# Patient Record
Sex: FEMALE | Race: BLACK OR AFRICAN AMERICAN | NOT HISPANIC OR LATINO
[De-identification: names, ages, dates, MRNs, and addresses within clinical notes are randomized per-mention and may not be internally consistent; named-entity substitution may affect disease eponyms.]

---

## 2017-04-13 PROBLEM — Z00.00 ENCOUNTER FOR PREVENTIVE HEALTH EXAMINATION: Status: ACTIVE | Noted: 2017-04-13

## 2017-05-09 ENCOUNTER — APPOINTMENT (OUTPATIENT)
Dept: OTOLARYNGOLOGY | Facility: CLINIC | Age: 57
End: 2017-05-09

## 2017-10-16 ENCOUNTER — APPOINTMENT (OUTPATIENT)
Dept: OTOLARYNGOLOGY | Facility: HOSPITAL | Age: 57
End: 2017-10-16

## 2017-10-30 ENCOUNTER — INPATIENT (INPATIENT)
Facility: HOSPITAL | Age: 57
LOS: 0 days | Discharge: HOME | End: 2017-10-31
Attending: OTOLARYNGOLOGY | Admitting: OTOLARYNGOLOGY
Payer: MEDICAID

## 2017-10-30 ENCOUNTER — APPOINTMENT (OUTPATIENT)
Dept: OTOLARYNGOLOGY | Facility: HOSPITAL | Age: 57
End: 2017-10-30

## 2017-10-30 ENCOUNTER — RESULT REVIEW (OUTPATIENT)
Age: 57
End: 2017-10-30

## 2017-10-30 VITALS
HEIGHT: 64 IN | HEART RATE: 74 BPM | SYSTOLIC BLOOD PRESSURE: 113 MMHG | OXYGEN SATURATION: 98 % | TEMPERATURE: 98 F | RESPIRATION RATE: 18 BRPM | DIASTOLIC BLOOD PRESSURE: 84 MMHG

## 2017-10-30 DIAGNOSIS — Z93.0 TRACHEOSTOMY STATUS: Chronic | ICD-10-CM

## 2017-10-30 DIAGNOSIS — J39.8 OTHER SPECIFIED DISEASES OF UPPER RESPIRATORY TRACT: ICD-10-CM

## 2017-10-30 PROCEDURE — 31613 TRACHEOSTOMA REVJ SIMPLE: CPT

## 2017-10-30 PROCEDURE — 31571 LARYNGOSCOP W/VC INJ + SCOPE: CPT

## 2017-10-30 PROCEDURE — 31641 BRONCHOSCOPY TREAT BLOCKAGE: CPT

## 2017-10-30 PROCEDURE — 88305 TISSUE EXAM BY PATHOLOGIST: CPT | Mod: 26

## 2017-10-30 RX ORDER — ASPIRIN/CALCIUM CARB/MAGNESIUM 324 MG
81 TABLET ORAL DAILY
Qty: 0 | Refills: 0 | Status: DISCONTINUED | OUTPATIENT
Start: 2017-10-30 | End: 2017-10-31

## 2017-10-30 RX ORDER — MORPHINE SULFATE 50 MG/1
2 CAPSULE, EXTENDED RELEASE ORAL EVERY 4 HOURS
Qty: 0 | Refills: 0 | Status: DISCONTINUED | OUTPATIENT
Start: 2017-10-30 | End: 2017-10-31

## 2017-10-30 RX ORDER — MIRTAZAPINE 45 MG/1
15 TABLET, ORALLY DISINTEGRATING ORAL DAILY
Qty: 0 | Refills: 0 | Status: DISCONTINUED | OUTPATIENT
Start: 2017-10-30 | End: 2017-10-31

## 2017-10-30 RX ORDER — HEPARIN SODIUM 5000 [USP'U]/ML
5000 INJECTION INTRAVENOUS; SUBCUTANEOUS EVERY 12 HOURS
Qty: 0 | Refills: 0 | Status: DISCONTINUED | OUTPATIENT
Start: 2017-10-30 | End: 2017-10-31

## 2017-10-30 RX ORDER — SODIUM CHLORIDE 9 MG/ML
1000 INJECTION, SOLUTION INTRAVENOUS
Qty: 0 | Refills: 0 | Status: DISCONTINUED | OUTPATIENT
Start: 2017-10-30 | End: 2017-10-31

## 2017-10-30 RX ORDER — ACETAMINOPHEN 500 MG
650 TABLET ORAL ONCE
Qty: 0 | Refills: 0 | Status: COMPLETED | OUTPATIENT
Start: 2017-10-30 | End: 2017-10-30

## 2017-10-30 RX ORDER — CLONAZEPAM 1 MG
0.5 TABLET ORAL EVERY 8 HOURS
Qty: 0 | Refills: 0 | Status: DISCONTINUED | OUTPATIENT
Start: 2017-10-30 | End: 2017-10-31

## 2017-10-30 RX ORDER — ALBUTEROL 90 UG/1
2 AEROSOL, METERED ORAL EVERY 6 HOURS
Qty: 0 | Refills: 0 | Status: DISCONTINUED | OUTPATIENT
Start: 2017-10-30 | End: 2017-10-30

## 2017-10-30 RX ORDER — CLONAZEPAM 1 MG
0.5 TABLET ORAL DAILY
Qty: 0 | Refills: 0 | Status: DISCONTINUED | OUTPATIENT
Start: 2017-10-30 | End: 2017-10-30

## 2017-10-30 RX ORDER — IPRATROPIUM/ALBUTEROL SULFATE 18-103MCG
3 AEROSOL WITH ADAPTER (GRAM) INHALATION EVERY 6 HOURS
Qty: 0 | Refills: 0 | Status: DISCONTINUED | OUTPATIENT
Start: 2017-10-30 | End: 2017-10-31

## 2017-10-30 RX ORDER — ALBUTEROL 90 UG/1
2.5 AEROSOL, METERED ORAL ONCE
Qty: 0 | Refills: 0 | Status: COMPLETED | OUTPATIENT
Start: 2017-10-30 | End: 2017-10-30

## 2017-10-30 RX ORDER — ACETAMINOPHEN 500 MG
650 TABLET ORAL EVERY 6 HOURS
Qty: 0 | Refills: 0 | Status: DISCONTINUED | OUTPATIENT
Start: 2017-10-30 | End: 2017-10-31

## 2017-10-30 RX ORDER — IPRATROPIUM BROMIDE 0.2 MG/ML
1 SOLUTION, NON-ORAL INHALATION EVERY 6 HOURS
Qty: 0 | Refills: 0 | Status: DISCONTINUED | OUTPATIENT
Start: 2017-10-30 | End: 2017-10-30

## 2017-10-30 RX ORDER — AMLODIPINE BESYLATE 2.5 MG/1
5 TABLET ORAL DAILY
Qty: 0 | Refills: 0 | Status: DISCONTINUED | OUTPATIENT
Start: 2017-10-30 | End: 2017-10-31

## 2017-10-30 RX ORDER — OXYCODONE AND ACETAMINOPHEN 5; 325 MG/1; MG/1
2 TABLET ORAL EVERY 6 HOURS
Qty: 0 | Refills: 0 | Status: DISCONTINUED | OUTPATIENT
Start: 2017-10-30 | End: 2017-10-31

## 2017-10-30 RX ORDER — FENTANYL CITRATE 50 UG/ML
25 INJECTION INTRAVENOUS
Qty: 0 | Refills: 0 | Status: DISCONTINUED | OUTPATIENT
Start: 2017-10-30 | End: 2017-10-31

## 2017-10-30 RX ORDER — ONDANSETRON 8 MG/1
4 TABLET, FILM COATED ORAL ONCE
Qty: 0 | Refills: 0 | Status: DISCONTINUED | OUTPATIENT
Start: 2017-10-30 | End: 2017-10-31

## 2017-10-30 RX ADMIN — FENTANYL CITRATE 25 MICROGRAM(S): 50 INJECTION INTRAVENOUS at 18:30

## 2017-10-30 RX ADMIN — ALBUTEROL 2.5 MILLIGRAM(S): 90 AEROSOL, METERED ORAL at 19:47

## 2017-10-30 RX ADMIN — FENTANYL CITRATE 25 MICROGRAM(S): 50 INJECTION INTRAVENOUS at 19:10

## 2017-10-30 RX ADMIN — FENTANYL CITRATE 25 MICROGRAM(S): 50 INJECTION INTRAVENOUS at 23:49

## 2017-10-30 RX ADMIN — Medication 1 MILLIGRAM(S): at 18:30

## 2017-10-30 NOTE — CONSULT NOTE ADULT - ASSESSMENT
57 year old woman s/p direct laryngoscopy and tracheoplasty, SICU consult for respiratory care    Plan:    Neuro: post-op pain  pain control tylenol and oxycodone    Respiratory: s/p tracheostomy revision  continue current vent settings    CV: hypertension  continue norvasc    GI: no active issues  diet per ENT    : no active issues    Heme: DVT ppx  SQH    Dispo: PACU overnight

## 2017-10-30 NOTE — H&P ADULT - NSHPPHYSICALEXAM_GEN_ALL_CORE
Constitutional:. wheel-chair bound, on oxygen through trach, no retractions or distress.     Neck:. trach tube one-half out from stoma, loose trach ties. parotid gland, submandibular gland and thyroid glands are normal. temporomandibular joint is normal.     Ear: external ears are normal bilaterally and tympanic membranes are normal in both ears.     Nasal:  external appearance is normal, inferior turbinates and middle turbinates are normal and no abnormal secretions.     Oral Cavity: tongue is normal, teeth are normal, gums are normal, palatine tonsils are normal, lingual tonsils are normal, mucosa is normal and trachea located in midline position.   Larynx: laryngoscopy was performed, see procedure section for findings.     Head/Face: no masses and lesions seen, face is symmetric . salivary glands are normal.     Eyes: ocular motility and gaze are normal, extraocular movements are normal and no nystagmus.     Respiratory: assessment of respiratory effort is normal.       Lymphatic: palpation of lymph nodes is normal and no neck adenopathy.     Neurological: cranial nerves 2-12 intact and orientation to person, place, and time: normal.     Skin: no rashes.   Additional Findings: moderately raspy voice.

## 2017-10-30 NOTE — H&P ADULT - ASSESSMENT
57yF with chronic tracheostomy dependence, LTS, for repair in OR. She needs antibiotics now for acute tracheitis and we will plan for elective DLB with excision stenosis. Pulmonary clearance prior to OR. This can be done at [facility] or [facility].

## 2017-10-30 NOTE — H&P ADULT - HISTORY OF PRESENT ILLNESS
LnsycHygpldi378Ghc FuspjVdimaxw149Uplfm RagtiPhyuwki659Cue AmlnMktxYbrhl74Wtwcp 57yF with long h/o trach dependence pulmonary disease, laryngotracheal stneosis, last surgery at [facility] more than one year ago. She has been having a hard time breathing for the last few months and is requiring intermittent vent. The trach has been coming out and is difficult to replace for the patinet. Mild bleeding from trach, no significant amount, but frequent small blood tinged sputum coughed out from trach. She has been taking acetylcysteine for secretions, feels better on oxygen, though she does not desaturate off oxygen. PzmiUrqmNbald10Oef OvrxrOqnmard2Mxnhl KzmzsRmethic6Vtm IwhncJgxsegy797Dxtdz CegawUlifbrl534Ahm ZwbvnOncqkwr692Bscjv CbnfrAconuqj951Nvv LgtbsTljmmwz277Cyfzq CdnzpOufldzz468Qef [id number]

## 2017-10-30 NOTE — CONSULT NOTE ADULT - SUBJECTIVE AND OBJECTIVE BOX
HISTORY OF PRESENT ILLNESS:  PATRIZIA NAVARRETE is a 57y Female with long h/o trach dependence pulmonary disease, laryngotracheal stenosis, last surgery at [facility] more than one year ago. She has been having a hard time breathing for the last few months and is requiring intermittent vent. The trach has been coming out and is difficult to replace for the patient. Mild bleeding from trach, no significant amount, but frequent small blood tinged sputum coughed out from trach. She has been taking acetylcysteine for secretions, feels better on oxygen, though she does not desaturate off oxygen.     Today she underwent       PAST MEDICAL HISTORY: Tracheostomy dependent      PAST SURGICAL HISTORY: Dependence on tracheostomy      FAMILY HISTORY:     SOCIAL HISTORY:    CODE STATUS:     HOME MEDICATIONS:    ALLERGIES: No Known Allergies      VITAL SIGNS:  ICU Vital Signs Last 24 Hrs  T(C): 36.4 (30 Oct 2017 16:40), Max: 36.9 (30 Oct 2017 13:45)  T(F): 97.5 (30 Oct 2017 16:40), Max: 98.4 (30 Oct 2017 13:45)  HR: 72 (30 Oct 2017 18:30) (57 - 84)  BP: 118/75 (30 Oct 2017 18:30) (102/79 - 137/74)  BP(mean): --  ABP: --  ABP(mean): --  RR: 15 (30 Oct 2017 18:30) (10 - 19)  SpO2: 100% (30 Oct 2017 18:30) (98% - 100%)      NEURO  Exam:  Meds:acetaminophen   Tablet 650 milliGRAM(s) Oral every 6 hours PRN mild pain  acetaminophen   Tablet 650 milliGRAM(s) Oral once  clonazePAM  Oral Tab/Cap - Peds 0.5 milliGRAM(s) Oral daily  fentaNYL    Injectable 25 MICROGram(s) IV Push every 5 minutes PRN Moderate Pain  mirtazapine 15 milliGRAM(s) Oral daily  morphine  - Injectable 2 milliGRAM(s) IV Push every 4 hours PRN Severe Pain (7 - 10)  ondansetron Injectable 4 milliGRAM(s) IV Push once PRN Nausea and/or Vomiting  oxyCODONE    5 mG/acetaminophen 325 mG 2 Tablet(s) Oral every 6 hours PRN Moderate Pain (4 - 6)      RESPIRATORY  Mechanical Ventilation:     Exam:  Meds:ALBUTerol    0.083%. 2.5 milliGRAM(s) Nebulizer once  ALBUTerol    90 MICROgram(s) HFA Inhaler 2 Puff(s) Inhalation every 6 hours PRN Shortness of Breath  ipratropium 17 MICROgram(s) HFA Inhaler 1 Puff(s) Inhalation every 6 hours      CARDIOVASCULAR    Exam:  Cardiac Rhythm:  Meds:amLODIPine   Tablet 5 milliGRAM(s) Oral daily      GI/NUTRITION  Exam:  Diet:  Meds:    GENITOURINARY/RENAL  Meds:lactated ringers. 1000 milliLiter(s) IV Continuous <Continuous>              [ ] Reich catheter, indication: urine output monitoring in critically ill patient    HEMATOLOGIC  [ ] VTE Prophylaxis:  aspirin  chewable 81 milliGRAM(s) Oral daily  heparin  Injectable 5000 Unit(s) SubCutaneous every 12 hours        Transfusion: [ ] PRBC	[ ] Platelets	[ ] FFP	[ ] Cryoprecipitate      INFECTIOUS DISEASES  Meds:  RECENT CULTURES:      ENDOCRINE  Meds:  CAPILLARY BLOOD GLUCOSE          PATIENT CARE ACCESS DEVICES:  [ ] Peripheral IV  [ ] Central Venous Line	[ ] R	[ ] L	[ ] IJ	[ ] Fem	[ ] SC	Placed:   [ ] Arterial Line		[ ] R	[ ] L	[ ] Fem	[ ] Rad	[ ] Ax	Placed:   [ ] PICC:					[ ] Mediport  [ ] Urinary Catheter, Date Placed:   [x] Necessity of urinary, arterial, and venous catheters discussed    OTHER MEDICATIONS:     IMAGING STUDIES: HISTORY OF PRESENT ILLNESS:  PATRIZIA NAVARRETE is a 57y Female with long h/o trach dependence pulmonary disease, laryngotracheal stenosis, last surgery at [facility] more than one year ago. She has been having a hard time breathing for the last few months and is requiring intermittent vent. The trach has been coming out and is difficult to replace for the patient. Mild bleeding from trach, no significant amount, but frequent small blood tinged sputum coughed out from trach. She has been taking acetylcysteine for secretions, feels better on oxygen, though she does not desaturate off oxygen.     Today she underwent direct laryngoscopy and tracheoplasty. The procedure was un      PAST MEDICAL HISTORY: Tracheostomy dependent      PAST SURGICAL HISTORY: tracheostomy  CODE STATUS: Full Code     HOME MEDICATIONS: aspirin 81 mg, amlodipine 5 mg daily    ALLERGIES: No Known Allergies      VITAL SIGNS:  ICU Vital Signs Last 24 Hrs  T(C): 36.4 (30 Oct 2017 16:40), Max: 36.9 (30 Oct 2017 13:45)  T(F): 97.5 (30 Oct 2017 16:40), Max: 98.4 (30 Oct 2017 13:45)  HR: 72 (30 Oct 2017 18:30) (57 - 84)  BP: 118/75 (30 Oct 2017 18:30) (102/79 - 137/74)  BP(mean): --  ABP: --  ABP(mean): --  RR: 15 (30 Oct 2017 18:30) (10 - 19)  SpO2: 100% (30 Oct 2017 18:30) (98% - 100%)      NEURO  Exam: patient currently agitated, complaining that cuff is too tight, moving all extremities equally  Meds:acetaminophen   Tablet 650 milliGRAM(s) Oral every 6 hours PRN mild pain  acetaminophen   Tablet 650 milliGRAM(s) Oral once  clonazePAM  Oral Tab/Cap - Peds 0.5 milliGRAM(s) Oral daily  fentaNYL    Injectable 25 MICROGram(s) IV Push every 5 minutes PRN Moderate Pain  mirtazapine 15 milliGRAM(s) Oral daily  morphine  - Injectable 2 milliGRAM(s) IV Push every 4 hours PRN Severe Pain (7 - 10)  ondansetron Injectable 4 milliGRAM(s) IV Push once PRN Nausea and/or Vomiting  oxyCODONE    5 mG/acetaminophen 325 mG 2 Tablet(s) Oral every 6 hours PRN Moderate Pain (4 - 6)      RESPIRATORY  Mechanical Ventilation:     Exam:  Meds:ALBUTerol    0.083%. 2.5 milliGRAM(s) Nebulizer once  ALBUTerol    90 MICROgram(s) HFA Inhaler 2 Puff(s) Inhalation every 6 hours PRN Shortness of Breath  ipratropium 17 MICROgram(s) HFA Inhaler 1 Puff(s) Inhalation every 6 hours      CARDIOVASCULAR    Exam: S1, S2 heard  Cardiac Rhythm: regular rate and rhythm  Meds:amLODIPine   Tablet 5 milliGRAM(s) Oral daily      GI/NUTRITION  Exam: soft, nontender, nondistended  Diet: currently NPO, activatable CLD  Meds: none    GENITOURINARY/RENAL  Meds:lactated ringers. 1000 milliLiter(s) IV Continuous <Continuous>              [ ] Reich catheter, indication: urine output monitoring in critically ill patient- N/A    HEMATOLOGIC  [x] VTE Prophylaxis:  aspirin  chewable 81 milliGRAM(s) Oral daily  heparin  Injectable 5000 Unit(s) SubCutaneous every 12 hours        Transfusion: [ ] PRBC	[ ] Platelets	[ ] FFP	[ ] Cryoprecipitate- none      INFECTIOUS DISEASES  Meds: none  RECENT CULTURES: none      ENDOCRINE  Meds: none  CAPILLARY BLOOD GLUCOSE          PATIENT CARE ACCESS DEVICES:  [x] Peripheral IV  [ ] Central Venous Line	[ ] R	[ ] L	[ ] IJ	[ ] Fem	[ ] SC	Placed:   [ ] Arterial Line		[ ] R	[ ] L	[ ] Fem	[ ] Rad	[ ] Ax	Placed:   [ ] PICC:					[ ] Mediport  [ ] Urinary Catheter, Date Placed:   [x] Necessity of urinary, arterial, and venous catheters discussed HISTORY OF PRESENT ILLNESS:  PATRIZIA NAVARRETE is a 57y Female with long h/o trach dependence pulmonary disease, laryngotracheal stenosis, last surgery at [facility] more than one year ago. She has been having a hard time breathing for the last few months and is requiring intermittent vent. The trach has been coming out and is difficult to replace for the patient. Mild bleeding from trach, no significant amount, but frequent small blood tinged sputum coughed out from trach. She has been taking acetylcysteine for secretions, feels better on oxygen, though she does not desaturate off oxygen.     Today she underwent direct laryngoscopy and tracheoplasty. The procedure was un      PAST MEDICAL HISTORY: Tracheostomy dependent, obesity, hypertension, anxiety, COPD, dyspepsia      PAST SURGICAL HISTORY: tracheostomy in 2007, stomaplasty september 2016  CODE STATUS: Full Code     HOME MEDICATIONS: aspirin 81 mg, amlodipine 10 mg daily, prednisone PRN, albuterol+  ipratropium nebs, lasix 20 mg PO qd, zantac 150 mg PO bid, clonazepam .5 mg PO bid, 15 mg PO qhs mirtazapine    ALLERGIES: No Known Allergies      VITAL SIGNS:  ICU Vital Signs Last 24 Hrs  T(C): 36.4 (30 Oct 2017 16:40), Max: 36.9 (30 Oct 2017 13:45)  T(F): 97.5 (30 Oct 2017 16:40), Max: 98.4 (30 Oct 2017 13:45)  HR: 72 (30 Oct 2017 18:30) (57 - 84)  BP: 118/75 (30 Oct 2017 18:30) (102/79 - 137/74)  BP(mean): --  ABP: --  ABP(mean): --  RR: 15 (30 Oct 2017 18:30) (10 - 19)  SpO2: 100% (30 Oct 2017 18:30) (98% - 100%)      NEURO  Exam: patient currently agitated, complaining that cuff is too tight, moving all extremities equally  Meds:acetaminophen   Tablet 650 milliGRAM(s) Oral every 6 hours PRN mild pain  acetaminophen   Tablet 650 milliGRAM(s) Oral once  clonazePAM  Oral Tab/Cap - Peds 0.5 milliGRAM(s) Oral daily  fentaNYL    Injectable 25 MICROGram(s) IV Push every 5 minutes PRN Moderate Pain  mirtazapine 15 milliGRAM(s) Oral daily  morphine  - Injectable 2 milliGRAM(s) IV Push every 4 hours PRN Severe Pain (7 - 10)  ondansetron Injectable 4 milliGRAM(s) IV Push once PRN Nausea and/or Vomiting  oxyCODONE    5 mG/acetaminophen 325 mG 2 Tablet(s) Oral every 6 hours PRN Moderate Pain (4 - 6)      RESPIRATORY  Mechanical Ventilation: rate 12, volume 500 peep 5 FiO2 40%    Exam:  Meds:ALBUTerol    0.083%. 2.5 milliGRAM(s) Nebulizer once  ALBUTerol    90 MICROgram(s) HFA Inhaler 2 Puff(s) Inhalation every 6 hours PRN Shortness of Breath  ipratropium 17 MICROgram(s) HFA Inhaler 1 Puff(s) Inhalation every 6 hours      CARDIOVASCULAR    Exam: S1, S2 heard  Cardiac Rhythm: regular rate and rhythm  Meds:amLODIPine   Tablet 5 milliGRAM(s) Oral daily  per outpatient records, EF 55-60%      GI/NUTRITION  Exam: soft, nontender, nondistended  Diet: currently NPO, activatable CLD  Meds: none    GENITOURINARY/RENAL  Meds:lactated ringers. 1000 milliLiter(s) IV Continuous <Continuous>              [ ] Reich catheter, indication: urine output monitoring in critically ill patient- N/A    HEMATOLOGIC  [x] VTE Prophylaxis:  aspirin  chewable 81 milliGRAM(s) Oral daily  heparin  Injectable 5000 Unit(s) SubCutaneous every 12 hours        Transfusion: [ ] PRBC	[ ] Platelets	[ ] FFP	[ ] Cryoprecipitate- none      INFECTIOUS DISEASES  Meds: none  RECENT CULTURES: none      ENDOCRINE  Meds: none  CAPILLARY BLOOD GLUCOSE          PATIENT CARE ACCESS DEVICES:  [x] Peripheral IV  [ ] Central Venous Line	[ ] R	[ ] L	[ ] IJ	[ ] Fem	[ ] SC	Placed:   [ ] Arterial Line		[ ] R	[ ] L	[ ] Fem	[ ] Rad	[ ] Ax	Placed:   [ ] PICC:					[ ] Mediport  [ ] Urinary Catheter, Date Placed:   [x] Necessity of urinary, arterial, and venous catheters discussed

## 2017-10-31 ENCOUNTER — TRANSCRIPTION ENCOUNTER (OUTPATIENT)
Age: 57
End: 2017-10-31

## 2017-10-31 VITALS
HEART RATE: 90 BPM | SYSTOLIC BLOOD PRESSURE: 151 MMHG | OXYGEN SATURATION: 100 % | RESPIRATION RATE: 15 BRPM | DIASTOLIC BLOOD PRESSURE: 90 MMHG

## 2017-10-31 LAB
ALBUMIN SERPL ELPH-MCNC: 4.3 G/DL — SIGNIFICANT CHANGE UP (ref 3.3–5)
ALP SERPL-CCNC: 62 U/L — SIGNIFICANT CHANGE UP (ref 40–120)
ALT FLD-CCNC: 9 U/L — SIGNIFICANT CHANGE UP (ref 4–33)
APTT BLD: 45 SEC — HIGH (ref 27.5–37.4)
AST SERPL-CCNC: 17 U/L — SIGNIFICANT CHANGE UP (ref 4–32)
BASOPHILS # BLD AUTO: 0.01 K/UL — SIGNIFICANT CHANGE UP (ref 0–0.2)
BASOPHILS NFR BLD AUTO: 0.1 % — SIGNIFICANT CHANGE UP (ref 0–2)
BILIRUB SERPL-MCNC: 0.5 MG/DL — SIGNIFICANT CHANGE UP (ref 0.2–1.2)
BUN SERPL-MCNC: 8 MG/DL — SIGNIFICANT CHANGE UP (ref 7–23)
CALCIUM SERPL-MCNC: 9.5 MG/DL — SIGNIFICANT CHANGE UP (ref 8.4–10.5)
CHLORIDE SERPL-SCNC: 102 MMOL/L — SIGNIFICANT CHANGE UP (ref 98–107)
CO2 SERPL-SCNC: 29 MMOL/L — SIGNIFICANT CHANGE UP (ref 22–31)
CREAT SERPL-MCNC: 0.66 MG/DL — SIGNIFICANT CHANGE UP (ref 0.5–1.3)
EOSINOPHIL # BLD AUTO: 0 K/UL — SIGNIFICANT CHANGE UP (ref 0–0.5)
EOSINOPHIL NFR BLD AUTO: 0 % — SIGNIFICANT CHANGE UP (ref 0–6)
GLUCOSE SERPL-MCNC: 140 MG/DL — HIGH (ref 70–99)
HCT VFR BLD CALC: 34.3 % — LOW (ref 34.5–45)
HGB BLD-MCNC: 10.7 G/DL — LOW (ref 11.5–15.5)
IMM GRANULOCYTES # BLD AUTO: 0.03 # — SIGNIFICANT CHANGE UP
IMM GRANULOCYTES NFR BLD AUTO: 0.4 % — SIGNIFICANT CHANGE UP (ref 0–1.5)
INR BLD: 1.09 — SIGNIFICANT CHANGE UP (ref 0.88–1.17)
LYMPHOCYTES # BLD AUTO: 1.19 K/UL — SIGNIFICANT CHANGE UP (ref 1–3.3)
LYMPHOCYTES # BLD AUTO: 14.6 % — SIGNIFICANT CHANGE UP (ref 13–44)
MAGNESIUM SERPL-MCNC: 1.6 MG/DL — SIGNIFICANT CHANGE UP (ref 1.6–2.6)
MCHC RBC-ENTMCNC: 27.1 PG — SIGNIFICANT CHANGE UP (ref 27–34)
MCHC RBC-ENTMCNC: 31.2 % — LOW (ref 32–36)
MCV RBC AUTO: 86.8 FL — SIGNIFICANT CHANGE UP (ref 80–100)
MONOCYTES # BLD AUTO: 0.08 K/UL — SIGNIFICANT CHANGE UP (ref 0–0.9)
MONOCYTES NFR BLD AUTO: 1 % — LOW (ref 2–14)
NEUTROPHILS # BLD AUTO: 6.85 K/UL — SIGNIFICANT CHANGE UP (ref 1.8–7.4)
NEUTROPHILS NFR BLD AUTO: 83.9 % — HIGH (ref 43–77)
NRBC # FLD: 0 — SIGNIFICANT CHANGE UP
PHOSPHATE SERPL-MCNC: 1.9 MG/DL — LOW (ref 2.5–4.5)
PLATELET # BLD AUTO: 286 K/UL — SIGNIFICANT CHANGE UP (ref 150–400)
PMV BLD: 11.3 FL — SIGNIFICANT CHANGE UP (ref 7–13)
POTASSIUM SERPL-MCNC: 4.2 MMOL/L — SIGNIFICANT CHANGE UP (ref 3.5–5.3)
POTASSIUM SERPL-SCNC: 4.2 MMOL/L — SIGNIFICANT CHANGE UP (ref 3.5–5.3)
PROT SERPL-MCNC: 7.6 G/DL — SIGNIFICANT CHANGE UP (ref 6–8.3)
PROTHROM AB SERPL-ACNC: 12.2 SEC — SIGNIFICANT CHANGE UP (ref 9.8–13.1)
RBC # BLD: 3.95 M/UL — SIGNIFICANT CHANGE UP (ref 3.8–5.2)
RBC # FLD: 13.8 % — SIGNIFICANT CHANGE UP (ref 10.3–14.5)
SODIUM SERPL-SCNC: 144 MMOL/L — SIGNIFICANT CHANGE UP (ref 135–145)
WBC # BLD: 8.16 K/UL — SIGNIFICANT CHANGE UP (ref 3.8–10.5)
WBC # FLD AUTO: 8.16 K/UL — SIGNIFICANT CHANGE UP (ref 3.8–10.5)

## 2017-10-31 RX ORDER — IPRATROPIUM/ALBUTEROL SULFATE 18-103MCG
3 AEROSOL WITH ADAPTER (GRAM) INHALATION
Qty: 0 | Refills: 0 | COMMUNITY
Start: 2017-10-31

## 2017-10-31 RX ORDER — CLONAZEPAM 1 MG
1 TABLET ORAL
Qty: 0 | Refills: 0 | COMMUNITY
Start: 2017-10-31

## 2017-10-31 RX ORDER — AMLODIPINE BESYLATE 2.5 MG/1
1 TABLET ORAL
Qty: 0 | Refills: 0 | COMMUNITY
Start: 2017-10-31

## 2017-10-31 RX ORDER — ASPIRIN/CALCIUM CARB/MAGNESIUM 324 MG
1 TABLET ORAL
Qty: 0 | Refills: 0 | COMMUNITY
Start: 2017-10-31

## 2017-10-31 RX ADMIN — FENTANYL CITRATE 25 MICROGRAM(S): 50 INJECTION INTRAVENOUS at 00:00

## 2017-10-31 RX ADMIN — Medication 3 MILLILITER(S): at 11:59

## 2017-10-31 RX ADMIN — OXYCODONE AND ACETAMINOPHEN 2 TABLET(S): 5; 325 TABLET ORAL at 05:10

## 2017-10-31 RX ADMIN — OXYCODONE AND ACETAMINOPHEN 2 TABLET(S): 5; 325 TABLET ORAL at 13:20

## 2017-10-31 RX ADMIN — Medication 3 MILLILITER(S): at 05:03

## 2017-10-31 RX ADMIN — OXYCODONE AND ACETAMINOPHEN 2 TABLET(S): 5; 325 TABLET ORAL at 04:44

## 2017-10-31 RX ADMIN — Medication 3 MILLILITER(S): at 00:00

## 2017-10-31 RX ADMIN — AMLODIPINE BESYLATE 5 MILLIGRAM(S): 2.5 TABLET ORAL at 05:00

## 2017-10-31 NOTE — DISCHARGE NOTE ADULT - CARE PROVIDER_API CALL
Gaurang Rodriguez (MD; PhD), Otolaryngology  [facility]  Dept of Otolaryngology  [address] [place] [id number]  Phone: [phone]  Fax: [phone]

## 2017-10-31 NOTE — DISCHARGE NOTE ADULT - HOSPITAL COURSE
direct laryngoscopy and stoma revision direct laryngoscopy and stoma revision. will be weaned off the ventilator by pulmonology. Unable to tolerate cuff down and finger occlusion longer than 1 minute.

## 2017-10-31 NOTE — DISCHARGE NOTE ADULT - CONDITIONS AT DISCHARGE
STABLE. WILL NEED TO GO HOME WITH HOME VENTILATOR. WILL NEED TO SEE LUNG DOCTOR TO BE WEANED OFF THE VENTILATOR. WILL NEED TO SEE DR. MEDINA IN OFFICE AS WELL.

## 2017-10-31 NOTE — DISCHARGE NOTE ADULT - CARE PLAN
Principal Discharge DX:	Tracheostomy dependent  Goal:	tracheostomy stoma revision  Instructions for follow-up, activity and diet:	clears

## 2017-10-31 NOTE — PROVIDER CONTACT NOTE (OTHER) - ASSESSMENT
Social work contacted due to patient going to be dischared today after medical clearance in PACU bed 2.  Pt resides alone in an apartment and is vent dependent.  NETTIE set up ALS ambulance transport for 2pm via [facility] ambulance.  ref# [id number] via [facility].  [facility] trip#[id number].  Pt's home care RN Rose Mary contacted regarding patient being restarted today for RN visits.  Rose Mary Rn T# [phone].  Rose Mary aware pt is leaving at 2pm,  if any changes occur RN will be notified.

## 2017-10-31 NOTE — DISCHARGE NOTE ADULT - PATIENT PORTAL LINK FT
“You can access the [employer] Patient Portal, offered by [facility], by registering with the following website: [email or web address]”

## 2017-10-31 NOTE — PROGRESS NOTE ADULT - SUBJECTIVE AND OBJECTIVE BOX
SICU Daily Progress Note  =====================================================  Interval/Overnight Events: nil acute overnight  appears comfortable and drowsy this morning, unable to talk, indicating pain to head and neck and dry mouth    POD #  1        	SICU Day #  2    HPI:  57y Female with long h/o trach dependence pulmonary disease, laryngotracheal stenosis, last surgery at [facility] more than one year ago. She has been having a hard time breathing for the last few months and is requiring intermittent vent. The trach has been coming out and is difficult to replace for the patient. Mild bleeding from trach, no significant amount, but frequent small blood tinged sputum coughed out from trach. She has been taking acetylcysteine for secretions, feels better on oxygen, though she does not desaturate off oxygen.     s/p direct laryngoscopy and tracheoplasty 10/30/17    PMH: Tracheostomy dependent, obesity, hypertension, anxiety, COPD, dyspepsia    Allergies: No Known Allergies      MEDICATIONS:   --------------------------------------------------------------------------------------  Neurologic Medications  acetaminophen   Tablet 650 milliGRAM(s) Oral every 6 hours PRN mild pain  clonazePAM Oral Disintegrating Tablet 0.5 milliGRAM(s) Oral every 8 hours PRN anxiety  mirtazapine 15 milliGRAM(s) Oral daily  morphine  - Injectable 2 milliGRAM(s) IV Push every 4 hours PRN Severe Pain (7 - 10)  ondansetron Injectable 4 milliGRAM(s) IV Push once PRN Nausea and/or Vomiting  oxyCODONE    5 mG/acetaminophen 325 mG 2 Tablet(s) Oral every 6 hours PRN Moderate Pain (4 - 6)    Respiratory Medications  ALBUTerol/ipratropium for Nebulization 3 milliLiter(s) Nebulizer every 6 hours    Cardiovascular Medications  amLODIPine   Tablet 5 milliGRAM(s) Oral daily    Gastrointestinal Medications  lactated ringers. 1000 milliLiter(s) IV Continuous <Continuous>    Genitourinary Medications    Hematologic/Oncologic Medications  aspirin  chewable 81 milliGRAM(s) Oral daily  heparin  Injectable 5000 Unit(s) SubCutaneous every 12 hours    Antimicrobial/Immunologic Medications    Endocrine/Metabolic Medications    Topical/Other Medications    --------------------------------------------------------------------------------------    VITAL SIGNS, INS/OUTS (last 24 hours):  --------------------------------------------------------------------------------------  ICU Vital Signs Last 24 Hrs  T(C): 37.1 (31 Oct 2017 05:00), Max: 37.1 (31 Oct 2017 05:00)  T(F): 98.7 (31 Oct 2017 05:00), Max: 98.7 (31 Oct 2017 05:00)  HR: 78 (31 Oct 2017 07:00) (57 - 112)  BP: 157/87 (31 Oct 2017 07:00) (102/79 - 161/95)  BP(mean): --  ABP: --  ABP(mean): --  RR: 12 (31 Oct 2017 07:00) (10 - 24)  SpO2: 100% (31 Oct 2017 07:00) (95% - 100%)    I&O's Detail    30 Oct 2017 07:01  -  31 Oct 2017 07:00  --------------------------------------------------------  IN:    lactated ringers.: 750 mL    Oral Fluid: 515 mL  Total IN: 1265 mL    OUT:    Voided: 1075 mL  Total OUT: 1075 mL    Total NET: 190 mL      31 Oct 2017 07:01  -  31 Oct 2017 07:45  --------------------------------------------------------  IN:    lactated ringers.: 75 mL  Total IN: 75 mL    OUT:  Total OUT: 0 mL    Total NET: 75 mL        --------------------------------------------------------------------------------------    EXAM  NEUROLOGY  RASS: -1  	GCS:    Exam: easily rousable, follows instructions, orientation could not be established, moving all limbs    HEENT  Exam: Normocephalic, atraumatic, EOMI. tracheostomy in place    RESPIRATORY  Exam: Lungs clear to auscultation, Normal expansion/effort.  Mechanical Ventilation: Mode: SIMV with PS, RR (machine): 12, TV (machine): 500, FiO2: 40, PEEP: 5, PS: 10, MAP: 9.8, PIP: 26    CARDIOVASCULAR  Exam: S1, S2.  Regular rate and rhythm.    GI/NUTRITION  Exam: Abdomen soft, Non-tender, Distended.  Wound: nil  Current Diet: clear liquid    VASCULAR  Exam: Extremities warm, well-perfused.    MUSCULOSKELETAL  Exam: All extremities moving spontaneously without limitations.    SKIN  Exam: Good skin turgor, no skin breakdown. ***    METABOLIC/FLUIDS/ELECTROLYTES  lactated ringers. 1000 milliLiter(s) IV Continuous <Continuous>      HEMATOLOGIC  [x] VTE Prophylaxis: aspirin  chewable 81 milliGRAM(s) Oral daily  heparin  Injectable 5000 Unit(s) SubCutaneous every 12 hours    Transfusions:	[] PRBC	[] Platelets		[] FFP	[] Cryoprecipitate    INFECTIOUS DISEASE  Antimicrobials/Immunologic Medications: nil    Tubes/Lines/Drains  ***  [x] Peripheral IV  [] Central Venous Line     	[] R	[] L	[] IJ	[] Fem	[] SC	Date Placed:   [] Arterial Line		[] R	[] L	[] Fem	[] Rad	[] Ax	Date Placed:   [] PICC		[] Midline		[] Mediport  [] Urinary Catheter		Date Placed:   [x] Necessity of urinary, arterial, and venous catheters discussed    LABS  --------------------------------------------------------------------------------------                                            10.7                  Neurophils% (auto):   83.9   (10-31 @ 04:54):    8.16 )-----------(286          Lymphocytes% (auto):  14.6                                          34.3                   Eosinphils% (auto):   0.0      Manual%: Neutrophils x    ; Lymphocytes x    ; Eosinophils x    ; Bands%: x    ; Blasts x          10-31    144  |  102  |  8   ----------------------------<  140<H>  4.2   |  29  |  0.66    Ca    9.5      31 Oct 2017 04:54  Phos  1.9     10-31  Mg     1.6     10-31    TPro  7.6  /  Alb  4.3  /  TBili  0.5  /  DBili  x   /  AST  17  /  ALT  9   /  AlkPhos  62  10-31    ( 10-31 @ 04:54 )   PT: 12.2 SEC;   INR: 1.09   aPTT: 45.0 SEC        RECENT CULTURES:      --------------------------------------------------------------------------------------    OTHER LABORATORY:     IMAGING STUDIES:
ANESTHESIA POSTOP CHECK    57y Female POSTOP DAY 1 S/P excision of trachael stoma  pod1    Vital Signs Last 24 Hrs  T(C): 37.1 (31 Oct 2017 05:00), Max: 37.1 (31 Oct 2017 05:00)  T(F): 98.7 (31 Oct 2017 05:00), Max: 98.7 (31 Oct 2017 05:00)  HR: 82 (31 Oct 2017 08:00) (57 - 112)  BP: 145/78 (31 Oct 2017 08:00) (102/79 - 161/95)  BP(mean): --  RR: 16 (31 Oct 2017 08:00) (10 - 24)  SpO2: 100% (31 Oct 2017 08:00) (95% - 100%)  I&O's Summary    30 Oct 2017 07:01  -  31 Oct 2017 07:00  --------------------------------------------------------  IN: 1265 mL / OUT: 1075 mL / NET: 190 mL    31 Oct 2017 07:01  -  31 Oct 2017 09:40  --------------------------------------------------------  IN: 325 mL / OUT: 325 mL / NET: 0 mL        [x ] NO APPARENT ANESTHESIA COMPLICATIONS      Comments:
Pt seen and examined, No acute event overnight.    Vss  Awake, interactive  On vent  Face symmetric  OC/OP pink moist mucosa, clear secretions  Neck with 6LPC secured with soft collar  Minimal peristomal oozing, no bleeding  Mild secretions    A/P 57F, long term vent dependent s/p DLB stomaplasty  -	Pain control  -	Vent   -	Home meds  -	Dispo planning  -	Routine trach care

## 2017-11-02 ENCOUNTER — TRANSCRIPTION ENCOUNTER (OUTPATIENT)
Age: 57
End: 2017-11-02

## 2017-11-08 RX ORDER — OXYCODONE AND ACETAMINOPHEN 5; 325 MG/1; MG/1
5-325 TABLET ORAL
Qty: 21 | Refills: 0 | Status: COMPLETED | COMMUNITY
Start: 2017-11-08 | End: 2017-11-08

## 2017-11-16 ENCOUNTER — APPOINTMENT (OUTPATIENT)
Dept: OTOLARYNGOLOGY | Facility: CLINIC | Age: 57
End: 2017-11-16

## 2017-11-17 ENCOUNTER — APPOINTMENT (OUTPATIENT)
Dept: OTOLARYNGOLOGY | Facility: CLINIC | Age: 57
End: 2017-11-17
Payer: MEDICAID

## 2017-11-17 VITALS
HEART RATE: 57 BPM | TEMPERATURE: 98.3 F | HEIGHT: 64 IN | SYSTOLIC BLOOD PRESSURE: 173 MMHG | DIASTOLIC BLOOD PRESSURE: 92 MMHG

## 2017-11-17 PROCEDURE — 31575 DIAGNOSTIC LARYNGOSCOPY: CPT | Mod: 58,59

## 2017-11-17 PROCEDURE — 99024 POSTOP FOLLOW-UP VISIT: CPT

## 2017-11-17 PROCEDURE — 31615 TRCHEOBRNCHSC EST TRACHS INC: CPT | Mod: 58

## 2017-12-21 ENCOUNTER — APPOINTMENT (OUTPATIENT)
Dept: OTOLARYNGOLOGY | Facility: CLINIC | Age: 57
End: 2017-12-21
Payer: MEDICAID

## 2017-12-21 ENCOUNTER — OUTPATIENT (OUTPATIENT)
Dept: OUTPATIENT SERVICES | Facility: HOSPITAL | Age: 57
LOS: 1 days | Discharge: HOME | End: 2017-12-21

## 2017-12-21 DIAGNOSIS — Z93.0 TRACHEOSTOMY STATUS: Chronic | ICD-10-CM

## 2017-12-21 PROCEDURE — 99024 POSTOP FOLLOW-UP VISIT: CPT

## 2017-12-21 PROCEDURE — 31615 TRCHEOBRNCHSC EST TRACHS INC: CPT | Mod: 78

## 2017-12-21 PROCEDURE — 17250 CHEM CAUT OF GRANLTJ TISSUE: CPT | Mod: 78

## 2018-01-05 ENCOUNTER — APPOINTMENT (OUTPATIENT)
Dept: OTOLARYNGOLOGY | Facility: CLINIC | Age: 58
End: 2018-01-05
Payer: MEDICAID

## 2018-01-05 VITALS — HEART RATE: 64 BPM | OXYGEN SATURATION: 99 % | SYSTOLIC BLOOD PRESSURE: 144 MMHG | DIASTOLIC BLOOD PRESSURE: 91 MMHG

## 2018-01-05 PROCEDURE — 31615 TRCHEOBRNCHSC EST TRACHS INC: CPT | Mod: 58

## 2018-01-05 PROCEDURE — 99024 POSTOP FOLLOW-UP VISIT: CPT

## 2018-01-19 ENCOUNTER — APPOINTMENT (OUTPATIENT)
Dept: OTOLARYNGOLOGY | Facility: CLINIC | Age: 58
End: 2018-01-19

## 2018-02-08 ENCOUNTER — APPOINTMENT (OUTPATIENT)
Dept: OTOLARYNGOLOGY | Facility: CLINIC | Age: 58
End: 2018-02-08
Payer: MEDICAID

## 2018-02-08 PROCEDURE — 99214 OFFICE O/P EST MOD 30 MIN: CPT | Mod: 25

## 2018-02-08 PROCEDURE — 31615 TRCHEOBRNCHSC EST TRACHS INC: CPT

## 2018-02-22 ENCOUNTER — APPOINTMENT (OUTPATIENT)
Dept: PULMONOLOGY | Facility: CLINIC | Age: 58
End: 2018-02-22
Payer: MEDICAID

## 2018-02-22 VITALS
OXYGEN SATURATION: 98 % | RESPIRATION RATE: 17 BRPM | SYSTOLIC BLOOD PRESSURE: 152 MMHG | HEIGHT: 68 IN | DIASTOLIC BLOOD PRESSURE: 100 MMHG | WEIGHT: 190 LBS | HEART RATE: 70 BPM | BODY MASS INDEX: 28.79 KG/M2

## 2018-02-22 DIAGNOSIS — Z86.79 PERSONAL HISTORY OF OTHER DISEASES OF THE CIRCULATORY SYSTEM: ICD-10-CM

## 2018-02-22 DIAGNOSIS — Z87.19 PERSONAL HISTORY OF OTHER DISEASES OF THE DIGESTIVE SYSTEM: ICD-10-CM

## 2018-02-22 DIAGNOSIS — Z82.3 FAMILY HISTORY OF STROKE: ICD-10-CM

## 2018-02-22 DIAGNOSIS — Z83.2 FAMILY HISTORY OF DISEASES OF THE BLOOD AND BLOOD-FORMING ORGANS AND CERTAIN DISORDERS INVOLVING THE IMMUNE MECHANISM: ICD-10-CM

## 2018-02-22 DIAGNOSIS — Z82.79 FAMILY HISTORY OF OTHER CONGENITAL MALFORMATIONS, DEFORMATIONS AND CHROMOSOMAL ABNORMALITIES: ICD-10-CM

## 2018-02-22 DIAGNOSIS — Z87.09 PERSONAL HISTORY OF OTHER DISEASES OF THE RESPIRATORY SYSTEM: ICD-10-CM

## 2018-02-22 DIAGNOSIS — Z80.6 FAMILY HISTORY OF LEUKEMIA: ICD-10-CM

## 2018-02-22 DIAGNOSIS — Z72.820 SLEEP DEPRIVATION: ICD-10-CM

## 2018-02-22 DIAGNOSIS — F41.9 ANXIETY DISORDER, UNSPECIFIED: ICD-10-CM

## 2018-02-22 DIAGNOSIS — Z87.891 PERSONAL HISTORY OF NICOTINE DEPENDENCE: ICD-10-CM

## 2018-02-22 DIAGNOSIS — Z87.440 PERSONAL HISTORY OF URINARY (TRACT) INFECTIONS: ICD-10-CM

## 2018-02-22 PROCEDURE — 99205 OFFICE O/P NEW HI 60 MIN: CPT

## 2018-02-22 RX ORDER — MULTIVITAMIN
TABLET ORAL
Refills: 0 | Status: ACTIVE | COMMUNITY

## 2018-02-22 RX ORDER — LOSARTAN POTASSIUM 25 MG/1
25 TABLET, FILM COATED ORAL
Refills: 0 | Status: ACTIVE | COMMUNITY

## 2018-02-22 RX ORDER — AMLODIPINE BESYLATE 5 MG/1
5 TABLET ORAL
Refills: 0 | Status: ACTIVE | COMMUNITY

## 2018-02-22 RX ORDER — CLONAZEPAM 0.5 MG/1
0.5 TABLET ORAL
Refills: 0 | Status: ACTIVE | COMMUNITY

## 2018-02-27 ENCOUNTER — MEDICATION RENEWAL (OUTPATIENT)
Age: 58
End: 2018-02-27

## 2018-02-27 RX ORDER — THEOPHYLLINE ANHYDROUS 300 MG/1
300 CAPSULE, EXTENDED RELEASE ORAL
Qty: 90 | Refills: 1 | Status: DISCONTINUED | COMMUNITY
Start: 2018-02-22 | End: 2018-02-27

## 2018-03-14 ENCOUNTER — FORM ENCOUNTER (OUTPATIENT)
Age: 58
End: 2018-03-14

## 2018-03-15 ENCOUNTER — APPOINTMENT (OUTPATIENT)
Dept: CT IMAGING | Facility: IMAGING CENTER | Age: 58
End: 2018-03-15
Payer: MEDICAID

## 2018-03-15 ENCOUNTER — OUTPATIENT (OUTPATIENT)
Dept: OUTPATIENT SERVICES | Facility: HOSPITAL | Age: 58
LOS: 1 days | End: 2018-03-15
Payer: MEDICAID

## 2018-03-15 DIAGNOSIS — Z93.0 TRACHEOSTOMY STATUS: Chronic | ICD-10-CM

## 2018-03-15 DIAGNOSIS — J39.8 OTHER SPECIFIED DISEASES OF UPPER RESPIRATORY TRACT: ICD-10-CM

## 2018-03-15 PROCEDURE — 71250 CT THORAX DX C-: CPT | Mod: 26

## 2018-03-15 PROCEDURE — 71250 CT THORAX DX C-: CPT

## 2018-03-19 ENCOUNTER — APPOINTMENT (OUTPATIENT)
Dept: PULMONOLOGY | Facility: CLINIC | Age: 58
End: 2018-03-19
Payer: MEDICAID

## 2018-03-19 VITALS
SYSTOLIC BLOOD PRESSURE: 126 MMHG | HEIGHT: 68 IN | HEART RATE: 63 BPM | WEIGHT: 190 LBS | DIASTOLIC BLOOD PRESSURE: 80 MMHG | OXYGEN SATURATION: 100 % | BODY MASS INDEX: 28.79 KG/M2

## 2018-03-19 PROCEDURE — 99214 OFFICE O/P EST MOD 30 MIN: CPT

## 2018-03-20 DIAGNOSIS — J04.10 ACUTE TRACHEITIS WITHOUT OBSTRUCTION: ICD-10-CM

## 2018-03-20 DIAGNOSIS — J98.9 RESPIRATORY DISORDER, UNSPECIFIED: ICD-10-CM

## 2018-03-20 DIAGNOSIS — J95.00 UNSPECIFIED TRACHEOSTOMY COMPLICATION: ICD-10-CM

## 2018-03-22 ENCOUNTER — APPOINTMENT (OUTPATIENT)
Dept: THORACIC SURGERY | Facility: CLINIC | Age: 58
End: 2018-03-22
Payer: MEDICAID

## 2018-03-22 ENCOUNTER — INPATIENT (INPATIENT)
Facility: HOSPITAL | Age: 58
LOS: 3 days | Discharge: HOME | DRG: 166 | End: 2018-03-26
Attending: THORACIC SURGERY (CARDIOTHORACIC VASCULAR SURGERY) | Admitting: THORACIC SURGERY (CARDIOTHORACIC VASCULAR SURGERY)
Payer: COMMERCIAL

## 2018-03-22 VITALS
HEART RATE: 57 BPM | HEIGHT: 64 IN | TEMPERATURE: 97.7 F | DIASTOLIC BLOOD PRESSURE: 74 MMHG | BODY MASS INDEX: 45.24 KG/M2 | OXYGEN SATURATION: 98 % | RESPIRATION RATE: 18 BRPM | WEIGHT: 265 LBS | SYSTOLIC BLOOD PRESSURE: 134 MMHG

## 2018-03-22 VITALS
HEART RATE: 76 BPM | RESPIRATION RATE: 18 BRPM | DIASTOLIC BLOOD PRESSURE: 85 MMHG | OXYGEN SATURATION: 98 % | TEMPERATURE: 98 F | SYSTOLIC BLOOD PRESSURE: 129 MMHG

## 2018-03-22 DIAGNOSIS — Z43.0 ENCOUNTER FOR ATTENTION TO TRACHEOSTOMY: Chronic | ICD-10-CM

## 2018-03-22 DIAGNOSIS — Z01.811 ENCOUNTER FOR PREPROCEDURAL RESPIRATORY EXAMINATION: ICD-10-CM

## 2018-03-22 DIAGNOSIS — Z93.0 TRACHEOSTOMY STATUS: Chronic | ICD-10-CM

## 2018-03-22 LAB
ALBUMIN SERPL ELPH-MCNC: 4.4 G/DL — SIGNIFICANT CHANGE UP (ref 3.3–5)
ALP SERPL-CCNC: 59 U/L — SIGNIFICANT CHANGE UP (ref 40–120)
ALT FLD-CCNC: 8 U/L — LOW (ref 10–45)
ANION GAP SERPL CALC-SCNC: 8 MMOL/L — SIGNIFICANT CHANGE UP (ref 5–17)
APTT BLD: 65.7 SEC — HIGH (ref 27.5–37.4)
AST SERPL-CCNC: 13 U/L — SIGNIFICANT CHANGE UP (ref 10–40)
BASOPHILS NFR BLD AUTO: 0.6 % — SIGNIFICANT CHANGE UP (ref 0–2)
BILIRUB SERPL-MCNC: 0.5 MG/DL — SIGNIFICANT CHANGE UP (ref 0.2–1.2)
BUN SERPL-MCNC: 5 MG/DL — LOW (ref 7–23)
CALCIUM SERPL-MCNC: 9.2 MG/DL — SIGNIFICANT CHANGE UP (ref 8.4–10.5)
CHLORIDE SERPL-SCNC: 101 MMOL/L — SIGNIFICANT CHANGE UP (ref 96–108)
CO2 SERPL-SCNC: 35 MMOL/L — HIGH (ref 22–31)
CREAT SERPL-MCNC: 0.73 MG/DL — SIGNIFICANT CHANGE UP (ref 0.5–1.3)
EOSINOPHIL NFR BLD AUTO: 3.6 % — SIGNIFICANT CHANGE UP (ref 0–6)
GLUCOSE SERPL-MCNC: 99 MG/DL — SIGNIFICANT CHANGE UP (ref 70–99)
HCT VFR BLD CALC: 33.8 % — LOW (ref 34.5–45)
HGB BLD-MCNC: 10.9 G/DL — LOW (ref 11.5–15.5)
INR BLD: 1.15 — SIGNIFICANT CHANGE UP (ref 0.88–1.16)
LYMPHOCYTES # BLD AUTO: 37.9 % — SIGNIFICANT CHANGE UP (ref 13–44)
MCHC RBC-ENTMCNC: 27.9 PG — SIGNIFICANT CHANGE UP (ref 27–34)
MCHC RBC-ENTMCNC: 32.2 G/DL — SIGNIFICANT CHANGE UP (ref 32–36)
MCV RBC AUTO: 86.4 FL — SIGNIFICANT CHANGE UP (ref 80–100)
MONOCYTES NFR BLD AUTO: 7.4 % — SIGNIFICANT CHANGE UP (ref 2–14)
NEUTROPHILS NFR BLD AUTO: 50.5 % — SIGNIFICANT CHANGE UP (ref 43–77)
PLATELET # BLD AUTO: 282 K/UL — SIGNIFICANT CHANGE UP (ref 150–400)
POTASSIUM SERPL-MCNC: 4 MMOL/L — SIGNIFICANT CHANGE UP (ref 3.5–5.3)
POTASSIUM SERPL-SCNC: 4 MMOL/L — SIGNIFICANT CHANGE UP (ref 3.5–5.3)
PROT SERPL-MCNC: 7.8 G/DL — SIGNIFICANT CHANGE UP (ref 6–8.3)
PROTHROM AB SERPL-ACNC: 12.8 SEC — HIGH (ref 9.8–12.7)
RBC # BLD: 3.91 M/UL — SIGNIFICANT CHANGE UP (ref 3.8–5.2)
RBC # FLD: 13.8 % — SIGNIFICANT CHANGE UP (ref 10.3–16.9)
SODIUM SERPL-SCNC: 144 MMOL/L — SIGNIFICANT CHANGE UP (ref 135–145)
WBC # BLD: 6.6 K/UL — SIGNIFICANT CHANGE UP (ref 3.8–10.5)
WBC # FLD AUTO: 6.6 K/UL — SIGNIFICANT CHANGE UP (ref 3.8–10.5)

## 2018-03-22 PROCEDURE — 99205 OFFICE O/P NEW HI 60 MIN: CPT

## 2018-03-22 PROCEDURE — 99285 EMERGENCY DEPT VISIT HI MDM: CPT

## 2018-03-22 PROCEDURE — 71045 X-RAY EXAM CHEST 1 VIEW: CPT | Mod: 26

## 2018-03-22 RX ORDER — IPRATROPIUM/ALBUTEROL SULFATE 18-103MCG
3 AEROSOL WITH ADAPTER (GRAM) INHALATION ONCE
Qty: 0 | Refills: 0 | Status: COMPLETED | OUTPATIENT
Start: 2018-03-22 | End: 2018-03-22

## 2018-03-22 RX ORDER — CHLORHEXIDINE GLUCONATE 213 G/1000ML
1 SOLUTION TOPICAL ONCE
Qty: 0 | Refills: 0 | Status: COMPLETED | OUTPATIENT
Start: 2018-03-22 | End: 2018-03-22

## 2018-03-22 RX ORDER — CHLORHEXIDINE GLUCONATE 213 G/1000ML
10 SOLUTION TOPICAL ONCE
Qty: 0 | Refills: 0 | Status: DISCONTINUED | OUTPATIENT
Start: 2018-03-22 | End: 2018-03-26

## 2018-03-22 RX ORDER — OXYCODONE AND ACETAMINOPHEN 5; 325 MG/1; MG/1
1 TABLET ORAL EVERY 6 HOURS
Qty: 0 | Refills: 0 | Status: DISCONTINUED | OUTPATIENT
Start: 2018-03-22 | End: 2018-03-26

## 2018-03-22 RX ADMIN — OXYCODONE AND ACETAMINOPHEN 1 TABLET(S): 5; 325 TABLET ORAL at 23:28

## 2018-03-22 RX ADMIN — Medication 3 MILLILITER(S): at 18:50

## 2018-03-22 NOTE — ED PROVIDER NOTE - OBJECTIVE STATEMENT
57 y/o female with hx of HTN, COPD, Anxiety, tracheal, bronchial malacia was evaluated earlier today by CT surgery team for episode of chocking, pain at site of tracheal stent. Concern is that trachea will need dilatation.

## 2018-03-22 NOTE — ED ADULT NURSE NOTE - PMH
Anxiety disorder    COPD (chronic obstructive pulmonary disease)    Hypertension    Pancreatic cyst    Sickle cell trait

## 2018-03-22 NOTE — H&P ADULT - NSHPLABSRESULTS_GEN_ALL_CORE
< from: CT Chest No Cont (03.15.18 @ 13:41) >  FINDINGS:   AIRWAYS AND LUNGS: Approximately 65% narrowing of the trachea with   dynamic expiration. Anti--dependent 10 x 5 mm nodule along the tracheal   wall just cranial to the [place] (2, 43) that focally nearly occludes the   trachea on dynamic expiration. The complete collapseof the proximal   bronchi with dynamic expiration. Linear opacities within the right upper   lobe may represent scarring. Tracheostomy tube.  MEDIASTINUM AND PLEURA: There are no enlarged mediastinal, hilar or   axillary lymph nodes.     There is no pleural effusion. There is no   pneumothorax.    HEART AND VESSELS: The heart is normal in size.  There are no   atherosclerotic calcifications of the aorta.  There is no pericardial   effusion.       UPPER ABDOMEN: Images of the upper abdomen demonstrate no abnormality.   BONES AND SOFT TISSUES: There are mild degenerative changes of the spine.    The soft tissues are unremarkable.  TUBES/LINES: None.  IMPRESSION:   Bronchomalacia. Approximately 65% narrowing of the trachea with dynamic   expiration, correlate clinically for tracheomalacia.   Anti--dependent 10 x 5 mm nodule along the tracheal wall just cranial to   the [place] that focally nearly occludes the trachea on dynamic   expiration. Given tracheostomy tube this may represent adherent   secretions although tracheal wall versus aortic etiologies are not   excluded. Consider short-term follow-up CTA versus direct visualization   for complete evaluation.

## 2018-03-22 NOTE — H&P ADULT - NSHPREVIEWOFSYSTEMS_GEN_ALL_CORE
Review of Systems  CONSTITUTIONAL:  Denies Fevers / chills, sweats, fatigue, weight loss, weight gain                                      NEURO:  Denies parathesias, seizures, syncope, confusion                                                                                EYES:  Denies Blurry vision, discharge, pain, loss of vision                                                                                    ENMT:  Denies Difficulty hearing, vertigo, dysphagia, epistaxis, recent dental work                                       CV:  Denies Chest pain, palpitations, INFANTE, orthopnea                                                                                          RESPIRATORY:  +SOB, -Wheezing,  -cough / sputum, -hemoptysis                                                                GI:  Denies Nausea, vommiting, diarrhea, constipation, melena, difficulty swallowing                                               : Denies Hematuria, dysuria, urgency, incontinence                                                                                         MUSKULOSKELETAL:  Denies arthritis, joint swelling, muscle weakness                                                             SKIN/BREAST:  Denies rash, itching, jam loss, masses                                                                                            PSYCH:  Denies depresion, anxiety, suicidal ideation                                                                                               HEME/LYMPH:  Denies bruises easily, enlarged lymph nodes, tender lymph nodes                                        ENDOCRINE:  Denies cold intolerance, heat intolerance, polydipsia

## 2018-03-22 NOTE — H&P ADULT - NSHPPHYSICALEXAM_GEN_ALL_CORE
Physical Exam  CONSTITUTIONAL: NAD, sitting in bed  NEURO: A&Ox3, no focal deficits                     EYES:  WNL  ENMT:  WNL  CV: S1S2, RRR, no m/g/r  RESPIRATORY: diffuse wheezing throughout  GI: +BS. NT/ND  : ANNE  -                                                                    MUSKULOSKELETAL: WNL  SKIN / BREAST:   WNL  EXTREMITIES: no edema or calf tenderness. 2+ peripheral pulses

## 2018-03-22 NOTE — H&P ADULT - HISTORY OF PRESENT ILLNESS
This is a 58 year old female, former smoker, with a PMHx of HTN, GERD, COPD and respiratory failure that was seen by Dr. Shell in the office for laryngotracheal stenosis, complaining of increased SOBx2 months and pain at her trach site. Her trach was placed 11 years ago after being hospitalized for a fall in which she fell face first into a park bench, which she manages at home with a 24/7 home aid and portable ventilator and can normally speak, breath and eat normally. Over the past 2 months, she has experienced increasing SOB for which she states she cant sleep, can only tolerate pureed meals and has noticed a change in her voice. In October of 2017, she was noted to have laryngotracheal stenosis on CT for which a bronchoscopy and excision of the laryngotracheal stenosis was performed and trach tube exchange with Dr. Gaurang Rodriguez. In March of 2018, the patient noted she was unable to change her trach tube (due to tightening of the trach), a dynamic CT was performed which revealed 65% stenosis of her trachea and a 10mm nodule above the maria del rosario. She was referred to Dr. Shell/Dr. Larios for evaluation. Upon her visit in the office, she was sent to [facility] ED for PST and management of her laryngotracheal stenosis. She denies fever, chills, headache, dizziness, chest pain, palpitations, abdominal pain, n/v/d, pain or swelling in the upper or lower extremities.

## 2018-03-22 NOTE — ED ADULT NURSE REASSESSMENT NOTE - NS ED NURSE REASSESS COMMENT FT1
Patient a/oX 3, anxious, able to speak w/out difficulty, no SOB or chest pain complaint, c/o of pain on tracheostomy site, no bleed or discharge from trach site.  Trach connected to VENT, nebs administered, Respiratory therapist at bedside, stable and comfortable.

## 2018-03-22 NOTE — H&P ADULT - ASSESSMENT
58 year old female, former smoker, with a PMHx of HTN, GERD, COPD and respiratory failure s/p trach 11 years ago. She is here for evaluation and management of her laryngotracheal stenosis and stenosis of trach site.    Neuro: pain control  -oxycodone    CV: pmhx HTN  -monitor tele, HR and BP    Pulm: COPD, respiratory failure s/p trach (11 years ago), laryngotracheal stenosis  -patient on ventilator  -SATing 100% on vent  -continue nebs  -OR tomorrow with Dr. Larios  -PST: need T&Sx2, bloods, consent, HgA1C, TSH     GI: no active issues  -Diet: NPO after midnight, Regular diet  -GI ppx: protonix when on 9L  -Bowel reg: colace, senna, miralax once on 9L    renal/gu: no active issues  -BUN/Cr 5/.73 monitor  -monitor lytes, replete prn    Endo: f/u HgA1C    Heme: H/H 1039/33.8  -DVT ppx: once on floor 5000un q8hrs    ID: no active issues  -afebrile, WBC 6.6  -monitor temp and CBC    Dispo: Bronchoscopy with possible balloon dilatation/laser ablation of laryngotracheal stenosis, trach exchange with Dr. Larios 3/23/18

## 2018-03-22 NOTE — ED PROVIDER NOTE - MEDICAL DECISION MAKING DETAILS
59 y/o female with trachea malacia with episode of chocking, pain at site of trachea. CT surgery planning to do bronchoscopy to be followed by tracheal dilatation as needed. Given albuterol in the ED

## 2018-03-22 NOTE — ED ADULT NURSE NOTE - CHPI ED SYMPTOMS NEG
no chest pain/no fever/no headache/no hemoptysis/no wheezing/no edema/no cough/no chills/no diaphoresis

## 2018-03-22 NOTE — ED ADULT NURSE REASSESSMENT NOTE - NS ED NURSE REASSESS COMMENT FT1
Rec'd pt calm, sitting on stretcher using cell phone. Pt denies SOB, MILTON at this time. Pt trach to vent settings FiO2 40%, Tv 450, RR 20, PEEP 5. Pt tolerating well. Safety precautions in place. Close monitoring continues.

## 2018-03-22 NOTE — ED ADULT NURSE NOTE - OBJECTIVE STATEMENT
Patient sent from PMD due to c/o of pain 6/10 over trach site, no discharge or bleed from site, no SOB.  Respiratory at bedside, attached to Vent and nebs administered.

## 2018-03-22 NOTE — ED PROVIDER NOTE - PMH
Anxiety disorder    COPD (chronic obstructive pulmonary disease)    Hypertension    Pancreatic cyst    Sickle cell trait    Tracheostomy dependent

## 2018-03-22 NOTE — H&P ADULT - NSHPSOCIALHISTORY_GEN_ALL_CORE
Social History  Smoker:   former     Pack Years:       When Quit:  ETOH Use:   NO     Ilicit Drug Use:   NO  Lives with:  and 24/7 aid

## 2018-03-23 ENCOUNTER — APPOINTMENT (OUTPATIENT)
Dept: THORACIC SURGERY | Facility: HOSPITAL | Age: 58
End: 2018-03-23

## 2018-03-23 DIAGNOSIS — J39.8 OTHER SPECIFIED DISEASES OF UPPER RESPIRATORY TRACT: ICD-10-CM

## 2018-03-23 LAB
ALBUMIN SERPL ELPH-MCNC: 4.1 G/DL — SIGNIFICANT CHANGE UP (ref 3.3–5)
ALP SERPL-CCNC: 56 U/L — SIGNIFICANT CHANGE UP (ref 40–120)
ALT FLD-CCNC: 7 U/L — LOW (ref 10–45)
ANION GAP SERPL CALC-SCNC: 10 MMOL/L — SIGNIFICANT CHANGE UP (ref 5–17)
ANION GAP SERPL CALC-SCNC: 9 MMOL/L — SIGNIFICANT CHANGE UP (ref 5–17)
APTT BLD: 34.6 SECS — SIGNIFICANT CHANGE UP (ref 27.5–37.4)
APTT BLD: 51.4 SEC — HIGH (ref 27.5–37.4)
APTT BLD: 62.9 SEC — HIGH (ref 27.5–37.4)
AST SERPL-CCNC: 13 U/L — SIGNIFICANT CHANGE UP (ref 10–40)
BILIRUB SERPL-MCNC: 0.5 MG/DL — SIGNIFICANT CHANGE UP (ref 0.2–1.2)
BLD GP AB SCN SERPL QL: NEGATIVE — SIGNIFICANT CHANGE UP
BUN SERPL-MCNC: 5 MG/DL — LOW (ref 7–23)
BUN SERPL-MCNC: 5 MG/DL — LOW (ref 7–23)
CALCIUM SERPL-MCNC: 9.6 MG/DL — SIGNIFICANT CHANGE UP (ref 8.4–10.5)
CALCIUM SERPL-MCNC: 9.6 MG/DL — SIGNIFICANT CHANGE UP (ref 8.4–10.5)
CHLORIDE SERPL-SCNC: 101 MMOL/L — SIGNIFICANT CHANGE UP (ref 96–108)
CHLORIDE SERPL-SCNC: 102 MMOL/L — SIGNIFICANT CHANGE UP (ref 96–108)
CO2 SERPL-SCNC: 31 MMOL/L — SIGNIFICANT CHANGE UP (ref 22–31)
CO2 SERPL-SCNC: 33 MMOL/L — HIGH (ref 22–31)
CREAT SERPL-MCNC: 0.69 MG/DL — SIGNIFICANT CHANGE UP (ref 0.5–1.3)
CREAT SERPL-MCNC: 0.75 MG/DL — SIGNIFICANT CHANGE UP (ref 0.5–1.3)
FACT II INHIB PPP-ACNC: 96 % — SIGNIFICANT CHANGE UP (ref 74–142)
FACT IX PPP CHRO-ACNC: 134 % — SIGNIFICANT CHANGE UP (ref 69–167)
FACT VII ACT/NOR PPP: 88 % — SIGNIFICANT CHANGE UP (ref 53–149)
FACT VIII ACT/NOR PPP: 157 % — HIGH (ref 51–138)
FACT X ACT/NOR PPP: 89 % — SIGNIFICANT CHANGE UP (ref 68–149)
FACT XII ACT/NOR PPP: 89 % — SIGNIFICANT CHANGE UP (ref 73–148)
FACT XIIA PPP-ACNC: 42 % — LOW (ref 51–180)
FIBRINOGEN PPP-MCNC: 403 MG/DL — SIGNIFICANT CHANGE UP (ref 258–438)
GLUCOSE SERPL-MCNC: 119 MG/DL — HIGH (ref 70–99)
GLUCOSE SERPL-MCNC: 97 MG/DL — SIGNIFICANT CHANGE UP (ref 70–99)
HBA1C BLD-MCNC: 5.2 % — SIGNIFICANT CHANGE UP (ref 4–5.6)
HCT VFR BLD CALC: 31.8 % — LOW (ref 34.5–45)
HCT VFR BLD CALC: 33.1 % — LOW (ref 34.5–45)
HGB BLD-MCNC: 10.3 G/DL — LOW (ref 11.5–15.5)
HGB BLD-MCNC: 10.6 G/DL — LOW (ref 11.5–15.5)
INR BLD: 1.11 — SIGNIFICANT CHANGE UP (ref 0.88–1.16)
INR BLD: 1.11 — SIGNIFICANT CHANGE UP (ref 0.88–1.16)
MAGNESIUM SERPL-MCNC: 1.8 MG/DL — SIGNIFICANT CHANGE UP (ref 1.6–2.6)
MAGNESIUM SERPL-MCNC: 2 MG/DL — SIGNIFICANT CHANGE UP (ref 1.6–2.6)
MCHC RBC-ENTMCNC: 27.7 PG — SIGNIFICANT CHANGE UP (ref 27–34)
MCHC RBC-ENTMCNC: 27.9 PG — SIGNIFICANT CHANGE UP (ref 27–34)
MCHC RBC-ENTMCNC: 32 G/DL — SIGNIFICANT CHANGE UP (ref 32–36)
MCHC RBC-ENTMCNC: 32.4 G/DL — SIGNIFICANT CHANGE UP (ref 32–36)
MCV RBC AUTO: 86.2 FL — SIGNIFICANT CHANGE UP (ref 80–100)
MCV RBC AUTO: 86.4 FL — SIGNIFICANT CHANGE UP (ref 80–100)
PHOSPHATE SERPL-MCNC: 3.3 MG/DL — SIGNIFICANT CHANGE UP (ref 2.5–4.5)
PLATELET # BLD AUTO: 256 K/UL — SIGNIFICANT CHANGE UP (ref 150–400)
PLATELET # BLD AUTO: 289 K/UL — SIGNIFICANT CHANGE UP (ref 150–400)
POTASSIUM SERPL-MCNC: 3.8 MMOL/L — SIGNIFICANT CHANGE UP (ref 3.5–5.3)
POTASSIUM SERPL-MCNC: 4 MMOL/L — SIGNIFICANT CHANGE UP (ref 3.5–5.3)
POTASSIUM SERPL-SCNC: 3.8 MMOL/L — SIGNIFICANT CHANGE UP (ref 3.5–5.3)
POTASSIUM SERPL-SCNC: 4 MMOL/L — SIGNIFICANT CHANGE UP (ref 3.5–5.3)
PROT SERPL-MCNC: 7.8 G/DL — SIGNIFICANT CHANGE UP (ref 6–8.3)
PROTHROM AB SERPL-ACNC: 12.3 SEC — SIGNIFICANT CHANGE UP (ref 9.8–12.7)
PROTHROM AB SERPL-ACNC: 12.4 SEC — SIGNIFICANT CHANGE UP (ref 9.8–12.7)
RBC # BLD: 3.69 M/UL — LOW (ref 3.8–5.2)
RBC # BLD: 3.83 M/UL — SIGNIFICANT CHANGE UP (ref 3.8–5.2)
RBC # FLD: 13.9 % — SIGNIFICANT CHANGE UP (ref 10.3–16.9)
RBC # FLD: 14 % — SIGNIFICANT CHANGE UP (ref 10.3–16.9)
RH IG SCN BLD-IMP: POSITIVE — SIGNIFICANT CHANGE UP
RH IG SCN BLD-IMP: POSITIVE — SIGNIFICANT CHANGE UP
SODIUM SERPL-SCNC: 142 MMOL/L — SIGNIFICANT CHANGE UP (ref 135–145)
SODIUM SERPL-SCNC: 144 MMOL/L — SIGNIFICANT CHANGE UP (ref 135–145)
THROMBIN TIME: 20.1 SEC — SIGNIFICANT CHANGE UP (ref 17.6–24)
WBC # BLD: 5.8 K/UL — SIGNIFICANT CHANGE UP (ref 3.8–10.5)
WBC # BLD: 6.8 K/UL — SIGNIFICANT CHANGE UP (ref 3.8–10.5)
WBC # FLD AUTO: 5.8 K/UL — SIGNIFICANT CHANGE UP (ref 3.8–10.5)
WBC # FLD AUTO: 6.8 K/UL — SIGNIFICANT CHANGE UP (ref 3.8–10.5)

## 2018-03-23 PROCEDURE — 31500 INSERT EMERGENCY AIRWAY: CPT

## 2018-03-23 PROCEDURE — 99222 1ST HOSP IP/OBS MODERATE 55: CPT | Mod: 25

## 2018-03-23 PROCEDURE — 99291 CRITICAL CARE FIRST HOUR: CPT

## 2018-03-23 PROCEDURE — 31830 REVISE WINDPIPE SCAR: CPT

## 2018-03-23 PROCEDURE — 31502 CHANGE OF WINDPIPE AIRWAY: CPT

## 2018-03-23 PROCEDURE — 71045 X-RAY EXAM CHEST 1 VIEW: CPT | Mod: 26,59

## 2018-03-23 PROCEDURE — 93010 ELECTROCARDIOGRAM REPORT: CPT

## 2018-03-23 PROCEDURE — 99292 CRITICAL CARE ADDL 30 MIN: CPT

## 2018-03-23 PROCEDURE — 31645 BRNCHSC W/THER ASPIR 1ST: CPT

## 2018-03-23 RX ORDER — FENTANYL CITRATE 50 UG/ML
25 INJECTION INTRAVENOUS ONCE
Qty: 0 | Refills: 0 | Status: DISCONTINUED | OUTPATIENT
Start: 2018-03-23 | End: 2018-03-23

## 2018-03-23 RX ORDER — IPRATROPIUM/ALBUTEROL SULFATE 18-103MCG
3 AEROSOL WITH ADAPTER (GRAM) INHALATION EVERY 6 HOURS
Qty: 0 | Refills: 0 | Status: DISCONTINUED | OUTPATIENT
Start: 2018-03-23 | End: 2018-03-26

## 2018-03-23 RX ORDER — POTASSIUM CHLORIDE 20 MEQ
20 PACKET (EA) ORAL ONCE
Qty: 0 | Refills: 0 | Status: COMPLETED | OUTPATIENT
Start: 2018-03-23 | End: 2018-03-24

## 2018-03-23 RX ORDER — IPRATROPIUM/ALBUTEROL SULFATE 18-103MCG
3 AEROSOL WITH ADAPTER (GRAM) INHALATION ONCE
Qty: 0 | Refills: 0 | Status: COMPLETED | OUTPATIENT
Start: 2018-03-23 | End: 2018-03-23

## 2018-03-23 RX ORDER — ACETAMINOPHEN 500 MG
1000 TABLET ORAL ONCE
Qty: 0 | Refills: 0 | Status: COMPLETED | OUTPATIENT
Start: 2018-03-23 | End: 2018-03-23

## 2018-03-23 RX ORDER — MAGNESIUM OXIDE 400 MG ORAL TABLET 241.3 MG
800 TABLET ORAL ONCE
Qty: 0 | Refills: 0 | Status: COMPLETED | OUTPATIENT
Start: 2018-03-23 | End: 2018-03-24

## 2018-03-23 RX ORDER — SODIUM CHLORIDE 9 MG/ML
3 INJECTION INTRAMUSCULAR; INTRAVENOUS; SUBCUTANEOUS
Qty: 0 | Refills: 0 | Status: DISCONTINUED | OUTPATIENT
Start: 2018-03-23 | End: 2018-03-26

## 2018-03-23 RX ORDER — PANTOPRAZOLE SODIUM 20 MG/1
40 TABLET, DELAYED RELEASE ORAL
Qty: 0 | Refills: 0 | Status: DISCONTINUED | OUTPATIENT
Start: 2018-03-23 | End: 2018-03-26

## 2018-03-23 RX ORDER — PANTOPRAZOLE SODIUM 20 MG/1
40 TABLET, DELAYED RELEASE ORAL DAILY
Qty: 0 | Refills: 0 | Status: DISCONTINUED | OUTPATIENT
Start: 2018-03-23 | End: 2018-03-23

## 2018-03-23 RX ORDER — VANCOMYCIN HCL 1 G
1000 VIAL (EA) INTRAVENOUS ONCE
Qty: 0 | Refills: 0 | Status: COMPLETED | OUTPATIENT
Start: 2018-03-23 | End: 2018-03-23

## 2018-03-23 RX ORDER — INFLUENZA VIRUS VACCINE 15; 15; 15; 15 UG/.5ML; UG/.5ML; UG/.5ML; UG/.5ML
0.5 SUSPENSION INTRAMUSCULAR ONCE
Qty: 0 | Refills: 0 | Status: DISCONTINUED | OUTPATIENT
Start: 2018-03-23 | End: 2018-03-26

## 2018-03-23 RX ADMIN — Medication 3 MILLILITER(S): at 11:56

## 2018-03-23 RX ADMIN — PANTOPRAZOLE SODIUM 40 MILLIGRAM(S): 20 TABLET, DELAYED RELEASE ORAL at 11:56

## 2018-03-23 RX ADMIN — Medication 250 MILLIGRAM(S): at 18:49

## 2018-03-23 RX ADMIN — OXYCODONE AND ACETAMINOPHEN 1 TABLET(S): 5; 325 TABLET ORAL at 00:00

## 2018-03-23 RX ADMIN — Medication 400 MILLIGRAM(S): at 14:17

## 2018-03-23 RX ADMIN — FENTANYL CITRATE 25 MICROGRAM(S): 50 INJECTION INTRAVENOUS at 21:09

## 2018-03-23 RX ADMIN — Medication 3 MILLILITER(S): at 23:59

## 2018-03-23 RX ADMIN — FENTANYL CITRATE 25 MICROGRAM(S): 50 INJECTION INTRAVENOUS at 21:15

## 2018-03-23 RX ADMIN — FENTANYL CITRATE 25 MICROGRAM(S): 50 INJECTION INTRAVENOUS at 17:30

## 2018-03-23 RX ADMIN — Medication 1000 MILLIGRAM(S): at 15:00

## 2018-03-23 NOTE — CONSULT NOTE ADULT - SUBJECTIVE AND OBJECTIVE BOX
Patient is a 58y old  Female who presents with a chief complaint of Shortness of breath and pain at trach site (22 Mar 2018 20:49)    HPI:  This is a 58 year old female, former smoker, with a PMHx of HTN, GERD, COPD and respiratory failure that was seen by Dr. Shell in the office for laryngotracheal stenosis, complaining of increased SOBx2 months and pain at her trach site. Her trach was placed 11 years ago after being hospitalized for a fall in which she fell face first into a park bench, which she manages at home with a 24/7 home aid and portable ventilator and can normally speak, breath and eat normally. Over the past 2 months, she has experienced increasing SOB for which she states she cant sleep, can only tolerate pureed meals and has noticed a change in her voice. In October of 2017, she was noted to have laryngotracheal stenosis on CT for which a bronchoscopy and excision of the laryngotracheal stenosis was performed and trach tube exchange with Dr. Gaurang Rodriguez. In March of 2018, the patient noted she was unable to change her trach tube (due to tightening of the trach), a dynamic CT was performed which revealed 65% stenosis of her trachea and a 10mm nodule above the maria del rosario. She was referred to Dr. Shell/Dr. Larios for evaluation. Upon her visit in the office, she was sent to [facility] ED for PST and management of her laryngotracheal stenosis. She denies fever, chills, headache, dizziness, chest pain, palpitations, abdominal pain, n/v/d, pain or swelling in the upper or lower extremities. (22 Mar 2018 20:49)    Patient reports she still feels dyspneic and has significant pain at tracheal site. Is unable to advance her trach very far due to pain and has difficulty taking deep breaths. Prior to October was always on trach collar but over the past few months, has become more ventilator dependent.     PAST MEDICAL & SURGICAL HISTORY:  Pancreatic cyst  Anxiety disorder  Sickle cell trait  Hypertension  COPD (chronic obstructive pulmonary disease)  Tracheostomy dependent  Acute tracheostomy management  Dependence on tracheostomy      FAMILY HISTORY:  No pertinent family history in first degree relatives      SOCIAL HISTORY:  Smoking Status: [ ] Current, [x] Former, [ ] Never  Pack Years: 10    MEDICATIONS:  Pulmonary:  ALBUTerol/ipratropium for Nebulization. 3 milliLiter(s) Nebulizer once    Antimicrobials:    Anticoagulants:    Onc:    GI/:  pantoprazole  Injectable 40 milliGRAM(s) IV Push daily    Endocrine:    Cardiac:    Other Medications:  chlorhexidine 0.12% Liquid 10 milliLiter(s) Swish and Spit once  influenza   Vaccine 0.5 milliLiter(s) IntraMuscular once  oxyCODONE    5 mG/acetaminophen 325 mG 1 Tablet(s) Oral every 6 hours PRN      Allergies    Cipro (Unknown)    Intolerances        Vital Signs Last 24 Hrs  T(C): 36.3 (23 Mar 2018 10:41), Max: 37 (22 Mar 2018 22:28)  T(F): 97.4 (23 Mar 2018 10:41), Max: 98.6 (22 Mar 2018 22:28)  HR: 62 (23 Mar 2018 11:15) (56 - 80)  BP: 111/69 (23 Mar 2018 11:15) (93/55 - 148/76)  BP(mean): 82 (23 Mar 2018 11:15) (77 - 112)  RR: 20 (23 Mar 2018 11:15) (18 - 24)  SpO2: 100% (23 Mar 2018 11:15) (95% - 100%)    Mode: SIMV (Synchronized Intermittent Mandatory Ventilation)  RR (machine): 6  TV (machine): 450  FiO2: 40  PEEP: 5  PS: 15  ITime: 1  MAP: 6  PIP: 19    .  VITAL SIGNS:  T(C): 36.3 (03-23-18 @ 10:41), Max: 37 (03-22-18 @ 22:28)  T(F): 97.4 (03-23-18 @ 10:41), Max: 98.6 (03-22-18 @ 22:28)  HR: 62 (03-23-18 @ 11:15) (56 - 80)  BP: 111/69 (03-23-18 @ 11:15) (93/55 - 148/76)  BP(mean): 82 (03-23-18 @ 11:15) (77 - 112)  RR: 20 (03-23-18 @ 11:15) (18 - 24)  SpO2: 100% (03-23-18 @ 11:15) (95% - 100%)  Wt(kg): --    PHYSICAL EXAM:    Constitutional: elderly appearing female, well appearing, hoarse voice, NAD   Eyes: PERRL, EOMI, clear conjunctiva  ENT: no nasal discharge; uvula midline, no oropharyngeal erythema or exudates; dry MM  Neck: supple; no JVD or thyromegaly  Respiratory: mild expiratory wheezing at right lower base, otherwise good air movement; trach site clean but trach is protruding from skin approximately 1-2cm and unable to be advanced   Cardiac: +S1/S2; RRR; no murmurs  Gastrointestinal: soft, NT/ND; no rebound or guarding; +BSx4  Extremities: warm well perfused; no peripheral edema  Musculoskeletal: NROM x4; no joint swelling, tenderness or erythema  Vascular: 2+ radial, femoral, DP/PT pulses B/L  Dermatologic: skin warm, dry and intact; no rashes, wounds, or scars  Lymphatic: no submandibular or cervical LAD    LABS:  CBC Full  -  ( 23 Mar 2018 10:25 )  WBC Count : 5.8 K/uL  Hemoglobin : 10.6 g/dL  Hematocrit : 33.1 %  Platelet Count - Automated : 289 K/uL  Mean Cell Volume : 86.4 fL  Mean Cell Hemoglobin : 27.7 pg  Mean Cell Hemoglobin Concentration : 32.0 g/dL  Auto Neutrophil # : x  Auto Lymphocyte # : x  Auto Monocyte # : x  Auto Eosinophil # : x  Auto Basophil # : x  Auto Neutrophil % : x  Auto Lymphocyte % : x  Auto Monocyte % : x  Auto Eosinophil % : x  Auto Basophil % : x    03-23    142  |  101  |  5<L>  ----------------------------<  97  3.8   |  31  |  0.75    Ca    9.6      23 Mar 2018 10:25  Phos  3.3     03-23  Mg     2.0     03-23    TPro  7.8  /  Alb  4.1  /  TBili  0.5  /  DBili  x   /  AST  13  /  ALT  7<L>  /  AlkPhos  56  03-23    PT/INR - ( 23 Mar 2018 10:25 )   PT: 12.3 sec;   INR: 1.11          PTT - ( 23 Mar 2018 10:25 )  PTT:62.9 sec        RADIOLOGY & ADDITIONAL STUDIES (The following images were personally reviewed):  CT Chest Non Contrast (3/15/2018): FINDINGS:     AIRWAYS AND LUNGS: Approximately 65% narrowing of the trachea with dynamic   expiration. Anti--dependent 10 x 5 mm nodule along the tracheal wall just   cranial to the maria del rosario (2, 43) that focally nearly occludes the trachea on   dynamic expiration. The complete collapse of the proximal bronchi with   dynamic expiration. Linear opacities within the right upper lobe may   represent scarring. Tracheostomy tube.     MEDIASTINUM AND PLEURA: There are no enlarged mediastinal, hilar or axillary   lymph nodes. There is no pleural effusion. There is no pneumothorax.     HEART AND VESSELS: The heart is normal in size. There are no   atherosclerotic calcifications of the aorta. There is no pericardial   effusion.     UPPER ABDOMEN: Images of the upper abdomen demonstrate no abnormality.     BONES AND SOFT TISSUES: There are mild degenerative changes of the spine.   The soft tissues are unremarkable.     TUBES/LINES: None.     IMPRESSION:   Bronchomalacia. Approximately 65% narrowing of the trachea with dynamic   expiration, correlate clinically for tracheomalacia.     Anti--dependent 10 x 5 mm nodule along the tracheal wall just cranial to the   maria del rosario that focally nearly occludes the trachea on dynamic expiration. Given   tracheostomy tube this may represent adherent secretions although tracheal   wall versus aortic etiologies are not excluded. Consider short-term   follow-up CTA versus direct visualization for complete evaluation.       TORRES CHEW M.D., ATTENDING RADIOLOGIST   This document has been electronically signed. Mar 15 2018 3:01PM

## 2018-03-23 NOTE — CONSULT NOTE ADULT - PROBLEM SELECTOR RECOMMENDATION 9
- on recent CT Chest (3/15/18) found to have "10x5 mm nodule along tracheal wall just cranial to maria del rosario that nearly focally occludes trachea on end expiration" - differential includes adherent secretions vs tracheal vs aortic etiology     Recommend:  - plan for rigid bronchoscopy with laser therapy in OR today no

## 2018-03-23 NOTE — CONSULT NOTE ADULT - PROBLEM SELECTOR RECOMMENDATION 2
- patient with history of respiratory failure s/p trach in 2007 then in Oct 2017 underwent excision of laryngotracheal stenosis, direct laryngoscopy with therapeutic injection, stomaplasty with trach tube exchange (#6 [facility] Tonjaastrid)  - has been having progressively worsening symptoms and more ventilator dependent now - CT chest (3/15/18) revealed approximately 65% narrowing of the trachea with dynamic expiration    Recommend:  - plan for OR evaluation of airway with Dr Larios

## 2018-03-23 NOTE — CONSULT NOTE ADULT - ASSESSMENT
58 year old F with hx of smoker, COPD, and s/p trach 11 years ago on chronic vent admitted for dyspnea and found to have laryngotracheal stenosis. We were consulted for prolonged aPTT.     At time of this note, pt going to OR and factor analysis/mixing study was completed.    #Prolonged APTT  No prior history of acute bleeding in previous surgeries. Prolonged PTT could be due to factor deficiency, underlying factor inhibitor or lupus anticoagulant. Pt had a prolonged aPTT in October, at that time PTT was 45 seconds.     -Mixing study corrected after 1:1 dilution with normal plasma, likely due to underlying factor deficiency however factor inhibitor cannot be ruled out (requires 2 hour incubation period which special hematology was unable to do today). Lupus anticoagulant ruled out as PTT would not correct in the mixing study   -FXII ~43%, the rest of the other factors were within normal limits. 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 Pts are typically not treated with prophylactic FFP. Recommend keeping 2-3 units of FFP on standby in case patient bleeds in the OR.   -Would recommend a repeat mixing study where both immediate and 2 hour incubation phase are done to confirm that there is no underlying factor inhibitor.     Case discussed w/ Dr. Dixon, Recs relayed to primary team. Pt to go to the OR. 58 year old F with hx of smoker, COPD, and s/p trach 11 years ago on chronic vent admitted for dyspnea and found to have laryngotracheal stenosis. We were consulted for prolonged aPTT.     At time of this note, pt going to OR and factor analysis/mixing study was completed.    #Prolonged APTT  No prior history of acute bleeding in previous surgeries. Prolonged PTT could be due to factor deficiency, underlying factor inhibitor or lupus anticoagulant. Pt had a prolonged aPTT in October, at that time PTT was 45 seconds.     -Mixing study corrected after 1:1 dilution with normal plasma, likely due to underlying factor deficiency however factor inhibitor cannot be ruled out (requires 2 hour incubation period which special hematology was unable to do today). Lupus anticoagulant ruled out as PTT would not correct in the mixing study   -FXII ~43%, the rest of the other factors were within normal limits. 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 Pts are typically not treated with prophylactic FFP. Recommend keeping 2-3 units of FFP on standby in case patient bleeds in the OR.   -Would recommend a repeat mixing study where both immediate and 2 hour incubation phase are done to confirm that there is no underlying factor inhibitor.     Case discussed w/ Dr. Dixon and Dr. Rosenberg, Recs relayed to primary team. Pt to go to the OR.

## 2018-03-23 NOTE — BRIEF OPERATIVE NOTE - POST-OP DX
Tracheal stoma stenosis  03/23/2018    Active  Sam Gutiérrez
Tracheal stenosis  03/23/2018    Active  Sam Gutiérrez  Tracheal stoma stenosis  03/23/2018    Active  Sam Gutiérrez

## 2018-03-23 NOTE — BRIEF OPERATIVE NOTE - OPERATION/FINDINGS
Indication: 58 year old female, former smoker, with a PMHx of HTN, GERD, COPD and respiratory failure s/p trach 11 years ago. She is here for evaluation and management of her laryngotracheal stenosis and stenosis of trach site.  Procedure done:  Bronchoscopy through the stoma of a trach with aspiration of mucus  Intubation over a fiberoptic bronchoscope.  Pre-op Dx: Tracheal stoma stenosis, tracheal stenosis s/p trac  Preop medication: See anesthesia note  Findings:  Bronchoscope inserted through tracheostomy and airway evaluation done. No endobronchial lesion noted. There is small granulation tissue around the trach site. There was multiple mucus in the airway that probably seen on CT as, and mimicked an endobronchial lesion.  Airway evaluation revealed Sharp Estelle. KEVEN and LLL evaluation revealed multiple areas of thick mucus secretions that was suctioned out. RUL, RML, RLL revealed  mucus.  The bronchoscope is then advanced through the mouth passing through the vocal cord that reveled a nodule on the VC. Then an ETT is advanced through the bronchoscopy and airway secured while the CTS team revised the trach stoma. Then through a guidewire help a dilator passed and dilation of stoma done and shiley 6 Trach placed successfully and secured. Patient tolerated the procedure well and no bleeding noted    Specimens: None
Tracheal stoma stenosis, no endtracheal nodules

## 2018-03-23 NOTE — BRIEF OPERATIVE NOTE - PRE-OP DX
Tracheal stoma stenosis  03/23/2018    Active  Ellen Best
Tracheal stenosis  03/23/2018    Active  Sam Gutiérrez

## 2018-03-23 NOTE — PROGRESS NOTE ADULT - SUBJECTIVE AND OBJECTIVE BOX
CTICU  CRITICAL  CARE  attending     Hand off received 					   Pertinent clinical, laboratory, radiographic, hemodynamic, echocardiographic, respiratory data, microbiologic data and chart were reviewed and analyzed frequently throughout the course of the day and night  Patient seen and examined with CTS/ SH attending at bedside    This is a 58 year old female, former smoker, with a PMHx of HTN, GERD, COPD and respiratory failure that was seen by Dr. Shell in the office for laryngotracheal stenosis, complaining of increased SOBx2 months and pain at her trach site. Her trach was placed 11 years ago after being hospitalized for a fall in which she fell face first into a park bench, which she manages at home with a 24/7 home aid and portable ventilator and can normally speak, breath and eat normally. Over the past 2 months, she has experienced increasing SOB for which she states she cant sleep, can only tolerate pureed meals and has noticed a change in her voice. In October of 2017, she was noted to have laryngotracheal stenosis on CT for which a bronchoscopy and excision of the laryngotracheal stenosis was performed and trach tube exchange with Dr. Gaurang Rodriguez. In March of 2018, the patient noted she was unable to change her trach tube (due to tightening of the trach), a dynamic CT was performed which revealed 65% stenosis of her trachea and a 10mm nodule above the maria del rosario. She was referred to Dr. Shell/Dr. Larios for evaluation. Upon her visit in the office, she was sent to [facility] ED for PST and management of her laryngotracheal stenosis. She denies fever, chills, headache, dizziness, chest pain, palpitations, abdominal pain, n/v/d, pain or swelling in the upper or lower extremities.        FAMILY HISTORY:  No pertinent family history in first degree relatives  PAST MEDICAL & SURGICAL HISTORY:  Pancreatic cyst  Anxiety disorder  Sickle cell trait  Hypertension  COPD (chronic obstructive pulmonary disease)  Tracheostomy dependent  Acute tracheostomy management  Dependence on tracheostomy    Patient is a 58y old  Female who presents with a chief complaint of Shortness of breath and pain at trach site (22 Mar 2018 20:49)      14 system review was unremarkable  acute changes include acute respiratory failure  Vital signs, hemodynamic and respiratory parameters were reviewed from the bedside nursing flow sheet.  ICU Vital Signs Last 24 Hrs  T(C): 36.8 (23 Mar 2018 02:00), Max: 37 (22 Mar 2018 22:28)  T(F): 98.2 (23 Mar 2018 02:00), Max: 98.6 (22 Mar 2018 22:28)  HR: 68 (23 Mar 2018 04:15) (64 - 80)  BP: 120/68 (23 Mar 2018 04:00) (107/71 - 148/76)  BP(mean): 96 (23 Mar 2018 04:00) (96 - 112)  ABP: --  ABP(mean): --  RR: 22 (23 Mar 2018 04:00) (18 - 22)  SpO2: 100% (23 Mar 2018 04:15) (95% - 100%)    Adult Advanced Hemodynamics Last 24 Hrs  CVP(mm Hg): --  CVP(cm H2O): --  CO: --  CI: --  PA: --  PA(mean): --  PCWP: --  SVR: --  SVRI: --  PVR: --  PVRI: --,   Mode: SIMV with PS  RR (machine): 6  TV (machine): 450  FiO2: 40  PEEP: 5  PS: 15  ITime: 1  MAP: 11  PIP: 21    Intake and output was reviewed and the fluid balance was calculated  Daily     Daily   I&O's Summary      All lines and drain sites were assessed  Glycemic trend was reviewed CAPILLARY BLOOD GLUCOSE        NEURO: No focal motor deficit.   CVS: S1, S2, No S3.   RESPIRATORY: Auscultation of the chest reveals equal breath sounds.  GI: Abdomen is soft. Morbidly obese. No tenderness. + Bowel sounds.  Extremities are warm and well perfused.   VASCULAR: + Distal pulses.  Wounds appear clean and unremarkable      labs  CBC Full  -  ( 23 Mar 2018 03:22 )  WBC Count : 6.8 K/uL  Hemoglobin : 10.3 g/dL  Hematocrit : 31.8 %  Platelet Count - Automated : 256 K/uL  Mean Cell Volume : 86.2 fL  Mean Cell Hemoglobin : 27.9 pg  Mean Cell Hemoglobin Concentration : 32.4 g/dL  Auto Neutrophil # : x  Auto Lymphocyte # : x  Auto Monocyte # : x  Auto Eosinophil # : x  Auto Basophil # : x  Auto Neutrophil % : x  Auto Lymphocyte % : x  Auto Monocyte % : x  Auto Eosinophil % : x  Auto Basophil % : x    03-23    144  |  102  |  5<L>  ----------------------------<  119<H>  4.0   |  33<H>  |  0.69    Ca    9.6      23 Mar 2018 03:22  Mg     1.8     03-23    TPro  7.8  /  Alb  4.4  /  TBili  0.5  /  DBili  x   /  AST  13  /  ALT  8<L>  /  AlkPhos  59  03-22    PT/INR - ( 22 Mar 2018 17:55 )   PT: 12.8 sec;   INR: 1.15          PTT - ( 22 Mar 2018 17:55 )  PTT:65.7 sec  The current medications were reviewed   MEDICATIONS  (STANDING):  chlorhexidine 0.12% Liquid 10 milliLiter(s) Swish and Spit once  influenza   Vaccine 0.5 milliLiter(s) IntraMuscular once    MEDICATIONS  (PRN):  oxyCODONE    5 mG/acetaminophen 325 mG 1 Tablet(s) Oral every 6 hours PRN Moderate Pain (4 - 6)       PROBLEM LIST/ ASSESSMENT:  HEALTH ISSUES - PROBLEM Dx:    58 years old female with HYN, GERD, COPD, H/O respiratory failure S/P Trach. On vent at home admitted with laryngotracheal  stenosis.    My plan includes :  Keep NPO for Revison of TRACH, laser ablation of laryngotracheal stenosis.  Close hemodynamic, ventilatory and drain monitoring and management.  Monitor for arrhythmias and monitor parameters for organ perfusion  Monitor neurologic status  Head of the bed should remain elevated to 45 deg .   Chest PT and IS will be encouraged  Monitor adequacy of oxygenation and ventilation and attempt to wean oxygen  Nutritional goals will be met using po eventually , ensure adequate caloric intake and monitor the same  Stress ulcer and VTE prophylaxis will be achieved    Glycemic control is satisfactory  Electrolytes have been repleted as necessary and wound care has been carried out. Pain control has been achieved.   Aggressive physical therapy and early mobility and ambulation goals will be met   The family was updated about the course and plan  CRITICAL CARE TIME SPENT in evaluation and management, reassessments, review and interpretation of labs and x-rays, ventilator and hemodynamic management, formulating a plan and coordinating care: ___60____ MIN.  Time does not include procedural time.  CTICU ATTENDING     					    Fadi Mcnally MD

## 2018-03-23 NOTE — BRIEF OPERATIVE NOTE - PROCEDURE
<<-----Click on this checkbox to enter Procedure Tracheal stoma revision  03/23/2018  replacement of tracheal tube with a #6 shiley  Active  PMAINGON

## 2018-03-23 NOTE — CONSULT NOTE ADULT - ASSESSMENT
59 yo F with HTN, GERD, COPD, trach s/p trauma 11 years ago, then developed progressive laryngotracheal stenosis and found to have bronchomalacia and 10x5 mm nodule along tracheal wall, admitted for further workup.

## 2018-03-23 NOTE — CONSULT NOTE ADULT - SUBJECTIVE AND OBJECTIVE BOX
Hematology Oncology Consult Note (Dr. Dixon )  Discussed with Dr. Dixon and recommendations reviewed with the primary team.    The patient was seen and examined    This is a 58 year old female, former smoker, with a PMHx of HTN, GERD, COPD and respiratory failure that was seen by Dr. Shell in the office for laryngotracheal stenosis, complaining of increased SOBx2 months and pain at her trach site. Her trach was placed 11 years ago after being hospitalized for a fall in which she fell face first into a park bench, which she manages at home with a 24/7 home aid and portable ventilator and can normally speak, breath and eat normally. Over the past 2 months, she has experienced increasing SOB for which she states she cant sleep, can only tolerate pureed meals and has noticed a change in her voice. In October of 2017, she was noted to have laryngotracheal stenosis on CT for which a bronchoscopy and excision of the laryngotracheal stenosis was performed and trach tube exchange with Dr. Gaurang Rodriguez. In March of 2018, the patient noted she was unable to change her trach tube (due to tightening of the trach), a dynamic CT was performed which revealed 65% stenosis of her trachea and a 10mm nodule above the maria del rosario. She was referred to Dr. Shell/Dr. Larios for evaluation. Upon her visit in the office, she was sent to [facility] ED for PST and management of her laryngotracheal stenosis. She denies fever, chills, headache, dizziness, chest pain, palpitations, abdominal pain, n/v/d, pain or swelling in the upper or lower extremities.    We were consulted for prolonged aPTT. PTT yesterday was 65 seconds. It was repeated today w/ similar results.     Pt has to urgently go to the OR for bronchoscopy and dilation for replacement of tracheostomy tube         PAST MEDICAL & SURGICAL HISTORY:  Pancreatic cyst  Anxiety disorder  Sickle cell trait  Hypertension  COPD (chronic obstructive pulmonary disease)  Tracheostomy dependent  Acute tracheostomy management  Dependence on tracheostomy      Allergies:  Cipro (Unknown)      Medications:        Social History:    FAMILY HISTORY:  No pertinent family history in first degree relatives      PHYSICAL EXAM:    T(F): 97.4 (03-23-18 @ 15:13), Max: 98.6 (03-22-18 @ 22:28)  HR: 64 (03-23-18 @ 15:13) (54 - 80)  BP: 120/68 (03-23-18 @ 15:13) (93/55 - 148/76)  RR: 20 (03-23-18 @ 15:13) (18 - 24)  SpO2: 100% (03-23-18 @ 15:13) (95% - 100%)  Wt(kg): --    Daily Height in cm: 193 (23 Mar 2018 15:13)    Daily     Constitutional: elderly appearing female, well appearing, hoarse voice, NAD   Eyes: PERRL, EOMI, clear conjunctiva  ENT: no nasal discharge; uvula midline, no oropharyngeal erythema or exudates; dry MM  Neck: supple; no JVD or thyromegaly  Respiratory: mild expiratory wheezing at right lower base, otherwise good air movement; trach site clean but trach is protruding from skin approximately 1-2cm and unable to be advanced   Cardiac: +S1/S2; RRR; no murmurs  Gastrointestinal: soft, NT/ND; no rebound or guarding; +BSx4  Extremities: warm well perfused; no peripheral edema  Musculoskeletal: NROM x4; no joint swelling, tenderness or erythema  Vascular: 2+ radial, femoral, DP/PT pulses B/L  Dermatologic: skin warm, dry and intact; no rashes, wounds, or scars  Lymphatic: no submandibular or cervical LAD            Labs:                          10.6   5.8   )-----------( 289      ( 23 Mar 2018 10:25 )             33.1     CBC Full  -  ( 23 Mar 2018 10:25 )  WBC Count : 5.8 K/uL  Hemoglobin : 10.6 g/dL  Hematocrit : 33.1 %  Platelet Count - Automated : 289 K/uL  Mean Cell Volume : 86.4 fL  Mean Cell Hemoglobin : 27.7 pg  Mean Cell Hemoglobin Concentration : 32.0 g/dL  Auto Neutrophil # : x  Auto Lymphocyte # : x  Auto Monocyte # : x  Auto Eosinophil # : x  Auto Basophil # : x  Auto Neutrophil % : x  Auto Lymphocyte % : x  Auto Monocyte % : x  Auto Eosinophil % : x  Auto Basophil % : x    PT/INR - ( 23 Mar 2018 12:33 )   PT: 12.4 sec;   INR: 1.11          PTT - ( 23 Mar 2018 12:33 )  PTT:51.4 sec    03-23    142  |  101  |  5<L>  ----------------------------<  97  3.8   |  31  |  0.75    Ca    9.6      23 Mar 2018 10:25  Phos  3.3     03-23  Mg     2.0     03-23    TPro  7.8  /  Alb  4.1  /  TBili  0.5  /  DBili  x   /  AST  13  /  ALT  7<L>  /  AlkPhos  56  03-23          Other Labs:    Cultures:    Pathology:    Imaging Studies:

## 2018-03-23 NOTE — PROGRESS NOTE ADULT - SUBJECTIVE AND OBJECTIVE BOX
CTICU  CRITICAL  CARE  attending     Hand off received @ 7a					   Pertinent clinical, laboratory, radiographic, hemodynamic, echocardiographic, respiratory data, microbiologic data and chart were reviewed and analyzed frequently throughout the course of the day and night  Patient seen and examined with CTS/ SH attending at bedside    Pt is a 58y , Female, admitted with tracheal stenosis        HPI:  This is a 58 year old female, former smoker, with a PMHx of HTN, GERD, COPD and respiratory failure that was seen by Dr. Shell in the office for laryngotracheal stenosis, complaining of increased SOBx2 months and pain at her trach site. Her trach was placed 11 years ago after being hospitalized for a fall in which she fell face first into a park bench, which she manages at home with a 24/7 home aid and portable ventilator and can normally speak, breath and eat normally. Over the past 2 months, she has experienced increasing SOB for which she states she cant sleep, can only tolerate pureed meals and has noticed a change in her voice. In October of 2017, she was noted to have laryngotracheal stenosis on CT for which a bronchoscopy and excision of the laryngotracheal stenosis was performed and trach tube exchange with Dr. Gaurang Rodriguez. In March of 2018, the patient noted she was unable to change her trach tube (due to tightening of the trach), a dynamic CT was performed which revealed 65% stenosis of her trachea and a 10mm nodule above the maria del rosario. She was referred to Dr. Shell/Dr. Larios for evaluation. Upon her visit in the office, she was sent to [facility] ED for PST and management of her laryngotracheal stenosis. She denies fever, chills, headache, dizziness, chest pain, palpitations, abdominal pain, n/v/d, pain or swelling in the upper or lower extremities. (22 Mar 2018 20:49)    Patient reports she still feels dyspneic and has significant pain at tracheal site. Is unable to advance her trach very far due to pain and has difficulty taking deep breaths. Prior to October was always on trach collar but over the past few months, has become more ventilator dependent.     PAST MEDICAL & SURGICAL HISTORY:  Pancreatic cyst  Anxiety disorder  Sickle cell trait  Hypertension  COPD (chronic obstructive pulmonary disease)  Tracheostomy dependent  Acute tracheostomy managemen          Trachea, stenosis: Trachea, stenosis  Tracheal mass: Tracheal mass      , FAMILY HISTORY:  No pertinent family history in first degree relatives  PAST MEDICAL & SURGICAL HISTORY:  Pancreatic cyst  Anxiety disorder  Sickle cell trait  Hypertension  COPD (chronic obstructive pulmonary disease)  Tracheostomy dependent  Acute tracheostomy management  Dependence on tracheostomy    Patient is a 58y old  Female who presents with a chief complaint of Shortness of breath and pain at trach site (22 Mar 2018 20:49)      14 system review was unremarkable  acute changes include acute respiratory failure  Vital signs, hemodynamic and respiratory parameters were reviewed from the bedside nursing flowsheet.  ICU Vital Signs Last 24 Hrs  T(C): 36.3 (23 Mar 2018 15:13), Max: 37 (22 Mar 2018 22:28)  T(F): 97.4 (23 Mar 2018 15:13), Max: 98.6 (22 Mar 2018 22:28)  HR: 64 (23 Mar 2018 15:13) (54 - 80)  BP: 120/68 (23 Mar 2018 15:13) (93/55 - 148/76)  BP(mean): 96 (23 Mar 2018 14:00) (77 - 112)  ABP: --  ABP(mean): --  RR: 20 (23 Mar 2018 15:13) (18 - 24)  SpO2: 100% (23 Mar 2018 15:13) (95% - 100%)    Adult Advanced Hemodynamics Last 24 Hrs  CVP(mm Hg): --  CVP(cm H2O): --  CO: --  CI: --  PA: --  PA(mean): --  PCWP: --  SVR: --  SVRI: --  PVR: --  PVRI: --,   Mode: SIMV (Synchronized Intermittent Mandatory Ventilation)  RR (machine): 6  TV (machine): 450  FiO2: 40  PEEP: 5  PS: 15  ITime: 1  MAP: 9  PIP: 15    Intake and output was reviewed and the fluid balance was calculated  Daily Height in cm: 193 (23 Mar 2018 15:13)    Daily   I&O's Summary    23 Mar 2018 07:01  -  23 Mar 2018 16:49  --------------------------------------------------------  IN: 100 mL / OUT: 200 mL / NET: -100 mL        All lines and drain sites were assessed  Glycemic trend was reviewedCAPILLARY BLOOD GLUCOSE        No acute change in mental status  Auscultation of the chest reveals equal bs  Abdomen is soft  Extremities are warm and well perfused  Wounds appear clean and unremarkable  Antibiotics are periop    labs  CBC Full  -  ( 23 Mar 2018 10:25 )  WBC Count : 5.8 K/uL  Hemoglobin : 10.6 g/dL  Hematocrit : 33.1 %  Platelet Count - Automated : 289 K/uL  Mean Cell Volume : 86.4 fL  Mean Cell Hemoglobin : 27.7 pg  Mean Cell Hemoglobin Concentration : 32.0 g/dL  Auto Neutrophil # : x  Auto Lymphocyte # : x  Auto Monocyte # : x  Auto Eosinophil # : x  Auto Basophil # : x  Auto Neutrophil % : x  Auto Lymphocyte % : x  Auto Monocyte % : x  Auto Eosinophil % : x  Auto Basophil % : x    03-23    142  |  101  |  5<L>  ----------------------------<  97  3.8   |  31  |  0.75    Ca    9.6      23 Mar 2018 10:25  Phos  3.3     03-23  Mg     2.0     03-23    TPro  7.8  /  Alb  4.1  /  TBili  0.5  /  DBili  x   /  AST  13  /  ALT  7<L>  /  AlkPhos  56  03-23    PT/INR - ( 23 Mar 2018 12:33 )   PT: 12.4 sec;   INR: 1.11          PTT - ( 23 Mar 2018 12:33 )  PTT:51.4 sec  The current medications were reviewed   MEDICATIONS  (STANDING):  chlorhexidine 0.12% Liquid 10 milliLiter(s) Swish and Spit once  influenza   Vaccine 0.5 milliLiter(s) IntraMuscular once  pantoprazole  Injectable 40 milliGRAM(s) IV Push daily    MEDICATIONS  (PRN):  oxyCODONE    5 mG/acetaminophen 325 mG 1 Tablet(s) Oral every 6 hours PRN Moderate Pain (4 - 6)       PROBLEM LIST/ ASSESSMENT:  HEALTH ISSUES - PROBLEM Dx  acute resp failure with hypoxia  Trachea, stenosis: Trachea, stenosis  Tracheal mass: Tracheal mass      ,   Patient is a 58y old  Female who presents with a chief complaint of Shortness of breath and pain at trach site (22 Mar 2018 20:49)    admitted with tracheal stenosis  acute changes include acute respiratory failure    My plan includes :  close hemodynamic, ventilatory and drain monitoring and management per post op routine    Monitor for arrhythmias and monitor parameters for organ perfusion  monitor neurologic status  Head of the bed should remain elevated to 45 deg .   chest PT and IS will be encouraged  monitor adequacy of oxygenation and ventilation and attempt to wean oxygen  Nutritional goals will be met using po eventually , ensure adequate caloric intake and montior the same  Stress ulcer and VTE prophylaxis will be achieved    Glycemic control is satisfactory  Electrolytes have been repleted as necessary and wound care has been carried out. Pain control has been achieved.   agressive physical therapy and early mobility and ambulation goals will be met   The family was updated about the course and plan  CRITICAL CARE TIME SPENT in evaluation and management, reassessments, review and interpretation of labs and x-rays, ventilator and hemodynamic management, formulating a plan and coordinating care: _30__ MIN.  Time does not include procedural time.  CTICU ATTENDING     					    Robert Chambers MD

## 2018-03-23 NOTE — BRIEF OPERATIVE NOTE - PROCEDURE
<<-----Click on this checkbox to enter Procedure Intubation, endotracheal, patient with difficult airway  03/23/2018    Active  HBELETE  Bronchoscopy  03/23/2018    Active  HBELETE

## 2018-03-24 LAB
ALBUMIN SERPL ELPH-MCNC: 3.5 G/DL — SIGNIFICANT CHANGE UP (ref 3.3–5)
ALP SERPL-CCNC: 51 U/L — SIGNIFICANT CHANGE UP (ref 40–120)
ALT FLD-CCNC: 8 U/L — LOW (ref 10–45)
ANION GAP SERPL CALC-SCNC: 9 MMOL/L — SIGNIFICANT CHANGE UP (ref 5–17)
APTT BLD: 55.2 SEC — HIGH (ref 27.5–37.4)
AST SERPL-CCNC: 15 U/L — SIGNIFICANT CHANGE UP (ref 10–40)
BILIRUB SERPL-MCNC: 0.8 MG/DL — SIGNIFICANT CHANGE UP (ref 0.2–1.2)
BUN SERPL-MCNC: 4 MG/DL — LOW (ref 7–23)
CALCIUM SERPL-MCNC: 9.4 MG/DL — SIGNIFICANT CHANGE UP (ref 8.4–10.5)
CHLORIDE SERPL-SCNC: 100 MMOL/L — SIGNIFICANT CHANGE UP (ref 96–108)
CO2 SERPL-SCNC: 32 MMOL/L — HIGH (ref 22–31)
CREAT SERPL-MCNC: 0.72 MG/DL — SIGNIFICANT CHANGE UP (ref 0.5–1.3)
GLUCOSE SERPL-MCNC: 90 MG/DL — SIGNIFICANT CHANGE UP (ref 70–99)
HCT VFR BLD CALC: 31.2 % — LOW (ref 34.5–45)
HGB BLD-MCNC: 9.9 G/DL — LOW (ref 11.5–15.5)
INR BLD: 1.17 — HIGH (ref 0.88–1.16)
MAGNESIUM SERPL-MCNC: 1.7 MG/DL — SIGNIFICANT CHANGE UP (ref 1.6–2.6)
MCHC RBC-ENTMCNC: 27.3 PG — SIGNIFICANT CHANGE UP (ref 27–34)
MCHC RBC-ENTMCNC: 31.7 G/DL — LOW (ref 32–36)
MCV RBC AUTO: 86 FL — SIGNIFICANT CHANGE UP (ref 80–100)
PHOSPHATE SERPL-MCNC: 3.1 MG/DL — SIGNIFICANT CHANGE UP (ref 2.5–4.5)
PLATELET # BLD AUTO: 245 K/UL — SIGNIFICANT CHANGE UP (ref 150–400)
POTASSIUM SERPL-MCNC: 3.9 MMOL/L — SIGNIFICANT CHANGE UP (ref 3.5–5.3)
POTASSIUM SERPL-SCNC: 3.9 MMOL/L — SIGNIFICANT CHANGE UP (ref 3.5–5.3)
PROT SERPL-MCNC: 6.7 G/DL — SIGNIFICANT CHANGE UP (ref 6–8.3)
PROTHROM AB SERPL-ACNC: 13 SEC — HIGH (ref 9.8–12.7)
RBC # BLD: 3.63 M/UL — LOW (ref 3.8–5.2)
RBC # FLD: 13.8 % — SIGNIFICANT CHANGE UP (ref 10.3–16.9)
SODIUM SERPL-SCNC: 141 MMOL/L — SIGNIFICANT CHANGE UP (ref 135–145)
WBC # BLD: 5.6 K/UL — SIGNIFICANT CHANGE UP (ref 3.8–10.5)
WBC # FLD AUTO: 5.6 K/UL — SIGNIFICANT CHANGE UP (ref 3.8–10.5)

## 2018-03-24 PROCEDURE — 99291 CRITICAL CARE FIRST HOUR: CPT

## 2018-03-24 PROCEDURE — 71045 X-RAY EXAM CHEST 1 VIEW: CPT | Mod: 26,59

## 2018-03-24 RX ORDER — HYDROMORPHONE HYDROCHLORIDE 2 MG/ML
0.5 INJECTION INTRAMUSCULAR; INTRAVENOUS; SUBCUTANEOUS ONCE
Qty: 0 | Refills: 0 | Status: DISCONTINUED | OUTPATIENT
Start: 2018-03-24 | End: 2018-03-24

## 2018-03-24 RX ORDER — ACETYLCYSTEINE 200 MG/ML
3 VIAL (ML) MISCELLANEOUS EVERY 6 HOURS
Qty: 0 | Refills: 0 | Status: DISCONTINUED | OUTPATIENT
Start: 2018-03-24 | End: 2018-03-26

## 2018-03-24 RX ORDER — ACETAMINOPHEN 500 MG
1000 TABLET ORAL ONCE
Qty: 0 | Refills: 0 | Status: COMPLETED | OUTPATIENT
Start: 2018-03-24 | End: 2018-03-24

## 2018-03-24 RX ORDER — FENTANYL CITRATE 50 UG/ML
25 INJECTION INTRAVENOUS ONCE
Qty: 0 | Refills: 0 | Status: DISCONTINUED | OUTPATIENT
Start: 2018-03-24 | End: 2018-03-24

## 2018-03-24 RX ADMIN — Medication 3 MILLILITER(S): at 21:24

## 2018-03-24 RX ADMIN — FENTANYL CITRATE 25 MICROGRAM(S): 50 INJECTION INTRAVENOUS at 06:40

## 2018-03-24 RX ADMIN — FENTANYL CITRATE 25 MICROGRAM(S): 50 INJECTION INTRAVENOUS at 20:41

## 2018-03-24 RX ADMIN — HYDROMORPHONE HYDROCHLORIDE 0.5 MILLIGRAM(S): 2 INJECTION INTRAMUSCULAR; INTRAVENOUS; SUBCUTANEOUS at 15:30

## 2018-03-24 RX ADMIN — FENTANYL CITRATE 25 MICROGRAM(S): 50 INJECTION INTRAVENOUS at 04:15

## 2018-03-24 RX ADMIN — FENTANYL CITRATE 25 MICROGRAM(S): 50 INJECTION INTRAVENOUS at 03:59

## 2018-03-24 RX ADMIN — FENTANYL CITRATE 25 MICROGRAM(S): 50 INJECTION INTRAVENOUS at 10:05

## 2018-03-24 RX ADMIN — FENTANYL CITRATE 25 MICROGRAM(S): 50 INJECTION INTRAVENOUS at 11:06

## 2018-03-24 RX ADMIN — MAGNESIUM OXIDE 400 MG ORAL TABLET 800 MILLIGRAM(S): 241.3 TABLET ORAL at 15:03

## 2018-03-24 RX ADMIN — FENTANYL CITRATE 25 MICROGRAM(S): 50 INJECTION INTRAVENOUS at 06:15

## 2018-03-24 RX ADMIN — Medication 3 MILLILITER(S): at 21:26

## 2018-03-24 RX ADMIN — Medication 3 MILLILITER(S): at 07:05

## 2018-03-24 RX ADMIN — FENTANYL CITRATE 25 MICROGRAM(S): 50 INJECTION INTRAVENOUS at 20:31

## 2018-03-24 RX ADMIN — Medication 20 MILLIEQUIVALENT(S): at 15:03

## 2018-03-24 RX ADMIN — HYDROMORPHONE HYDROCHLORIDE 0.5 MILLIGRAM(S): 2 INJECTION INTRAMUSCULAR; INTRAVENOUS; SUBCUTANEOUS at 15:31

## 2018-03-24 RX ADMIN — PANTOPRAZOLE SODIUM 40 MILLIGRAM(S): 20 TABLET, DELAYED RELEASE ORAL at 15:04

## 2018-03-24 RX ADMIN — FENTANYL CITRATE 25 MICROGRAM(S): 50 INJECTION INTRAVENOUS at 20:32

## 2018-03-24 NOTE — PROGRESS NOTE ADULT - SUBJECTIVE AND OBJECTIVE BOX
Patient discussed on morning rounds with       Operation / Date:     SUBJECTIVE ASSESSMENT:  58y Female         Vital Signs Last 24 Hrs  T(C): 36.1 (24 Mar 2018 16:46), Max: 36.9 (24 Mar 2018 10:05)  T(F): 97 (24 Mar 2018 16:46), Max: 98.5 (24 Mar 2018 10:05)  HR: 68 (24 Mar 2018 17:04) (56 - 82)  BP: 138/72 (24 Mar 2018 12:24) (106/67 - 145/78)  BP(mean): 97 (24 Mar 2018 12:24) (73 - 109)  RR: 20 (24 Mar 2018 12:24) (12 - 20)  SpO2: 100% (24 Mar 2018 17:04) (97% - 100%)  I&O's Detail    23 Mar 2018 07:01  -  24 Mar 2018 07:00  --------------------------------------------------------  IN:    IV PiggyBack: 350 mL  Total IN: 350 mL    OUT:    Voided: 1950 mL  Total OUT: 1950 mL    Total NET: -1600 mL      24 Mar 2018 07:01  -  24 Mar 2018 17:57  --------------------------------------------------------  IN:  Total IN: 0 mL    OUT:    Voided: 700 mL  Total OUT: 700 mL    Total NET: -700 mL          CHEST TUBE:  Yes/No. AIR LEAKS: Yes/No. Suction / H2O SEAL.   JORDAN DRAIN:  Yes/No.  EPICARDIAL WIRES: Yes/No.  TIE DOWNS: Yes/No.  ANNE: Yes/No.    PHYSICAL EXAM:    General:     Neurological:    Cardiovascular:    Respiratory:    Gastrointestinal:    Extremities:    Vascular:    Incision Sites:    LABS:                        9.9    5.6   )-----------( 245      ( 24 Mar 2018 03:50 )             31.2       COUMADIN:  Yes/No. REASON: .    PT/INR - ( 24 Mar 2018 03:50 )   PT: 13.0 sec;   INR: 1.17          PTT - ( 24 Mar 2018 03:50 )  PTT:55.2 sec    03-24    141  |  100  |  4<L>  ----------------------------<  90  3.9   |  32<H>  |  0.72    Ca    9.4      24 Mar 2018 03:50  Phos  3.1     03-24  Mg     1.7     03-24    TPro  6.7  /  Alb  3.5  /  TBili  0.8  /  DBili  x   /  AST  15  /  ALT  8<L>  /  AlkPhos  51  03-24          MEDICATIONS  (STANDING):  chlorhexidine 0.12% Liquid 10 milliLiter(s) Swish and Spit once  influenza   Vaccine 0.5 milliLiter(s) IntraMuscular once  pantoprazole    Tablet 40 milliGRAM(s) Oral before breakfast    MEDICATIONS  (PRN):  ALBUTerol/ipratropium for Nebulization 3 milliLiter(s) Nebulizer every 6 hours PRN Shortness of Breath and/or Wheezing  oxyCODONE    5 mG/acetaminophen 325 mG 1 Tablet(s) Oral every 6 hours PRN Moderate Pain (4 - 6)  sodium chloride 0.9% for Nebulization 3 milliLiter(s) Nebulizer every 2 hours PRN TT Care.        RADIOLOGY & ADDITIONAL TESTS: Patient discussed on morning rounds with Dr. Larios/ Chase    Operation / Date: Tracheal stoma revision, and tracheal tube exchange to # 6 Yesika 3/23/18    SUBJECTIVE ASSESSMENT:  58y Female - doing well. No issues. No SOB.         Vital Signs Last 24 Hrs  T(C): 36.1 (24 Mar 2018 16:46), Max: 36.9 (24 Mar 2018 10:05)  T(F): 97 (24 Mar 2018 16:46), Max: 98.5 (24 Mar 2018 10:05)  HR: 68 (24 Mar 2018 17:04) (56 - 82) SR  BP: 138/72 (24 Mar 2018 12:24) (106/67 - 145/78)  BP(mean): 97 (24 Mar 2018 12:24) (73 - 109)  RR: 20 (24 Mar 2018 12:24) (12 - 20)  SpO2: 100% (24 Mar 2018 17:04) (97% - 100%) CPAP/CMV  I&O's Detail    23 Mar 2018 07:01  -  24 Mar 2018 07:00  --------------------------------------------------------  IN:    IV PiggyBack: 350 mL  Total IN: 350 mL    OUT:    Voided: 1950 mL  Total OUT: 1950 mL    Total NET: -1600 mL      24 Mar 2018 07:01  -  24 Mar 2018 17:57  --------------------------------------------------------  IN:  Total IN: 0 mL    OUT:    Voided: 700 mL  Total OUT: 700 mL    Total NET: -700 mL      CHEST TUBE:  No  TIE DOWNS: Trach sutured in place  ANNE: No.    PHYSICAL EXAM:    General: Sitting comfortably, NAD    Neurological: Alert and oriented, non focal    Cardiovascular: RRR, no m/r/g    Respiratory: CTABL    Gastrointestinal: +BS, soft, non tender    Extremities: warm, no edema    Trach Sites:    LABS:                        9.9    5.6   )-----------( 245      ( 24 Mar 2018 03:50 )             31.2       COUMADIN:  Yes/No. REASON: .    PT/INR - ( 24 Mar 2018 03:50 )   PT: 13.0 sec;   INR: 1.17          PTT - ( 24 Mar 2018 03:50 )  PTT:55.2 sec    03-24    141  |  100  |  4<L>  ----------------------------<  90  3.9   |  32<H>  |  0.72    Ca    9.4      24 Mar 2018 03:50  Phos  3.1     03-24  Mg     1.7     03-24    TPro  6.7  /  Alb  3.5  /  TBili  0.8  /  DBili  x   /  AST  15  /  ALT  8<L>  /  AlkPhos  51  03-24          MEDICATIONS  (STANDING):  chlorhexidine 0.12% Liquid 10 milliLiter(s) Swish and Spit once  influenza   Vaccine 0.5 milliLiter(s) IntraMuscular once  pantoprazole    Tablet 40 milliGRAM(s) Oral before breakfast    MEDICATIONS  (PRN):  ALBUTerol/ipratropium for Nebulization 3 milliLiter(s) Nebulizer every 6 hours PRN Shortness of Breath and/or Wheezing  oxyCODONE    5 mG/acetaminophen 325 mG 1 Tablet(s) Oral every 6 hours PRN Moderate Pain (4 - 6)  sodium chloride 0.9% for Nebulization 3 milliLiter(s) Nebulizer every 2 hours PRN TT Care.        RADIOLOGY & ADDITIONAL TESTS: Patient discussed on morning rounds with Dr. Larios/ [facility]    Operation / Date: Tracheal stoma revision, and tracheal tube exchange to # 6 Yesika 3/23/18    SUBJECTIVE ASSESSMENT:  58y Female - doing well. No issues. No SOB.         Vital Signs Last 24 Hrs  T(C): 36.1 (24 Mar 2018 16:46), Max: 36.9 (24 Mar 2018 10:05)  T(F): 97 (24 Mar 2018 16:46), Max: 98.5 (24 Mar 2018 10:05)  HR: 68 (24 Mar 2018 17:04) (56 - 82) SR  BP: 138/72 (24 Mar 2018 12:24) (106/67 - 145/78)  BP(mean): 97 (24 Mar 2018 12:24) (73 - 109)  RR: 20 (24 Mar 2018 12:24) (12 - 20)  SpO2: 100% (24 Mar 2018 17:04) (97% - 100%) CPAP/CMV  I&O's Detail    23 Mar 2018 07:01  -  24 Mar 2018 07:00  --------------------------------------------------------  IN:    IV PiggyBack: 350 mL  Total IN: 350 mL    OUT:    Voided: 1950 mL  Total OUT: 1950 mL    Total NET: -1600 mL      24 Mar 2018 07:01  -  24 Mar 2018 17:57  --------------------------------------------------------  IN:  Total IN: 0 mL    OUT:    Voided: 700 mL  Total OUT: 700 mL    Total NET: -700 mL      CHEST TUBE:  No  TIE DOWNS: Trach sutured in place  ANNE: No.    PHYSICAL EXAM:    General: Sitting comfortably, NAD    Neurological: Alert and oriented, non focal    Cardiovascular: RRR, no m/r/g    Respiratory: CTABL    Gastrointestinal: +BS, soft, non tender    Extremities: warm, no edema    Trach Sites: c/d/i    LABS:                        9.9    5.6   )-----------( 245      ( 24 Mar 2018 03:50 )             31.2       COUMADIN:  Yes/No. REASON: .    PT/INR - ( 24 Mar 2018 03:50 )   PT: 13.0 sec;   INR: 1.17          PTT - ( 24 Mar 2018 03:50 )  PTT:55.2 sec    03-24    141  |  100  |  4<L>  ----------------------------<  90  3.9   |  32<H>  |  0.72    Ca    9.4      24 Mar 2018 03:50  Phos  3.1     03-24  Mg     1.7     03-24    TPro  6.7  /  Alb  3.5  /  TBili  0.8  /  DBili  x   /  AST  15  /  ALT  8<L>  /  AlkPhos  51  03-24          MEDICATIONS  (STANDING):  chlorhexidine 0.12% Liquid 10 milliLiter(s) Swish and Spit once  influenza   Vaccine 0.5 milliLiter(s) IntraMuscular once  pantoprazole    Tablet 40 milliGRAM(s) Oral before breakfast    MEDICATIONS  (PRN):  ALBUTerol/ipratropium for Nebulization 3 milliLiter(s) Nebulizer every 6 hours PRN Shortness of Breath and/or Wheezing  oxyCODONE    5 mG/acetaminophen 325 mG 1 Tablet(s) Oral every 6 hours PRN Moderate Pain (4 - 6)  sodium chloride 0.9% for Nebulization 3 milliLiter(s) Nebulizer every 2 hours PRN TT Care.        RADIOLOGY & ADDITIONAL TESTS:

## 2018-03-24 NOTE — PROGRESS NOTE ADULT - ASSESSMENT
58 year old female,     former smoker, with a PMHx of HTN, GERD, COPD and respiratory failure that was seen by Dr. Shell in the office for laryngotracheal stenosis, complaining of increased SOBx2 months and pain at her trach site. Her trach was placed 11 years ago after being hospitalized for a fall in which she fell face first into a park bench, which she manages at home with a 24/7 home aid and portable ventilator and can normally speak, breath and eat normally. Over the past 2 months, she has experienced increasing SOB for which she states she cant sleep, can only tolerate pureed meals and has noticed a change in her voice. In October of 2017, she was noted to have laryngotracheal stenosis on CT for which a bronchoscopy and excision of the laryngotracheal stenosis was performed and trach tube exchange with Dr. Gaurang Rodriguez. In March of 2018, the patient noted she was unable to change her trach tube (due to tightening of the trach), a dynamic CT was performed which revealed 65% stenosis of her trachea and a 10mm nodule above the maria del rosario. 58 year old female former smoker, with a PMHx of HTN, GERD, COPD and respiratory failure that was seen by Dr. Shell in the office for laryngotracheal stenosis, complaining of increased SOBx2 months and pain at her trach site. Her trach was placed 11 years ago after being hospitalized for a fall in which she fell face first into a park bench, which she manages at home with a 24/7 home aid and portable ventilator and can normally speak, breath and eat normally. Over the past 2 months, she has experienced increasing SOB for which she states she cant sleep, can only tolerate pureed meals and has noticed a change in her voice. In October of 2017, she was noted to have laryngotracheal stenosis on CT for which a bronchoscopy and excision of the laryngotracheal stenosis was performed and trach tube exchange with Dr. Gaurang Rodriguez. In March of 2018, the patient noted she was unable to change her trach tube (due to tightening of the trach), a dynamic CT was performed which revealed 65% stenosis of her trachea and a 10mm nodule above the maria del rosario. On 3/23 she went for tracheal revision, her stoma was debrided of scar tissue and enlarged, no nodule was seen, a new #6 Shiley tracheal tube was placed.     -HD stable.   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 No trach care for 48 hours post procedure per Dr. Larios.

## 2018-03-24 NOTE — PROVIDER CONTACT NOTE (OTHER) - ACTION/TREATMENT ORDERED:
Advised to discuss with Dr. Larios in AM regarding balloon deflation to preform dysphagia screen. Oncoming nurse notified as Dr. Larios did not round during shift.

## 2018-03-24 NOTE — PROVIDER CONTACT NOTE (OTHER) - BACKGROUND
Pt had new trach placed and was ordered no trach care for 48 hours. Unable to do dysphagia screen without deflating balloon.

## 2018-03-24 NOTE — PROGRESS NOTE ADULT - SUBJECTIVE AND OBJECTIVE BOX
CTICU  CRITICAL  CARE  attending     Hand off received 					   Pertinent clinical, laboratory, radiographic, hemodynamic, echocardiographic, respiratory data, microbiologic data and chart were reviewed and analyzed frequently throughout the course of the day and night  Patient seen and examined with CTS/ SH attending at bedside  Pt is a 58y , Female, HEALTH ISSUES - PROBLEM Dx:  Trachea, stenosis: Trachea, stenosis  Tracheal mass: Tracheal mass      , FAMILY HISTORY:  No pertinent family history in first degree relatives  PAST MEDICAL & SURGICAL HISTORY:  Pancreatic cyst  Anxiety disorder  Sickle cell trait  Hypertension  COPD (chronic obstructive pulmonary disease)  Tracheostomy dependent  Acute tracheostomy management  Dependence on tracheostomy    Patient is a 58y old  Female who presents with a chief complaint of Shortness of breath and pain at trach site (22 Mar 2018 20:49)      14 system review was unremarkable  acute changes include acute respiratory failure  Vital signs, hemodynamic and respiratory parameters were reviewed from the bedside nursing flowsheet.  ICU Vital Signs Last 24 Hrs  T(C): 36.3 (24 Mar 2018 21:12), Max: 36.9 (24 Mar 2018 10:05)  T(F): 97.3 (24 Mar 2018 21:12), Max: 98.5 (24 Mar 2018 10:05)  HR: 82 (24 Mar 2018 21:26) (56 - 84)  BP: 104/72 (24 Mar 2018 20:45) (104/72 - 145/78)  BP(mean): 85 (24 Mar 2018 20:45) (73 - 109)  ABP: --  ABP(mean): --  RR: 18 (24 Mar 2018 20:45) (12 - 20)  SpO2: 99% (24 Mar 2018 21:26) (97% - 100%)    Adult Advanced Hemodynamics Last 24 Hrs  CVP(mm Hg): --  CVP(cm H2O): --  CO: --  CI: --  PA: --  PA(mean): --  PCWP: --  SVR: --  SVRI: --  PVR: --  PVRI: --,   Mode: AC/ CMV (Assist Control/ Continuous Mandatory Ventilation)  RR (machine): 12  TV (machine): 500  FiO2: 50  PEEP: 5  ITime: 1  MAP: 10  PC: 5  PIP: 23    Intake and output was reviewed and the fluid balance was calculated  Daily     Daily   I&O's Summary    23 Mar 2018 07:01  -  24 Mar 2018 07:00  --------------------------------------------------------  IN: 350 mL / OUT: 1950 mL / NET: -1600 mL    24 Mar 2018 07:01  -  24 Mar 2018 21:42  --------------------------------------------------------  IN: 0 mL / OUT: 900 mL / NET: -900 mL        All lines and drain sites were assessed  Glycemic trend was reviewedCAPILLARY BLOOD GLUCOSE        No acute change in mental status  Auscultation of the chest reveals equal bs  Abdomen is soft  Extremities are warm and well perfused  Wounds appear clean and unremarkable  Antibiotics are periop    labs  CBC Full  -  ( 24 Mar 2018 03:50 )  WBC Count : 5.6 K/uL  Hemoglobin : 9.9 g/dL  Hematocrit : 31.2 %  Platelet Count - Automated : 245 K/uL  Mean Cell Volume : 86.0 fL  Mean Cell Hemoglobin : 27.3 pg  Mean Cell Hemoglobin Concentration : 31.7 g/dL  Auto Neutrophil # : x  Auto Lymphocyte # : x  Auto Monocyte # : x  Auto Eosinophil # : x  Auto Basophil # : x  Auto Neutrophil % : x  Auto Lymphocyte % : x  Auto Monocyte % : x  Auto Eosinophil % : x  Auto Basophil % : x    03-24    141  |  100  |  4<L>  ----------------------------<  90  3.9   |  32<H>  |  0.72    Ca    9.4      24 Mar 2018 03:50  Phos  3.1     03-24  Mg     1.7     03-24    TPro  6.7  /  Alb  3.5  /  TBili  0.8  /  DBili  x   /  AST  15  /  ALT  8<L>  /  AlkPhos  51  03-24    PT/INR - ( 24 Mar 2018 03:50 )   PT: 13.0 sec;   INR: 1.17          PTT - ( 24 Mar 2018 03:50 )  PTT:55.2 sec  The current medications were reviewed   MEDICATIONS  (STANDING):  chlorhexidine 0.12% Liquid 10 milliLiter(s) Swish and Spit once  influenza   Vaccine 0.5 milliLiter(s) IntraMuscular once  pantoprazole    Tablet 40 milliGRAM(s) Oral before breakfast    MEDICATIONS  (PRN):  acetylcysteine 20% Inhalation 3 milliLiter(s) Inhalation every 6 hours PRN SOB  ALBUTerol/ipratropium for Nebulization 3 milliLiter(s) Nebulizer every 6 hours PRN Shortness of Breath and/or Wheezing  oxyCODONE    5 mG/acetaminophen 325 mG 1 Tablet(s) Oral every 6 hours PRN Moderate Pain (4 - 6)  sodium chloride 0.9% for Nebulization 3 milliLiter(s) Nebulizer every 2 hours PRN TT Care.       PROBLEM LIST/ ASSESSMENT:  HEALTH ISSUES - PROBLEM Dx:  Trachea, stenosis: Trachea, stenosis  Tracheal mass: Tracheal mass      ,   Patient is a 58y old  Female who presents with a chief complaint of Shortness of breath and pain at trach site (22 Mar 2018 20:49)     s/p ts  acute changes include acute respiratory failure    My plan includes :  close hemodynamic, ventilatory and drain monitoring and management per post op routine    Monitor for arrhythmias and monitor parameters for organ perfusion  monitor neurologic status  Head of the bed should remain elevated to 45 deg .   chest PT and IS will be encouraged  monitor adequacy of oxygenation and ventilation and attempt to wean oxygen  Nutritional goals will be met using po eventually , ensure adequate caloric intake and montior the same  Stress ulcer and VTE prophylaxis will be achieved    Glycemic control is satisfactory  Electrolytes have been repleted as necessary and wound care has been carried out. Pain control has been achieved.   agressive physical therapy and early mobility and ambulation goals will be met   The family was updated about the course and plan  CRITICAL CARE TIME SPENT in evaluation and management, reassessments, review and interpretation of labs and x-rays, ventilator and hemodynamic management, formulating a plan and coordinating care: ___90____ MIN.  Time does not include procedural time.  CTICU ATTENDING     					    Anthony Covarrubias MD

## 2018-03-25 LAB
ALBUMIN SERPL ELPH-MCNC: 3.9 G/DL — SIGNIFICANT CHANGE UP (ref 3.3–5)
ALBUMIN SERPL ELPH-MCNC: 4.2 G/DL — SIGNIFICANT CHANGE UP (ref 3.3–5)
ALP SERPL-CCNC: 52 U/L — SIGNIFICANT CHANGE UP (ref 40–120)
ALP SERPL-CCNC: 57 U/L — SIGNIFICANT CHANGE UP (ref 40–120)
ALT FLD-CCNC: 6 U/L — LOW (ref 10–45)
ALT FLD-CCNC: 6 U/L — LOW (ref 10–45)
ANION GAP SERPL CALC-SCNC: 7 MMOL/L — SIGNIFICANT CHANGE UP (ref 5–17)
ANION GAP SERPL CALC-SCNC: 9 MMOL/L — SIGNIFICANT CHANGE UP (ref 5–17)
APTT BLD: 61.2 SEC — HIGH (ref 27.5–37.4)
AST SERPL-CCNC: 13 U/L — SIGNIFICANT CHANGE UP (ref 10–40)
AST SERPL-CCNC: 14 U/L — SIGNIFICANT CHANGE UP (ref 10–40)
BILIRUB DIRECT SERPL-MCNC: <0.2 MG/DL — SIGNIFICANT CHANGE UP (ref 0–0.2)
BILIRUB INDIRECT FLD-MCNC: >0.4 MG/DL — SIGNIFICANT CHANGE UP (ref 0.2–1)
BILIRUB SERPL-MCNC: 0.6 MG/DL — SIGNIFICANT CHANGE UP (ref 0.2–1.2)
BILIRUB SERPL-MCNC: 0.7 MG/DL — SIGNIFICANT CHANGE UP (ref 0.2–1.2)
BUN SERPL-MCNC: 5 MG/DL — LOW (ref 7–23)
BUN SERPL-MCNC: 6 MG/DL — LOW (ref 7–23)
CALCIUM SERPL-MCNC: 9.2 MG/DL — SIGNIFICANT CHANGE UP (ref 8.4–10.5)
CALCIUM SERPL-MCNC: 9.4 MG/DL — SIGNIFICANT CHANGE UP (ref 8.4–10.5)
CHLORIDE SERPL-SCNC: 101 MMOL/L — SIGNIFICANT CHANGE UP (ref 96–108)
CHLORIDE SERPL-SCNC: 103 MMOL/L — SIGNIFICANT CHANGE UP (ref 96–108)
CO2 SERPL-SCNC: 30 MMOL/L — SIGNIFICANT CHANGE UP (ref 22–31)
CO2 SERPL-SCNC: 32 MMOL/L — HIGH (ref 22–31)
CREAT SERPL-MCNC: 0.74 MG/DL — SIGNIFICANT CHANGE UP (ref 0.5–1.3)
CREAT SERPL-MCNC: 0.77 MG/DL — SIGNIFICANT CHANGE UP (ref 0.5–1.3)
GAS PNL BLDA: SIGNIFICANT CHANGE UP
GLUCOSE SERPL-MCNC: 102 MG/DL — HIGH (ref 70–99)
GLUCOSE SERPL-MCNC: 115 MG/DL — HIGH (ref 70–99)
HCT VFR BLD CALC: 31.9 % — LOW (ref 34.5–45)
HCT VFR BLD CALC: 32.2 % — LOW (ref 34.5–45)
HGB BLD-MCNC: 10 G/DL — LOW (ref 11.5–15.5)
HGB BLD-MCNC: 10.2 G/DL — LOW (ref 11.5–15.5)
INR BLD: 1.19 — HIGH (ref 0.88–1.16)
LACTATE SERPL-SCNC: 0.4 MMOL/L — LOW (ref 0.5–2)
MAGNESIUM SERPL-MCNC: 1.9 MG/DL — SIGNIFICANT CHANGE UP (ref 1.6–2.6)
MCHC RBC-ENTMCNC: 27 PG — SIGNIFICANT CHANGE UP (ref 27–34)
MCHC RBC-ENTMCNC: 27.2 PG — SIGNIFICANT CHANGE UP (ref 27–34)
MCHC RBC-ENTMCNC: 31.3 G/DL — LOW (ref 32–36)
MCHC RBC-ENTMCNC: 31.7 G/DL — LOW (ref 32–36)
MCV RBC AUTO: 85.2 FL — SIGNIFICANT CHANGE UP (ref 80–100)
MCV RBC AUTO: 86.7 FL — SIGNIFICANT CHANGE UP (ref 80–100)
PHOSPHATE SERPL-MCNC: 3.6 MG/DL — SIGNIFICANT CHANGE UP (ref 2.5–4.5)
PLATELET # BLD AUTO: 244 K/UL — SIGNIFICANT CHANGE UP (ref 150–400)
PLATELET # BLD AUTO: 254 K/UL — SIGNIFICANT CHANGE UP (ref 150–400)
POTASSIUM SERPL-MCNC: 3.9 MMOL/L — SIGNIFICANT CHANGE UP (ref 3.5–5.3)
POTASSIUM SERPL-MCNC: 4.3 MMOL/L — SIGNIFICANT CHANGE UP (ref 3.5–5.3)
POTASSIUM SERPL-SCNC: 3.9 MMOL/L — SIGNIFICANT CHANGE UP (ref 3.5–5.3)
POTASSIUM SERPL-SCNC: 4.3 MMOL/L — SIGNIFICANT CHANGE UP (ref 3.5–5.3)
PROT SERPL-MCNC: 7.1 G/DL — SIGNIFICANT CHANGE UP (ref 6–8.3)
PROT SERPL-MCNC: 7.4 G/DL — SIGNIFICANT CHANGE UP (ref 6–8.3)
PROTHROM AB SERPL-ACNC: 13.2 SEC — HIGH (ref 9.8–12.7)
RBC # BLD: 3.68 M/UL — LOW (ref 3.8–5.2)
RBC # BLD: 3.78 M/UL — LOW (ref 3.8–5.2)
RBC # FLD: 13.8 % — SIGNIFICANT CHANGE UP (ref 10.3–16.9)
RBC # FLD: 13.9 % — SIGNIFICANT CHANGE UP (ref 10.3–16.9)
SODIUM SERPL-SCNC: 140 MMOL/L — SIGNIFICANT CHANGE UP (ref 135–145)
SODIUM SERPL-SCNC: 142 MMOL/L — SIGNIFICANT CHANGE UP (ref 135–145)
WBC # BLD: 6.3 K/UL — SIGNIFICANT CHANGE UP (ref 3.8–10.5)
WBC # BLD: 7.7 K/UL — SIGNIFICANT CHANGE UP (ref 3.8–10.5)
WBC # FLD AUTO: 6.3 K/UL — SIGNIFICANT CHANGE UP (ref 3.8–10.5)
WBC # FLD AUTO: 7.7 K/UL — SIGNIFICANT CHANGE UP (ref 3.8–10.5)

## 2018-03-25 PROCEDURE — 99291 CRITICAL CARE FIRST HOUR: CPT

## 2018-03-25 PROCEDURE — 71045 X-RAY EXAM CHEST 1 VIEW: CPT | Mod: 26

## 2018-03-25 RX ORDER — FENTANYL CITRATE 50 UG/ML
25 INJECTION INTRAVENOUS ONCE
Qty: 0 | Refills: 0 | Status: DISCONTINUED | OUTPATIENT
Start: 2018-03-25 | End: 2018-03-25

## 2018-03-25 RX ORDER — DEXMEDETOMIDINE HYDROCHLORIDE IN 0.9% SODIUM CHLORIDE 4 UG/ML
0.2 INJECTION INTRAVENOUS
Qty: 200 | Refills: 0 | Status: DISCONTINUED | OUTPATIENT
Start: 2018-03-25 | End: 2018-03-26

## 2018-03-25 RX ORDER — MULTIVIT WITH MIN/MFOLATE/K2 340-15/3 G
150 POWDER (GRAM) ORAL ONCE
Qty: 0 | Refills: 0 | Status: COMPLETED | OUTPATIENT
Start: 2018-03-25 | End: 2018-03-25

## 2018-03-25 RX ORDER — MAGNESIUM OXIDE 400 MG ORAL TABLET 241.3 MG
800 TABLET ORAL ONCE
Qty: 0 | Refills: 0 | Status: COMPLETED | OUTPATIENT
Start: 2018-03-25 | End: 2018-03-25

## 2018-03-25 RX ORDER — POTASSIUM CHLORIDE 20 MEQ
40 PACKET (EA) ORAL ONCE
Qty: 0 | Refills: 0 | Status: COMPLETED | OUTPATIENT
Start: 2018-03-25 | End: 2018-03-25

## 2018-03-25 RX ORDER — LACTULOSE 10 G/15ML
20 SOLUTION ORAL ONCE
Qty: 0 | Refills: 0 | Status: COMPLETED | OUTPATIENT
Start: 2018-03-25 | End: 2018-03-25

## 2018-03-25 RX ADMIN — Medication 40 MILLIEQUIVALENT(S): at 09:24

## 2018-03-25 RX ADMIN — Medication 10 MILLIGRAM(S): at 19:00

## 2018-03-25 RX ADMIN — FENTANYL CITRATE 25 MICROGRAM(S): 50 INJECTION INTRAVENOUS at 04:59

## 2018-03-25 RX ADMIN — LACTULOSE 20 GRAM(S): 10 SOLUTION ORAL at 18:00

## 2018-03-25 RX ADMIN — Medication 1 ENEMA: at 21:40

## 2018-03-25 RX ADMIN — FENTANYL CITRATE 25 MICROGRAM(S): 50 INJECTION INTRAVENOUS at 00:29

## 2018-03-25 RX ADMIN — Medication 3 MILLILITER(S): at 12:21

## 2018-03-25 RX ADMIN — FENTANYL CITRATE 25 MICROGRAM(S): 50 INJECTION INTRAVENOUS at 05:46

## 2018-03-25 RX ADMIN — Medication 3 MILLILITER(S): at 18:38

## 2018-03-25 RX ADMIN — MAGNESIUM OXIDE 400 MG ORAL TABLET 800 MILLIGRAM(S): 241.3 TABLET ORAL at 09:24

## 2018-03-25 RX ADMIN — OXYCODONE AND ACETAMINOPHEN 1 TABLET(S): 5; 325 TABLET ORAL at 22:00

## 2018-03-25 RX ADMIN — OXYCODONE AND ACETAMINOPHEN 1 TABLET(S): 5; 325 TABLET ORAL at 09:20

## 2018-03-25 RX ADMIN — Medication 3 MILLILITER(S): at 09:23

## 2018-03-25 RX ADMIN — Medication 150 MILLILITER(S): at 22:49

## 2018-03-25 RX ADMIN — OXYCODONE AND ACETAMINOPHEN 1 TABLET(S): 5; 325 TABLET ORAL at 20:51

## 2018-03-25 RX ADMIN — FENTANYL CITRATE 25 MICROGRAM(S): 50 INJECTION INTRAVENOUS at 00:59

## 2018-03-25 RX ADMIN — OXYCODONE AND ACETAMINOPHEN 1 TABLET(S): 5; 325 TABLET ORAL at 10:30

## 2018-03-25 RX ADMIN — PANTOPRAZOLE SODIUM 40 MILLIGRAM(S): 20 TABLET, DELAYED RELEASE ORAL at 09:24

## 2018-03-25 NOTE — PROGRESS NOTE ADULT - ASSESSMENT
58 year old female former smoker, with a PMHx of HTN, GERD, COPD and respiratory failure that was seen by Dr. Shell in the office for laryngotracheal stenosis, complaining of increased SOBx2 months and pain at her trach site. Her trach was placed 11 years ago after being hospitalized for a fall in which she fell face first into a park bench, which she manages at home with a 24/7 home aid and portable ventilator and can normally speak, breath and eat normally. Over the past 2 months, she has experienced increasing SOB for which she states she cant sleep, can only tolerate pureed meals and has noticed a change in her voice. In October of 2017, she was noted to have laryngotracheal stenosis on CT for which a bronchoscopy and excision of the laryngotracheal stenosis was performed and trach tube exchange with Dr. Gaurang Rodriguez. In March of 2018, the patient noted she was unable to change her trach tube (due to tightening of the trach), a dynamic CT was performed which revealed 65% stenosis of her trachea and a 10mm nodule above the maria del rosario. On 3/23 she went for tracheal revision, her stoma was debrided of scar tissue and enlarged, no nodule was seen, a new #6 Shiley tracheal tube was placed.     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 No trach care for 48 hours post procedure per Dr. Larios

## 2018-03-25 NOTE — PROGRESS NOTE ADULT - ASSESSMENT
59yo s/p tracheal stoma revision and trach exchange.  Chronic resp failure, vent dependent    Problems  1. Chronic resp failure/vent dependent  2. Acute on chronic resp insufficiency   3. Anxiety     Plan   Neuro -- pain control, anxiolysis  CVS - hemodynamic support. Stable BP  Pulm - vent weaning as tolerated   GI - GI proph, bowel regimen   - monitor UOP  Endo - glycemic control  Heme - correct coagulapathy, monitor for bleeding  ID - Sputum cx sent       Critical post op.    Critical care time spent 50 min

## 2018-03-25 NOTE — PROGRESS NOTE ADULT - SUBJECTIVE AND OBJECTIVE BOX
Patient discussed on morning rounds with Dr. Stone    Operation / Date: Tracheal stoma revision, and tracheal tube exchange to # 6 Yesika 3/23/18    SUBJECTIVE ASSESSMENT:  58y Female - doing well. No issues. No SOB.     Vital Signs Last 24 Hrs  T(C): 36.2 (25 Mar 2018 13:58), Max: 36.3 (24 Mar 2018 21:12)  T(F): 97.1 (25 Mar 2018 13:58), Max: 97.3 (24 Mar 2018 21:12)  HR: 70 (25 Mar 2018 12:10) (56 - 84)  BP: 105/71 (25 Mar 2018 12:00) (87/51 - 138/88)  BP(mean): 86 (25 Mar 2018 12:00) (63 - 108)  RR: 16 (25 Mar 2018 12:10) (13 - 20)  SpO2: 100% (25 Mar 2018 12:10) (96% - 100%)  I&O's Detail    24 Mar 2018 07:01  -  25 Mar 2018 07:00  --------------------------------------------------------  IN:    Oral Fluid: 220 mL  Total IN: 220 mL    OUT:    Voided: 1460 mL  Total OUT: 1460 mL    Total NET: -1240 mL    PHYSICAL EXAM:  General: WD obese female  Neurological: AA&O x3  Cardiovascular: S1S2, no mummurs  Respiratory: b/l breath sounds  Gastrointestinal: soft NTND  Extremities: moves all 4  Vascular: WWP  Incision Sites: trach c/d/i sutured in place    LABS:                        10.2   6.3   )-----------( 244      ( 25 Mar 2018 15:38 )             32.2       COUMADIN:  No.    PT/INR - ( 25 Mar 2018 05:29 )   PT: 13.2 sec;   INR: 1.19          PTT - ( 25 Mar 2018 05:29 )  PTT:61.2 sec    03-25    140  |  101  |  6<L>  ----------------------------<  102<H>  3.9   |  32<H>  |  0.77    Ca    9.4      25 Mar 2018 05:29  Phos  3.6     03-25  Mg     1.9     03-25    TPro  7.1  /  Alb  3.9  /  TBili  0.7  /  DBili  x   /  AST  14  /  ALT  6<L>  /  AlkPhos  52  03-25    MEDICATIONS  (STANDING):  bisacodyl Suppository 10 milliGRAM(s) Rectal once  bisacodyl Suppository 10 milliGRAM(s) Rectal once  chlorhexidine 0.12% Liquid 10 milliLiter(s) Swish and Spit once  dexmedetomidine Infusion 0.2 MICROgram(s)/kG/Hr (3.25 mL/Hr) IV Continuous <Continuous>  influenza   Vaccine 0.5 milliLiter(s) IntraMuscular once  lactulose Syrup 20 Gram(s) Oral once  pantoprazole    Tablet 40 milliGRAM(s) Oral before breakfast    MEDICATIONS  (PRN):  acetylcysteine 20% Inhalation 3 milliLiter(s) Inhalation every 6 hours PRN SOB  ALBUTerol/ipratropium for Nebulization 3 milliLiter(s) Nebulizer every 6 hours PRN Shortness of Breath and/or Wheezing  oxyCODONE    5 mG/acetaminophen 325 mG 1 Tablet(s) Oral every 6 hours PRN Moderate Pain (4 - 6)  sodium chloride 0.9% for Nebulization 3 milliLiter(s) Nebulizer every 2 hours PRN TT Care.

## 2018-03-25 NOTE — PROGRESS NOTE ADULT - SUBJECTIVE AND OBJECTIVE BOX
3/23 S/p Tracheal stoma revision     24hours: stable on CPAP, failed brief trial of trach collar  developed acute subjective shortness of breath post trach collar resolved spontaneously. labs and CXR no abnormal finding.     57yo with chronic resp failure trach/vent dependent admitted on with shortness of breath     PMHx of HTN, GERD, COPD  11/ 2017 - diagnosed with laryngotracheal stenosis on CT for which a bronchoscopy and excision of the laryngotracheal stenosis was performed and trach tube exchange with Dr. Gaurang Rodriguez.   3/2018 : the patient noted she was unable to change her trach tube (due to tightening of the trach), a dynamic CT was performed which revealed 65% stenosis of her trachea and a 10mm nodule above the maria del rosario. She was referred to Dr. Shell/Dr. Larios for evaluation.  Admitted directly from clinic.     3/23 Bronch and Trach exchange via wire -- thick secretions in airway and no nodule noted at the level of Maria Del Rosario.  During endotracheal intubation noted to have a vocal cord nodule.    size 6 trach inserted and secured.       ICU Vital Signs Last 24 Hrs  T(C): 36.3 (25 Mar 2018 16:49), Max: 36.3 (24 Mar 2018 21:12)  T(F): 97.3 (25 Mar 2018 16:49), Max: 97.3 (24 Mar 2018 21:12)  HR: 68 (25 Mar 2018 16:00) (56 - 84)  BP: 109/72 (25 Mar 2018 16:00) (87/51 - 173/117)  BP(mean): 83 (25 Mar 2018 16:00) (63 - 148)  RR: 16 (25 Mar 2018 16:00) (13 - 20)  SpO2: 94% (25 Mar 2018 16:00) (94% - 100%)    MEDICATIONS  (STANDING):  pantoprazole    Tablet 40 milliGRAM(s) Oral before breakfast    Exam   PHYSICAL EXAM:  Gen : no acute distress, anxious   Neck: + trach- minimal drainage, no secretion  Able to phonate with fully inflated cuff.  ++ leak around cuff  Respiratory: clear   Cardiovascular: S1 and S2, RRR, no M/G/R  Gastrointestinal: BS+,obese  Extremities: No peripheral edema                          10.2   6.3   )-----------( 244      ( 25 Mar 2018 15:38 )             32.2   03-25    142  |  103  |  5<L>  ----------------------------<  115<H>  4.3   |  30  |  0.74    Ca    9.2      25 Mar 2018 15:38  Phos  3.6     03-25  Mg     1.9     03-25    TPro  7.4  /  Alb  4.2  /  TBili  0.6  /  DBili  <0.2  /  AST  13  /  ALT  6<L>  /  AlkPhos  57  03-25  ABG - ( 25 Mar 2018 15:45 )    pH: 7.35  /  pCO2: 57    /  pO2: 411   / HCO3: 30    / Base Excess: 3.8   /  SaO2: 100

## 2018-03-26 VITALS — SYSTOLIC BLOOD PRESSURE: 139 MMHG | DIASTOLIC BLOOD PRESSURE: 72 MMHG | OXYGEN SATURATION: 100 % | HEART RATE: 72 BPM

## 2018-03-26 PROBLEM — D57.3 SICKLE-CELL TRAIT: Chronic | Status: ACTIVE | Noted: 2018-03-22

## 2018-03-26 PROBLEM — K86.2 CYST OF PANCREAS: Chronic | Status: ACTIVE | Noted: 2018-03-22

## 2018-03-26 PROBLEM — F41.9 ANXIETY DISORDER, UNSPECIFIED: Chronic | Status: ACTIVE | Noted: 2018-03-22

## 2018-03-26 PROBLEM — J44.9 CHRONIC OBSTRUCTIVE PULMONARY DISEASE, UNSPECIFIED: Chronic | Status: ACTIVE | Noted: 2018-03-22

## 2018-03-26 PROBLEM — I10 ESSENTIAL (PRIMARY) HYPERTENSION: Chronic | Status: ACTIVE | Noted: 2018-03-22

## 2018-03-26 LAB
ALBUMIN SERPL ELPH-MCNC: 3.7 G/DL — SIGNIFICANT CHANGE UP (ref 3.3–5)
ALP SERPL-CCNC: 53 U/L — SIGNIFICANT CHANGE UP (ref 40–120)
ALT FLD-CCNC: 7 U/L — LOW (ref 10–45)
ANION GAP SERPL CALC-SCNC: 11 MMOL/L — SIGNIFICANT CHANGE UP (ref 5–17)
APTT 50/50 2HOUR INCUB: 36.9 SEC — SIGNIFICANT CHANGE UP (ref 27.5–37.4)
APTT BLD: 51.8 SEC — HIGH (ref 27.5–37.4)
AST SERPL-CCNC: 14 U/L — SIGNIFICANT CHANGE UP (ref 10–40)
BILIRUB SERPL-MCNC: 0.5 MG/DL — SIGNIFICANT CHANGE UP (ref 0.2–1.2)
BUN SERPL-MCNC: 5 MG/DL — LOW (ref 7–23)
CALCIUM SERPL-MCNC: 9.4 MG/DL — SIGNIFICANT CHANGE UP (ref 8.4–10.5)
CHLORIDE SERPL-SCNC: 101 MMOL/L — SIGNIFICANT CHANGE UP (ref 96–108)
CO2 SERPL-SCNC: 29 MMOL/L — SIGNIFICANT CHANGE UP (ref 22–31)
CONFIRM APTT STACLOT: NEGATIVE — SIGNIFICANT CHANGE UP
CREAT SERPL-MCNC: 0.73 MG/DL — SIGNIFICANT CHANGE UP (ref 0.5–1.3)
DRVVT SCREEN TO CONFIRM RATIO: (no result)
GLUCOSE SERPL-MCNC: 94 MG/DL — SIGNIFICANT CHANGE UP (ref 70–99)
HCT VFR BLD CALC: 31.6 % — LOW (ref 34.5–45)
HGB BLD-MCNC: 10 G/DL — LOW (ref 11.5–15.5)
INR BLD: 1.06 — SIGNIFICANT CHANGE UP (ref 0.88–1.16)
LA NT DPL PPP QL: 38.3 SEC — SIGNIFICANT CHANGE UP
MAGNESIUM SERPL-MCNC: 2.2 MG/DL — SIGNIFICANT CHANGE UP (ref 1.6–2.6)
MCHC RBC-ENTMCNC: 27.5 PG — SIGNIFICANT CHANGE UP (ref 27–34)
MCHC RBC-ENTMCNC: 31.6 G/DL — LOW (ref 32–36)
MCV RBC AUTO: 87.1 FL — SIGNIFICANT CHANGE UP (ref 80–100)
PAT CTL 2H: 44.2 SEC — HIGH (ref 27.5–37.4)
PHOSPHATE SERPL-MCNC: 3.2 MG/DL — SIGNIFICANT CHANGE UP (ref 2.5–4.5)
PLATELET # BLD AUTO: 253 K/UL — SIGNIFICANT CHANGE UP (ref 150–400)
POTASSIUM SERPL-MCNC: 4.1 MMOL/L — SIGNIFICANT CHANGE UP (ref 3.5–5.3)
POTASSIUM SERPL-SCNC: 4.1 MMOL/L — SIGNIFICANT CHANGE UP (ref 3.5–5.3)
PROT SERPL-MCNC: 7.2 G/DL — SIGNIFICANT CHANGE UP (ref 6–8.3)
PROTHROM AB SERPL-ACNC: 11.8 SEC — SIGNIFICANT CHANGE UP (ref 9.8–12.7)
RBC # BLD: 3.63 M/UL — LOW (ref 3.8–5.2)
RBC # FLD: 14.1 % — SIGNIFICANT CHANGE UP (ref 10.3–16.9)
SODIUM SERPL-SCNC: 141 MMOL/L — SIGNIFICANT CHANGE UP (ref 135–145)
VWF AG ACT/NOR PPP IA: 139 % — SIGNIFICANT CHANGE UP (ref 63–170)
VWF:RCO ACT/NOR PPP PL AGG: 125 % — SIGNIFICANT CHANGE UP (ref 45–133)
WBC # BLD: 6.7 K/UL — SIGNIFICANT CHANGE UP (ref 3.8–10.5)
WBC # FLD AUTO: 6.7 K/UL — SIGNIFICANT CHANGE UP (ref 3.8–10.5)

## 2018-03-26 PROCEDURE — 71045 X-RAY EXAM CHEST 1 VIEW: CPT | Mod: 26

## 2018-03-26 RX ORDER — BUDESONIDE, MICRONIZED 100 %
0 POWDER (GRAM) MISCELLANEOUS
Qty: 0 | Refills: 0 | COMMUNITY

## 2018-03-26 RX ORDER — ALBUTEROL 90 UG/1
0 AEROSOL, METERED ORAL
Qty: 0 | Refills: 0 | COMMUNITY

## 2018-03-26 RX ORDER — AMLODIPINE BESYLATE 2.5 MG/1
1 TABLET ORAL
Qty: 0 | Refills: 0 | COMMUNITY

## 2018-03-26 RX ORDER — ASPIRIN/CALCIUM CARB/MAGNESIUM 324 MG
1 TABLET ORAL
Qty: 30 | Refills: 0
Start: 2018-03-26 | End: 2018-04-24

## 2018-03-26 RX ORDER — ZOLPIDEM TARTRATE 10 MG/1
1 TABLET ORAL
Qty: 15 | Refills: 0
Start: 2018-03-26 | End: 2018-04-09

## 2018-03-26 RX ORDER — ASPIRIN/CALCIUM CARB/MAGNESIUM 324 MG
1 TABLET ORAL
Qty: 30 | Refills: 0 | DISCHARGE
Start: 2018-03-26 | End: 2018-04-24

## 2018-03-26 RX ORDER — BUDESONIDE, MICRONIZED 100 %
2 POWDER (GRAM) MISCELLANEOUS
Qty: 1 | Refills: 0 | OUTPATIENT
Start: 2018-03-26 | End: 2018-04-24

## 2018-03-26 RX ORDER — IPRATROPIUM/ALBUTEROL SULFATE 18-103MCG
3 AEROSOL WITH ADAPTER (GRAM) INHALATION
Qty: 1 | Refills: 0
Start: 2018-03-26

## 2018-03-26 RX ORDER — LOSARTAN POTASSIUM 100 MG/1
1 TABLET, FILM COATED ORAL
Qty: 0 | Refills: 0 | COMMUNITY

## 2018-03-26 RX ORDER — CLONAZEPAM 1 MG
0 TABLET ORAL
Qty: 0 | Refills: 0 | COMMUNITY

## 2018-03-26 RX ORDER — AMLODIPINE BESYLATE 2.5 MG/1
5 TABLET ORAL ONCE
Qty: 0 | Refills: 0 | Status: COMPLETED | OUTPATIENT
Start: 2018-03-26 | End: 2018-03-26

## 2018-03-26 RX ORDER — OXYCODONE HYDROCHLORIDE 5 MG/1
0 TABLET ORAL
Qty: 0 | Refills: 0 | COMMUNITY

## 2018-03-26 RX ORDER — AMLODIPINE BESYLATE 2.5 MG/1
1 TABLET ORAL
Qty: 30 | Refills: 0
Start: 2018-03-26 | End: 2018-04-24

## 2018-03-26 RX ORDER — CLONAZEPAM 1 MG
1 TABLET ORAL
Qty: 65 | Refills: 0
Start: 2018-03-26

## 2018-03-26 RX ADMIN — PANTOPRAZOLE SODIUM 40 MILLIGRAM(S): 20 TABLET, DELAYED RELEASE ORAL at 06:15

## 2018-03-26 RX ADMIN — Medication 3 MILLILITER(S): at 05:28

## 2018-03-26 RX ADMIN — Medication 3 MILLILITER(S): at 00:21

## 2018-03-26 RX ADMIN — AMLODIPINE BESYLATE 5 MILLIGRAM(S): 2.5 TABLET ORAL at 14:44

## 2018-03-26 NOTE — DISCHARGE NOTE ADULT - MEDICATION SUMMARY - MEDICATIONS TO TAKE
I will START or STAY ON the medications listed below when I get home from the hospital:    6 DIC Shiley Inner Cannula   -- 6 Disposable Inner Cannula Shiley    (Dispense 30)  -- Indication: For TRACHEostomy    Tracheostomy Tube Size 6 cuffed, non-fenestrated  -- Tracheostomy Tube Size 6 cuffed, non-fenestrated    Dispense One, Patient to carry with her at all times in case of an emergency  -- Indication: For TRACHEostomy    budesonide 0.5 mg/2 mL inhalation suspension  -- 2 milliliter(s) by nebulizer 2 times a day     dispense one month supply  -- For inhalation only.  Rinse mouth thoroughly after use.  Shake well before use.    -- Indication: For lung health    aspirin 81 mg oral tablet, chewable  -- 1 tab(s) by mouth once a day  -- Indication: For Heart disease    oxyCODONE-acetaminophen 5 mg-325 mg oral tablet  -- 1 tab(s) by mouth every 6 hours, As Needed -Pain MDD:4 tablets  -- Indication: For As needed for pain    clonazePAM 0.5 mg oral tablet, disintegrating  -- 1 tab(s) by mouth every 8 hours, As Needed -anxiety - for agitation MDD:3 tablets  -- Indication: For As needed for anxiety    Ambien 5 mg oral tablet  -- 1 tab(s) by mouth once a day (at bedtime), As Needed insomnia MDD:1 tablet  -- Caution federal law prohibits the transfer of this drug to any person other  than the person for whom it was prescribed.  May cause drowsiness.  Alcohol may intensify this effect.  Use care when operating dangerous machinery.    -- Indication: For As needed for sleep    ipratropium-albuterol 0.5 mg-2.5 mg/3 mLinhalation solution  -- 3 milliliter(s) inhaled every 6 hours    dispense one month supply  -- Indication: For Lung health    Norvasc 5 mg oral tablet  -- 1 tab(s) by mouth once a day  -- Indication: For Blood pressure    multivitamin  -- Indication: For Vitamin

## 2018-03-26 NOTE — PROGRESS NOTE ADULT - SUBJECTIVE AND OBJECTIVE BOX
Patient discussed on morning rounds with Dr. Larios     Operation / Date: 3/23/18: tracheal stoma revision, bronchoscopy, replacement ro tracheal tube size #6 shiley     Surgeon: Dr. Larios, Dr. Kay     Referring Physician: Dr. ely    SUBJECTIVE ASSESSMENT:  58y Female seen and examined at the bedside. Pt feeling well, denies CP or SOB and states she is ready for discharge    Hospital Course:  57 y/o F, former smoker, with a PMHx of HTN, GERD, COPD and respiratory failure that was seen by Dr. Phillips/Dr. Larios's in the office for laryngotracheal stenosis, c/o increased SOB and pain at her trach site. Her trach was placed 11 years ago after being hospitalized for a fall in which she fell face first into a park bench, which she manages at home with a 24/7 home aid and portable ventilator. In 10/2017, she was noted to have laryngotracheal stenosis on CT for which a bronchoscopy and excision of the laryngotracheal stenosis was performed and trach tube exchange with Dr. Gaurang Ely. In 3/2018,the pt was unable to change her trach tube, a dynamic CT was performed which revealed 65% stenosis of her trachea and a 10mm nodule above the maria del rosario. She was referred to Dr. Shell/Dr. Larios for evaluation. Upon her visit in the office, she was sent to [facility] ED for PST and management of her laryngotracheal stenosis. Pt was admitted to CTICU for evaluation. On 3/23/18, pt underwent tracheal stoma revision and replacement to tracheal tube with #6 shiley. Of note pre-op pt was noted to have isolated elevated ptt, Hematology team was consulted and pt was diagnosed with mild Factor XII deficiency (hematology clear pt for OR). Pt remained stable in CTICU post-op and was cleared by Dr. Larios for discharge home on POD#3.     Of note, pt will have outpatiet follow-up with Dr. Larios, Dr. Hopkins and Dr. Dixon. Pt's previous home services were reinstated by social work. Pt was given Emergency size 6 DIC Shiley, and understands to carry with her at all times for emergency personnel use only. Pts understands to not remove Shiley which is to remain sutured in place. Pt will also be dispensed 30 inner tracheal tube cannulas. As discussed with hematology pt will have additional antibody testing drawn prior to discharge and will be followed up as an outpatient. As discussed with Dr. Larios, pt's blood pressure has been controlled, will send on Norvasc and hold Losartan. Pt to have PCP evaluate blood pressure control as an outpatient.       Vital Signs Last 24 Hrs  T(C): 36.5 (26 Mar 2018 14:00), Max: 36.5 (26 Mar 2018 14:00)  T(F): 97.7 (26 Mar 2018 14:00), Max: 97.7 (26 Mar 2018 14:00)  HR: 64 (26 Mar 2018 14:00) (56 - 76)  BP: 127/65 (26 Mar 2018 12:00) (109/72 - 142/82)  BP(mean): 96 (26 Mar 2018 12:00) (83 - 110)  RR: 16 (26 Mar 2018 12:00) (16 - 21)  SpO2: 100% (26 Mar 2018 14:00) (94% - 100%)  I&O's Detail    25 Mar 2018 07:01  -  26 Mar 2018 07:00  --------------------------------------------------------  IN:    Oral Fluid: 360 mL  Total IN: 360 mL    OUT:    Voided: 300 mL  Total OUT: 300 mL    Total NET: 60 mL      26 Mar 2018 07:01  -  26 Mar 2018 15:20  --------------------------------------------------------  IN:    Oral Fluid: 450 mL  Total IN: 450 mL    OUT:    Voided: 700 mL  Total OUT: 700 mL    Total NET: -250 mL        TIE DOWNS REMOVED: Yes trach sutures in place.    PHYSICAL EXAM:    General: NAD, sitting upright in bed   Neurological: moving all extremities with no focal deficits  Cardiovascular: RRR, no m/r/g  Respiratory: CTA b/l   Gastrointestinal: NT-ND, soft   Extremities: warm and well perfused, no edema or calf tenderness b/l   Vascular: +2 radial b/l, +1 PT b/L  Incision Sites: trach site CDI, no erythema, hematoma, bleeding or drainage    LABS:                        10.0   6.7   )-----------( 253      ( 26 Mar 2018 07:14 )             31.6       COUMADIN:  No.       PT/INR - ( 26 Mar 2018 05:41 )   PT: 11.8 sec;   INR: 1.06     PTT - ( 26 Mar 2018 05:41 )  PTT:51.8 sec    03-26    141  |  101  |  5<L>  ----------------------------<  94  4.1   |  29  |  0.73    Ca    9.4      26 Mar 2018 07:15  Phos  3.2     03-26  Mg     2.2     03-26    TPro  7.2  /  Alb  3.7  /  TBili  0.5  /  DBili  x   /  AST  14  /  ALT  7<L>  /  AlkPhos  53  03-26          MEDICATIONS  (STANDING):  chlorhexidine 0.12% Liquid 10 milliLiter(s) Swish and Spit once  dexmedetomidine Infusion 0.2 MICROgram(s)/kG/Hr (3.25 mL/Hr) IV Continuous <Continuous>  influenza   Vaccine 0.5 milliLiter(s) IntraMuscular once  pantoprazole    Tablet 40 milliGRAM(s) Oral before breakfast      Discharge CXR:  < from: Xray Chest 1 View-PORTABLE IMMEDIATE (03.25.18 @ 15:22) >  Slight haziness in the right lower lobe may represent underlying small   pleural effusion or a developing infiltrate. Blunted left lateral   costophrenic angle could represent a small left pleural effusion.      D/C Instruction:   -Please follow up with Dr. Larios on 4/6/18 at 9:45AM.  The office is located at [facility], [facility], 4th floor. Call us with any questions #[phone].  - Dr. Hopkins is a pulmonologist that will see you as an outpatient. His office will call you to given you the scheduled date and time of your appointment. For any questions or concerns please contact his office. His contact information is located in your discharge paperwork.  - Please also follow-up with Hematologist Dr. Bonner. Her office will call you to schedule a follow-up appointment. Her contact information is located in your discharge paperwork.   -Please also make a follow-up appointment with your primary care provider within 1-2 weeks to discuss your blood pressure medication regimen. You were discharged on Norvasc 5mg orally once daily, and told to hold your losartan unless otherwise instructed by a medical provider.   -You were given Emergency size 6 DIC Tracheal Shiley, please carry with you at all times for emergency or medical personnel use only. Unless and emergency arises your tracheostomy tube is to remain sutured in place.    -Walk daily as tolerated daily and resume using your home ventilator.    -No driving or strenuous activity/exercise for 6 weeks, or until cleared by your surgeon. Please do not lift anything greater than ten pounds.     -Please keep your tracheostomy site clean and dry. Sutures are to remain in place unless otherwise instructed by Dr. Larios or in an emergency situation.     -Call your doctor if you have shortness of breath, chest pain not relieved by pain medication, dizziness, fever >101.5, or increased redness or drainage from tracheostomy site.

## 2018-03-26 NOTE — DISCHARGE NOTE ADULT - CARE PROVIDERS DIRECT ADDRESSES
[email or web address],DirectAddress_Unknown,DirectAddress_Unknown [email or web address]

## 2018-03-26 NOTE — DISCHARGE NOTE ADULT - DURABLE MEDICAL EQUIPMENT AGENCY
[facility], ph# [phone]. [phone], fax# [phone] [facility], ph# [phone]. [phone], fax# [phone]. Delivery in route, Confirmation# [id number].

## 2018-03-26 NOTE — DISCHARGE NOTE ADULT - PROVIDER TOKENS
TOKEN:'[id number]',TOKEN:'[id number]',TOKEN:'[id number]' TOKEN:'[id number]',TOKEN:'[id number]',TOKEN:'[id number]'

## 2018-03-26 NOTE — DISCHARGE NOTE ADULT - CARE PLAN
Principal Discharge DX:	Trachea, stenosis  Goal:	s/p Tracheostomy ablation and replacement  Assessment and plan of treatment:	-Please follow up with Dr. Larios on ___.  The office is located at [facility], [facility], 4th floor. Call us with any questions #[phone].  -You were given Emergency size 6 DIC Tracheal Shiley, please carry with you at all times for emergency or medical personnel use only. Unless and emergency arises your tracheostomy tube is to remain sutured in place.    -Walk daily as tolerated daily and resume using your home ventilator.    -No driving or strenuous activity/exercise for 6 weeks, or until cleared by your surgeon. Please do not lift anything greater than ten pounds.     -Please keep your tracheostomy site clean and dry. Sutures are to remain in place unless otherwise instructed by Dr. Larios or in an emergency situation.     -Call your doctor if you have shortness of breath, chest pain not relieved by pain medication, dizziness, fever >101.5, or increased redness or drainage from tracheostomy site. Principal Discharge DX:	Trachea, stenosis  Goal:	s/p Tracheostomy ablation and replacement  Assessment and plan of treatment:	-Please follow up with Dr. Larios on 4/6/18 at 9:45AM.  The office is located at [facility], [facility], 4th floor. Call us with any questions #[phone].  - Dr. Hopkins is a pulmonologist that will see you as an outpatient. His office will call you to given you the scheduled date and time of your appointment. For any questions or concerns please contact his office. His contact information is located in your discharge paperwork.  - Please also follow-up with Hematologist Dr. Bonner. Her office will call you to schedule a follow-up appointment. Her contact information is located in your discharge paperwork.   -Please also make a follow-up appointment with your primary care provider within 1-2 weeks to discuss your blood pressure medication regimen. You were discharged on Norvasc 5mg orally once daily, and told to hold your losartan unless otherwise instructed by a medical provider.   -You were given Emergency size 6 DIC Tracheal Shiley, please carry with you at all times for emergency or medical personnel use only. Unless and emergency arises your tracheostomy tube is to remain sutured in place.    -Walk daily as tolerated daily and resume using your home ventilator.    -No driving or strenuous activity/exercise for 6 weeks, or until cleared by your surgeon. Please do not lift anything greater than ten pounds.     -Please keep your tracheostomy site clean and dry. Sutures are to remain in place unless otherwise instructed by Dr. Lraios or in an emergency situation.     -Call your doctor if you have shortness of breath, chest pain not relieved by pain medication, dizziness, fever >101.5, or increased redness or drainage from tracheostomy site.

## 2018-03-26 NOTE — DISCHARGE NOTE ADULT - MEDICATION SUMMARY - MEDICATIONS TO STOP TAKING
I will STOP taking the medications listed below when I get home from the hospital:    losartan 25 mg oral tablet  -- 1 tab(s) by mouth once a day    OxyCONTIN

## 2018-03-26 NOTE — DISCHARGE NOTE ADULT - HOSPITAL COURSE
This is a 57 y/o F, former smoker, with a PMHx of HTN, GERD, COPD and respiratory failure that was seen by Dr. Phillips/Dr. Larios's in the office for laryngotracheal stenosis, c/o increased SOB and pain at her trach site. Her trach was placed 11 years ago after being hospitalized for a fall in which she fell face first into a park bench, which she manages at home with a 24/7 home aid and portable ventilator. In 10/2017, she was noted to have laryngotracheal stenosis on CT for which a bronchoscopy and excision of the laryngotracheal stenosis was performed and trach tube exchange with Dr. Gaurang Rodriguez. In 3/2018,the pt was unable to change her trach tube, a dynamic CT was performed which revealed 65% stenosis of her trachea and a 10mm nodule above the maria del rosario. She was referred to Dr. Shell/Dr. Larios for evaluation. Upon her visit in the office, she was sent to [facility] ED for PST and management of her laryngotracheal stenosis. Pt was admitted to CTICU for evaluation. On 3/23/18, pt underwent tracheal stoma revision and replacement to tracheal tube with #6 shiley. Of note pre-op pt was noted to have isolated elevated ptt, Hematology team was consulted and pt was diagnosed with mild Factor XII deficiency (hematology clear pt for OR). Pt remained stable in CTICU post-op and was cleared by Dr. Larios for discharge home on POD#3.     Of note, pt will have outpatiet follow-up with Dr. Larios, Dr. Hopkins and Dr. Dixon. Pt's previous home services were reinstated by social work. Pt was given Emergency size 6 DIC Shiley, and understands to carry with her at all times for emergency personnel use only. Pts understands to not remove Shiley which is to remain sutured in place. Pt will also be dispensed 30 inner tracheal tube cannulas. This is a 59 y/o F, former smoker, with a PMHx of HTN, GERD, COPD and respiratory failure that was seen by Dr. Phillips/Dr. Larios's in the office for laryngotracheal stenosis, c/o increased SOB and pain at her trach site. Her trach was placed 11 years ago after being hospitalized for a fall in which she fell face first into a park bench, which she manages at home with a 24/7 home aid and portable ventilator. In 10/2017, she was noted to have laryngotracheal stenosis on CT for which a bronchoscopy and excision of the laryngotracheal stenosis was performed and trach tube exchange with Dr. Gaurang Rodriguez. In 3/2018,the pt was unable to change her trach tube, a dynamic CT was performed which revealed 65% stenosis of her trachea and a 10mm nodule above the maria del rosario. She was referred to Dr. Shell/Dr. Larios for evaluation. Upon her visit in the office, she was sent to [facility] ED for PST and management of her laryngotracheal stenosis. Pt was admitted to CTICU for evaluation. On 3/23/18, pt underwent tracheal stoma revision and replacement to tracheal tube with #6 shiley. Of note pre-op pt was noted to have isolated elevated ptt, Hematology team was consulted and pt was diagnosed with mild Factor XII deficiency (hematology clear pt for OR). Pt remained stable in CTICU post-op and was cleared by Dr. Larios for discharge home on POD#3.     Of note, pt will have outpatiet follow-up with Dr. Larios, Dr. Hopkins and Dr. Dixon. Pt's previous home services were reinstated by social work. Pt was given Emergency size 6 DIC Shiley, and understands to carry with her at all times for emergency personnel use only. Pts understands to not remove Shiley which is to remain sutured in place. Pt will also be dispensed 30 inner tracheal tube cannulas. As discussed with hematology pt will have additional antibody testing drawn prior to discharge and will be followed up as an outpatient. As discussed with Dr. Larios, pt's blood pressure has been controlled, will send on Norvasc and hold Losartan. Pt to have PCP evaluate blood pressure control as an outpatient.

## 2018-03-26 NOTE — DISCHARGE NOTE ADULT - HOME CARE AGENCY
Tavo, Care Manager at [facility] [phone]. [facility] [phone], Kelly FISHER [place] [phone].

## 2018-03-26 NOTE — DISCHARGE NOTE ADULT - PATIENT PORTAL LINK FT
You can access the [facility] Patient Portal, offered by [facility], by registering with the following website: [email or web address]

## 2018-03-26 NOTE — DISCHARGE NOTE ADULT - MEDICATION SUMMARY - MEDICATIONS TO CHANGE
I will SWITCH the dose or number of times a day I take the medications listed below when I get home from the hospital:    oxyCODONE-acetaminophen 5 mg-325 mg oral tablet  -- 2 tab(s) by mouth every 6 hours, As needed, Moderate Pain (4 - 6) MDD:4

## 2018-03-26 NOTE — DISCHARGE NOTE ADULT - CARE PROVIDER_API CALL
Kelby Larios), Thoracic Surgery  [address] [address]  Phone: [phone]  Fax: [phone]    Paul Dixon (MD), Hematology; Internal Medicine  [address] [address]  Phone: [phone]  Fax: [phone]    Darlene Hopkins), Critical Care Medicine; Pulmonary Disease  [address] [address]  Phone: [phone]  Fax: [phone] Kelby Larios), Thoracic Surgery  [address] [address]  Phone: [phone]  Fax: [phone]    Darlene Hopkins), Critical Care Medicine; Pulmonary Disease  [address]  [address]  [place] [address]  Phone: [phone]  Fax: [phone]    Maria Del Cramen Bonner), Internal Medicine  [address]  4th [place] [address]  Phone: [phone]  Fax: [phone]

## 2018-03-26 NOTE — DISCHARGE NOTE ADULT - PLAN OF CARE
s/p Tracheostomy ablation and replacement -Please follow up with Dr. Larios on ___.  The office is located at [facility], [facility], 4th floor. Call us with any questions #[phone].  -You were given Emergency size 6 DIC Tracheal Shiley, please carry with you at all times for emergency or medical personnel use only. Unless and emergency arises your tracheostomy tube is to remain sutured in place.    -Walk daily as tolerated daily and resume using your home ventilator.    -No driving or strenuous activity/exercise for 6 weeks, or until cleared by your surgeon. Please do not lift anything greater than ten pounds.     -Please keep your tracheostomy site clean and dry. Sutures are to remain in place unless otherwise instructed by Dr. Larios or in an emergency situation.     -Call your doctor if you have shortness of breath, chest pain not relieved by pain medication, dizziness, fever >101.5, or increased redness or drainage from tracheostomy site. -Please follow up with Dr. Larios on 4/6/18 at 9:45AM.  The office is located at [facility], [facility], 4th floor. Call us with any questions #[phone].  - Dr. Hopkins is a pulmonologist that will see you as an outpatient. His office will call you to given you the scheduled date and time of your appointment. For any questions or concerns please contact his office. His contact information is located in your discharge paperwork.  - Please also follow-up with Hematologist Dr. Bonner. Her office will call you to schedule a follow-up appointment. Her contact information is located in your discharge paperwork.   -Please also make a follow-up appointment with your primary care provider within 1-2 weeks to discuss your blood pressure medication regimen. You were discharged on Norvasc 5mg orally once daily, and told to hold your losartan unless otherwise instructed by a medical provider.   -You were given Emergency size 6 DIC Tracheal Shiley, please carry with you at all times for emergency or medical personnel use only. Unless and emergency arises your tracheostomy tube is to remain sutured in place.    -Walk daily as tolerated daily and resume using your home ventilator.    -No driving or strenuous activity/exercise for 6 weeks, or until cleared by your surgeon. Please do not lift anything greater than ten pounds.     -Please keep your tracheostomy site clean and dry. Sutures are to remain in place unless otherwise instructed by Dr. Larios or in an emergency situation.     -Call your doctor if you have shortness of breath, chest pain not relieved by pain medication, dizziness, fever >101.5, or increased redness or drainage from tracheostomy site.

## 2018-03-27 LAB
B2 GLYCOPROT1 AB SER QL: POSITIVE
CARDIOLIPIN AB SER-ACNC: NEGATIVE — SIGNIFICANT CHANGE UP

## 2018-03-27 PROCEDURE — 86901 BLOOD TYPING SEROLOGIC RH(D): CPT

## 2018-03-27 PROCEDURE — 85027 COMPLETE CBC AUTOMATED: CPT

## 2018-03-27 PROCEDURE — 85270 CLOT FACTOR XI PTA: CPT

## 2018-03-27 PROCEDURE — 85025 COMPLETE CBC W/AUTO DIFF WBC: CPT

## 2018-03-27 PROCEDURE — 83036 HEMOGLOBIN GLYCOSYLATED A1C: CPT

## 2018-03-27 PROCEDURE — 83605 ASSAY OF LACTIC ACID: CPT

## 2018-03-27 PROCEDURE — 86146 BETA-2 GLYCOPROTEIN ANTIBODY: CPT

## 2018-03-27 PROCEDURE — 84100 ASSAY OF PHOSPHORUS: CPT

## 2018-03-27 PROCEDURE — 85613 RUSSELL VIPER VENOM DILUTED: CPT

## 2018-03-27 PROCEDURE — 85246 CLOT FACTOR VIII VW ANTIGEN: CPT

## 2018-03-27 PROCEDURE — 84132 ASSAY OF SERUM POTASSIUM: CPT

## 2018-03-27 PROCEDURE — 80048 BASIC METABOLIC PNL TOTAL CA: CPT

## 2018-03-27 PROCEDURE — 85384 FIBRINOGEN ACTIVITY: CPT

## 2018-03-27 PROCEDURE — 83735 ASSAY OF MAGNESIUM: CPT

## 2018-03-27 PROCEDURE — 85610 PROTHROMBIN TIME: CPT

## 2018-03-27 PROCEDURE — 84295 ASSAY OF SERUM SODIUM: CPT

## 2018-03-27 PROCEDURE — 86850 RBC ANTIBODY SCREEN: CPT

## 2018-03-27 PROCEDURE — 85280 CLOT FACTOR XII HAGEMAN: CPT

## 2018-03-27 PROCEDURE — 85250 CLOT FACTOR IX PTC/CHRSTMAS: CPT

## 2018-03-27 PROCEDURE — 85240 CLOT FACTOR VIII AHG 1 STAGE: CPT

## 2018-03-27 PROCEDURE — 85230 CLOT FACTOR VII PROCONVERTIN: CPT

## 2018-03-27 PROCEDURE — 85670 THROMBIN TIME PLASMA: CPT

## 2018-03-27 PROCEDURE — 85732 THROMBOPLASTIN TIME PARTIAL: CPT

## 2018-03-27 PROCEDURE — 82803 BLOOD GASES ANY COMBINATION: CPT

## 2018-03-27 PROCEDURE — 85245 CLOT FACTOR VIII VW RISTOCTN: CPT

## 2018-03-27 PROCEDURE — 99285 EMERGENCY DEPT VISIT HI MDM: CPT | Mod: 25

## 2018-03-27 PROCEDURE — 80053 COMPREHEN METABOLIC PANEL: CPT

## 2018-03-27 PROCEDURE — 80076 HEPATIC FUNCTION PANEL: CPT

## 2018-03-27 PROCEDURE — 86900 BLOOD TYPING SEROLOGIC ABO: CPT

## 2018-03-27 PROCEDURE — 85260 CLOT FACTOR X STUART-POWER: CPT

## 2018-03-27 PROCEDURE — 82330 ASSAY OF CALCIUM: CPT

## 2018-03-27 PROCEDURE — 85598 HEXAGNAL PHOSPH PLTLT NEUTRL: CPT

## 2018-03-27 PROCEDURE — 94640 AIRWAY INHALATION TREATMENT: CPT

## 2018-03-27 PROCEDURE — 85210 CLOT FACTOR II PROTHROM SPEC: CPT

## 2018-03-27 PROCEDURE — 85730 THROMBOPLASTIN TIME PARTIAL: CPT

## 2018-03-27 PROCEDURE — 93005 ELECTROCARDIOGRAM TRACING: CPT

## 2018-03-27 PROCEDURE — 71045 X-RAY EXAM CHEST 1 VIEW: CPT

## 2018-03-27 PROCEDURE — 36415 COLL VENOUS BLD VENIPUNCTURE: CPT

## 2018-03-28 DIAGNOSIS — Z79.891 LONG TERM (CURRENT) USE OF OPIATE ANALGESIC: ICD-10-CM

## 2018-03-28 DIAGNOSIS — D68.2 HEREDITARY DEFICIENCY OF OTHER CLOTTING FACTORS: ICD-10-CM

## 2018-03-28 DIAGNOSIS — Z87.891 PERSONAL HISTORY OF NICOTINE DEPENDENCE: ICD-10-CM

## 2018-03-28 DIAGNOSIS — K21.9 GASTRO-ESOPHAGEAL REFLUX DISEASE WITHOUT ESOPHAGITIS: ICD-10-CM

## 2018-03-28 DIAGNOSIS — I10 ESSENTIAL (PRIMARY) HYPERTENSION: ICD-10-CM

## 2018-03-28 DIAGNOSIS — Z88.1 ALLERGY STATUS TO OTHER ANTIBIOTIC AGENTS STATUS: ICD-10-CM

## 2018-03-28 DIAGNOSIS — J98.09 OTHER DISEASES OF BRONCHUS, NOT ELSEWHERE CLASSIFIED: ICD-10-CM

## 2018-03-28 DIAGNOSIS — J39.8 OTHER SPECIFIED DISEASES OF UPPER RESPIRATORY TRACT: ICD-10-CM

## 2018-03-28 DIAGNOSIS — D57.3 SICKLE-CELL TRAIT: ICD-10-CM

## 2018-03-28 DIAGNOSIS — K86.2 CYST OF PANCREAS: ICD-10-CM

## 2018-03-28 DIAGNOSIS — J96.90 RESPIRATORY FAILURE, UNSPECIFIED, UNSPECIFIED WHETHER WITH HYPOXIA OR HYPERCAPNIA: ICD-10-CM

## 2018-03-28 DIAGNOSIS — F41.9 ANXIETY DISORDER, UNSPECIFIED: ICD-10-CM

## 2018-03-28 DIAGNOSIS — Z93.0 TRACHEOSTOMY STATUS: ICD-10-CM

## 2018-03-28 DIAGNOSIS — Z79.82 LONG TERM (CURRENT) USE OF ASPIRIN: ICD-10-CM

## 2018-03-28 DIAGNOSIS — J44.9 CHRONIC OBSTRUCTIVE PULMONARY DISEASE, UNSPECIFIED: ICD-10-CM

## 2018-03-28 DIAGNOSIS — Z99.11 DEPENDENCE ON RESPIRATOR [VENTILATOR] STATUS: ICD-10-CM

## 2018-04-06 ENCOUNTER — APPOINTMENT (OUTPATIENT)
Dept: THORACIC SURGERY | Facility: CLINIC | Age: 58
End: 2018-04-06
Payer: MEDICAID

## 2018-04-06 VITALS
OXYGEN SATURATION: 96 % | DIASTOLIC BLOOD PRESSURE: 85 MMHG | RESPIRATION RATE: 18 BRPM | TEMPERATURE: 97.5 F | SYSTOLIC BLOOD PRESSURE: 119 MMHG | HEART RATE: 67 BPM

## 2018-04-06 PROCEDURE — 99024 POSTOP FOLLOW-UP VISIT: CPT

## 2018-04-17 ENCOUNTER — APPOINTMENT (OUTPATIENT)
Dept: PULMONOLOGY | Facility: CLINIC | Age: 58
End: 2018-04-17
Payer: MEDICAID

## 2018-04-17 VITALS
SYSTOLIC BLOOD PRESSURE: 124 MMHG | BODY MASS INDEX: 45.24 KG/M2 | DIASTOLIC BLOOD PRESSURE: 80 MMHG | WEIGHT: 265 LBS | HEART RATE: 83 BPM | HEIGHT: 64 IN | OXYGEN SATURATION: 99 %

## 2018-04-17 PROCEDURE — 99214 OFFICE O/P EST MOD 30 MIN: CPT

## 2018-04-23 ENCOUNTER — APPOINTMENT (OUTPATIENT)
Dept: HEMATOLOGY ONCOLOGY | Facility: CLINIC | Age: 58
End: 2018-04-23
Payer: MEDICAID

## 2018-04-23 VITALS
OXYGEN SATURATION: 100 % | SYSTOLIC BLOOD PRESSURE: 116 MMHG | TEMPERATURE: 97.9 F | HEART RATE: 72 BPM | DIASTOLIC BLOOD PRESSURE: 81 MMHG | RESPIRATION RATE: 12 BRPM | HEIGHT: 64 IN

## 2018-04-23 DIAGNOSIS — D68.62 LUPUS ANTICOAGULANT SYNDROME: ICD-10-CM

## 2018-04-23 DIAGNOSIS — R79.1 ABNORMAL COAGULATION PROFILE: ICD-10-CM

## 2018-04-23 PROCEDURE — 99204 OFFICE O/P NEW MOD 45 MIN: CPT

## 2018-04-26 ENCOUNTER — APPOINTMENT (OUTPATIENT)
Dept: PULMONOLOGY | Facility: CLINIC | Age: 58
End: 2018-04-26

## 2018-04-27 ENCOUNTER — APPOINTMENT (OUTPATIENT)
Dept: THORACIC SURGERY | Facility: CLINIC | Age: 58
End: 2018-04-27
Payer: MEDICAID

## 2018-04-27 VITALS
SYSTOLIC BLOOD PRESSURE: 130 MMHG | OXYGEN SATURATION: 99 % | DIASTOLIC BLOOD PRESSURE: 76 MMHG | RESPIRATION RATE: 18 BRPM | HEART RATE: 66 BPM | TEMPERATURE: 97.3 F

## 2018-04-27 PROCEDURE — 99214 OFFICE O/P EST MOD 30 MIN: CPT | Mod: 24

## 2018-04-30 ENCOUNTER — APPOINTMENT (OUTPATIENT)
Dept: PULMONOLOGY | Facility: CLINIC | Age: 58
End: 2018-04-30

## 2018-04-30 ENCOUNTER — MEDICATION RENEWAL (OUTPATIENT)
Age: 58
End: 2018-04-30

## 2018-05-04 ENCOUNTER — APPOINTMENT (OUTPATIENT)
Dept: OTOLARYNGOLOGY | Facility: CLINIC | Age: 58
End: 2018-05-04
Payer: MEDICAID

## 2018-05-04 VITALS — DIASTOLIC BLOOD PRESSURE: 68 MMHG | OXYGEN SATURATION: 100 % | SYSTOLIC BLOOD PRESSURE: 100 MMHG

## 2018-05-04 PROCEDURE — 17250 CHEM CAUT OF GRANLTJ TISSUE: CPT

## 2018-05-04 PROCEDURE — 99214 OFFICE O/P EST MOD 30 MIN: CPT | Mod: 25

## 2018-05-04 PROCEDURE — 31615 TRCHEOBRNCHSC EST TRACHS INC: CPT

## 2018-05-09 ENCOUNTER — MEDICATION RENEWAL (OUTPATIENT)
Age: 58
End: 2018-05-09

## 2018-05-18 ENCOUNTER — APPOINTMENT (OUTPATIENT)
Dept: THORACIC SURGERY | Facility: CLINIC | Age: 58
End: 2018-05-18

## 2018-05-18 VITALS
RESPIRATION RATE: 17 BRPM | DIASTOLIC BLOOD PRESSURE: 88 MMHG | SYSTOLIC BLOOD PRESSURE: 127 MMHG | HEART RATE: 60 BPM | OXYGEN SATURATION: 90 %

## 2018-05-30 ENCOUNTER — APPOINTMENT (OUTPATIENT)
Dept: OTOLARYNGOLOGY | Facility: CLINIC | Age: 58
End: 2018-05-30

## 2018-05-31 ENCOUNTER — APPOINTMENT (OUTPATIENT)
Dept: OTOLARYNGOLOGY | Facility: CLINIC | Age: 58
End: 2018-05-31
Payer: MEDICAID

## 2018-05-31 VITALS — WEIGHT: 265 LBS | BODY MASS INDEX: 45.24 KG/M2 | HEIGHT: 64 IN

## 2018-05-31 PROCEDURE — 31615 TRCHEOBRNCHSC EST TRACHS INC: CPT

## 2018-05-31 PROCEDURE — 99214 OFFICE O/P EST MOD 30 MIN: CPT | Mod: 25

## 2018-06-20 ENCOUNTER — OUTPATIENT (OUTPATIENT)
Dept: OUTPATIENT SERVICES | Facility: HOSPITAL | Age: 58
LOS: 1 days | Discharge: HOME | End: 2018-06-20

## 2018-06-20 ENCOUNTER — APPOINTMENT (OUTPATIENT)
Dept: OTOLARYNGOLOGY | Facility: CLINIC | Age: 58
End: 2018-06-20
Payer: MEDICAID

## 2018-06-20 DIAGNOSIS — Z43.0 ENCOUNTER FOR ATTENTION TO TRACHEOSTOMY: Chronic | ICD-10-CM

## 2018-06-20 DIAGNOSIS — Z93.0 TRACHEOSTOMY STATUS: Chronic | ICD-10-CM

## 2018-06-20 PROCEDURE — 17250 CHEM CAUT OF GRANLTJ TISSUE: CPT

## 2018-06-20 PROCEDURE — 99214 OFFICE O/P EST MOD 30 MIN: CPT | Mod: 25

## 2018-06-20 PROCEDURE — 31615 TRCHEOBRNCHSC EST TRACHS INC: CPT

## 2018-06-20 RX ORDER — LEVOFLOXACIN 250 MG/1
250 TABLET, FILM COATED ORAL DAILY
Qty: 7 | Refills: 0 | Status: DISCONTINUED | COMMUNITY
Start: 2018-04-17 | End: 2018-06-20

## 2018-06-20 RX ORDER — METHYLPREDNISOLONE 4 MG/1
4 TABLET ORAL
Qty: 1 | Refills: 0 | Status: DISCONTINUED | COMMUNITY
Start: 2018-05-31 | End: 2018-06-20

## 2018-06-20 RX ORDER — CEPHALEXIN 500 MG/1
500 TABLET ORAL 3 TIMES DAILY
Qty: 30 | Refills: 0 | Status: DISCONTINUED | COMMUNITY
Start: 2018-05-31 | End: 2018-06-20

## 2018-06-22 ENCOUNTER — APPOINTMENT (OUTPATIENT)
Dept: PULMONOLOGY | Facility: CLINIC | Age: 58
End: 2018-06-22
Payer: MEDICAID

## 2018-06-22 VITALS
HEIGHT: 64 IN | WEIGHT: 265 LBS | RESPIRATION RATE: 14 BRPM | BODY MASS INDEX: 45.24 KG/M2 | OXYGEN SATURATION: 99 % | DIASTOLIC BLOOD PRESSURE: 77 MMHG | SYSTOLIC BLOOD PRESSURE: 122 MMHG | HEART RATE: 60 BPM

## 2018-06-22 PROCEDURE — 99214 OFFICE O/P EST MOD 30 MIN: CPT

## 2018-06-22 RX ORDER — BACITRACIN 500 [IU]/G
500 OINTMENT TOPICAL
Qty: 56 | Refills: 0 | Status: DISCONTINUED | COMMUNITY
Start: 2018-05-02

## 2018-06-25 DIAGNOSIS — J95.00 UNSPECIFIED TRACHEOSTOMY COMPLICATION: ICD-10-CM

## 2018-06-25 DIAGNOSIS — L92.9 GRANULOMATOUS DISORDER OF THE SKIN AND SUBCUTANEOUS TISSUE, UNSPECIFIED: ICD-10-CM

## 2018-06-25 DIAGNOSIS — Z93.0 TRACHEOSTOMY STATUS: ICD-10-CM

## 2018-06-25 DIAGNOSIS — R49.0 DYSPHONIA: ICD-10-CM

## 2018-06-25 DIAGNOSIS — J98.9 RESPIRATORY DISORDER, UNSPECIFIED: ICD-10-CM

## 2018-06-25 DIAGNOSIS — J20.9 ACUTE BRONCHITIS, UNSPECIFIED: ICD-10-CM

## 2018-06-25 DIAGNOSIS — R06.89 OTHER ABNORMALITIES OF BREATHING: ICD-10-CM

## 2018-06-25 DIAGNOSIS — J39.8 OTHER SPECIFIED DISEASES OF UPPER RESPIRATORY TRACT: ICD-10-CM

## 2018-07-26 ENCOUNTER — INPATIENT (INPATIENT)
Facility: HOSPITAL | Age: 58
LOS: 5 days | Discharge: HOME HEALTH | DRG: 202 | End: 2018-08-01
Attending: STUDENT IN AN ORGANIZED HEALTH CARE EDUCATION/TRAINING PROGRAM | Admitting: STUDENT IN AN ORGANIZED HEALTH CARE EDUCATION/TRAINING PROGRAM
Payer: COMMERCIAL

## 2018-07-26 ENCOUNTER — APPOINTMENT (OUTPATIENT)
Dept: THORACIC SURGERY | Facility: CLINIC | Age: 58
End: 2018-07-26
Payer: MEDICAID

## 2018-07-26 VITALS
HEART RATE: 77 BPM | TEMPERATURE: 99 F | RESPIRATION RATE: 20 BRPM | SYSTOLIC BLOOD PRESSURE: 132 MMHG | OXYGEN SATURATION: 92 % | DIASTOLIC BLOOD PRESSURE: 84 MMHG

## 2018-07-26 VITALS
RESPIRATION RATE: 19 BRPM | TEMPERATURE: 98.1 F | DIASTOLIC BLOOD PRESSURE: 76 MMHG | HEART RATE: 76 BPM | OXYGEN SATURATION: 100 % | SYSTOLIC BLOOD PRESSURE: 134 MMHG

## 2018-07-26 DIAGNOSIS — Z43.0 ENCOUNTER FOR ATTENTION TO TRACHEOSTOMY: Chronic | ICD-10-CM

## 2018-07-26 DIAGNOSIS — Z93.0 TRACHEOSTOMY STATUS: Chronic | ICD-10-CM

## 2018-07-26 LAB
ALBUMIN SERPL ELPH-MCNC: 4.2 G/DL — SIGNIFICANT CHANGE UP (ref 3.3–5)
ALP SERPL-CCNC: 55 U/L — SIGNIFICANT CHANGE UP (ref 40–120)
ALT FLD-CCNC: 9 U/L — LOW (ref 10–45)
ANION GAP SERPL CALC-SCNC: 8 MMOL/L — SIGNIFICANT CHANGE UP (ref 5–17)
APTT BLD: 54.7 SEC — HIGH (ref 27.5–37.4)
AST SERPL-CCNC: 18 U/L — SIGNIFICANT CHANGE UP (ref 10–40)
BASOPHILS NFR BLD AUTO: 0.7 % — SIGNIFICANT CHANGE UP (ref 0–2)
BILIRUB SERPL-MCNC: 0.8 MG/DL — SIGNIFICANT CHANGE UP (ref 0.2–1.2)
BUN SERPL-MCNC: 4 MG/DL — LOW (ref 7–23)
CALCIUM SERPL-MCNC: 10 MG/DL — SIGNIFICANT CHANGE UP (ref 8.4–10.5)
CHLORIDE SERPL-SCNC: 101 MMOL/L — SIGNIFICANT CHANGE UP (ref 96–108)
CO2 SERPL-SCNC: 34 MMOL/L — HIGH (ref 22–31)
CREAT SERPL-MCNC: 0.73 MG/DL — SIGNIFICANT CHANGE UP (ref 0.5–1.3)
EOSINOPHIL NFR BLD AUTO: 3.1 % — SIGNIFICANT CHANGE UP (ref 0–6)
GLUCOSE SERPL-MCNC: 108 MG/DL — HIGH (ref 70–99)
HCT VFR BLD CALC: 32.6 % — LOW (ref 34.5–45)
HGB BLD-MCNC: 10.2 G/DL — LOW (ref 11.5–15.5)
INR BLD: 1.14 — SIGNIFICANT CHANGE UP (ref 0.88–1.16)
LYMPHOCYTES # BLD AUTO: 28 % — SIGNIFICANT CHANGE UP (ref 13–44)
MCHC RBC-ENTMCNC: 26.5 PG — LOW (ref 27–34)
MCHC RBC-ENTMCNC: 31.3 G/DL — LOW (ref 32–36)
MCV RBC AUTO: 84.7 FL — SIGNIFICANT CHANGE UP (ref 80–100)
MONOCYTES NFR BLD AUTO: 8.9 % — SIGNIFICANT CHANGE UP (ref 2–14)
NEUTROPHILS NFR BLD AUTO: 59.3 % — SIGNIFICANT CHANGE UP (ref 43–77)
PLATELET # BLD AUTO: 268 K/UL — SIGNIFICANT CHANGE UP (ref 150–400)
POTASSIUM SERPL-MCNC: 4.3 MMOL/L — SIGNIFICANT CHANGE UP (ref 3.5–5.3)
POTASSIUM SERPL-SCNC: 4.3 MMOL/L — SIGNIFICANT CHANGE UP (ref 3.5–5.3)
PROT SERPL-MCNC: 7.3 G/DL — SIGNIFICANT CHANGE UP (ref 6–8.3)
PROTHROM AB SERPL-ACNC: 12.7 SEC — SIGNIFICANT CHANGE UP (ref 9.8–12.7)
RBC # BLD: 3.85 M/UL — SIGNIFICANT CHANGE UP (ref 3.8–5.2)
RBC # FLD: 13.4 % — SIGNIFICANT CHANGE UP (ref 10.3–16.9)
SODIUM SERPL-SCNC: 143 MMOL/L — SIGNIFICANT CHANGE UP (ref 135–145)
WBC # BLD: 5.8 K/UL — SIGNIFICANT CHANGE UP (ref 3.8–10.5)
WBC # FLD AUTO: 5.8 K/UL — SIGNIFICANT CHANGE UP (ref 3.8–10.5)

## 2018-07-26 PROCEDURE — 99215 OFFICE O/P EST HI 40 MIN: CPT

## 2018-07-26 PROCEDURE — 71045 X-RAY EXAM CHEST 1 VIEW: CPT | Mod: 26

## 2018-07-26 PROCEDURE — 99285 EMERGENCY DEPT VISIT HI MDM: CPT

## 2018-07-26 NOTE — ED PROVIDER NOTE - MEDICAL DECISION MAKING DETAILS
vent dependent pt w/ chronic lung disease, reports difficulty w/ breathing thru trach ~ 1 month, seen by pulm and sent to see ENT in ED, will call for scope to eval for stenosis/obstruction vs pathology

## 2018-07-26 NOTE — ED PROVIDER NOTE - PHYSICAL EXAMINATION
CON: ao x 3, HENMT: clear oropharynx, soft neck, trach in place, pt being vented, able to speak thru vent, HEAD: atraumatic, CV: rrr, equal pulses b/l, RESP: coarse breath sounds bl, GI: soft, nontender, no rebound, no guarding, SKIN: no rash, MSK: no deformities,

## 2018-07-26 NOTE — ED PROVIDER NOTE - PROGRESS NOTE DETAILS
d/w ENT, noted tracheomalacia but no stricture/stenosis, no obstruction, no acute intervention for any procedure pt feels uncomfortable w/ trach, reconsulted ENT, evaluated pt at bedside, can potentially change the trach in the AM, but no emergent indication, as pt is adequately vented on current setup, no concern for airway obstruction, rec'd admission to medicine for respiratory care and trach replacement in the AM

## 2018-07-26 NOTE — ED ADULT NURSE NOTE - OBJECTIVE STATEMENT
here for airway evaluation here for airway evaluation--- has scar tissue in trach and here for ENT evaluation for surgery  - has had increased SOB over 2 months and intermittent CP

## 2018-07-26 NOTE — CONSULT NOTE ADULT - SUBJECTIVE AND OBJECTIVE BOX
ENT Consult Note    HPI: 58F PMH HTN, GERD, COPD s/p trach 2007 who presented for increased difficulty breathing. 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 Gaurang Rodriguez 10/2017 ([facility]) and revision of her tracheal stoma 3/2018. She has had difficulty with a feeling of tightness in her neck and feeling as though her breathing is blocked. She reports previously tolerating trach collar for a few hours at a time but over the last few months has been vent dependent throughout the day. She was sent to the ED by CT surgery for management of her tracheal stenosis. Last trach change 6/14/18 at [facility] by Dr. Fco Dunn.     PE  Alert, NAD  Voice raspy, strained, but able to phonate around cuffed trach and vent  Nonlabored respirations on ventilator. No stridor  Tracheal stoma clean, no skin breakdown or granulation tissue appreciated    Tracheoscopy: Trach in place, sitting on left tracheal wall. Mild-moderate tracheomalacia, not fully obstructing the distal opening of the trach with inspiration. No tracheal stenosis observed.     A/P: 58F PMH HTN, GERD, COPD s/p trach 2007 s/p tracheal stoma revision and excision of tracheal stenosis now with tracheomalacia.   -	Although she has tracheomalacia, there is no tracheal stenosis and her trachea is clear to maria del rosario. There is no acute intervention needed at this point.   -	Routine trach care: spare trach and one size down at bedside. Routine suctioning with red rubber catheter. Obturator at [facility]  -	If admitted, will plan for trach change with Dr. Laughlin to distal XLT in AM  -	d/w attending ENT Consult Note    HPI: 58F PMH HTN, GERD, COPD s/p trach 2007 who presented for increased difficulty breathing. 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 Gaurang Rodriguez 10/2017 ([facility]) and revision of her tracheal stoma 3/2018. She has had difficulty with a feeling of tightness in her neck and feeling as though her breathing is blocked. She reports previously tolerating trach collar for a few hours at a time but over the last few months has been vent dependent throughout the day. She was sent to the ED by CT surgery for management of her tracheal stenosis. Last trach change 6/14/18 at [facility] by Dr. Fco Dunn.     PE  Alert, NAD  Voice raspy, strained, but able to phonate around cuffed trach and vent  Nonlabored respirations on ventilator. No stridor  Tracheal stoma clean, no skin breakdown or granulation tissue appreciated    Tracheoscopy: Trach in place, sitting on left tracheal wall. Mild-moderate tracheomalacia, not fully obstructing the distal opening of the trach with inspiration. No tracheal stenosis observed.     A/P: 58F PMH HTN, GERD, COPD s/p trach 2007 s/p tracheal stoma revision and excision of tracheal stenosis now with tracheomalacia.   -	Although she has tracheomalacia, there is no tracheal stenosis and her trachea is clear to maria del rosario. There is no acute intervention needed at this point.   -	Can f/u w/Dr. Laughlin as an outpatient  -	d/w attending ENT Consult Note    HPI: 58F PMH HTN, GERD, COPD s/p trach 2007 who presented for increased difficulty breathing. 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 Gaurang Rodriguez 10/2017 ([facility]) and revision of her tracheal stoma 3/2018. She has had difficulty with a feeling of tightness in her neck and feeling as though her breathing is blocked. She reports previously tolerating trach collar for a few hours at a time but over the last few months has been vent dependent throughout the day. She was sent to the ED by CT surgery for management of her tracheal stenosis. Last trach change 6/14/18 at [facility] by Dr. Fco Dunn.     PE  Alert, NAD  Voice raspy, strained, but able to phonate around cuffed trach and vent  Nonlabored respirations on ventilator. No stridor  Tracheal stoma clean, no skin breakdown or granulation tissue appreciated    Tracheoscopy: Trach in place, sitting on left tracheal wall. Mild-moderate tracheomalacia, not fully obstructing the distal opening of the trach with inspiration. No tracheal stenosis observed.     A/P: 58F PMH HTN, GERD, COPD s/p trach 2007 s/p tracheal stoma revision and excision of tracheal stenosis now with tracheomalacia.   -	Although she has tracheomalacia, there is no tracheal stenosis and her trachea is clear to maria del rosario. There is no emergent intervention needed tonight  -	Routine trach care: Spare trach and one size down at bedside. Suction with red rubber catheters. Obturator at [facility]  -	If admitted, will change trach to distal XLT in AM  -	d/w attending

## 2018-07-26 NOTE — ED PROVIDER NOTE - OBJECTIVE STATEMENT
58 yof pw trach problem, pt states feels like it's difficult to breathe thru the trach and feels like there's stuff stuck there.  vent dependent, chronic lung disease.  states feels worse when vent tubing pulls on the trach.  sent here by ENT by PMD.

## 2018-07-26 NOTE — ED ADULT NURSE REASSESSMENT NOTE - NS ED NURSE REASSESS COMMENT FT1
pt remains aaox3  no deficits no sob no chest pain no n/v.  trach to vent.  voided on bedpan.  pending transport to floor.

## 2018-07-27 ENCOUNTER — TRANSCRIPTION ENCOUNTER (OUTPATIENT)
Age: 58
End: 2018-07-27

## 2018-07-27 DIAGNOSIS — R94.31 ABNORMAL ELECTROCARDIOGRAM [ECG] [EKG]: ICD-10-CM

## 2018-07-27 DIAGNOSIS — J39.8 OTHER SPECIFIED DISEASES OF UPPER RESPIRATORY TRACT: ICD-10-CM

## 2018-07-27 DIAGNOSIS — R63.8 OTHER SYMPTOMS AND SIGNS CONCERNING FOOD AND FLUID INTAKE: ICD-10-CM

## 2018-07-27 DIAGNOSIS — D64.9 ANEMIA, UNSPECIFIED: ICD-10-CM

## 2018-07-27 DIAGNOSIS — J44.9 CHRONIC OBSTRUCTIVE PULMONARY DISEASE, UNSPECIFIED: ICD-10-CM

## 2018-07-27 DIAGNOSIS — Z91.89 OTHER SPECIFIED PERSONAL RISK FACTORS, NOT ELSEWHERE CLASSIFIED: ICD-10-CM

## 2018-07-27 DIAGNOSIS — Z93.0 TRACHEOSTOMY STATUS: ICD-10-CM

## 2018-07-27 DIAGNOSIS — I10 ESSENTIAL (PRIMARY) HYPERTENSION: ICD-10-CM

## 2018-07-27 DIAGNOSIS — F41.9 ANXIETY DISORDER, UNSPECIFIED: ICD-10-CM

## 2018-07-27 LAB
ANION GAP SERPL CALC-SCNC: 8 MMOL/L — SIGNIFICANT CHANGE UP (ref 5–17)
BASE EXCESS BLDA CALC-SCNC: 5 MMOL/L — HIGH (ref -2–3)
BASOPHILS NFR BLD AUTO: 0.8 % — SIGNIFICANT CHANGE UP (ref 0–2)
BUN SERPL-MCNC: 5 MG/DL — LOW (ref 7–23)
CALCIUM SERPL-MCNC: 9.8 MG/DL — SIGNIFICANT CHANGE UP (ref 8.4–10.5)
CHLORIDE SERPL-SCNC: 99 MMOL/L — SIGNIFICANT CHANGE UP (ref 96–108)
CO2 SERPL-SCNC: 34 MMOL/L — HIGH (ref 22–31)
CREAT SERPL-MCNC: 0.71 MG/DL — SIGNIFICANT CHANGE UP (ref 0.5–1.3)
EOSINOPHIL NFR BLD AUTO: 5.3 % — SIGNIFICANT CHANGE UP (ref 0–6)
GAS PNL BLDA: SIGNIFICANT CHANGE UP
GLUCOSE SERPL-MCNC: 109 MG/DL — HIGH (ref 70–99)
HCO3 BLDA-SCNC: 27 MMOL/L — SIGNIFICANT CHANGE UP (ref 21–28)
HCT VFR BLD CALC: 30.7 % — LOW (ref 34.5–45)
HGB BLD-MCNC: 9.3 G/DL — LOW (ref 11.5–15.5)
LYMPHOCYTES # BLD AUTO: 42.5 % — SIGNIFICANT CHANGE UP (ref 13–44)
MAGNESIUM SERPL-MCNC: 2 MG/DL — SIGNIFICANT CHANGE UP (ref 1.6–2.6)
MCHC RBC-ENTMCNC: 26.3 PG — LOW (ref 27–34)
MCHC RBC-ENTMCNC: 30.3 G/DL — LOW (ref 32–36)
MCV RBC AUTO: 86.7 FL — SIGNIFICANT CHANGE UP (ref 80–100)
MONOCYTES NFR BLD AUTO: 8.8 % — SIGNIFICANT CHANGE UP (ref 2–14)
NEUTROPHILS NFR BLD AUTO: 42.6 % — LOW (ref 43–77)
PCO2 BLDA: 31 MMHG — LOW (ref 32–45)
PH BLDA: 7.55 — HIGH (ref 7.35–7.45)
PHOSPHATE SERPL-MCNC: 3.3 MG/DL — SIGNIFICANT CHANGE UP (ref 2.5–4.5)
PLATELET # BLD AUTO: 260 K/UL — SIGNIFICANT CHANGE UP (ref 150–400)
PO2 BLDA: 151 MMHG — HIGH (ref 83–108)
POTASSIUM SERPL-MCNC: 3.4 MMOL/L — LOW (ref 3.5–5.3)
POTASSIUM SERPL-SCNC: 3.4 MMOL/L — LOW (ref 3.5–5.3)
RBC # BLD: 3.54 M/UL — LOW (ref 3.8–5.2)
RBC # FLD: 13.8 % — SIGNIFICANT CHANGE UP (ref 10.3–16.9)
SAO2 % BLDA: 99 % — SIGNIFICANT CHANGE UP (ref 95–100)
SODIUM SERPL-SCNC: 141 MMOL/L — SIGNIFICANT CHANGE UP (ref 135–145)
WBC # BLD: 5.1 K/UL — SIGNIFICANT CHANGE UP (ref 3.8–10.5)
WBC # FLD AUTO: 5.1 K/UL — SIGNIFICANT CHANGE UP (ref 3.8–10.5)

## 2018-07-27 PROCEDURE — 93010 ELECTROCARDIOGRAM REPORT: CPT

## 2018-07-27 PROCEDURE — 99223 1ST HOSP IP/OBS HIGH 75: CPT | Mod: GC

## 2018-07-27 RX ORDER — CLONAZEPAM 1 MG
0.5 TABLET ORAL EVERY 8 HOURS
Qty: 0 | Refills: 0 | Status: DISCONTINUED | OUTPATIENT
Start: 2018-07-27 | End: 2018-07-27

## 2018-07-27 RX ORDER — PANTOPRAZOLE SODIUM 20 MG/1
40 TABLET, DELAYED RELEASE ORAL
Qty: 0 | Refills: 0 | Status: DISCONTINUED | OUTPATIENT
Start: 2018-07-27 | End: 2018-08-01

## 2018-07-27 RX ORDER — TIOTROPIUM BROMIDE 18 UG/1
1 CAPSULE ORAL; RESPIRATORY (INHALATION) DAILY
Qty: 0 | Refills: 0 | Status: DISCONTINUED | OUTPATIENT
Start: 2018-07-27 | End: 2018-07-27

## 2018-07-27 RX ORDER — ALBUTEROL 90 UG/1
2 AEROSOL, METERED ORAL EVERY 6 HOURS
Qty: 0 | Refills: 0 | Status: DISCONTINUED | OUTPATIENT
Start: 2018-07-27 | End: 2018-07-27

## 2018-07-27 RX ORDER — IPRATROPIUM/ALBUTEROL SULFATE 18-103MCG
3 AEROSOL WITH ADAPTER (GRAM) INHALATION ONCE
Qty: 0 | Refills: 0 | Status: COMPLETED | OUTPATIENT
Start: 2018-07-27 | End: 2018-07-27

## 2018-07-27 RX ORDER — HEPARIN SODIUM 5000 [USP'U]/ML
7500 INJECTION INTRAVENOUS; SUBCUTANEOUS EVERY 8 HOURS
Qty: 0 | Refills: 0 | Status: DISCONTINUED | OUTPATIENT
Start: 2018-07-27 | End: 2018-07-28

## 2018-07-27 RX ORDER — POTASSIUM CHLORIDE 20 MEQ
20 PACKET (EA) ORAL
Qty: 0 | Refills: 0 | Status: COMPLETED | OUTPATIENT
Start: 2018-07-27 | End: 2018-07-27

## 2018-07-27 RX ORDER — AMLODIPINE BESYLATE 2.5 MG/1
5 TABLET ORAL DAILY
Qty: 0 | Refills: 0 | Status: DISCONTINUED | OUTPATIENT
Start: 2018-07-28 | End: 2018-08-01

## 2018-07-27 RX ORDER — HEPARIN SODIUM 5000 [USP'U]/ML
5000 INJECTION INTRAVENOUS; SUBCUTANEOUS EVERY 8 HOURS
Qty: 0 | Refills: 0 | Status: DISCONTINUED | OUTPATIENT
Start: 2018-07-27 | End: 2018-07-27

## 2018-07-27 RX ORDER — POLYETHYLENE GLYCOL 3350 17 G/17G
17 POWDER, FOR SOLUTION ORAL DAILY
Qty: 0 | Refills: 0 | Status: DISCONTINUED | OUTPATIENT
Start: 2018-07-27 | End: 2018-08-01

## 2018-07-27 RX ORDER — AMLODIPINE BESYLATE 2.5 MG/1
5 TABLET ORAL DAILY
Qty: 0 | Refills: 0 | Status: DISCONTINUED | OUTPATIENT
Start: 2018-07-27 | End: 2018-07-27

## 2018-07-27 RX ORDER — BUDESONIDE, MICRONIZED 100 %
0.5 POWDER (GRAM) MISCELLANEOUS
Qty: 0 | Refills: 0 | Status: DISCONTINUED | OUTPATIENT
Start: 2018-07-27 | End: 2018-08-01

## 2018-07-27 RX ORDER — LOSARTAN POTASSIUM 100 MG/1
25 TABLET, FILM COATED ORAL DAILY
Qty: 0 | Refills: 0 | Status: DISCONTINUED | OUTPATIENT
Start: 2018-07-27 | End: 2018-07-28

## 2018-07-27 RX ORDER — ASPIRIN/CALCIUM CARB/MAGNESIUM 324 MG
81 TABLET ORAL DAILY
Qty: 0 | Refills: 0 | Status: DISCONTINUED | OUTPATIENT
Start: 2018-07-27 | End: 2018-08-01

## 2018-07-27 RX ADMIN — Medication 20 MILLIEQUIVALENT(S): at 14:03

## 2018-07-27 RX ADMIN — Medication 81 MILLIGRAM(S): at 12:05

## 2018-07-27 RX ADMIN — AMLODIPINE BESYLATE 5 MILLIGRAM(S): 2.5 TABLET ORAL at 06:27

## 2018-07-27 RX ADMIN — Medication 3 MILLILITER(S): at 09:02

## 2018-07-27 RX ADMIN — LOSARTAN POTASSIUM 25 MILLIGRAM(S): 100 TABLET, FILM COATED ORAL at 06:27

## 2018-07-27 RX ADMIN — HEPARIN SODIUM 7500 UNIT(S): 5000 INJECTION INTRAVENOUS; SUBCUTANEOUS at 14:03

## 2018-07-27 RX ADMIN — Medication 20 MILLIEQUIVALENT(S): at 09:56

## 2018-07-27 RX ADMIN — Medication 0.5 MILLIGRAM(S): at 17:58

## 2018-07-27 RX ADMIN — Medication 20 MILLIEQUIVALENT(S): at 12:05

## 2018-07-27 RX ADMIN — HEPARIN SODIUM 7500 UNIT(S): 5000 INJECTION INTRAVENOUS; SUBCUTANEOUS at 21:49

## 2018-07-27 RX ADMIN — Medication 0.5 MILLIGRAM(S): at 06:28

## 2018-07-27 RX ADMIN — PANTOPRAZOLE SODIUM 40 MILLIGRAM(S): 20 TABLET, DELAYED RELEASE ORAL at 06:27

## 2018-07-27 RX ADMIN — Medication 3 MILLILITER(S): at 04:57

## 2018-07-27 NOTE — CONSULT NOTE ADULT - SUBJECTIVE AND OBJECTIVE BOX
=	HPI: 58F PMH HTN, GERD, COPD s/p trach 2007 who presented for increased difficulty breathing. 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 Gaurang Ely 10/2017 ([facility]) and revision of her tracheal stoma 3/2018. She has had difficulty with a feeling of tightness in her neck and feeling as though her breathing is blocked. She reports previously tolerating trach collar for a few hours at a time but over the last few months has been vent dependent throughout the day. She was sent to the ED by CT surgery for management of her tracheal stenosis. Last trach change with Dr. ely with a custom trach, the location of which is currently unknown.  A note from Dr. Fco Dunn also indicates a trach change on 6/14/18.     Interval: 7/27/18. Patient evaluated by at bedside with Dr. Laughlin.  6 cuffed inner canula found to be striped. The optimal trach for patient is a 6 distal XLT cuffed, which is not readily available on [facility] floors, ICUs or materials management.  Request placed with OR materials management to order above trach.  Patient given option to change trach to an 8 cuffed in the meant time to provide enough length to bypass tracheomalacia; patient refused. Patient noted to be anxious during conversation.       PE  Anxious, A+OX3  Voice raspy, strained, but able to phonate around cuffed trach and vent  Nonlabored respirations on ventilator. No stridor  Tracheal stoma clean, no skin breakdown or granulation tissue appreciated    Tracheoscopy: Trach in place, clear to maria del rosario and appropriately positioned with mild tracheomalacia.   A/P: 58F PMH HTN, GERD, COPD s/p trach 2007 s/p tracheal stoma revision and excision of tracheal stenosis now with tracheomalacia.   -	6 distal XLT clinically indicated; not readily available; request placed for order - will follow up  8 cuffed teach offered as temporizing measure, which patient adamantly refused.   	Recommend Psych consult for anxiety management  	  D/W attending whom agrees with above. =	HPI: 58F PMH HTN, GERD, COPD s/p trach 2007 who presented for increased difficulty breathing. 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 Gaurang Ely 10/2017 ([facility]) and revision of her tracheal stoma 3/2018. She has had difficulty with a feeling of tightness in her neck and feeling as though her breathing is blocked. She reports previously tolerating trach collar for a few hours at a time but over the last few months has been vent dependent throughout the day. She was sent to the ED by CT surgery for management of her tracheal stenosis. Last trach change with Dr. ely with a custom trach, the location of which is currently unknown.  A note from Dr. Fco Dunn also indicates a trach change on 6/14/18.     Interval: 7/27/18. Patient evaluated by at bedside with Dr. Laughlin.  6 cuffed inner canula found to be striped. The optimal trach for patient is a 6 distal XLT cuffed, which is not readily available on [facility] floors, ICUs or materials management.  Request placed with OR materials management to order above trach.  Patient given option to change trach to an 8 cuffed in the mean time to provide enough length to bypass tracheomalacia; patient refused. Patient noted to be anxious during conversation.       PE  Anxious, A+OX3  Voice raspy, strained, but able to phonate around cuffed trach and vent  Nonlabored respirations on ventilator. No stridor  Tracheal stoma clean, no skin breakdown or granulation tissue appreciated    Tracheoscopy: Trach in place, clear to maria del rosario and appropriately positioned with mild tracheomalacia.   A/P: 58F PMH HTN, GERD, COPD s/p trach 2007 s/p tracheal stoma revision and excision of tracheal stenosis now with tracheomalacia.   -	6 distal XLT clinically indicated; not readily available; request placed for order - will follow up  8 cuffed teach offered as temporizing measure, which patient adamantly refused.   	Recommend Psych consult for anxiety management  	  D/W attending whom agrees with above.

## 2018-07-27 NOTE — DISCHARGE NOTE ADULT - PATIENT PORTAL LINK FT
You can access the [facility] Patient Portal, offered by [facility], by registering with the following website: [email or web address]

## 2018-07-27 NOTE — PROGRESS NOTE ADULT - PROBLEM SELECTOR PLAN 2
Continue ventilation through trach. Current settings: , RR 15, PEEP 5, FiO2 35%  RT for routine tracheostomy care. Continue ventilation through trach.   -RT for routine tracheostomy care.    #metabolic alkalosis w/ resp alkalosis  ABG showed pH 7.55, and Co2 31, with bicarb 37 on BMP, indicative of mixed resp and met alkalemia likely 2/2 hyperventilation on vent and post-hypercapnea  -decreased vent rate for the resp alkalosis  -consulted RT

## 2018-07-27 NOTE — H&P ADULT - NSHPSOCIALHISTORY_GEN_ALL_CORE
Previous tobacco users. Denies alcohol or illicit drug use.    Lives at home by self, with 24 hr nurse. Chronically ventilator and trach dependent.

## 2018-07-27 NOTE — H&P ADULT - PROBLEM SELECTOR PLAN 7
Will update primary MD prior to discharge Hb 10.2 on admission, appears to be at baseline, likely 2/2 to history of sickle cell trait.  Patient also with elevated aPTT at 54.7.    Monitor with daily labs. Repeat coags. Taking clonazepam PRN at home.    Continue clonazepam PRN while inpatient.

## 2018-07-27 NOTE — PROGRESS NOTE ADULT - PROBLEM SELECTOR PLAN 5
QTc 501 on admission EKG    Avoid QT prolonging agents.  Repeat EKG. QTc 501 on admission EKG. Not taking any meds that cause qt prologation.   -Avoid QT prolonging agents.  -f/u repeat EKG

## 2018-07-27 NOTE — H&P ADULT - NSHPPHYSICALEXAM_GEN_ALL_CORE
General: no acute distress; sitting in bed; appears older than stated age  HEENT: NC, AT, PERRL, EOMI, oropharynx clear; poor dentition   Neck: supple, trach in place   Lungs: diminished breath sounds bilaterally, no wheezing, rales or rhonchi  Heart: regular rate and regular rhythm; no murmurs, rubs, gallops  Abdomen: soft, non-tender, non-distended; normal bowel sounds; no hepatosplenomegaly   Extremities: no edema or cyanosis; 2+ DP, PT, and radial pulses bilaterally  Neuro: AAOx3; grossly intact General: no acute distress; sitting in bed; appears older than stated age.  HEENT: NC, AT, PERRL, EOMI, oropharynx clear; poor dentition. Speaks in raspy voice.   Neck: supple, trach in place   Lungs: diminished breath sounds bilaterally, no wheezing, rales or rhonchi  Heart: regular rate and regular rhythm; no murmurs, rubs, gallops  Abdomen: soft, non-tender, non-distended; normal bowel sounds; no hepatosplenomegaly   Extremities: no edema or cyanosis; 2+ DP, PT, and radial pulses bilaterally  Neuro: AAOx3; grossly intact

## 2018-07-27 NOTE — H&P ADULT - PROBLEM SELECTOR PLAN 5
F:  E:  N: soft diet (passed bedside swallow) Hb 10.2 on admission, appears to be at baseline  Likely 2/2 to history of sickle cell trait     Monitor with daily labs. Hb 10.2 on admission, appears to be at baseline, likely 2/2 to history of sickle cell trait.  Patient also with elevated aPTT at 54.7.    Monitor with daily labs. Repeat coags. QTc 501 on admission EKG>    Avoid QT prolonging agents.  Repeat EKG. QTc 501 on admission EKG    Avoid QT prolonging agents.  Repeat EKG.

## 2018-07-27 NOTE — H&P ADULT - PROBLEM SELECTOR PLAN 6
F:  E:  N: soft diet (passed bedside swallow) F: not needed  E: monitor and replace as needed   N: soft diet (passed bedside swallow)    Code: Full  DVT ppx: heparin SQ  GI ppx: pantoprazole  Dispo: admit to Medicine Taking clonazepam PRN at home.    Continue clonazepam PRN while inpatient. Hb 10.2 on admission, appears to be at baseline, likely 2/2 to history of sickle cell trait.  Patient also with elevated aPTT at 54.7.    Monitor with daily labs. Repeat coags.

## 2018-07-27 NOTE — DISCHARGE NOTE ADULT - CARE PLAN
Principal Discharge DX:	Tracheomalacia  Goal:	to home  Assessment and plan of treatment:	Your trach was replaced by ENT. It was found to be placed correctly  Secondary Diagnosis:	Anxiety disorder Principal Discharge DX:	Tracheomalacia  Goal:	to home  Assessment and plan of treatment:	Your trach was replaced by ENT. It was found to be placed correctly and is safe for you.  Secondary Diagnosis:	Anxiety disorder  Assessment and plan of treatment:	Your home medications of clonopin were held for a few days in the hospital, but have been restarted. Principal Discharge DX:	Tracheomalacia  Goal:	to home  Assessment and plan of treatment:	Your trach was replaced by ENT. It was found to be placed correctly and is safe for you. You are going home with trach collar. Please follow up with Dr. Laughlin within ~2 weeks.  Secondary Diagnosis:	Anxiety disorder  Assessment and plan of treatment:	Your home medications of clonopin were held for a few days in the hospital, but have been restarted.

## 2018-07-27 NOTE — CONSULT NOTE ADULT - SUBJECTIVE AND OBJECTIVE BOX
ENT Follow Up    See prior notes for further details.      6-0 cuffed distal XLT obtained.     Patient satting 100% on AC vent  6-0 cuffed shiley removed  tracheal stoma small, skin clean and intact, tract well formed, no granulation tissue or areas of skin breakdown    Trach changed to 6-0 cuffed distal XLT, patient satting well on same vent settings after trach change.    Tracheoscopy revealed trach in good position, trachea clear to maria del rosario.       - Patient now has 6-0 cuffed distal XLT  - Please have spare trach at bedside  - Patient will need new supplies to go home with, as her prior supples were for 6-0 cuffed shiley. The inner cannula for a 6-0 cuffed shiley does not fit a 6-0 cuffed distal XLT.   - Please send obturator home with patient  - Routine trach care: spare trach and one size down at bedside (okay to have 4-0 cuffed shiley as one size down), suction with red rubber catheters, humidfied air at all times. Please obtain extra 6-0 distal XLT inner cannulas for daily inner cannula changes while in house. It is important that her inner cannula is changed routinely so she does not get a mucus plug.   - Seen w/attending

## 2018-07-27 NOTE — H&P ADULT - HISTORY OF PRESENT ILLNESS
58 female with history of HTN, GERD, COPD s/p trach in 2007 who presented to ED for increased difficulty breathing. Patient reports a feeling of tightness in her neck and feeling as though her breathing is blocked. Previously, she was tolerating her trach collar for a few hours at a time but over the last few months has been vent-dependent throughout the day. She was sent to the ED by CT Surgery for management of suspected tracheal stenosis.     Of note, patient was found to have tracheal stenosis, s/p DL/bronch, and excision of tracheal stenosis by Dr. Gaurang Rodriguez at [facility] in October 2017. She  underwent revision of her tracheal stoma 3/2018. Last trach change was  6/14/18 at [facility] by Dr. Fco Dunn. 58 female with history of HTN, GERD, COPD s/p trach in 2007 who presented to ED for increased difficulty breathing. Patient reports a feeling of tightness in her neck and feeling as though her breathing is blocked. Previously, she was tolerating her trach collar for a few hours at a time but over the last few months has been vent-dependent throughout the day. She was sent to the ED by CT Surgery for management of suspected tracheal stenosis.     In ED initial vital signs were 98.5, P 79, /73, R 20, SpO2 100% on ventilator at FiO2 of 40%. Patient seen by ENT in ED: scope was performed and showing tracheomalacia.    Of note, patient was previously found to have tracheal stenosis, s/p DL/bronch, and excision of tracheal stenosis by Dr. Gaurang Rodriguez at [facility] in October 2017. She  underwent revision of her tracheal stoma 3/2018. Last trach change was  6/14/18 at [facility] by Dr. Fco Dunn. 58 female with history of HTN, GERD, COPD s/p trach in 2007 who presented to ED for increased difficulty breathing. Patient reports a feeling of tightness in her neck and feeling as though her breathing is blocked. Previously, she was tolerating her trach collar for a few hours at a time but over the last few months has been vent-dependent throughout the day. She was sent to the ED by CT Surgery for management of suspected tracheal stenosis.     In ED initial vital signs were 98.5, P 79, /73, R 20, SpO2 100% on ventilator at FiO2 of 40%. Patient seen by ENT in ED and tracheoscopy was performed and showing tracheomalacia but no tracheal stenosis. Otherwise, no complaints at this time. Patient denies fevers, chills, chest pain, palpitations, nausea, vomiting, abdominal pain, changes in bowel habits. Patient in no respiratory distress on admission and denied dyspnea at time of examination.     Of note, patient was previously found to have tracheal stenosis, s/p DL/bronch, and excision of tracheal stenosis by Dr. Gaurang Rodriguez at [facility] in October 2017. She  underwent revision of her tracheal stoma 3/2018. Last trach change was  6/14/18 at [facility] by Dr. Fco Dunn.

## 2018-07-27 NOTE — PROGRESS NOTE ADULT - PROBLEM SELECTOR PLAN 6
Hb 10.2 on admission, appears to be at baseline, likely 2/2 to history of sickle cell trait.  Patient also with elevated aPTT at 54.7.    Monitor with daily labs. Repeat coags. Hb 10.2 on admission, appears to be at baseline, likely 2/2 to history of sickle cell trait.  -monitor CBC    #high PTT  Patient also with elevated aPTT at 54.7, at baseline from other admissions.

## 2018-07-27 NOTE — H&P ADULT - PROBLEM SELECTOR PLAN 9
Will update primary MD prior to discharge 1) PCP Contacted on Admission: (Y/N) --> Name & Phone #:  2) Date of Contact with PCP:  3) PCP Contacted at Discharge: (Y/N)  4) Summary of Handoff Given to PCP:   5) Post-Discharge Appointment Date and Location:

## 2018-07-27 NOTE — H&P ADULT - ATTENDING COMMENTS
patient seen and examined    reviewed complex data including vs, labs, available radiological reports/ studies, EKG     agree w/ PE findings as above, w/ additions/ exceptions: pt w/ raspy voice, no increased WOB; trach in place; mild rhonchi b/l    1. tracheomalacia: follow up ENT recs plan for trach change, c/w trach care; monitor for worsening sxs         rest of plan as above

## 2018-07-27 NOTE — PROGRESS NOTE ADULT - SUBJECTIVE AND OBJECTIVE BOX
OVERNIGHT EVENTS: No acute events overnight.    SUBJECTIVE / INTERVAL HPI:  58y yo F w/ . Patient seen and examined at bedside. No new complaints.     VITAL SIGNS:  Vital Signs Last 24 Hrs  T(C): 36.4 (27 Jul 2018 08:36), Max: 37.1 (26 Jul 2018 17:19)  T(F): 97.6 (27 Jul 2018 08:36), Max: 98.7 (26 Jul 2018 17:19)  HR: 60 (27 Jul 2018 08:36) (60 - 81)  BP: 104/69 (27 Jul 2018 08:36) (104/69 - 154/73)  BP(mean): --  RR: 19 (27 Jul 2018 08:36) (18 - 20)  SpO2: 100% (27 Jul 2018 08:36) (92% - 100%)      PHYSICAL EXAM:  General: NAD, laying in bed  Cardiovascular: +S1/S2; RRR  Respiratory: CTA B/L; no W/R/R  Gastrointestinal: soft, NT/ND; +BSx4  Extremities: no edema, clubbing or cyanosis  Neurological: AAOx3; no focal deficits    MEDICATIONS:  MEDICATIONS  (STANDING):  aspirin  chewable 81 milliGRAM(s) Oral daily  buDESOnide   0.5 milliGRAM(s) Respule 0.5 milliGRAM(s) Inhalation two times a day  heparin  Injectable 7500 Unit(s) SubCutaneous every 8 hours  losartan 25 milliGRAM(s) Oral daily  pantoprazole    Tablet 40 milliGRAM(s) Oral before breakfast    MEDICATIONS  (PRN):      ALLERGIES:  Allergies    Cipro (Unknown)    Intolerances        LABS:                        9.3    5.1   )-----------( 260      ( 27 Jul 2018 08:08 )             30.7     07-27    141  |  99  |  5<L>  ----------------------------<  109<H>  3.4<L>   |  34<H>  |  0.71    Ca    9.8      27 Jul 2018 08:08  Phos  3.3     07-27  Mg     2.0     07-27    TPro  7.3  /  Alb  4.2  /  TBili  0.8  /  DBili  x   /  AST  18  /  ALT  9<L>  /  AlkPhos  55  07-26    PT/INR - ( 26 Jul 2018 19:12 )   PT: 12.7 sec;   INR: 1.14          PTT - ( 26 Jul 2018 19:12 )  PTT:54.7 sec  Fingerstick  glucose:     RADIOLOGY & ADDITIONAL TESTS: Reviewed. OVERNIGHT EVENTS: No acute events overnight.    SUBJECTIVE / INTERVAL HPI:  58 female with history of HTN, GERD, COPD s/p trach in 2007, on vent for past several months who presented to ED for increased difficulty breathing after being sent by CT surgery. . Patient seen and examined at bedside. No new complaints.     VITAL SIGNS:  Vital Signs Last 24 Hrs  T(C): 36.4 (27 Jul 2018 08:36), Max: 37.1 (26 Jul 2018 17:19)  T(F): 97.6 (27 Jul 2018 08:36), Max: 98.7 (26 Jul 2018 17:19)  HR: 60 (27 Jul 2018 08:36) (60 - 81)  BP: 104/69 (27 Jul 2018 08:36) (104/69 - 154/73)  BP(mean): --  RR: 19 (27 Jul 2018 08:36) (18 - 20)  SpO2: 100% (27 Jul 2018 08:36) (92% - 100%)      PHYSICAL EXAM:  General: NAD, laying in bed  Cardiovascular: +S1/S2; RRR  Respiratory: CTA B/L; no W/R/R  Gastrointestinal: soft, NT/ND; +BSx4  Extremities: no edema, clubbing or cyanosis  Neurological: AAOx3; no focal deficits    MEDICATIONS:  MEDICATIONS  (STANDING):  aspirin  chewable 81 milliGRAM(s) Oral daily  buDESOnide   0.5 milliGRAM(s) Respule 0.5 milliGRAM(s) Inhalation two times a day  heparin  Injectable 7500 Unit(s) SubCutaneous every 8 hours  losartan 25 milliGRAM(s) Oral daily  pantoprazole    Tablet 40 milliGRAM(s) Oral before breakfast    MEDICATIONS  (PRN):      ALLERGIES:  Allergies    Cipro (Unknown)    Intolerances        LABS:                        9.3    5.1   )-----------( 260      ( 27 Jul 2018 08:08 )             30.7     07-27    141  |  99  |  5<L>  ----------------------------<  109<H>  3.4<L>   |  34<H>  |  0.71    Ca    9.8      27 Jul 2018 08:08  Phos  3.3     07-27  Mg     2.0     07-27    TPro  7.3  /  Alb  4.2  /  TBili  0.8  /  DBili  x   /  AST  18  /  ALT  9<L>  /  AlkPhos  55  07-26    PT/INR - ( 26 Jul 2018 19:12 )   PT: 12.7 sec;   INR: 1.14          PTT - ( 26 Jul 2018 19:12 )  PTT:54.7 sec  Fingerstick  glucose:     RADIOLOGY & ADDITIONAL TESTS: Reviewed. OVERNIGHT EVENTS: No acute events overnight.    SUBJECTIVE / INTERVAL HPI:  58 female with history of HTN, GERD, COPD s/p trach in 2007, on vent for past several months who presented to ED for increased difficulty breathing after being sent by CT surgery. Patient seen and examined at bedside. No new complaints.     VITAL SIGNS:  Vital Signs Last 24 Hrs  T(C): 36.4 (27 Jul 2018 08:36), Max: 37.1 (26 Jul 2018 17:19)  T(F): 97.6 (27 Jul 2018 08:36), Max: 98.7 (26 Jul 2018 17:19)  HR: 60 (27 Jul 2018 08:36) (60 - 81)  BP: 104/69 (27 Jul 2018 08:36) (104/69 - 154/73)  BP(mean): --  RR: 19 (27 Jul 2018 08:36) (18 - 20)  SpO2: 100% (27 Jul 2018 08:36) (92% - 100%)      PHYSICAL EXAM:  General: obese  female, NAD, laying in bed, on vent, lethargic this am but more alert a few hours later after more awake  Cardiovascular: +S1/S2; RRR  Respiratory: CTA B/L; decreased breath sounds due to body habitus   Gastrointestinal: soft, NT/ND; +BSx4      MEDICATIONS:  MEDICATIONS  (STANDING):  aspirin  chewable 81 milliGRAM(s) Oral daily  buDESOnide   0.5 milliGRAM(s) Respule 0.5 milliGRAM(s) Inhalation two times a day  heparin  Injectable 7500 Unit(s) SubCutaneous every 8 hours  losartan 25 milliGRAM(s) Oral daily  pantoprazole    Tablet 40 milliGRAM(s) Oral before breakfast    MEDICATIONS  (PRN):      ALLERGIES:  Allergies    Cipro (Unknown)    Intolerances        LABS:                        9.3    5.1   )-----------( 260      ( 27 Jul 2018 08:08 )             30.7     07-27    141  |  99  |  5<L>  ----------------------------<  109<H>  3.4<L>   |  34<H>  |  0.71    Ca    9.8      27 Jul 2018 08:08  Phos  3.3     07-27  Mg     2.0     07-27    TPro  7.3  /  Alb  4.2  /  TBili  0.8  /  DBili  x   /  AST  18  /  ALT  9<L>  /  AlkPhos  55  07-26    PT/INR - ( 26 Jul 2018 19:12 )   PT: 12.7 sec;   INR: 1.14          PTT - ( 26 Jul 2018 19:12 )  PTT:54.7 sec  Fingerstick  glucose:     RADIOLOGY & ADDITIONAL TESTS: Reviewed. OVERNIGHT EVENTS: No acute events overnight.    SUBJECTIVE / INTERVAL HPI:  58 female with history of HTN, GERD, COPD s/p trach in 2007, on vent for past several months who presented to ED for increased difficulty breathing after being sent by CT surgery. Patient seen and examined at bedside. No new complaints. Complains of pain in her left under arm that has slowly traveled up her arm from wrist this month.    VITAL SIGNS:  Vital Signs Last 24 Hrs  T(C): 36.4 (27 Jul 2018 08:36), Max: 37.1 (26 Jul 2018 17:19)  T(F): 97.6 (27 Jul 2018 08:36), Max: 98.7 (26 Jul 2018 17:19)  HR: 60 (27 Jul 2018 08:36) (60 - 81)  BP: 104/69 (27 Jul 2018 08:36) (104/69 - 154/73)  BP(mean): --  RR: 19 (27 Jul 2018 08:36) (18 - 20)  SpO2: 100% (27 Jul 2018 08:36) (92% - 100%)      PHYSICAL EXAM:  General: obese  female, NAD, laying in bed, on vent, lethargic this am but more alert a few hours later after more awake  Cardiovascular: +S1/S2; RRR  Respiratory: CTA B/L; decreased breath sounds due to body habitus   Gastrointestinal: soft, NT/ND; +BSx4  Extremities: left arm not significantly more swollen than right, but hard to tell given body habitus, there is an indent in left upper arm that is not on right side (pt reports is recent change) may indicate swelling      MEDICATIONS:  MEDICATIONS  (STANDING):  aspirin  chewable 81 milliGRAM(s) Oral daily  buDESOnide   0.5 milliGRAM(s) Respule 0.5 milliGRAM(s) Inhalation two times a day  heparin  Injectable 7500 Unit(s) SubCutaneous every 8 hours  losartan 25 milliGRAM(s) Oral daily  pantoprazole    Tablet 40 milliGRAM(s) Oral before breakfast    MEDICATIONS  (PRN):      ALLERGIES:  Allergies    Cipro (Unknown)    Intolerances        LABS:                        9.3    5.1   )-----------( 260      ( 27 Jul 2018 08:08 )             30.7     07-27    141  |  99  |  5<L>  ----------------------------<  109<H>  3.4<L>   |  34<H>  |  0.71    Ca    9.8      27 Jul 2018 08:08  Phos  3.3     07-27  Mg     2.0     07-27    TPro  7.3  /  Alb  4.2  /  TBili  0.8  /  DBili  x   /  AST  18  /  ALT  9<L>  /  AlkPhos  55  07-26    PT/INR - ( 26 Jul 2018 19:12 )   PT: 12.7 sec;   INR: 1.14          PTT - ( 26 Jul 2018 19:12 )  PTT:54.7 sec  Fingerstick  glucose:     RADIOLOGY & ADDITIONAL TESTS: Reviewed.

## 2018-07-27 NOTE — H&P ADULT - PROBLEM SELECTOR PLAN 1
Per ENT, will swap trach for XLT trach the morning. Tracheoscopy by ENT showing mild-moderate tracheomalacia, not fully obstructing the distal opening of the trach with inspiration. No tracheal stenosis observed.     ENT on board, f/u recs:  -Routine trach care: Spare trach and one size down at bedside. Suction with red rubber catheters. Obturator at [facility]  -Per ENT, will swap trach for distal XLT trach the morning.

## 2018-07-27 NOTE — PROGRESS NOTE ADULT - ASSESSMENT
58 female with history of HTN, GERD, COPD s/p trach in 2007 who presented to ED for increased difficulty breathing after being sent by CT surgery. Tracheoscopy performed by ENT showing tracheomalacia but no tracheal stenosis. 58 female with history of HTN, GERD, COPD s/p trach in 2007 who presented to ED for increased difficulty breathing after being sent by CT surgery. Tracheoscopy performed by ENT showing tracheomalacia but no tracheal stenosis. ENT following and will replace trach. 58 female with history of HTN, GERD, COPD s/p trach in 2007 who presented to ED for increased difficulty breathing after being sent by CT surgery. Tracheoscopy performed by ENT showing tracheomalacia but no tracheal stenosis. ENT following and will replace trach.     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

## 2018-07-27 NOTE — DISCHARGE NOTE ADULT - MEDICATION SUMMARY - MEDICATIONS TO STOP TAKING
I will STOP taking the medications listed below when I get home from the hospital:  None I will STOP taking the medications listed below when I get home from the hospital:    Tracheostomy Tube Size 6 cuffed, non-fenestrated  -- Tracheostomy Tube Size 6 cuffed, non-fenestrated    Dispense One, Patient to carry with her at all times in case of an emergency

## 2018-07-27 NOTE — DISCHARGE NOTE ADULT - ADDITIONAL INSTRUCTIONS
Follow up with your Primary care doctor. Follow up with your Primary care doctor, and with Dr. Laughlin within 2 weeks. Follow up with your Primary care doctor, and with Dr. Laughlin. You have an appt with Dr. Laughlin 8/7 at 11:30am, please arrive at 11.

## 2018-07-27 NOTE — H&P ADULT - PROBLEM SELECTOR PROBLEM 7
Transition of care performed with sharing of clinical summary Anemia Anxiety disorder, unspecified type

## 2018-07-27 NOTE — PROGRESS NOTE ADULT - PROBLEM SELECTOR PLAN 1
Tracheoscopy by ENT showing mild-moderate tracheomalacia, not fully obstructing the distal opening of the trach with inspiration. No tracheal stenosis observed.     ENT on board, f/u recs:  -Routine trach care: Spare trach and one size down at bedside. Suction with red rubber catheters. Obturator at [facility]  -Per ENT, will swap trach for distal XLT trach the morning. Tracheoscopy by ENT showing mild-moderate tracheomalacia, not fully obstructing the distal opening of the trach with inspiration. No tracheal stenosis observed.   ENT on board, f/u recs:  -Routine trach care: Spare trach and one size down at bedside. Suction with red rubber catheters. Obturator at [facility]  -Per ENT, will swap trach for distal XLT trach

## 2018-07-27 NOTE — H&P ADULT - ASSESSMENT
58 female with history of HTN, GERD, COPD s/p trach in 2007 who presented to ED for increased difficulty breathing after being sent by CT surgery. Tracheoscopy performed by ENT showing tracheomalacia but no tracheal stenosis.

## 2018-07-27 NOTE — H&P ADULT - PROBLEM SELECTOR PLAN 2
Continue ventilation through trach. Current settings: Continue ventilation through trach. Current settings: , RR 15, PEEP 5, FiO2 35%  RT for routine tracheostomy care.

## 2018-07-27 NOTE — DISCHARGE NOTE ADULT - MEDICATION SUMMARY - MEDICATIONS TO TAKE
I will START or STAY ON the medications listed below when I get home from the hospital:    6.0 Distal XLT Cuffed Inner Cannula  -- Tracheostomy set with three replacement inner cannulas. ICD10 J39.8 Tracheomalacia  -- Indication: For Tracheomalacia    budesonide 0.5 mg/2 mL inhalation suspension  -- 2 milliliter(s) by nebulizer 2 times a day     dispense one month supply  -- For inhalation only.  Rinse mouth thoroughly after use.  Shake well before use.    -- Indication: For Copd    aspirin 81 mg oral tablet, chewable  -- 1 tab(s) by mouth once a day  -- Indication: For Cad    oxyCODONE-acetaminophen 5 mg-325 mg oral tablet  -- 1 tab(s) by mouth every 6 hours, As Needed -Pain MDD:4 tablets  -- Indication: For Pain    acetaminophen 325 mg oral tablet  -- 1 tab(s) by mouth every 4 hours, As needed, Mild Pain (1 - 3)  -- Indication: For Pain    losartan 25 mg oral tablet  -- 1 tab(s) by mouth once a day  -- Indication: For Cad    clonazePAM 0.5 mg oral tablet, disintegrating  -- 1 tab(s) by mouth every 8 hours, As Needed -anxiety - for agitation MDD:3 tablets  -- Indication: For Anxiety disorder    Ambien 5 mg oral tablet  -- 1 tab(s) by mouth once a day (at bedtime), As Needed insomnia MDD:1 tablet  -- Caution federal law prohibits the transfer of this drug to any person other  than the person for whom it was prescribed.  May cause drowsiness.  Alcohol may intensify this effect.  Use care when operating dangerous machinery.    -- Indication: For sleep    ipratropium-albuterol 0.5 mg-2.5 mg/3 mLinhalation solution  -- 3 milliliter(s) inhaled every 6 hours    dispense one month supply  -- Indication: For Copd    Norvasc 5 mg oral tablet  -- 1 tab(s) by mouth once a day  -- Indication: For BP    polyethylene glycol 3350 oral powder for reconstitution  -- 17 gram(s) by mouth once a day, As needed, Constipation  -- Indication: For Constipation    multivitamin  -- Indication: For Ppx

## 2018-07-27 NOTE — H&P ADULT - PROBLEM SELECTOR PLAN 8
F: not needed  E: monitor and replace as needed   N: soft diet (passed bedside swallow)    Code: Full  DVT ppx: heparin SQ  GI ppx: pantoprazole  Dispo: admit to Medicine

## 2018-07-27 NOTE — PROGRESS NOTE ADULT - PROBLEM SELECTOR PLAN 9
1) PCP Contacted on Admission: (Y/N) --> Name & Phone #:  2) Date of Contact with PCP:  3) PCP Contacted at Discharge: (Y/N)  4) Summary of Handoff Given to PCP:   5) Post-Discharge Appointment Date and Location:

## 2018-07-27 NOTE — H&P ADULT - NSHPLABSRESULTS_GEN_ALL_CORE
10.2   5.8   )-----------( 268      ( 26 Jul 2018 19:12 )             32.6     07-26    143  |  101  |  4<L>  ----------------------------<  108<H>  4.3   |  34<H>  |  0.73    Ca    10.0      26 Jul 2018 19:12    TPro  7.3  /  Alb  4.2  /  TBili  0.8  /  DBili  x   /  AST  18  /  ALT  9<L>  /  AlkPhos  55  07-26    CXR: official read pending; 10.2   5.8   )-----------( 268      ( 26 Jul 2018 19:12 )             32.6     07-26    143  |  101  |  4<L>  ----------------------------<  108<H>  4.3   |  34<H>  |  0.73    Ca    10.0      26 Jul 2018 19:12    TPro  7.3  /  Alb  4.2  /  TBili  0.8  /  DBili  x   /  AST  18  /  ALT  9<L>  /  AlkPhos  55  07-26    CXR: official read pending; appears to have mild scattered opacities in bilateral lung bases.

## 2018-07-27 NOTE — H&P ADULT - PROBLEM SELECTOR PROBLEM 5
Transition of care performed with sharing of clinical summary Nutrition, metabolism, and development symptoms Anemia Prolonged QT interval

## 2018-07-27 NOTE — H&P ADULT - PROBLEM SELECTOR PROBLEM 6
Transition of care performed with sharing of clinical summary Nutrition, metabolism, and development symptoms Anxiety disorder, unspecified type Anemia

## 2018-07-27 NOTE — H&P ADULT - PROBLEM SELECTOR PLAN 3
Stable, no signs of COPD exacerbation at this time   Continue home inhalers. Stable, no signs of COPD exacerbation at this time   DuoNebs PRN.  Continue home budesonide inhaler.

## 2018-07-27 NOTE — DISCHARGE NOTE ADULT - CARE PROVIDERS DIRECT ADDRESSES
[email or web address]

## 2018-07-27 NOTE — PROGRESS NOTE ADULT - PROBLEM SELECTOR PLAN 7
Taking clonazepam PRN at home.    Continue clonazepam PRN while inpatient. Taking clonazepam PRN at home.  -Continue clonazepam PRN while inpatient.  -hold if pt becomes too lethargic

## 2018-07-27 NOTE — DISCHARGE NOTE ADULT - PLAN OF CARE
to home Your trach was replaced by ENT. It was found to be placed correctly Your trach was replaced by ENT. It was found to be placed correctly and is safe for you. Your home medications of clonopin were held for a few days in the hospital, but have been restarted. Your trach was replaced by ENT. It was found to be placed correctly and is safe for you. You are going home with trach collar. Please follow up with Dr. Laughlin within ~2 weeks.

## 2018-07-27 NOTE — PROGRESS NOTE ADULT - PROBLEM SELECTOR PLAN 8
F: not needed  E: monitor and replace as needed   N: soft diet (passed bedside swallow)    Code: Full  DVT ppx: heparin SQ  GI ppx: pantoprazole  Dispo: admit to Medicine F: not needed  E: monitor and replace as needed   N: soft diet (passed bedside swallow)  Code: Full  DVT ppx: heparin SQ  GI ppx: pantoprazole  Dispo: to home

## 2018-07-27 NOTE — DISCHARGE NOTE ADULT - HOSPITAL COURSE
58 female with history of HTN, GERD, COPD s/p trach in 2007, on vent for past several months who presented to ED for increased difficulty breathing after being sent by CT surgery. S/p trach replacement by ENT during hospitlization. Pt complained of difficulty coughing and pain in her throat near trach site. 58 female with history of HTN, GERD, COPD s/p trach in 2007, on vent for past several months who presented to ED for increased difficulty breathing after being sent by CT surgery. S/p trach replacement by ENT during hospitlization. Pt complained of difficulty coughing and pain in her throat near trach site. Pt reassured that the trach was in correctly and was safe by ENT. Started in tylenol PRN for discomfort, and continued on home dose Clonapin for anxiety. Pt is going home on trach collar with printed Rx for trach supplies. Pt will f/u w/ Dr. Laughlin outpt.

## 2018-07-27 NOTE — DISCHARGE NOTE ADULT - CARE PROVIDER_API CALL
Brett Laughlin (MD), Otolaryngology  [address]  [address]  [place], [place] [address]  Phone: [phone]  Fax: [phone]

## 2018-07-28 DIAGNOSIS — D68.9 COAGULATION DEFECT, UNSPECIFIED: ICD-10-CM

## 2018-07-28 LAB
ANION GAP SERPL CALC-SCNC: 8 MMOL/L — SIGNIFICANT CHANGE UP (ref 5–17)
APTT BLD: 100.3 SEC — HIGH (ref 27.5–37.4)
BUN SERPL-MCNC: 7 MG/DL — SIGNIFICANT CHANGE UP (ref 7–23)
CALCIUM SERPL-MCNC: 9.6 MG/DL — SIGNIFICANT CHANGE UP (ref 8.4–10.5)
CHLORIDE SERPL-SCNC: 104 MMOL/L — SIGNIFICANT CHANGE UP (ref 96–108)
CO2 SERPL-SCNC: 32 MMOL/L — HIGH (ref 22–31)
CREAT SERPL-MCNC: 0.82 MG/DL — SIGNIFICANT CHANGE UP (ref 0.5–1.3)
GLUCOSE SERPL-MCNC: 97 MG/DL — SIGNIFICANT CHANGE UP (ref 70–99)
HCT VFR BLD CALC: 31.7 % — LOW (ref 34.5–45)
HGB BLD-MCNC: 9.7 G/DL — LOW (ref 11.5–15.5)
INR BLD: 1.13 — SIGNIFICANT CHANGE UP (ref 0.88–1.16)
MAGNESIUM SERPL-MCNC: 2 MG/DL — SIGNIFICANT CHANGE UP (ref 1.6–2.6)
MCHC RBC-ENTMCNC: 26.3 PG — LOW (ref 27–34)
MCHC RBC-ENTMCNC: 30.6 G/DL — LOW (ref 32–36)
MCV RBC AUTO: 85.9 FL — SIGNIFICANT CHANGE UP (ref 80–100)
PLATELET # BLD AUTO: 241 K/UL — SIGNIFICANT CHANGE UP (ref 150–400)
POTASSIUM SERPL-MCNC: 3.7 MMOL/L — SIGNIFICANT CHANGE UP (ref 3.5–5.3)
POTASSIUM SERPL-SCNC: 3.7 MMOL/L — SIGNIFICANT CHANGE UP (ref 3.5–5.3)
PROTHROM AB SERPL-ACNC: 12.6 SEC — SIGNIFICANT CHANGE UP (ref 9.8–12.7)
RBC # BLD: 3.69 M/UL — LOW (ref 3.8–5.2)
RBC # FLD: 14 % — SIGNIFICANT CHANGE UP (ref 10.3–16.9)
SODIUM SERPL-SCNC: 144 MMOL/L — SIGNIFICANT CHANGE UP (ref 135–145)
WBC # BLD: 4.5 K/UL — SIGNIFICANT CHANGE UP (ref 3.8–10.5)
WBC # FLD AUTO: 4.5 K/UL — SIGNIFICANT CHANGE UP (ref 3.8–10.5)

## 2018-07-28 PROCEDURE — 99233 SBSQ HOSP IP/OBS HIGH 50: CPT | Mod: GC

## 2018-07-28 RX ORDER — SODIUM CHLORIDE 9 MG/ML
5 INJECTION INTRAMUSCULAR; INTRAVENOUS; SUBCUTANEOUS THREE TIMES A DAY
Qty: 0 | Refills: 0 | Status: DISCONTINUED | OUTPATIENT
Start: 2018-07-28 | End: 2018-08-01

## 2018-07-28 RX ORDER — LANOLIN ALCOHOL/MO/W.PET/CERES
3 CREAM (GRAM) TOPICAL AT BEDTIME
Qty: 0 | Refills: 0 | Status: DISCONTINUED | OUTPATIENT
Start: 2018-07-28 | End: 2018-08-01

## 2018-07-28 RX ORDER — IPRATROPIUM/ALBUTEROL SULFATE 18-103MCG
3 AEROSOL WITH ADAPTER (GRAM) INHALATION EVERY 6 HOURS
Qty: 0 | Refills: 0 | Status: DISCONTINUED | OUTPATIENT
Start: 2018-07-28 | End: 2018-08-01

## 2018-07-28 RX ORDER — SODIUM CHLORIDE 9 MG/ML
3 INJECTION INTRAMUSCULAR; INTRAVENOUS; SUBCUTANEOUS THREE TIMES A DAY
Qty: 0 | Refills: 0 | Status: DISCONTINUED | OUTPATIENT
Start: 2018-07-28 | End: 2018-07-28

## 2018-07-28 RX ORDER — SODIUM CHLORIDE 9 MG/ML
3 INJECTION INTRAMUSCULAR; INTRAVENOUS; SUBCUTANEOUS
Qty: 0 | Refills: 0 | Status: DISCONTINUED | OUTPATIENT
Start: 2018-07-28 | End: 2018-08-01

## 2018-07-28 RX ORDER — HEPARIN SODIUM 5000 [USP'U]/ML
5000 INJECTION INTRAVENOUS; SUBCUTANEOUS EVERY 12 HOURS
Qty: 0 | Refills: 0 | Status: DISCONTINUED | OUTPATIENT
Start: 2018-07-28 | End: 2018-08-01

## 2018-07-28 RX ADMIN — POLYETHYLENE GLYCOL 3350 17 GRAM(S): 17 POWDER, FOR SOLUTION ORAL at 11:35

## 2018-07-28 RX ADMIN — HEPARIN SODIUM 7500 UNIT(S): 5000 INJECTION INTRAVENOUS; SUBCUTANEOUS at 05:12

## 2018-07-28 RX ADMIN — PANTOPRAZOLE SODIUM 40 MILLIGRAM(S): 20 TABLET, DELAYED RELEASE ORAL at 06:59

## 2018-07-28 RX ADMIN — Medication 3 MILLIGRAM(S): at 23:33

## 2018-07-28 RX ADMIN — Medication 3 MILLILITER(S): at 23:28

## 2018-07-28 RX ADMIN — Medication 3 MILLILITER(S): at 05:09

## 2018-07-28 RX ADMIN — Medication 0.5 MILLIGRAM(S): at 05:09

## 2018-07-28 RX ADMIN — HEPARIN SODIUM 7500 UNIT(S): 5000 INJECTION INTRAVENOUS; SUBCUTANEOUS at 13:17

## 2018-07-28 RX ADMIN — SODIUM CHLORIDE 5 MILLILITER(S): 9 INJECTION INTRAMUSCULAR; INTRAVENOUS; SUBCUTANEOUS at 22:43

## 2018-07-28 RX ADMIN — Medication 3 MILLILITER(S): at 17:43

## 2018-07-28 RX ADMIN — Medication 81 MILLIGRAM(S): at 11:35

## 2018-07-28 RX ADMIN — Medication 0.5 MILLIGRAM(S): at 17:43

## 2018-07-28 RX ADMIN — Medication 3 MILLILITER(S): at 11:35

## 2018-07-28 NOTE — PROGRESS NOTE ADULT - ASSESSMENT
58 female with history of HTN, GERD, COPD s/p trach in 2007 who presented to ED for increased difficulty breathing after being sent by CT surgery. Tracheoscopy performed by ENT showing tracheomalacia but no tracheal stenosis. ENT following and will replace trach.     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

## 2018-07-28 NOTE — PROGRESS NOTE ADULT - SUBJECTIVE AND OBJECTIVE BOX
Patient is a 58y old  Female who presents with a chief complaint of tracheomalacia (27 Jul 2018 14:06)      INTERVAL HPI/OVERNIGHT EVENTS:          ROS  (- ) headache  ( -  )fevers/chills  ( - ) dyspnea  (  - ) cough  (  - ) chest pain  (  - ) palpatations  ( - ) dizziness/lightheadedness  (  - ) nausea/vomiting  (  - ) abd pain  (  - ) diarrhea  (  - ) melena  (  - ) hematochezia  (  - ) dysuria   ( - ) hematuria  (  - ) leg swelling    ( - ) calf tenderness  (  - ) motor weakness  ( - ) extremity numbness  ( - ) back pain  ( + ) tolerating POs  ( + ) BM  ROS: 12 point review of systems otherwise negative              MEDICATIONS  (STANDING):  ALBUTerol/ipratropium for Nebulization 3 milliLiter(s) Nebulizer every 6 hours  amLODIPine   Tablet 5 milliGRAM(s) Oral daily  aspirin  chewable 81 milliGRAM(s) Oral daily  buDESOnide   0.5 milliGRAM(s) Respule 0.5 milliGRAM(s) Inhalation two times a day  heparin  Injectable 7500 Unit(s) SubCutaneous every 8 hours  pantoprazole    Tablet 40 milliGRAM(s) Oral before breakfast  sodium chloride 3%  Inhalation 3 milliLiter(s) Inhalation three times a day    MEDICATIONS  (PRN):  polyethylene glycol 3350 17 Gram(s) Oral daily PRN Constipation      Allergies    Cipro (Unknown)    Intolerances          Vital Signs Last 24 Hrs  T(C): 36.6 (28 Jul 2018 08:46), Max: 37.1 (27 Jul 2018 21:45)  T(F): 97.9 (28 Jul 2018 08:46), Max: 98.8 (27 Jul 2018 21:45)  HR: 71 (28 Jul 2018 08:46) (62 - 72)  BP: 113/67 (28 Jul 2018 08:46) (96/62 - 130/83)  BP(mean): --  RR: 16 (28 Jul 2018 08:46) (16 - 20)  SpO2: 98% (28 Jul 2018 14:00) (98% - 100%)  CAPILLARY BLOOD GLUCOSE            Physical Exam:    Daily     Daily   General:  Well appearing   HEENT:  Nonicteric, PERRLA, oral pharynx w/o erythema/exudate,  neck supple  CV:  [id number] , RRR,  no JVD  Lungs:  CTA B/L, no wheezes, rales, rhonchi  Abdomen:  positive BS, Soft, non-tender, non distended, no hepatosplenomegaly  Extremities:  2+ DP/radial pulses b/l, no cyanosis, no edema  Back: no cva or midline tenderness  Skin:  Warm and dry   :  No irizarry  Neuro:  AAOx3,  CN II-XII grossly intact, 5/5 str all 4 ext, sensation intact, (-) dysmetria b/l (-) dysdiadochokinesia bl  No Restraints    LABS:                        9.7    4.5   )-----------( 241      ( 28 Jul 2018 06:51 )             31.7     07-28    144  |  104  |  7   ----------------------------<  97  3.7   |  32<H>  |  0.82    Ca    9.6      28 Jul 2018 06:51  Phos  3.3     07-27  Mg     2.0     07-28    TPro  7.3  /  Alb  4.2  /  TBili  0.8  /  DBili  x   /  AST  18  /  ALT  9<L>  /  AlkPhos  55  07-26    PT/INR - ( 28 Jul 2018 06:51 )   PT: 12.6 sec;   INR: 1.13          PTT - ( 28 Jul 2018 06:51 )  PTT:100.3 sec        RADIOLOGY & ADDITIONAL TESTS: Patient is a 58y old  Female who presents with a chief complaint of tracheomalacia (27 Jul 2018 14:06)      INTERVAL HPI/OVERNIGHT EVENTS:  no dyspnea  no cough  remains on vent      ROS     ( -  )fevers/chills     (  - ) chest pain  (  - ) palpatations     (  - ) nausea/vomiting  (  - ) abd pain  (  - ) diarrhea  (  - ) melena  (  - ) hematochezia      ROS: 12 point review of systems otherwise negative              MEDICATIONS  (STANDING):  ALBUTerol/ipratropium for Nebulization 3 milliLiter(s) Nebulizer every 6 hours  amLODIPine   Tablet 5 milliGRAM(s) Oral daily  aspirin  chewable 81 milliGRAM(s) Oral daily  buDESOnide   0.5 milliGRAM(s) Respule 0.5 milliGRAM(s) Inhalation two times a day  heparin  Injectable 7500 Unit(s) SubCutaneous every 8 hours  pantoprazole    Tablet 40 milliGRAM(s) Oral before breakfast  sodium chloride 3%  Inhalation 3 milliLiter(s) Inhalation three times a day    MEDICATIONS  (PRN):  polyethylene glycol 3350 17 Gram(s) Oral daily PRN Constipation      Allergies    Cipro (Unknown)    Intolerances          Vital Signs Last 24 Hrs  T(C): 36.6 (28 Jul 2018 08:46), Max: 37.1 (27 Jul 2018 21:45)  T(F): 97.9 (28 Jul 2018 08:46), Max: 98.8 (27 Jul 2018 21:45)  HR: 71 (28 Jul 2018 08:46) (62 - 72)  BP: 113/67 (28 Jul 2018 08:46) (96/62 - 130/83)  BP(mean): --  RR: 16 (28 Jul 2018 08:46) (16 - 20)  SpO2: 98% (28 Jul 2018 14:00) (98% - 100%)  CAPILLARY BLOOD GLUCOSE            Physical Exam:    Daily     Daily   General:  Well appearing   HEENT:  Nonicteric, PERRLA, oral pharynx w/o erythema/exudate,  neck supple, trach site clean, dry  CV:  [id number] , RRR,  no JVD  Lungs:  CTA B/L, no wheezes, rales, rhonchi  Abdomen:  positive BS, Soft, non-tender, non distended, no hepatosplenomegaly  Extremities:  2+ DP/radial pulses b/l, no cyanosis, no edema  Back: no cva or midline tenderness  Skin:  Warm and dry   :  No irizarry  Neuro: follows commands,  CN II-XII grossly intact,  refused to move left upper ext, remainder to extremities 4/5 str , sensation intact,  No Restraints    LABS:                        9.7    4.5   )-----------( 241      ( 28 Jul 2018 06:51 )             31.7     07-28    144  |  104  |  7   ----------------------------<  97  3.7   |  32<H>  |  0.82    Ca    9.6      28 Jul 2018 06:51  Phos  3.3     07-27  Mg     2.0     07-28    TPro  7.3  /  Alb  4.2  /  TBili  0.8  /  DBili  x   /  AST  18  /  ALT  9<L>  /  AlkPhos  55  07-26    PT/INR - ( 28 Jul 2018 06:51 )   PT: 12.6 sec;   INR: 1.13          PTT - ( 28 Jul 2018 06:51 )  PTT:100.3 sec        RADIOLOGY & ADDITIONAL TESTS:

## 2018-07-29 LAB
ANION GAP SERPL CALC-SCNC: 12 MMOL/L — SIGNIFICANT CHANGE UP (ref 5–17)
BUN SERPL-MCNC: 6 MG/DL — LOW (ref 7–23)
CALCIUM SERPL-MCNC: 10 MG/DL — SIGNIFICANT CHANGE UP (ref 8.4–10.5)
CHLORIDE SERPL-SCNC: 100 MMOL/L — SIGNIFICANT CHANGE UP (ref 96–108)
CHLORIDE UR-SCNC: 20 MMOL/L — SIGNIFICANT CHANGE UP
CO2 SERPL-SCNC: 29 MMOL/L — SIGNIFICANT CHANGE UP (ref 22–31)
CREAT SERPL-MCNC: 0.8 MG/DL — SIGNIFICANT CHANGE UP (ref 0.5–1.3)
GLUCOSE SERPL-MCNC: 93 MG/DL — SIGNIFICANT CHANGE UP (ref 70–99)
MAGNESIUM SERPL-MCNC: 2 MG/DL — SIGNIFICANT CHANGE UP (ref 1.6–2.6)
POTASSIUM SERPL-MCNC: 4 MMOL/L — SIGNIFICANT CHANGE UP (ref 3.5–5.3)
POTASSIUM SERPL-SCNC: 4 MMOL/L — SIGNIFICANT CHANGE UP (ref 3.5–5.3)
SODIUM SERPL-SCNC: 141 MMOL/L — SIGNIFICANT CHANGE UP (ref 135–145)
SODIUM UR-SCNC: 53 MMOL/L — SIGNIFICANT CHANGE UP

## 2018-07-29 PROCEDURE — 99233 SBSQ HOSP IP/OBS HIGH 50: CPT | Mod: GC

## 2018-07-29 PROCEDURE — 71045 X-RAY EXAM CHEST 1 VIEW: CPT | Mod: 26

## 2018-07-29 RX ORDER — ACETAMINOPHEN 500 MG
325 TABLET ORAL EVERY 4 HOURS
Qty: 0 | Refills: 0 | Status: DISCONTINUED | OUTPATIENT
Start: 2018-07-29 | End: 2018-08-01

## 2018-07-29 RX ORDER — CLONAZEPAM 1 MG
0.5 TABLET ORAL EVERY 8 HOURS
Qty: 0 | Refills: 0 | Status: DISCONTINUED | OUTPATIENT
Start: 2018-07-29 | End: 2018-08-01

## 2018-07-29 RX ADMIN — Medication 3 MILLILITER(S): at 12:30

## 2018-07-29 RX ADMIN — Medication 0.5 MILLIGRAM(S): at 06:54

## 2018-07-29 RX ADMIN — AMLODIPINE BESYLATE 5 MILLIGRAM(S): 2.5 TABLET ORAL at 09:03

## 2018-07-29 RX ADMIN — HEPARIN SODIUM 5000 UNIT(S): 5000 INJECTION INTRAVENOUS; SUBCUTANEOUS at 17:23

## 2018-07-29 RX ADMIN — Medication 0.5 MILLIGRAM(S): at 17:23

## 2018-07-29 RX ADMIN — SODIUM CHLORIDE 5 MILLILITER(S): 9 INJECTION INTRAMUSCULAR; INTRAVENOUS; SUBCUTANEOUS at 06:55

## 2018-07-29 RX ADMIN — SODIUM CHLORIDE 5 MILLILITER(S): 9 INJECTION INTRAMUSCULAR; INTRAVENOUS; SUBCUTANEOUS at 13:04

## 2018-07-29 RX ADMIN — Medication 3 MILLILITER(S): at 06:54

## 2018-07-29 RX ADMIN — SODIUM CHLORIDE 3 MILLILITER(S): 9 INJECTION INTRAMUSCULAR; INTRAVENOUS; SUBCUTANEOUS at 03:48

## 2018-07-29 RX ADMIN — Medication 325 MILLIGRAM(S): at 19:22

## 2018-07-29 RX ADMIN — Medication 3 MILLILITER(S): at 17:23

## 2018-07-29 RX ADMIN — Medication 325 MILLIGRAM(S): at 20:15

## 2018-07-29 RX ADMIN — HEPARIN SODIUM 5000 UNIT(S): 5000 INJECTION INTRAVENOUS; SUBCUTANEOUS at 06:54

## 2018-07-29 RX ADMIN — Medication 81 MILLIGRAM(S): at 12:30

## 2018-07-29 NOTE — PROGRESS NOTE ADULT - SUBJECTIVE AND OBJECTIVE BOX
OVERNIGHT EVENTS: No acute events overnight. Given melatonin 3mg for insomnia.     SUBJECTIVE / INTERVAL HPI:  58 female with history of HTN, GERD, COPD s/p trach in 2007, on vent for past several months who presented to ED for increased difficulty breathing after being sent by CT surgery. s/p trach replacement by ENT. Patient seen and examined at bedside. Complains of difficulty coughing  and pain in her throat near trach site.     Vitals:  T(C): 37.1 (07-29-18 @ 08:41), Max: 37.2 (07-28-18 @ 17:11)  HR: 59 (07-29-18 @ 08:41) (59 - 76)  BP: 159/78 (07-29-18 @ 08:41) (105/72 - 159/78)  RR: 18 (07-29-18 @ 08:41) (18 - 21)  SpO2: 100% (07-29-18 @ 05:05) (98% - 100%)    PHYSICAL EXAM:  General: obese  female, NAD, laying in bed, on vent  Cardiovascular: +S1/S2; RRR  Respiratory: CTA B/L  Gastrointestinal: soft, NT/ND; +BSx4    MEDICATIONS:  MEDICATIONS  (STANDING):  aspirin  chewable 81 milliGRAM(s) Oral daily  buDESOnide   0.5 milliGRAM(s) Respule 0.5 milliGRAM(s) Inhalation two times a day  heparin  Injectable 7500 Unit(s) SubCutaneous every 8 hours  losartan 25 milliGRAM(s) Oral daily  pantoprazole    Tablet 40 milliGRAM(s) Oral before breakfast    MEDICATIONS  (PRN):      ALLERGIES:  Allergies    Cipro (Unknown)    Intolerances      .  LABS:                         9.7    4.5   )-----------( 241      ( 28 Jul 2018 06:51 )             31.7     07-28    144  |  104  |  7   ----------------------------<  97  3.7   |  32<H>  |  0.82    Ca    9.6      28 Jul 2018 06:51  Mg     2.0     07-28      PT/INR - ( 28 Jul 2018 06:51 )   PT: 12.6 sec;   INR: 1.13          PTT - ( 28 Jul 2018 06:51 )  PTT:100.3 sec

## 2018-07-29 NOTE — PROGRESS NOTE ADULT - PROBLEM SELECTOR PLAN 6
Hb 10.2 on admission, appears to be at baseline, likely 2/2 to history of sickle cell trait.  -monitor CBC    #high PTT  Patient also with elevated aPTT at 54.7, at baseline from other admissions.

## 2018-07-29 NOTE — PROGRESS NOTE ADULT - PROBLEM SELECTOR PLAN 8
F: not needed  E: monitor and replace as needed   N: soft diet (passed bedside swallow)  Code: Full  DVT ppx: heparin SQ  GI ppx: pantoprazole  Dispo: to home

## 2018-07-29 NOTE — PROGRESS NOTE ADULT - PROBLEM SELECTOR PLAN 7
Taking clonazepam PRN at home.  -Continue clonazepam PRN while inpatient.  -hold if pt becomes too lethargic

## 2018-07-29 NOTE — PROGRESS NOTE ADULT - PROBLEM SELECTOR PLAN 5
QTc 501 on admission EKG. Not taking any meds that cause qt prologation.   -Avoid QT prolonging agents.  -f/u repeat EKG

## 2018-07-29 NOTE — PROGRESS NOTE ADULT - PROBLEM SELECTOR PLAN 1
Tracheoscopy by ENT showing mild-moderate tracheomalacia, not fully obstructing the distal opening of the trach with inspiration. No tracheal stenosis observed.   ENT on board, f/u recs: ENT replaced trach, now awaiting SW to get trach supplies prior to dc.   -Routine trach care: Spare trach and one size down at bedside. Suction with red rubber catheters. Obturator at [facility]  -call RT to assess pressure in balloon   -pts pain likely 2/2 larger trach tube stretching the tissue

## 2018-07-29 NOTE — PROGRESS NOTE ADULT - PROBLEM SELECTOR PLAN 2
Continue ventilation through trach.   -RT for routine tracheostomy care.    #metabolic alkalosis w/ resp alkalosis  ABG showed pH 7.55, and Co2 31, with bicarb 37 on BMP, indicative of mixed resp and met alkalemia likely 2/2 hyperventilation on vent and post-hypercapnea  -decreased vent rate for the resp alkalosis  -consulted RT

## 2018-07-30 LAB
ANION GAP SERPL CALC-SCNC: 8 MMOL/L — SIGNIFICANT CHANGE UP (ref 5–17)
BUN SERPL-MCNC: 7 MG/DL — SIGNIFICANT CHANGE UP (ref 7–23)
CALCIUM SERPL-MCNC: 9.8 MG/DL — SIGNIFICANT CHANGE UP (ref 8.4–10.5)
CHLORIDE SERPL-SCNC: 101 MMOL/L — SIGNIFICANT CHANGE UP (ref 96–108)
CO2 SERPL-SCNC: 31 MMOL/L — SIGNIFICANT CHANGE UP (ref 22–31)
CREAT SERPL-MCNC: 0.79 MG/DL — SIGNIFICANT CHANGE UP (ref 0.5–1.3)
GLUCOSE SERPL-MCNC: 93 MG/DL — SIGNIFICANT CHANGE UP (ref 70–99)
MAGNESIUM SERPL-MCNC: 1.9 MG/DL — SIGNIFICANT CHANGE UP (ref 1.6–2.6)
POTASSIUM SERPL-MCNC: 3.6 MMOL/L — SIGNIFICANT CHANGE UP (ref 3.5–5.3)
POTASSIUM SERPL-SCNC: 3.6 MMOL/L — SIGNIFICANT CHANGE UP (ref 3.5–5.3)
SODIUM SERPL-SCNC: 140 MMOL/L — SIGNIFICANT CHANGE UP (ref 135–145)

## 2018-07-30 PROCEDURE — 99233 SBSQ HOSP IP/OBS HIGH 50: CPT | Mod: GC

## 2018-07-30 RX ORDER — POLYETHYLENE GLYCOL 3350 17 G/17G
17 POWDER, FOR SOLUTION ORAL
Qty: 0 | Refills: 0 | DISCHARGE
Start: 2018-07-30

## 2018-07-30 RX ORDER — ACETAMINOPHEN 500 MG
1 TABLET ORAL
Qty: 0 | Refills: 0 | COMMUNITY
Start: 2018-07-30

## 2018-07-30 RX ORDER — POTASSIUM CHLORIDE 20 MEQ
40 PACKET (EA) ORAL ONCE
Qty: 0 | Refills: 0 | Status: COMPLETED | OUTPATIENT
Start: 2018-07-30 | End: 2018-07-30

## 2018-07-30 RX ADMIN — SODIUM CHLORIDE 5 MILLILITER(S): 9 INJECTION INTRAMUSCULAR; INTRAVENOUS; SUBCUTANEOUS at 22:30

## 2018-07-30 RX ADMIN — SODIUM CHLORIDE 3 MILLILITER(S): 9 INJECTION INTRAMUSCULAR; INTRAVENOUS; SUBCUTANEOUS at 11:13

## 2018-07-30 RX ADMIN — AMLODIPINE BESYLATE 5 MILLIGRAM(S): 2.5 TABLET ORAL at 07:14

## 2018-07-30 RX ADMIN — Medication 3 MILLILITER(S): at 07:11

## 2018-07-30 RX ADMIN — HEPARIN SODIUM 5000 UNIT(S): 5000 INJECTION INTRAVENOUS; SUBCUTANEOUS at 07:11

## 2018-07-30 RX ADMIN — PANTOPRAZOLE SODIUM 40 MILLIGRAM(S): 20 TABLET, DELAYED RELEASE ORAL at 07:14

## 2018-07-30 RX ADMIN — Medication 3 MILLILITER(S): at 18:17

## 2018-07-30 RX ADMIN — Medication 3 MILLILITER(S): at 00:04

## 2018-07-30 RX ADMIN — Medication 0.5 MILLIGRAM(S): at 07:22

## 2018-07-30 RX ADMIN — Medication 3 MILLILITER(S): at 11:13

## 2018-07-30 RX ADMIN — HEPARIN SODIUM 5000 UNIT(S): 5000 INJECTION INTRAVENOUS; SUBCUTANEOUS at 18:17

## 2018-07-30 RX ADMIN — Medication 0.5 MILLIGRAM(S): at 18:17

## 2018-07-30 RX ADMIN — Medication 0.5 MILLIGRAM(S): at 07:13

## 2018-07-30 RX ADMIN — SODIUM CHLORIDE 5 MILLILITER(S): 9 INJECTION INTRAMUSCULAR; INTRAVENOUS; SUBCUTANEOUS at 07:14

## 2018-07-30 RX ADMIN — Medication 81 MILLIGRAM(S): at 11:13

## 2018-07-30 RX ADMIN — SODIUM CHLORIDE 5 MILLILITER(S): 9 INJECTION INTRAMUSCULAR; INTRAVENOUS; SUBCUTANEOUS at 00:00

## 2018-07-30 NOTE — CONSULT NOTE ADULT - SUBJECTIVE AND OBJECTIVE BOX
ENT follow up - See prior notes for further details     ENT consulted to eval trach and trach position as pt has complaints of size of trach and inability to cough.    At beginning of eval, patient sleeping with no distress.     PE: NAD, A+OX3 - became more anxious during conversation.    Neck: soft and flat.  6 distal XLT cuffed in place with trach tie.   Tracheoscopy: clear to [facility] [place], trach appropriately positioned with mild tracheomalacia noted  Pt able to phonate and cough with cuff down.     A/P: trach in appropriate position and clear to [facility] [place] [facility] - pt able to phonate and produce cough with cuff down.   - Clear for D/C home with supplies  - Please send obturator home with patient  - Routine trach care: spare trach and one size down at bedside (okay to have 4-0 cuffed shiley as one size down), suction with red rubber catheters, humidfied air at all times. Please obtain extra 6-0 distal XLT inner cannulas for daily inner cannula changes while in house. It is important that her inner cannula is changed routinely so she does not get a mucus plug.   D/W attending whom agrees with above.

## 2018-07-30 NOTE — DIETITIAN INITIAL EVALUATION ADULT. - PROBLEM SELECTOR PLAN 6
Hb 10.2 on admission, appears to be at baseline, likely 2/2 to history of sickle cell trait.  Patient also with elevated aPTT at 54.7.    Monitor with daily labs. Repeat coags.

## 2018-07-30 NOTE — DIETITIAN INITIAL EVALUATION ADULT. - ENERGY NEEDS
Ideal body weight used for calculations as pt >120% of IBW.   IBW 50kg, 198% IBW, BMI 40.2   Normal guidelines for adults used for calculations

## 2018-07-30 NOTE — PROGRESS NOTE ADULT - SUBJECTIVE AND OBJECTIVE BOX
OVERNIGHT EVENTS: No acute events overnight.     SUBJECTIVE / INTERVAL HPI: Restarted clonopin home dose evening of 7/29, given pt anxiety and not lethargic (reason for holding it).  S/p trach replacement by ENT. Pt complaining of discomfort and her throat and trouble coughing. Given tylenol PRN for throat pain. Consulting ENT for pt's concerns about trouble coughing/ choking on her food. Patient seen and examined at bedside.     Vitals:  T(C): 36.9 (07-30-18 @ 08:47), Max: 37.1 (07-29-18 @ 16:29)  HR: 63 (07-30-18 @ 08:47) (56 - 71)  BP: 120/74 (07-30-18 @ 08:47) (104/74 - 120/74)  RR: 15 (07-30-18 @ 08:47) (15 - 18)  SpO2: 99% (07-30-18 @ 08:47) (99% - 100%)    PHYSICAL EXAM:  General: obese  female, NAD, laying in bed, on vent  Cardiovascular: +S1/S2; RRR  Respiratory: CTA B/L  Gastrointestinal: soft, NT/ND; +BSx4    MEDICATIONS:  MEDICATIONS  (STANDING):  aspirin  chewable 81 milliGRAM(s) Oral daily  buDESOnide   0.5 milliGRAM(s) Respule 0.5 milliGRAM(s) Inhalation two times a day  heparin  Injectable 7500 Unit(s) SubCutaneous every 8 hours  losartan 25 milliGRAM(s) Oral daily  pantoprazole    Tablet 40 milliGRAM(s) Oral before breakfast    MEDICATIONS  (PRN):      ALLERGIES:  Allergies    Cipro (Unknown)    Intolerances      LABS:     07-30    140  |  101  |  7   ----------------------------<  93  3.6   |  31  |  0.79    Ca    9.8      30 Jul 2018 06:04  Mg     1.9     07-30      ABG 7/27: wnl

## 2018-07-30 NOTE — DIETITIAN INITIAL EVALUATION ADULT. - OTHER INFO
58F admitted w tracheomalacia and difficulty breathing. Trach replaced by ENT, pt w no new complaints, breathing has improved, pending supplies at home. No noted n/v/d/c, chewing/ swallowing/ pain impacting intake, prefers soft diet, tolerated well per RN >50% consumed. No noted changes in UBW. Skin intact at this time. Lethargic at time of visit, will follow for ed. DANAFA.

## 2018-07-30 NOTE — PROGRESS NOTE ADULT - PROBLEM SELECTOR PLAN 1
Tracheoscopy by ENT showing mild-moderate tracheomalacia, not fully obstructing the distal opening of the trach with inspiration. No tracheal stenosis observed.   ENT on board, f/u recs: ENT replaced trach, now awaiting SW to get trach supplies prior to dc.   -Routine trach care: Spare trach and one size down at bedside. Suction with red rubber catheters. Obturator at [facility]  -call RT to assess pressure in balloon   -pts pain likely 2/2 larger trach tube stretching the tissue Tracheoscopy by ENT showing mild-moderate tracheomalacia, not fully obstructing the distal opening of the trach with inspiration. No tracheal stenosis observed.   ENT on board, f/u recs: ENT replaced trach, now awaiting SW to get trach supplies prior to dc.   -ENT reconsulted bc pt complaining about unable to cough  -Routine trach care: Spare trach and one size down at bedside. Suction with red rubber catheters. Obturator at [facility]  -call RT to assess pressure in balloon   -pts pain likely 2/2 larger trach tube stretching the tissue  -tylenol ordered PRN  -clonapin restarted for anxiety  -pt needs to go with inner canula RX for trach, consult

## 2018-07-30 NOTE — PROGRESS NOTE ADULT - PROBLEM SELECTOR PLAN 5
QTc 501 on admission EKG. Not taking any meds that cause qt prologation.   -Avoid QT prolonging agents.  -f/u repeat EKG QTc 501 on admission EKG. Not taking any meds that cause qt prologation.   -Avoid QT prolonging agents.

## 2018-07-30 NOTE — DIETITIAN INITIAL EVALUATION ADULT. - PROBLEM SELECTOR PLAN 1
Tracheoscopy by ENT showing mild-moderate tracheomalacia, not fully obstructing the distal opening of the trach with inspiration. No tracheal stenosis observed.     ENT on board, f/u recs:  -Routine trach care: Spare trach and one size down at bedside. Suction with red rubber catheters. Obturator at [facility]  -Per ENT, will swap trach for distal XLT trach the morning.

## 2018-07-30 NOTE — PROGRESS NOTE ADULT - ASSESSMENT
58 female with history of HTN, GERD, COPD s/p trach in 2007 who presented to ED for increased difficulty breathing after being sent by CT surgery. Tracheoscopy performed by ENT showing tracheomalacia but no tracheal stenosis. ENT following and will replace trach. 58 female with history of HTN, GERD, COPD s/p trach in 2007 who presented to ED for increased difficulty breathing after being sent by CT surgery. Tracheoscopy performed by ENT showing tracheomalacia but no tracheal stenosis. ENT replaced trach, following pt.

## 2018-07-30 NOTE — PROGRESS NOTE ADULT - PROBLEM SELECTOR PLAN 9
1) PCP Contacted on Admission: (Y/N) --> Name & Phone #:  2) Date of Contact with PCP:  3) PCP Contacted at Discharge: (Y/N)  4) Summary of Handoff Given to PCP:   5) Post-Discharge Appointment Date and Location: 1) PCP Contacted on Admission: (Y/N) --> Name & Phone #:   2) Date of Contact with PCP:  3) PCP Contacted at Discharge: (Y/N)  4) Summary of Handoff Given to PCP:   5) Post-Discharge Appointment Date and Location:

## 2018-07-30 NOTE — DIETITIAN INITIAL EVALUATION ADULT. - PROBLEM SELECTOR PLAN 3
Stable, no signs of COPD exacerbation at this time   DuoNebs PRN.  Continue home budesonide inhaler.

## 2018-07-31 DIAGNOSIS — E66.01 MORBID (SEVERE) OBESITY DUE TO EXCESS CALORIES: ICD-10-CM

## 2018-07-31 DIAGNOSIS — F41.9 ANXIETY DISORDER, UNSPECIFIED: ICD-10-CM

## 2018-07-31 DIAGNOSIS — J44.9 CHRONIC OBSTRUCTIVE PULMONARY DISEASE, UNSPECIFIED: ICD-10-CM

## 2018-07-31 DIAGNOSIS — J96.10 CHRONIC RESPIRATORY FAILURE, UNSPECIFIED WHETHER WITH HYPOXIA OR HYPERCAPNIA: ICD-10-CM

## 2018-07-31 LAB
ANION GAP SERPL CALC-SCNC: 9 MMOL/L — SIGNIFICANT CHANGE UP (ref 5–17)
BUN SERPL-MCNC: 7 MG/DL — SIGNIFICANT CHANGE UP (ref 7–23)
CALCIUM SERPL-MCNC: 9.7 MG/DL — SIGNIFICANT CHANGE UP (ref 8.4–10.5)
CHLORIDE SERPL-SCNC: 101 MMOL/L — SIGNIFICANT CHANGE UP (ref 96–108)
CO2 SERPL-SCNC: 32 MMOL/L — HIGH (ref 22–31)
CREAT SERPL-MCNC: 0.8 MG/DL — SIGNIFICANT CHANGE UP (ref 0.5–1.3)
GLUCOSE SERPL-MCNC: 99 MG/DL — SIGNIFICANT CHANGE UP (ref 70–99)
HCT VFR BLD CALC: 28.7 % — LOW (ref 34.5–45)
HGB BLD-MCNC: 9.1 G/DL — LOW (ref 11.5–15.5)
MAGNESIUM SERPL-MCNC: 1.9 MG/DL — SIGNIFICANT CHANGE UP (ref 1.6–2.6)
MCHC RBC-ENTMCNC: 26.8 PG — LOW (ref 27–34)
MCHC RBC-ENTMCNC: 31.7 G/DL — LOW (ref 32–36)
MCV RBC AUTO: 84.4 FL — SIGNIFICANT CHANGE UP (ref 80–100)
PLATELET # BLD AUTO: 228 K/UL — SIGNIFICANT CHANGE UP (ref 150–400)
POTASSIUM SERPL-MCNC: 3.7 MMOL/L — SIGNIFICANT CHANGE UP (ref 3.5–5.3)
POTASSIUM SERPL-SCNC: 3.7 MMOL/L — SIGNIFICANT CHANGE UP (ref 3.5–5.3)
RBC # BLD: 3.4 M/UL — LOW (ref 3.8–5.2)
RBC # FLD: 13.8 % — SIGNIFICANT CHANGE UP (ref 10.3–16.9)
SODIUM SERPL-SCNC: 142 MMOL/L — SIGNIFICANT CHANGE UP (ref 135–145)
WBC # BLD: 4.7 K/UL — SIGNIFICANT CHANGE UP (ref 3.8–10.5)
WBC # FLD AUTO: 4.7 K/UL — SIGNIFICANT CHANGE UP (ref 3.8–10.5)

## 2018-07-31 PROCEDURE — 99233 SBSQ HOSP IP/OBS HIGH 50: CPT | Mod: GC

## 2018-07-31 RX ORDER — POTASSIUM CHLORIDE 20 MEQ
20 PACKET (EA) ORAL
Qty: 0 | Refills: 0 | Status: DISCONTINUED | OUTPATIENT
Start: 2018-07-31 | End: 2018-07-31

## 2018-07-31 RX ORDER — MAGNESIUM SULFATE 500 MG/ML
1 VIAL (ML) INJECTION ONCE
Qty: 0 | Refills: 0 | Status: DISCONTINUED | OUTPATIENT
Start: 2018-07-31 | End: 2018-07-31

## 2018-07-31 RX ORDER — POTASSIUM CHLORIDE 20 MEQ
20 PACKET (EA) ORAL ONCE
Qty: 0 | Refills: 0 | Status: COMPLETED | OUTPATIENT
Start: 2018-07-31 | End: 2018-07-31

## 2018-07-31 RX ADMIN — HEPARIN SODIUM 5000 UNIT(S): 5000 INJECTION INTRAVENOUS; SUBCUTANEOUS at 18:49

## 2018-07-31 RX ADMIN — Medication 3 MILLILITER(S): at 22:39

## 2018-07-31 RX ADMIN — Medication 0.5 MILLIGRAM(S): at 06:30

## 2018-07-31 RX ADMIN — Medication 3 MILLILITER(S): at 13:30

## 2018-07-31 RX ADMIN — POLYETHYLENE GLYCOL 3350 17 GRAM(S): 17 POWDER, FOR SOLUTION ORAL at 10:01

## 2018-07-31 RX ADMIN — SODIUM CHLORIDE 5 MILLILITER(S): 9 INJECTION INTRAMUSCULAR; INTRAVENOUS; SUBCUTANEOUS at 22:50

## 2018-07-31 RX ADMIN — Medication 20 MILLIEQUIVALENT(S): at 10:01

## 2018-07-31 RX ADMIN — AMLODIPINE BESYLATE 5 MILLIGRAM(S): 2.5 TABLET ORAL at 06:30

## 2018-07-31 RX ADMIN — Medication 3 MILLILITER(S): at 10:01

## 2018-07-31 RX ADMIN — HEPARIN SODIUM 5000 UNIT(S): 5000 INJECTION INTRAVENOUS; SUBCUTANEOUS at 06:30

## 2018-07-31 RX ADMIN — Medication 0.5 MILLIGRAM(S): at 13:32

## 2018-07-31 RX ADMIN — Medication 0.5 MILLIGRAM(S): at 18:49

## 2018-07-31 RX ADMIN — Medication 3 MILLILITER(S): at 03:59

## 2018-07-31 RX ADMIN — Medication 81 MILLIGRAM(S): at 11:34

## 2018-07-31 RX ADMIN — PANTOPRAZOLE SODIUM 40 MILLIGRAM(S): 20 TABLET, DELAYED RELEASE ORAL at 06:30

## 2018-07-31 RX ADMIN — SODIUM CHLORIDE 5 MILLILITER(S): 9 INJECTION INTRAMUSCULAR; INTRAVENOUS; SUBCUTANEOUS at 06:30

## 2018-07-31 RX ADMIN — SODIUM CHLORIDE 5 MILLILITER(S): 9 INJECTION INTRAMUSCULAR; INTRAVENOUS; SUBCUTANEOUS at 13:12

## 2018-07-31 NOTE — PROGRESS NOTE ADULT - PROBLEM SELECTOR PLAN 4
Continue home losartan and amlodipine.
QTc 501 on admission EKG.    -Avoid QT prolonging agents.  -f/u repeat EKG
BMI>40
Continue home losartan and amlodipine.

## 2018-07-31 NOTE — PROGRESS NOTE ADULT - PROBLEM SELECTOR PLAN 3
Stable, no signs of COPD exacerbation at this time   DuoNebs PRN.  Continue home budesonide inhaler.
losartan and amlodipine.
sg, c/w home meds
Stable, no signs of COPD exacerbation at this time   DuoNebs PRN.  Continue home budesonide inhaler.

## 2018-07-31 NOTE — PROGRESS NOTE ADULT - PROBLEM SELECTOR PROBLEM 2
Tracheostomy dependence
Chronic obstructive pulmonary disease, unspecified COPD type
Tracheomalacia
Tracheostomy dependence

## 2018-07-31 NOTE — PROGRESS NOTE ADULT - PROBLEM SELECTOR PLAN 5
QTc 501 on admission EKG. Not taking any meds that cause qt prologation.   -Avoid QT prolonging agents.

## 2018-07-31 NOTE — PROGRESS NOTE ADULT - PROBLEM SELECTOR PROBLEM 5
Prolonged QT interval
Anemia
Anxiety
Prolonged QT interval

## 2018-07-31 NOTE — PROGRESS NOTE ADULT - ASSESSMENT
58 female with history of HTN, GERD, COPD s/p trach in 2007 who presented to ED for increased difficulty breathing after being sent by CT surgery. Tracheoscopy performed by ENT showing tracheomalacia but no tracheal stenosis. ENT replaced trach, following pt.

## 2018-07-31 NOTE — PROGRESS NOTE ADULT - PROBLEM SELECTOR PLAN 1
Tracheoscopy by ENT showing mild-moderate tracheomalacia, not fully obstructing the distal opening of the trach with inspiration. No tracheal stenosis observed.   ENT on board, f/u recs: ENT replaced trach, transport set up for 10am tmrw. pt medically cleared, 24 hours notice signed,   -ENT reconsulted bc pt complaining about unable to cough  -Routine trach care: Spare trach and one size down at bedside. Suction with red rubber catheters. Obturator at [facility]  -pts pain likely 2/2 larger trach tube stretching the tissue  -tylenol ordered PRN  -clonapin restarted for anxiety

## 2018-07-31 NOTE — PROGRESS NOTE ADULT - SUBJECTIVE AND OBJECTIVE BOX
Patient is a 58y old  Female who presents with a chief complaint of tracheomalacia (27 Jul 2018 14:06)      INTERVAL HPI/OVERNIGHT EVENTS:  pt has been providing inconsistent stories to different providers and RNs,   tolerating po  CM verified with home services RN-on CPAP 24/7       Review of Systems: 12 point review of systems otherwise negative    MEDICATIONS  (STANDING):  ALBUTerol/ipratropium for Nebulization 3 milliLiter(s) Nebulizer every 6 hours  amLODIPine   Tablet 5 milliGRAM(s) Oral daily  aspirin  chewable 81 milliGRAM(s) Oral daily  buDESOnide   0.5 milliGRAM(s) Respule 0.5 milliGRAM(s) Inhalation two times a day  heparin  Injectable 5000 Unit(s) SubCutaneous every 12 hours  pantoprazole    Tablet 40 milliGRAM(s) Oral before breakfast  sodium chloride 3%  Inhalation 5 milliLiter(s) Inhalation three times a day    MEDICATIONS  (PRN):  acetaminophen   Tablet. 325 milliGRAM(s) Oral every 4 hours PRN Mild Pain (1 - 3)  clonazePAM Tablet 0.5 milliGRAM(s) Oral every 8 hours PRN anxiety  melatonin 3 milliGRAM(s) Oral at bedtime PRN Insomnia  polyethylene glycol 3350 17 Gram(s) Oral daily PRN Constipation  sodium chloride 3%  Inhalation 3 milliLiter(s) Inhalation every 2 hours PRN dry nasal/oral mucosa      Allergies    Cipro (Unknown)    Intolerances          Vital Signs Last 24 Hrs  T(C): 36.9 (31 Jul 2018 08:47), Max: 37.1 (30 Jul 2018 22:28)  T(F): 98.5 (31 Jul 2018 08:47), Max: 98.7 (30 Jul 2018 22:28)  HR: 80 (31 Jul 2018 13:31) (60 - 84)  BP: 120/74 (31 Jul 2018 08:47) (120/74 - 132/75)  BP(mean): --  RR: 15 (31 Jul 2018 08:47) (15 - 17)  SpO2: 100% (31 Jul 2018 13:31) (98% - 100%)  CAPILLARY BLOOD GLUCOSE            Physical Exam:    Daily     Daily   General:  Well appearing, NAD,   HEENT:  +trach on cpap  CV:  RRR,   Lungs:  CTA B/L  Abdomen:  Soft, non-tender, no distended, positive BS  Extremities:  no edema  Skin:  no rashes  :  No irizarry  Neuro:  AAOx3, non-focal      LABS:                        9.1    4.7   )-----------( 228      ( 31 Jul 2018 07:11 )             28.7     07-31    142  |  101  |  7   ----------------------------<  99  3.7   |  32<H>  |  0.80    Ca    9.7      31 Jul 2018 07:11  Mg     1.9     07-31

## 2018-07-31 NOTE — PROGRESS NOTE ADULT - SUBJECTIVE AND OBJECTIVE BOX
OVERNIGHT EVENTS: No acute events overnight.     SUBJECTIVE / INTERVAL HPI:  Pt medically cleared for discharge.     Vitals:  T(C): 36.9 (07-31-18 @ 08:47), Max: 37.1 (07-30-18 @ 22:28)  HR: 81 (07-31-18 @ 16:45) (60 - 84)  BP: 120/74 (07-31-18 @ 08:47) (120/74 - 132/75)  RR: 15 (07-31-18 @ 08:47) (15 - 17)  SpO2: 100% (07-31-18 @ 16:45) (98% - 100%)    PHYSICAL EXAM:  General: obese  female, NAD, laying in bed, on vent  Cardiovascular: +S1/S2; RRR  Respiratory: CTA B/L  Gastrointestinal: soft, NT/ND; +BSx4    MEDICATIONS:  MEDICATIONS  (STANDING):  aspirin  chewable 81 milliGRAM(s) Oral daily  buDESOnide   0.5 milliGRAM(s) Respule 0.5 milliGRAM(s) Inhalation two times a day  heparin  Injectable 7500 Unit(s) SubCutaneous every 8 hours  losartan 25 milliGRAM(s) Oral daily  pantoprazole    Tablet 40 milliGRAM(s) Oral before breakfast    MEDICATIONS  (PRN):      ALLERGIES:  Allergies    Cipro (Unknown)    Intolerances      .  LABS:                         9.1    4.7   )-----------( 228      ( 31 Jul 2018 07:11 )             28.7     07-31    142  |  101  |  7   ----------------------------<  99  3.7   |  32<H>  |  0.80    Ca    9.7      31 Jul 2018 07:11  Mg     1.9     07-31

## 2018-07-31 NOTE — PROGRESS NOTE ADULT - ATTENDING COMMENTS
Dispo: anticipate dc tomorrow.
pt with increasing neck discomfort from new trach.   will have ENT re-evaluate   pt remains stable on vent
Pt seen and examined by me earlier in AM and again later in the afternoon with ENT resident to address pt's concerns,   pt was able to cough with instruction to cover the trach; and also has been tolerating food.   speaking full sentences when pt was taking off vent setting (CPAP) briefly.   Pt was updated at this point in time, she is medically cleared by ENT for outpatient f/u. awaiting trach care setup at home.

## 2018-07-31 NOTE — PROGRESS NOTE ADULT - PROBLEM SELECTOR PROBLEM 1
Chronic respiratory failure
Tracheomalacia

## 2018-07-31 NOTE — PROGRESS NOTE ADULT - PROBLEM SELECTOR PROBLEM 3
Chronic obstructive pulmonary disease, unspecified COPD type
Essential hypertension
COPD (chronic obstructive pulmonary disease)
Chronic obstructive pulmonary disease, unspecified COPD type

## 2018-07-31 NOTE — PROGRESS NOTE ADULT - PROBLEM SELECTOR PROBLEM 4
Essential hypertension
Prolonged QT interval
Morbid obesity
Essential hypertension

## 2018-07-31 NOTE — PROGRESS NOTE ADULT - PROVIDER SPECIALTY LIST ADULT
Internal Medicine
Internal Medicine
Hospitalist
Internal Medicine

## 2018-07-31 NOTE — PROGRESS NOTE ADULT - PROBLEM SELECTOR PROBLEM 7
Anxiety disorder, unspecified type
Essential hypertension
Anxiety disorder, unspecified type
Anxiety disorder, unspecified type

## 2018-08-01 VITALS — OXYGEN SATURATION: 100 %

## 2018-08-01 PROCEDURE — 99239 HOSP IP/OBS DSCHRG MGMT >30: CPT

## 2018-08-01 RX ADMIN — AMLODIPINE BESYLATE 5 MILLIGRAM(S): 2.5 TABLET ORAL at 06:35

## 2018-08-01 RX ADMIN — PANTOPRAZOLE SODIUM 40 MILLIGRAM(S): 20 TABLET, DELAYED RELEASE ORAL at 06:35

## 2018-08-01 RX ADMIN — HEPARIN SODIUM 5000 UNIT(S): 5000 INJECTION INTRAVENOUS; SUBCUTANEOUS at 06:35

## 2018-08-01 RX ADMIN — POLYETHYLENE GLYCOL 3350 17 GRAM(S): 17 POWDER, FOR SOLUTION ORAL at 10:09

## 2018-08-01 RX ADMIN — SODIUM CHLORIDE 5 MILLILITER(S): 9 INJECTION INTRAMUSCULAR; INTRAVENOUS; SUBCUTANEOUS at 06:36

## 2018-08-01 RX ADMIN — Medication 3 MILLILITER(S): at 04:34

## 2018-08-01 RX ADMIN — Medication 0.5 MILLIGRAM(S): at 06:34

## 2018-08-01 NOTE — CHART NOTE - NSCHARTNOTEFT_GEN_A_CORE
Upon Nutritional Assessment by the Registered Dietitian your patient was determined to meet criteria / has evidence of the following diagnosis/diagnoses:          [ ]  Mild Protein Calorie Malnutrition        [ ]  Moderate Protein Calorie Malnutrition        [ ] Severe Protein Calorie Malnutrition        [ ] Unspecified Protein Calorie Malnutrition        [ ] Underweight / BMI <19        [ x] Morbid Obesity / BMI > 40      Findings as based on:  •  Comprehensive nutrition assessment and consultation      Treatment:    The following diet has been recommended:    Continue with low sodium diet    PROVIDER Section:     By signing this assessment you are acknowledging and agree with the diagnosis/diagnoses assigned by the Registered Dietitian    Comments:

## 2018-08-06 DIAGNOSIS — J44.9 CHRONIC OBSTRUCTIVE PULMONARY DISEASE, UNSPECIFIED: ICD-10-CM

## 2018-08-06 DIAGNOSIS — E66.01 MORBID (SEVERE) OBESITY DUE TO EXCESS CALORIES: ICD-10-CM

## 2018-08-06 DIAGNOSIS — J39.8 OTHER SPECIFIED DISEASES OF UPPER RESPIRATORY TRACT: ICD-10-CM

## 2018-08-06 DIAGNOSIS — I10 ESSENTIAL (PRIMARY) HYPERTENSION: ICD-10-CM

## 2018-08-06 DIAGNOSIS — E87.3 ALKALOSIS: ICD-10-CM

## 2018-08-06 DIAGNOSIS — Z87.891 PERSONAL HISTORY OF NICOTINE DEPENDENCE: ICD-10-CM

## 2018-08-06 DIAGNOSIS — Z93.0 TRACHEOSTOMY STATUS: ICD-10-CM

## 2018-08-06 DIAGNOSIS — J96.10 CHRONIC RESPIRATORY FAILURE, UNSPECIFIED WHETHER WITH HYPOXIA OR HYPERCAPNIA: ICD-10-CM

## 2018-08-06 DIAGNOSIS — Z88.1 ALLERGY STATUS TO OTHER ANTIBIOTIC AGENTS STATUS: ICD-10-CM

## 2018-08-06 DIAGNOSIS — G47.00 INSOMNIA, UNSPECIFIED: ICD-10-CM

## 2018-08-06 DIAGNOSIS — D64.9 ANEMIA, UNSPECIFIED: ICD-10-CM

## 2018-08-06 DIAGNOSIS — I45.81 LONG QT SYNDROME: ICD-10-CM

## 2018-08-06 DIAGNOSIS — F41.9 ANXIETY DISORDER, UNSPECIFIED: ICD-10-CM

## 2018-08-06 DIAGNOSIS — D68.2 HEREDITARY DEFICIENCY OF OTHER CLOTTING FACTORS: ICD-10-CM

## 2018-08-06 DIAGNOSIS — D57.3 SICKLE-CELL TRAIT: ICD-10-CM

## 2018-08-06 DIAGNOSIS — J98.4 OTHER DISORDERS OF LUNG: ICD-10-CM

## 2018-08-06 DIAGNOSIS — M79.603 PAIN IN ARM, UNSPECIFIED: ICD-10-CM

## 2018-08-07 ENCOUNTER — APPOINTMENT (OUTPATIENT)
Dept: OTOLARYNGOLOGY | Facility: CLINIC | Age: 58
End: 2018-08-07
Payer: MEDICAID

## 2018-08-07 PROCEDURE — 31515 LARYNGOSCOPY FOR ASPIRATION: CPT

## 2018-08-07 PROCEDURE — 99204 OFFICE O/P NEW MOD 45 MIN: CPT | Mod: 25

## 2018-08-10 ENCOUNTER — RX RENEWAL (OUTPATIENT)
Age: 58
End: 2018-08-10

## 2018-08-22 ENCOUNTER — APPOINTMENT (OUTPATIENT)
Dept: PULMONOLOGY | Facility: CLINIC | Age: 58
End: 2018-08-22
Payer: MEDICAID

## 2018-08-22 VITALS
OXYGEN SATURATION: 100 % | HEIGHT: 64 IN | SYSTOLIC BLOOD PRESSURE: 124 MMHG | BODY MASS INDEX: 40.12 KG/M2 | DIASTOLIC BLOOD PRESSURE: 93 MMHG | WEIGHT: 235 LBS | HEART RATE: 64 BPM | RESPIRATION RATE: 18 BRPM

## 2018-08-22 DIAGNOSIS — Z87.09 PERSONAL HISTORY OF OTHER DISEASES OF THE RESPIRATORY SYSTEM: ICD-10-CM

## 2018-08-22 DIAGNOSIS — J44.1 CHRONIC OBSTRUCTIVE PULMONARY DISEASE WITH (ACUTE) EXACERBATION: ICD-10-CM

## 2018-08-22 PROCEDURE — 99214 OFFICE O/P EST MOD 30 MIN: CPT

## 2018-08-29 ENCOUNTER — APPOINTMENT (OUTPATIENT)
Dept: OTOLARYNGOLOGY | Facility: CLINIC | Age: 58
End: 2018-08-29
Payer: MEDICAID

## 2018-08-29 PROCEDURE — 99214 OFFICE O/P EST MOD 30 MIN: CPT | Mod: 25

## 2018-08-29 PROCEDURE — 31615 TRCHEOBRNCHSC EST TRACHS INC: CPT

## 2018-08-29 PROCEDURE — 17250 CHEM CAUT OF GRANLTJ TISSUE: CPT

## 2018-09-07 ENCOUNTER — MEDICATION RENEWAL (OUTPATIENT)
Age: 58
End: 2018-09-07

## 2018-09-11 ENCOUNTER — MEDICATION RENEWAL (OUTPATIENT)
Age: 58
End: 2018-09-11

## 2018-09-13 ENCOUNTER — APPOINTMENT (OUTPATIENT)
Dept: OTOLARYNGOLOGY | Facility: CLINIC | Age: 58
End: 2018-09-13

## 2018-09-20 ENCOUNTER — APPOINTMENT (OUTPATIENT)
Dept: OTOLARYNGOLOGY | Facility: CLINIC | Age: 58
End: 2018-09-20
Payer: MEDICAID

## 2018-09-20 PROCEDURE — 99214 OFFICE O/P EST MOD 30 MIN: CPT | Mod: 25

## 2018-09-20 PROCEDURE — 11900 INJECT SKIN LESIONS </W 7: CPT

## 2018-09-20 PROCEDURE — 31615 TRCHEOBRNCHSC EST TRACHS INC: CPT

## 2018-09-25 ENCOUNTER — APPOINTMENT (OUTPATIENT)
Dept: SPEECH THERAPY | Facility: HOSPITAL | Age: 58
End: 2018-09-25

## 2018-09-25 ENCOUNTER — APPOINTMENT (OUTPATIENT)
Dept: RADIOLOGY | Facility: HOSPITAL | Age: 58
End: 2018-09-25

## 2018-10-16 DIAGNOSIS — J04.10 ACUTE TRACHEITIS WITHOUT OBSTRUCTION: ICD-10-CM

## 2018-10-16 DIAGNOSIS — M54.2 CERVICALGIA: ICD-10-CM

## 2018-10-16 DIAGNOSIS — K21.9 GASTRO-ESOPHAGEAL REFLUX DISEASE WITHOUT ESOPHAGITIS: ICD-10-CM

## 2018-10-16 DIAGNOSIS — J38.6 STENOSIS OF LARYNX: ICD-10-CM

## 2018-10-16 DIAGNOSIS — J96.11 CHRONIC RESPIRATORY FAILURE WITH HYPOXIA: ICD-10-CM

## 2018-10-17 ENCOUNTER — APPOINTMENT (OUTPATIENT)
Dept: OTOLARYNGOLOGY | Facility: CLINIC | Age: 58
End: 2018-10-17

## 2018-10-31 ENCOUNTER — APPOINTMENT (OUTPATIENT)
Dept: OTOLARYNGOLOGY | Facility: CLINIC | Age: 58
End: 2018-10-31
Payer: MEDICAID

## 2018-10-31 PROCEDURE — 31615 TRCHEOBRNCHSC EST TRACHS INC: CPT

## 2018-10-31 PROCEDURE — 17250 CHEM CAUT OF GRANLTJ TISSUE: CPT

## 2018-10-31 PROCEDURE — 99214 OFFICE O/P EST MOD 30 MIN: CPT | Mod: 25

## 2018-11-01 ENCOUNTER — APPOINTMENT (OUTPATIENT)
Dept: OTOLARYNGOLOGY | Facility: CLINIC | Age: 58
End: 2018-11-01

## 2018-11-05 ENCOUNTER — FORM ENCOUNTER (OUTPATIENT)
Age: 58
End: 2018-11-05

## 2018-11-06 ENCOUNTER — APPOINTMENT (OUTPATIENT)
Dept: CT IMAGING | Facility: IMAGING CENTER | Age: 58
End: 2018-11-06
Payer: MEDICAID

## 2018-11-06 ENCOUNTER — APPOINTMENT (OUTPATIENT)
Dept: RADIOLOGY | Facility: IMAGING CENTER | Age: 58
End: 2018-11-06
Payer: MEDICAID

## 2018-11-06 ENCOUNTER — OUTPATIENT (OUTPATIENT)
Dept: OUTPATIENT SERVICES | Facility: HOSPITAL | Age: 58
LOS: 1 days | End: 2018-11-06
Payer: MEDICAID

## 2018-11-06 DIAGNOSIS — K21.9 GASTRO-ESOPHAGEAL REFLUX DISEASE WITHOUT ESOPHAGITIS: ICD-10-CM

## 2018-11-06 DIAGNOSIS — Z43.0 ENCOUNTER FOR ATTENTION TO TRACHEOSTOMY: Chronic | ICD-10-CM

## 2018-11-06 DIAGNOSIS — Z93.0 TRACHEOSTOMY STATUS: Chronic | ICD-10-CM

## 2018-11-06 PROCEDURE — 70491 CT SOFT TISSUE NECK W/DYE: CPT | Mod: 26

## 2018-11-06 PROCEDURE — 70491 CT SOFT TISSUE NECK W/DYE: CPT

## 2018-11-06 PROCEDURE — 71046 X-RAY EXAM CHEST 2 VIEWS: CPT | Mod: 26

## 2018-11-06 PROCEDURE — 82565 ASSAY OF CREATININE: CPT

## 2018-11-06 PROCEDURE — 71046 X-RAY EXAM CHEST 2 VIEWS: CPT

## 2018-11-08 ENCOUNTER — APPOINTMENT (OUTPATIENT)
Dept: RADIOLOGY | Facility: HOSPITAL | Age: 58
End: 2018-11-08
Payer: MEDICAID

## 2018-11-08 ENCOUNTER — OUTPATIENT (OUTPATIENT)
Dept: OUTPATIENT SERVICES | Facility: HOSPITAL | Age: 58
LOS: 1 days | End: 2018-11-08

## 2018-11-08 DIAGNOSIS — Z93.0 TRACHEOSTOMY STATUS: Chronic | ICD-10-CM

## 2018-11-08 DIAGNOSIS — Z43.0 ENCOUNTER FOR ATTENTION TO TRACHEOSTOMY: Chronic | ICD-10-CM

## 2018-11-08 DIAGNOSIS — R13.12 DYSPHAGIA, OROPHARYNGEAL PHASE: ICD-10-CM

## 2018-11-08 PROCEDURE — 74220 X-RAY XM ESOPHAGUS 1CNTRST: CPT | Mod: 26

## 2018-11-09 ENCOUNTER — RESULT REVIEW (OUTPATIENT)
Age: 58
End: 2018-11-09

## 2018-11-13 ENCOUNTER — MEDICATION RENEWAL (OUTPATIENT)
Age: 58
End: 2018-11-13

## 2018-11-16 ENCOUNTER — APPOINTMENT (OUTPATIENT)
Dept: OTOLARYNGOLOGY | Facility: CLINIC | Age: 58
End: 2018-11-16
Payer: MEDICAID

## 2018-11-16 ENCOUNTER — APPOINTMENT (OUTPATIENT)
Dept: THORACIC SURGERY | Facility: CLINIC | Age: 58
End: 2018-11-16

## 2018-11-16 VITALS — SYSTOLIC BLOOD PRESSURE: 117 MMHG | OXYGEN SATURATION: 99 % | HEART RATE: 62 BPM | DIASTOLIC BLOOD PRESSURE: 63 MMHG

## 2018-11-16 PROCEDURE — 31615 TRCHEOBRNCHSC EST TRACHS INC: CPT

## 2018-11-16 PROCEDURE — 99214 OFFICE O/P EST MOD 30 MIN: CPT | Mod: 25

## 2018-11-26 ENCOUNTER — APPOINTMENT (OUTPATIENT)
Dept: PULMONOLOGY | Facility: CLINIC | Age: 58
End: 2018-11-26
Payer: MEDICAID

## 2018-11-26 VITALS
HEIGHT: 64 IN | HEART RATE: 65 BPM | WEIGHT: 190 LBS | DIASTOLIC BLOOD PRESSURE: 76 MMHG | BODY MASS INDEX: 32.44 KG/M2 | SYSTOLIC BLOOD PRESSURE: 117 MMHG | RESPIRATION RATE: 14 BRPM | OXYGEN SATURATION: 93 %

## 2018-11-26 DIAGNOSIS — Z23 ENCOUNTER FOR IMMUNIZATION: ICD-10-CM

## 2018-11-26 PROCEDURE — 90682 RIV4 VACC RECOMBINANT DNA IM: CPT

## 2018-11-26 PROCEDURE — 99214 OFFICE O/P EST MOD 30 MIN: CPT | Mod: 25

## 2018-11-26 PROCEDURE — G0008: CPT

## 2018-11-26 RX ORDER — PREDNISONE 10 MG/1
10 TABLET ORAL
Qty: 1 | Refills: 0 | Status: DISCONTINUED | COMMUNITY
Start: 2018-06-22 | End: 2018-11-26

## 2018-11-26 RX ORDER — ARFORMOTEROL TARTRATE 15 UG/2ML
15 SOLUTION RESPIRATORY (INHALATION) TWICE DAILY
Qty: 60 | Refills: 5 | Status: DISCONTINUED | COMMUNITY
Start: 2018-02-22 | End: 2018-11-26

## 2018-11-26 RX ORDER — GLYCOPYRROLATE 25 UG/ML
25 SOLUTION RESPIRATORY (INHALATION)
Qty: 60 | Refills: 5 | Status: ACTIVE | COMMUNITY
Start: 2018-11-26 | End: 1900-01-01

## 2018-11-26 RX ORDER — SULFAMETHOXAZOLE AND TRIMETHOPRIM 800; 160 MG/1; MG/1
800-160 TABLET ORAL TWICE DAILY
Qty: 1 | Refills: 0 | Status: DISCONTINUED | COMMUNITY
Start: 2018-06-22 | End: 2018-11-26

## 2018-11-26 NOTE — ADDENDUM
[FreeTextEntry1] : Documented by Reynaldo Hameed acting as a scribe for Dr. Maco Nova on 11/26/18\par \par All medical record entries made by the Scribe were at my, Dr. Maco Nova's, direction and personally dictated by me on 11/26/18. I have reviewed the chart and agree that the record accurately reflects my personal performance of the history, physical exam, assessment and plan. I have also personally directed, reviewed, and agree with the discharge instructions. \par \par \par \par \par

## 2018-11-26 NOTE — HISTORY OF PRESENT ILLNESS
[FreeTextEntry1] : Ms. Bo is a 58 year old female with a history of acute bronchitis, chronic hypoxemic respiratory failure, COPD, chronic respiratory insufficiency, complication of tracheostomy, dependence on tracheostomy, dysphonia, GERD, tracheal stenosis and tracheomalacia presenting to the office today for a follow up visit. Her chief complaint is SOB\par - She has been hospitalized 4 times for SOB\par - She notes significant SOB and difficulty eating.\par - Upon being released from the hospital, she was placed on a liquid diet. She has since returned to eating solid foods.\par - She reports having both heartburn and reflux.\par - She reports having gas in the chest\par - she states that the last 2 weeks she has been feeling fine. \par - She states that she is drinking a lot of water but continues to feel dry \par - She is coughing but has been unable to bring up any sputum.

## 2018-11-26 NOTE — PHYSICAL EXAM

## 2018-11-26 NOTE — ASSESSMENT
[FreeTextEntry1] : Ms. Bo is a 58 year old female with a history of former smoker, COPD, respiratory failure for 10 years, tracheostomy,  tracheostenosis and tracheomalacia who is s/p bronchoscopy with revision of the tracheostomy with dilation of the tracheostomy. She is s/p ENT visit. She presents with tracheomalacia COPD end stage with issues with ventilator. She is s/p 4 hospitalizations for SOB. \par \par Her chronic respiratory failure is multifactorial due to:\par -underlying COPD/severe persistent asthma\par -respiratory muscle weakness\par -tracheomalacia\par \par problem 1: tracheostomy/ chronic respiratory failure \par -Continue to wean off the ventilator as tolerable in short trials throughout the day  \par -recommended to use hypertonic saline in clearing of tracheostomy \par -Rx given for elastic pads, dressings, disposable inner cannulas, and tracheostomy collar to improve collar. Rx given for Metaplex Lite. \par -recommended f/u with Dr. Brett Laughlin and Dr. Edward Rodriguez\par \par problem 3:  COPD/asthma - stable \par -continue Performist nebulizer 1 unit dose BID, followed by Pulmicort 0.5 nebulizer BID \par -continue Albuterol via nebulizer for rescue up to QiD \par -continue on NAC Q8H\par -Add Lonhala 1 inhalation BID \par \par -COPD is a progressive disease and although it can’t be cured , appropriate management can slow its progression, reduce frequency and severity of exacerbations, and improve symptoms and the patient quality of life. Hospitalizations are the greatest contributor to the total COPD costs and account for up to 87% of total COPD related costs. Exacerbations are the main cause of admissions and subsequently account for the 40-75% of COPD costs. Inhaled maintenance therapy reduces the incidence of exacerbations in patients with stable COPD. Incorrect inhaler use and nonadherence are major obstacles to achieving COPD treatment goals. Many COPD patients have challenges (impaired inhalation, limited dexterity, reduced cognition: that limit their ability to correctly use their COPD treatment devices resulting in reduced symptom control. Of most importance is smoking cessation and early intervention with respiratory illnesses and contemplation for pulmonary rehab to enhance quality of life.\par \par problem 4: GERD\par -continue Ranitidine 150mg BID\par -add Protonix 40 mg qAM\par \par -Things to avoid including overeating, spicy foods, tight clothing, eating within three hours of bed, this list is not all inclusive. \par -For treatment of reflux, possible options discussed including diet control, H2 blockers, PPIs, as well as coating motility agents discussed as treatment options. Timing of meals and proximity of last meal to sleep were discussed. If symptoms persist, a formal gastrointestinal evaluation is needed.\par -Rule of 2's: Avoid eating too late, too fast, too much, too spicy or within two hours of bedtime\par \par Problem 5: Immunodeficiency\par - Add Zithromax 250 mg Monday, Wednesday, and Friday\par \par Problem 6: Sensory neuropathic Cough\par - Add Amitriptyline 10 mg up to TID\par \par problem 7: tracheomalacia/tracheal tumor \par -follow up with Dr. Kelby Larios for thoracic evaluation for potential tracheoplasty - s/p new tracheostomy\par \par Tracheomalacia is usually acquired in adults and common causes include damage by tracheostomy or endotracheal intubation damaging the tracheal cartilage with increase risk with multiple intubations, prolonged intubation, and concurrent high dose steroid therapy; external chest wall trauma and surgery; chronic compression of the trachea by benign etiologies (eg, benign mediastinal goiter) or malignancy; relapsing polychondritis; or recurrent infection. Tracheomalacia can be asymptomatic, however signs or symptoms can develop as the severity of the airway narrowing progresses with major symptoms include dyspnea, cough, and sputum retention. Other symptoms include severe paroxysms of coughing, wheezing or stridor, barking cough and may be exacerbated by forced expiration, cough, and Valsalva maneuver. Tracheomalacia is diagnosed by a bronchoscopic visualization of dynamic airway collapse on dynamic chest CT. Therapy is warranted in symptomatic patients with severe tracheomalacia and includes surgical repair as tracheobronchoplasty. The patient was referred to Dr. Willy Kay or Dr. Kelby Larios, at [facility] for a surgical consult. \par \par problem 8: dysphonia/sore throat\par -recommended Fisher[employer]'s lozengers \par \par problem 9: health maintenance \par - recommended to f/u with Dr. Dinh (GI) \par -instructed to increase physical activity as much as possible\par - She was administered an influenza vaccination today 11/26/2018\par -recommended strep pneumonia vaccines: Prevnar-13 vaccine, followed by Pneumo vaccine 23 one year following\par -recommended early intervention for URIs\par -recommended regular osteoporosis evaluations\par -recommended early dermatological evaluations\par -recommended after the age of 50 to the age of 70, colonoscopy every 5 years \par \par \par Follow up in 2 months.\par Patient is encouraged to call with any changes, concerns or questions.

## 2018-11-26 NOTE — REASON FOR VISIT
[Follow-Up] : a follow-up visit [FreeTextEntry1] : acute bronchitis, chronic hypoxemic respiratory failure, COPD, chronic respiratory insufficiency, complication of tracheostomy, dependence on tracheostomy, dysphonia, GERD, tracheal stenosis and tracheomalacia

## 2018-12-07 ENCOUNTER — MOBILE ON CALL (OUTPATIENT)
Age: 58
End: 2018-12-07

## 2018-12-13 ENCOUNTER — APPOINTMENT (OUTPATIENT)
Dept: OTOLARYNGOLOGY | Facility: CLINIC | Age: 58
End: 2018-12-13
Payer: MEDICAID

## 2018-12-13 PROCEDURE — 17250 CHEM CAUT OF GRANLTJ TISSUE: CPT

## 2018-12-13 PROCEDURE — 31615 TRCHEOBRNCHSC EST TRACHS INC: CPT

## 2018-12-13 PROCEDURE — 99214 OFFICE O/P EST MOD 30 MIN: CPT | Mod: 25

## 2018-12-26 PROCEDURE — 99285 EMERGENCY DEPT VISIT HI MDM: CPT

## 2018-12-26 PROCEDURE — 94799 UNLISTED PULMONARY SVC/PX: CPT

## 2018-12-26 PROCEDURE — 85025 COMPLETE CBC W/AUTO DIFF WBC: CPT

## 2018-12-26 PROCEDURE — 80048 BASIC METABOLIC PNL TOTAL CA: CPT

## 2018-12-26 PROCEDURE — 83735 ASSAY OF MAGNESIUM: CPT

## 2018-12-26 PROCEDURE — 85730 THROMBOPLASTIN TIME PARTIAL: CPT

## 2018-12-26 PROCEDURE — 93005 ELECTROCARDIOGRAM TRACING: CPT

## 2018-12-26 PROCEDURE — 82803 BLOOD GASES ANY COMBINATION: CPT

## 2018-12-26 PROCEDURE — 94640 AIRWAY INHALATION TREATMENT: CPT

## 2018-12-26 PROCEDURE — 85610 PROTHROMBIN TIME: CPT

## 2018-12-26 PROCEDURE — 94002 VENT MGMT INPAT INIT DAY: CPT

## 2018-12-26 PROCEDURE — 71045 X-RAY EXAM CHEST 1 VIEW: CPT

## 2018-12-26 PROCEDURE — 82436 ASSAY OF URINE CHLORIDE: CPT

## 2018-12-26 PROCEDURE — 80053 COMPREHEN METABOLIC PANEL: CPT

## 2018-12-26 PROCEDURE — 85027 COMPLETE CBC AUTOMATED: CPT

## 2018-12-26 PROCEDURE — 84300 ASSAY OF URINE SODIUM: CPT

## 2018-12-26 PROCEDURE — 36415 COLL VENOUS BLD VENIPUNCTURE: CPT

## 2018-12-26 PROCEDURE — 94003 VENT MGMT INPAT SUBQ DAY: CPT

## 2018-12-26 PROCEDURE — 84100 ASSAY OF PHOSPHORUS: CPT

## 2019-01-03 ENCOUNTER — OUTPATIENT (OUTPATIENT)
Dept: OUTPATIENT SERVICES | Facility: HOSPITAL | Age: 59
LOS: 1 days | End: 2019-01-03
Payer: MEDICAID

## 2019-01-03 VITALS
TEMPERATURE: 98 F | RESPIRATION RATE: 16 BRPM | DIASTOLIC BLOOD PRESSURE: 70 MMHG | OXYGEN SATURATION: 98 % | SYSTOLIC BLOOD PRESSURE: 118 MMHG | WEIGHT: 197.98 LBS | HEART RATE: 67 BPM | HEIGHT: 62 IN

## 2019-01-03 DIAGNOSIS — Z98.891 HISTORY OF UTERINE SCAR FROM PREVIOUS SURGERY: Chronic | ICD-10-CM

## 2019-01-03 DIAGNOSIS — Z93.0 TRACHEOSTOMY STATUS: Chronic | ICD-10-CM

## 2019-01-03 DIAGNOSIS — Z93.0 TRACHEOSTOMY STATUS: ICD-10-CM

## 2019-01-03 DIAGNOSIS — J39.8 OTHER SPECIFIED DISEASES OF UPPER RESPIRATORY TRACT: ICD-10-CM

## 2019-01-03 DIAGNOSIS — D68.62 LUPUS ANTICOAGULANT SYNDROME: ICD-10-CM

## 2019-01-03 DIAGNOSIS — Z43.0 ENCOUNTER FOR ATTENTION TO TRACHEOSTOMY: Chronic | ICD-10-CM

## 2019-01-03 DIAGNOSIS — J39.8 OTHER SPECIFIED DISEASES OF UPPER RESPIRATORY TRACT: Chronic | ICD-10-CM

## 2019-01-03 DIAGNOSIS — K21.9 GASTRO-ESOPHAGEAL REFLUX DISEASE WITHOUT ESOPHAGITIS: ICD-10-CM

## 2019-01-03 DIAGNOSIS — I10 ESSENTIAL (PRIMARY) HYPERTENSION: ICD-10-CM

## 2019-01-03 LAB
APTT BLD: 53.1 SEC — HIGH (ref 27.5–36.3)
BLD GP AB SCN SERPL QL: POSITIVE — SIGNIFICANT CHANGE UP
BUN SERPL-MCNC: 3 MG/DL — LOW (ref 7–23)
CALCIUM SERPL-MCNC: 10.1 MG/DL — SIGNIFICANT CHANGE UP (ref 8.4–10.5)
CHLORIDE SERPL-SCNC: 102 MMOL/L — SIGNIFICANT CHANGE UP (ref 98–107)
CO2 SERPL-SCNC: 33 MMOL/L — HIGH (ref 22–31)
CREAT SERPL-MCNC: 0.65 MG/DL — SIGNIFICANT CHANGE UP (ref 0.5–1.3)
GLUCOSE SERPL-MCNC: 77 MG/DL — SIGNIFICANT CHANGE UP (ref 70–99)
HCT VFR BLD CALC: 31.3 % — LOW (ref 34.5–45)
HGB BLD-MCNC: 9.7 G/DL — LOW (ref 11.5–15.5)
INR BLD: 1.1 — SIGNIFICANT CHANGE UP (ref 0.88–1.17)
MCHC RBC-ENTMCNC: 27.2 PG — SIGNIFICANT CHANGE UP (ref 27–34)
MCHC RBC-ENTMCNC: 31 % — LOW (ref 32–36)
MCV RBC AUTO: 87.9 FL — SIGNIFICANT CHANGE UP (ref 80–100)
NRBC # FLD: 0 — SIGNIFICANT CHANGE UP
PLATELET # BLD AUTO: 302 K/UL — SIGNIFICANT CHANGE UP (ref 150–400)
PMV BLD: 11.3 FL — SIGNIFICANT CHANGE UP (ref 7–13)
POTASSIUM SERPL-MCNC: 3.8 MMOL/L — SIGNIFICANT CHANGE UP (ref 3.5–5.3)
POTASSIUM SERPL-SCNC: 3.8 MMOL/L — SIGNIFICANT CHANGE UP (ref 3.5–5.3)
PROTHROM AB SERPL-ACNC: 12.6 SEC — SIGNIFICANT CHANGE UP (ref 9.8–13.1)
RBC # BLD: 3.56 M/UL — LOW (ref 3.8–5.2)
RBC # FLD: 14.6 % — HIGH (ref 10.3–14.5)
RH IG SCN BLD-IMP: POSITIVE — SIGNIFICANT CHANGE UP
SODIUM SERPL-SCNC: 146 MMOL/L — HIGH (ref 135–145)
WBC # BLD: 6.21 K/UL — SIGNIFICANT CHANGE UP (ref 3.8–10.5)
WBC # FLD AUTO: 6.21 K/UL — SIGNIFICANT CHANGE UP (ref 3.8–10.5)

## 2019-01-03 PROCEDURE — 93010 ELECTROCARDIOGRAM REPORT: CPT

## 2019-01-03 PROCEDURE — 86077 PHYS BLOOD BANK SERV XMATCH: CPT

## 2019-01-03 RX ORDER — SODIUM CHLORIDE 9 MG/ML
1000 INJECTION, SOLUTION INTRAVENOUS
Qty: 0 | Refills: 0 | Status: DISCONTINUED | OUTPATIENT
Start: 2019-01-14 | End: 2019-01-15

## 2019-01-03 NOTE — H&P PST ADULT - PROBLEM SELECTOR PLAN 4
Spoke with visiting MD Hall via phone, Dr. Hall states pt may hold aspirin 7 days preop and MD will provide preop eval, will request document.

## 2019-01-03 NOTE — H&P PST ADULT - RESPIRATORY AND THORAX COMMENTS
ventilator dependent, 3L O2, 6 shiley fenestrated cuff ventilator dependent with 3L O2, able to tolerate trache collar 1 hr daily, 6 shiley fenestrated cuff tracheostomy

## 2019-01-03 NOTE — H&P PST ADULT - PSH
Acute tracheostomy management    Dependence on tracheostomy    S/P   1986  Tracheal stenosis  excision of tracheal stenosis 10/2017  revision of tracheal stoma 3/2018

## 2019-01-03 NOTE — H&P PST ADULT - FAMILY HISTORY
Sibling  Still living? No  Family history of lung disease, Age at diagnosis: Age Unknown     Father  Still living? No  Family history of leukemia, Age at diagnosis: Age Unknown     Grandparent  Still living? No  Family history of heart disease, Age at diagnosis: Age Unknown

## 2019-01-03 NOTE — H&P PST ADULT - NEUROLOGICAL COMMENTS
left arm intermittent tingling.  Ambulates with cane at times, also has motorized wheelchair left arm intermittent tingling.  able to ambulate short distance independently, ambulates with cane at times, also has motorized wheelchair

## 2019-01-03 NOTE — H&P PST ADULT - CARDIOVASCULAR COMMENTS
shortness of breath at times at rest.  Trache collar for 1 hr daily. shortness of breath at times at rest.

## 2019-01-03 NOTE — H&P PST ADULT - PROBLEM SELECTOR PLAN 2
Pt instructed to take losartan AM of surgery with a sip of water.   Pt met STOP BANG score for ANIAYH precaution. OR booking notified via fax.

## 2019-01-03 NOTE — H&P PST ADULT - OTHER CARE PROVIDERS
Dr. Maco courtney([facility]) [phone]                    Dr. Hall(visiting MD) [phone] Dr. Maco courtney([facility]) [phone]                    Dr. Hall(visiting MD) [phone] 's [phone]

## 2019-01-03 NOTE — H&P PST ADULT - MUSCULOSKELETAL
details… detailed exam no joint swelling/no joint erythema/no joint warmth/normal strength/no calf tenderness/ROM intact

## 2019-01-03 NOTE — H&P PST ADULT - NSANTHOSAYNRD_GEN_A_CORE
No. ANIYAH screening performed.  STOP BANG Legend: 0-2 = LOW Risk; 3-4 = INTERMEDIATE Risk; 5-8 = HIGH Risk

## 2019-01-03 NOTE — H&P PST ADULT - HISTORY OF PRESENT ILLNESS
58 female with history of HTN, GERD, COPD s/p trach in 2007. Pt was admitted to [facility] for increased difficulty breathing Jul, 2018, diagnosed with tracheal .   Pt is vent-dependent throughout the day and reports intermittent difficulty breathing and swallowing that is slowing worsening.   Pt is scheduled for microdirect laryngoscopy and bronchoscopy with excision stenosis, tracheoplasty on 1/14/19. 58 female with history of HTN, anxiety, Sickle cell trait, lupus anticoagulant syndrome, GERD, and COPD s/p trach in 2007. Pt reports she was admitted to [facility] for increased difficulty breathing Jul, 2018 with a diagnosis of tracheal stenosis.   Pt is mostly vent-dependent throughout the day and reports intermittent difficulty breathing and swallowing that is slowing worsening.   Pt is scheduled for microdirect laryngoscopy and bronchoscopy with excision stenosis, tracheoplasty on 1/14/19.

## 2019-01-03 NOTE — H&P PST ADULT - NEGATIVE NEUROLOGICAL SYMPTOMS
no vertigo/no generalized seizures/no loss of sensation/no headache/no transient paralysis/no weakness

## 2019-01-03 NOTE — H&P PST ADULT - PMH
Anxiety disorder    COPD (chronic obstructive pulmonary disease)    Hypertension    Lupus anticoagulant syndrome    Pancreatic cyst    Sickle cell trait    Tracheomalacia    Tracheostomy dependent

## 2019-01-03 NOTE — H&P PST ADULT - NEGATIVE ENMT SYMPTOMS
no vertigo/no tinnitus/no dysphagia/no hearing difficulty/no ear pain/no sinus symptoms/no nasal congestion

## 2019-01-03 NOTE — H&P PST ADULT - PROBLEM SELECTOR PLAN 1
Pt is scheduled for microdirect laryngoscopy and bronchoscopy with excision stenosis, tracheoplasty on 1/14/19.   Pre op instructions given and pt able to verbalize understanding.  OR booking notified that pt has tracheostomy and is vent dependent via fax.  Pt instructed to take all inhalers and nebulizers as prescribed including the day of procedure.

## 2019-01-03 NOTE — H&P PST ADULT - NEGATIVE MUSCULOSKELETAL SYMPTOMS
no neck pain/no arm pain R/no stiffness/no arm pain L/no muscle cramps/no muscle weakness/no back pain/no arthralgia/no leg pain R/no joint swelling/no myalgia

## 2019-01-04 LAB — DAT POLY-SP REAG RBC QL: NEGATIVE — SIGNIFICANT CHANGE UP

## 2019-01-13 ENCOUNTER — TRANSCRIPTION ENCOUNTER (OUTPATIENT)
Age: 59
End: 2019-01-13

## 2019-01-14 ENCOUNTER — INPATIENT (INPATIENT)
Facility: HOSPITAL | Age: 59
LOS: 3 days | Discharge: HOME | End: 2019-01-18
Attending: OTOLARYNGOLOGY | Admitting: OTOLARYNGOLOGY
Payer: MEDICAID

## 2019-01-14 ENCOUNTER — RESULT REVIEW (OUTPATIENT)
Age: 59
End: 2019-01-14

## 2019-01-14 ENCOUNTER — APPOINTMENT (OUTPATIENT)
Dept: OTOLARYNGOLOGY | Facility: HOSPITAL | Age: 59
End: 2019-01-14

## 2019-01-14 VITALS
HEIGHT: 62 IN | TEMPERATURE: 98 F | DIASTOLIC BLOOD PRESSURE: 67 MMHG | HEART RATE: 76 BPM | RESPIRATION RATE: 16 BRPM | OXYGEN SATURATION: 98 % | WEIGHT: 197.98 LBS | SYSTOLIC BLOOD PRESSURE: 119 MMHG

## 2019-01-14 DIAGNOSIS — J39.8 OTHER SPECIFIED DISEASES OF UPPER RESPIRATORY TRACT: ICD-10-CM

## 2019-01-14 DIAGNOSIS — Z43.0 ENCOUNTER FOR ATTENTION TO TRACHEOSTOMY: Chronic | ICD-10-CM

## 2019-01-14 DIAGNOSIS — Z93.0 TRACHEOSTOMY STATUS: Chronic | ICD-10-CM

## 2019-01-14 DIAGNOSIS — Z98.891 HISTORY OF UTERINE SCAR FROM PREVIOUS SURGERY: Chronic | ICD-10-CM

## 2019-01-14 DIAGNOSIS — J39.8 OTHER SPECIFIED DISEASES OF UPPER RESPIRATORY TRACT: Chronic | ICD-10-CM

## 2019-01-14 LAB — RH IG SCN BLD-IMP: POSITIVE — SIGNIFICANT CHANGE UP

## 2019-01-14 PROCEDURE — 88305 TISSUE EXAM BY PATHOLOGIST: CPT | Mod: 26

## 2019-01-14 PROCEDURE — 31571 LARYNGOSCOP W/VC INJ + SCOPE: CPT

## 2019-01-14 PROCEDURE — 31641 BRONCHOSCOPY TREAT BLOCKAGE: CPT

## 2019-01-14 PROCEDURE — 31541 LARYNSCOP W/TUMR EXC + SCOPE: CPT | Mod: 59

## 2019-01-14 PROCEDURE — 31614 TRACHEOSTOMA REVJ COMPLEX: CPT

## 2019-01-14 RX ORDER — AMLODIPINE BESYLATE 2.5 MG/1
5 TABLET ORAL DAILY
Qty: 0 | Refills: 0 | Status: DISCONTINUED | OUTPATIENT
Start: 2019-01-14 | End: 2019-01-17

## 2019-01-14 RX ORDER — ALBUTEROL 90 UG/1
2.5 AEROSOL, METERED ORAL ONCE
Qty: 0 | Refills: 0 | Status: COMPLETED | OUTPATIENT
Start: 2019-01-14 | End: 2019-01-14

## 2019-01-14 RX ORDER — AMLODIPINE BESYLATE 2.5 MG/1
5 TABLET ORAL DAILY
Qty: 0 | Refills: 0 | Status: DISCONTINUED | OUTPATIENT
Start: 2019-01-14 | End: 2019-01-14

## 2019-01-14 RX ORDER — FENTANYL CITRATE 50 UG/ML
25 INJECTION INTRAVENOUS
Qty: 0 | Refills: 0 | Status: DISCONTINUED | OUTPATIENT
Start: 2019-01-14 | End: 2019-01-15

## 2019-01-14 RX ORDER — DOCUSATE SODIUM 100 MG
100 CAPSULE ORAL THREE TIMES A DAY
Qty: 0 | Refills: 0 | Status: DISCONTINUED | OUTPATIENT
Start: 2019-01-14 | End: 2019-01-17

## 2019-01-14 RX ORDER — FENTANYL CITRATE 50 UG/ML
50 INJECTION INTRAVENOUS
Qty: 0 | Refills: 0 | Status: DISCONTINUED | OUTPATIENT
Start: 2019-01-14 | End: 2019-01-15

## 2019-01-14 RX ORDER — HEPARIN SODIUM 5000 [USP'U]/ML
5000 INJECTION INTRAVENOUS; SUBCUTANEOUS EVERY 12 HOURS
Qty: 0 | Refills: 0 | Status: DISCONTINUED | OUTPATIENT
Start: 2019-01-14 | End: 2019-01-18

## 2019-01-14 RX ORDER — ALBUTEROL 90 UG/1
2 AEROSOL, METERED ORAL EVERY 6 HOURS
Qty: 0 | Refills: 0 | Status: DISCONTINUED | OUTPATIENT
Start: 2019-01-14 | End: 2019-01-15

## 2019-01-14 RX ORDER — IPRATROPIUM/ALBUTEROL SULFATE 18-103MCG
3 AEROSOL WITH ADAPTER (GRAM) INHALATION ONCE
Qty: 0 | Refills: 0 | Status: COMPLETED | OUTPATIENT
Start: 2019-01-14 | End: 2019-01-14

## 2019-01-14 RX ORDER — METOCLOPRAMIDE HCL 10 MG
10 TABLET ORAL ONCE
Qty: 0 | Refills: 0 | Status: DISCONTINUED | OUTPATIENT
Start: 2019-01-14 | End: 2019-01-15

## 2019-01-14 RX ORDER — OXYCODONE AND ACETAMINOPHEN 5; 325 MG/1; MG/1
2 TABLET ORAL EVERY 6 HOURS
Qty: 0 | Refills: 0 | Status: DISCONTINUED | OUTPATIENT
Start: 2019-01-14 | End: 2019-01-18

## 2019-01-14 RX ORDER — LOSARTAN POTASSIUM 100 MG/1
25 TABLET, FILM COATED ORAL DAILY
Qty: 0 | Refills: 0 | Status: DISCONTINUED | OUTPATIENT
Start: 2019-01-14 | End: 2019-01-14

## 2019-01-14 RX ORDER — TIOTROPIUM BROMIDE 18 UG/1
1 CAPSULE ORAL; RESPIRATORY (INHALATION) DAILY
Qty: 0 | Refills: 0 | Status: DISCONTINUED | OUTPATIENT
Start: 2019-01-14 | End: 2019-01-18

## 2019-01-14 RX ORDER — MIDAZOLAM HYDROCHLORIDE 1 MG/ML
2 INJECTION, SOLUTION INTRAMUSCULAR; INTRAVENOUS
Qty: 0 | Refills: 0 | Status: DISCONTINUED | OUTPATIENT
Start: 2019-01-14 | End: 2019-01-15

## 2019-01-14 RX ORDER — CLONAZEPAM 1 MG
0.5 TABLET ORAL EVERY 8 HOURS
Qty: 0 | Refills: 0 | Status: DISCONTINUED | OUTPATIENT
Start: 2019-01-14 | End: 2019-01-14

## 2019-01-14 RX ORDER — OXYCODONE AND ACETAMINOPHEN 5; 325 MG/1; MG/1
1 TABLET ORAL EVERY 6 HOURS
Qty: 0 | Refills: 0 | Status: DISCONTINUED | OUTPATIENT
Start: 2019-01-14 | End: 2019-01-18

## 2019-01-14 RX ORDER — LOSARTAN POTASSIUM 100 MG/1
25 TABLET, FILM COATED ORAL DAILY
Qty: 0 | Refills: 0 | Status: DISCONTINUED | OUTPATIENT
Start: 2019-01-14 | End: 2019-01-18

## 2019-01-14 RX ORDER — ACETYLCYSTEINE 200 MG/ML
4 VIAL (ML) MISCELLANEOUS
Qty: 0 | Refills: 0 | Status: DISCONTINUED | OUTPATIENT
Start: 2019-01-14 | End: 2019-01-14

## 2019-01-14 RX ORDER — PANTOPRAZOLE SODIUM 20 MG/1
40 TABLET, DELAYED RELEASE ORAL
Qty: 0 | Refills: 0 | Status: DISCONTINUED | OUTPATIENT
Start: 2019-01-14 | End: 2019-01-18

## 2019-01-14 RX ORDER — ONDANSETRON 8 MG/1
4 TABLET, FILM COATED ORAL EVERY 6 HOURS
Qty: 0 | Refills: 0 | Status: DISCONTINUED | OUTPATIENT
Start: 2019-01-14 | End: 2019-01-15

## 2019-01-14 RX ORDER — ALBUTEROL 90 UG/1
2 AEROSOL, METERED ORAL EVERY 6 HOURS
Qty: 0 | Refills: 0 | Status: DISCONTINUED | OUTPATIENT
Start: 2019-01-14 | End: 2019-01-14

## 2019-01-14 RX ORDER — ASPIRIN/CALCIUM CARB/MAGNESIUM 324 MG
81 TABLET ORAL DAILY
Qty: 0 | Refills: 0 | Status: DISCONTINUED | OUTPATIENT
Start: 2019-01-14 | End: 2019-01-18

## 2019-01-14 RX ORDER — CLONAZEPAM 1 MG
0.5 TABLET ORAL EVERY 8 HOURS
Qty: 0 | Refills: 0 | Status: DISCONTINUED | OUTPATIENT
Start: 2019-01-14 | End: 2019-01-18

## 2019-01-14 RX ORDER — ALBUTEROL 90 UG/1
1.25 AEROSOL, METERED ORAL EVERY 4 HOURS
Qty: 0 | Refills: 0 | Status: DISCONTINUED | OUTPATIENT
Start: 2019-01-14 | End: 2019-01-15

## 2019-01-14 RX ORDER — ACETAMINOPHEN 500 MG
325 TABLET ORAL EVERY 6 HOURS
Qty: 0 | Refills: 0 | Status: DISCONTINUED | OUTPATIENT
Start: 2019-01-14 | End: 2019-01-18

## 2019-01-14 RX ORDER — ZOLPIDEM TARTRATE 10 MG/1
5 TABLET ORAL AT BEDTIME
Qty: 0 | Refills: 0 | Status: DISCONTINUED | OUTPATIENT
Start: 2019-01-14 | End: 2019-01-18

## 2019-01-14 RX ADMIN — Medication 3 MILLILITER(S): at 18:30

## 2019-01-14 RX ADMIN — ALBUTEROL 2.5 MILLIGRAM(S): 90 AEROSOL, METERED ORAL at 23:35

## 2019-01-14 RX ADMIN — ALBUTEROL 2 PUFF(S): 90 AEROSOL, METERED ORAL at 22:23

## 2019-01-14 RX ADMIN — MIDAZOLAM HYDROCHLORIDE 2 MILLIGRAM(S): 1 INJECTION, SOLUTION INTRAMUSCULAR; INTRAVENOUS at 17:05

## 2019-01-14 RX ADMIN — SODIUM CHLORIDE 30 MILLILITER(S): 9 INJECTION, SOLUTION INTRAVENOUS at 16:00

## 2019-01-14 RX ADMIN — FENTANYL CITRATE 25 MICROGRAM(S): 50 INJECTION INTRAVENOUS at 16:30

## 2019-01-14 RX ADMIN — FENTANYL CITRATE 25 MICROGRAM(S): 50 INJECTION INTRAVENOUS at 16:20

## 2019-01-14 RX ADMIN — HEPARIN SODIUM 5000 UNIT(S): 5000 INJECTION INTRAVENOUS; SUBCUTANEOUS at 20:15

## 2019-01-14 RX ADMIN — FENTANYL CITRATE 25 MICROGRAM(S): 50 INJECTION INTRAVENOUS at 16:10

## 2019-01-15 ENCOUNTER — TRANSCRIPTION ENCOUNTER (OUTPATIENT)
Age: 59
End: 2019-01-15

## 2019-01-15 LAB
ANION GAP SERPL CALC-SCNC: 11 MEQ/L — SIGNIFICANT CHANGE UP (ref 7–14)
BUN SERPL-MCNC: 8 MG/DL — SIGNIFICANT CHANGE UP (ref 7–23)
CALCIUM SERPL-MCNC: 9.5 MG/DL — SIGNIFICANT CHANGE UP (ref 8.4–10.5)
CHLORIDE SERPL-SCNC: 102 MMOL/L — SIGNIFICANT CHANGE UP (ref 98–107)
CO2 SERPL-SCNC: 30 MMOL/L — SIGNIFICANT CHANGE UP (ref 22–31)
CREAT SERPL-MCNC: 0.71 MG/DL — SIGNIFICANT CHANGE UP (ref 0.5–1.3)
GLUCOSE SERPL-MCNC: 94 MG/DL — SIGNIFICANT CHANGE UP (ref 70–99)
HCT VFR BLD CALC: 28.3 % — LOW (ref 34.5–45)
HGB BLD-MCNC: 9.2 G/DL — LOW (ref 11.5–15.5)
MAGNESIUM SERPL-MCNC: 1.6 MG/DL — SIGNIFICANT CHANGE UP (ref 1.6–2.6)
MCHC RBC-ENTMCNC: 27.3 PG — SIGNIFICANT CHANGE UP (ref 27–34)
MCHC RBC-ENTMCNC: 32.5 % — SIGNIFICANT CHANGE UP (ref 32–36)
MCV RBC AUTO: 84 FL — SIGNIFICANT CHANGE UP (ref 80–100)
NRBC # FLD: 0 K/UL — LOW (ref 25–125)
PHOSPHATE SERPL-MCNC: 3.4 MG/DL — SIGNIFICANT CHANGE UP (ref 2.5–4.5)
PLATELET # BLD AUTO: 238 K/UL — SIGNIFICANT CHANGE UP (ref 150–400)
PMV BLD: 10.7 FL — SIGNIFICANT CHANGE UP (ref 7–13)
POTASSIUM SERPL-MCNC: 3.8 MMOL/L — SIGNIFICANT CHANGE UP (ref 3.5–5.3)
POTASSIUM SERPL-SCNC: 3.8 MMOL/L — SIGNIFICANT CHANGE UP (ref 3.5–5.3)
RBC # BLD: 3.37 M/UL — LOW (ref 3.8–5.2)
RBC # FLD: 14.4 % — SIGNIFICANT CHANGE UP (ref 10.3–14.5)
SODIUM SERPL-SCNC: 143 MMOL/L — SIGNIFICANT CHANGE UP (ref 135–145)
WBC # BLD: 6.49 K/UL — SIGNIFICANT CHANGE UP (ref 3.8–10.5)
WBC # FLD AUTO: 6.49 K/UL — SIGNIFICANT CHANGE UP (ref 3.8–10.5)

## 2019-01-15 RX ORDER — IPRATROPIUM/ALBUTEROL SULFATE 18-103MCG
3 AEROSOL WITH ADAPTER (GRAM) INHALATION EVERY 6 HOURS
Qty: 0 | Refills: 0 | Status: DISCONTINUED | OUTPATIENT
Start: 2019-01-15 | End: 2019-01-18

## 2019-01-15 RX ORDER — SIMETHICONE 80 MG/1
125 TABLET, CHEWABLE ORAL AT BEDTIME
Qty: 0 | Refills: 0 | Status: DISCONTINUED | OUTPATIENT
Start: 2019-01-15 | End: 2019-01-15

## 2019-01-15 RX ORDER — CHLORHEXIDINE GLUCONATE 213 G/1000ML
1 SOLUTION TOPICAL
Qty: 0 | Refills: 0 | Status: DISCONTINUED | OUTPATIENT
Start: 2019-01-15 | End: 2019-01-18

## 2019-01-15 RX ORDER — MAGNESIUM SULFATE 500 MG/ML
1 VIAL (ML) INJECTION ONCE
Qty: 0 | Refills: 0 | Status: COMPLETED | OUTPATIENT
Start: 2019-01-15 | End: 2019-01-15

## 2019-01-15 RX ORDER — POLYETHYLENE GLYCOL 3350 17 G/17G
17 POWDER, FOR SOLUTION ORAL DAILY
Qty: 0 | Refills: 0 | Status: DISCONTINUED | OUTPATIENT
Start: 2019-01-15 | End: 2019-01-18

## 2019-01-15 RX ORDER — AZITHROMYCIN 500 MG/1
250 TABLET, FILM COATED ORAL
Qty: 0 | Refills: 0 | Status: DISCONTINUED | OUTPATIENT
Start: 2019-01-15 | End: 2019-01-18

## 2019-01-15 RX ORDER — SIMETHICONE 80 MG/1
80 TABLET, CHEWABLE ORAL AT BEDTIME
Qty: 0 | Refills: 0 | Status: DISCONTINUED | OUTPATIENT
Start: 2019-01-15 | End: 2019-01-18

## 2019-01-15 RX ADMIN — LOSARTAN POTASSIUM 25 MILLIGRAM(S): 100 TABLET, FILM COATED ORAL at 06:54

## 2019-01-15 RX ADMIN — AMLODIPINE BESYLATE 5 MILLIGRAM(S): 2.5 TABLET ORAL at 14:07

## 2019-01-15 RX ADMIN — ALBUTEROL 1.25 MILLIGRAM(S): 90 AEROSOL, METERED ORAL at 04:31

## 2019-01-15 RX ADMIN — HEPARIN SODIUM 5000 UNIT(S): 5000 INJECTION INTRAVENOUS; SUBCUTANEOUS at 18:21

## 2019-01-15 RX ADMIN — OXYCODONE AND ACETAMINOPHEN 2 TABLET(S): 5; 325 TABLET ORAL at 14:06

## 2019-01-15 RX ADMIN — ALBUTEROL 1.25 MILLIGRAM(S): 90 AEROSOL, METERED ORAL at 15:10

## 2019-01-15 RX ADMIN — FENTANYL CITRATE 50 MICROGRAM(S): 50 INJECTION INTRAVENOUS at 01:06

## 2019-01-15 RX ADMIN — HEPARIN SODIUM 5000 UNIT(S): 5000 INJECTION INTRAVENOUS; SUBCUTANEOUS at 06:42

## 2019-01-15 RX ADMIN — OXYCODONE AND ACETAMINOPHEN 1 TABLET(S): 5; 325 TABLET ORAL at 22:24

## 2019-01-15 RX ADMIN — FENTANYL CITRATE 50 MICROGRAM(S): 50 INJECTION INTRAVENOUS at 00:31

## 2019-01-15 RX ADMIN — OXYCODONE AND ACETAMINOPHEN 2 TABLET(S): 5; 325 TABLET ORAL at 15:00

## 2019-01-15 RX ADMIN — Medication 100 GRAM(S): at 15:48

## 2019-01-15 RX ADMIN — ALBUTEROL 2 PUFF(S): 90 AEROSOL, METERED ORAL at 05:19

## 2019-01-15 RX ADMIN — Medication 100 MILLIGRAM(S): at 06:42

## 2019-01-15 RX ADMIN — Medication 81 MILLIGRAM(S): at 14:07

## 2019-01-15 RX ADMIN — Medication 3 MILLILITER(S): at 22:31

## 2019-01-15 RX ADMIN — OXYCODONE AND ACETAMINOPHEN 1 TABLET(S): 5; 325 TABLET ORAL at 22:54

## 2019-01-15 NOTE — CONSULT NOTE ADULT - ASSESSMENT
58y female with chronic hypoxemic respiratory failure, COPD, chronic respiratory insuffiencey s/p tracheostomy on pressure support ventilation at home now s/p stomatoplasty and bronch for tracheomalacia patient in pacu on mechanical ventilation, SICU consulted for being on the ventilator.     PLAN:   Neurologic: Tylenol PRN pain, klonopin for anxiety   Respiratory: Continue pressure support ventilation, continue home nebulizers  Cardiovascular: continue amlodipine and losartan for bp control   Gastrointestinal/Nutrition: Mechanical soft diet, continue home bowel regime, protonix for gerd  Renal/Genitourinary: monitor lytes replete prn  Hematologic: sqh for dvt ppx  Infectious Disease: azithromycin for prophylaxis   Tubes/Lines/Drains: PIV  Endocrine: Stable  Disposition: SICU, possible DC tomorrow    --------------------------------------------------------------------------------------    Critical Care Diagnoses: Respiratory failure requiring mechanical ventilation

## 2019-01-15 NOTE — DISCHARGE NOTE ADULT - CARE PROVIDER_API CALL
Gaurang Rodriguez (MD; PhD), Otolaryngology  [facility]  Dept of Otolaryngology  [address] [place] [id number]  Phone: [phone]  Fax: [phone]

## 2019-01-15 NOTE — DISCHARGE NOTE ADULT - PATIENT PORTAL LINK FT
You can access the [facility] Portal, offered by [facility], by registering with the following website: [email or web address]

## 2019-01-15 NOTE — DISCHARGE NOTE ADULT - MEDICATION SUMMARY - MEDICATIONS TO TAKE
I will START or STAY ON the medications listed below when I get home from the hospital:    Red Rubber Catheters  -- Dispense 60 catheters. ICD10 K11.7. Medicaid ID: __________, DARIUS: 99 (lifetime), NPI: __________  -- Indication: For Outpt supplies     saline  -- Indication: For Outpt supplies     Oxygen 3L on ventilator  -- Indication: For Outpt    Pulmicort Turbuhaler 200 mcg/inh inhalation powder  -- Indication: For Outpt med    aspirin 81 mg oral tablet, chewable  -- 1 tab(s) by mouth once a day. Last dose 1/6/19  -- Indication: For Outpt med    losartan 25 mg oral tablet  -- 1 tab(s) by mouth once a day  -- Indication: For Outpt med    clonazePAM 0.5 mg oral tablet, disintegrating  -- 1 tab(s) by mouth every 8 hours, As Needed -anxiety - for agitation MDD:3 tablets  -- Indication: For Outpt med    acetylcysteine 20% inhalation solution  -- 4 milliliter(s) inhaled 4 times a day, As Needed  -- Indication: For Outpt med    Ambien 5 mg oral tablet  -- 1 tab(s) by mouth once a day (at bedtime), As Needed insomnia MDD:1 tablet  -- Caution federal law prohibits the transfer of this drug to any person other  than the person for whom it was prescribed.  May cause drowsiness.  Alcohol may intensify this effect.  Use care when operating dangerous machinery.    -- Indication: For Outpt med    ipratropium-albuterol 0.5 mg-2.5 mg/3 mLinhalation solution  -- 3 milliliter(s) inhaled every 6 hours    dispense one month supply  -- Indication: For Outpt med    ipratropium-albuterol 20 mcg-100 mcg/inh inhalation aerosol  -- 1 puff(s) inhaled 4 times a day  -- Indication: For Outpt med    albuterol 90 mcg/inh inhalation aerosol  -- 2 puff(s) inhaled 4 times a day  -- Indication: For Outpt med    Norvasc 5 mg oral tablet  -- 1 tab(s) by mouth once a day  -- Indication: For Outpt med    polyethylene glycol 3350 oral powder for reconstitution  -- 17 gram(s) by mouth once a day, As needed, Constipation  -- Indication: For Outpt med    lactulose 10 g/15 mL oral syrup  -- 15 milliliter(s) by mouth once a day, As Needed  -- Indication: For Outpt med    Senna 8.6 mg oral tablet  -- 1 tab(s) by mouth every other day (at bedtime)  -- Indication: For Outpt med    MiraLax oral powder for reconstitution  -- orally every other day (at bedtime)  -- Indication: For Outpt med    azithromycin 250 mg oral capsule  -- orally 3 times a week  -- Indication: For Outpt med    simethicone 125 mg oral capsule  -- 1 cap(s) by mouth 3 to 4 times a day  -- Indication: For Outpt med    pantoprazole 40 mg oral delayed release tablet  -- 1 tab(s) by mouth once a day  -- Indication: For Outpt med    sterile water irrigation solution  -- irrigation once a day, As Needed  -- Indication: For Outpt med    multivitamin  -- 1  by mouth once a day. Last dose 1/6/19.  -- Indication: For Outpt med

## 2019-01-15 NOTE — DISCHARGE NOTE ADULT - REASON FOR REFUSAL (REFER PATIENT TO HEALTHCARE PROVIDER FOR FOLLOW-UP):
Patient constantly shaking head no- not offering reason. Pt explained of benefits of vaccination- patient refused- given time to respond.

## 2019-01-15 NOTE — CONSULT NOTE ADULT - SUBJECTIVE AND OBJECTIVE BOX
=====================================================                                                             SICU Consultation Note                                                             =====================================================  Patient is a 58y old  Female who presents with a chief complaint of S/P stomaplasty (15 Peter 2019 08:23)    HPI: 58y female with HTN, anxiety, Sickle cell trait, lupus anticoagulant syndrome, GERD, chronic hypoxemic respiratory failure, COPD, chronic respiratory insuffiencey s/p tracheostomy on pressure support ventilation at home now s/p stomatoplasty and bronch for tracheostenosis patient in pacu on mechanical ventilation, SICU consulted for being on the ventilator.     HISTORY  58y Female  Allergies: Cipro (Unknown)    PAST MEDICAL & SURGICAL HISTORY:  Lupus anticoagulant syndrome  Tracheomalacia  Pancreatic cyst  Anxiety disorder  Sickle cell trait  Hypertension  COPD (chronic obstructive pulmonary disease)  Tracheostomy dependent  S/P : 1986  Tracheal stenosis: excision of tracheal stenosis 10/2017  revision of tracheal stoma 3/2018  Acute tracheostomy management  Dependence on tracheostomy    FAMILY HISTORY:  Family history of heart disease (Grandparent)  Family history of leukemia (Father)  Family history of lung disease (Sibling)  SOCIAL HISTORY:    ADVANCE DIRECTIVES: Presumed Full Code      REVIEW OF SYSTEMS:    General: Non-Contributory  Skin/Breast: Non-Contributory  Ophthalmologic: Non-Contributory  ENMT: Non-Contributory  Respiratory and Thorax: Non-Contributory  Cardiovascular: Non-Contributory  Gastrointestinal: Non-Contributory  Genitourinary: Non-Contributory  Musculoskeletal: Non-Contributory  Neurological: Non-Contributory  Psychiatric: Non-Contributory  Hematology/Lymphatics: Non-Contributory  Endocrine: Non-Contributory  Allergic/Immunologic: Non-Contributory    HOME MEDICATIONS: Ranitidine, Protonix, Zithromax, amitriptyline    CURRENT MEDICATIONS:   --------------------------------------------------------------------------------------  Neurologic Medications  acetaminophen   Tablet .. 325 milliGRAM(s) Oral every 6 hours PRN Mild Pain (1 - 3)  clonazePAM Tablet 0.5 milliGRAM(s) Oral every 8 hours PRN anxiety  fentaNYL    Injectable 25 MICROGram(s) IV Push every 5 minutes PRN Moderate Pain (4 - 6)  fentaNYL    Injectable 50 MICROGram(s) IV Push every 5 minutes PRN Severe Pain (7 - 10)  metoclopramide Injectable 10 milliGRAM(s) IV Push once PRN Nausea and/or Vomiting  midazolam Injectable 2 milliGRAM(s) IV Push every 30 minutes PRN agitation  ondansetron Injectable 4 milliGRAM(s) IV Push every 6 hours PRN Nausea and/or Vomiting  oxyCODONE    5 mG/acetaminophen 325 mG 1 Tablet(s) Oral every 6 hours PRN Moderate Pain (4 - 6)  oxyCODONE    5 mG/acetaminophen 325 mG 2 Tablet(s) Oral every 6 hours PRN Severe Pain (7 - 10)  zolpidem 5 milliGRAM(s) Oral at bedtime PRN Insomnia    Respiratory Medications  ALBUTerol    90 MICROgram(s) HFA Inhaler 2 Puff(s) Inhalation every 6 hours  ALBUTerol   0.042% 1.25 milliGRAM(s) Nebulizer every 4 hours PRN Bronchospasm  tiotropium 18 MICROgram(s) Capsule 1 Capsule(s) Inhalation daily  Cardiovascular Medications  amLODIPine   Tablet 5 milliGRAM(s) Oral daily  losartan 25 milliGRAM(s) Oral daily  Gastrointestinal Medications  docusate sodium 100 milliGRAM(s) Oral three times a day  lactated ringers. 1000 milliLiter(s) IV Continuous <Continuous>  pantoprazole    Tablet 40 milliGRAM(s) Oral before breakfast  polyethylene glycol 3350 17 Gram(s) Oral daily PRN Constipation  simethicone drops 125 milliGRAM(s) Oral at bedtime  Genitourinary Medications  Hematologic/Oncologic Medications  aspirin  chewable 81 milliGRAM(s) Oral daily  heparin  Injectable 5000 Unit(s) SubCutaneous every 12 hours  Antimicrobial/Immunologic Medications  Endocrine/Metabolic Medications  Topical/Other Medications    --------------------------------------------------------------------------------------    VITAL SIGNS, INS/OUTS (last 24 hours):  T(C): 36.5 (01-15-19 @ 12:00), Max: 37.1 (19 @ 20:00)  HR: 72 (01-15-19 @ 12:00) (60 - 100)  BP: 112/73 (01-15-19 @ 11:00) (112/73 - 180/164)  BP(mean): 82 (01-15-19 @ 11:00) (67 - 168)  RR: 19 (01-15-19 @ 12:00) (10 - 40)  SpO2: 98% (01-15-19 @ 12:00) (90% - 100%)    EXAM  NEUROLOGY  Exam: Normal, alert, oriented  HEENT  Exam: atraumatic.  EOMI + Trach size 8  RESPIRATORY  Exam: Lungs clear to auscultation, No respiratory distress   Mechanical Ventilation: Pressure support FiO2 30 PEEP 5 Pressure Support 18  CARDIOVASCULAR  Exam: S1, S2.  Regular rate and rhythm   GI/NUTRITION  Exam: Abdomen soft, Non-tender, Non-distended.   Current Diet:  NPO  VASCULAR  Exam: Extremities warm, pink, well-perfused.   MUSCULOSKELETAL  Exam: All extremities moving spontaneously without limitations.    SKIN:  Exam: Good skin turgor, no skin breakdown.    METABOLIC/FLUIDS/ELECTROLYTES  lactated ringers. 1000 milliLiter(s) IV Continuous <Continuous>  HEMATOLOGIC  [x] DVT Prophylaxis: aspirin  chewable 81 milliGRAM(s) Oral daily  heparin  Injectable 5000 Unit(s) SubCutaneous every 12 hours  Transfusions:	[] PRBC	[] Platelets		[] FFP	[] Cryoprecipitate  INFECTIOUS DISEASE  Antimicrobials/Immunologic Medications:    Tubes/Lines/Drains  [x] Peripheral IV  [] Central Venous Line     	[] R	[] L	[] IJ	[] Fem	[] SC	Date Placed:   [] Arterial Line		[] R	[] L	[] Fem	[] Rad	[] Ax	Date Placed:   [] PICC:         	[] Midline		[] Mediport  [] Urinary Catheter		Date Placed: =====================================================                                                             SICU Consultation Note                                                             =====================================================  Patient is a 58y old  Female who presents with a chief complaint of S/P stomaplasty (15 Peter 2019 08:23)    HPI: 58y female with HTN, anxiety, Sickle cell trait, lupus anticoagulant syndrome, GERD, chronic hypoxemic respiratory failure, COPD, chronic respiratory insuffiencey s/p tracheostomy on pressure support ventilation at home now s/p stomatoplasty and bronch for tracheostenosis patient in pacu on mechanical ventilation, SICU consulted for being on the ventilator.     HISTORY  58y Female  Allergies: Cipro (Unknown)    PAST MEDICAL & SURGICAL HISTORY:  Lupus anticoagulant syndrome  Tracheomalacia  Pancreatic cyst  Anxiety disorder  Sickle cell trait  Hypertension  COPD (chronic obstructive pulmonary disease)  Tracheostomy dependent  S/P : 1986  Tracheal stenosis: excision of tracheal stenosis 10/2017  revision of tracheal stoma 3/2018  Acute tracheostomy management  Dependence on tracheostomy    FAMILY HISTORY:  Family history of heart disease (Grandparent)  Family history of leukemia (Father)  Family history of lung disease (Sibling)  SOCIAL HISTORY:    ADVANCE DIRECTIVES: Presumed Full Code      REVIEW OF SYSTEMS:    General: Non-Contributory  Skin/Breast: Non-Contributory  Ophthalmologic: Non-Contributory  ENMT: Non-Contributory  Respiratory and Thorax: Non-Contributory  Cardiovascular: Non-Contributory  Gastrointestinal: Non-Contributory  Genitourinary: Non-Contributory  Musculoskeletal: Non-Contributory  Neurological: Non-Contributory  Psychiatric: Non-Contributory  Hematology/Lymphatics: Non-Contributory  Endocrine: Non-Contributory  Allergic/Immunologic: Non-Contributory    HOME MEDICATIONS: Ranitidine, Protonix, Zithromax, amitriptyline    CURRENT MEDICATIONS:   --------------------------------------------------------------------------------------  Neurologic Medications  acetaminophen   Tablet .. 325 milliGRAM(s) Oral every 6 hours PRN Mild Pain (1 - 3)  clonazePAM Tablet 0.5 milliGRAM(s) Oral every 8 hours PRN anxiety  fentaNYL    Injectable 25 MICROGram(s) IV Push every 5 minutes PRN Moderate Pain (4 - 6)  fentaNYL    Injectable 50 MICROGram(s) IV Push every 5 minutes PRN Severe Pain (7 - 10)  metoclopramide Injectable 10 milliGRAM(s) IV Push once PRN Nausea and/or Vomiting  midazolam Injectable 2 milliGRAM(s) IV Push every 30 minutes PRN agitation  ondansetron Injectable 4 milliGRAM(s) IV Push every 6 hours PRN Nausea and/or Vomiting  oxyCODONE    5 mG/acetaminophen 325 mG 1 Tablet(s) Oral every 6 hours PRN Moderate Pain (4 - 6)  oxyCODONE    5 mG/acetaminophen 325 mG 2 Tablet(s) Oral every 6 hours PRN Severe Pain (7 - 10)  zolpidem 5 milliGRAM(s) Oral at bedtime PRN Insomnia    Respiratory Medications  ALBUTerol    90 MICROgram(s) HFA Inhaler 2 Puff(s) Inhalation every 6 hours  ALBUTerol   0.042% 1.25 milliGRAM(s) Nebulizer every 4 hours PRN Bronchospasm  tiotropium 18 MICROgram(s) Capsule 1 Capsule(s) Inhalation daily  Cardiovascular Medications  amLODIPine   Tablet 5 milliGRAM(s) Oral daily  losartan 25 milliGRAM(s) Oral daily  Gastrointestinal Medications  docusate sodium 100 milliGRAM(s) Oral three times a day  lactated ringers. 1000 milliLiter(s) IV Continuous <Continuous>  pantoprazole    Tablet 40 milliGRAM(s) Oral before breakfast  polyethylene glycol 3350 17 Gram(s) Oral daily PRN Constipation  simethicone drops 125 milliGRAM(s) Oral at bedtime  Genitourinary Medications  Hematologic/Oncologic Medications  aspirin  chewable 81 milliGRAM(s) Oral daily  heparin  Injectable 5000 Unit(s) SubCutaneous every 12 hours  Antimicrobial/Immunologic Medications  Endocrine/Metabolic Medications  Topical/Other Medications    --------------------------------------------------------------------------------------    VITAL SIGNS, INS/OUTS (last 24 hours):  T(C): 36.5 (01-15-19 @ 12:00), Max: 37.1 (19 @ 20:00)  HR: 72 (01-15-19 @ 12:00) (60 - 100)  BP: 112/73 (01-15-19 @ 11:00) (112/73 - 180/164)  BP(mean): 82 (01-15-19 @ 11:00) (67 - 168)  RR: 19 (01-15-19 @ 12:00) (10 - 40)  SpO2: 98% (01-15-19 @ 12:00) (90% - 100%)    EXAM  NEUROLOGY  Exam: Normal, alert, oriented, sitting up in bed, writing on board  HEENT  Exam: atraumatic.  EOMI + Trach size 8  RESPIRATORY  Exam: Lungs clear to auscultation, No respiratory distress   Mechanical Ventilation: Pressure support FiO2 30 PEEP 5 Pressure Support 18  CARDIOVASCULAR  Exam: S1, S2.  Regular rate and rhythm   GI/NUTRITION  Exam: Abdomen soft, Non-tender, Non-distended.   Current Diet:  soft  VASCULAR  Exam: Extremities warm, pink, well-perfused.   MUSCULOSKELETAL  Exam: All extremities moving spontaneously without limitations.    SKIN:  Exam: Good skin turgor, no skin breakdown.    METABOLIC/FLUIDS/ELECTROLYTES  lactated ringers. 1000 milliLiter(s) IV Continuous <Continuous>  HEMATOLOGIC  [x] DVT Prophylaxis: aspirin  chewable 81 milliGRAM(s) Oral daily  heparin  Injectable 5000 Unit(s) SubCutaneous every 12 hours  Transfusions:	[] PRBC	[] Platelets		[] FFP	[] Cryoprecipitate  INFECTIOUS DISEASE  Antimicrobials/Immunologic Medications:    Tubes/Lines/Drains  [x] Peripheral IV  [] Central Venous Line     	[] R	[] L	[] IJ	[] Fem	[] SC	Date Placed:   [] Arterial Line		[] R	[] L	[] Fem	[] Rad	[] Ax	Date Placed:   [] PICC:         	[] Midline		[] Mediport  [] Urinary Catheter		Date Placed:

## 2019-01-15 NOTE — PROGRESS NOTE ADULT - ASSESSMENT
A/P 58F hx tracheal stenosis s/p DLB, stomaplasty 1/14.   - home meds  - [facility] vent  - plan for trach change to 6 FEN on 1/16  - pain control PRN  - f/u pulm consult  - diet  - [facility]

## 2019-01-15 NOTE — DISCHARGE NOTE ADULT - DURABLE MEDICAL EQUIPMENT AGENCY
[facility] [phone]- Patient's home ventilator was exchanged w/ a new ventilator on 01/17/2019 [facility] [phone]- Patient's home ventilator was exchanged w/ a new ventilator on 01/17/2019

## 2019-01-15 NOTE — DISCHARGE NOTE ADULT - HOSPITAL COURSE
S/P stomaplasty. Trach changed 1/16/19... S/P stomaplasty. Trach changed 1/16/19. Patient discharged on 1/16/18 S/P stomaplasty. Trach changed 1/16/19. Patient discharged on 1/18/18

## 2019-01-15 NOTE — PROGRESS NOTE ADULT - SUBJECTIVE AND OBJECTIVE BOX
Patient seen and examined. Post-op patient with multiple complaints of shortness of breath, sensation of mucous plugging secondary to significant anxiety. however, suction passes easily and O2 sats 100% on mech vent. Reports pain related to trach suture site.    Exam  Vital Signs Last 24 Hrs  T(C): 36.5 (15 Peter 2019 00:00), Max: 37.1 (14 Jan 2019 20:00)  T(F): 97.7 (15 Peter 2019 00:00), Max: 98.8 (14 Jan 2019 20:00)  HR: 61 (15 Peter 2019 04:00) (61 - 100)  BP: 115/69 (15 Peter 2019 04:00) (115/69 - 180/164)  BP(mean): 80 (15 Peter 2019 04:00) (67 - 168)  RR: 11 (15 Peter 2019 04:00) (10 - 40)  SpO2: 95% (15 Peter 2019 04:00) (90% - 100%)  NAD, anxious  Breathing comfortably on mech vent. No stridor, no stertor, no retractions  Nose: nares patent anteriorly  OC/OP: tongue wnl, OP clear  Neck soft, flat, no crepitus or palpable collection  8FEN sutured in place, cuff inflated

## 2019-01-15 NOTE — DISCHARGE NOTE ADULT - COMMUNITY RESOURCES
Patient's RN/LPN services were reinstated w/ [employer] for 01/18/2019. Patient's RN is scheduled to be at patient's home at 3PM 01/18/19. Patient's HHA services were also reinstated for 01/19/2019.

## 2019-01-15 NOTE — PROGRESS NOTE ADULT - SUBJECTIVE AND OBJECTIVE BOX
POST ANESTHESIA EVALUATION    58y Female POSTOP DAY 1     MENTAL STATUS: Patient participation [  ] Awake     [ x ] Arousable     [  ] Sedated    AIRWAY PATENCY: [ x ] Satisfactory  [  ] Other:     Vital Signs Last 24 Hrs  T(C): 36.4 (15 Peter 2019 08:00), Max: 37.1 (14 Jan 2019 20:00)  T(F): 97.5 (15 Epter 2019 08:00), Max: 98.8 (14 Jan 2019 20:00)  HR: 75 (15 Peter 2019 08:00) (60 - 100)  BP: 134/99 (15 Peter 2019 08:00) (114/59 - 180/164)  BP(mean): 102 (15 Peter 2019 07:00) (67 - 168)  RR: 16 (15 Peter 2019 08:00) (10 - 40)  SpO2: 100% (15 Peter 2019 08:00) (90% - 100%)  I&O's Summary    14 Jan 2019 07:01  -  15 Peter 2019 07:00  --------------------------------------------------------  IN: 830 mL / OUT: 2250 mL / NET: -1420 mL          NAUSEA/ VOMITTING:  [ x ] NONE  [  ] CONTROLLED [  ] OTHER     PAIN: [ x ] CONTROLLED WITH CURRENT REGIMEN  [  ] OTHER    [ x ] NO APPARENT ANESTHESIA COMPLICATIONS      Comments: Discussed w/ RN

## 2019-01-15 NOTE — PATIENT PROFILE ADULT - NUMBER OF YRS
Quality 130: Documentation Of Current Medications In The Medical Record: Current Medications Documented
Quality 431: Preventive Care And Screening: Unhealthy Alcohol Use - Screening: Patient screened for unhealthy alcohol use using a single question and scores less than 2 times per year
Detail Level: Zone
Quality 226: Preventive Care And Screening: Tobacco Use: Screening And Cessation Intervention: Patient screened for tobacco and never smoked
40

## 2019-01-15 NOTE — DISCHARGE NOTE ADULT - CARE PLAN
Principal Discharge DX:	Acute tracheostomy management  Goal:	stomaplasty  Assessment and plan of treatment:	same

## 2019-01-16 PROCEDURE — 31502 CHANGE OF WINDPIPE AIRWAY: CPT

## 2019-01-16 RX ORDER — HYDROMORPHONE HYDROCHLORIDE 2 MG/ML
0.5 INJECTION INTRAMUSCULAR; INTRAVENOUS; SUBCUTANEOUS ONCE
Qty: 0 | Refills: 0 | Status: DISCONTINUED | OUTPATIENT
Start: 2019-01-16 | End: 2019-01-16

## 2019-01-16 RX ADMIN — HYDROMORPHONE HYDROCHLORIDE 0.5 MILLIGRAM(S): 2 INJECTION INTRAMUSCULAR; INTRAVENOUS; SUBCUTANEOUS at 09:30

## 2019-01-16 RX ADMIN — Medication 3 MILLILITER(S): at 09:20

## 2019-01-16 RX ADMIN — Medication 3 MILLILITER(S): at 21:40

## 2019-01-16 RX ADMIN — LOSARTAN POTASSIUM 25 MILLIGRAM(S): 100 TABLET, FILM COATED ORAL at 06:35

## 2019-01-16 RX ADMIN — HYDROMORPHONE HYDROCHLORIDE 0.5 MILLIGRAM(S): 2 INJECTION INTRAMUSCULAR; INTRAVENOUS; SUBCUTANEOUS at 09:45

## 2019-01-16 RX ADMIN — SIMETHICONE 80 MILLIGRAM(S): 80 TABLET, CHEWABLE ORAL at 23:52

## 2019-01-16 RX ADMIN — HEPARIN SODIUM 5000 UNIT(S): 5000 INJECTION INTRAVENOUS; SUBCUTANEOUS at 06:34

## 2019-01-16 RX ADMIN — OXYCODONE AND ACETAMINOPHEN 2 TABLET(S): 5; 325 TABLET ORAL at 13:30

## 2019-01-16 RX ADMIN — OXYCODONE AND ACETAMINOPHEN 2 TABLET(S): 5; 325 TABLET ORAL at 13:00

## 2019-01-16 RX ADMIN — Medication 81 MILLIGRAM(S): at 12:38

## 2019-01-16 RX ADMIN — HEPARIN SODIUM 5000 UNIT(S): 5000 INJECTION INTRAVENOUS; SUBCUTANEOUS at 18:43

## 2019-01-16 RX ADMIN — Medication 3 MILLILITER(S): at 03:03

## 2019-01-16 RX ADMIN — PANTOPRAZOLE SODIUM 40 MILLIGRAM(S): 20 TABLET, DELAYED RELEASE ORAL at 11:16

## 2019-01-16 NOTE — PROGRESS NOTE ADULT - ATTENDING COMMENTS
Critical Care Dx    New tracheostomy, presences of tracheostomy  -airway watch  -ENT to downsize at bedside    COPD  -restart home meds  -maintain O2 sat > 88%    HTN  -Losartan  -Hydralazine for intermittent spikes  -restart home meds     The patient is a critical care patient with life threatening respiratory instability in SICU.  I have personally interviewed and examined the patient, reviewed data and laboratory tests/x-rays and all pertinent electronic images.  The SICU team has a constant risk benefit analyzes discussion with the primary team, all consultants, House Staff and PA's on all decisions.  These diagnoses are unrelated to the surgical procedure noted.  Time involved in performance of separately billable procedures was not counted toward my critical care time. There is no overlap.    I spent 35 minutes in total providing critical care for the diagnoses, assessment and plan above.      I was physically present for the key portions of the evaluation and management (E/M) service provided.  I agree with the above history, physical, and plan which I have reviewed and edited where appropriate.     Chandu Arellano MD  Acute and Critical Care Surgery

## 2019-01-16 NOTE — PROGRESS NOTE ADULT - SUBJECTIVE AND OBJECTIVE BOX
Patient seen and examined. transferred to SICU for vent management. No acute events.    Vital Signs Last 24 Hrs  T(C): 36.7 (16 Jan 2019 04:00), Max: 36.8 (15 Peter 2019 20:00)  T(F): 98 (16 Jan 2019 04:00), Max: 98.2 (15 Peter 2019 20:00)  HR: 61 (16 Jan 2019 06:00) (52 - 89)  BP: 116/70 (16 Jan 2019 06:00) (96/62 - 161/89)  BP(mean): 82 (16 Jan 2019 06:00) (46 - 102)  RR: 17 (16 Jan 2019 06:00) (13 - 29)  SpO2: 100% (16 Jan 2019 06:00) (92% - 100%)    NAD, anxious  Breathing comfortably on [facility] vent. No stridor, no stertor, no retractions  Nose: nares patent anteriorly  OC/OP: tongue wnl, OP clear  Neck soft, flat, no crepitus or palpable collection  8FEN sutured in place, cuff inflated          A/P 58F hx tracheal stenosis s/p DLB, stomaplasty 1/14.   - home meds  - [facility] vent  - plan for trach change to 6 FEN today  - pain control PRN  - f/u pulm consult  - diet  - SQH

## 2019-01-16 NOTE — PROGRESS NOTE ADULT - SUBJECTIVE AND OBJECTIVE BOX
SICU Daily Progress Note  =====================================================  Interval/Overnight Events:  No acute events overnight    POD #  1        	SICU Day #   2    58y female with HTN, anxiety, Sickle cell trait, lupus anticoagulant syndrome, GERD, chronic hypoxemic respiratory failure, COPD, chronic respiratory insuffiencey s/p tracheostomy on pressure support ventilation at home now s/p stomatoplasty and bronch for tracheostenosis patient in pacu on mechanical ventilation, SICU consulted for ventilator dependence.     Allergies: Cipro (Unknown)  latex (Unknown)    MEDICATIONS:   --------------------------------------------------------------------------------------  Neurologic Medications  acetaminophen   Tablet .. 325 milliGRAM(s) Oral every 6 hours PRN Mild Pain (1 - 3)  clonazePAM Tablet 0.5 milliGRAM(s) Oral every 8 hours PRN anxiety  oxyCODONE    5 mG/acetaminophen 325 mG 1 Tablet(s) Oral every 6 hours PRN Moderate Pain (4 - 6)  oxyCODONE    5 mG/acetaminophen 325 mG 2 Tablet(s) Oral every 6 hours PRN Severe Pain (7 - 10)  zolpidem 5 milliGRAM(s) Oral at bedtime PRN Insomnia    Respiratory Medications  ALBUTerol/ipratropium for Nebulization 3 milliLiter(s) Nebulizer every 6 hours  tiotropium 18 MICROgram(s) Capsule 1 Capsule(s) Inhalation daily    Cardiovascular Medications  amLODIPine   Tablet 5 milliGRAM(s) Oral daily  losartan 25 milliGRAM(s) Oral daily    Gastrointestinal Medications  docusate sodium 100 milliGRAM(s) Oral three times a day  pantoprazole    Tablet 40 milliGRAM(s) Oral before breakfast  polyethylene glycol 3350 17 Gram(s) Oral daily PRN Constipation  simethicone 80 milliGRAM(s) Chew at bedtime    Genitourinary Medications    Hematologic/Oncologic Medications  aspirin  chewable 81 milliGRAM(s) Oral daily  heparin  Injectable 5000 Unit(s) SubCutaneous every 12 hours    Antimicrobial/Immunologic Medications  azithromycin   Tablet 250 milliGRAM(s) Oral <User Schedule>    Endocrine/Metabolic Medications    Topical/Other Medications  chlorhexidine 4% Liquid 1 Application(s) Topical <User Schedule>    --------------------------------------------------------------------------------------    VITAL SIGNS, INS/OUTS (last 24 hours):  --------------------------------------------------------------------------------------  ((Insert SICU Vitals/Is+Os here))***  --------------------------------------------------------------------------------------    EXAM  NEUROLOGY  Exam: Normal, alert, oriented, sitting up in bed, writing on board    HEENT  Exam: atraumatic.  EOMI + Trach size 8    RESPIRATORY  Exam: Lungs clear to auscultation, No respiratory distress   Mechanical Ventilation: Pressure support FiO2 30 PEEP 5 Pressure Support 18    CARDIOVASCULAR  Exam: S1, S2.  Regular rate and rhythm     GI/NUTRITION  Exam: Abdomen soft, Non-tender, Non-distended.   Current Diet:  soft    VASCULAR  Exam: Extremities warm, pink, well-perfused.     MUSCULOSKELETAL  Exam: All extremities moving spontaneously without limitations.      SKIN:  Exam: Good skin turgor, no skin breakdown.      METABOLIC/FLUIDS/ELECTROLYTES      HEMATOLOGIC  [x] VTE Prophylaxis: aspirin  chewable 81 milliGRAM(s) Oral daily  heparin  Injectable 5000 Unit(s) SubCutaneous every 12 hours    Transfusions:	[] PRBC	[] Platelets		[] FFP	[] Cryoprecipitate    INFECTIOUS DISEASE  Antimicrobials/Immunologic Medications:  azithromycin   Tablet 250 milliGRAM(s) Oral <User Schedule>    Tubes/Lines/Drains   [x] Peripheral IV  [] Central Venous Line     	[] R	[] L	[] IJ	[] Fem	[] SC	Date Placed:   [] Arterial Line		[] R	[] L	[] Fem	[] Rad	[] Ax	Date Placed:   [] PICC		[] Midline		[] Mediport  [] Urinary Catheter		Date Placed:   [x] Necessity of urinary, arterial, and venous catheters discussed    LABS  --------------------------------------------------------------------------------------  ((Insert SICU Labs here))***  --------------------------------------------------------------------------------------    OTHER LABORATORY:     IMAGING STUDIES:   CXR: SICU Daily Progress Note  =====================================================  Interval/Overnight Events:  No acute events overnight    POD #  1        	SICU Day #   2    58y female with HTN, anxiety, Sickle cell trait, lupus anticoagulant syndrome, GERD, chronic hypoxemic respiratory failure, COPD, chronic respiratory insuffiencey h/o tracheostomy on pressure support ventilation at home now s/p stomatoplasty and bronch for tracheostenosis patient in pacu on mechanical ventilation, SICU consulted for ventilator dependence.     Allergies: Cipro (Unknown)  latex (Unknown)    MEDICATIONS:   --------------------------------------------------------------------------------------  Neurologic Medications  acetaminophen   Tablet .. 325 milliGRAM(s) Oral every 6 hours PRN Mild Pain (1 - 3)  clonazePAM Tablet 0.5 milliGRAM(s) Oral every 8 hours PRN anxiety  oxyCODONE    5 mG/acetaminophen 325 mG 1 Tablet(s) Oral every 6 hours PRN Moderate Pain (4 - 6)  oxyCODONE    5 mG/acetaminophen 325 mG 2 Tablet(s) Oral every 6 hours PRN Severe Pain (7 - 10)  zolpidem 5 milliGRAM(s) Oral at bedtime PRN Insomnia    Respiratory Medications  ALBUTerol/ipratropium for Nebulization 3 milliLiter(s) Nebulizer every 6 hours  tiotropium 18 MICROgram(s) Capsule 1 Capsule(s) Inhalation daily    Cardiovascular Medications  amLODIPine   Tablet 5 milliGRAM(s) Oral daily  losartan 25 milliGRAM(s) Oral daily    Gastrointestinal Medications  docusate sodium 100 milliGRAM(s) Oral three times a day  pantoprazole    Tablet 40 milliGRAM(s) Oral before breakfast  polyethylene glycol 3350 17 Gram(s) Oral daily PRN Constipation  simethicone 80 milliGRAM(s) Chew at bedtime    Genitourinary Medications    Hematologic/Oncologic Medications  aspirin  chewable 81 milliGRAM(s) Oral daily  heparin  Injectable 5000 Unit(s) SubCutaneous every 12 hours    Antimicrobial/Immunologic Medications  azithromycin   Tablet 250 milliGRAM(s) Oral <User Schedule>    Endocrine/Metabolic Medications    Topical/Other Medications  chlorhexidine 4% Liquid 1 Application(s) Topical <User Schedule>    --------------------------------------------------------------------------------------    VITAL SIGNS, INS/OUTS (last 24 hours):  --------------------------------------------------------------------------------------  ((Insert SICU Vitals/Is+Os here))***  --------------------------------------------------------------------------------------    EXAM  NEUROLOGY  Exam: Normal, alert, oriented, sitting up in bed, writing on board    HEENT  Exam: atraumatic.  EOMI + Trach size 8    RESPIRATORY  Exam: Lungs clear to auscultation, No respiratory distress   Mechanical Ventilation: Pressure support FiO2 30 PEEP 5 Pressure Support 18    CARDIOVASCULAR  Exam: S1, S2.  Regular rate and rhythm     GI/NUTRITION  Exam: Abdomen soft, Non-tender, Non-distended.   Current Diet:  soft    VASCULAR  Exam: Extremities warm, pink, well-perfused.     MUSCULOSKELETAL  Exam: All extremities moving spontaneously without limitations.      SKIN:  Exam: Good skin turgor, no skin breakdown.      METABOLIC/FLUIDS/ELECTROLYTES      HEMATOLOGIC  [x] VTE Prophylaxis: aspirin  chewable 81 milliGRAM(s) Oral daily  heparin  Injectable 5000 Unit(s) SubCutaneous every 12 hours    Transfusions:	[] PRBC	[] Platelets		[] FFP	[] Cryoprecipitate    INFECTIOUS DISEASE  Antimicrobials/Immunologic Medications:  azithromycin   Tablet 250 milliGRAM(s) Oral <User Schedule>    Tubes/Lines/Drains   [x] Peripheral IV  [] Central Venous Line     	[] R	[] L	[] IJ	[] Fem	[] SC	Date Placed:   [] Arterial Line		[] R	[] L	[] Fem	[] Rad	[] Ax	Date Placed:   [] PICC		[] Midline		[] Mediport  [] Urinary Catheter		Date Placed:   [x] Necessity of urinary, arterial, and venous catheters discussed    LABS  --------------------------------------------------------------------------------------  ((Insert SICU Labs here))***  --------------------------------------------------------------------------------------    OTHER LABORATORY:     IMAGING STUDIES:   CXR:

## 2019-01-17 RX ORDER — DOCUSATE SODIUM 100 MG
100 CAPSULE ORAL
Qty: 0 | Refills: 0 | Status: DISCONTINUED | OUTPATIENT
Start: 2019-01-17 | End: 2019-01-18

## 2019-01-17 RX ORDER — AMLODIPINE BESYLATE 2.5 MG/1
5 TABLET ORAL ONCE
Qty: 0 | Refills: 0 | Status: COMPLETED | OUTPATIENT
Start: 2019-01-17 | End: 2019-01-17

## 2019-01-17 RX ORDER — AMLODIPINE BESYLATE 2.5 MG/1
10 TABLET ORAL DAILY
Qty: 0 | Refills: 0 | Status: DISCONTINUED | OUTPATIENT
Start: 2019-01-18 | End: 2019-01-18

## 2019-01-17 RX ORDER — OXYCODONE HYDROCHLORIDE 5 MG/1
10 TABLET ORAL ONCE
Qty: 0 | Refills: 0 | Status: DISCONTINUED | OUTPATIENT
Start: 2019-01-17 | End: 2019-01-17

## 2019-01-17 RX ADMIN — AZITHROMYCIN 250 MILLIGRAM(S): 500 TABLET, FILM COATED ORAL at 13:54

## 2019-01-17 RX ADMIN — Medication 81 MILLIGRAM(S): at 11:19

## 2019-01-17 RX ADMIN — OXYCODONE HYDROCHLORIDE 10 MILLIGRAM(S): 5 TABLET ORAL at 14:25

## 2019-01-17 RX ADMIN — OXYCODONE AND ACETAMINOPHEN 2 TABLET(S): 5; 325 TABLET ORAL at 01:00

## 2019-01-17 RX ADMIN — OXYCODONE AND ACETAMINOPHEN 2 TABLET(S): 5; 325 TABLET ORAL at 11:19

## 2019-01-17 RX ADMIN — Medication 3 MILLILITER(S): at 21:05

## 2019-01-17 RX ADMIN — AMLODIPINE BESYLATE 5 MILLIGRAM(S): 2.5 TABLET ORAL at 09:38

## 2019-01-17 RX ADMIN — OXYCODONE AND ACETAMINOPHEN 2 TABLET(S): 5; 325 TABLET ORAL at 23:00

## 2019-01-17 RX ADMIN — ZOLPIDEM TARTRATE 5 MILLIGRAM(S): 10 TABLET ORAL at 23:38

## 2019-01-17 RX ADMIN — LOSARTAN POTASSIUM 25 MILLIGRAM(S): 100 TABLET, FILM COATED ORAL at 01:57

## 2019-01-17 RX ADMIN — Medication 3 MILLILITER(S): at 05:28

## 2019-01-17 RX ADMIN — OXYCODONE HYDROCHLORIDE 10 MILLIGRAM(S): 5 TABLET ORAL at 13:54

## 2019-01-17 RX ADMIN — OXYCODONE AND ACETAMINOPHEN 2 TABLET(S): 5; 325 TABLET ORAL at 22:06

## 2019-01-17 RX ADMIN — Medication 100 MILLIGRAM(S): at 01:57

## 2019-01-17 RX ADMIN — SIMETHICONE 80 MILLIGRAM(S): 80 TABLET, CHEWABLE ORAL at 22:05

## 2019-01-17 RX ADMIN — OXYCODONE AND ACETAMINOPHEN 2 TABLET(S): 5; 325 TABLET ORAL at 00:00

## 2019-01-17 RX ADMIN — Medication 3 MILLILITER(S): at 09:32

## 2019-01-17 RX ADMIN — AMLODIPINE BESYLATE 5 MILLIGRAM(S): 2.5 TABLET ORAL at 05:38

## 2019-01-17 RX ADMIN — Medication 100 MILLIGRAM(S): at 17:47

## 2019-01-17 RX ADMIN — HEPARIN SODIUM 5000 UNIT(S): 5000 INJECTION INTRAVENOUS; SUBCUTANEOUS at 17:47

## 2019-01-17 RX ADMIN — HEPARIN SODIUM 5000 UNIT(S): 5000 INJECTION INTRAVENOUS; SUBCUTANEOUS at 05:38

## 2019-01-17 RX ADMIN — CHLORHEXIDINE GLUCONATE 1 APPLICATION(S): 213 SOLUTION TOPICAL at 17:47

## 2019-01-17 RX ADMIN — OXYCODONE AND ACETAMINOPHEN 2 TABLET(S): 5; 325 TABLET ORAL at 11:50

## 2019-01-17 NOTE — PROGRESS NOTE ADULT - SUBJECTIVE AND OBJECTIVE BOX
Patient seen and examined. No acute events. Trach changed to 6LPC without event yesterday.     T(C): 36.6 (01-17-19 @ 04:00), Max: 37.2 (01-16-19 @ 20:00)  HR: 66 (01-17-19 @ 05:28) (56 - 78)  BP: 151/74 (01-17-19 @ 05:00) (99/62 - 160/90)  RR: 20 (01-17-19 @ 05:00) (12 - 20)  SpO2: 100% (01-17-19 @ 05:28) (95% - 100%)    NAD, anxious  Breathing comfortably on mech vent. No stridor, no stertor, no retractions  Nose: nares patent anteriorly  OC/OP: tongue wnl, OP clear  Neck soft, flat, no crepitus or palpable collection  6LPC in place, cuff inflated    A/P 58F hx tracheal stenosis s/p DLB, stomaplasty 1/14.   - home meds  - mech vent  - pain control PRN  - diet  - SQH  - dispo planning

## 2019-01-17 NOTE — PROGRESS NOTE ADULT - ASSESSMENT
58y female with chronic hypoxemic respiratory failure, COPD, chronic respiratory insuffiencey s/p tracheostomy on pressure support ventilation at home now s/p stomatoplasty and bronch for tracheomalacia patient in pacu on mechanical ventilation, SICU consulted for being on the ventilator.     PLAN:   Neurologic:   - Tylenol PRN pain,   - klonopin for chronic anxiety     Respiratory:   - Continue pressure support ventilation, continue home nebulizers    Cardiovascular:   - Continue home medications, amlodipine and losartan for HTN      Gastrointestinal/Nutrition:  -Mechanical soft diet  -Protonix daily for GERD  -Continue bowel regimen    Renal/Genitourinary:   -IVL  -Replete electrolytes PRN  -Monitor intake & output     Hematologic:   -SQH for VTE ppx    Infectious Disease:   -On chronic azithromycin     Tubes/Lines/Drains: PIV  Endocrine:   -No active issues     Disposition: D/c home when home ventilator issue is resolved 58y female with chronic hypoxemic respiratory failure, COPD, chronic respiratory insuffiencey s/p tracheostomy on pressure support ventilation at home now s/p stomatoplasty and bronch for tracheomalacia patient in pacu on mechanical ventilation, SICU consulted for being on the ventilator.     PLAN:   Neurologic:   - Tylenol PRN pain  - klonopin for chronic anxiety     Respiratory:   - Continue pressure support ventilation, continue home nebulizers    Cardiovascular:   - Continue home medications, amlodipine and losartan for HTN      Gastrointestinal/Nutrition:  -Puree diet for patient comfort  -Protonix daily for GERD  -Continue bowel regimen    Renal/Genitourinary:   -IVL  -Replete electrolytes PRN  -Monitor intake & output     Hematologic:   -SQH for VTE ppx    Infectious Disease:   -On chronic azithromycin     Tubes/Lines/Drains: PIV  Endocrine:   -No active issues     Disposition: D/c home when home ventilator issue is resolved

## 2019-01-17 NOTE — PROGRESS NOTE ADULT - SUBJECTIVE AND OBJECTIVE BOX
SICU Daily Progress Note  =====================================================  Interval/Overnight Events:  Trach downsized from 8 -->6 by ENT. Unable to be discharged due to social issues. Home ventilator malfunctioning. Awaiting repair of ventilator for discharge.     POD #  2        	SICU Day #   3    58y female with HTN, anxiety, Sickle cell trait, lupus anticoagulant syndrome, GERD, chronic hypoxemic respiratory failure, COPD, chronic respiratory insuffiencey h/o tracheostomy on pressure support ventilation at home now s/p stomatoplasty and bronch for tracheostenosis patient in pacu on mechanical ventilation, SICU consulted for ventilator dependence.     Allergies: Cipro (Unknown)  latex (Unknown)      MEDICATIONS:   --------------------------------------------------------------------------------------  Neurologic Medications  acetaminophen   Tablet .. 325 milliGRAM(s) Oral every 6 hours PRN Mild Pain (1 - 3)  clonazePAM Tablet 0.5 milliGRAM(s) Oral every 8 hours PRN anxiety  oxyCODONE    5 mG/acetaminophen 325 mG 1 Tablet(s) Oral every 6 hours PRN Moderate Pain (4 - 6)  oxyCODONE    5 mG/acetaminophen 325 mG 2 Tablet(s) Oral every 6 hours PRN Severe Pain (7 - 10)  zolpidem 5 milliGRAM(s) Oral at bedtime PRN Insomnia    Respiratory Medications  ALBUTerol/ipratropium for Nebulization 3 milliLiter(s) Nebulizer every 6 hours  tiotropium 18 MICROgram(s) Capsule 1 Capsule(s) Inhalation daily    Cardiovascular Medications  amLODIPine   Tablet 5 milliGRAM(s) Oral daily  losartan 25 milliGRAM(s) Oral daily    Gastrointestinal Medications  docusate sodium Liquid 100 milliGRAM(s) Oral two times a day  pantoprazole    Tablet 40 milliGRAM(s) Oral before breakfast  polyethylene glycol 3350 17 Gram(s) Oral daily PRN Constipation  simethicone 80 milliGRAM(s) Chew at bedtime    Genitourinary Medications    Hematologic/Oncologic Medications  aspirin  chewable 81 milliGRAM(s) Oral daily  heparin  Injectable 5000 Unit(s) SubCutaneous every 12 hours    Antimicrobial/Immunologic Medications  azithromycin   Tablet 250 milliGRAM(s) Oral <User Schedule>    Endocrine/Metabolic Medications    Topical/Other Medications  chlorhexidine 4% Liquid 1 Application(s) Topical <User Schedule>    --------------------------------------------------------------------------------------    VITAL SIGNS, INS/OUTS (last 24 hours):  --------------------------------------------------------------------------------------  ((Insert SICU Vitals/Is+Os here))***  --------------------------------------------------------------------------------------    EXAM  NEUROLOGY  Exam: Normal, alert, oriented, sitting up in bed, writing on board    HEENT  Exam: atraumatic.  EOMI + Trach size 8    RESPIRATORY  Exam: Lungs clear to auscultation, No respiratory distress   Mechanical Ventilation: Pressure support FiO2 30 PEEP 5 Pressure Support 18    CARDIOVASCULAR  Exam: S1, S2.  Regular rate and rhythm     GI/NUTRITION  Exam: Abdomen soft, Non-tender, Non-distended.   Current Diet:  soft    VASCULAR  Exam: Extremities warm, pink, well-perfused.     MUSCULOSKELETAL  Exam: All extremities moving spontaneously without limitations.      SKIN:  Exam: Good skin turgor, no skin breakdown.      METABOLIC/FLUIDS/ELECTROLYTES      HEMATOLOGIC  [x] VTE Prophylaxis: aspirin  chewable 81 milliGRAM(s) Oral daily  heparin  Injectable 5000 Unit(s) SubCutaneous every 12 hours    Transfusions:	[] PRBC	[] Platelets		[] FFP	[] Cryoprecipitate    INFECTIOUS DISEASE  Antimicrobials/Immunologic Medications:  azithromycin   Tablet 250 milliGRAM(s) Oral <User Schedule>    Day #      of     ***    Tubes/Lines/Drains  ***  [x] Peripheral IV  [] Central Venous Line     	[] R	[] L	[] IJ	[] Fem	[] SC	Date Placed:   [] Arterial Line		[] R	[] L	[] Fem	[] Rad	[] Ax	Date Placed:   [] PICC		[] Midline		[] Mediport  [] Urinary Catheter		Date Placed:   [x] Necessity of urinary, arterial, and venous catheters discussed    LABS  --------------------------------------------------------------------------------------  ((Insert SICU Labs here))***  --------------------------------------------------------------------------------------    OTHER LABORATORY:     IMAGING STUDIES:   CXR: SICU Daily Progress Note  =====================================================  Interval/Overnight Events:  Trach downsized from 8 -->6 by ENT. Unable to be discharged due to social issues. Home ventilator malfunctioning. Awaiting repair of ventilator for discharge.     POD #  2        	SICU Day #   3    58y female with HTN, anxiety, Sickle cell trait, lupus anticoagulant syndrome, GERD, chronic hypoxemic respiratory failure, COPD, chronic respiratory insuffiencey h/o tracheostomy on pressure support ventilation at home now s/p stomatoplasty and bronch for tracheostenosis patient in pacu on mechanical ventilation, SICU consulted for ventilator dependence.     Allergies: Cipro (Unknown)  latex (Unknown)      MEDICATIONS:   --------------------------------------------------------------------------------------  Neurologic Medications  acetaminophen   Tablet .. 325 milliGRAM(s) Oral every 6 hours PRN Mild Pain (1 - 3)  clonazePAM Tablet 0.5 milliGRAM(s) Oral every 8 hours PRN anxiety  oxyCODONE    5 mG/acetaminophen 325 mG 1 Tablet(s) Oral every 6 hours PRN Moderate Pain (4 - 6)  oxyCODONE    5 mG/acetaminophen 325 mG 2 Tablet(s) Oral every 6 hours PRN Severe Pain (7 - 10)  zolpidem 5 milliGRAM(s) Oral at bedtime PRN Insomnia    Respiratory Medications  ALBUTerol/ipratropium for Nebulization 3 milliLiter(s) Nebulizer every 6 hours  tiotropium 18 MICROgram(s) Capsule 1 Capsule(s) Inhalation daily    Cardiovascular Medications  amLODIPine   Tablet 5 milliGRAM(s) Oral daily  losartan 25 milliGRAM(s) Oral daily    Gastrointestinal Medications  docusate sodium Liquid 100 milliGRAM(s) Oral two times a day  pantoprazole    Tablet 40 milliGRAM(s) Oral before breakfast  polyethylene glycol 3350 17 Gram(s) Oral daily PRN Constipation  simethicone 80 milliGRAM(s) Chew at bedtime    Genitourinary Medications    Hematologic/Oncologic Medications  aspirin  chewable 81 milliGRAM(s) Oral daily  heparin  Injectable 5000 Unit(s) SubCutaneous every 12 hours    Antimicrobial/Immunologic Medications  azithromycin   Tablet 250 milliGRAM(s) Oral <User Schedule>    Endocrine/Metabolic Medications    Topical/Other Medications  chlorhexidine 4% Liquid 1 Application(s) Topical <User Schedule>    --------------------------------------------------------------------------------------    VITAL SIGNS, INS/OUTS (last 24 hours):  --------------------------------------------------------------------------------------  ((Insert SICU Vitals/Is+Os here))***  --------------------------------------------------------------------------------------    EXAM  NEUROLOGY  Exam: Normal, alert, oriented, sitting up in bed, writing on board    HEENT  Exam: atraumatic.  EOMI + Trach size 6    RESPIRATORY  Exam: Lungs clear to auscultation, No respiratory distress   Mechanical Ventilation: Pressure support FiO2 30 PEEP 5 Pressure Support 18    CARDIOVASCULAR  Exam: S1, S2.  Regular rate and rhythm     GI/NUTRITION  Exam: Abdomen soft, Non-tender, Non-distended.   Current Diet:  puree    VASCULAR  Exam: Extremities warm, pink, well-perfused.     MUSCULOSKELETAL  Exam: All extremities moving spontaneously without limitations.      SKIN:  Exam: Good skin turgor, no skin breakdown.      METABOLIC/FLUIDS/ELECTROLYTES      HEMATOLOGIC  [x] VTE Prophylaxis: aspirin  chewable 81 milliGRAM(s) Oral daily  heparin  Injectable 5000 Unit(s) SubCutaneous every 12 hours    Transfusions:	[] PRBC	[] Platelets		[] FFP	[] Cryoprecipitate    INFECTIOUS DISEASE  Antimicrobials/Immunologic Medications:  azithromycin   Tablet 250 milliGRAM(s) Oral <User Schedule>    Day #      of     ***    Tubes/Lines/Drains  ***  [x] Peripheral IV  [] Central Venous Line     	[] R	[] L	[] IJ	[] Fem	[] SC	Date Placed:   [] Arterial Line		[] R	[] L	[] Fem	[] Rad	[] Ax	Date Placed:   [] PICC		[] Midline		[] Mediport  [] Urinary Catheter		Date Placed:   [x] Necessity of urinary, arterial, and venous catheters discussed    LABS  --------------------------------------------------------------------------------------  ((Insert SICU Labs here))***  --------------------------------------------------------------------------------------    OTHER LABORATORY:     IMAGING STUDIES:   CXR:

## 2019-01-18 VITALS — HEART RATE: 72 BPM | OXYGEN SATURATION: 100 %

## 2019-01-18 PROCEDURE — 99232 SBSQ HOSP IP/OBS MODERATE 35: CPT | Mod: GC

## 2019-01-18 RX ORDER — ONDANSETRON 8 MG/1
4 TABLET, FILM COATED ORAL ONCE
Qty: 0 | Refills: 0 | Status: COMPLETED | OUTPATIENT
Start: 2019-01-18 | End: 2019-01-18

## 2019-01-18 RX ORDER — CLONAZEPAM 1 MG
0.5 TABLET ORAL ONCE
Qty: 0 | Refills: 0 | Status: DISCONTINUED | OUTPATIENT
Start: 2019-01-18 | End: 2019-01-18

## 2019-01-18 RX ORDER — ONDANSETRON 8 MG/1
4 TABLET, FILM COATED ORAL ONCE
Qty: 0 | Refills: 0 | Status: DISCONTINUED | OUTPATIENT
Start: 2019-01-18 | End: 2019-01-18

## 2019-01-18 RX ORDER — ACETAMINOPHEN 500 MG
1000 TABLET ORAL ONCE
Qty: 0 | Refills: 0 | Status: COMPLETED | OUTPATIENT
Start: 2019-01-18 | End: 2019-01-18

## 2019-01-18 RX ORDER — METOCLOPRAMIDE HCL 10 MG
10 TABLET ORAL ONCE
Qty: 0 | Refills: 0 | Status: COMPLETED | OUTPATIENT
Start: 2019-01-18 | End: 2019-01-18

## 2019-01-18 RX ADMIN — Medication 0.5 MILLIGRAM(S): at 11:01

## 2019-01-18 RX ADMIN — ONDANSETRON 4 MILLIGRAM(S): 8 TABLET, FILM COATED ORAL at 06:50

## 2019-01-18 RX ADMIN — Medication 10 MILLIGRAM(S): at 09:15

## 2019-01-18 RX ADMIN — Medication 3 MILLILITER(S): at 08:27

## 2019-01-18 RX ADMIN — Medication 3 MILLILITER(S): at 14:32

## 2019-01-18 RX ADMIN — Medication 400 MILLIGRAM(S): at 09:31

## 2019-01-18 RX ADMIN — Medication 100 MILLIGRAM(S): at 06:23

## 2019-01-18 RX ADMIN — HEPARIN SODIUM 5000 UNIT(S): 5000 INJECTION INTRAVENOUS; SUBCUTANEOUS at 06:23

## 2019-01-18 RX ADMIN — OXYCODONE AND ACETAMINOPHEN 2 TABLET(S): 5; 325 TABLET ORAL at 08:49

## 2019-01-18 RX ADMIN — Medication 0.5 MILLIGRAM(S): at 08:52

## 2019-01-18 RX ADMIN — AMLODIPINE BESYLATE 10 MILLIGRAM(S): 2.5 TABLET ORAL at 06:24

## 2019-01-18 RX ADMIN — OXYCODONE AND ACETAMINOPHEN 2 TABLET(S): 5; 325 TABLET ORAL at 08:53

## 2019-01-18 RX ADMIN — LOSARTAN POTASSIUM 25 MILLIGRAM(S): 100 TABLET, FILM COATED ORAL at 06:23

## 2019-01-18 NOTE — PROGRESS NOTE ADULT - ASSESSMENT
58y female with chronic hypoxemic respiratory failure, COPD, chronic respiratory insuffiencey s/p tracheostomy on pressure support ventilation at home now s/p stomatoplasty and bronch for tracheomalacia patient in pacu on mechanical ventilation, SICU consulted for being on the ventilator.     PLAN:   Neurologic:   - Tylenol PRN pain  - klonopin for chronic anxiety     Respiratory:   - Continue pressure support ventilation, continue home nebulizers    Cardiovascular:   - Continue home medications, amlodipine and losartan for HTN      Gastrointestinal/Nutrition:  -Puree diet for patient comfort  -Protonix daily for GERD  -Continue bowel regimen    Renal/Genitourinary:   -IVL  -Replete electrolytes PRN  -Monitor intake & output     Hematologic:   -SQH for VTE ppx    Infectious Disease:   -On chronic azithromycin     Tubes/Lines/Drains: PIV  Endocrine:   -No active issues     Disposition: D/c home when home today

## 2019-01-18 NOTE — PROGRESS NOTE ADULT - SUBJECTIVE AND OBJECTIVE BOX
SICU Daily Progress Note  =====================================================  Interval/Overnight Events:    Nausea and anxiety - stable on vent, will d/c home    58y female with chronic hypoxemic respiratory failure, COPD, chronic respiratory insuffiencey s/p tracheostomy on pressure support ventilation at home now s/p stomatoplasty and bronch for tracheomalacia patient in pacu on mechanical ventilation, SICU consulted for being on the ventilator.       Allergies: Cipro (Unknown)  latex (Unknown)      MEDICATIONS:   --------------------------------------------------------------------------------------  Neurologic Medications  acetaminophen   Tablet .. 325 milliGRAM(s) Oral every 6 hours PRN Mild Pain (1 - 3)  clonazePAM Tablet 0.5 milliGRAM(s) Oral every 8 hours PRN anxiety  ondansetron    Tablet 4 milliGRAM(s) Oral once  oxyCODONE    5 mG/acetaminophen 325 mG 1 Tablet(s) Oral every 6 hours PRN Moderate Pain (4 - 6)  oxyCODONE    5 mG/acetaminophen 325 mG 2 Tablet(s) Oral every 6 hours PRN Severe Pain (7 - 10)  zolpidem 5 milliGRAM(s) Oral at bedtime PRN Insomnia    Respiratory Medications  ALBUTerol/ipratropium for Nebulization 3 milliLiter(s) Nebulizer every 6 hours  tiotropium 18 MICROgram(s) Capsule 1 Capsule(s) Inhalation daily    Cardiovascular Medications  amLODIPine   Tablet 10 milliGRAM(s) Oral daily  losartan 25 milliGRAM(s) Oral daily    Gastrointestinal Medications  docusate sodium Liquid 100 milliGRAM(s) Oral two times a day  pantoprazole    Tablet 40 milliGRAM(s) Oral before breakfast  polyethylene glycol 3350 17 Gram(s) Oral daily PRN Constipation  simethicone 80 milliGRAM(s) Chew at bedtime    Genitourinary Medications    Hematologic/Oncologic Medications  aspirin  chewable 81 milliGRAM(s) Oral daily  heparin  Injectable 5000 Unit(s) SubCutaneous every 12 hours    Antimicrobial/Immunologic Medications  azithromycin   Tablet 250 milliGRAM(s) Oral <User Schedule>    Endocrine/Metabolic Medications    Topical/Other Medications  chlorhexidine 4% Liquid 1 Application(s) Topical <User Schedule>    --------------------------------------------------------------------------------------      EXAM  NEUROLOGY  RASS:   	GCS:    Exam: Normal, NAD, alert, oriented x3, no focal deficits.    HEENT  Exam: Normocephalic, atraumatic, EOMI.     RESPIRATORY  Exam: Lungs clear to auscultation, Normal expansion/effort.   Mechanical Ventilation: Mode: CPAP with PS, FiO2: 30, PEEP: 5, PS: 18    CARDIOVASCULAR  Exam: S1, S2.  Regular rate and rhythm.      GI/NUTRITION  Exam: Abdomen soft, Non-tender, Non-distended.       VASCULAR  Exam: Extremities warm, pink, well-perfused.     MUSCULOSKELETAL  Exam: All extremities moving spontaneously without limitations.     SKIN  Exam: Good skin turgor, no skin breakdown.     METABOLIC/FLUIDS/ELECTROLYTES      HEMATOLOGIC  [x] VTE Prophylaxis: aspirin  chewable 81 milliGRAM(s) Oral daily  heparin  Injectable 5000 Unit(s) SubCutaneous every 12 hours    Transfusions:	[] PRBC	[] Platelets		[] FFP	[] Cryoprecipitate    INFECTIOUS DISEASE  Antimicrobials/Immunologic Medications:  azithromycin   Tablet 250 milliGRAM(s) Oral <User Schedule>    Day #      of     ***    Tubes/Lines/Drains  ***  [x] Peripheral IV  [] Central Venous Line     	[] R	[] L	[] IJ	[] Fem	[] SC	Date Placed:   [] Arterial Line		[] R	[] L	[] Fem	[] Rad	[] Ax	Date Placed:   [] PICC		[] Midline		[] Mediport  [] Urinary Catheter		Date Placed:   [x] Necessity of urinary, arterial, and venous catheters discussed    LABS  --------------------------------------------------------------------------------------    OTHER LABORATORY:     IMAGING STUDIES:   CXR:

## 2019-01-18 NOTE — PROGRESS NOTE ADULT - SUBJECTIVE AND OBJECTIVE BOX
Patient seen and examined. No acute events. stable for discharge yesterday but couldnt get ride.      NAD, anxious  Breathing comfortably on mech vent. No stridor, no stertor, no retractions  Nose: nares patent anteriorly  OC/OP: tongue wnl, OP clear  Neck soft, flat, no crepitus or palpable collection  6LPC in place    A/P 58F hx tracheal stenosis s/p DLB, stomaplasty 1/14.   - home meds  - mech vent  - pain control PRN  - diet  - SQH  - dispo

## 2019-01-18 NOTE — PROGRESS NOTE ADULT - ATTENDING COMMENTS
Current Issues:  J39.8 Tracheomalacia   - stable s/p stomplasty  J96.10 Chronic respiratory failure, unspecified whether with hypoxia or hypercapnia   - stable, is chronically on vent  F41.9 Anxiety   - on home clonazepam  Z91.89 At risk for malnutrition  - on diet

## 2019-01-18 NOTE — PROVIDER CONTACT NOTE (OTHER) - ASSESSMENT
Patient agitated, unable to follow commands, given nebulizer treatment as requested- patient self suctions.  pt given percocet for pain and clonidine for agitation- threw up <1 min later.

## 2019-01-24 ENCOUNTER — APPOINTMENT (OUTPATIENT)
Dept: OTOLARYNGOLOGY | Facility: CLINIC | Age: 59
End: 2019-01-24
Payer: MEDICAID

## 2019-01-24 ENCOUNTER — OUTPATIENT (OUTPATIENT)
Dept: OUTPATIENT SERVICES | Facility: HOSPITAL | Age: 59
LOS: 1 days | Discharge: HOME | End: 2019-01-24

## 2019-01-24 DIAGNOSIS — Z98.891 HISTORY OF UTERINE SCAR FROM PREVIOUS SURGERY: Chronic | ICD-10-CM

## 2019-01-24 DIAGNOSIS — Z43.0 ENCOUNTER FOR ATTENTION TO TRACHEOSTOMY: Chronic | ICD-10-CM

## 2019-01-24 DIAGNOSIS — J39.8 OTHER SPECIFIED DISEASES OF UPPER RESPIRATORY TRACT: Chronic | ICD-10-CM

## 2019-01-24 DIAGNOSIS — Z93.0 TRACHEOSTOMY STATUS: Chronic | ICD-10-CM

## 2019-01-24 PROCEDURE — 99024 POSTOP FOLLOW-UP VISIT: CPT

## 2019-01-24 PROCEDURE — 31615 TRCHEOBRNCHSC EST TRACHS INC: CPT | Mod: 58

## 2019-01-29 PROBLEM — J39.8 OTHER SPECIFIED DISEASES OF UPPER RESPIRATORY TRACT: Chronic | Status: ACTIVE | Noted: 2019-01-03

## 2019-01-29 PROBLEM — D68.62 LUPUS ANTICOAGULANT SYNDROME: Chronic | Status: ACTIVE | Noted: 2019-01-03

## 2019-02-01 ENCOUNTER — APPOINTMENT (OUTPATIENT)
Dept: OTOLARYNGOLOGY | Facility: CLINIC | Age: 59
End: 2019-02-01
Payer: MEDICAID

## 2019-02-01 DIAGNOSIS — J95.02 INFECTION OF TRACHEOSTOMY STOMA: ICD-10-CM

## 2019-02-01 PROCEDURE — 31615 TRCHEOBRNCHSC EST TRACHS INC: CPT | Mod: 79,59

## 2019-02-01 PROCEDURE — 99214 OFFICE O/P EST MOD 30 MIN: CPT | Mod: 24,25

## 2019-02-01 PROCEDURE — 17250 CHEM CAUT OF GRANLTJ TISSUE: CPT | Mod: 58

## 2019-02-27 ENCOUNTER — APPOINTMENT (OUTPATIENT)
Dept: OTOLARYNGOLOGY | Facility: CLINIC | Age: 59
End: 2019-02-27
Payer: MEDICAID

## 2019-02-27 PROCEDURE — 17250 CHEM CAUT OF GRANLTJ TISSUE: CPT | Mod: 58

## 2019-02-27 PROCEDURE — 31615 TRCHEOBRNCHSC EST TRACHS INC: CPT | Mod: 58

## 2019-02-27 PROCEDURE — 99024 POSTOP FOLLOW-UP VISIT: CPT

## 2019-03-04 ENCOUNTER — MEDICATION RENEWAL (OUTPATIENT)
Age: 59
End: 2019-03-04

## 2019-03-11 ENCOUNTER — APPOINTMENT (OUTPATIENT)
Dept: PULMONOLOGY | Facility: CLINIC | Age: 59
End: 2019-03-11
Payer: MEDICAID

## 2019-03-11 VITALS
SYSTOLIC BLOOD PRESSURE: 146 MMHG | OXYGEN SATURATION: 100 % | RESPIRATION RATE: 17 BRPM | WEIGHT: 190 LBS | HEART RATE: 63 BPM | DIASTOLIC BLOOD PRESSURE: 84 MMHG | TEMPERATURE: 98.3 F | BODY MASS INDEX: 32.44 KG/M2 | HEIGHT: 64 IN

## 2019-03-11 PROCEDURE — 99214 OFFICE O/P EST MOD 30 MIN: CPT

## 2019-03-11 RX ORDER — SOFT LENS RINSE,STORE SOLUTION
0.9 SOLUTION, NON-ORAL MISCELLANEOUS
Qty: 25 | Refills: 5 | Status: ACTIVE | COMMUNITY
Start: 2019-03-11 | End: 1900-01-01

## 2019-03-11 NOTE — REASON FOR VISIT
[Follow-Up] : a follow-up visit [FreeTextEntry1] : chronic hypoxemic respiratory failure, COPD, chronic respiratory insufficiency, complication of tracheostomy, dependence on tracheostomy, dysphonia, GERD, tracheal stenosis and tracheomalacia

## 2019-03-11 NOTE — PROCEDURE
[FreeTextEntry1] : She had a CT scan of the neck and soft tissue performed on 11/26/2018, which showed a tracheostomy tube noted in place. There is no thoracic tracheal stenosis. Above the level of the tracheostomy tube, wall thickening involved the subglottic airway, which may reflect tracheomalacia or a subglottic stenosis. the glottic larynx

## 2019-03-11 NOTE — HISTORY OF PRESENT ILLNESS
[FreeTextEntry1] : Ms. Bo is a 58 year old female with a history of chronic hypoxemic respiratory failure, COPD, chronic respiratory insufficiency, complication of tracheostomy, dependence on tracheostomy, dysphonia, GERD, tracheal stenosis and tracheomalacia presenting to the office today for a follow up visit. Her chief complaint is interrupted sleep. \par - She was told by her therapist to adjust the settings on her respirator to SIMP though she has already been on it \par - She is s/p scarred tissue removal by Dr. Rodriguez\par - She states that she has soreness in the area \par - She has been producing a lot of yellow mucus. \par - She states that it has been more difficult to bring up secretions \par - She notes that her machine has been indicating apnea all night long and interrupting her sleep. \par - Her current pressure port on hr CPAP is 18 and her pressure control is 20\par - She states that she does not like her current settings. \par - She has been limiting her activity and needs to keep her airways moist. \par - She expresses a desire to eventually discontinue her trach collar altogether but in the meantime she wants to limit her time on it.

## 2019-03-11 NOTE — PHYSICAL EXAM

## 2019-03-11 NOTE — ASSESSMENT
[FreeTextEntry1] : Ms. Bo is a 58 year old female with a history of former smoker, COPD, respiratory failure for 10 years, tracheostomy,  tracheostenosis and tracheomalacia who is s/p bronchoscopy with revision of the tracheostomy with dilation of the tracheostomy. She is s/p ENT visit. She presents with tracheomalacia COPD end stage with issues with ventilator. She is s/p recent ENT surgery. \par \par Her chronic respiratory failure is multifactorial due to:\par -underlying COPD/severe persistent asthma\par -respiratory muscle weakness\par -tracheomalacia\par \par problem 1: tracheostomy/ chronic respiratory failure \par -Continue to wean off the ventilator as tolerable in short trials throughout the day  \par -recommended to use hypertonic saline in clearing of tracheostomy \par -Rx given for elastic pads, dressings, disposable inner cannulas, and tracheostomy collar to improve collar. Rx given for Metaplex Lite. \par -recommended f/u with Dr. Brett Laughlin and Dr. Edward Rodriguez\par \par Problem 2: Chronic Respiratory Failure \par - Trach collar- goal up to 12hrs per day\par - 3 L SIMV: VT- 450; PC 20; PS-18; PEEP- 5\par - Night CPAP 5/PS 18 (back up 8 B/mm)\par \par problem 3:  COPD/asthma - stable \par -continue Performist nebulizer 1 unit dose BID, followed by Pulmicort 0.5 nebulizer BID \par -continue Albuterol via nebulizer for rescue up to QiD \par -continue on NAC Q8H\par - Continue Lonhala 1 inhalation BID \par \par -COPD is a progressive disease and although it can’t be cured , appropriate management can slow its progression, reduce frequency and severity of exacerbations, and improve symptoms and the patient quality of life. Hospitalizations are the greatest contributor to the total COPD costs and account for up to 87% of total COPD related costs. Exacerbations are the main cause of admissions and subsequently account for the 40-75% of COPD costs. Inhaled maintenance therapy reduces the incidence of exacerbations in patients with stable COPD. Incorrect inhaler use and nonadherence are major obstacles to achieving COPD treatment goals. Many COPD patients have challenges (impaired inhalation, limited dexterity, reduced cognition: that limit their ability to correctly use their COPD treatment devices resulting in reduced symptom control. Of most importance is smoking cessation and early intervention with respiratory illnesses and contemplation for pulmonary rehab to enhance quality of life.\par \par problem 4: GERD\par -continue Ranitidine 150mg BID\par - Continue Protonix 40 mg qAM\par \par -Things to avoid including overeating, spicy foods, tight clothing, eating within three hours of bed, this list is not all inclusive. \par -For treatment of reflux, possible options discussed including diet control, H2 blockers, PPIs, as well as coating motility agents discussed as treatment options. Timing of meals and proximity of last meal to sleep were discussed. If symptoms persist, a formal gastrointestinal evaluation is needed.\par -Rule of 2's: Avoid eating too late, too fast, too much, too spicy or within two hours of bedtime\par \par Problem 5: Immunodeficiency\par - Continue Zithromax 250 mg Monday, Wednesday, and Friday\par \par Problem 6: Sensory neuropathic Cough\par - Continue Amitriptyline 10 mg up to TID\par \par problem 7: tracheomalacia/tracheal tumor \par -follow up with Dr. Kelby Larios for thoracic evaluation for potential tracheoplasty - s/p new tracheostomy\par \par Tracheomalacia is usually acquired in adults and common causes include damage by tracheostomy or endotracheal intubation damaging the tracheal cartilage with increase risk with multiple intubations, prolonged intubation, and concurrent high dose steroid therapy; external chest wall trauma and surgery; chronic compression of the trachea by benign etiologies (eg, benign mediastinal goiter) or malignancy; relapsing polychondritis; or recurrent infection. Tracheomalacia can be asymptomatic, however signs or symptoms can develop as the severity of the airway narrowing progresses with major symptoms include dyspnea, cough, and sputum retention. Other symptoms include severe paroxysms of coughing, wheezing or stridor, barking cough and may be exacerbated by forced expiration, cough, and Valsalva maneuver. Tracheomalacia is diagnosed by a bronchoscopic visualization of dynamic airway collapse on dynamic chest CT. Therapy is warranted in symptomatic patients with severe tracheomalacia and includes surgical repair as tracheobronchoplasty. The patient was referred to Dr. Willy Kay or Dr. Kelby Larios, at [facility] for a surgical consult. \par \par problem 8: dysphonia/sore throat\par -recommended Fisher[employer] Friend's lozengers \par \par problem 9: health maintenance \par - recommended to f/u with Dr. Dinh (GI) \par -instructed to increase physical activity as much as possible\par - She was administered an influenza vaccination today 11/26/2018\par -recommended strep pneumonia vaccines: Prevnar-13 vaccine, followed by Pneumo vaccine 23 one year following\par -recommended early intervention for URIs\par -recommended regular osteoporosis evaluations\par -recommended early dermatological evaluations\par -recommended after the age of 50 to the age of 70, colonoscopy every 5 years \par \par \par Follow up in 2 months.\par Patient is encouraged to call with any changes, concerns or questions.

## 2019-03-11 NOTE — ADDENDUM
[FreeTextEntry1] : Documented by Reynaldo Hameed acting as a scribe for Dr. Maco Nova on 3/11/2019\par \par All medical record entries made by the Scribe were at my, Dr. Maco Nova's, direction and personally dictated by me on 3/11/2019. I have reviewed the chart and agree that the record accurately reflects my personal performance of the history, physical exam, assessment and plan. I have also personally directed, reviewed, and agree with the discharge instructions. \par \par \par \par \par

## 2019-03-12 NOTE — PHYSICAL EXAM
[Midline] : trachea located in midline position [Laryngoscopy Performed] : laryngoscopy was performed, see procedure section for findings [Normal] : no rashes [FreeTextEntry1] : stretcher-bound, on oxygen and ventilator through trach, no retractions or distress [de-identified] : trach tube one-half out from stoma, loose trach ties, no peristomal granulation [de-identified] : mildly raspy voice, hyperfunctional

## 2019-03-12 NOTE — REASON FOR VISIT
[Post-Operative Visit] : a post-operative visit [FreeTextEntry2] : C/o discharge and soreness following stomaplasty

## 2019-03-12 NOTE — HISTORY OF PRESENT ILLNESS
[de-identified] : 57yo F S/p Micro suspension direct laryngoscopy with injection of supraglottic stenosis excision, bronchoscopy. Has been having dysphagia and choking since the surgery. Now she has to take pills crushed or in liquid form. Breathing is much better since the surgery. Still slightly sore and has tenderness. Some purulence from around the trach and muopurulent secretions through the trach.\par c/o running out of 14frnch suction catheters\par

## 2019-03-14 DIAGNOSIS — Z93.0 TRACHEOSTOMY STATUS: ICD-10-CM

## 2019-03-14 DIAGNOSIS — M54.2 CERVICALGIA: ICD-10-CM

## 2019-03-14 DIAGNOSIS — J04.10 ACUTE TRACHEITIS WITHOUT OBSTRUCTION: ICD-10-CM

## 2019-03-14 DIAGNOSIS — J98.9 RESPIRATORY DISORDER, UNSPECIFIED: ICD-10-CM

## 2019-03-14 DIAGNOSIS — R13.12 DYSPHAGIA, OROPHARYNGEAL PHASE: ICD-10-CM

## 2019-03-14 DIAGNOSIS — J38.6 STENOSIS OF LARYNX: ICD-10-CM

## 2019-03-17 PROBLEM — J95.02: Status: ACTIVE | Noted: 2017-06-03

## 2019-03-17 NOTE — HISTORY OF PRESENT ILLNESS
[de-identified] : 59yo F S/p Micro suspension direct laryngoscopy with injection of supraglottic stenosis excision, bronchoscopy. dysphagia and choking since the surgery has improved. Breathing is much better since the surgery. Still slightly sore and has tenderness. decreased purulence from around the trach and muopurulent secretions through the trach.\par no bleeding, no trach change\par \par

## 2019-03-17 NOTE — PHYSICAL EXAM
[FreeTextEntry1] : stretcher-bound, on oxygen and ventilator through trach, no retractions or distress [de-identified] : trach tube one-half closer to stoma, loose trach ties, minimal peristomal granulation [Midline] : trachea located in midline position [Laryngoscopy Performed] : laryngoscopy was performed, see procedure section for findings [Normal] : no rashes [de-identified] : mildly raspy voice, hyperfunctional, stronger than pre-op

## 2019-03-27 ENCOUNTER — APPOINTMENT (OUTPATIENT)
Dept: OTOLARYNGOLOGY | Facility: CLINIC | Age: 59
End: 2019-03-27
Payer: MEDICAID

## 2019-03-27 VITALS — HEIGHT: 64 IN | BODY MASS INDEX: 32.44 KG/M2 | WEIGHT: 190 LBS

## 2019-03-27 PROCEDURE — 99024 POSTOP FOLLOW-UP VISIT: CPT

## 2019-03-27 PROCEDURE — 31615 TRCHEOBRNCHSC EST TRACHS INC: CPT | Mod: 58

## 2019-03-27 PROCEDURE — 17250 CHEM CAUT OF GRANLTJ TISSUE: CPT | Mod: 58,59

## 2019-04-14 NOTE — REASON FOR VISIT
[Subsequent Evaluation] : a subsequent evaluation for [Formal Caregiver] : formal caregiver [FreeTextEntry2] : follow up visit for trach care

## 2019-04-14 NOTE — PHYSICAL EXAM
[FreeTextEntry1] : stretcher-bound, on oxygen and ventilator through trach, no retractions or distress [de-identified] : trach tube closer to stoma, loose trach ties, moderate peristomal granulation [Midline] : trachea located in midline position [Laryngoscopy Performed] : laryngoscopy was performed, see procedure section for findings [Normal] : no rashes [de-identified] : mildly raspy voice, hyperfunctional, stronger than pre-op

## 2019-04-14 NOTE — CONSULT LETTER
[Dear  ___] : Dear  [unfilled], [Consult Letter:] : I had the pleasure of evaluating your patient, [unfilled]. [Please see my note below.] : Please see my note below. [Consult Closing:] : Thank you very much for allowing me to participate in the care of this patient.  If you have any questions, please do not hesitate to contact me. [Sincerely,] : Sincerely, [FreeTextEntry3] : Gaurang Rodriguez MD, PhD\par Chief, Division of Laryngology\par Department of Otolaryngology\par [facility]\par Pediatric Otolaryngology, [facility]\par  of Otolaryngology\par [employer] [employer]\par \par \par

## 2019-04-14 NOTE — HISTORY OF PRESENT ILLNESS
[de-identified] : 60yo F here for f/u trach. Continues to have granulation tissue. Puts a few gauze in between trach collar and trach for relief. Has pain. Has had yellow secretions recently. Trying to wean off the vent for 2 hours a day now. Breathing better now. Voice is stronger now. Doing a lot better today than the last time we saw her. Last trach change was 1 month ago.  Has 6 LPC trach.

## 2019-04-14 NOTE — HISTORY OF PRESENT ILLNESS
[de-identified] : s/p excision laryngotracheal stenosis 1/14, now c/o worsening neck pain and some bleeding from trach site\par concerned by "meat around hole"\par voice consistent, breathing has improved from pre-op\par swallowing ok\par using vent as before\par minimal purulent secretions\par no F/C/NS, no increase in vent or O2 settings\par

## 2019-04-14 NOTE — PHYSICAL EXAM
[Midline] : trachea located in midline position [Laryngoscopy Performed] : laryngoscopy was performed, see procedure section for findings [Normal] : no rashes [FreeTextEntry1] : stretcher-bound, on oxygen and ventilator through trach, no retractions or distress [de-identified] : trach tube in place with multiple gauze and loose , 6 LPC trach  [de-identified] : mildly raspy voice, hyperfunctional

## 2019-04-16 DIAGNOSIS — L92.9 GRANULOMATOUS DISORDER OF THE SKIN AND SUBCUTANEOUS TISSUE, UNSPECIFIED: ICD-10-CM

## 2019-04-16 DIAGNOSIS — J39.8 OTHER SPECIFIED DISEASES OF UPPER RESPIRATORY TRACT: ICD-10-CM

## 2019-04-16 DIAGNOSIS — J44.9 CHRONIC OBSTRUCTIVE PULMONARY DISEASE, UNSPECIFIED: ICD-10-CM

## 2019-04-17 ENCOUNTER — APPOINTMENT (OUTPATIENT)
Dept: OTOLARYNGOLOGY | Facility: CLINIC | Age: 59
End: 2019-04-17
Payer: MEDICAID

## 2019-04-17 VITALS
BODY MASS INDEX: 32.44 KG/M2 | WEIGHT: 190 LBS | HEART RATE: 68 BPM | HEIGHT: 64 IN | DIASTOLIC BLOOD PRESSURE: 96 MMHG | SYSTOLIC BLOOD PRESSURE: 160 MMHG

## 2019-04-17 PROCEDURE — 99214 OFFICE O/P EST MOD 30 MIN: CPT | Mod: 25

## 2019-04-17 PROCEDURE — 17250 CHEM CAUT OF GRANLTJ TISSUE: CPT

## 2019-04-17 PROCEDURE — 31615 TRCHEOBRNCHSC EST TRACHS INC: CPT

## 2019-04-18 RX ORDER — MUPIROCIN 20 MG/G
2 OINTMENT TOPICAL 3 TIMES DAILY
Qty: 1 | Refills: 0 | Status: ACTIVE | COMMUNITY
Start: 2019-04-18 | End: 1900-01-01

## 2019-05-22 ENCOUNTER — APPOINTMENT (OUTPATIENT)
Dept: OTOLARYNGOLOGY | Facility: CLINIC | Age: 59
End: 2019-05-22
Payer: MEDICAID

## 2019-05-22 PROCEDURE — 99214 OFFICE O/P EST MOD 30 MIN: CPT | Mod: 25

## 2019-05-22 PROCEDURE — 17250 CHEM CAUT OF GRANLTJ TISSUE: CPT

## 2019-05-22 PROCEDURE — 31615 TRCHEOBRNCHSC EST TRACHS INC: CPT

## 2019-05-29 NOTE — PATIENT PROFILE ADULT. - PURPOSEFUL PROACTIVE ROUNDING
Deer tick bite  Doxycycline 100 mg oral tablet, take 2 tablets once today  Wash area of tick bite with warm soapy water, cover with topical antibiotic ointment 2-3 times/ daily for 7-10 days  Follow up with PCP if symptoms persist or worsen  Seek immediate care for    Increasing redness around the bite site    Increased pain or swelling    Fever over 100.4 F (38 C), or as directed by your healthcare provider    Fluid draining from the bite area  Signs of tick-related disease. Watch for these over the next few weeks to months:    Circular, red, ring-like rash appears at the bite area within 1 to 3 weeks    Tiredness, fever or chills, nausea or vomiting    Neck pain or stiffness, headache, or confusion    Muscle or bone aches    Joint pain or swelling, especially in the knee    Weakness on one side of the face    Patient Education     Tick Bite, Antibiotic Treatment    Ticks are small arachnids that feed on the blood of rodents, rabbits, birds, deer, dogs and humans. The bite may cause a local reaction like that of a spider, with a small amount of local redness, itching and slight swelling. Sometimes there is no local reaction.  Most tick bites are harmless. But some ticks carry diseases, such as Lyme disease or [place] spotted fever. These can be passed to people at the time of the bite. Lyme disease is of greatest concern. Right now you have no symptoms of Lyme disease or other serious reaction to the bite. It is important to watch for the warning signs, which could appear weeks to months after the tick bite.  Home care  The following guidelines can help you care for your bite at home:    If itching is a problem, avoid tight clothing and anything that heats up your skin. This includes hot showers or baths and direct sunlight. This often makes the itching worse.    An ice pack will reduce local areas of redness and itching. Make your own ice pack by putting ice cubes in a zip-top plastic bag and wrapping it in  a thin towel. Over-the-counter creams containing benzocaine may help with itching.    You can use an antihistamine with diphenhydramine if your doctor did not give you another antihistamine. This medicine may be used to reduce itching if large areas of the skin are involved. It is available at drugstores and grocery stores. If symptoms continue, talk with your doctor or pharmacist about other over-the counter medicines that may be helpful.    Your doctor may prescribe antibiotics to reduce your risk of getting Lyme disease. It is very important that you take them exactly as directed until they are completely finished.  Follow-up care  Follow up with your healthcare provider, or as advised.  Call 911  Call 911 if any of these occur:    Irregular or rapid heartbeat    Numbness, tingling, or weakness in the arms or legs  When to seek medical advice  Call your healthcare provider right away if any of these occur:  Signs of local infection. Watch for these during the next few days:    Increasing redness around the bite site    Increased pain or swelling    Fever over 100.4 F (38 C), or as directed by your healthcare provider    Fluid draining from the bite area  Signs of tick-related disease. Watch for these over the next few weeks to months:    Circular, red, ring-like rash appears at the bite area within 1 to 3 weeks    Tiredness, fever or chills, nausea or vomiting    Neck pain or stiffness, headache, or confusion    Muscle or bone aches    Joint pain or swelling, especially in the knee    Weakness on one side of the face  Date Last Reviewed: 10/1/2016    7912-1644 [employer]. [address] [place] [address], [place] [address]. All rights reserved. This information is not intended as a substitute for professional medical care. Always follow your healthcare professional's instructions.            Patient

## 2019-05-30 ENCOUNTER — MEDICATION RENEWAL (OUTPATIENT)
Age: 59
End: 2019-05-30

## 2019-06-03 ENCOUNTER — APPOINTMENT (OUTPATIENT)
Dept: GASTROENTEROLOGY | Facility: CLINIC | Age: 59
End: 2019-06-03
Payer: MEDICAID

## 2019-06-03 ENCOUNTER — APPOINTMENT (OUTPATIENT)
Dept: GASTROENTEROLOGY | Facility: CLINIC | Age: 59
End: 2019-06-03

## 2019-06-03 ENCOUNTER — LABORATORY RESULT (OUTPATIENT)
Age: 59
End: 2019-06-03

## 2019-06-03 VITALS
DIASTOLIC BLOOD PRESSURE: 80 MMHG | BODY MASS INDEX: 32.44 KG/M2 | TEMPERATURE: 98.1 F | SYSTOLIC BLOOD PRESSURE: 140 MMHG | HEART RATE: 59 BPM | OXYGEN SATURATION: 100 % | WEIGHT: 190 LBS | HEIGHT: 64 IN

## 2019-06-03 DIAGNOSIS — K30 FUNCTIONAL DYSPEPSIA: ICD-10-CM

## 2019-06-03 PROCEDURE — 99204 OFFICE O/P NEW MOD 45 MIN: CPT

## 2019-06-03 NOTE — HISTORY OF PRESENT ILLNESS
[None] : had no significant interval events [Vomiting] : denies vomiting [Diarrhea] : denies diarrhea [Rectal Pain] : denies rectal pain [Yellow Skin Or Eyes (Jaundice)] : denies jaundice [Wt Loss ___ Lbs] : recent [unfilled] ~Upound(s) weight loss [Nausea] : nausea [Heartburn] : heartburn [Constipation] : constipation [Abdominal Swelling] : abdominal swelling [Abdominal Pain] : abdominal pain [GERD] : gastroesophageal reflux disease [Hiatus Hernia] : no hiatus hernia [Peptic Ulcer Disease] : no peptic ulcer disease [Pancreatitis] : no pancreatitis [Cholelithiasis] : no cholelithiasis [Inflammatory Bowel Disease] : no inflammatory bowel disease [Kidney Stone] : no kidney stone [Irritable Bowel Syndrome] : no irritable bowel syndrome [Diverticulitis] : no diverticulitis [Alcohol Abuse] : no alcohol abuse [Malignancy] : no malignancy [Abdominal Surgery] : no abdominal surgery [Appendectomy] : no appendectomy [Cholecystectomy] : no cholecystectomy [de-identified] : The patient is a 59-year-old -American female with past medical history significant for hypertension, COPD on home oxygen and coronary artery disease who was referred to my office by Dr. King for history of pancreatic cyst x 4 years.  The patient also admits to having  abdominal pain, dyspepsia, gastroesophageal reflux disease, dysphagia, atypical chest pain, nausea, constipation, change in bowel habits and change in caliber of stool. I was asked to render an opinion for consultation for the above complaints.   The patient states that she is feeling uncomfortable.  The patient complains of abdominal pain.  The patient describes the abdominal pain as a sharp, crampy, intermittent diffuse abdominal discomfort that occasionally radiates to the back.  The abdominal pain is unrelated to meals.  The abdominal pain improves with passing gas or having a bowel movement.  The abdominal pain is described as being mild to moderate in nature.  The abdominal pain occurs at night and in the morning.  The abdominal pain can occur at any time.   The abdominal pain has awakened the patient from sleep.  The abdominal pain is not relieved with any medications.  The abdominal pain is associated with abdominal gas and bloating.  The patient complains of nausea but denies any vomiting.  The patient complains of gastroesophageal reflux disease and dysphagia. The dysphagia is worse with solids but unrelated to liquids. The gastroesophageal reflux disease is worse after meals and late at night and in the early morning. The gastroesophageal reflux disease is slightly improved with proton pump inhibitors, H2 blockers and antacids.  The patient complains of atypical chest pain, shortness of breath and palpitations.  The chest pain is described as a sharp, pressure, intermittent substernal discomfort that is nonradiating in nature.  The patient denies any diaphoresis. The patient admits to occasional episodes of diaphoresis.  The chest pain is described as being 7 out of 10 in intensity.  The chest pain can occur at any time.  The chest pain is worse at night and early morning.  The chest pain is worse after meals and improves with passing gas.  The chest pain has awakened the patient from sleep.  The patient complains of constipation but denies any diarrhea.  The patient has 1 bowel movement every 3 days.  The patient complains of a change in bowel habits.  The patient complains of a change in caliber of stool.   The patient denies having mucus discharge with the movements.  The patient denies any bright red blood per rectum, melena or hematemesis.  The patient denies any rectal pain or rectal pruritus. The patient complains of weight loss but denies any anorexia.  The patient admits to losing an unknown amount over the past 4 months.  She denies any fevers or chills.  The patient denies any jaundice or pruritus.  The patient complains of lower back pain.  The patient admits to having a prior upper endoscopy and colonoscopy performed by another gastroenterologist.  According to the patient, the upper endoscopic findings were unknown to the patient.  The colonoscopic findings were also unknown to the patient.   The patient's last menstrual period was age 40s.  The patient is a .  The patient's first menstrual period was at age 12. The patient admits to a family history of GI problems.  The patient’s father had a history of liver disease.

## 2019-06-03 NOTE — ASSESSMENT
[FreeTextEntry1] : Abdominal Pain: The patient complains of abdominal pain. The patient is to avoid nonsteroidal anti-inflammatory drugs and aspirin. I recommend a trial of Pantoprazole 40 mg once a day for 3 months for the symptoms.  \par Dyspepsia: The patient complains of dyspeptic symptoms.  The patient was advised to abide by an anti-gas diet.  The patient was given a pamphlet for anti-gas.  The patient and I reviewed the anti-gas diet at length. The patient is to start on a trial of Phazyme one tablet 3 times a day p.r.n. abdominal pain and gas.\par GERD: The patient was advised to avoid late-night meals and dietary indiscretions.  The patient was advised to avoid fried and fatty foods.  The patient was advised to abide by an anti-GERD diet. The patient was given a pamphlet foranti-GERD.  The patient and I reviewed the anti-GERD diet at length. I recommend a trial of Pantoprazole 40 mg once a day x 3 months for the symptoms.\par Atypical Chest Pain: The patient complains of atypical chest pain of unclear etiology.  The patient was advised to follow up with the cardiologist regarding evaluation for the atypical chest pain. The patient was told of possible etiologies such as cardiac, pulmonary, GI, musculoskeletal, stress and other causes for the atypical chest pain.  The patient agrees and will follow-up with the cardiologist. \par Nausea: The patient complains of nausea. If the symptoms of nausea persists, the patient may require a trial of Zofran 8 mg twice a day. \par Constipation: The patient complains of constipation. I recommend a high-fiber diet. I recommend a trial of a probiotic such as Align once a day. I recommend a trial of Metamucil once a day for fiber supplementation.  If the symptoms persist, the patient may require a colonoscopy to assess the symptoms.  The patient was told of the risks and benefits of the procedure.  The patient was told of the risks of perforation, emergency surgery, bleeding, infections and missed lesions.  The patient agreed and will followup to reassess the symptoms pending cardiac condition.  \par Follow-up: The patient is to follow-up in the office in 2 to 3 weeks to reassess the symptoms. The patient was told to call the office if any further problems. \par History of Pancreatic Cyst: The patient was previously diagnosed with a pancreatic cystic lesion on prior imaging study.  The patient denies any jaundice, pruritus or back pain.  The patient complains of abdominal pain.  The patient denies any prior history of EtOH abuse.  I recommend an CAT scan of the pancreas with IV contrast to reassess the pancreatic cystic lesions.  The patient and I had a long discussion regarding the potential risks of pancreatic cysts progressing to pancreatic cancer.  If the CAT scan reveals a change in the pancreatic cystic lesions, the patient may require an endoscopic ultrasound of pancreas with possible fine-needle aspiration to better assess the pancreatic cystic lesions.  The patient is aware of the importance for followup.  The patient agrees and will followup.\par Imaging Study: I recommend an imaging study to assess the symptoms. I recommend a CAT scan of the abdomen and pelvis with IV contrast to assess the pancreatic cystic lesion.\par Blood Work: I recommend blood work to assess the patient`s symptoms. I recommend a CBC, SMA 24, amylase, lipase, ESR, TFTs, LAVONNE, rheumatoid factor, celiac sprue panel, IgA, profile for hepatitis A, B, C. ,AFP, alpha 1 anti-trypsin  antibody, ceruloplasmin level, iron, TIBC, ferritin level, AMA, anti smooth muscle antibody, CEA, CA 19-9 and PT/INR/PTT. \par \par \par \par

## 2019-06-03 NOTE — REVIEW OF SYSTEMS
[Feeling Tired] : feeling tired [Feeling Poorly] : feeling poorly [Eyesight Problems] : eyesight problems [Hoarseness] : hoarseness [Sore Throat] : sore throat [Palpitations] : palpitations [Shortness Of Breath] : shortness of breath [Chest Pain] : chest pain [SOB on Exertion] : shortness of breath during exertion [Wheezing] : wheezing [Cough] : cough [Constipation] : constipation [Abdominal Pain] : abdominal pain [Dysuria] : dysuria [Heartburn] : heartburn [Arthralgias] : arthralgias [Joint Pain] : joint pain [Dizziness] : dizziness [Depression] : depression [Anxiety] : anxiety [Negative] : Heme/Lymph [FreeTextEntry8] : polyuria [de-identified] : headaches [de-identified] : dry skin

## 2019-06-04 ENCOUNTER — MESSAGE (OUTPATIENT)
Age: 59
End: 2019-06-04

## 2019-06-04 LAB
A1AT SERPL-MCNC: 149 MG/DL
AFP-TM SERPL-MCNC: 2.6 NG/ML
ALBUMIN SERPL ELPH-MCNC: 4.5 G/DL
ALP BLD-CCNC: 58 U/L
ALT SERPL-CCNC: 10 U/L
AMYLASE/CREAT SERPL: 56 U/L
ANION GAP SERPL CALC-SCNC: 13 MMOL/L
APTT BLD: 57.9 SEC
AST SERPL-CCNC: 18 U/L
BASOPHILS # BLD AUTO: 0.07 K/UL
BASOPHILS NFR BLD AUTO: 1.4 %
BILIRUB SERPL-MCNC: 0.7 MG/DL
BUN SERPL-MCNC: 5 MG/DL
CALCIUM SERPL-MCNC: 9.8 MG/DL
CANCER AG19-9 SERPL-ACNC: 27 U/ML
CEA SERPL-MCNC: 1.7 NG/ML
CERULOPLASMIN SERPL-MCNC: 31 MG/DL
CHLORIDE SERPL-SCNC: 99 MMOL/L
CHOLEST SERPL-MCNC: 132 MG/DL
CHOLEST/HDLC SERPL: 1.7 RATIO
CO2 SERPL-SCNC: 28 MMOL/L
CREAT SERPL-MCNC: 0.85 MG/DL
EOSINOPHIL # BLD AUTO: 0.22 K/UL
EOSINOPHIL NFR BLD AUTO: 4.5 %
ERYTHROCYTE [SEDIMENTATION RATE] IN BLOOD BY WESTERGREN METHOD: 36 MM/HR
FERRITIN SERPL-MCNC: 97 NG/ML
FOLATE SERPL-MCNC: 10 NG/ML
GGT SERPL-CCNC: 17 U/L
GLUCOSE SERPL-MCNC: 87 MG/DL
HBV CORE IGM SER QL: NONREACTIVE
HBV SURFACE AG SER QL: NONREACTIVE
HCT VFR BLD CALC: 31.6 %
HCV AB SER QL: NONREACTIVE
HCV S/CO RATIO: 0.12 S/CO
HDLC SERPL-MCNC: 76 MG/DL
HEMOCCULT STL QL: NEGATIVE
HGB BLD-MCNC: 9.9 G/DL
IGA SER QL IEP: 184 MG/DL
IMM GRANULOCYTES NFR BLD AUTO: 0.2 %
INR PPP: 1.08 RATIO
IRON SATN MFR SERPL: 16 %
IRON SERPL-MCNC: 48 UG/DL
LDLC SERPL CALC-MCNC: 46 MG/DL
LPL SERPL-CCNC: 10 U/L
LYMPHOCYTES # BLD AUTO: 2.22 K/UL
LYMPHOCYTES NFR BLD AUTO: 45.5 %
MAN DIFF?: NORMAL
MCHC RBC-ENTMCNC: 27.2 PG
MCHC RBC-ENTMCNC: 31.3 GM/DL
MCV RBC AUTO: 86.8 FL
MONOCYTES # BLD AUTO: 0.35 K/UL
MONOCYTES NFR BLD AUTO: 7.2 %
NEUTROPHILS # BLD AUTO: 2.01 K/UL
NEUTROPHILS NFR BLD AUTO: 41.2 %
PLATELET # BLD AUTO: 263 K/UL
POTASSIUM SERPL-SCNC: 4.2 MMOL/L
PROT SERPL-MCNC: 6.9 G/DL
PT BLD: 12.3 SEC
RBC # BLD: 3.64 M/UL
RBC # FLD: 14.1 %
RHEUMATOID FACT SER QL: 15 IU/ML
SODIUM SERPL-SCNC: 140 MMOL/L
TIBC SERPL-MCNC: 296 UG/DL
TRIGL SERPL-MCNC: 50 MG/DL
TSH SERPL-ACNC: 2.85 UIU/ML
UIBC SERPL-MCNC: 248 UG/DL
WBC # FLD AUTO: 4.88 K/UL

## 2019-06-05 LAB
ANA SER IF-ACNC: NEGATIVE
HBV E AB SER QL: NEGATIVE
HBV E AG SER QL: NEGATIVE
MITOCHONDRIA AB SER IF-ACNC: NORMAL
SMOOTH MUSCLE AB SER QL IF: NORMAL

## 2019-06-10 LAB
CELIAC DISEASE INTERPRETATION: NORMAL
CELIAC GENE PAIRS PRESENT: NO
DQ ALPHA 1: NORMAL
DQ BETA 1: NORMAL
IMMUNOGLOBULIN A (IGA): 211 MG/DL

## 2019-06-15 NOTE — PHYSICAL EXAM
[Midline] : trachea located in midline position [Laryngoscopy Performed] : laryngoscopy was performed, see procedure section for findings [Normal] : no rashes [FreeTextEntry1] : stretcher-bound, on oxygen and ventilator through trach, no retractions or distress [de-identified] : mildly raspy voice, hyperfunctional [de-identified] : trach tube in place with multiple gauze and loose , 6 LPC trach

## 2019-06-15 NOTE — HISTORY OF PRESENT ILLNESS
[de-identified] : 58yo F here with continued pain underneath trach due to granulation tissue. Has increased bleeding. Getting increased mucous plugs and having difficulty breathing. Vent all night. Last trach change 5 weeks ago. Bleeding is mild, no profuse bright red blood. Also having abdominal pain intermittently. Not consistent with reflux meds. Occasionally uses nebs. Concerned that she needs more surgery. Has been following with pulm for COPD. Minimal dysphagia, voice stable. \par

## 2019-06-15 NOTE — HISTORY OF PRESENT ILLNESS
[de-identified] : 58yo F here with increased granulation tissue around trach. Has increased bleeding and irritation. Keeping trach loose because it hurts and feels tight when its in deeper. Was recently on abx for 2 weeks- augmentin, then a second abx. Mucopurulent secretions have decreased. Breathing well overall. Voice is strong. Last trach change was more than one month ago.\par

## 2019-06-15 NOTE — HISTORY OF PRESENT ILLNESS
[de-identified] : 58yo F here with increased granulation tissue around trach. Has increased bleeding and irritation. Keeping trach loose because it hurts and feels tight when its in deeper. Was recently on abx for 2 weeks- augmentin, then a second abx. Mucopurulent secretions have decreased. Breathing well overall. Voice is strong. Last trach change was more than one month ago.\par

## 2019-06-15 NOTE — PHYSICAL EXAM
[Midline] : trachea located in midline position [Laryngoscopy Performed] : laryngoscopy was performed, see procedure section for findings [Normal] : orientation to person, place, and time: normal [FreeTextEntry1] : stretcher-bound, on oxygen and ventilator through trach, no retractions or distress [de-identified] : trach tube closer to stoma, loose trach ties, moderate peristomal granulation [de-identified] : mildly raspy voice, hyperfunctional, stronger than pre-op

## 2019-06-15 NOTE — PHYSICAL EXAM
[Midline] : trachea located in midline position [Laryngoscopy Performed] : laryngoscopy was performed, see procedure section for findings [Normal] : orientation to person, place, and time: normal [FreeTextEntry1] : stretcher-bound, on oxygen and ventilator through trach, no retractions or distress [de-identified] : trach tube closer to stoma, loose trach ties, moderate peristomal granulation [de-identified] : mildly raspy voice, hyperfunctional, stronger than pre-op

## 2019-06-19 ENCOUNTER — APPOINTMENT (OUTPATIENT)
Dept: PULMONOLOGY | Facility: CLINIC | Age: 59
End: 2019-06-19
Payer: MEDICAID

## 2019-06-19 PROCEDURE — 71046 X-RAY EXAM CHEST 2 VIEWS: CPT

## 2019-06-19 PROCEDURE — 99214 OFFICE O/P EST MOD 30 MIN: CPT | Mod: 25

## 2019-06-19 NOTE — PROCEDURE
[FreeTextEntry1] : CXR reveals a normal sized heart; no evidence of infiltrate or effusion--hyperinflation notes b/l

## 2019-06-19 NOTE — ADDENDUM
[FreeTextEntry1] : All medical record entries made by dewey Wong were at Dr. Maco Nova's, direction and personally dictated by me on 06/19/2019. I have reviewed the chart and agree that the record accurately reflects my personal performance of the history, physical exam, assessment and plan. I have also personally directed, reviewed, and agree with the discharge instructions.

## 2019-06-19 NOTE — REASON FOR VISIT
[Follow-Up] : a follow-up visit [FreeTextEntry1] : atypical chest pain, COPD, chronic hypoxemia, dysphagia, GERD, laryngeal stnosis, OW, dependence on tracheostomy, and tracheomalacia

## 2019-06-19 NOTE — HISTORY OF PRESENT ILLNESS
[FreeTextEntry1] : Ms. Bo is a 59 year old female with a history of atypical chest pain, COPD, chronic hypoxemia, dysphagia, GERD, laryngeal stenosis, OW, dependence on tracheostomy, and tracheomalacia presenting to the office today for a follow up visit. Her chief complaint is difficulty;ty breathing\par -she reports that her feet and hands have been swelling and hurting \par -she followed up with her cardiologist\par -she notes that she has been drinking a lot of water although her mouth has been very dry. she adds that this dryness has been making her choke, especially at night or while shes eating\par -she notes that she feels a significant amount of mucus in her chest, although she is unable to cough it up.\par -she notes that she has lost a lot of weight unintentionally \par -she feels that her breathing has gotten significantly worse lately, and has been very short of breath\par -she has not been using the Lanhala, as she never received it \par -she denies any headaches, nausea, vomiting, fever, chills, sweats, chest pain, chest pressure, diarrhea, constipation, dysphagia, dizziness, leg swelling, leg pain, itchy eyes, itchy ears, heartburn, reflux, or sour taste in the mouth.

## 2019-06-19 NOTE — PHYSICAL EXAM
[Well Groomed] : well groomed [Normal Appearance] : normal appearance [General Appearance - Well Developed] : well developed [General Appearance - Well Nourished] : well nourished [General Appearance - In No Acute Distress] : no acute distress [No Deformities] : no deformities [Normal Oropharynx] : normal oropharynx [Eyelids - No Xanthelasma] : the eyelids demonstrated no xanthelasmas [Normal Conjunctiva] : the conjunctiva exhibited no abnormalities [Neck Appearance] : the appearance of the neck was normal [II] : II [Thyroid Diffuse Enlargement] : the thyroid was not enlarged [Jugular Venous Distention Increased] : there was no jugular-venous distention [Neck Cervical Mass (___cm)] : no neck mass was observed [Thyroid Nodule] : there were no palpable thyroid nodules [Heart Sounds] : normal S1 and S2 [Heart Rate And Rhythm] : heart rate and rhythm were normal [Murmurs] : no murmurs present [Exaggerated Use Of Accessory Muscles For Inspiration] : no accessory muscle use [Respiration, Rhythm And Depth] : normal respiratory rhythm and effort [Abdomen Tenderness] : non-tender [Abdomen Soft] : soft [Abdomen Mass (___ Cm)] : no abdominal mass palpated [Abnormal Walk] : normal gait [Gait - Sufficient For Exercise Testing] : the gait was sufficient for exercise testing [Cyanosis, Localized] : no localized cyanosis [Nail Clubbing] : no clubbing of the fingernails [Petechial Hemorrhages (___cm)] : no petechial hemorrhages [Skin Color & Pigmentation] : normal skin color and pigmentation [] : no rash [No Venous Stasis] : no venous stasis [No Skin Ulcers] : no skin ulcer [Skin Lesions] : no skin lesions [Deep Tendon Reflexes (DTR)] : deep tendon reflexes were 2+ and symmetric [Sensation] : the sensory exam was normal to light touch and pinprick [No Xanthoma] : no  xanthoma was observed [Oriented To Time, Place, And Person] : oriented to person, place, and time [No Focal Deficits] : no focal deficits [Affect] : the affect was normal [Impaired Insight] : insight and judgment were intact [FreeTextEntry2] : 1+ TONI on left (not right)  [FreeTextEntry1] : I:E ratio 1:3;  mild expiratory wheeze

## 2019-06-26 ENCOUNTER — APPOINTMENT (OUTPATIENT)
Dept: OTOLARYNGOLOGY | Facility: CLINIC | Age: 59
End: 2019-06-26
Payer: MEDICAID

## 2019-06-26 ENCOUNTER — OUTPATIENT (OUTPATIENT)
Dept: OUTPATIENT SERVICES | Facility: HOSPITAL | Age: 59
LOS: 1 days | Discharge: HOME | End: 2019-06-26

## 2019-06-26 VITALS
WEIGHT: 190 LBS | SYSTOLIC BLOOD PRESSURE: 139 MMHG | HEART RATE: 61 BPM | BODY MASS INDEX: 32.44 KG/M2 | DIASTOLIC BLOOD PRESSURE: 89 MMHG | HEIGHT: 64 IN

## 2019-06-26 DIAGNOSIS — Z43.0 ENCOUNTER FOR ATTENTION TO TRACHEOSTOMY: Chronic | ICD-10-CM

## 2019-06-26 DIAGNOSIS — Z98.891 HISTORY OF UTERINE SCAR FROM PREVIOUS SURGERY: Chronic | ICD-10-CM

## 2019-06-26 DIAGNOSIS — Z93.0 TRACHEOSTOMY STATUS: Chronic | ICD-10-CM

## 2019-06-26 DIAGNOSIS — J39.8 OTHER SPECIFIED DISEASES OF UPPER RESPIRATORY TRACT: Chronic | ICD-10-CM

## 2019-06-26 PROCEDURE — 31615 TRCHEOBRNCHSC EST TRACHS INC: CPT

## 2019-06-26 PROCEDURE — 17250 CHEM CAUT OF GRANLTJ TISSUE: CPT

## 2019-06-26 PROCEDURE — 99214 OFFICE O/P EST MOD 30 MIN: CPT | Mod: 25

## 2019-07-03 ENCOUNTER — APPOINTMENT (OUTPATIENT)
Dept: CT IMAGING | Facility: HOSPITAL | Age: 59
End: 2019-07-03

## 2019-07-03 ENCOUNTER — OUTPATIENT (OUTPATIENT)
Dept: OUTPATIENT SERVICES | Facility: HOSPITAL | Age: 59
LOS: 1 days | End: 2019-07-03
Payer: MEDICAID

## 2019-07-03 DIAGNOSIS — J39.8 OTHER SPECIFIED DISEASES OF UPPER RESPIRATORY TRACT: Chronic | ICD-10-CM

## 2019-07-03 DIAGNOSIS — Z98.891 HISTORY OF UTERINE SCAR FROM PREVIOUS SURGERY: Chronic | ICD-10-CM

## 2019-07-03 DIAGNOSIS — Z93.0 TRACHEOSTOMY STATUS: Chronic | ICD-10-CM

## 2019-07-03 DIAGNOSIS — Z43.0 ENCOUNTER FOR ATTENTION TO TRACHEOSTOMY: Chronic | ICD-10-CM

## 2019-07-03 DIAGNOSIS — K86.2 CYST OF PANCREAS: ICD-10-CM

## 2019-07-03 PROCEDURE — 74177 CT ABD & PELVIS W/CONTRAST: CPT

## 2019-07-03 PROCEDURE — 74177 CT ABD & PELVIS W/CONTRAST: CPT | Mod: 26

## 2019-07-23 ENCOUNTER — RX RENEWAL (OUTPATIENT)
Age: 59
End: 2019-07-23

## 2019-07-31 ENCOUNTER — APPOINTMENT (OUTPATIENT)
Dept: OTOLARYNGOLOGY | Facility: CLINIC | Age: 59
End: 2019-07-31
Payer: MEDICAID

## 2019-07-31 PROCEDURE — 17250 CHEM CAUT OF GRANLTJ TISSUE: CPT

## 2019-07-31 PROCEDURE — 31615 TRCHEOBRNCHSC EST TRACHS INC: CPT

## 2019-07-31 PROCEDURE — 99214 OFFICE O/P EST MOD 30 MIN: CPT | Mod: 25

## 2019-08-11 NOTE — PHYSICAL EXAM
[Midline] : trachea located in midline position [Laryngoscopy Performed] : laryngoscopy was performed, see procedure section for findings [Normal] : no rashes [FreeTextEntry1] : stretcher-bound, on oxygen and ventilator through trach, no retractions or distress [de-identified] : trach tube closer to stoma, loose trach ties, moderate peristomal granulation [de-identified] : improved voice, mild hyperfunctional

## 2019-08-11 NOTE — HISTORY OF PRESENT ILLNESS
[de-identified] : 59 year old female follow up for trach change and cauterization, history of neck pain secondary to granulation tissue, dysphagia and COPD.  Patient is trach is dependent.  Patient states Left side of trach has abundant purulent, feels very raw and has fevers.  States pan CT pending for GI with upper endoscopy.  States pulmonary, Dr. Nova, x-ray to be done due to unable to expectorate secretions appropriately.  Reports swollen hands and feet, to be seen by Cardiologist.  States continues to have puree foods, due to dysphagia, continues to have difficulty swallowing even with modified diet. Stoma had healed per patient but for the last week after infection, has been painful again. No hemoptysis or significant bleeding from trach. Able to come off vent to go outside her building.\par \par

## 2019-08-11 NOTE — REASON FOR VISIT
[Subsequent Evaluation] : a subsequent evaluation for [Formal Caregiver] : formal caregiver [Other: _____] : [unfilled] [FreeTextEntry2] : follow up for trach change and cauterization, history of neck pain secondary to granulation tissue, dysphagia and COPD.

## 2019-08-19 DIAGNOSIS — J95.00 UNSPECIFIED TRACHEOSTOMY COMPLICATION: ICD-10-CM

## 2019-08-19 DIAGNOSIS — J38.6 STENOSIS OF LARYNX: ICD-10-CM

## 2019-08-19 DIAGNOSIS — R06.9 UNSPECIFIED ABNORMALITIES OF BREATHING: ICD-10-CM

## 2019-08-19 DIAGNOSIS — R49.0 DYSPHONIA: ICD-10-CM

## 2019-08-19 DIAGNOSIS — M54.2 CERVICALGIA: ICD-10-CM

## 2019-08-19 DIAGNOSIS — R13.12 DYSPHAGIA, OROPHARYNGEAL PHASE: ICD-10-CM

## 2019-08-19 DIAGNOSIS — L92.9 GRANULOMATOUS DISORDER OF THE SKIN AND SUBCUTANEOUS TISSUE, UNSPECIFIED: ICD-10-CM

## 2019-08-19 DIAGNOSIS — J44.9 CHRONIC OBSTRUCTIVE PULMONARY DISEASE, UNSPECIFIED: ICD-10-CM

## 2019-08-19 DIAGNOSIS — J39.8 OTHER SPECIFIED DISEASES OF UPPER RESPIRATORY TRACT: ICD-10-CM

## 2019-08-21 ENCOUNTER — APPOINTMENT (OUTPATIENT)
Dept: GASTROENTEROLOGY | Facility: CLINIC | Age: 59
End: 2019-08-21
Payer: MEDICAID

## 2019-08-21 VITALS
HEART RATE: 59 BPM | HEIGHT: 64 IN | WEIGHT: 190 LBS | TEMPERATURE: 98.6 F | OXYGEN SATURATION: 90 % | DIASTOLIC BLOOD PRESSURE: 71 MMHG | SYSTOLIC BLOOD PRESSURE: 105 MMHG | BODY MASS INDEX: 32.44 KG/M2

## 2019-08-21 DIAGNOSIS — R10.11 RIGHT UPPER QUADRANT PAIN: ICD-10-CM

## 2019-08-21 DIAGNOSIS — R10.12 RIGHT UPPER QUADRANT PAIN: ICD-10-CM

## 2019-08-21 PROCEDURE — 99213 OFFICE O/P EST LOW 20 MIN: CPT

## 2019-08-21 NOTE — ASSESSMENT
[FreeTextEntry1] : Abdominal Pain: The patient complains of abdominal pain. The patient is to avoid nonsteroidal anti-inflammatory drugs and aspirin.  I recommend a trial of Phazyme 1 tablet PO 3 times a day for 3 months for the symptoms.\par Dyspepsia: The patient complains of dyspeptic symptoms.  The patient was advised to abide by an anti-gas diet.  The patient was given a pamphlet for anti-gas.  The patient and I reviewed the anti-gas diet at length. The patient is to start on a trial of Phazyme one tablet 3 times a day p.r.n. abdominal pain and gas.\par Constipation: The patient complains of constipation. I recommend a high-fiber diet. I recommend a trial of a probiotic such as Align once a day. I recommend a trial of Metamucil once a day for fiber supplementation. I recommend a trial of Linzess 145 mcg once a day for the constipation. \par Anemia: The patient was previously found to have anemia on prior blood work.  The patient denies any bright red blood per rectum, melena or hematemesis.  The patient was guaiac-negative on physical exam.  I recommend an upper endoscopy and colonoscopy to assess the anemia.  The patient was told of the risks and benefits of the procedure.  The patient was told of the risks of perforation, emergency surgery, bleeding, infections and missed lesions.  The patient agreed and will schedule for the procedure. The patient is to be n.p.o. after midnight and bowel prep was given.  The patient is to return for the procedure.  \par Colonoscopy: I recommend a colonoscopy to assess the symptoms.  The patient was told of the risks and benefits of the procedure.  The patient was told of the risks of perforation, emergency surgery, bleeding, infections and missed lesions.  The patient agreed and will schedule for the procedure. The patient can take the antihypertensive medication with a sip of water one hour prior to the procedure. The patient is to be n.p.o. after midnight and bowel prep was given.  The patient is to return for the procedure. \par History of Pancreatic Cyst: The patient was previously diagnosed with a pancreatic cystic lesion on prior imaging study. The patient denies any jaundice, pruritus or back pain. The patient complains of abdominal pain. The patient denies any prior history of EtOH abuse. I recommend a CAT scan of the pancreas with IV contrast to reassess the pancreatic cystic lesions. The patient and I had a long discussion regarding the potential risks of pancreatic cysts progressing to pancreatic cancer. If the CAT scan reveals a change in the pancreatic cystic lesions, the patient may require an endoscopic ultrasound of pancreas with possible fine-needle aspiration to better assess the pancreatic cystic lesions. The patient is aware of the importance for followup. The patient agrees and will followup. The patient was advised to followup with the cardiologist for cardiac clearance prior to evaluation with endoscopic procedures.  \par Follow-up: The patient is to follow-up in the office in 3 weeks to reassess the symptoms. The patient was told to call the office if any further problems. \par \par \par \par

## 2019-08-21 NOTE — HISTORY OF PRESENT ILLNESS
[None] : had no significant interval events [Heartburn] : denies heartburn [Nausea] : denies nausea [Vomiting] : denies vomiting [Diarrhea] : denies diarrhea [Yellow Skin Or Eyes (Jaundice)] : denies jaundice [Rectal Pain] : denies rectal pain [Constipation] : constipation [Abdominal Swelling] : abdominal swelling [Abdominal Pain] : abdominal pain [Wt Gain ___ Lbs] : no recent weight gain [Wt Loss ___ Lbs] : no recent weight loss [GERD] : no gastroesophageal reflux disease [Hiatus Hernia] : no hiatus hernia [Peptic Ulcer Disease] : no peptic ulcer disease [Pancreatitis] : no pancreatitis [Cholelithiasis] : no cholelithiasis [Kidney Stone] : no kidney stone [Inflammatory Bowel Disease] : no inflammatory bowel disease [Irritable Bowel Syndrome] : no irritable bowel syndrome [Diverticulitis] : no diverticulitis [Alcohol Abuse] : no alcohol abuse [Malignancy] : no malignancy [Abdominal Surgery] : no abdominal surgery [Appendectomy] : no appendectomy [Cholecystectomy] : no cholecystectomy [de-identified] : The patient states that she is feeling uncomfortable.  She complains of shortness of breath.  The patient was recently found to have a left lung nodule noted. The patient is to have a repeat CAT scan in 3 months to reassess the lung lesion. The patient denies any jaundice or pruritus.  The patient denies any complains of chronic lower back pain. The patient complains of abdominal pain.  The patient describes the abdominal pain as a crampy, intermittent lower abdominal discomfort that is nonradiating in nature.  The abdominal pain is unrelated to meals.  The abdominal pain improves with passing gas or having a bowel movement.  The abdominal pain is described as being mild to moderate in nature.  The abdominal pain occurs at night and in the morning.  The abdominal pain can occur at any time.   The abdominal pain has never awakened the patient from sleep.  The abdominal pain is relieved with certain medication such as proton pump inhibitors, H2 blockers and antacids.  The abdominal pain is not relieved with medication.  The abdominal pain is associated with abdominal gas and bloating.  The patient complains of nausea but denies any vomiting.  The patient complains of gastroesophageal reflux disease and dysphagia.  The dysphagia is worse with solids but unrelated to liquids. The  gastroesophageal reflux disease is worse after meals and late at night and in the early morning. The gastroesophageal reflux disease is improved with proton pump inhibitors, H2 blockers and antacids.   The patient complains of atypical chest pain, shortness of breath and palpitations.  The chest pain is described as a pressure, intermittent substernal and left sided chest  discomfort that is nonradiating in nature.  The patient admits to occasional episodes of diaphoresis.  The chest pain is described as being 8 out of 10 in intensity.  The chest pain can occur at any time.  The chest pain is worse at night and early morning.  The chest pain is worse after meals and improves with passing gas.  The chest pain is unrelated to meals and passing gas.  The chest pain has never awakened the patient from sleep.  The patient complains of constipation but denies any diarrhea.  The patient has 1 bowel movement every 3 days.  The patient complains of a change in bowel habits.  The patient complains of a change in caliber of stool.   The patient denies a change in bowel habits.  The patient denies a change in caliber of stool.  The patient denies having mucus discharge with the bowel movements.  The patient denies any bright red blood per rectum, melena or hematemesis. The patient denies any rectal pain or rectal pruritus.  The patient denies any weight loss or anorexia.  She denies any fevers or chills.  The blood tests performed on Patricia 3, 2019 was significant for anemia with Hgb/Hct level of 9.9/31. 6, respectively, an elevated rheumatoid factor of 15 and elevated ESR of 36 mm/hr. The patient has a history of pancreatic cyst x 4 years. The patient admits to having a prior upper endoscopy and colonoscopy performed by another gastroenterologist. According to the patient, the upper endoscopic findings were unknown to the patient. The colonoscopic findings were also unknown to the patient. The patient admits to a family history of GI problems. The patient’s father had a history of liver disease.

## 2019-08-27 ENCOUNTER — APPOINTMENT (OUTPATIENT)
Dept: PULMONOLOGY | Facility: CLINIC | Age: 59
End: 2019-08-27
Payer: MEDICAID

## 2019-08-27 VITALS
OXYGEN SATURATION: 100 % | DIASTOLIC BLOOD PRESSURE: 81 MMHG | WEIGHT: 190 LBS | RESPIRATION RATE: 17 BRPM | SYSTOLIC BLOOD PRESSURE: 140 MMHG | HEART RATE: 56 BPM | HEIGHT: 64 IN | BODY MASS INDEX: 32.44 KG/M2

## 2019-08-27 PROCEDURE — 99214 OFFICE O/P EST MOD 30 MIN: CPT

## 2019-08-27 NOTE — ADDENDUM
[FreeTextEntry1] : Documented by Smith Newsome acting as a scribe for Dr. Maco Nova on 08/27/2019.\par \par All medical record entries made by the Scribe were at my, Dr. Maco Nova's, direction and personally dictated by me on 08/27/2019. I have reviewed the chart and agree that the record accurately reflects my personal performance of the history, physical exam, assessment and plan. I have also personally directed, reviewed, and agree with the discharge instructions.

## 2019-08-27 NOTE — REVIEW OF SYSTEMS
[Sputum] : sputum  [Dyspnea] : dyspnea [Chest Discomfort] : chest discomfort [As Noted in HPI] : as noted in HPI [Reflux] : reflux [Heartburn] : heartburn [Dysphagia] : dysphagia [Constipation] : constipation [Negative] : Sleep Disorder [Diarrhea] : no diarrhea

## 2019-08-27 NOTE — ASSESSMENT
[FreeTextEntry1] : Ms. Bo is a 59 year old female with a history of former smoker, COPD, respiratory failure for 10 years, tracheostomy,  tracheostenosis and tracheomalacia who is s/p bronchoscopy with revision of the tracheostomy with dilation of the tracheostomy. She is s/p ENT visit. She presents with tracheomalacia COPD end stage with issues with ventilator. She is symptomatic with difficulty breathing and poor mucus clearance.\par \par Her chronic respiratory failure is multifactorial due to:\par -underlying COPD/severe persistent asthma\par -respiratory muscle weakness\par -tracheomalacia\par \par problem 1: tracheostomy/ chronic respiratory failure \par -Continue to wean off the ventilator as tolerable in short trials throughout the day  \par -recommended to use hypertonic saline in clearing of tracheostomy \par -Rx given for elastic pads, dressings, disposable inner cannulas, and tracheostomy collar to improve collar. Rx given for Metaplex Lite. \par -recommended f/u with Dr. Brett Laughlin and Dr. Edward Rodriguez\par \par Problem 2: Chronic Respiratory Failure \par - Trach collar- goal up to 12hrs per day (at 2 hours now)\par - 3 L SIMV: VT- 450; PC 20; PS-18; PEEP- 5\par - Night CPAP 5/PS 18 (back up 8 B/mm)\par \par problem 3:  COPD/asthma\par -Add Combivent 1 inhalation up to QID to replace Symbicort \par -continue Performist nebulizer 1 unit dose BID, followed by Pulmicort 0.5 nebulizer BID \par -continue Albuterol via nebulizer for rescue up to QiD \par -continue on NAC Q8H\par -continue Yulerpi 1 unit dose QD via nebulizer\par \par -COPD is a progressive disease and although it can’t be cured , appropriate management can slow its progression, reduce frequency and severity of exacerbations, and improve symptoms and the patient quality of life. Hospitalizations are the greatest contributor to the total COPD costs and account for up to 87% of total COPD related costs. Exacerbations are the main cause of admissions and subsequently account for the 40-75% of COPD costs. Inhaled maintenance therapy reduces the incidence of exacerbations in patients with stable COPD. Incorrect inhaler use and nonadherence are major obstacles to achieving COPD treatment goals. Many COPD patients have challenges (impaired inhalation, limited dexterity, reduced cognition: that limit their ability to correctly use their COPD treatment devices resulting in reduced symptom control. Of most importance is smoking cessation and early intervention with respiratory illnesses and contemplation for pulmonary rehab to enhance quality of life.\par \par Problem 3A: Abnormal CT: r/o Bronchiectasis / pulmonary nodule\par -Complete high resolution chest CT, prone and supine, to r/o pulmonary nodules\par -Recommended to take Slippery [place]m Tea and pill for cough and mucus clearing \par -based upon results of CT scan, will apply for chest vest therapy\par \par CAT scans are the only radiological modality to identify abnormalities w/in the lungs with regards to nodules/masses/lymph nodes. Risks, benefits were reviewed in detail. The guidelines for abnormalities include follow up CT scans at various intervals which could range from 6 weeks to 1 year intervals. If there is a change for the worse then consideration for a biopsy will be considered if you are a candidate. Second opinion evaluation with a thoracic surgeon or an interventional radiologist could be offered. \par \par problem 4: GERD\par -continue Ranitidine 150mg BID\par - Continue Protonix 40 mg qAM\par -continue Baclofen 5 mg premeal, QHS \par \par -Things to avoid including overeating, spicy foods, tight clothing, eating within three hours of bed, this list is not all inclusive. \par -For treatment of reflux, possible options discussed including diet control, H2 blockers, PPIs, as well as coating motility agents discussed as treatment options. Timing of meals and proximity of last meal to sleep were discussed. If symptoms persist, a formal gastrointestinal evaluation is needed.\par -Rule of 2's: Avoid eating too late, too fast, too much, too spicy or within two hours of bedtime\par \par Problem 5: Immunodeficiency\par - Continue Zithromax 250 mg Monday, Wednesday, and Friday\par -Due to the fact that this pt has had more infections than would be expected and immunological blood work is indicated this would include: IgG subclasses, quantitative immunoglobulins, Strep pneumoniae titers as well as Vitamin D levels. Based on this blood work we will be able to decide where the pt needs additional pneumococcal vaccine either Prevnar 13 or Pneumovax. Immunology evaluation will also be potentially indicated. \par \par Problem 6: Sensory neuropathic Cough\par - Continue Amitriptyline 10 mg up to TID\par -Sensory neuropathic cough is an etiology of cough that is often realized once someone has been ruled out for common disease such as: asthma, COPD, eosinophilic bronchitis, bronchiectasis, post nasal drip, and GERD. It sometimes develops following a URI, herpes zoster outbreak in pharynx or thyroid or cervical spine injury. However, many patients have no identifiable antecedent explanation. \par \par problem 7: tracheomalacia/tracheal tumor \par -follow up with Dr. Kelby Larios for thoracic evaluation for potential tracheoplasty - s/p new tracheostomy\par \par Tracheomalacia is usually acquired in adults and common causes include damage by tracheostomy or endotracheal intubation damaging the tracheal cartilage with increase risk with multiple intubations, prolonged intubation, and concurrent high dose steroid therapy; external chest wall trauma and surgery; chronic compression of the trachea by benign etiologies (eg, benign mediastinal goiter) or malignancy; relapsing polychondritis; or recurrent infection. Tracheomalacia can be asymptomatic, however signs or symptoms can develop as the severity of the airway narrowing progresses with major symptoms include dyspnea, cough, and sputum retention. Other symptoms include severe paroxysms of coughing, wheezing or stridor, barking cough and may be exacerbated by forced expiration, cough, and Valsalva maneuver. Tracheomalacia is diagnosed by a bronchoscopic visualization of dynamic airway collapse on dynamic chest CT. Therapy is warranted in symptomatic patients with severe tracheomalacia and includes surgical repair as tracheobronchoplasty. The patient was referred to Dr. Willy Kay or Dr. Kelby Larios, at [facility] for a surgical consult. \par \par problem 8: dysphonia/sore throat\par -recommended Fisherman Friend's lozenges \par \par problem 9: health maintenance \par - recommended to f/u with Dr. Palomares(GI) \par -instructed to increase physical activity as much as possible\par - She was administered an influenza vaccination today 11/26/2018\par -recommended strep pneumonia vaccines: Prevnar-13 vaccine, followed by Pneumo vaccine 23 one year following\par -recommended early intervention for URIs\par -recommended regular osteoporosis evaluations\par -recommended early dermatological evaluations\par -recommended after the age of 50 to the age of 70, colonoscopy every 5 years \par \par \par Follow up in 2 months.\par Patient is encouraged to call with any changes, concerns or questions.

## 2019-08-27 NOTE — HISTORY OF PRESENT ILLNESS
[FreeTextEntry1] : Ms. Bo is a 59 year old female with a history of atypical chest pain, COPD, chronic hypoxemia, dysphagia, GERD, laryngeal stenosis, OW, dependence on tracheostomy, and tracheomalacia presenting to the office today for a follow up visit. Her chief complaint is difficulty breathing.\par -she is s/p hospitalization 2 weeks ago with COPD exacerbation at [facility], and they found an 8 mm nodule in her lung\par -she notes she is awaiting a colonoscopy as soon as she gets clearance after September 4th\par -she reports having chest pain, and went to her cardiologist, who stated her heart is healthy\par -She notes she had a CT scan and an Echo\par she reports having heartburn and reflux\par -she reports having dysphagia from the scar tissue in her throat, and notes she has bleeding from her tracheostomy site, and will be returning to Dr. Rodriguez to clean up the area\par -she notes her bowels are irregular, and if she doesn’t have a bowel movement in 2 days, it interferes with her breathing and other functions\par -she reports having thick secretions, and has tried drinking hot water and several other treatments to attempt to loosen her secretions. All of these attempts have not worked\par -she reports she wishes to get the chest vest therapy\par -she reports having difficulty sleeping and has to sit up to sleep\par -she notes she is only using Perforomist and Yupelri\par -she notes she hasn’t received Budesonide\par -she notes she doesn’t take her Singulair as it makes her nauseated\par -she denies any chest pressure, diarrhea, dizziness, sour taste in the mouth

## 2019-08-27 NOTE — PHYSICAL EXAM
[General Appearance - Well Developed] : well developed [Normal Appearance] : normal appearance [Well Groomed] : well groomed [No Deformities] : no deformities [General Appearance - Well Nourished] : well nourished [General Appearance - In No Acute Distress] : no acute distress [Eyelids - No Xanthelasma] : the eyelids demonstrated no xanthelasmas [Normal Conjunctiva] : the conjunctiva exhibited no abnormalities [Normal Oropharynx] : normal oropharynx [II] : II [Neck Cervical Mass (___cm)] : no neck mass was observed [Neck Appearance] : the appearance of the neck was normal [Jugular Venous Distention Increased] : there was no jugular-venous distention [Thyroid Nodule] : there were no palpable thyroid nodules [Thyroid Diffuse Enlargement] : the thyroid was not enlarged [Heart Sounds] : normal S1 and S2 [Heart Rate And Rhythm] : heart rate and rhythm were normal [Murmurs] : no murmurs present [Respiration, Rhythm And Depth] : normal respiratory rhythm and effort [Exaggerated Use Of Accessory Muscles For Inspiration] : no accessory muscle use [Abdomen Soft] : soft [Abdomen Tenderness] : non-tender [Abdomen Mass (___ Cm)] : no abdominal mass palpated [Gait - Sufficient For Exercise Testing] : the gait was sufficient for exercise testing [Cyanosis, Localized] : no localized cyanosis [Nail Clubbing] : no clubbing of the fingernails [Petechial Hemorrhages (___cm)] : no petechial hemorrhages [] : no rash [Skin Color & Pigmentation] : normal skin color and pigmentation [Skin Lesions] : no skin lesions [No Venous Stasis] : no venous stasis [No Skin Ulcers] : no skin ulcer [Deep Tendon Reflexes (DTR)] : deep tendon reflexes were 2+ and symmetric [No Xanthoma] : no  xanthoma was observed [No Focal Deficits] : no focal deficits [Sensation] : the sensory exam was normal to light touch and pinprick [Oriented To Time, Place, And Person] : oriented to person, place, and time [Impaired Insight] : insight and judgment were intact [Affect] : the affect was normal [FreeTextEntry1] : currently on a stretcher [FreeTextEntry2] : trade edema of RLE

## 2019-08-28 ENCOUNTER — RX RENEWAL (OUTPATIENT)
Age: 59
End: 2019-08-28

## 2019-08-30 ENCOUNTER — RX RENEWAL (OUTPATIENT)
Age: 59
End: 2019-08-30

## 2019-10-13 ENCOUNTER — TRANSCRIPTION ENCOUNTER (OUTPATIENT)
Age: 59
End: 2019-10-13

## 2019-10-13 NOTE — HISTORY OF PRESENT ILLNESS
[de-identified] : 60yo F here with continued pain underneath trach resolving .Was recently hospitalized for 1 week due to tracheobronchitis. Vent all night. Last trach change 4 weeks ago. Bleeding is mild- less than before, no profuse bright red blood. Not consistent with reflux meds. Occasionally uses nebs. Has been following with pulm for COPD. Minimal dysphagia, voice stable. \par

## 2019-10-13 NOTE — PHYSICAL EXAM
[Midline] : trachea located in midline position [Laryngoscopy Performed] : laryngoscopy was performed, see procedure section for findings [Normal] : no rashes [FreeTextEntry1] : stretcher-bound, on oxygen and ventilator through trach, no retractions or distress [de-identified] : trach tube in place with multiple gauze and loose , 6 LPC trach  [de-identified] : mildly raspy voice, hyperfunctional

## 2019-10-14 ENCOUNTER — RESULT REVIEW (OUTPATIENT)
Age: 59
End: 2019-10-14

## 2019-10-14 ENCOUNTER — INPATIENT (INPATIENT)
Facility: HOSPITAL | Age: 59
LOS: 0 days | Discharge: HOME | End: 2019-10-15
Attending: OTOLARYNGOLOGY | Admitting: OTOLARYNGOLOGY
Payer: MEDICAID

## 2019-10-14 ENCOUNTER — APPOINTMENT (OUTPATIENT)
Dept: OTOLARYNGOLOGY | Facility: HOSPITAL | Age: 59
End: 2019-10-14

## 2019-10-14 VITALS
HEIGHT: 64 IN | DIASTOLIC BLOOD PRESSURE: 74 MMHG | HEART RATE: 51 BPM | WEIGHT: 190.04 LBS | TEMPERATURE: 98 F | OXYGEN SATURATION: 98 % | SYSTOLIC BLOOD PRESSURE: 97 MMHG | RESPIRATION RATE: 20 BRPM

## 2019-10-14 DIAGNOSIS — Z98.891 HISTORY OF UTERINE SCAR FROM PREVIOUS SURGERY: Chronic | ICD-10-CM

## 2019-10-14 DIAGNOSIS — Z43.0 ENCOUNTER FOR ATTENTION TO TRACHEOSTOMY: Chronic | ICD-10-CM

## 2019-10-14 DIAGNOSIS — L92.9 GRANULOMATOUS DISORDER OF THE SKIN AND SUBCUTANEOUS TISSUE, UNSPECIFIED: ICD-10-CM

## 2019-10-14 DIAGNOSIS — J39.8 OTHER SPECIFIED DISEASES OF UPPER RESPIRATORY TRACT: Chronic | ICD-10-CM

## 2019-10-14 DIAGNOSIS — Z93.0 TRACHEOSTOMY STATUS: ICD-10-CM

## 2019-10-14 DIAGNOSIS — Z93.0 TRACHEOSTOMY STATUS: Chronic | ICD-10-CM

## 2019-10-14 DIAGNOSIS — J95.03 MALFUNCTION OF TRACHEOSTOMY STOMA: ICD-10-CM

## 2019-10-14 PROCEDURE — 31541 LARYNSCOP W/TUMR EXC + SCOPE: CPT | Mod: 59

## 2019-10-14 PROCEDURE — 31641 BRONCHOSCOPY TREAT BLOCKAGE: CPT

## 2019-10-14 PROCEDURE — 99222 1ST HOSP IP/OBS MODERATE 55: CPT

## 2019-10-14 PROCEDURE — 31614 TRACHEOSTOMA REVJ COMPLEX: CPT

## 2019-10-14 PROCEDURE — 88304 TISSUE EXAM BY PATHOLOGIST: CPT | Mod: 26

## 2019-10-14 RX ORDER — ACETAMINOPHEN 500 MG
650 TABLET ORAL EVERY 6 HOURS
Refills: 0 | Status: DISCONTINUED | OUTPATIENT
Start: 2019-10-14 | End: 2019-10-15

## 2019-10-14 RX ORDER — BUDESONIDE, MICRONIZED 100 %
0.5 POWDER (GRAM) MISCELLANEOUS
Refills: 0 | Status: DISCONTINUED | OUTPATIENT
Start: 2019-10-14 | End: 2019-10-15

## 2019-10-14 RX ORDER — DEXAMETHASONE 0.5 MG/5ML
10 ELIXIR ORAL ONCE
Refills: 0 | Status: COMPLETED | OUTPATIENT
Start: 2019-10-15 | End: 2019-10-15

## 2019-10-14 RX ORDER — ACETAMINOPHEN 500 MG
1000 TABLET ORAL ONCE
Refills: 0 | Status: COMPLETED | OUTPATIENT
Start: 2019-10-14 | End: 2019-10-14

## 2019-10-14 RX ORDER — ACETYLCYSTEINE 200 MG/ML
4 VIAL (ML) MISCELLANEOUS THREE TIMES A DAY
Refills: 0 | Status: COMPLETED | OUTPATIENT
Start: 2019-10-14 | End: 2019-10-15

## 2019-10-14 RX ORDER — SODIUM CHLORIDE 9 MG/ML
1000 INJECTION, SOLUTION INTRAVENOUS
Refills: 0 | Status: DISCONTINUED | OUTPATIENT
Start: 2019-10-14 | End: 2019-10-14

## 2019-10-14 RX ORDER — POLYETHYLENE GLYCOL 3350 17 G/17G
17 POWDER, FOR SOLUTION ORAL DAILY
Refills: 0 | Status: DISCONTINUED | OUTPATIENT
Start: 2019-10-15 | End: 2019-10-15

## 2019-10-14 RX ORDER — LOSARTAN POTASSIUM 100 MG/1
25 TABLET, FILM COATED ORAL DAILY
Refills: 0 | Status: DISCONTINUED | OUTPATIENT
Start: 2019-10-15 | End: 2019-10-15

## 2019-10-14 RX ORDER — IBUPROFEN 200 MG
600 TABLET ORAL EVERY 6 HOURS
Refills: 0 | Status: DISCONTINUED | OUTPATIENT
Start: 2019-10-14 | End: 2019-10-15

## 2019-10-14 RX ORDER — KETOROLAC TROMETHAMINE 30 MG/ML
15 SYRINGE (ML) INJECTION ONCE
Refills: 0 | Status: DISCONTINUED | OUTPATIENT
Start: 2019-10-14 | End: 2019-10-15

## 2019-10-14 RX ORDER — IPRATROPIUM/ALBUTEROL SULFATE 18-103MCG
3 AEROSOL WITH ADAPTER (GRAM) INHALATION EVERY 6 HOURS
Refills: 0 | Status: COMPLETED | OUTPATIENT
Start: 2019-10-14 | End: 2019-10-14

## 2019-10-14 RX ORDER — AMLODIPINE BESYLATE 2.5 MG/1
5 TABLET ORAL DAILY
Refills: 0 | Status: DISCONTINUED | OUTPATIENT
Start: 2019-10-15 | End: 2019-10-15

## 2019-10-14 RX ORDER — IPRATROPIUM/ALBUTEROL SULFATE 18-103MCG
3 AEROSOL WITH ADAPTER (GRAM) INHALATION EVERY 4 HOURS
Refills: 0 | Status: DISCONTINUED | OUTPATIENT
Start: 2019-10-14 | End: 2019-10-15

## 2019-10-14 RX ORDER — KETOROLAC TROMETHAMINE 30 MG/ML
15 SYRINGE (ML) INJECTION ONCE
Refills: 0 | Status: DISCONTINUED | OUTPATIENT
Start: 2019-10-14 | End: 2019-10-14

## 2019-10-14 RX ADMIN — Medication 0.5 MILLIGRAM(S): at 20:30

## 2019-10-14 RX ADMIN — Medication 15 MILLIGRAM(S): at 22:30

## 2019-10-14 RX ADMIN — Medication 1000 MILLIGRAM(S): at 20:00

## 2019-10-14 RX ADMIN — Medication 15 MILLIGRAM(S): at 22:56

## 2019-10-14 RX ADMIN — Medication 400 MILLIGRAM(S): at 19:30

## 2019-10-14 RX ADMIN — Medication 3 MILLILITER(S): at 18:52

## 2019-10-14 NOTE — ASU DISCHARGE PLAN (ADULT/PEDIATRIC) - PROCEDURE
flexible bronchoscopy, direct laryngoscopy, bronchoscopy, tracheostomy stoma revision, removal of granulation tissue

## 2019-10-14 NOTE — BRIEF OPERATIVE NOTE - NSICDXBRIEFPROCEDURE_GEN_ALL_CORE_FT
PROCEDURES:  Bronchoscopy, flexible, adult 14-Oct-2019 16:29:47  Abhi Butler  Direct laryngoscopy with bronchoscopy 14-Oct-2019 16:29:41  Abhi Butler PROCEDURES:  Balloon dilation of trachea 14-Oct-2019 18:01:45  Abhi Butler  Simple revision, tracheostomy 14-Oct-2019 18:01:33  Abhi Butler  Bronchoscopy, flexible, adult 14-Oct-2019 16:29:47  Abhi Butler

## 2019-10-14 NOTE — H&P ADULT - NSICDXFAMILYHX_GEN_ALL_CORE_FT
FAMILY HISTORY:  Father  Still living? No  Family history of leukemia, Age at diagnosis: Age Unknown    Sibling  Still living? No  Family history of lung disease, Age at diagnosis: Age Unknown    Grandparent  Still living? No  Family history of heart disease, Age at diagnosis: Age Unknown

## 2019-10-14 NOTE — H&P ADULT - HISTORY OF PRESENT ILLNESS
HPI: 59F with hx COPD, laryngotracheal stenosis, worsened stomal stenosis s/p trach. On Vent at night time. Follows with pulm for COPD and lung nodule. Noted to have persistent peristomal granulation tissue and stomal stenosis here for OR today for stomaplasty. Minimal dysphagia, voice stable.

## 2019-10-14 NOTE — H&P ADULT - NSHPPHYSICALEXAM_GEN_ALL_CORE
Constitutional:. stretcher-bound, on oxygen and ventilator through trach, no retractions or distress.     Neck:. trach tube in place with multiple gauze and loose , 6 LPC trach. parotid gland, submandibular gland and thyroid glands are normal. temporomandibular joint is normal.     Ear: external ears are normal bilaterally and tympanic membranes are normal in both ears.     Nasal:  external appearance is normal, inferior turbinates and middle turbinates are normal and no abnormal secretions.     Oral Cavity: tongue is normal, teeth are normal, gums are normal, palatine tonsils are normal, lingual tonsils are normal, mucosa is normal and trachea located in midline position.   Larynx: laryngoscopy was performed, see procedure section for findings.     Head/Face: no masses and lesions seen, face is symmetric . salivary glands are normal.     Eyes: ocular motility and gaze are normal, extraocular movements are normal and no nystagmus.     Respiratory: assessment of respiratory effort is normal.     Lymphatic: palpation of lymph nodes is normal and no neck adenopathy.     Neurological: cranial nerves 2-12 intact and orientation to person, place, and time: normal.     Skin: no rashes.   Additional Findings: mildly raspy voice, hyperfunctional.

## 2019-10-14 NOTE — ASU DISCHARGE PLAN (ADULT/PEDIATRIC) - CALL YOUR DOCTOR IF YOU HAVE ANY OF THE FOLLOWING:
Wound/Surgical Site with redness, or foul smelling discharge or pus/Inability to tolerate liquids or foods/Bleeding that does not stop

## 2019-10-14 NOTE — PROGRESS NOTE ADULT - SUBJECTIVE AND OBJECTIVE BOX
SICU Consultation Note  =====================================================    Interval Events:    Pt tolerated the procedure . No active issues . SICU was consulted for overnight monitoring . Pt is on Full Vent Support : 14/450/5/50% . Pt denies shortness of breath , nausea , vomiting.      HPI:     59F with hx COPD, laryngotracheal stenosis, worsened stomal stenosis s/p trach. On Vent at night time. Follows with pulm for COPD and lung nodule. Noted to have persistent peristomal granulation tissue and stomal stenosis here for OR today for stomaplasty. Minimal dysphagia, voice stable.         Surgery Information :   Balloon dilation of trachea ,Simple revision, tracheostomy; Bronchoscopy, flexible,  Case Duration:      EBL:    10 ml    IV Fluids:       Blood Products:         Complications:    none     HISTORY  59yFemale  Allergies: Cipro (Unknown)  latex (Unknown)  theophylline (Hives)    PAST MEDICAL & SURGICAL HISTORY:  Lupus anticoagulant syndrome  Tracheomalacia  Pancreatic cyst  Anxiety disorder  Sickle cell trait  Hypertension  COPD (chronic obstructive pulmonary disease)  Tracheostomy dependent  S/P :   Tracheal stenosis: excision of tracheal stenosis 10/2017  revision of tracheal stoma 3/2018  Acute tracheostomy management  Dependence on tracheostomy    FAMILY HISTORY:  Family history of heart disease (Grandparent)  Family history of leukemia  Family history of lung disease (Sibling)      CURRENT MEDICATIONS:   --------------------------------------------------------------------------------------  Neurologic Medications  acetaminophen    Suspension .. 650 milliGRAM(s) Oral every 6 hours PRN Mild Pain (1 - 3)  ibuprofen  Suspension. 600 milliGRAM(s) Oral every 6 hours PRN Moderate Pain (4 - 6)  ketorolac   Injectable 15 milliGRAM(s) IV Push once    Respiratory Medications  acetylcysteine 20%  Inhalation 4 milliLiter(s) Inhalation three times a day  ALBUTerol/ipratropium for Nebulization 3 milliLiter(s) Nebulizer every 4 hours PRN Shortness of Breath and/or Wheezing  buDESOnide    Inhalation Suspension 0.5 milliGRAM(s) Nebulizer two times a day    Cardiovascular Medications  amLODIPine   Tablet 5 milliGRAM(s) Oral daily  losartan 25 milliGRAM(s) Oral daily    Gastrointestinal Medications  multivitamin 1 Tablet(s) Oral daily  polyethylene glycol 3350 17 Gram(s) Oral daily    Genitourinary Medications    Hematologic/Oncologic Medications    Antimicrobial/Immunologic Medications    Endocrine/Metabolic Medications  dexamethasone  IVPB 10 milliGRAM(s) IV Intermittent once    Topical/Other Medications    --------------------------------------------------------------------------------------    VITAL SIGNS, INS/OUTS (last 24 hours):  --------------------------------------------------------------------------------------  T(C): 36.4 (10-14-19 @ 21:45), Max: 36.7 (10-14-19 @ 14:13)  HR: 56 (10-15-19 @ 00:00) (51 - 108)  BP: 119/69 (10-15-19 @ 00:00) (97/74 - 162/98)  BP(mean): 79 (10-15-19 @ 00:00) (72 - 120)  ABP: --  ABP(mean): --  RR: 15 (10-15-19 @ 00:00) (14 - 27)  SpO2: 96% (10-15-19 @ 00:00) (69% - 100%)  Wt(kg): --  CVP(mm Hg): --  CI: --  CAPILLARY BLOOD GLUCOSE       N/A      10-14 @ 07:01  -  10-15 @ 00:39  --------------------------------------------------------  IN:  Total IN: 0 mL    OUT:    Voided: 400 mL  Total OUT: 400 mL    Total NET: -400 mL        --------------------------------------------------------------------------------------    EXAM:  NEUROLOGY  RASS: 0  GCS: 15  Exam: Normal, NAD, alert, oriented x 3, no focal deficits. PERRLA      RESPIRATORY  Exam: Lungs clear to auscultation, Normal expansion/effort.  LPC Size 6 Trach . Stoma site is clean no active bleeding noted .   [X] Tracheostomy   [] Intubated  Mechanical Ventilation: 14/450/5/50%    CARDIOVASCULAR  Exam: S1, S2.  Regular rate and rhythm.  No Peripheral edema    Cardiac Rhythm: Normal Sinus Rhythm    GI/NUTRITION  Exam: Abdomen soft, Non-tender, Non-distended.     METABOLIC/FLUIDS/ELECTROLYTES  multivitamin 1 Tablet(s) Oral daily      HEMATOLOGIC  [x] DVT Prophylaxis:   Transfusions:	[] PRBC	[] Platelets		[] FFP	[] Cryoprecipitate    INFECTIOUS DISEASE  Antimicrobials/Immunologic Medications:      Tubes/Lines/Drains    [x] Peripheral IV  [] Central Venous Line     	[] R	[] L	[] IJ	[] Fem	[] SC	Date Placed:   [] Arterial Line		[] R	[] L	[] Fem	[] Rad	[] Ax	Date Placed:   [] PICC:         	[] Midline		[] Mediport  [] Urinary Catheter		Date Placed:     VASCULAR  Exam: Extremities warm, pink, well-perfused.      MUSCULOSKELETAL  Exam: All extremities moving spontaneously without limitations.      SKIN:  Exam: Good skin turgor, no skin breakdown.        LABS  --------------------------------------------------------------------------------------    --------------------------------------------------------------------------------------    OTHER LABS    IMAGING RESULTS

## 2019-10-14 NOTE — H&P ADULT - NSICDXPASTSURGICALHX_GEN_ALL_CORE_FT
PAST SURGICAL HISTORY:  Acute tracheostomy management     Dependence on tracheostomy     S/P  1986    Tracheal stenosis excision of tracheal stenosis 10/2017  revision of tracheal stoma 3/2018

## 2019-10-14 NOTE — PROGRESS NOTE ADULT - ATTENDING COMMENTS
59y hx COPD, laryngotracheal stenosis, worsened stomal stenosis s/p Balloon dilation of trachea ,Simple revision, tracheostomy; Bronchoscopy, flexible LPC Size 6 Cuffed     PLAN:     Neurologic:   PRN pain control     Respiratory:   On MEch VEnt Support : 14/450/5/50%    Cardiovascular:   no acitve issues     Gastrointestinal/Nutrition:   Soft Diet     Renal/Genitourinary:   STrict I and O   replete lyytes as needed    Hematologic:   H/H stable  no active issues         Lines/Tubes:  PIV ,  Trach Size 6 cuffed     Disposition:     Admit  to SIC - PACU overnight   --------------------------------------------------------------------------------------    Critical Care Diagnoses:  respiratory failure.  The patient is a critical care patient with life threatening hemodynamic and metabolic instability in SICU.  I have personally interviewed when possible and examined the patient, reviewed data and laboratory tests/x-rays and all pertinent electronic images.  I was physically present for the key portions of the evaluation and management (E/M) service provided.   The SICU team has a constant risk benefit analyzes discussion with the primary team, all consultants, House Staff and PA's on all decisions.  These diagnoses are unrelated to the surgical procedure noted above.  I meet with family if needed to get further history, discuss the case and make care decisions for this patient who might not be able to participate.  Time involved in performance of separately billable procedures was not counted toward my critical care time. There is no overlap.  I spent 55-75 minutes ( 0800Hrs-0915Hrs in AM/ 1600Hrs-1715Hrs in PM, or other time indicated) of critical care time for the diagnoses, assessment, plan and interventions.  This time excludes time spent on separate procedures and teaching as discussed above.

## 2019-10-14 NOTE — H&P ADULT - NSICDXPASTMEDICALHX_GEN_ALL_CORE_FT
PAST MEDICAL HISTORY:  Anxiety disorder     COPD (chronic obstructive pulmonary disease)     Hypertension     Lupus anticoagulant syndrome     Pancreatic cyst     Sickle cell trait     Tracheomalacia     Tracheostomy dependent

## 2019-10-14 NOTE — H&P ADULT - ASSESSMENT
HPI: 59F with hx COPD, laryngotracheal stenosis s/p trach noted to have persistent peristomal granulation tissue/stenosis.  - OR today for stomaplasty

## 2019-10-15 ENCOUNTER — TRANSCRIPTION ENCOUNTER (OUTPATIENT)
Age: 59
End: 2019-10-15

## 2019-10-15 VITALS — HEART RATE: 94 BPM | OXYGEN SATURATION: 100 %

## 2019-10-15 LAB
ALBUMIN SERPL ELPH-MCNC: 4.1 G/DL — SIGNIFICANT CHANGE UP (ref 3.3–5)
ALP SERPL-CCNC: 57 U/L — SIGNIFICANT CHANGE UP (ref 40–120)
ALT FLD-CCNC: 9 U/L — SIGNIFICANT CHANGE UP (ref 4–33)
ANION GAP SERPL CALC-SCNC: 12 MMO/L — SIGNIFICANT CHANGE UP (ref 7–14)
AST SERPL-CCNC: 18 U/L — SIGNIFICANT CHANGE UP (ref 4–32)
BILIRUB SERPL-MCNC: 0.8 MG/DL — SIGNIFICANT CHANGE UP (ref 0.2–1.2)
BUN SERPL-MCNC: 5 MG/DL — LOW (ref 7–23)
CALCIUM SERPL-MCNC: 9.6 MG/DL — SIGNIFICANT CHANGE UP (ref 8.4–10.5)
CHLORIDE SERPL-SCNC: 103 MMOL/L — SIGNIFICANT CHANGE UP (ref 98–107)
CO2 SERPL-SCNC: 27 MMOL/L — SIGNIFICANT CHANGE UP (ref 22–31)
CREAT SERPL-MCNC: 0.71 MG/DL — SIGNIFICANT CHANGE UP (ref 0.5–1.3)
GLUCOSE SERPL-MCNC: 129 MG/DL — HIGH (ref 70–99)
HCT VFR BLD CALC: 31.4 % — LOW (ref 34.5–45)
HGB BLD-MCNC: 10.2 G/DL — LOW (ref 11.5–15.5)
MAGNESIUM SERPL-MCNC: 1.6 MG/DL — SIGNIFICANT CHANGE UP (ref 1.6–2.6)
MCHC RBC-ENTMCNC: 27.2 PG — SIGNIFICANT CHANGE UP (ref 27–34)
MCHC RBC-ENTMCNC: 32.5 % — SIGNIFICANT CHANGE UP (ref 32–36)
MCV RBC AUTO: 83.7 FL — SIGNIFICANT CHANGE UP (ref 80–100)
NRBC # FLD: 0.02 K/UL — SIGNIFICANT CHANGE UP (ref 0–0)
PHOSPHATE SERPL-MCNC: 3.3 MG/DL — SIGNIFICANT CHANGE UP (ref 2.5–4.5)
PLATELET # BLD AUTO: 212 K/UL — SIGNIFICANT CHANGE UP (ref 150–400)
PMV BLD: 10.8 FL — SIGNIFICANT CHANGE UP (ref 7–13)
POTASSIUM SERPL-MCNC: 4.1 MMOL/L — SIGNIFICANT CHANGE UP (ref 3.5–5.3)
POTASSIUM SERPL-SCNC: 4.1 MMOL/L — SIGNIFICANT CHANGE UP (ref 3.5–5.3)
PROT SERPL-MCNC: 6.8 G/DL — SIGNIFICANT CHANGE UP (ref 6–8.3)
RBC # BLD: 3.75 M/UL — LOW (ref 3.8–5.2)
RBC # FLD: 13.4 % — SIGNIFICANT CHANGE UP (ref 10.3–14.5)
SODIUM SERPL-SCNC: 142 MMOL/L — SIGNIFICANT CHANGE UP (ref 135–145)
WBC # BLD: 4.19 K/UL — SIGNIFICANT CHANGE UP (ref 3.8–10.5)
WBC # FLD AUTO: 4.19 K/UL — SIGNIFICANT CHANGE UP (ref 3.8–10.5)

## 2019-10-15 PROCEDURE — 99233 SBSQ HOSP IP/OBS HIGH 50: CPT

## 2019-10-15 RX ORDER — IPRATROPIUM/ALBUTEROL SULFATE 18-103MCG
1 AEROSOL WITH ADAPTER (GRAM) INHALATION
Qty: 0 | Refills: 0 | DISCHARGE

## 2019-10-15 RX ORDER — IPRATROPIUM/ALBUTEROL SULFATE 18-103MCG
3 AEROSOL WITH ADAPTER (GRAM) INHALATION
Qty: 0 | Refills: 0 | DISCHARGE
Start: 2019-10-15

## 2019-10-15 RX ADMIN — Medication 650 MILLIGRAM(S): at 09:20

## 2019-10-15 RX ADMIN — AMLODIPINE BESYLATE 5 MILLIGRAM(S): 2.5 TABLET ORAL at 09:37

## 2019-10-15 RX ADMIN — Medication 204 MILLIGRAM(S): at 01:20

## 2019-10-15 RX ADMIN — Medication 15 MILLIGRAM(S): at 06:30

## 2019-10-15 RX ADMIN — Medication 3 MILLILITER(S): at 05:38

## 2019-10-15 RX ADMIN — Medication 4 MILLILITER(S): at 09:34

## 2019-10-15 RX ADMIN — Medication 15 MILLIGRAM(S): at 06:55

## 2019-10-15 RX ADMIN — Medication 3 MILLILITER(S): at 15:33

## 2019-10-15 RX ADMIN — Medication 650 MILLIGRAM(S): at 08:50

## 2019-10-15 RX ADMIN — Medication 0.5 MILLIGRAM(S): at 09:32

## 2019-10-15 RX ADMIN — Medication 1 TABLET(S): at 11:37

## 2019-10-15 RX ADMIN — Medication 4 MILLILITER(S): at 00:00

## 2019-10-15 RX ADMIN — LOSARTAN POTASSIUM 25 MILLIGRAM(S): 100 TABLET, FILM COATED ORAL at 09:37

## 2019-10-15 NOTE — PROGRESS NOTE ADULT - SUBJECTIVE AND OBJECTIVE BOX
SICU AM Progress  Note  =====================================================    Interval Events:    Pt was resting comfortably overnight . On full vent support . Pain controlled with PRN meds .     HPI:     59F with hx COPD, laryngotracheal stenosis, worsened stomal stenosis s/p trach. On Vent at night time. Follows with pulm for COPD and lung nodule. Noted to have persistent peristomal granulation tissue and stomal stenosis here for OR today for stomaplasty. Minimal dysphagia, voice stable.         Surgery Information :   Balloon dilation of trachea ,Simple revision, tracheostomy; Bronchoscopy, flexible,  Case Duration:      EBL:    10 ml    IV Fluids:       Blood Products:         Complications:    none     HISTORY  59yFemale  Allergies: Cipro (Unknown)  latex (Unknown)  theophylline (Hives)    PAST MEDICAL & SURGICAL HISTORY:  Lupus anticoagulant syndrome  Tracheomalacia  Pancreatic cyst  Anxiety disorder  Sickle cell trait  Hypertension  COPD (chronic obstructive pulmonary disease)  Tracheostomy dependent  S/P :   Tracheal stenosis: excision of tracheal stenosis 10/2017  revision of tracheal stoma 3/2018  Acute tracheostomy management  Dependence on tracheostomy    FAMILY HISTORY:  Family history of heart disease (Grandparent)  Family history of leukemia  Family history of lung disease (Sibling)      CURRENT MEDICATIONS:   --------------------------------------------------------------------------------------  Neurologic Medications  acetaminophen    Suspension .. 650 milliGRAM(s) Oral every 6 hours PRN Mild Pain (1 - 3)  ibuprofen  Suspension. 600 milliGRAM(s) Oral every 6 hours PRN Moderate Pain (4 - 6)  ketorolac   Injectable 15 milliGRAM(s) IV Push once    Respiratory Medications  acetylcysteine 20%  Inhalation 4 milliLiter(s) Inhalation three times a day  ALBUTerol/ipratropium for Nebulization 3 milliLiter(s) Nebulizer every 4 hours PRN Shortness of Breath and/or Wheezing  buDESOnide    Inhalation Suspension 0.5 milliGRAM(s) Nebulizer two times a day    Cardiovascular Medications  amLODIPine   Tablet 5 milliGRAM(s) Oral daily  losartan 25 milliGRAM(s) Oral daily    Gastrointestinal Medications  multivitamin 1 Tablet(s) Oral daily  polyethylene glycol 3350 17 Gram(s) Oral daily    Genitourinary Medications    Hematologic/Oncologic Medications    Antimicrobial/Immunologic Medications    Endocrine/Metabolic Medications  dexamethasone  IVPB 10 milliGRAM(s) IV Intermittent once    Topical/Other Medications    --------------------------------------------------------------------------------------    VITAL SIGNS, INS/OUTS (last 24 hours):  --------------------------------------------------------------------------------------  T(C): 36.4 (10-14-19 @ 21:45), Max: 36.7 (10-14-19 @ 14:13)  HR: 49 (10-15-19 @ 02:00) (49 - 108)  BP: 145/81 (10-15-19 @ 02:00) (97/74 - 162/98)  BP(mean): 97 (10-15-19 @ 02:00) (72 - 120)  ABP: --  ABP(mean): --  RR: 14 (10-15-19 @ 02:00) (14 - 27)  SpO2: 100% (10-15-19 @ 02:00) (69% - 100%)  Wt(kg): --  CVP(mm Hg): --  CI: --  CAPILLARY BLOOD GLUCOSE       N/A      10-14 @ 07:01  -  10-15 @ 03:00  --------------------------------------------------------  IN:    IV PiggyBack: 50 mL    Oral Fluid: 90 mL  Total IN: 140 mL    OUT:    Voided: 400 mL  Total OUT: 400 mL    Total NET: -260 mL          --------------------------------------------------------------------------------------    EXAM:  NEUROLOGY  RASS: 0  GCS: 15  Exam: Normal, NAD, alert, oriented x 3, no focal deficits. PERRLA      RESPIRATORY  Exam: Lungs clear to auscultation, Normal expansion/effort.  LPC Size 6 Trach . Stoma site is clean no active bleeding noted .   [X] Tracheostomy   [] Intubated  Mechanical Ventilation: 14/450/5/50%    CARDIOVASCULAR  Exam: S1, S2.  Regular rate and rhythm.  No Peripheral edema    Cardiac Rhythm: Normal Sinus Rhythm    GI/NUTRITION  Exam: Abdomen soft, Non-tender, Non-distended.     METABOLIC/FLUIDS/ELECTROLYTES  multivitamin 1 Tablet(s) Oral daily      HEMATOLOGIC  [x] DVT Prophylaxis:   Transfusions:	[] PRBC	[] Platelets		[] FFP	[] Cryoprecipitate    INFECTIOUS DISEASE  Antimicrobials/Immunologic Medications:      Tubes/Lines/Drains    [x] Peripheral IV  [] Central Venous Line     	[] R	[] L	[] IJ	[] Fem	[] SC	Date Placed:   [] Arterial Line		[] R	[] L	[] Fem	[] Rad	[] Ax	Date Placed:   [] PICC:         	[] Midline		[] Mediport  [] Urinary Catheter		Date Placed:     VASCULAR  Exam: Extremities warm, pink, well-perfused.      MUSCULOSKELETAL  Exam: All extremities moving spontaneously without limitations.      SKIN:  Exam: Good skin turgor, no skin breakdown.        LABS  --------------------------------------------------------------------------------------    --------------------------------------------------------------------------------------    OTHER LABS    IMAGING RESULTS

## 2019-10-15 NOTE — PROGRESS NOTE ADULT - ATTENDING COMMENTS
59y hx COPD, laryngotracheal stenosis, worsened stomal stenosis s/p Balloon dilation of trachea ,Simple revision, tracheostomy; Bronchoscopy, flexible LPC Size 6 Cuffed     PLAN:     Neurologic:   PRN pain control     Respiratory:   On MEch VEnt Support : 14/450/5/50%    Cardiovascular:   no acitve issues     Gastrointestinal/Nutrition:   Soft Diet     Renal/Genitourinary:   STrict I and O   replete lyytes as needed    Hematologic:   H/H stable  no active issues         Lines/Tubes:  PIV ,  Trach Size 6 cuffed     Disposition:     Downgrade today     Critical Care Diagnoses:  respiratory failure.  The patient is a critical care patient with life threatening hemodynamic and metabolic instability in SICU.  I have personally interviewed when possible and examined the patient, reviewed data and laboratory tests/x-rays and all pertinent electronic images.  I was physically present for the key portions of the evaluation and management (E/M) service provided.   The SICU team has a constant risk benefit analyzes discussion with the primary team, all consultants, House Staff and PA's on all decisions.  These diagnoses are unrelated to the surgical procedure noted above.  I meet with family if needed to get further history, discuss the case and make care decisions for this patient who might not be able to participate.  Time involved in performance of separately billable procedures was not counted toward my critical care time. There is no overlap.  I spent 55-75 minutes ( 0800Hrs-0915Hrs in AM/ 1600Hrs-1715Hrs in PM, or other time indicated) of critical care time for the diagnoses, assessment, plan and interventions.  This time excludes time spent on separate procedures and teaching as discussed above.

## 2019-10-15 NOTE — PATIENT PROFILE ADULT - DO YOU FEEL THREATENED BY OTHERS?
[FreeTextEntry1] : This note was authored by Meera Baca working as a medical scribe for Dr. Rommel Thornton. The note was reviewed, edited, and revised by Dr. Rommel Thornton whom is in agreement with the exam findings, imaging findings, and treatment plan. 01/11/2019.  no

## 2019-10-15 NOTE — DISCHARGE NOTE PROVIDER - CARE PROVIDER_API CALL
Gaurang Rodriguez (MD; PhD)  Otolaryngology  [facility]  Dept of Otolaryngology, [address] [place] [address]  Phone: [phone]  Fax: [phone]  Follow Up Time:

## 2019-10-15 NOTE — PROGRESS NOTE ADULT - ASSESSMENT
ASSESSMENT:  59y hx COPD, laryngotracheal stenosis, worsened stomal stenosis s/p Balloon dilation of trachea ,Simple revision, tracheostomy; Bronchoscopy, flexible LPC Size 6 Cuffed     PLAN:     Neurologic:   PRN pain control     Respiratory:   On [facility] VEnt Support : 14/450/5/50%    Cardiovascular:   no acitve issues     Gastrointestinal/Nutrition:   Soft Diet     Renal/Genitourinary:   STrict I and O   replete lyytes as needed    Hematologic:   H/H stable  no active issues         Lines/Tubes:  PIV ,  Trach Size 6 cuffed     Disposition:     Admit  to SIC - PACU overnight   --------------------------------------------------------------------------------------    Critical Care Diagnoses:
ASSESSMENT:  59y hx COPD, laryngotracheal stenosis, worsened stomal stenosis s/p Balloon dilation of trachea ,Simple revision, tracheostomy; Bronchoscopy, flexible LPC Size 6 Cuffed     PLAN:     Neurologic:   PRN pain control     Respiratory:   On [facility] VEnt Support : 14/450/5/50%    Cardiovascular:   no acitve issues     Gastrointestinal/Nutrition:   Soft Diet     Renal/Genitourinary:   STrict I and O   replete lyytes as needed    Hematologic:   H/H stable  no active issues         Lines/Tubes:  PIV ,  Trach Size 6 cuffed     Disposition:     Downgrade today   --------------------------------------------------------------------------------------    Critical Care Diagnoses:

## 2019-10-15 NOTE — PROGRESS NOTE ADULT - SUBJECTIVE AND OBJECTIVE BOX
pt seen and examined. no acute events overnight. remained on vent.     vss  nad  6LPC in place with soft tie, no peristomal bleeding or excess secretions   on mech vent  neck: soft, flat, no crepitus     a/p: 59F s/p mDL, balloon dilation, stomaplasty   -dc with [facility]ha today, will discuss case with SW  -cont vent per home setting  -budesonide nebs bid pt seen and examined. no acute events overnight. remained on vent.     vss  nad  6LPC in place with soft tie, no peristomal bleeding or excess secretions   on mech vent  neck: soft, flat, no crepitus     a/p: 59F s/p mDL, balloon dilation, stomaplasty   -dc with hha today, will discuss case with SW  -cont vent per home setting  -budesonide nebs bid   -soft po  -cont home meds  -scd

## 2019-10-15 NOTE — DISCHARGE NOTE PROVIDER - NSDCHHCONTRAHOME_GEN_ALL_CORE
Activity restrictions due to illness/surgery Activity restrictions due to illness/surgery/Other, specify...

## 2019-10-15 NOTE — PROVIDER CONTACT NOTE (OTHER) - BACKGROUND
lamar initially called to have ALS ambulance set up for pt.  lamar then received call to have RN reinstated today so pt may return to home.

## 2019-10-15 NOTE — PROVIDER CONTACT NOTE (OTHER) - ASSESSMENT
[place] received call from Sarah in PACU stating that pt is ready to retun home and is trach/vent dependent with suctioning.  [facility] called and transport set up for 10:15am.  conf# [id number], trip# [id number]
lamar called [facility] [phone] and spoke with RN representative that stated her RN will be at pt's home at 3pm.  transportation was then changed to 3pm transport with ALS. conf# remaind same.

## 2019-10-15 NOTE — DISCHARGE NOTE NURSING/CASE MANAGEMENT/SOCIAL WORK - PATIENT PORTAL LINK FT
You can access the FollowMyHealth Patient Portal offered by [facility] by registering at the following website: [email or web address]. By joining [employer]’s FollowMyHealth portal, you will also be able to view your health information using other applications (apps) compatible with our system.

## 2019-10-21 ENCOUNTER — RX RENEWAL (OUTPATIENT)
Age: 59
End: 2019-10-21

## 2019-10-21 RX ORDER — OXYCODONE AND ACETAMINOPHEN 5; 325 MG/1; MG/1
5-325 TABLET ORAL
Qty: 10 | Refills: 0 | Status: ACTIVE | COMMUNITY
Start: 2019-10-21 | End: 1900-01-01

## 2019-10-29 ENCOUNTER — APPOINTMENT (OUTPATIENT)
Dept: PULMONOLOGY | Facility: CLINIC | Age: 59
End: 2019-10-29
Payer: MEDICAID

## 2019-10-29 VITALS
HEIGHT: 64 IN | RESPIRATION RATE: 17 BRPM | BODY MASS INDEX: 32.44 KG/M2 | WEIGHT: 190 LBS | OXYGEN SATURATION: 98 % | HEART RATE: 54 BPM

## 2019-10-29 PROCEDURE — 99214 OFFICE O/P EST MOD 30 MIN: CPT

## 2019-10-29 RX ORDER — AMOXICILLIN AND CLAVULANATE POTASSIUM 875; 125 MG/1; MG/1
875-125 TABLET, COATED ORAL
Qty: 20 | Refills: 0 | Status: DISCONTINUED | COMMUNITY
Start: 2018-05-31 | End: 2019-10-29

## 2019-10-29 NOTE — ASSESSMENT
[FreeTextEntry1] : Ms. Bo is a 59 year old female with a history of former smoker, COPD, respiratory failure for 10 years, tracheostomy,  tracheostenosis and tracheomalacia who is s/p bronchoscopy with revision of the tracheostomy with dilation of the tracheostomy. She is s/p ENT visit. She presents with tracheomalacia COPD end stage with issues with ventilator. She is stable c/o dry mouth and mucus plugging\par \par Her chronic respiratory failure is multifactorial due to:\par -underlying COPD/severe persistent asthma\par -respiratory muscle weakness\par -tracheomalacia\par \par problem 1: tracheostomy/ chronic respiratory failure \par -Continue to wean off the ventilator as tolerable in short trials throughout the day  \par -recommended to use hypertonic saline in clearing of tracheostomy \par -Rx given for elastic pads, dressings, disposable inner cannulas, and tracheostomy collar to improve collar. Rx given for Metaplex Lite. \par -recommended f/u with Dr. Brett Laughlin and Dr. Edward Rodriguez\par \par Problem 2: Chronic Respiratory Failure \par - Trach collar- goal up to 12hrs per day (at 2 hours now)\par - 3 L SIMV: VT- 450; PC 20; PS-18; PEEP- 5\par - Night CPAP 5/PS 18 (back up 8 B/mm)\par \par problem 3:  COPD/asthma\par -Add Combivent 1 inhalation up to QID to replace Symbicort \par -continue Performist nebulizer 1 unit dose BID, followed by Pulmicort 0.5 nebulizer BID \par -continue Albuterol via nebulizer for rescue up to QiD \par -continue on NAC Q8H\par -continue Yulerpi 1 unit dose QD via nebulizer\par \par -COPD is a progressive disease and although it can’t be cured , appropriate management can slow its progression, reduce frequency and severity of exacerbations, and improve symptoms and the patient quality of life. Hospitalizations are the greatest contributor to the total COPD costs and account for up to 87% of total COPD related costs. Exacerbations are the main cause of admissions and subsequently account for the 40-75% of COPD costs. Inhaled maintenance therapy reduces the incidence of exacerbations in patients with stable COPD. Incorrect inhaler use and nonadherence are major obstacles to achieving COPD treatment goals. Many COPD patients have challenges (impaired inhalation, limited dexterity, reduced cognition: that limit their ability to correctly use their COPD treatment devices resulting in reduced symptom control. Of most importance is smoking cessation and early intervention with respiratory illnesses and contemplation for pulmonary rehab to enhance quality of life.\par \par Problem 3A: Abnormal CT: r/o Bronchiectasis / pulmonary nodule\par -f/u CT of the chest \par -Complete high resolution chest CT, prone and supine, to r/o pulmonary nodules\par -Recommended to take Slippery Elm Tea and pill for cough and mucus clearing \par -based upon results of CT scan, will apply for chest vest therapy\par \par CAT scans are the only radiological modality to identify abnormalities w/in the lungs with regards to nodules/masses/lymph nodes. Risks, benefits were reviewed in detail. The guidelines for abnormalities include follow up CT scans at various intervals which could range from 6 weeks to 1 year intervals. If there is a change for the worse then consideration for a biopsy will be considered if you are a candidate. Second opinion evaluation with a thoracic surgeon or an interventional radiologist could be offered. \par \par problem 4: GERD\par -continue Ranitidine 150mg BID\par - Continue Protonix 40 mg qAM\par -continue Baclofen 5 mg premeal, QHS \par \par -Things to avoid including overeating, spicy foods, tight clothing, eating within three hours of bed, this list is not all inclusive. \par -For treatment of reflux, possible options discussed including diet control, H2 blockers, PPIs, as well as coating motility agents discussed as treatment options. Timing of meals and proximity of last meal to sleep were discussed. If symptoms persist, a formal gastrointestinal evaluation is needed.\par -Rule of 2's: Avoid eating too late, too fast, too much, too spicy or within two hours of bedtime\par \par Problem 5: Immunodeficiency\par - Continue Zithromax 250 mg Monday, Wednesday, and Friday\par -Due to the fact that this pt has had more infections than would be expected and immunological blood work is indicated this would include: IgG subclasses, quantitative immunoglobulins, Strep pneumoniae titers as well as Vitamin D levels. Based on this blood work we will be able to decide where the pt needs additional pneumococcal vaccine either Prevnar 13 or Pneumovax. Immunology evaluation will also be potentially indicated. \par \par Problem 6: Sensory neuropathic Cough\par - Continue Amitriptyline 10 mg up to TID\par -Sensory neuropathic cough is an etiology of cough that is often realized once someone has been ruled out for common disease such as: asthma, COPD, eosinophilic bronchitis, bronchiectasis, post nasal drip, and GERD. It sometimes develops following a URI, herpes zoster outbreak in pharynx or thyroid or cervical spine injury. However, many patients have no identifiable antecedent explanation. \par \par problem 7: tracheomalacia/tracheal tumor \par -follow up with Dr. Kelby Larios for thoracic evaluation for potential tracheoplasty - s/p new tracheostomy\par \par Tracheomalacia is usually acquired in adults and common causes include damage by tracheostomy or endotracheal intubation damaging the tracheal cartilage with increase risk with multiple intubations, prolonged intubation, and concurrent high dose steroid therapy; external chest wall trauma and surgery; chronic compression of the trachea by benign etiologies (eg, benign mediastinal goiter) or malignancy; relapsing polychondritis; or recurrent infection. Tracheomalacia can be asymptomatic, however signs or symptoms can develop as the severity of the airway narrowing progresses with major symptoms include dyspnea, cough, and sputum retention. Other symptoms include severe paroxysms of coughing, wheezing or stridor, barking cough and may be exacerbated by forced expiration, cough, and Valsalva maneuver. Tracheomalacia is diagnosed by a bronchoscopic visualization of dynamic airway collapse on dynamic chest CT. Therapy is warranted in symptomatic patients with severe tracheomalacia and includes surgical repair as tracheobronchoplasty. The patient was referred to Dr. Willy Kay or Dr. Kelby Larios, at [facility] for a surgical consult. \par \par problem 8: dysphonia/sore throat\par -recommended Fisherman Friend's lozenges \par -recommended to use Slippery Elm Lozenge / Tea \par -add Evoxac\par \par problem 9: health maintenance \par - recommended to f/u with Dr. Palomares(GI) \par -instructed to increase physical activity as much as possible\par - She was administered an influenza vaccination today 11/26/2018\par -recommended strep pneumonia vaccines: Prevnar-13 vaccine, followed by Pneumo vaccine 23 one year following\par -recommended early intervention for URIs\par -recommended regular osteoporosis evaluations\par -recommended early dermatological evaluations\par -recommended after the age of 50 to the age of 70, colonoscopy every 5 years \par \par \par Follow up in 2 months.\par Patient is encouraged to call with any changes, concerns or questions.

## 2019-10-29 NOTE — REVIEW OF SYSTEMS
[Sputum] : sputum  [Dyspnea] : dyspnea [Chest Discomfort] : chest discomfort [As Noted in HPI] : as noted in HPI [Heartburn] : heartburn [Reflux] : reflux [Dysphagia] : dysphagia [Constipation] : constipation [Negative] : Sleep Disorder [Diarrhea] : no diarrhea

## 2019-10-29 NOTE — HISTORY OF PRESENT ILLNESS
[FreeTextEntry1] : Ms. Bo is a 59 year old female with a history of atypical chest pain, COPD, chronic hypoxemia, dysphagia, GERD, laryngeal stenosis, OW, dependence on tracheostomy, and tracheomalacia presenting to the office today for a follow up visit. Her chief complaint is \par \par -pt presents in a stretcher today. \par -she had surgery with Michael (ENT). will f/u 11/14. pt feels it was successful\par -pt's throat pain has persisted \par -pt has some pain in the chest \par -pt has some bitter taste in her mouth\par -her swallowing has improved, but is still impaired. she is not eating solid foods.\par -he has a very dry mouth. has been using saline treatments\par -pt uses slippery elm\par -no change in medications/supplements/vitamins \par -she had some mucus plugging yesterday for the whole day. \par -pt is drinking water and her urine is clear. \par \par -she denies any headaches, nausea, vomiting, fever, chills, sweats, chest pressure, diarrhea, constipation, dysphagia, dizziness, leg swelling, leg pain, itchy eyes, itchy ears, heartburn, reflux, or sour taste in the mouth.

## 2019-10-29 NOTE — ADDENDUM
[FreeTextEntry1] : All medical record entries made by dewey Ervin were at Dr. Maco Nova's, direction and personally dictated by me on 10/29/2019 . I have reviewed the chart and agree that the record accurately reflects my personal performance of the history, physical exam, assessment and plan. I have also personally directed, reviewed, and agree with the discharge instructions.

## 2019-10-29 NOTE — PHYSICAL EXAM

## 2019-11-04 ENCOUNTER — RX RENEWAL (OUTPATIENT)
Age: 59
End: 2019-11-04

## 2019-11-05 ENCOUNTER — RX CHANGE (OUTPATIENT)
Age: 59
End: 2019-11-05

## 2019-11-06 ENCOUNTER — RX CHANGE (OUTPATIENT)
Age: 59
End: 2019-11-06

## 2019-11-06 RX ORDER — PILOCARPINE HYDROCHLORIDE 5 MG/1
5 TABLET, FILM COATED ORAL 3 TIMES DAILY
Qty: 90 | Refills: 0 | Status: ACTIVE | COMMUNITY
Start: 2019-11-06 | End: 1900-01-01

## 2019-11-11 ENCOUNTER — APPOINTMENT (OUTPATIENT)
Dept: GASTROENTEROLOGY | Facility: CLINIC | Age: 59
End: 2019-11-11
Payer: MEDICAID

## 2019-11-11 VITALS
BODY MASS INDEX: 32.44 KG/M2 | SYSTOLIC BLOOD PRESSURE: 109 MMHG | HEIGHT: 64 IN | TEMPERATURE: 98.8 F | HEART RATE: 56 BPM | OXYGEN SATURATION: 96 % | WEIGHT: 190 LBS | DIASTOLIC BLOOD PRESSURE: 76 MMHG

## 2019-11-11 PROCEDURE — 99213 OFFICE O/P EST LOW 20 MIN: CPT

## 2019-11-11 RX ORDER — POLYETHYLENE GLYCOL 3350 17 G/17G
17 POWDER, FOR SOLUTION ORAL DAILY
Qty: 510 | Refills: 1 | Status: ACTIVE | COMMUNITY
Start: 2019-11-11 | End: 1900-01-01

## 2019-11-11 NOTE — HISTORY OF PRESENT ILLNESS
[None] : had no significant interval events [Nausea] : denies nausea [Diarrhea] : denies diarrhea [Vomiting] : denies vomiting [Yellow Skin Or Eyes (Jaundice)] : denies jaundice [Rectal Pain] : denies rectal pain [Wt Loss ___ Lbs] : recent [unfilled] ~Upound(s) weight loss [Heartburn] : heartburn [Constipation] : constipation [Abdominal Pain] : abdominal pain [Abdominal Swelling] : abdominal swelling [GERD] : gastroesophageal reflux disease [Hiatus Hernia] : no hiatus hernia [Wt Gain ___ Lbs] : no recent weight gain [Peptic Ulcer Disease] : no peptic ulcer disease [Pancreatitis] : no pancreatitis [Cholelithiasis] : no cholelithiasis [Kidney Stone] : no kidney stone [Irritable Bowel Syndrome] : no irritable bowel syndrome [Inflammatory Bowel Disease] : no inflammatory bowel disease [Diverticulitis] : no diverticulitis [Malignancy] : no malignancy [Abdominal Surgery] : no abdominal surgery [Appendectomy] : no appendectomy [de-identified] : The patient had a CAT scan of the abdomen and pelvis with IV contrast performed on July 3, 2019.  The CAT scan of the abdomen and pelvis with IV contrast revealed a markedly limited evaluation of the pancreas. The pancreatic tissue is atrophic.   There was beam hardening artifact, motion artifact and lack of small bowel opacification that contributed to limited evaluation. Also noted was a single borderline enlarged periaortic lymph node, a constipated colon and a distended urinary bladder. \par  [Cholecystectomy] : no cholecystectomy [de-identified] : The patient states that she is feeling uncomfortable x several months. The patient denies any jaundice or pruritus.  The patient complains of chronic lower back pain. The patient complains of abdominal pain.  The patient describes the abdominal pain as a crampy, intermittent upper abdominal discomfort that is nonradiating in nature.  The abdominal pain is worse with meals and improves with passing gas or having a bowel movement.  The abdominal pain is described as being mild to moderate in nature.  The abdominal pain occurs at night and in the morning.  The abdominal pain can occur at any time.   The abdominal pain has never awakened the patient from sleep.  The abdominal pain is not relieved with medication.  The abdominal pain is associated with abdominal gas and bloating.  The patient denies any nausea or vomiting.  The patient complains of gastroesophageal reflux disease and dysphagia.  The dysphagia is worse with solids but unrelated to liquids. The gastroesophageal reflux disease is worse after meals and late at night and in the early morning. The gastroesophageal reflux disease is slightly improved with proton pump inhibitors, H2 blockers and antacids.   The patient complains of atypical chest pain, shortness of breath and palpitations.  The chest pain is described as a sharp, intermittent substernal discomfort that is nonradiating in nature.  The patient admits to occasional episodes of diaphoresis.  The chest pain is described as being 8 to 9 out of 10 in intensity.  The chest pain can occur at any time.  The chest pain is worse at night and early morning.  The chest pain is worse after meals and improves with passing gas.  The chest pain has never awakened the patient from sleep.   The patient complains of constipation but denies any diarrhea.  The patient has 1 bowel movement every 2 days.  The patient complains of a change in bowel habits.  The patient complains of a change in caliber of stool.   The patient denies having mucus discharge with the -bowel movements.  The patient denies any bright red blood per rectum, melena or hematemesis..  The patient denies any rectal pain or rectal pruritus.  The patient complains of weight loss but denies any anorexia.  The patient admits to losing an unknown amount of over the past few months.  She denies any fevers or chills.  The patient had a CAT scan of the abdomen and pelvis with IV contrast performed on July 3, 2019.  The CAT scan of the abdomen and pelvis with IV contrast revealed a markedly limited evaluation of the pancreas. The pancreatic tissue is atrophic.   There was beam hardening artifact, motion artifact and lack of small bowel opacification that contributed to limited evaluation. Also noted was a single borderline enlarged periaortic lymph node, a constipated colon and a distended urinary bladder. The blood tests performed on Patricia 3, 2019 was significant for anemia with Hgb/Hct level of 9.9/31. 6, respectively, an elevated rheumatoid factor of 15 and elevated ESR of 36 mm/hr. The patient has a history of pancreatic cyst x 4 years. The patient admits to having a prior upper endoscopy and colonoscopy performed by another gastroenterologist. According to the patient, the upper endoscopic findings were unknown to the patient. The colonoscopic findings were also unknown to the patient. The patient admits to a family history of GI problems. The patient’s father had a history of liver disease.

## 2019-11-11 NOTE — ASSESSMENT
[FreeTextEntry1] : Abdominal Pain: The patient complains of abdominal pain. The patient is to avoid nonsteroidal anti-inflammatory drugs and aspirin.  I recommend a trial of Carafate suspension 1 gram/10 cc PO 4 times a day for 3 months for the symptoms.\par Dyspepsia: The patient complains of dyspeptic symptoms.  The patient was advised to abide by an anti-gas diet.  The patient was given a pamphlet for anti-gas.  The patient and I reviewed the anti-gas diet at length. The patient is to start on a trial of Phazyme one tablet 3 times a day p.r.n. abdominal pain and gas.\par GERD: The patient was advised to avoid late-night meals and dietary indiscretions.  The patient was advised to avoid fried and fatty foods.  The patient was advised to abide by an anti-GERD diet. The patient was given a pamphlet for anti-GERD.  The patient and I reviewed the anti-GERD diet at length. I recommend a trial of Carafate suspension 1 gram/10 cc 4 times a day x 3 months for the symptoms.\par Constipation: The patient complains of constipation. I recommend a high-fiber diet. I recommend a trial of a probiotic such as Align once a day. I recommend a trial of Metamucil once a day for fiber supplementation. I recommend a trial of Linzess 145 mcg once a day for the constipation. If the symptoms persist, the patient may require a colonoscopy to assess the symptoms.  The patient was told of the risks and benefits of the procedure.  The patient was told of the risks of perforation, emergency surgery, bleeding, infections and missed lesions.  The patient agreed and will followup to reassess the symptoms.  \par Anemia: The patient was previously found to have anemia on prior blood work. The patient denies any bright red blood per rectum, melena or hematemesis.  I recommend an upper endoscopy and colonoscopy to assess the anemia. The patient was told of the risks and benefits of the procedure. The patient was told of the risks of perforation, emergency surgery, bleeding, infections and missed lesions. The patient agreed and will schedule for the procedure. The patient is to be n.p.o. after midnight and bowel prep was given. The patient is to return for the procedure after cardiac clearance. \par Colonoscopy: I recommend a colonoscopy to assess the symptoms. The patient was told of the risks and benefits of the procedure. The patient was told of the risks of perforation, emergency surgery, bleeding, infections and missed lesions. The patient agreed and will schedule for the procedure. The patient can take the antihypertensive medication with a sip of water one hour prior to the procedure. The patient is to be n.p.o. after midnight and bowel prep was given. The patient is to return for the procedure after cardiac clearance. \par History of Pancreatic Cyst: The patient was previously diagnosed with a pancreatic cystic lesion on prior imaging study. The patient denies any jaundice, pruritus or back pain. The patient complains of abdominal pain. The patient denies any prior history of EtOH abuse.  The patient had a CAT scan of the abdomen and pelvis with IV contrast performed on July 3, 2019.  The CAT scan of the abdomen and pelvis with IV contrast revealed a markedly limited evaluation of the pancreas. The pancreatic tissue is atrophic.   There was beam hardening artifact, motion artifact and lack of small bowel opacification that contributed to limited evaluation. Also noted was a single borderline enlarged periaortic lymph node, a constipated colon and a distended urinary bladder.  I recommend a repeat imaging study of the pancreas with IV contrast to reassess the pancreatic cystic lesions after the endoscopic evaluation. The patient is aware of the potential risks of pancreatic cysts progressing to pancreatic cancer.  The patient may require an endoscopic ultrasound of pancreas with possible fine-needle aspiration to better assess the pancreatic cystic lesions pending repeat imaging study. The patient is aware of the importance for followup. The patient agrees and will followup. The patient was advised to followup with the cardiologist for cardiac clearance prior to evaluation with endoscopic procedures. \par Follow-up: The patient is to follow-up in the office in 4 weeks to reassess the symptoms. The patient was told to call the office if any further problems. \par \par \par

## 2019-11-14 ENCOUNTER — APPOINTMENT (OUTPATIENT)
Dept: OTOLARYNGOLOGY | Facility: CLINIC | Age: 59
End: 2019-11-14

## 2019-11-27 ENCOUNTER — OUTPATIENT (OUTPATIENT)
Dept: OUTPATIENT SERVICES | Facility: HOSPITAL | Age: 59
LOS: 1 days | Discharge: HOME | End: 2019-11-27

## 2019-11-27 ENCOUNTER — APPOINTMENT (OUTPATIENT)
Dept: OTOLARYNGOLOGY | Facility: CLINIC | Age: 59
End: 2019-11-27
Payer: MEDICAID

## 2019-11-27 DIAGNOSIS — J39.8 OTHER SPECIFIED DISEASES OF UPPER RESPIRATORY TRACT: Chronic | ICD-10-CM

## 2019-11-27 DIAGNOSIS — Z93.0 TRACHEOSTOMY STATUS: Chronic | ICD-10-CM

## 2019-11-27 DIAGNOSIS — Z98.891 HISTORY OF UTERINE SCAR FROM PREVIOUS SURGERY: Chronic | ICD-10-CM

## 2019-11-27 DIAGNOSIS — Z43.0 ENCOUNTER FOR ATTENTION TO TRACHEOSTOMY: Chronic | ICD-10-CM

## 2019-11-27 PROCEDURE — 31615 TRCHEOBRNCHSC EST TRACHS INC: CPT | Mod: 58

## 2019-11-27 PROCEDURE — 99024 POSTOP FOLLOW-UP VISIT: CPT

## 2019-12-10 ENCOUNTER — APPOINTMENT (OUTPATIENT)
Dept: SPEECH THERAPY | Facility: CLINIC | Age: 59
End: 2019-12-10

## 2019-12-19 ENCOUNTER — APPOINTMENT (OUTPATIENT)
Dept: SPEECH THERAPY | Facility: CLINIC | Age: 59
End: 2019-12-19

## 2019-12-19 ENCOUNTER — OUTPATIENT (OUTPATIENT)
Dept: OUTPATIENT SERVICES | Facility: HOSPITAL | Age: 59
LOS: 1 days | Discharge: HOME | End: 2019-12-19

## 2019-12-19 ENCOUNTER — APPOINTMENT (OUTPATIENT)
Dept: OTOLARYNGOLOGY | Facility: CLINIC | Age: 59
End: 2019-12-19
Payer: MEDICAID

## 2019-12-19 VITALS
BODY MASS INDEX: 32.44 KG/M2 | HEART RATE: 56 BPM | WEIGHT: 190 LBS | HEIGHT: 64 IN | SYSTOLIC BLOOD PRESSURE: 132 MMHG | DIASTOLIC BLOOD PRESSURE: 83 MMHG

## 2019-12-19 DIAGNOSIS — J39.8 OTHER SPECIFIED DISEASES OF UPPER RESPIRATORY TRACT: Chronic | ICD-10-CM

## 2019-12-19 DIAGNOSIS — Z93.0 TRACHEOSTOMY STATUS: Chronic | ICD-10-CM

## 2019-12-19 DIAGNOSIS — Z43.0 ENCOUNTER FOR ATTENTION TO TRACHEOSTOMY: Chronic | ICD-10-CM

## 2019-12-19 DIAGNOSIS — Z98.891 HISTORY OF UTERINE SCAR FROM PREVIOUS SURGERY: Chronic | ICD-10-CM

## 2019-12-19 PROCEDURE — 99024 POSTOP FOLLOW-UP VISIT: CPT

## 2019-12-19 PROCEDURE — 31579 LARYNGOSCOPY TELESCOPIC: CPT | Mod: 58,59

## 2019-12-19 PROCEDURE — 31615 TRCHEOBRNCHSC EST TRACHS INC: CPT | Mod: 58

## 2019-12-19 RX ORDER — SENNOSIDES 8.6 MG TABLETS 8.6 MG/1
TABLET ORAL
Refills: 0 | Status: ACTIVE | COMMUNITY

## 2019-12-19 RX ORDER — ACETYLCYSTEINE 100 MG/ML
10 SOLUTION ORAL; RESPIRATORY (INHALATION)
Qty: 3 | Refills: 1 | Status: DISCONTINUED | COMMUNITY
Start: 2018-05-09 | End: 2019-12-19

## 2019-12-19 RX ORDER — LACTULOSE 10 G/15ML
10 SOLUTION ORAL
Refills: 0 | Status: DISCONTINUED | COMMUNITY
End: 2019-12-19

## 2019-12-19 RX ORDER — AZITHROMYCIN 250 MG/1
250 TABLET, FILM COATED ORAL
Qty: 39 | Refills: 1 | Status: DISCONTINUED | COMMUNITY
Start: 2019-06-19 | End: 2019-12-19

## 2019-12-19 RX ORDER — LINACLOTIDE 145 UG/1
145 CAPSULE, GELATIN COATED ORAL
Qty: 30 | Refills: 0 | Status: DISCONTINUED | COMMUNITY
Start: 2019-08-30 | End: 2019-12-19

## 2019-12-19 RX ORDER — ESOMEPRAZOLE MAGNESIUM 40 MG/1
CAPSULE, DELAYED RELEASE ORAL
Refills: 0 | Status: ACTIVE | COMMUNITY

## 2019-12-19 RX ORDER — BUDESONIDE 0.5 MG/2ML
0.5 INHALANT ORAL TWICE DAILY
Qty: 5 | Refills: 0 | Status: DISCONTINUED | COMMUNITY
End: 2019-12-19

## 2019-12-19 RX ORDER — LEVALBUTEROL HYDROCHLORIDE 0.63 MG/3ML
0.63 SOLUTION RESPIRATORY (INHALATION)
Qty: 120 | Refills: 3 | Status: DISCONTINUED | COMMUNITY
Start: 2017-11-13 | End: 2019-12-19

## 2019-12-19 RX ORDER — ACETYLCYSTEINE 600 MG
CAPSULE ORAL
Refills: 0 | Status: DISCONTINUED | COMMUNITY
End: 2019-12-19

## 2019-12-19 RX ORDER — ROFLUMILAST 500 UG/1
500 TABLET ORAL
Qty: 90 | Refills: 1 | Status: DISCONTINUED | COMMUNITY
Start: 2018-02-27 | End: 2019-12-19

## 2019-12-19 RX ORDER — IPRATROPIUM BROMIDE AND ALBUTEROL SULFATE 2.5; .5 MG/3ML; MG/3ML
0.5-2.5 (3) SOLUTION RESPIRATORY (INHALATION)
Qty: 1 | Refills: 0 | Status: DISCONTINUED | COMMUNITY
Start: 2018-05-02 | End: 2019-12-19

## 2019-12-19 RX ORDER — NITROGLYCERIN 0.6 MG/1
TABLET SUBLINGUAL
Refills: 0 | Status: ACTIVE | COMMUNITY

## 2019-12-19 RX ORDER — AMOXICILLIN AND CLAVULANATE POTASSIUM 875; 125 MG/1; MG/1
875-125 TABLET, COATED ORAL
Qty: 20 | Refills: 0 | Status: DISCONTINUED | COMMUNITY
Start: 2019-10-31 | End: 2019-12-19

## 2019-12-19 RX ORDER — REVEFENACIN 175 UG/3ML
175 SOLUTION RESPIRATORY (INHALATION)
Qty: 90 | Refills: 1 | Status: DISCONTINUED | COMMUNITY
Start: 2019-06-19 | End: 2019-12-19

## 2019-12-19 RX ORDER — MUPIROCIN 20 MG/G
2 OINTMENT TOPICAL 3 TIMES DAILY
Qty: 1 | Refills: 2 | Status: DISCONTINUED | COMMUNITY
Start: 2018-05-31 | End: 2019-12-19

## 2019-12-19 RX ORDER — POLYETHYLENE GLYCOL 3350, SODIUM CHLORIDE, SODIUM BICARBONATE AND POTASSIUM CHLORIDE WITH LEMON FLAVOR 420; 11.2; 5.72; 1.48 G/4L; G/4L; G/4L; G/4L
420 POWDER, FOR SOLUTION ORAL
Qty: 1 | Refills: 0 | Status: DISCONTINUED | COMMUNITY
Start: 2019-08-21 | End: 2019-12-19

## 2019-12-19 RX ORDER — FAMOTIDINE 20 MG/1
20 TABLET, FILM COATED ORAL
Qty: 30 | Refills: 0 | Status: DISCONTINUED | COMMUNITY
Start: 2017-11-09 | End: 2019-12-19

## 2019-12-19 RX ORDER — BUDESONIDE 0.5 MG/2ML
0.5 INHALANT ORAL TWICE DAILY
Qty: 60 | Refills: 5 | Status: DISCONTINUED | COMMUNITY
Start: 2018-11-26 | End: 2019-12-19

## 2019-12-19 RX ORDER — LINACLOTIDE 145 UG/1
145 CAPSULE, GELATIN COATED ORAL
Qty: 30 | Refills: 0 | Status: DISCONTINUED | COMMUNITY
Start: 2019-08-21 | End: 2019-12-19

## 2019-12-19 RX ORDER — ACETYLCYSTEINE 200 MG/ML
20 SOLUTION ORAL; RESPIRATORY (INHALATION)
Qty: 90 | Refills: 1 | Status: DISCONTINUED | COMMUNITY
Start: 2017-11-09 | End: 2019-12-19

## 2019-12-19 RX ORDER — RANITIDINE HYDROCHLORIDE 150 MG/1
150 CAPSULE ORAL
Refills: 0 | Status: DISCONTINUED | COMMUNITY
End: 2019-12-19

## 2019-12-19 RX ORDER — BACLOFEN 5 MG/1
5 TABLET ORAL
Qty: 120 | Refills: 5 | Status: DISCONTINUED | COMMUNITY
Start: 2019-06-19 | End: 2019-12-19

## 2019-12-19 RX ORDER — AZITHROMYCIN 250 MG/1
250 TABLET, FILM COATED ORAL
Qty: 39 | Refills: 1 | Status: DISCONTINUED | COMMUNITY
Start: 2018-11-26 | End: 2019-12-19

## 2019-12-19 RX ORDER — MIRTAZAPINE 15 MG/1
15 TABLET, FILM COATED ORAL
Refills: 0 | Status: DISCONTINUED | COMMUNITY
End: 2019-12-19

## 2019-12-23 ENCOUNTER — APPOINTMENT (OUTPATIENT)
Dept: PULMONOLOGY | Facility: CLINIC | Age: 59
End: 2019-12-23
Payer: MEDICAID

## 2019-12-23 ENCOUNTER — MEDICATION RENEWAL (OUTPATIENT)
Age: 59
End: 2019-12-23

## 2019-12-23 VITALS
SYSTOLIC BLOOD PRESSURE: 130 MMHG | DIASTOLIC BLOOD PRESSURE: 78 MMHG | OXYGEN SATURATION: 99 % | RESPIRATION RATE: 17 BRPM

## 2019-12-23 DIAGNOSIS — A49.8 OTHER BACTERIAL INFECTIONS OF UNSPECIFIED SITE: ICD-10-CM

## 2019-12-23 PROCEDURE — 99214 OFFICE O/P EST MOD 30 MIN: CPT | Mod: 25

## 2019-12-24 ENCOUNTER — RX CHANGE (OUTPATIENT)
Age: 59
End: 2019-12-24

## 2019-12-24 ENCOUNTER — APPOINTMENT (OUTPATIENT)
Dept: GASTROENTEROLOGY | Facility: HOSPITAL | Age: 59
End: 2019-12-24

## 2019-12-24 ENCOUNTER — RX RENEWAL (OUTPATIENT)
Age: 59
End: 2019-12-24

## 2019-12-24 NOTE — ASSESSMENT
[FreeTextEntry1] : Ms. Bo is a 59 year old female with a history of former smoker, COPD, respiratory failure for 10 years, tracheostomy,  tracheostenosis and tracheomalacia who is s/p bronchoscopy with revision of the tracheostomy with dilation of the tracheostomy. She is s/p ENT visit. She presents with tracheomalacia and end stage COPD with issues with ventilator. She is stable except for resistant pseudomonas aeruginosa.\par \par Her chronic respiratory failure is multifactorial due to:\par -underlying COPD/severe persistent asthma\par -respiratory muscle weakness\par -tracheomalacia\par \par problem 1: tracheostomy / chronic respiratory failure \par -Continue to wean off the ventilator as tolerable in short trials throughout the day  \par -recommended to use hypertonic saline in clearing of tracheostomy \par -Rx given for elastic pads, dressings, disposable inner cannulas, and tracheostomy collar to improve collar. Rx given for Metaplex Lite. \par -recommended f/u with Dr. Brett Lauhglin and Dr. Edward Rodriguez\par \par Problem 2: Chronic Respiratory Failure \par -Trach collar- goal up to 12hrs per day (at 2 hours now)\par - 3 L SIMV: VT- 450; PC 20; PS-18; PEEP- 5\par - Night CPAP 5/PS 18 (back up 8 B/mm)\par \par problem 3:  COPD/asthma\par -continue Combivent 1 inhalation up to QID to replace Symbicort \par -continue Performist nebulizer 1 unit dose BID, followed by Pulmicort 0.5 nebulizer BID \par -continue Albuterol via nebulizer for rescue up to QiD \par -continue on NAC Q8H\par -continue Yulerpi 1 unit dose QD via nebulizer\par \par -COPD is a progressive disease and although it can’t be cured , appropriate management can slow its progression, reduce frequency and severity of exacerbations, and improve symptoms and the patient quality of life. Hospitalizations are the greatest contributor to the total COPD costs and account for up to 87% of total COPD related costs. Exacerbations are the main cause of admissions and subsequently account for the 40-75% of COPD costs. Inhaled maintenance therapy reduces the incidence of exacerbations in patients with stable COPD. Incorrect inhaler use and nonadherence are major obstacles to achieving COPD treatment goals. Many COPD patients have challenges (impaired inhalation, limited dexterity, reduced cognition: that limit their ability to correctly use their COPD treatment devices resulting in reduced symptom control. Of most importance is smoking cessation and early intervention with respiratory illnesses and contemplation for pulmonary rehab to enhance quality of life.\par \par Problem 3A: Abnormal CT: r/o Bronchiectasis / pulmonary nodule\par -s/p CT of the chest -(no present) - Next 11/2020\par -Complete high resolution chest CT, prone and supine, to r/o pulmonary nodules\par -Recommended to take Slippery Elm Tea and pill for cough and mucus clearing \par -based upon results of CT scan, will apply for chest vest therapy\par \par CAT scans are the only radiological modality to identify abnormalities w/in the lungs with regards to nodules/masses/lymph nodes. Risks, benefits were reviewed in detail. The guidelines for abnormalities include follow up CT scans at various intervals which could range from 6 weeks to 1 year intervals. If there is a change for the worse then consideration for a biopsy will be considered if you are a candidate. Second opinion evaluation with a thoracic surgeon or an interventional radiologist could be offered. \par \par problem 3b: pseudomonas aeruginosa.\par -add Amikacin 250 BID x 2 weeks\par -recheck culture after 2 weeksof inhaler use\par -if Pt is not approved for inhaler, get infectious disease evaluation\par \par problem 4: GERD -(Needs EgD +/- colonoscopy)\par -continue Ranitidine 150 mg BID\par -continue Protonix 40 mg qAM\par -continue Baclofen 5 mg premeal, QHS \par -Things to avoid including overeating, spicy foods, tight clothing, eating within three hours of bed, this list is not all inclusive. \par -For treatment of reflux, possible options discussed including diet control, H2 blockers, PPIs, as well as coating motility agents discussed as treatment options. Timing of meals and proximity of last meal to sleep were discussed. If symptoms persist, a formal gastrointestinal evaluation is needed.\par -Rule of 2's: Avoid eating too late, too fast, too much, too spicy or within two hours of bedtime\par \par Problem 5: Immunodeficiency\par - Continue Zithromax 250 mg Monday, Wednesday, and Friday\par -Due to the fact that this pt has had more infections than would be expected and immunological blood work is indicated this would include: IgG subclasses, quantitative immunoglobulins, Strep pneumoniae titers as well as Vitamin D levels. Based on this blood work we will be able to decide where the pt needs additional pneumococcal vaccine either Prevnar 13 or Pneumovax. Immunology evaluation will also be potentially indicated. \par \par Problem 6: Sensory neuropathic Cough\par - Continue Amitriptyline 10 mg up to TID\par -Sensory neuropathic cough is an etiology of cough that is often realized once someone has been ruled out for common disease such as: asthma, COPD, eosinophilic bronchitis, bronchiectasis, post nasal drip, and GERD. It sometimes develops following a URI, herpes zoster outbreak in pharynx or thyroid or cervical spine injury. However, many patients have no identifiable antecedent explanation. \par \par problem 7: tracheomalacia/tracheal tumor \par -follow up with Dr. Kelby Larios for thoracic evaluation for potential tracheoplasty - s/p new tracheostomy\par \par Tracheomalacia is usually acquired in adults and common causes include damage by tracheostomy or endotracheal intubation damaging the tracheal cartilage with increase risk with multiple intubations, prolonged intubation, and concurrent high dose steroid therapy; external chest wall trauma and surgery; chronic compression of the trachea by benign etiologies (eg, benign mediastinal goiter) or malignancy; relapsing polychondritis; or recurrent infection. Tracheomalacia can be asymptomatic, however signs or symptoms can develop as the severity of the airway narrowing progresses with major symptoms include dyspnea, cough, and sputum retention. Other symptoms include severe paroxysms of coughing, wheezing or stridor, barking cough and may be exacerbated by forced expiration, cough, and Valsalva maneuver. Tracheomalacia is diagnosed by a bronchoscopic visualization of dynamic airway collapse on dynamic chest CT. Therapy is warranted in symptomatic patients with severe tracheomalacia and includes surgical repair as tracheobronchoplasty. The patient was referred to Dr. Willy Kay or Dr. Kelby Larios, at [facility] for a surgical consult. \par \par problem 8: dysphonia/sore throat\par -recommended Fisherman Friend's lozenges \par -recommended to use Slippery Elm Lozenge / Tea \par -add Evoxac 30 q 8\par \par **********************Pre-Procedure Clearance**************\par Problem 9: Pre-procedure for EGD and colonoscopy\par -at this point in time there are no absolute pulmonary contraindications to go forward with the planned procedure \par -patient is to continue all his/her regular medications\par -patient is high-risk for complications from a pulmonary perspective\par -no absolute contraindication for either procedure\par \par problem 10: health maintenance \par - recommended to f/u with Dr. Palomares(GI) \par -instructed to increase physical activity as much as possible\par - She was administered an influenza vaccination 11/19\par -recommended strep pneumonia vaccines: Prevnar-13 vaccine, followed by Pneumo vaccine 23 one year following\par -recommended early intervention for URIs\par -recommended regular osteoporosis evaluations\par -recommended early dermatological evaluations\par -recommended after the age of 50 to the age of 70, colonoscopy every 5 years \par \par \par Follow up in 2 months.\par Patient is encouraged to call with any changes, concerns or questions.

## 2019-12-24 NOTE — PROCEDURE
[FreeTextEntry1] : Chest CT (November 19, 2019) reveals indwelling tracheotomy, reportedly for several years. Moderate diffuse centrilobular emphysema and biapical scar R>L. Diffuse bronchiectasis, no focal infiltrate or consolidation. no significant or suspicious pulmonary nodule. Heart is normal size.

## 2019-12-24 NOTE — ADDENDUM
[FreeTextEntry1] : *************Amended by Smith Newsome on 12/24/19*************\par All medical record entries made by dewey Wong were at Dr. Maco Nova's direction and personally dictated by me on 12/23/2019. I have reviewed the chart and agree that the record accurately reflects my personal performance of the history, physical exam, assessment and plan. I have also personally directed, reviewed, and agree with the discharge instructions.

## 2019-12-24 NOTE — PHYSICAL EXAM
[General Appearance - Well Developed] : well developed [Normal Appearance] : normal appearance [Well Groomed] : well groomed [General Appearance - Well Nourished] : well nourished [No Deformities] : no deformities [General Appearance - In No Acute Distress] : no acute distress [Normal Conjunctiva] : the conjunctiva exhibited no abnormalities [Eyelids - No Xanthelasma] : the eyelids demonstrated no xanthelasmas [Normal Oropharynx] : normal oropharynx [Neck Appearance] : the appearance of the neck was normal [Neck Cervical Mass (___cm)] : no neck mass was observed [Jugular Venous Distention Increased] : there was no jugular-venous distention [Thyroid Diffuse Enlargement] : the thyroid was not enlarged [Thyroid Nodule] : there were no palpable thyroid nodules [Heart Sounds] : normal S1 and S2 [Heart Rate And Rhythm] : heart rate and rhythm were normal [Murmurs] : no murmurs present [Respiration, Rhythm And Depth] : normal respiratory rhythm and effort [Exaggerated Use Of Accessory Muscles For Inspiration] : no accessory muscle use [Abdomen Soft] : soft [Abdomen Mass (___ Cm)] : no abdominal mass palpated [Abdomen Tenderness] : non-tender [Gait - Sufficient For Exercise Testing] : the gait was sufficient for exercise testing [Nail Clubbing] : no clubbing of the fingernails [Cyanosis, Localized] : no localized cyanosis [Petechial Hemorrhages (___cm)] : no petechial hemorrhages [Skin Color & Pigmentation] : normal skin color and pigmentation [No Venous Stasis] : no venous stasis [] : no rash [Skin Lesions] : no skin lesions [No Skin Ulcers] : no skin ulcer [Deep Tendon Reflexes (DTR)] : deep tendon reflexes were 2+ and symmetric [No Xanthoma] : no  xanthoma was observed [Sensation] : the sensory exam was normal to light touch and pinprick [No Focal Deficits] : no focal deficits [Oriented To Time, Place, And Person] : oriented to person, place, and time [Impaired Insight] : insight and judgment were intact [Affect] : the affect was normal [II] : II [FreeTextEntry1] : I:E ratio 1:3; expiratory wheeze b/l

## 2019-12-24 NOTE — HISTORY OF PRESENT ILLNESS
[FreeTextEntry1] : Ms. Bo is a 59 year old female with a history of atypical chest pain, COPD, chronic hypoxemia, dysphagia, GERD, laryngeal stenosis, OW, dependence on tracheostomy, and tracheomalacia presenting to the office today for a follow up visit. Her \par -she reports that she has not been feeling well and she has recently lost some weight. she has been very constipated \par -she is s/p evaluation with Dr. Rodriguez (ENT) and will be having a colonoscopy tomorrow with Dr. Palomares \par -she reports that her mouth has been very dry \par -she has not been sleeping well at night\par -she states that her legs are frequently swollen\par -she states that her breathing has been stable\par -she reports that she feels significant mucus congestion in her chest but is unable to bring up any mucus\par -she denies any headaches, nausea, vomiting, fever, chills, sweats, chest pain, chest pressure, diarrhea, dysphagia, dizziness, leg pain, itchy eyes, itchy ears, heartburn, reflux, or sour taste in the mouth.

## 2019-12-24 NOTE — REVIEW OF SYSTEMS
[Sputum] : sputum  [Dyspnea] : dyspnea [As Noted in HPI] : as noted in HPI [Chest Discomfort] : chest discomfort [Heartburn] : heartburn [Reflux] : reflux [Dysphagia] : dysphagia [Constipation] : constipation [Negative] : Psychiatric [Diarrhea] : no diarrhea

## 2019-12-26 ENCOUNTER — MEDICATION RENEWAL (OUTPATIENT)
Age: 59
End: 2019-12-26

## 2019-12-27 NOTE — ASU PATIENT PROFILE, ADULT - TEACHING/LEARNING DEVELOPMENTAL CONSIDERATIONS
Patient wanted to let [address] know she had the scope with Dr Niesha Batista on Monday. States her protonix was increased to BID.  (script ordered by Dr Rodrigo Joseph med list updated)    [facility] none

## 2019-12-31 DIAGNOSIS — R13.12 DYSPHAGIA, OROPHARYNGEAL PHASE: ICD-10-CM

## 2020-02-03 RX ORDER — FOLIC ACID 1 MG/1
1 TABLET ORAL DAILY
Qty: 30 | Refills: 4 | Status: ACTIVE | COMMUNITY
Start: 2020-02-03 | End: 1900-01-01

## 2020-02-07 ENCOUNTER — APPOINTMENT (OUTPATIENT)
Dept: PULMONOLOGY | Facility: CLINIC | Age: 60
End: 2020-02-07
Payer: MEDICAID

## 2020-02-07 VITALS
BODY MASS INDEX: 27.31 KG/M2 | SYSTOLIC BLOOD PRESSURE: 140 MMHG | RESPIRATION RATE: 18 BRPM | HEIGHT: 64 IN | WEIGHT: 160 LBS | DIASTOLIC BLOOD PRESSURE: 80 MMHG | OXYGEN SATURATION: 99 % | HEART RATE: 90 BPM

## 2020-02-07 DIAGNOSIS — J69.0 PNEUMONITIS DUE TO INHALATION OF FOOD AND VOMIT: ICD-10-CM

## 2020-02-07 PROCEDURE — 99214 OFFICE O/P EST MOD 30 MIN: CPT | Mod: 25

## 2020-02-07 PROCEDURE — 71046 X-RAY EXAM CHEST 2 VIEWS: CPT

## 2020-02-07 RX ORDER — AMIKACIN SULFATE 250 MG/ML
500 INJECTION, SOLUTION INTRAMUSCULAR; INTRAVENOUS
Qty: 56 | Refills: 1 | Status: DISCONTINUED | COMMUNITY
Start: 2019-12-23 | End: 2020-02-07

## 2020-02-07 NOTE — PHYSICAL EXAM
[General Appearance - Well Developed] : well developed [Normal Appearance] : normal appearance [Well Groomed] : well groomed [General Appearance - Well Nourished] : well nourished [No Deformities] : no deformities [General Appearance - In No Acute Distress] : no acute distress [Normal Conjunctiva] : the conjunctiva exhibited no abnormalities [Eyelids - No Xanthelasma] : the eyelids demonstrated no xanthelasmas [Normal Oropharynx] : normal oropharynx [III] : III [Neck Appearance] : the appearance of the neck was normal [Neck Cervical Mass (___cm)] : no neck mass was observed [Jugular Venous Distention Increased] : there was no jugular-venous distention [Thyroid Nodule] : there were no palpable thyroid nodules [Thyroid Diffuse Enlargement] : the thyroid was not enlarged [Heart Rate And Rhythm] : heart rate and rhythm were normal [Heart Sounds] : normal S1 and S2 [Murmurs] : no murmurs present [Respiration, Rhythm And Depth] : normal respiratory rhythm and effort [Exaggerated Use Of Accessory Muscles For Inspiration] : no accessory muscle use [Abdomen Soft] : soft [Abdomen Tenderness] : non-tender [Abdomen Mass (___ Cm)] : no abdominal mass palpated [Gait - Sufficient For Exercise Testing] : the gait was sufficient for exercise testing [Nail Clubbing] : no clubbing of the fingernails [Cyanosis, Localized] : no localized cyanosis [Petechial Hemorrhages (___cm)] : no petechial hemorrhages [] : no ischemic changes [Skin Color & Pigmentation] : normal skin color and pigmentation [No Venous Stasis] : no venous stasis [No Skin Ulcers] : no skin ulcer [Skin Lesions] : no skin lesions [No Xanthoma] : no  xanthoma was observed [Deep Tendon Reflexes (DTR)] : deep tendon reflexes were 2+ and symmetric [Sensation] : the sensory exam was normal to light touch and pinprick [No Focal Deficits] : no focal deficits [Oriented To Time, Place, And Person] : oriented to person, place, and time [Impaired Insight] : insight and judgment were intact [Affect] : the affect was normal [FreeTextEntry1] : I:E ratio 1:3; mild expiratory wheezes

## 2020-02-07 NOTE — ASSESSMENT
[FreeTextEntry1] : Ms. Bo is a 59 year old female with a history of former smoker, COPD, respiratory failure for 10 years, tracheostomy,  tracheostenosis and tracheomalacia who is s/p bronchoscopy with revision of the tracheostomy with dilation of the tracheostomy. She is s/p ENT visit. She presents with tracheomalacia and end stage COPD with issues with ventilator. She is s/p resistant pseudomonas aeruginosa. PNA ([facility])- awaiting colonoscopy (preop).\par \par Her chronic respiratory failure is multifactorial due to:\par -underlying COPD/severe persistent asthma\par -respiratory muscle weakness\par -tracheomalacia\par \par problem 1: tracheostomy / chronic respiratory failure \par -Continue to wean off the ventilator as tolerable in short trials throughout the day  \par -recommended to use hypertonic saline in clearing of tracheostomy \par -Rx given for elastic pads, dressings, disposable inner cannulas, and tracheostomy collar to improve collar. Rx given for Metaplex Lite. \par -recommended f/u with Dr. Brett Laughlin and Dr. Edward Rodriguez\par \par Problem 1A: PNA\par - Radiologically cleared\par - Your chest xray/CT reveals an infiltrate c/w pneumonia; this based on your clinical scenario required additional therapy. Any change in your status will require hospitalization at the nearest hospital and al local ambulance will need to be called. Our group is on staff at [facility] and [facility] as- consultants. The patient/family is instructed to notify our office with any change in condition. \par \par \par  \par \par Problem 2: Chronic Respiratory Failure \par -Trach collar- goal up to 12hrs per day (at 2 hours now)\par - 3 L SIMV: VT- 450; PC 20; PS-18; PEEP- 5\par - Night CPAP 5/PS 18 (back up 8 B/mm)\par \par problem 3:  COPD/asthma\par -continue Combivent 1 inhalation up to QID to replace Symbicort \par -continue Performist nebulizer 1 unit dose BID, followed by Pulmicort 0.5 nebulizer BID \par -continue Albuterol via nebulizer for rescue up to QiD \par -continue on NAC Q8H\par -continue Yulerpi 1 unit dose QD via nebulizer\par \par -COPD is a progressive disease and although it can’t be cured , appropriate management can slow its progression, reduce frequency and severity of exacerbations, and improve symptoms and the patient quality of life. Hospitalizations are the greatest contributor to the total COPD costs and account for up to 87% of total COPD related costs. Exacerbations are the main cause of admissions and subsequently account for the 40-75% of COPD costs. Inhaled maintenance therapy reduces the incidence of exacerbations in patients with stable COPD. Incorrect inhaler use and nonadherence are major obstacles to achieving COPD treatment goals. Many COPD patients have challenges (impaired inhalation, limited dexterity, reduced cognition: that limit their ability to correctly use their COPD treatment devices resulting in reduced symptom control. Of most importance is smoking cessation and early intervention with respiratory illnesses and contemplation for pulmonary rehab to enhance quality of life.\par \par Problem 3A: Abnormal CT: r/o Bronchiectasis / pulmonary nodule\par -s/p CT of the chest -(no present) - Next 11/2020\par -Complete high resolution chest CT, prone and supine, to r/o pulmonary nodules\par -Recommended to take Slippery Elm Tea and pill for cough and mucus clearing \par -based upon results of CT scan, will apply for chest vest therapy\par \par CAT scans are the only radiological modality to identify abnormalities w/in the lungs with regards to nodules/masses/lymph nodes. Risks, benefits were reviewed in detail. The guidelines for abnormalities include follow up CT scans at various intervals which could range from 6 weeks to 1 year intervals. If there is a change for the worse then consideration for a biopsy will be considered if you are a candidate. Second opinion evaluation with a thoracic surgeon or an interventional radiologist could be offered. \par \par problem 3b: pseudomonas aeruginosa.\par -add Amikacin 250 BID x 2 weeks\par -recheck culture after 2 weeksof inhaler use\par -if Pt is not approved for inhaler, get infectious disease evaluation\par \par problem 4: GERD -(Needs EgD +/- colonoscopy)\par - add Pepcid Complete 40 mg QHS\par -continue Protonix 40 mg qAM\par -continue Baclofen 5 mg premeal, QHS \par -Things to avoid including overeating, spicy foods, tight clothing, eating within three hours of bed, this list is not all inclusive. \par -For treatment of reflux, possible options discussed including diet control, H2 blockers, PPIs, as well as coating motility agents discussed as treatment options. Timing of meals and proximity of last meal to sleep were discussed. If symptoms persist, a formal gastrointestinal evaluation is needed.\par -Rule of 2's: Avoid eating too late, too fast, too much, too spicy or within two hours of bedtime\par \par Problem 5: Immunodeficiency\par - Continue Zithromax 250 mg Monday, Wednesday, and Friday\par -Due to the fact that this pt has had more infections than would be expected and immunological blood work is indicated this would include: IgG subclasses, quantitative immunoglobulins, Strep pneumoniae titers as well as Vitamin D levels. Based on this blood work we will be able to decide where the pt needs additional pneumococcal vaccine either Prevnar 13 or Pneumovax. Immunology evaluation will also be potentially indicated. \par \par Problem 6: Sensory neuropathic Cough\par - Continue Amitriptyline 10 mg up to TID\par -Sensory neuropathic cough is an etiology of cough that is often realized once someone has been ruled out for common disease such as: asthma, COPD, eosinophilic bronchitis, bronchiectasis, post nasal drip, and GERD. It sometimes develops following a URI, herpes zoster outbreak in pharynx or thyroid or cervical spine injury. However, many patients have no identifiable antecedent explanation. \par \par problem 7: tracheomalacia/tracheal tumor \par -follow up with Dr. Kelby Larios for thoracic evaluation for potential tracheoplasty - s/p new tracheostomy\par \par Tracheomalacia is usually acquired in adults and common causes include damage by tracheostomy or endotracheal intubation damaging the tracheal cartilage with increase risk with multiple intubations, prolonged intubation, and concurrent high dose steroid therapy; external chest wall trauma and surgery; chronic compression of the trachea by benign etiologies (eg, benign mediastinal goiter) or malignancy; relapsing polychondritis; or recurrent infection. Tracheomalacia can be asymptomatic, however signs or symptoms can develop as the severity of the airway narrowing progresses with major symptoms include dyspnea, cough, and sputum retention. Other symptoms include severe paroxysms of coughing, wheezing or stridor, barking cough and may be exacerbated by forced expiration, cough, and Valsalva maneuver. Tracheomalacia is diagnosed by a bronchoscopic visualization of dynamic airway collapse on dynamic chest CT. Therapy is warranted in symptomatic patients with severe tracheomalacia and includes surgical repair as tracheobronchoplasty. The patient was referred to Dr. Willy Kay or Dr. Kelby Larios, at [facility] for a surgical consult. \par \par problem 8: dysphonia/sore throat\par -recommended Fisherman Friend's lozenges \par -recommended to use Slippery Elm Lozenge / Tea \par -add Evoxac 30 q 8\par \par **********************Pre-Procedure Clearance**************\par Problem 9: Pre-procedure for EGD and colonoscopy\par -at this point in time there are no absolute pulmonary contraindications to go forward with the planned procedure \par -patient is to continue all his/her regular medications\par -patient is high-risk for complications from a pulmonary perspective\par -no absolute contraindication for either procedure\par \par problem 10: health maintenance \par - recommended to f/u with Dr. Palomares(GI) \par -instructed to increase physical activity as much as possible\par - She was administered an influenza vaccination 11/19\par -recommended strep pneumonia vaccines: Prevnar-13 vaccine, followed by Pneumo vaccine 23 one year following (completed)\par -recommended early intervention for URIs\par -recommended regular osteoporosis evaluations\par -recommended early dermatological evaluations\par -recommended after the age of 50 to the age of 70, colonoscopy every 5 years \par \par \par Follow up in 2 months.\par Patient is encouraged to call with any changes, concerns or questions.

## 2020-02-07 NOTE — PROCEDURE
[FreeTextEntry1] : Sputum (1.18.2020) positive pseudomonis, Serratia sensitive to cefazoline, cefepime , cefatazdime, ceftrazone, ciprofloxacin , levofloxacin, cifro, topra.  \par \par CXR: Tracheostomy in place. Normal sized heart. Hyperinflation biaterally. Prominent pulmonary arteries. No evidence of any pneumonia.

## 2020-02-07 NOTE — HISTORY OF PRESENT ILLNESS
[FreeTextEntry1] : Ms. Bo is a 59 year old female with a history of atypical chest pain, COPD, chronic hypoxemia, dysphagia, GERD, laryngeal stenosis, OW, dependence on tracheostomy, and tracheomalacia presenting to the office today for a follow up visit. She is preop for a colonoscopy. \par - She was at [facility] for 1 week\par -  She was in the hospital for pneumonia\par - She got out on Thursday January 30th \par - She has constipation\par - Her appetite is poor \par - She has a hard time swallowing solids \par - Her breathing has not been good\par - She is having dry mouth and throat\par - She is on Cefepime\par - denies any headaches, nausea, vomiting, fever, chills, sweats, chest pain, chest pressure, diarrhea, dizziness, leg swelling, leg pain, itchy eyes, itchy ears, heartburn, reflux, or sour taste in the mouth.\par

## 2020-02-07 NOTE — ADDENDUM
[FreeTextEntry1] : Documented by Ne Wilks acting as a scribe for Dr. Maco Nova on (02/07/2020).\par \par All medical record entries made by the Scribe were at my, Dr. Maco Nova's, direction and personally dictated by me on (02/07/2020). I have reviewed the chart and agree that the record accurately reflects my personal performance of the history, physical exam, assessment and plan. I have also personally directed, reviewed, and agree with the discharge instructions. \par \par \par

## 2020-02-09 NOTE — PHYSICAL EXAM
[FreeTextEntry1] : stretcher-bound, on oxygen and ventilator through trach, no retractions or distress [de-identified] : trach tube closer to stoma, loose trach ties, minimal peristomal granulation [Midline] : trachea located in midline position [Laryngoscopy Performed] : laryngoscopy was performed, see procedure section for findings [Normal] : no rashes [de-identified] : improved voice, mild hyperfunctional

## 2020-02-09 NOTE — HISTORY OF PRESENT ILLNESS
[de-identified] : 58yo F here for post op Micro direct laryngoscopy with excision of laryngeal stenosis, bronchoscopy with excision of tracheal stenosis, tracheostomaplasty with flaps and placement of new 6 LPC Shiley tracheostomy  tube. Myriad of complaints today but states that her breathing has improved since surgery. However, she is concerned by dysphagia and concern for peristomal granulation tissue. No bleeding, voice improved. Able to swallow but feels like food is getting stuck.  Has seen GI and CT abdomen reviewed, rec EGD and continued treatment for egrd.

## 2020-02-09 NOTE — REASON FOR VISIT
[Subsequent Evaluation] : a subsequent evaluation for [FreeTextEntry2] : post op Micro direct laryngoscopy with excision of laryngeal stenosis, bronchoscopy with excision of tracheal stenosis, tracheostomaplasty with flaps and placement of new 6 LPC Shiley tracheostomy  tube

## 2020-02-11 ENCOUNTER — APPOINTMENT (OUTPATIENT)
Dept: SPEECH THERAPY | Facility: CLINIC | Age: 60
End: 2020-02-11

## 2020-02-13 ENCOUNTER — APPOINTMENT (OUTPATIENT)
Dept: OTOLARYNGOLOGY | Facility: CLINIC | Age: 60
End: 2020-02-13
Payer: MEDICAID

## 2020-02-13 VITALS
WEIGHT: 160 LBS | HEART RATE: 63 BPM | DIASTOLIC BLOOD PRESSURE: 71 MMHG | SYSTOLIC BLOOD PRESSURE: 116 MMHG | BODY MASS INDEX: 27.31 KG/M2 | HEIGHT: 64 IN

## 2020-02-13 PROCEDURE — 17250 CHEM CAUT OF GRANLTJ TISSUE: CPT

## 2020-02-13 PROCEDURE — 31615 TRCHEOBRNCHSC EST TRACHS INC: CPT

## 2020-02-13 PROCEDURE — 99214 OFFICE O/P EST MOD 30 MIN: CPT | Mod: 25

## 2020-02-18 DIAGNOSIS — J38.6 STENOSIS OF LARYNX: ICD-10-CM

## 2020-02-18 DIAGNOSIS — J04.10 ACUTE TRACHEITIS WITHOUT OBSTRUCTION: ICD-10-CM

## 2020-02-18 DIAGNOSIS — J39.8 OTHER SPECIFIED DISEASES OF UPPER RESPIRATORY TRACT: ICD-10-CM

## 2020-02-18 DIAGNOSIS — R13.12 DYSPHAGIA, OROPHARYNGEAL PHASE: ICD-10-CM

## 2020-02-18 DIAGNOSIS — J96.11 CHRONIC RESPIRATORY FAILURE WITH HYPOXIA: ICD-10-CM

## 2020-03-03 LAB — BACTERIA SPT CF RESP CULT: ABNORMAL

## 2020-03-09 ENCOUNTER — APPOINTMENT (OUTPATIENT)
Dept: INFECTIOUS DISEASE | Facility: CLINIC | Age: 60
End: 2020-03-09
Payer: MEDICAID

## 2020-03-09 ENCOUNTER — OUTPATIENT (OUTPATIENT)
Dept: OUTPATIENT SERVICES | Facility: HOSPITAL | Age: 60
LOS: 1 days | End: 2020-03-09
Payer: MEDICAID

## 2020-03-09 ENCOUNTER — INPATIENT (INPATIENT)
Facility: HOSPITAL | Age: 60
LOS: 3 days | Discharge: HOME HEALTH | End: 2020-03-13
Attending: STUDENT IN AN ORGANIZED HEALTH CARE EDUCATION/TRAINING PROGRAM | Admitting: STUDENT IN AN ORGANIZED HEALTH CARE EDUCATION/TRAINING PROGRAM
Payer: MEDICAID

## 2020-03-09 VITALS
OXYGEN SATURATION: 100 % | HEART RATE: 61 BPM | SYSTOLIC BLOOD PRESSURE: 105 MMHG | DIASTOLIC BLOOD PRESSURE: 71 MMHG | HEIGHT: 64 IN | TEMPERATURE: 97.8 F

## 2020-03-09 VITALS
TEMPERATURE: 96 F | HEART RATE: 63 BPM | OXYGEN SATURATION: 100 % | SYSTOLIC BLOOD PRESSURE: 96 MMHG | RESPIRATION RATE: 20 BRPM | DIASTOLIC BLOOD PRESSURE: 68 MMHG

## 2020-03-09 DIAGNOSIS — Z93.0 TRACHEOSTOMY STATUS: Chronic | ICD-10-CM

## 2020-03-09 DIAGNOSIS — Z98.891 HISTORY OF UTERINE SCAR FROM PREVIOUS SURGERY: Chronic | ICD-10-CM

## 2020-03-09 DIAGNOSIS — Z43.0 ENCOUNTER FOR ATTENTION TO TRACHEOSTOMY: Chronic | ICD-10-CM

## 2020-03-09 DIAGNOSIS — J39.8 OTHER SPECIFIED DISEASES OF UPPER RESPIRATORY TRACT: Chronic | ICD-10-CM

## 2020-03-09 DIAGNOSIS — I10 ESSENTIAL (PRIMARY) HYPERTENSION: ICD-10-CM

## 2020-03-09 DIAGNOSIS — J39.8 OTHER SPECIFIED DISEASES OF UPPER RESPIRATORY TRACT: ICD-10-CM

## 2020-03-09 DIAGNOSIS — R62.7 ADULT FAILURE TO THRIVE: ICD-10-CM

## 2020-03-09 DIAGNOSIS — B97.89 OTHER VIRAL AGENTS AS THE CAUSE OF DISEASES CLASSIFIED ELSEWHERE: ICD-10-CM

## 2020-03-09 DIAGNOSIS — J44.9 CHRONIC OBSTRUCTIVE PULMONARY DISEASE, UNSPECIFIED: ICD-10-CM

## 2020-03-09 DIAGNOSIS — F41.9 ANXIETY DISORDER, UNSPECIFIED: ICD-10-CM

## 2020-03-09 LAB
ALBUMIN SERPL ELPH-MCNC: 3.9 G/DL — SIGNIFICANT CHANGE UP (ref 3.3–5)
ALP SERPL-CCNC: 70 U/L — SIGNIFICANT CHANGE UP (ref 40–120)
ALT FLD-CCNC: 9 U/L — SIGNIFICANT CHANGE UP (ref 4–33)
ANION GAP SERPL CALC-SCNC: 14 MMO/L — SIGNIFICANT CHANGE UP (ref 7–14)
APPEARANCE UR: CLEAR — SIGNIFICANT CHANGE UP
APTT BLD: 50.4 SEC — HIGH (ref 27.5–36.3)
AST SERPL-CCNC: 17 U/L — SIGNIFICANT CHANGE UP (ref 4–32)
BASE EXCESS BLDV CALC-SCNC: 5.1 MMOL/L — SIGNIFICANT CHANGE UP
BASOPHILS # BLD AUTO: 0.06 K/UL — SIGNIFICANT CHANGE UP (ref 0–0.2)
BASOPHILS NFR BLD AUTO: 1 % — SIGNIFICANT CHANGE UP (ref 0–2)
BILIRUB SERPL-MCNC: 0.5 MG/DL — SIGNIFICANT CHANGE UP (ref 0.2–1.2)
BILIRUB UR-MCNC: NEGATIVE — SIGNIFICANT CHANGE UP
BLOOD GAS VENOUS - CREATININE: 0.81 MG/DL — SIGNIFICANT CHANGE UP (ref 0.5–1.3)
BLOOD GAS VENOUS - FIO2: 35 — SIGNIFICANT CHANGE UP
BLOOD UR QL VISUAL: NEGATIVE — SIGNIFICANT CHANGE UP
BUN SERPL-MCNC: 2 MG/DL — LOW (ref 7–23)
CALCIUM SERPL-MCNC: 9.9 MG/DL — SIGNIFICANT CHANGE UP (ref 8.4–10.5)
CHLORIDE BLDV-SCNC: 102 MMOL/L — SIGNIFICANT CHANGE UP (ref 96–108)
CHLORIDE SERPL-SCNC: 99 MMOL/L — SIGNIFICANT CHANGE UP (ref 98–107)
CO2 SERPL-SCNC: 28 MMOL/L — SIGNIFICANT CHANGE UP (ref 22–31)
COLOR SPEC: COLORLESS — SIGNIFICANT CHANGE UP
CREAT SERPL-MCNC: 0.7 MG/DL — SIGNIFICANT CHANGE UP (ref 0.5–1.3)
EOSINOPHIL # BLD AUTO: 0.18 K/UL — SIGNIFICANT CHANGE UP (ref 0–0.5)
EOSINOPHIL NFR BLD AUTO: 3 % — SIGNIFICANT CHANGE UP (ref 0–6)
GAS PNL BLDV: 141 MMOL/L — SIGNIFICANT CHANGE UP (ref 136–146)
GLUCOSE BLDV-MCNC: 85 MG/DL — SIGNIFICANT CHANGE UP (ref 70–99)
GLUCOSE SERPL-MCNC: 86 MG/DL — SIGNIFICANT CHANGE UP (ref 70–99)
GLUCOSE UR-MCNC: NEGATIVE — SIGNIFICANT CHANGE UP
HCO3 BLDV-SCNC: 26 MMOL/L — SIGNIFICANT CHANGE UP (ref 20–27)
HCT VFR BLD CALC: 33.1 % — LOW (ref 34.5–45)
HCT VFR BLDV CALC: 34 % — LOW (ref 34.5–45)
HGB BLD-MCNC: 10.7 G/DL — LOW (ref 11.5–15.5)
HGB BLDV-MCNC: 11 G/DL — LOW (ref 11.5–15.5)
IMM GRANULOCYTES NFR BLD AUTO: 0.2 % — SIGNIFICANT CHANGE UP (ref 0–1.5)
INR BLD: 1.2 — HIGH (ref 0.88–1.17)
KETONES UR-MCNC: NEGATIVE — SIGNIFICANT CHANGE UP
LACTATE BLDV-MCNC: 1.5 MMOL/L — SIGNIFICANT CHANGE UP (ref 0.5–2)
LEUKOCYTE ESTERASE UR-ACNC: NEGATIVE — SIGNIFICANT CHANGE UP
LYMPHOCYTES # BLD AUTO: 2.41 K/UL — SIGNIFICANT CHANGE UP (ref 1–3.3)
LYMPHOCYTES # BLD AUTO: 39.8 % — SIGNIFICANT CHANGE UP (ref 13–44)
MCHC RBC-ENTMCNC: 27.4 PG — SIGNIFICANT CHANGE UP (ref 27–34)
MCHC RBC-ENTMCNC: 32.3 % — SIGNIFICANT CHANGE UP (ref 32–36)
MCV RBC AUTO: 84.9 FL — SIGNIFICANT CHANGE UP (ref 80–100)
MONOCYTES # BLD AUTO: 0.52 K/UL — SIGNIFICANT CHANGE UP (ref 0–0.9)
MONOCYTES NFR BLD AUTO: 8.6 % — SIGNIFICANT CHANGE UP (ref 2–14)
NEUTROPHILS # BLD AUTO: 2.87 K/UL — SIGNIFICANT CHANGE UP (ref 1.8–7.4)
NEUTROPHILS NFR BLD AUTO: 47.4 % — SIGNIFICANT CHANGE UP (ref 43–77)
NITRITE UR-MCNC: NEGATIVE — SIGNIFICANT CHANGE UP
NRBC # FLD: 0 K/UL — SIGNIFICANT CHANGE UP (ref 0–0)
PCO2 BLDV: 69 MMHG — HIGH (ref 41–51)
PH BLDV: 7.28 PH — LOW (ref 7.32–7.43)
PH UR: 6 — SIGNIFICANT CHANGE UP (ref 5–8)
PLATELET # BLD AUTO: 492 K/UL — HIGH (ref 150–400)
PMV BLD: 10.4 FL — SIGNIFICANT CHANGE UP (ref 7–13)
PO2 BLDV: < 24 MMHG — LOW (ref 35–40)
POTASSIUM BLDV-SCNC: 3.5 MMOL/L — SIGNIFICANT CHANGE UP (ref 3.4–4.5)
POTASSIUM SERPL-MCNC: 3.8 MMOL/L — SIGNIFICANT CHANGE UP (ref 3.5–5.3)
POTASSIUM SERPL-SCNC: 3.8 MMOL/L — SIGNIFICANT CHANGE UP (ref 3.5–5.3)
PROT SERPL-MCNC: 7.2 G/DL — SIGNIFICANT CHANGE UP (ref 6–8.3)
PROT UR-MCNC: NEGATIVE — SIGNIFICANT CHANGE UP
PROTHROM AB SERPL-ACNC: 13.8 SEC — HIGH (ref 9.8–13.1)
RBC # BLD: 3.9 M/UL — SIGNIFICANT CHANGE UP (ref 3.8–5.2)
RBC # FLD: 14.2 % — SIGNIFICANT CHANGE UP (ref 10.3–14.5)
SAO2 % BLDV: 22.6 % — LOW (ref 60–85)
SODIUM SERPL-SCNC: 141 MMOL/L — SIGNIFICANT CHANGE UP (ref 135–145)
SP GR SPEC: 1 — LOW (ref 1–1.04)
UROBILINOGEN FLD QL: NORMAL — SIGNIFICANT CHANGE UP
WBC # BLD: 6.05 K/UL — SIGNIFICANT CHANGE UP (ref 3.8–10.5)
WBC # FLD AUTO: 6.05 K/UL — SIGNIFICANT CHANGE UP (ref 3.8–10.5)

## 2020-03-09 PROCEDURE — G0463: CPT

## 2020-03-09 PROCEDURE — 71045 X-RAY EXAM CHEST 1 VIEW: CPT | Mod: 26

## 2020-03-09 PROCEDURE — 99205 OFFICE O/P NEW HI 60 MIN: CPT

## 2020-03-09 RX ORDER — SODIUM CHLORIDE 9 MG/ML
1000 INJECTION INTRAMUSCULAR; INTRAVENOUS; SUBCUTANEOUS ONCE
Refills: 0 | Status: COMPLETED | OUTPATIENT
Start: 2020-03-09 | End: 2020-03-09

## 2020-03-09 RX ORDER — CHLORHEXIDINE GLUCONATE 213 G/1000ML
15 SOLUTION TOPICAL EVERY 12 HOURS
Refills: 0 | Status: DISCONTINUED | OUTPATIENT
Start: 2020-03-09 | End: 2020-03-13

## 2020-03-09 RX ORDER — ACETYLCYSTEINE 200 MG/ML
4 VIAL (ML) MISCELLANEOUS
Refills: 0 | Status: DISCONTINUED | OUTPATIENT
Start: 2020-03-09 | End: 2020-03-09

## 2020-03-09 RX ORDER — POLYETHYLENE GLYCOL 3350 17 G/17G
17 POWDER, FOR SOLUTION ORAL DAILY
Refills: 0 | Status: DISCONTINUED | OUTPATIENT
Start: 2020-03-09 | End: 2020-03-13

## 2020-03-09 RX ORDER — CLONAZEPAM 1 MG
0.5 TABLET ORAL EVERY 8 HOURS
Refills: 0 | Status: DISCONTINUED | OUTPATIENT
Start: 2020-03-09 | End: 2020-03-13

## 2020-03-09 RX ORDER — INFLUENZA VIRUS VACCINE 15; 15; 15; 15 UG/.5ML; UG/.5ML; UG/.5ML; UG/.5ML
0.5 SUSPENSION INTRAMUSCULAR ONCE
Refills: 0 | Status: COMPLETED | OUTPATIENT
Start: 2020-03-09 | End: 2020-03-09

## 2020-03-09 RX ORDER — AZITHROMYCIN 500 MG/1
250 TABLET, FILM COATED ORAL
Refills: 0 | Status: DISCONTINUED | OUTPATIENT
Start: 2020-03-09 | End: 2020-03-13

## 2020-03-09 RX ORDER — BUDESONIDE, MICRONIZED 100 %
0.5 POWDER (GRAM) MISCELLANEOUS
Refills: 0 | Status: DISCONTINUED | OUTPATIENT
Start: 2020-03-09 | End: 2020-03-13

## 2020-03-09 RX ORDER — ZOLPIDEM TARTRATE 10 MG/1
5 TABLET ORAL AT BEDTIME
Refills: 0 | Status: DISCONTINUED | OUTPATIENT
Start: 2020-03-09 | End: 2020-03-13

## 2020-03-09 RX ORDER — IPRATROPIUM/ALBUTEROL SULFATE 18-103MCG
3 AEROSOL WITH ADAPTER (GRAM) INHALATION EVERY 6 HOURS
Refills: 0 | Status: DISCONTINUED | OUTPATIENT
Start: 2020-03-09 | End: 2020-03-09

## 2020-03-09 RX ORDER — SENNA PLUS 8.6 MG/1
2 TABLET ORAL AT BEDTIME
Refills: 0 | Status: DISCONTINUED | OUTPATIENT
Start: 2020-03-09 | End: 2020-03-13

## 2020-03-09 RX ORDER — PANTOPRAZOLE SODIUM 20 MG/1
40 TABLET, DELAYED RELEASE ORAL
Refills: 0 | Status: DISCONTINUED | OUTPATIENT
Start: 2020-03-09 | End: 2020-03-13

## 2020-03-09 RX ORDER — CHLORHEXIDINE GLUCONATE 213 G/1000ML
15 SOLUTION TOPICAL EVERY 12 HOURS
Refills: 0 | Status: DISCONTINUED | OUTPATIENT
Start: 2020-03-09 | End: 2020-03-09

## 2020-03-09 RX ORDER — OXYCODONE AND ACETAMINOPHEN 5; 325 MG/1; MG/1
1 TABLET ORAL EVERY 4 HOURS
Refills: 0 | Status: DISCONTINUED | OUTPATIENT
Start: 2020-03-09 | End: 2020-03-13

## 2020-03-09 RX ORDER — ACETYLCYSTEINE 200 MG/ML
4 VIAL (ML) MISCELLANEOUS
Refills: 0 | Status: DISCONTINUED | OUTPATIENT
Start: 2020-03-09 | End: 2020-03-13

## 2020-03-09 RX ORDER — SODIUM CHLORIDE 9 MG/ML
1000 INJECTION, SOLUTION INTRAVENOUS
Refills: 0 | Status: DISCONTINUED | OUTPATIENT
Start: 2020-03-09 | End: 2020-03-10

## 2020-03-09 RX ORDER — MEROPENEM 1 G/30ML
1000 INJECTION INTRAVENOUS ONCE
Refills: 0 | Status: COMPLETED | OUTPATIENT
Start: 2020-03-09 | End: 2020-03-09

## 2020-03-09 RX ORDER — IPRATROPIUM BROMIDE 0.2 MG/ML
1 SOLUTION, NON-ORAL INHALATION EVERY 6 HOURS
Refills: 0 | Status: DISCONTINUED | OUTPATIENT
Start: 2020-03-09 | End: 2020-03-13

## 2020-03-09 RX ORDER — BUDESONIDE AND FORMOTEROL FUMARATE DIHYDRATE 160; 4.5 UG/1; UG/1
2 AEROSOL RESPIRATORY (INHALATION)
Refills: 0 | Status: DISCONTINUED | OUTPATIENT
Start: 2020-03-09 | End: 2020-03-13

## 2020-03-09 RX ORDER — SIMETHICONE 80 MG/1
80 TABLET, CHEWABLE ORAL
Refills: 0 | Status: DISCONTINUED | OUTPATIENT
Start: 2020-03-09 | End: 2020-03-13

## 2020-03-09 RX ORDER — ASPIRIN/CALCIUM CARB/MAGNESIUM 324 MG
81 TABLET ORAL DAILY
Refills: 0 | Status: DISCONTINUED | OUTPATIENT
Start: 2020-03-09 | End: 2020-03-13

## 2020-03-09 RX ORDER — IPRATROPIUM/ALBUTEROL SULFATE 18-103MCG
3 AEROSOL WITH ADAPTER (GRAM) INHALATION EVERY 6 HOURS
Refills: 0 | Status: DISCONTINUED | OUTPATIENT
Start: 2020-03-09 | End: 2020-03-13

## 2020-03-09 RX ORDER — CHLORHEXIDINE GLUCONATE 213 G/1000ML
1 SOLUTION TOPICAL DAILY
Refills: 0 | Status: DISCONTINUED | OUTPATIENT
Start: 2020-03-09 | End: 2020-03-13

## 2020-03-09 RX ORDER — ALBUTEROL 90 UG/1
1 AEROSOL, METERED ORAL EVERY 6 HOURS
Refills: 0 | Status: DISCONTINUED | OUTPATIENT
Start: 2020-03-09 | End: 2020-03-13

## 2020-03-09 RX ADMIN — CHLORHEXIDINE GLUCONATE 15 MILLILITER(S): 213 SOLUTION TOPICAL at 18:43

## 2020-03-09 RX ADMIN — AZITHROMYCIN 250 MILLIGRAM(S): 500 TABLET, FILM COATED ORAL at 18:44

## 2020-03-09 RX ADMIN — SODIUM CHLORIDE 1000 MILLILITER(S): 9 INJECTION INTRAMUSCULAR; INTRAVENOUS; SUBCUTANEOUS at 13:47

## 2020-03-09 RX ADMIN — OXYCODONE AND ACETAMINOPHEN 1 TABLET(S): 5; 325 TABLET ORAL at 23:00

## 2020-03-09 RX ADMIN — Medication 3 MILLILITER(S): at 22:53

## 2020-03-09 RX ADMIN — Medication 3 MILLILITER(S): at 17:39

## 2020-03-09 RX ADMIN — SODIUM CHLORIDE 75 MILLILITER(S): 9 INJECTION, SOLUTION INTRAVENOUS at 18:13

## 2020-03-09 RX ADMIN — OXYCODONE AND ACETAMINOPHEN 1 TABLET(S): 5; 325 TABLET ORAL at 22:38

## 2020-03-09 RX ADMIN — SENNA PLUS 2 TABLET(S): 8.6 TABLET ORAL at 22:24

## 2020-03-09 RX ADMIN — MEROPENEM 100 MILLIGRAM(S): 1 INJECTION INTRAVENOUS at 14:56

## 2020-03-09 NOTE — HISTORY OF PRESENT ILLNESS
[FreeTextEntry1] : Ms. Bo is a 59 year old female referred by her Pulmonologist Dr. Nova with a history of atypical chest pain, COPD, chronic hypoxemia, dysphagia, GERD, laryngeal stenosis, OW, dependence on tracheostomy, and tracheomalacia presenting for evaluation for a positive sputum culture. The patient is currently stating she is losing weight, over 100 pounds, with clothes being loose, and with decreased oral intake. She is also requiring more oxygen, unable to sleep at night due to continuous mucous plugging, and coughing with increased secretions per primary nurse at her side. She states at home having intermittent fevers to Tm of 101F at home. She was recently admitted to [facility] for 1 week, discharged home on Cefepime via a PICC line, which was recently removed. Sputum culture from 12/18/19 with pseudomonas S to amikacin, aztreonam, ceftazidime, cefepime, zosyn. She had repeat sputum cultures from 2/25/20 with ESBL Kleb, moderate pseudomonas, and rare chyseobacterium indologenes. She is also pending a colonoscopy.

## 2020-03-09 NOTE — H&P ADULT - PROBLEM SELECTOR PLAN 1
- 100 lb weight loss in past 3 months  - electrolytes stable, BUN 2  - CT C/A/P to search for occult malignancy  - GI consult in AM for possible EGD/colonoscopy  - does not appear clinically infected, will monitor off antibiotics for time being  - f/u blood, urine and sputum cultures  - start IVF resuscitation  - check ESR/CRP  - CMP daily - 100 lb weight loss in past 3 months  - DDx includes occult malignancy vs wasting syndrome from chronic illness vs less likely chronic infection  - electrolytes stable, BUN 2  - CT C/A/P to search for occult malignancy  - GI consult in AM for possible EGD/colonoscopy  - does not appear clinically infected, will monitor off antibiotics for time being  - CXR clear, UA clear  - f/u blood, urine and sputum cultures  - start IVF resuscitation  - check ESR/CRP  - CMP daily

## 2020-03-09 NOTE — H&P ADULT - NSHPPHYSICALEXAM_GEN_ALL_CORE
General: awake and alert, nontoxic appearing female sitting up in bed  HEENT: NC/AT, EOMI b/l, PERRL, conjunctiva normal, MMM,  Neck: supple, nontender, +size 6 cuffed trach in place connected to vent  Respiratory: CTA b/l, no w/r/c, appears comfortable on ventilator, no conversational dyspnea or accessory muscle use  Cardiovascular: S1 S2 present, RRR, no m/r/g  Abdomen: soft, NT/ND, +peg scar in LUQ, +normoactive BS  Extremities: no c/c/e  Skin: no rashes or lesions noted  Neuro: AAOx3, no focal deficits  Psych: calm, cooperative

## 2020-03-09 NOTE — H&P ADULT - PROBLEM SELECTOR PLAN 2
- trach placed in 2004, vent dependent since  - follows [facility] Dr. Nova, attempting to wean from ventilator  - current vent settings: CPAP with PS 18, PEEP 5, FiO2 35%  - emergency settings: RR 12, , FiO2 35%, PEEP 5  - ENT consult in AM (Dr. Rodriguez)

## 2020-03-09 NOTE — H&P ADULT - PROBLEM SELECTOR PLAN 4
- holding antihypertensives in setting of relative hypotension and decreased nutritional status on admission  - can restart when appropriate (amlodipine 5 mg, losartan 25 mg)

## 2020-03-09 NOTE — ED PROVIDER NOTE - OBJECTIVE STATEMENT
60 yo F with chronic trach in place since 2004 for tracheomalacia and laryngeal stenosis, COPD, dysphagia who presents from her physician for admission for work up for decreased secretions, fevers at home, and 100lb weight loss over 3 months. Patient states that following a revision of her trach in October of 2019 patient has been to the hospital three times for PNA/positive sputum cultures. Most recently at [facility] for 1 week and dc with cefepime through PICC line which has since been taken out. Most recent sputum culture from 2/35/20 shows ESBL KLEB, moderate pseudomonas, and rare Chryseobacterium indologenes. Patient also with 100lb weight loss in three months. Patient endorses not eating. Currently, patient endorses intermittent fevers, chills, at home and decreased secretions which she blames on mucus plugging. Denies CP/SOB.

## 2020-03-09 NOTE — ED PROVIDER NOTE - CARE PLAN
Principal Discharge DX:	Failure to thrive in adult  Secondary Diagnosis:	Sputum culture positive for Pseudomonas

## 2020-03-09 NOTE — H&P ADULT - ASSESSMENT
This is a 60 y/o F with PMHx of tracheomalacia s/p trach (2004) s/p stomaplasty 10/2019 c/b chronic vent dependence and COPD who presents with subjective fevers and throat pain and a 100 lb weight loss over the past 3 months. Reports unintentional weight loss but has not been eating very well. Afebrile and hemodynamically stable in the ED, cultures sent and given 1 g meropenem x1 in ED. Patient has no leukocytosis and CXR does not reveal focal consolidation. She does not clinically appear septic, however weight loss is very concerning. Patient is being admitted to monitor for signs of sepsis as well as workup for failure to thrive, with suspicion for occult malignancy.

## 2020-03-09 NOTE — REVIEW OF SYSTEMS
[Difficulty Sleeping] : difficulty sleeping [Feeling Tired] : feeling tired [Feeling Sick] : feeling sick [Recent Weight Loss (___ Lbs)] : recent [unfilled] ~Ulb weight loss [Shortness Of Breath] : shortness of breath [Wheezing] : wheezing [Cough] : cough [Sputum] : coughing up ~M sputum [As Noted in HPI] : as noted in HPI [Constipation] : constipation [Negative] : Heme/Lymph [FreeTextEntry4] : trach

## 2020-03-09 NOTE — ED PROVIDER NOTE - NS ED ROS FT
Gen: + fever, + weight loss  CV: Denies chest pain, palpitations  HEENT: Denies neck pain, sore throat, eye discharge  Skin: Denies rash, erythema, color changes  Resp: Denies SOB, + intermittent cough  Endo: Denies sensitivity to heat, cold, increased urination  GI: Denies abdominal pain, constipation, nausea, vomiting  Msk: Denies back pain, LE swelling, extremity pain  : Denies dysuria, increased frequency  Neuro: Denies LOC, weakness, seizures  Psych: Denies hx of psych, hallucinations, SI

## 2020-03-09 NOTE — H&P ADULT - NSICDXFAMILYHX_GEN_ALL_CORE_FT
FAMILY HISTORY:  Family history of leukemia    Sibling  Still living? No  Family history of lung disease, Age at diagnosis: Age Unknown    Grandparent  Still living? No  Family history of heart disease, Age at diagnosis: Age Unknown

## 2020-03-09 NOTE — PHYSICAL EXAM
[General Appearance - Alert] : alert [General Appearance - In No Acute Distress] : in no acute distress [Sclera] : the sclera and conjunctiva were normal [PERRL With Normal Accommodation] : pupils were equal in size, round, reactive to light [Extraocular Movements] : extraocular movements were intact [Outer Ear] : the ears and nose were normal in appearance [Hearing Threshold Finger Rub Not [place]] : hearing was normal [Examination Of The Oral Cavity] : the lips and gums were normal [Neck Appearance] : the appearance of the neck was normal [Neck Cervical Mass (___cm)] : no neck mass was observed [Heart Rate And Rhythm] : heart rate was normal and rhythm regular [Heart Sounds] : normal S1 and S2 [Murmurs] : no murmurs [Edema] : there was no peripheral edema [Bowel Sounds] : normal bowel sounds [Abdomen Soft] : soft [Abdomen Tenderness] : non-tender [No Palpable Adenopathy] : no palpable adenopathy [Musculoskeletal - Swelling] : no joint swelling [Nail Clubbing] : no clubbing  or cyanosis of the fingernails [Skin Color & Pigmentation] : normal skin color and pigmentation [] : no rash [Skin Lesions] : no skin lesions [Sensation] : the sensory exam was normal to light touch and pinprick [Oriented To Time, Place, And Person] : oriented to person, place, and time [Affect] : the affect was normal [FreeTextEntry1] : trached, vented, decreased breath sounds bilaterally

## 2020-03-09 NOTE — ED ADULT TRIAGE NOTE - CHIEF COMPLAINT QUOTE
pt with chronic trach, vent dependent. sen in by PMD for chyseobacterium indologenes, 100 lb weight loss and failure to thrive.  pt states trach is LPC 6 with inner-cannula

## 2020-03-09 NOTE — ED PROVIDER NOTE - ATTENDING CONTRIBUTION TO CARE
I have personally performed a face to face bedside history and physical examination of this patient. I have discussed the history, examination, review of systems, assessment and plan of management with the resident. I have reviewed the electronic medical record and amended it to reflect my history, review of systems, physical exam, assessment and plan.    60 yo F with chronic trach in place since 2004 for tracheomalacia and laryngeal stenosis, COPD, dysphagia who presents from her physician for admission for work up for decreased secretions, fevers at home, and 100lb weight loss over 3 months.   Most recent sputum culture from 2/35/20 shows ESBL KLEB, moderate pseudomonas, and rare Chryseobacterium indologenes.  Patient denies any complaints currently.  VSS.  Elderly appearing, non-toxic, trach in place, bs clear b/l, abdomen soft, no le swelling. Low c/f sepsis at this time.  Will send labs, cxr, iv abx given positive sputum culture.  Plan for admission.

## 2020-03-09 NOTE — ED PROVIDER NOTE - PHYSICAL EXAMINATION
Gen: Alert and oriented. Lying comfortably in bed. Answering questions appropriately, Patient with trach in place  HEENT: extra occular movements intact, no nasal discharge, mucous membranes moist  CV: Regular rate and rhythm, +S1/S2, no murmurs/rubs/gallops,   Resp: Clear to ausculation bilaterally, no wheezes/rhonchi/rales  GI: Abdomen soft non-distended, non tender to palpation, no masses  MSK: No open wounds, no bruising, no LE edema, Homans sign negative bl  Neuro: A&Ox4, following commands, moving all four extremities spontaneously  Psych: appropriate mood

## 2020-03-09 NOTE — H&P ADULT - HISTORY OF PRESENT ILLNESS
Patient is a 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) and chronic vent dependence (on trach collar ~2 hours per day), COPD, GERD, anxiety who presents with complaint of 100 lb weight loss over the past 3 months. Was recently hospitalized at [facility] and was treated for pneumonia with a combination of antibiotics (chart review states cefepime, patient reports levaquin and bactrim). Has been off antibiotics for at least 1 week, however reports she still feels "off". When pressed further, she reports severe weight loss over the past 3 months and also reports subjective fevers and decreased secretions. Reports she thinks the bacteria has "dried up" her lungs and she thinks she has pneumonia. Otherwise, denies chills, malaise, cough, sputum production, hemoptysis, chest pain, abdominal pain, n/v/d, urinary symptoms, LE edema.

## 2020-03-09 NOTE — ASSESSMENT
[FreeTextEntry1] : Ms. Bo is a 59 year old female referred by her Pulmonologist Dr. Nova, from home,  with a history of atypical chest pain, COPD, chronic hypoxemia, dysphagia, GERD, laryngeal stenosis, OW, dependence on tracheostomy, and tracheomalacia presenting for evaluation for a positive sputum culture. The patient is currently stating she is losing weight, over 100 pounds, with clothes being loose, and with decreased oral intake. She is also requiring more oxygen, unable to sleep at night due to continuous mucous plugging, and coughing with increased secretions per primary nurse at her side. She states at home having intermittent fevers to Tm of 101F at home. She was recently admitted to [facility] for 1 week, discharged home on Cefepime via a PICC line, which was recently removed. Sputum culture from 12/18/19 with pseudomonas S to amikacin, aztreonam, ceftazidime, cefepime, zosyn. She had repeat sputum cultures from 2/25/20 with ESBL Kleb, moderate pseudomonas, and rare chyseobacterium indologenes. She is also pending a colonoscopy. \par \par Recommend:\par #Dyspnea, coughing, mucous plugging, increased secretions in the setting of trach and laryngeal stenosis\par -R/o pna - send to [facility] ED for CT chest\par \par #Positive sputum cultures with ESBL Keb pneumo, Pseudomonas, and rare Chryseobacterium indologenese\par -I suspect most of these organisms are chronic colonization\par -Repeat cultures - sputum and blood\par -Can give meroepenem 1 gram IV x 1\par -F/U culture data\par -ID consult\par \par #Weight loss/ FTT\par -Was planned for EGD and colonoscopy if want to perform inpatient\par -GI evaluation\par

## 2020-03-09 NOTE — H&P ADULT - PROBLEM SELECTOR PLAN 3
- nebs q6h  - symbicort vs pulmicort bid  - mucomyst prn for secretions  - frequent suctioning (patient cares for herself)  - f/u sputum culture - nebs q6h  - symbicort vs pulmicort bid  - mucomyst prn for secretions  - zithro TIW  - frequent suctioning (patient cares for herself)  - f/u sputum culture

## 2020-03-09 NOTE — CONSULT LETTER
[Dear  ___] : Dear  [unfilled], [Consult Letter:] : I had the pleasure of evaluating your patient, [unfilled]. [Please see my note below.] : Please see my note below. [Consult Closing:] : Thank you very much for allowing me to participate in the care of this patient.  If you have any questions, please do not hesitate to contact me. [Sincerely,] : Sincerely, [FreeTextEntry3] : Juan M Stevenson MD\par Attending Physician\par Division of Infectious Diseases\par [address]\[place] [address]\par Office: [phone]\par Fax: [phone]\par Email: [email or web address] \par [facility]\par Visit us at [facility]\par \par

## 2020-03-09 NOTE — ED ADULT NURSE NOTE - OBJECTIVE STATEMENT
pt received to 10, aox3.  pt sent to ED for evaluation of FTT and + bacteria in sputum.  respiratory therapy at bedside, pt is a chronic trach size 6.  vent settings: CPAP 5/18, FiO2 35%.  pt endorses that she lost over 100 pounds in recent history.  SL placed, labs sent, v/s as noted.  pt is on tele monitor, report given to Reyes MARMOLEJO.

## 2020-03-09 NOTE — CHART NOTE - NSCHARTNOTEFT_GEN_A_CORE
pt to be seen in am.    Await chest CT    Sputum from 2/25 with mixed organisms- some MDR  Unclear if these are pathogens or colonizers    Continue meropenem for now    if change in status, give polymixin 700,000 units times one

## 2020-03-09 NOTE — ED PROVIDER NOTE - CLINICAL SUMMARY MEDICAL DECISION MAKING FREE TEXT BOX
Joseph Frankel PGY1: 60 yo F with chronic trach with chronic sputum colonization, COPD who presents for fever and chills at home and weight loss. VSS. Patient looks well and is non toxic appearing. PE as above. Fevers and chills most likely secondary to patient's chronic colonization now with potential PNA?. However, not concerned for sepsis at this time. Failure to thrive may be secondary to patient's lack of eating however unclear why patient is not eating. Will get basic labs, CXR, urine. Will treat patient empirically with meropenem per outpatient ID doctor and most likely admit.

## 2020-03-09 NOTE — REASON FOR VISIT
[Consultation] : a consultation visit [Formal Caregiver] : formal caregiver [FreeTextEntry1] : Positive sputum culture

## 2020-03-10 DIAGNOSIS — R84.5 ABNORMAL MICROBIOLOGICAL FINDINGS IN SPECIMENS FROM RESPIRATORY ORGANS AND THORAX: ICD-10-CM

## 2020-03-10 DIAGNOSIS — R63.4 ABNORMAL WEIGHT LOSS: ICD-10-CM

## 2020-03-10 DIAGNOSIS — J96.11 CHRONIC RESPIRATORY FAILURE WITH HYPOXIA: ICD-10-CM

## 2020-03-10 DIAGNOSIS — J38.6 STENOSIS OF LARYNX: ICD-10-CM

## 2020-03-10 DIAGNOSIS — Z29.9 ENCOUNTER FOR PROPHYLACTIC MEASURES, UNSPECIFIED: ICD-10-CM

## 2020-03-10 LAB
ALBUMIN SERPL ELPH-MCNC: 3.2 G/DL — LOW (ref 3.3–5)
ALP SERPL-CCNC: 60 U/L — SIGNIFICANT CHANGE UP (ref 40–120)
ALT FLD-CCNC: 9 U/L — SIGNIFICANT CHANGE UP (ref 4–33)
ANION GAP SERPL CALC-SCNC: 10 MMO/L — SIGNIFICANT CHANGE UP (ref 7–14)
ANION GAP SERPL CALC-SCNC: 10 MMO/L — SIGNIFICANT CHANGE UP (ref 7–14)
AST SERPL-CCNC: 14 U/L — SIGNIFICANT CHANGE UP (ref 4–32)
BASOPHILS # BLD AUTO: 0.07 K/UL — SIGNIFICANT CHANGE UP (ref 0–0.2)
BASOPHILS NFR BLD AUTO: 1.4 % — SIGNIFICANT CHANGE UP (ref 0–2)
BILIRUB SERPL-MCNC: 0.3 MG/DL — SIGNIFICANT CHANGE UP (ref 0.2–1.2)
BUN SERPL-MCNC: 2 MG/DL — LOW (ref 7–23)
BUN SERPL-MCNC: 2 MG/DL — LOW (ref 7–23)
CALCIUM SERPL-MCNC: 9 MG/DL — SIGNIFICANT CHANGE UP (ref 8.4–10.5)
CALCIUM SERPL-MCNC: 9 MG/DL — SIGNIFICANT CHANGE UP (ref 8.4–10.5)
CHLORIDE SERPL-SCNC: 106 MMOL/L — SIGNIFICANT CHANGE UP (ref 98–107)
CHLORIDE SERPL-SCNC: 106 MMOL/L — SIGNIFICANT CHANGE UP (ref 98–107)
CO2 SERPL-SCNC: 26 MMOL/L — SIGNIFICANT CHANGE UP (ref 22–31)
CO2 SERPL-SCNC: 26 MMOL/L — SIGNIFICANT CHANGE UP (ref 22–31)
CREAT SERPL-MCNC: 0.61 MG/DL — SIGNIFICANT CHANGE UP (ref 0.5–1.3)
CREAT SERPL-MCNC: 0.61 MG/DL — SIGNIFICANT CHANGE UP (ref 0.5–1.3)
CRP SERPL-MCNC: 8 MG/L — HIGH
CULTURE RESULTS: NO GROWTH — SIGNIFICANT CHANGE UP
EOSINOPHIL # BLD AUTO: 0.19 K/UL — SIGNIFICANT CHANGE UP (ref 0–0.5)
EOSINOPHIL NFR BLD AUTO: 3.8 % — SIGNIFICANT CHANGE UP (ref 0–6)
ERYTHROCYTE [SEDIMENTATION RATE] IN BLOOD: 38 MM/HR — HIGH (ref 4–25)
GLUCOSE SERPL-MCNC: 92 MG/DL — SIGNIFICANT CHANGE UP (ref 70–99)
GLUCOSE SERPL-MCNC: 92 MG/DL — SIGNIFICANT CHANGE UP (ref 70–99)
HCT VFR BLD CALC: 27.7 % — LOW (ref 34.5–45)
HCV AB S/CO SERPL IA: 0.17 S/CO — SIGNIFICANT CHANGE UP (ref 0–0.99)
HCV AB SERPL-IMP: SIGNIFICANT CHANGE UP
HGB BLD-MCNC: 8.9 G/DL — LOW (ref 11.5–15.5)
IMM GRANULOCYTES NFR BLD AUTO: 0 % — SIGNIFICANT CHANGE UP (ref 0–1.5)
LYMPHOCYTES # BLD AUTO: 2.29 K/UL — SIGNIFICANT CHANGE UP (ref 1–3.3)
LYMPHOCYTES # BLD AUTO: 45.3 % — HIGH (ref 13–44)
MAGNESIUM SERPL-MCNC: 1.7 MG/DL — SIGNIFICANT CHANGE UP (ref 1.6–2.6)
MCHC RBC-ENTMCNC: 27.4 PG — SIGNIFICANT CHANGE UP (ref 27–34)
MCHC RBC-ENTMCNC: 32.1 % — SIGNIFICANT CHANGE UP (ref 32–36)
MCV RBC AUTO: 85.2 FL — SIGNIFICANT CHANGE UP (ref 80–100)
MONOCYTES # BLD AUTO: 0.45 K/UL — SIGNIFICANT CHANGE UP (ref 0–0.9)
MONOCYTES NFR BLD AUTO: 8.9 % — SIGNIFICANT CHANGE UP (ref 2–14)
NEUTROPHILS # BLD AUTO: 2.05 K/UL — SIGNIFICANT CHANGE UP (ref 1.8–7.4)
NEUTROPHILS NFR BLD AUTO: 40.6 % — LOW (ref 43–77)
NRBC # FLD: 0 K/UL — SIGNIFICANT CHANGE UP (ref 0–0)
PHOSPHATE SERPL-MCNC: 3.5 MG/DL — SIGNIFICANT CHANGE UP (ref 2.5–4.5)
PLATELET # BLD AUTO: 379 K/UL — SIGNIFICANT CHANGE UP (ref 150–400)
PMV BLD: 10.2 FL — SIGNIFICANT CHANGE UP (ref 7–13)
POTASSIUM SERPL-MCNC: 3.5 MMOL/L — SIGNIFICANT CHANGE UP (ref 3.5–5.3)
POTASSIUM SERPL-MCNC: 3.5 MMOL/L — SIGNIFICANT CHANGE UP (ref 3.5–5.3)
POTASSIUM SERPL-SCNC: 3.5 MMOL/L — SIGNIFICANT CHANGE UP (ref 3.5–5.3)
POTASSIUM SERPL-SCNC: 3.5 MMOL/L — SIGNIFICANT CHANGE UP (ref 3.5–5.3)
PROT SERPL-MCNC: 6 G/DL — SIGNIFICANT CHANGE UP (ref 6–8.3)
RBC # BLD: 3.25 M/UL — LOW (ref 3.8–5.2)
RBC # FLD: 14 % — SIGNIFICANT CHANGE UP (ref 10.3–14.5)
SODIUM SERPL-SCNC: 142 MMOL/L — SIGNIFICANT CHANGE UP (ref 135–145)
SODIUM SERPL-SCNC: 142 MMOL/L — SIGNIFICANT CHANGE UP (ref 135–145)
SPECIMEN SOURCE: SIGNIFICANT CHANGE UP
WBC # BLD: 5.05 K/UL — SIGNIFICANT CHANGE UP (ref 3.8–10.5)
WBC # FLD AUTO: 5.05 K/UL — SIGNIFICANT CHANGE UP (ref 3.8–10.5)

## 2020-03-10 PROCEDURE — 99223 1ST HOSP IP/OBS HIGH 75: CPT | Mod: GC

## 2020-03-10 PROCEDURE — 71260 CT THORAX DX C+: CPT | Mod: 26

## 2020-03-10 PROCEDURE — 74177 CT ABD & PELVIS W/CONTRAST: CPT | Mod: 26

## 2020-03-10 PROCEDURE — 99222 1ST HOSP IP/OBS MODERATE 55: CPT

## 2020-03-10 RX ORDER — ENOXAPARIN SODIUM 100 MG/ML
40 INJECTION SUBCUTANEOUS DAILY
Refills: 0 | Status: DISCONTINUED | OUTPATIENT
Start: 2020-03-10 | End: 2020-03-13

## 2020-03-10 RX ORDER — SODIUM CHLORIDE 9 MG/ML
1000 INJECTION, SOLUTION INTRAVENOUS
Refills: 0 | Status: DISCONTINUED | OUTPATIENT
Start: 2020-03-10 | End: 2020-03-12

## 2020-03-10 RX ORDER — THIAMINE MONONITRATE (VIT B1) 100 MG
100 TABLET ORAL DAILY
Refills: 0 | Status: DISCONTINUED | OUTPATIENT
Start: 2020-03-10 | End: 2020-03-13

## 2020-03-10 RX ADMIN — BUDESONIDE AND FORMOTEROL FUMARATE DIHYDRATE 2 PUFF(S): 160; 4.5 AEROSOL RESPIRATORY (INHALATION) at 22:30

## 2020-03-10 RX ADMIN — CHLORHEXIDINE GLUCONATE 1 APPLICATION(S): 213 SOLUTION TOPICAL at 15:06

## 2020-03-10 RX ADMIN — Medication 1 PUFF(S): at 15:14

## 2020-03-10 RX ADMIN — POLYETHYLENE GLYCOL 3350 17 GRAM(S): 17 POWDER, FOR SOLUTION ORAL at 22:08

## 2020-03-10 RX ADMIN — CHLORHEXIDINE GLUCONATE 15 MILLILITER(S): 213 SOLUTION TOPICAL at 06:19

## 2020-03-10 RX ADMIN — ALBUTEROL 1 PUFF(S): 90 AEROSOL, METERED ORAL at 09:08

## 2020-03-10 RX ADMIN — BUDESONIDE AND FORMOTEROL FUMARATE DIHYDRATE 2 PUFF(S): 160; 4.5 AEROSOL RESPIRATORY (INHALATION) at 08:08

## 2020-03-10 RX ADMIN — Medication 3 MILLILITER(S): at 22:23

## 2020-03-10 RX ADMIN — ALBUTEROL 1 PUFF(S): 90 AEROSOL, METERED ORAL at 15:13

## 2020-03-10 RX ADMIN — Medication 81 MILLIGRAM(S): at 14:59

## 2020-03-10 RX ADMIN — Medication 100 MILLIGRAM(S): at 15:06

## 2020-03-10 RX ADMIN — ENOXAPARIN SODIUM 40 MILLIGRAM(S): 100 INJECTION SUBCUTANEOUS at 15:06

## 2020-03-10 RX ADMIN — Medication 1 PUFF(S): at 09:13

## 2020-03-10 RX ADMIN — Medication 1 TABLET(S): at 14:59

## 2020-03-10 NOTE — CONSULT NOTE ADULT - ASSESSMENT
59 year old with tracheal stenosis/ malacia with chronic respiratory failure and COPD>    She presents with subjective fever and complaint of feeling dry with cough.      ? exacerbation of chronic lung disease vs superimposed infection.    Sputum from end of February may be colonizers.    WBC is normal and afebrile here.    Observe off abx    Check CT chest.     Will monitor.

## 2020-03-10 NOTE — SWALLOW BEDSIDE ASSESSMENT ADULT - COMMENTS
As per charting: This is a 58 y/o F with PMHx of tracheomalacia s/p trach (2004) s/p stomaplasty 10/2019 c/b chronic vent dependence and COPD who presents with subjective fevers and throat pain and a 100 lb weight loss over the past 3 months. Reports unintentional weight loss but has not been eating very well. Patient has no leukocytosis and CXR does not reveal focal consolidation. Patient is being admitted to monitor for signs of sepsis as well as workup for failure to thrive, with suspicion for occult malignancy.    Patient seen for Flexible Endoscopic Evaluation Swallow (FEES) at [facility] 12/2019 with recommendation of puree/soft and thin liquids. Patient followed up a the Center 2/2020, clinician reported patient was tolerating current diet consistency.      SLP received patient sitting upright in bed, awake, alert, able to follow directions and answer questions, denied pain. Patient reported she has been consuming puree and thin liquids at home with no difficulty swallowing but poor appetite.  FRANCIE Omer present for evaluation to assist with suctioning.      Vent settings: Mode: Assist Control; Set Respiratory Rate: 12; Set Tidal Volume: 450; PEEP: 5; FiO2: 35%  SPO2:100%

## 2020-03-10 NOTE — SWALLOW BEDSIDE ASSESSMENT ADULT - SWALLOW EVAL: DIAGNOSIS
Patient consumed trials of puree and honey thick liquids dyed with green food coloring, cuff inflated for trials.  Oral phase characterized by adequate acceptance, adequate anterior bolus containment, adequate bolus manipulation, suspect adequate anterior to posterior transport and no oral cavity residue post swallow. Pharyngeal phase characterized by hyolaryngeal elevation noted upon palpation and no green dye evident in suctioned tracheal secretions with cuff deflated immediately following PO trials.  RN deflated the cuff and administered tracheal suctioning again one hour later and reported no green dye in suctioned secretions.

## 2020-03-10 NOTE — SWALLOW BEDSIDE ASSESSMENT ADULT - ADDITIONAL RECOMMENDATIONS
Patient consumed trials of puree and honey thick liquids dyed with green food coloring, cuff inflated for trials.  Oral phase characterized by adequate acceptance, adequate anterior bolus containment, adequate bolus manipulation, suspect adequate anterior to posterior transport and no oral cavity residue post swallow. Pharyngeal phase characterized by hyolaryngeal elevation noted upon palpation and no green dye evident in suctioned tracheal secretions with cuff deflated immediately following PO trials.  RN administered tracheal suctioning again one hour later.......................... Consideration reconsulting this service for possible diet consistency upgrade or if there is any concern for aspiration including changes in CXR, chest CT, increase in WBC

## 2020-03-10 NOTE — SWALLOW BEDSIDE ASSESSMENT ADULT - ASR SWALLOW ASPIRATION MONITOR
pneumonia/change of breathing pattern/cough/oral hygiene/position upright (90Y)/fever/throat clearing/gurgly voice/upper respiratory infection

## 2020-03-10 NOTE — SWALLOW BEDSIDE ASSESSMENT ADULT - SWALLOW EVAL: PROGNOSIS
Given patient with recent FEES, no evidence of gross aspiration during clinical green dye swallow eval and no consolidations on CXR, recommending initiation of puree and honey thick liquids.

## 2020-03-10 NOTE — CONSULT NOTE ADULT - SUBJECTIVE AND OBJECTIVE BOX
Chief Complaint:  Patient is a 59y old  Female who presents with a chief complaint of FTT (10 Mar 2020 07:50)    Lupus anticoagulant syndrome  Tracheomalacia  Pancreatic cyst  Anxiety disorder  Sickle cell trait  Hypertension  COPD (chronic obstructive pulmonary disease)  Tracheostomy dependent  S/P   Tracheal stenosis  Acute tracheostomy management  Dependence on tracheostomy     HPI:  Patient is a 60 y/o F with PMHx of tracheomalacia s/p tracheostomy () c/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) and chronic vent dependence (on trach collar ~2 hours per day), COPD, GERD, anxiety who presents with complaint of 100 lb weight loss over the past 3 months. Was recently hospitalized at [facility] and was treated for pneumonia with a combination of antibiotics (chart review states cefepime, patient reports levaquin and bactrim). Has been off antibiotics for at least 1 week, however reports she still feels "off". When pressed further, she reports severe weight loss over the past 3 months and also reports subjective fevers and decreased secretions. Reports she thinks the bacteria has "dried up" her lungs and she thinks she has pneumonia. Otherwise, denies chills, malaise, cough, sputum production, hemoptysis, chest pain, abdominal pain, n/v/d, urinary symptoms, LE edema. GI Consulted for unintentional weight loss. The patient reports that she has been with a lower than usual appetite, she is endorsing roughly a 100lb weight loss over the past 2 1/2 to 3 months. She is with periodic episodes of feeling of food getting stuck in her throat prompting her to continually have to try to clear secretions to alleviate the discomfort, no difference between solids or liquids. She states that she does have some lower abdominal pain, at times in the pelvic area as well. She used to have daily bowel movements that were formed, however, of recent she notes increased episodes of constipation followed then by periods of loose 'stringy' like stools. She does not notice any melena or hematochezia. She has never had hematemesis in the past. She has no FHx of gi malignancies that she is aware of. She does endorse a colonoscopy and endoscopy "many years ago" and unable to recall the results       Cipro (Unknown)  latex (Unknown)  theophylline (Hives)      acetylcysteine 20%  Inhalation 4 milliLiter(s) Inhalation four times a day PRN  ALBUTerol    90 MICROgram(s) HFA Inhaler 1 Puff(s) Inhalation every 6 hours  albuterol/ipratropium for Nebulization. 3 milliLiter(s) Nebulizer every 6 hours PRN  aspirin  chewable 81 milliGRAM(s) Oral daily  azithromycin   Tablet 250 milliGRAM(s) Oral <User Schedule>  bisacodyl 5 milliGRAM(s) Oral daily PRN  buDESOnide    Inhalation Suspension 0.5 milliGRAM(s) Inhalation two times a day PRN  budesonide 160 MICROgram(s)/formoterol 4.5 MICROgram(s) Inhaler 2 Puff(s) Inhalation two times a day  chlorhexidine 0.12% Liquid 15 milliLiter(s) Oral Mucosa every 12 hours  chlorhexidine 4% Liquid 1 Application(s) Topical daily  clonazePAM Oral Disintegrating Tablet 0.5 milliGRAM(s) Oral every 8 hours PRN  enoxaparin Injectable 40 milliGRAM(s) SubCutaneous daily  influenza   Vaccine 0.5 milliLiter(s) IntraMuscular once  ipratropium 17 MICROgram(s) HFA Inhaler 1 Puff(s) Inhalation every 6 hours  lactated ringers. 1000 milliLiter(s) IV Continuous <Continuous>  multivitamin 1 Tablet(s) Oral daily  oxycodone    5 mG/acetaminophen 325 mG 1 Tablet(s) Oral every 4 hours PRN  pantoprazole    Tablet 40 milliGRAM(s) Oral before breakfast  polyethylene glycol 3350 17 Gram(s) Oral daily PRN  senna 2 Tablet(s) Oral at bedtime PRN  simethicone 80 milliGRAM(s) Chew four times a day PRN  thiamine 100 milliGRAM(s) Oral daily  zolpidem 5 milliGRAM(s) Oral at bedtime PRN        FAMILY HISTORY:  Family history of heart disease (Grandparent)  Family history of leukemia  Family history of lung disease (Sibling)        Review of Systems:    General:  No wt loss, fevers, chills, night sweats, fatigue  Eyes:  Good vision, no reported pain  ENT:  No sore throat, pain, runny nose, dysphagia  CV:  No pain, palpitations, no lightheadedness  Resp:  No dyspnea, cough, tachypnea, wheezing  GI: as above  :  No pain, bleeding, incontinence, nocturia  Muscle:  No pain, weakness  Neuro:  No weakness, tingling, memory problems  Psych:  No fatigue, insomnia, mood problems, depression  Endocrine:  No polyuria, polydypsia, cold/heat intolerance  Heme:  No petechiae, ecchymosis, easy bruisability  Skin:  No rash, tattoos, scars, edema    Relevant Family History:   n/c    Relevant Social History: n/c      Physical Exam:    Vital Signs:  Vital Signs Last 24 Hrs  T(C): 36.1 (10 Mar 2020 06:01), Max: 36.9 (09 Mar 2020 16:13)  T(F): 97 (10 Mar 2020 06:01), Max: 98.5 (09 Mar 2020 16:13)  HR: 60 (10 Mar 2020 10:25) (57 - 94)  BP: 131/70 (10 Mar 2020 06:01) (96/68 - 131/70)  BP(mean): --  RR: 119 (10 Mar 2020 06:01) (17 - 119)  SpO2: 100% (10 Mar 2020 10:25) (98% - 100%)  Daily Height in cm: 162.56 (09 Mar 2020 17:08)    Daily     General:  Appears stated age, well-groomed, nad  HEENT:  NC/AT,  conjunctivae clear and pink, no thyromegaly, nodules, adenopathy, no JVD  (+) Trach to vent  Chest:  Full & symmetric excursion, no increased effort, breath sounds clear  Cardiovascular:  Regular rhythm, S1, S2, no murmur/rub/S3/S4, no abdominal bruit, no edema  Abdomen:  Soft, non-tender, non-distended, normoactive bowel sounds,  no masses ,no hepatosplenomeagaly, no signs of chronic liver disease  Extremities:  no cyanosis,clubbing or edema  Skin:  No rash/erythema/ecchymoses/petechiae/wounds/abscess/warm/dry  Neuro/Psych:  A&Ox3  , no asterixis, no tremor, no encephalopathy    Laboratory:                            8.9    5.05  )-----------( 379      ( 10 Mar 2020 05:30 )             27.7     03-10    142  |  106  |  2<L>  ----------------------------<  92  3.5   |  26  |  0.61    Ca    9.0      10 Mar 2020 05:30  Phos  3.5     03-10  Mg     1.7     03-10    TPro  6.0  /  Alb  3.2<L>  /  TBili  0.3  /  DBili  x   /  AST  14  /  ALT  9   /  AlkPhos  60  03-10    LIVER FUNCTIONS - ( 10 Mar 2020 05:30 )  Alb: 3.2 g/dL / Pro: 6.0 g/dL / ALK PHOS: 60 u/L / ALT: 9 u/L / AST: 14 u/L / GGT: x           PT/INR - ( 09 Mar 2020 13:00 )   PT: 13.8 SEC;   INR: 1.20          PTT - ( 09 Mar 2020 13:00 )  PTT:50.4 SEC  Urinalysis Basic - ( 09 Mar 2020 14:45 )    Color: COLORLESS / Appearance: CLEAR / S.004 / pH: 6.0  Gluc: NEGATIVE / Ketone: NEGATIVE  / Bili: NEGATIVE / Urobili: NORMAL   Blood: NEGATIVE / Protein: NEGATIVE / Nitrite: NEGATIVE   Leuk Esterase: NEGATIVE / RBC: x / WBC x   Sq Epi: x / Non Sq Epi: x / Bacteria: x        Imaging:      CT C/A/P pending

## 2020-03-10 NOTE — CONSULT NOTE ADULT - SUBJECTIVE AND OBJECTIVE BOX
HPI:  Patient is a 58 y/o F with PMHx of   ·	tracheomalacia s/p tracheostomy ()   ·	c/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)  ·	chronic vent dependence (on trach collar ~2 hours per day)  ·	COPD  ·	GERD     Was recently hospitalized at [facility] and was treated for pneumonia with a combination of antibiotics (chart review states cefepime, patient reports levaquin and bactrim).     Has been off antibiotics for at least 1 week, however reports she still feels "off".     When pressed further, she reports severe weight loss over the past 3 months and also reports subjective fevers and decreased secretions.    She complains of feeling Dry.  Subjective fever at home. Afebrile here     Otherwise, denies chills, malaise, cough, sputum production, hemoptysis, chest pain, abdominal pain, n/v/d, urinary symptoms, LE edema.       PAST MEDICAL & SURGICAL HISTORY:  Lupus anticoagulant syndrome  Tracheomalacia  Pancreatic cyst  Anxiety disorder  Sickle cell trait  Hypertension  COPD (chronic obstructive pulmonary disease)  Tracheostomy dependent  S/P :   Tracheal stenosis: excision of tracheal stenosis 10/2017  revision of tracheal stoma 3/2018  Acute tracheostomy management  Dependence on tracheostomy      Allergies    Cipro (Unknown)  latex (Unknown)  theophylline (Hives)    Intolerances        ANTIMICROBIALS:  azithromycin   Tablet 250 <User Schedule>      OTHER MEDS:  acetylcysteine 20%  Inhalation 4 milliLiter(s) Inhalation four times a day PRN  ALBUTerol    90 MICROgram(s) HFA Inhaler 1 Puff(s) Inhalation every 6 hours  albuterol/ipratropium for Nebulization. 3 milliLiter(s) Nebulizer every 6 hours PRN  aspirin  chewable 81 milliGRAM(s) Oral daily  bisacodyl 5 milliGRAM(s) Oral daily PRN  buDESOnide    Inhalation Suspension 0.5 milliGRAM(s) Inhalation two times a day PRN  budesonide 160 MICROgram(s)/formoterol 4.5 MICROgram(s) Inhaler 2 Puff(s) Inhalation two times a day  chlorhexidine 0.12% Liquid 15 milliLiter(s) Oral Mucosa every 12 hours  chlorhexidine 4% Liquid 1 Application(s) Topical daily  clonazePAM Oral Disintegrating Tablet 0.5 milliGRAM(s) Oral every 8 hours PRN  enoxaparin Injectable 40 milliGRAM(s) SubCutaneous daily  influenza   Vaccine 0.5 milliLiter(s) IntraMuscular once  ipratropium 17 MICROgram(s) HFA Inhaler 1 Puff(s) Inhalation every 6 hours  lactated ringers. 1000 milliLiter(s) IV Continuous <Continuous>  multivitamin 1 Tablet(s) Oral daily  oxycodone    5 mG/acetaminophen 325 mG 1 Tablet(s) Oral every 4 hours PRN  pantoprazole    Tablet 40 milliGRAM(s) Oral before breakfast  polyethylene glycol 3350 17 Gram(s) Oral daily PRN  senna 2 Tablet(s) Oral at bedtime PRN  simethicone 80 milliGRAM(s) Chew four times a day PRN  thiamine 100 milliGRAM(s) Oral daily  zolpidem 5 milliGRAM(s) Oral at bedtime PRN      SOCIAL HISTORY:  Lives at home  No tobacco  No travel    FAMILY HISTORY:  Family history of heart disease (Grandparent)  Family history of leukemia  Family history of lung disease (Sibling)      REVIEW OF SYSTEMS  [  ] ROS unobtainable because:    [x  ] All other systems negative except as noted below:	    Constitutional:  [ ] fever [ ] chills  [ ] weight loss  [ ] weakness  Skin:  [ ] rash [ ] phlebitis	  Eyes: [ ] icterus [ ] pain  [ ] discharge	  ENMT: [ ] sore throat  [ ] thrush [ ] ulcers [ ] exudates  Respiratory: [ x] dyspnea [ ] hemoptysis [ ] cough [ ] sputum	  Cardiovascular:  [ ] chest pain [ ] palpitations [ ] edema	  Gastrointestinal:  [ ] nausea [ ] vomiting [ ] diarrhea [ ] constipation [ ] pain	  Genitourinary:  [ ] dysuria [ ] frequency [ ] hematuria [ ] discharge [ ] flank pain  [ ] incontinence  Musculoskeletal:  [ ] myalgias [ ] arthralgias [ ] arthritis  [ ] back pain  Neurological:  [ ] headache [ ] seizures  [ ] confusion/altered mental status  Psychiatric:  [ ] anxiety [ ] depression	  Hematology/Lymphatics:  [ ] lymphadenopathy  Endocrine:  [ ] adrenal [ ] thyroid  Allergic/Immunologic:	 [ ] transplant [ ] seasonal    PHYSICAL EXAM:  General: [x ] non-toxic  HEAD/EYES: [ ] PERRL [x ] white sclera [ ] icterus  ENT:  [ ] normal [ x] supple [ ] thrush [x ] trach  Cardiovascular:   [ ] murmur x[ ] normal [ ] PPM/AICD  Respiratory:  [ x] clear to ausculation bilaterally  GI:  [x ] soft, non-tender, normal bowel sounds  :  [ ] irizarry [ ] no CVA tenderness   Musculoskeletal:  [ x] no synovitis  Neurologic:  [ ] non-focal exam   Skin:  x[ ] no rash  Lymph: [x ] no lymphadenopathy  Psychiatric:  [ ] appropriate affect [ ] alert & oriented  Lines:  [ x] no phlebitis [ ] central line          Drug Dosing Weight  Height (cm): 162.6 (09 Mar 2020 17:08)  Weight (kg): 69.9 (09 Mar 2020 17:08)  BMI (kg/m2): 26.4 (09 Mar 2020 17:08)  BSA (m2): 1.75 (09 Mar 2020 17:08)    Vital Signs Last 24 Hrs  T(F): 97 (03-10-20 @ 06:01), Max: 98.5 (20 @ 16:13)    Vital Signs Last 24 Hrs  HR: 60 (03-10-20 @ 10:25) (57 - 94)  BP: 131/70 (03-10-20 @ 06:01) (100/50 - 131/70)  RR: 119 (03-10-20 @ 06:01)  SpO2: 100% (03-10-20 @ 10:25) (98% - 100%)  Wt(kg): --                          8.9    5.05  )-----------( 379      ( 10 Mar 2020 05:30 )             27.7       03-10    142  |  106  |  2<L>  ----------------------------<  92  3.5   |  26  |  0.61    Ca    9.0      10 Mar 2020 05:30  Phos  3.5     03-10  Mg     1.7     03-10    TPro  6.0  /  Alb  3.2<L>  /  TBili  0.3  /  DBili  x   /  AST  14  /  ALT  9   /  AlkPhos  60  03-10      Urinalysis Basic - ( 09 Mar 2020 14:45 )    Color: COLORLESS / Appearance: CLEAR / S.004 / pH: 6.0  Gluc: NEGATIVE / Ketone: NEGATIVE  / Bili: NEGATIVE / Urobili: NORMAL   Blood: NEGATIVE / Protein: NEGATIVE / Nitrite: NEGATIVE   Leuk Esterase: NEGATIVE / RBC: x / WBC x   Sq Epi: x / Non Sq Epi: x / Bacteria: x        MICROBIOLOGY:    RADIOLOGY:  < from: Xray Chest 1 View AP/PA (20 @ 13:55) >  EXAM:  XR CHEST AP OR PA 1V        PROCEDURE DATE:  Mar  9 2020         INTERPRETATION:  CLINICAL INDICATION: sepsis    EXAM:  Portable frontal chest from 3/9/2020 at 1355. Compared to prior study from 2018.    IMPRESSION:  Tracheostomy tube again seen.    Vertically oriented streaky opacities in medial right lower lung with associated focal medial right hemidiaphragm tenting compatible with subsegmental atelectatic change or scarring. Clear remaining visualized lungs. No gross pleural effusions. No pneumothorax.    Cardiac and mediastinal surgical suture within normal limits for the projection.    Trachea midline.    Generalized osteopenia and bilateral glenohumeral degenerative change again noted.    < end of copied text >

## 2020-03-10 NOTE — PROGRESS NOTE ADULT - SUBJECTIVE AND OBJECTIVE BOX
CHIEF COMPLAINT: Patient is a 59y old  Female who presents with a chief complaint of FTT (09 Mar 2020 17:05)    Interval Events:      REVIEW OF SYSTEMS:  Constitutional:   Eyes:  ENT:  CV:  Resp:  GI:  :  MSK:  Integumentary:  Neurological:  Psychiatric:  Endocrine:  Hematologic/Lymphatic:  Allergic/Immunologic:  [ ] All other systems negative  [ ] Unable to assess ROS because ________      OBJECTIVE:  ICU Vital Signs Last 24 Hrs  T(C): 36.1 (10 Mar 2020 06:01), Max: 36.9 (09 Mar 2020 16:13)  T(F): 97 (10 Mar 2020 06:01), Max: 98.5 (09 Mar 2020 16:13)  HR: 58 (10 Mar 2020 06:11) (57 - 94)  BP: 131/70 (10 Mar 2020 06:01) (96/68 - 131/70)  BP(mean): --  ABP: --  ABP(mean): --  RR: 119 (10 Mar 2020 06:01) (17 - 119)  SpO2: 98% (10 Mar 2020 06:11) (98% - 100%)    Mode: AC/ CMV (Assist Control/ Continuous Mandatory Ventilation), RR (machine): 12, TV (machine): 450, FiO2: 35, PEEP: 5, MAP: 8, PIP: 21    03-09 @ 07:01  -  03-10 @ 07:00  --------------------------------------------------------  IN: 75 mL / OUT: 0 mL / NET: 75 mL    HOSPITAL MEDICATIONS:  MEDICATIONS  (STANDING):  ALBUTerol    90 MICROgram(s) HFA Inhaler 1 Puff(s) Inhalation every 6 hours  aspirin  chewable 81 milliGRAM(s) Oral daily  azithromycin   Tablet 250 milliGRAM(s) Oral <User Schedule>  budesonide 160 MICROgram(s)/formoterol 4.5 MICROgram(s) Inhaler 2 Puff(s) Inhalation two times a day  chlorhexidine 0.12% Liquid 15 milliLiter(s) Oral Mucosa every 12 hours  chlorhexidine 4% Liquid 1 Application(s) Topical daily  influenza   Vaccine 0.5 milliLiter(s) IntraMuscular once  ipratropium 17 MICROgram(s) HFA Inhaler 1 Puff(s) Inhalation every 6 hours  lactated ringers. 1000 milliLiter(s) (75 mL/Hr) IV Continuous <Continuous>  multivitamin 1 Tablet(s) Oral daily  pantoprazole    Tablet 40 milliGRAM(s) Oral before breakfast    MEDICATIONS  (PRN):  acetylcysteine 20%  Inhalation 4 milliLiter(s) Inhalation four times a day PRN secretions  albuterol/ipratropium for Nebulization. 3 milliLiter(s) Nebulizer every 6 hours PRN Shortness of Breath and/or Wheezing  bisacodyl 5 milliGRAM(s) Oral daily PRN Constipation  buDESOnide    Inhalation Suspension 0.5 milliGRAM(s) Inhalation two times a day PRN can be substituted for symbicort bid  clonazePAM Oral Disintegrating Tablet 0.5 milliGRAM(s) Oral every 8 hours PRN anxiety  oxycodone    5 mG/acetaminophen 325 mG 1 Tablet(s) Oral every 4 hours PRN Severe Pain (7 - 10)  polyethylene glycol 3350 17 Gram(s) Oral daily PRN Constipation  senna 2 Tablet(s) Oral at bedtime PRN Constipation  simethicone 80 milliGRAM(s) Chew four times a day PRN Gas  zolpidem 5 milliGRAM(s) Oral at bedtime PRN Insomnia      LABS:                        8.9    5.05  )-----------( 379      ( 10 Mar 2020 05:30 )             27.7     03-10    142  |  106  |  2<L>  ----------------------------<  92  3.5   |  26  |  0.61    Ca    9.0      10 Mar 2020 05:30  Phos  3.5     03-10  Mg     1.7     03-10    TPro  6.0  /  Alb  3.2<L>  /  TBili  0.3  /  DBili  x   /  AST  14  /  ALT  9   /  AlkPhos  60  03-10    PT/INR - ( 09 Mar 2020 13:00 )   PT: 13.8 SEC;   INR: 1.20          PTT - ( 09 Mar 2020 13:00 )  PTT:50.4 SEC  Urinalysis Basic - ( 09 Mar 2020 14:45 )    Color: COLORLESS / Appearance: CLEAR / S.004 / pH: 6.0  Gluc: NEGATIVE / Ketone: NEGATIVE  / Bili: NEGATIVE / Urobili: NORMAL   Blood: NEGATIVE / Protein: NEGATIVE / Nitrite: NEGATIVE   Leuk Esterase: NEGATIVE / RBC: x / WBC x   Sq Epi: x / Non Sq Epi: x / Bacteria: x        Venous Blood Gas:   @ 13:00  7.28/69/< 24/26/22.6  VBG Lactate: 1.5      MICROBIOLOGY:     RADIOLOGY:  [ ] Reviewed and interpreted by me    PULMONARY FUNCTION TESTS:    EKG: CHIEF COMPLAINT: Patient is a 59y old  Female who presents with a chief complaint of FTT (09 Mar 2020 17:05)    Interval Events: admitted yesterday - tolerating vent       REVIEW OF SYSTEMS:  Constitutional: no complaints   CV: denies   Resp: denies   GI: denies   [x] All other systems negative  [ ] Unable to assess ROS because ________      OBJECTIVE:  ICU Vital Signs Last 24 Hrs  T(C): 36.1 (10 Mar 2020 06:01), Max: 36.9 (09 Mar 2020 16:13)  T(F): 97 (10 Mar 2020 06:01), Max: 98.5 (09 Mar 2020 16:13)  HR: 58 (10 Mar 2020 06:11) (57 - 94)  BP: 131/70 (10 Mar 2020 06:01) (96/68 - 131/70)  BP(mean): --  ABP: --  ABP(mean): --  RR: 119 (10 Mar 2020 06:01) (17 - 119)  SpO2: 98% (10 Mar 2020 06:11) (98% - 100%)    Mode: AC/ CMV (Assist Control/ Continuous Mandatory Ventilation), RR (machine): 12, TV (machine): 450, FiO2: 35, PEEP: 5, MAP: 8, PIP: 21    03-09 @ 07:01  -  03-10 @ 07:00  --------------------------------------------------------  IN: 75 mL / OUT: 0 mL / NET: 75 mL    HOSPITAL MEDICATIONS:  MEDICATIONS  (STANDING):  ALBUTerol    90 MICROgram(s) HFA Inhaler 1 Puff(s) Inhalation every 6 hours  aspirin  chewable 81 milliGRAM(s) Oral daily  azithromycin   Tablet 250 milliGRAM(s) Oral <User Schedule>  budesonide 160 MICROgram(s)/formoterol 4.5 MICROgram(s) Inhaler 2 Puff(s) Inhalation two times a day  chlorhexidine 0.12% Liquid 15 milliLiter(s) Oral Mucosa every 12 hours  chlorhexidine 4% Liquid 1 Application(s) Topical daily  influenza   Vaccine 0.5 milliLiter(s) IntraMuscular once  ipratropium 17 MICROgram(s) HFA Inhaler 1 Puff(s) Inhalation every 6 hours  lactated ringers. 1000 milliLiter(s) (75 mL/Hr) IV Continuous <Continuous>  multivitamin 1 Tablet(s) Oral daily  pantoprazole    Tablet 40 milliGRAM(s) Oral before breakfast    MEDICATIONS  (PRN):  acetylcysteine 20%  Inhalation 4 milliLiter(s) Inhalation four times a day PRN secretions  albuterol/ipratropium for Nebulization. 3 milliLiter(s) Nebulizer every 6 hours PRN Shortness of Breath and/or Wheezing  bisacodyl 5 milliGRAM(s) Oral daily PRN Constipation  buDESOnide    Inhalation Suspension 0.5 milliGRAM(s) Inhalation two times a day PRN can be substituted for symbicort bid  clonazePAM Oral Disintegrating Tablet 0.5 milliGRAM(s) Oral every 8 hours PRN anxiety  oxycodone    5 mG/acetaminophen 325 mG 1 Tablet(s) Oral every 4 hours PRN Severe Pain (7 - 10)  polyethylene glycol 3350 17 Gram(s) Oral daily PRN Constipation  senna 2 Tablet(s) Oral at bedtime PRN Constipation  simethicone 80 milliGRAM(s) Chew four times a day PRN Gas  zolpidem 5 milliGRAM(s) Oral at bedtime PRN Insomnia      LABS:                        8.9    5.05  )-----------( 379      ( 10 Mar 2020 05:30 )             27.7     03-10    142  |  106  |  2<L>  ----------------------------<  92  3.5   |  26  |  0.61    Ca    9.0      10 Mar 2020 05:30  Phos  3.5     03-10  Mg     1.7     03-10    TPro  6.0  /  Alb  3.2<L>  /  TBili  0.3  /  DBili  x   /  AST  14  /  ALT  9   /  AlkPhos  60  03-10    PT/INR - ( 09 Mar 2020 13:00 )   PT: 13.8 SEC;   INR: 1.20         PTT - ( 09 Mar 2020 13:00 )  PTT:50.4 SEC  Urinalysis Basic - ( 09 Mar 2020 14:45 )    Color: COLORLESS / Appearance: CLEAR / S.004 / pH: 6.0  Gluc: NEGATIVE / Ketone: NEGATIVE  / Bili: NEGATIVE / Urobili: NORMAL   Blood: NEGATIVE / Protein: NEGATIVE / Nitrite: NEGATIVE   Leuk Esterase: NEGATIVE / RBC: x / WBC x   Sq Epi: x / Non Sq Epi: x / Bacteria: x    Venous Blood Gas:   @ 13:00  7.28/69/< 24/26/22.6  VBG Lactate: 1.5      MICROBIOLOGY:     Culture - Urine (20 @ 13:10)    Specimen Source: .Urine Clean Catch (Midstream)    Culture Results:   No growth

## 2020-03-10 NOTE — CONSULT NOTE ADULT - ASSESSMENT
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) and chronic vent dependence (on trach collar ~2 hours per day), COPD, GERD, anxiety who presents with complaint of 100 lb weight loss over the past 3 months    Unintentional Weight Loss   r/o gi malignancy given bowel habit/appetite changes   trend daily labs   iron studies ordered/pending   tumor markers ordered/pending   CT Chest/Abdomen/Pelvis ordered/pending   patient agreeable to EGD/Colonoscopy; possibly Thursday pending CT results   puree/honey thick liquids w/aspiration precautions     h/o Tracheomalacia  s/p tracheostomy in 2004  vent dependent   cont management per RCU/appreciated     Advanced care planning was discussed with patient and family.  Advanced care planning forms were reviewed and discussed.  Risks, benefits and alternatives of gastroenterologic procedures were discussed in detail and all questions were answered.  30 minutes spent.

## 2020-03-11 LAB
CANCER AG125 SERPL-ACNC: 14 U/ML — SIGNIFICANT CHANGE UP
CANCER AG19-9 SERPL-ACNC: 31 U/ML — SIGNIFICANT CHANGE UP
CEA SERPL-MCNC: 1.8 NG/ML — SIGNIFICANT CHANGE UP (ref 1–3.8)
FERRITIN SERPL-MCNC: 99.55 NG/ML — SIGNIFICANT CHANGE UP (ref 15–150)
FOLATE SERPL-MCNC: 12.5 NG/ML — SIGNIFICANT CHANGE UP (ref 4.7–20)
HAPTOGLOB SERPL-MCNC: 118 MG/DL — SIGNIFICANT CHANGE UP (ref 34–200)
HIV 1+2 AB+HIV1 P24 AG SERPL QL IA: SIGNIFICANT CHANGE UP
IRON SATN MFR SERPL: 190 UG/DL — SIGNIFICANT CHANGE UP (ref 140–530)
IRON SATN MFR SERPL: 36 UG/DL — SIGNIFICANT CHANGE UP (ref 30–160)
LDH SERPL L TO P-CCNC: 172 U/L — SIGNIFICANT CHANGE UP (ref 135–225)
RETICS #: 36 K/UL — SIGNIFICANT CHANGE UP (ref 25–125)
RETICS/RBC NFR: 1.1 % — SIGNIFICANT CHANGE UP (ref 0.5–2.5)
UIBC SERPL-MCNC: 153.7 UG/DL — SIGNIFICANT CHANGE UP (ref 110–370)
VIT B12 SERPL-MCNC: 549 PG/ML — SIGNIFICANT CHANGE UP (ref 200–900)

## 2020-03-11 PROCEDURE — 99232 SBSQ HOSP IP/OBS MODERATE 35: CPT | Mod: GC

## 2020-03-11 PROCEDURE — 99232 SBSQ HOSP IP/OBS MODERATE 35: CPT

## 2020-03-11 PROCEDURE — 99222 1ST HOSP IP/OBS MODERATE 55: CPT | Mod: 25

## 2020-03-11 PROCEDURE — 31615 TRCHEOBRNCHSC EST TRACHS INC: CPT

## 2020-03-11 RX ORDER — SOD SULF/SODIUM/NAHCO3/KCL/PEG
1 SOLUTION, RECONSTITUTED, ORAL ORAL EVERY 4 HOURS
Refills: 0 | Status: COMPLETED | OUTPATIENT
Start: 2020-03-11 | End: 2020-03-12

## 2020-03-11 RX ADMIN — Medication 3 MILLILITER(S): at 04:24

## 2020-03-11 RX ADMIN — PANTOPRAZOLE SODIUM 40 MILLIGRAM(S): 20 TABLET, DELAYED RELEASE ORAL at 05:39

## 2020-03-11 RX ADMIN — CHLORHEXIDINE GLUCONATE 1 APPLICATION(S): 213 SOLUTION TOPICAL at 13:55

## 2020-03-11 RX ADMIN — ENOXAPARIN SODIUM 40 MILLIGRAM(S): 100 INJECTION SUBCUTANEOUS at 13:55

## 2020-03-11 RX ADMIN — CHLORHEXIDINE GLUCONATE 15 MILLILITER(S): 213 SOLUTION TOPICAL at 05:38

## 2020-03-11 RX ADMIN — SODIUM CHLORIDE 50 MILLILITER(S): 9 INJECTION, SOLUTION INTRAVENOUS at 17:22

## 2020-03-11 RX ADMIN — Medication 1 LITER(S): at 22:57

## 2020-03-11 RX ADMIN — CHLORHEXIDINE GLUCONATE 15 MILLILITER(S): 213 SOLUTION TOPICAL at 17:01

## 2020-03-11 RX ADMIN — AZITHROMYCIN 250 MILLIGRAM(S): 500 TABLET, FILM COATED ORAL at 05:39

## 2020-03-11 RX ADMIN — SODIUM CHLORIDE 50 MILLILITER(S): 9 INJECTION, SOLUTION INTRAVENOUS at 20:29

## 2020-03-11 RX ADMIN — BUDESONIDE AND FORMOTEROL FUMARATE DIHYDRATE 2 PUFF(S): 160; 4.5 AEROSOL RESPIRATORY (INHALATION) at 22:36

## 2020-03-11 RX ADMIN — Medication 10 MILLIGRAM(S): at 17:17

## 2020-03-11 RX ADMIN — Medication 100 MILLIGRAM(S): at 13:54

## 2020-03-11 RX ADMIN — Medication 10 MILLIGRAM(S): at 22:57

## 2020-03-11 RX ADMIN — Medication 81 MILLIGRAM(S): at 13:54

## 2020-03-11 RX ADMIN — Medication 1 TABLET(S): at 13:54

## 2020-03-11 NOTE — DIETITIAN INITIAL EVALUATION ADULT. - PERTINENT MEDS FT
MEDICATIONS  (STANDING):  ALBUTerol    90 MICROgram(s) HFA Inhaler 1 Puff(s) Inhalation every 6 hours  aspirin  chewable 81 milliGRAM(s) Oral daily  azithromycin   Tablet 250 milliGRAM(s) Oral <User Schedule>  bisacodyl 10 milliGRAM(s) Oral every 4 hours  budesonide 160 MICROgram(s)/formoterol 4.5 MICROgram(s) Inhaler 2 Puff(s) Inhalation two times a day  chlorhexidine 0.12% Liquid 15 milliLiter(s) Oral Mucosa every 12 hours  chlorhexidine 4% Liquid 1 Application(s) Topical daily  enoxaparin Injectable 40 milliGRAM(s) SubCutaneous daily  influenza   Vaccine 0.5 milliLiter(s) IntraMuscular once  ipratropium 17 MICROgram(s) HFA Inhaler 1 Puff(s) Inhalation every 6 hours  lactated ringers. 1000 milliLiter(s) (50 mL/Hr) IV Continuous <Continuous>  multivitamin 1 Tablet(s) Oral daily  pantoprazole    Tablet 40 milliGRAM(s) Oral before breakfast  polyethylene glycol/electrolyte Solution 1 Liter(s) Oral every 4 hours  thiamine 100 milliGRAM(s) Oral daily

## 2020-03-11 NOTE — CHART NOTE - NSCHARTNOTEFT_GEN_A_CORE
NUTRITION SERVICES     Upon Nutritional Assessment by the Registered Dietitian your patient was determined to meet criteria/ has evidence of the following diagnosis/diagnoses:  [ ] Mild Protein Calorie Malnutrition   [ ] Moderate Protein Calorie Malnutrition   [x ] Severe Protein Calorie Malnutrition     Findings as based on:  •  Comprehensive nutritional assessment and consultation    Etiology : in the context of chronic illness.     Signs/Symptoms : 19.5% weight loss and < 75% estimated nutrition needs met over 3 mos PTA.     Please refer to Initial Dietitian Evaluation via documents section of MRI Interventions for further recommendations.

## 2020-03-11 NOTE — PHYSICAL THERAPY INITIAL EVALUATION ADULT - PERTINENT HX OF CURRENT PROBLEM, REHAB EVAL
Patient is a 59 year old female admitted to [facility] on 3/9 with complaint of 100 lb weight loss over the past 3 months, pt was recently hospitalized at [facility] and was treated for pneumonia. 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

## 2020-03-11 NOTE — PHYSICAL THERAPY INITIAL EVALUATION ADULT - PATIENT PROFILE REVIEW, REHAB EVAL
yes/PT orders received: ambulate as tolerated. Consult with RN Laquita CARLSON, pt may participate in PT evaluation.

## 2020-03-11 NOTE — PROGRESS NOTE ADULT - SUBJECTIVE AND OBJECTIVE BOX
59y Female w tracheostomy, tracheomalacia, admitted for COPD/tracheomalacia mx. Dr Rodriguez saw patient     Vital Signs Last 24 Hrs  T(C): 36.9 (11 Mar 2020 20:33), Max: 36.9 (11 Mar 2020 20:33)  T(F): 98.4 (11 Mar 2020 20:33), Max: 98.4 (11 Mar 2020 20:33)  HR: 81 (11 Mar 2020 22:37) (55 - 87)  BP: 118/66 (11 Mar 2020 20:33) (118/66 - 131/87)  BP(mean): --  RR: 17 (11 Mar 2020 20:33) (16 - 17)  SpO2: 93% (11 Mar 2020 22:37) (93% - 100%)    General: AAOx3, resting in bed, comfortable    Fiberoptic laryngoscopy:   Significant tracheomalacia seen. no mucus or pus, no bleeding.     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     Was recently hospitalized at [facility] and was treated for pneumonia with a combination of antibiotics (chart review states cefepime, patient reports levaquin and bactrim).     Has been off antibiotics for at least 1 week, however reports she still feels "off". When pressed further, she reports severe weight loss over the past 3 months and also reports subjective fevers and decreased secretions. Reports she thinks the bacteria has "dried up" her lungs and she thinks she has pneumonia. Otherwise, denies chills, malaise, cough, sputum production, hemoptysis, chest pain, abdominal pain, n/v/d, urinary symptoms, LE edema.    Currently admission issues include:  - tracheomalacia  - FTT  - COPD exacerbation    Vital Signs Last 24 Hrs  T(C): 36.9 (11 Mar 2020 20:33), Max: 36.9 (11 Mar 2020 20:33)  T(F): 98.4 (11 Mar 2020 20:33), Max: 98.4 (11 Mar 2020 20:33)  HR: 81 (11 Mar 2020 22:37) (55 - 87)  BP: 118/66 (11 Mar 2020 20:33) (118/66 - 131/87)  BP(mean): --  RR: 17 (11 Mar 2020 20:33) (16 - 17)  SpO2: 93% (11 Mar 2020 22:37) (93% - 100%)    General: AAOx3, resting in bed, comfortable    Fiberoptic laryngoscopy:   Significant tracheomalacia seen. no mucus or pus, no bleeding.     A/P: 59y Female w tracheostomy, tracheomalacia, admitted for COPD/tracheomalacia mx  - per GI, to scope for FTT eval  - cont abx per ID (azithromycin)  - cont nebulizers/bronchodilators  - will cont to follow  - Dr Rodriguez to speak to GI/primary team regrding poss trach change  - call us if any questions    ----------------------------------------------  Nikolas Navas MD  Resident  Department of Otolaryngology - Head and Neck Surgery  Adult Page #[phone]  Peds Page #[phone]

## 2020-03-11 NOTE — DIETITIAN INITIAL EVALUATION ADULT. - OTHER INFO
60 y/o female admitted with dx of Failure to Thrive in adult. Visited with pt for clarification of nutrition hx. Pt said that she has lost 100 lbs over the past 3 months PTA. When inquired as to why she lost so much weight, pt said that she "has something wrong inside me." Review of EMC and weight measurements in HIE section indicate that pt DID have approx 100 lb loss of weight over the past 2 yrs, with 37 lbs of that weight lost over the past 3 mos PTA. GI is recommending/planning EGD and colonoscopy tomorrow to ascertain whether there is a GI explanation to pt's loss of weight. Pt says that she has been eating better since being admitted to the hospital and denies food allergies, nausea/vomiting/diarrhea/constipation at the present time. A swallowing evaluation had been performed 3/10 with recommendation for puree consistency solids and honey thick liquids, which RN said pt is tolerating well. Pt endorses having followed puree diet at home with thin liquids. Food preferences taken and menu selections discussed. Pt presents at severe risk for malnutrition based on 19.5% loss of weight (verified by HIE records) with stated suboptimal oral intake over the past 3 months PTA. Spoke with PA and recommended Ensure Pudding x 3/day (510 calories and 12 gms protein) to prevent further loss of weight and optimize pt's oral intake. RDN to remain available as needed.

## 2020-03-11 NOTE — PROGRESS NOTE ADULT - SUBJECTIVE AND OBJECTIVE BOX
INTERVAL HPI/OVERNIGHT EVENTS:    c/o abdominal pain       MEDICATIONS  (STANDING):  ALBUTerol    90 MICROgram(s) HFA Inhaler 1 Puff(s) Inhalation every 6 hours  aspirin  chewable 81 milliGRAM(s) Oral daily  azithromycin   Tablet 250 milliGRAM(s) Oral <User Schedule>  bisacodyl 10 milliGRAM(s) Oral every 4 hours  budesonide 160 MICROgram(s)/formoterol 4.5 MICROgram(s) Inhaler 2 Puff(s) Inhalation two times a day  chlorhexidine 0.12% Liquid 15 milliLiter(s) Oral Mucosa every 12 hours  chlorhexidine 4% Liquid 1 Application(s) Topical daily  enoxaparin Injectable 40 milliGRAM(s) SubCutaneous daily  influenza   Vaccine 0.5 milliLiter(s) IntraMuscular once  ipratropium 17 MICROgram(s) HFA Inhaler 1 Puff(s) Inhalation every 6 hours  lactated ringers. 1000 milliLiter(s) (50 mL/Hr) IV Continuous <Continuous>  multivitamin 1 Tablet(s) Oral daily  pantoprazole    Tablet 40 milliGRAM(s) Oral before breakfast  polyethylene glycol/electrolyte Solution 1 Liter(s) Oral every 4 hours  thiamine 100 milliGRAM(s) Oral daily    MEDICATIONS  (PRN):  acetylcysteine 20%  Inhalation 4 milliLiter(s) Inhalation four times a day PRN secretions  albuterol/ipratropium for Nebulization. 3 milliLiter(s) Nebulizer every 6 hours PRN Shortness of Breath and/or Wheezing  bisacodyl 5 milliGRAM(s) Oral daily PRN Constipation  buDESOnide    Inhalation Suspension 0.5 milliGRAM(s) Inhalation two times a day PRN can be substituted for symbicort bid  clonazePAM Oral Disintegrating Tablet 0.5 milliGRAM(s) Oral every 8 hours PRN anxiety  oxycodone    5 mG/acetaminophen 325 mG 1 Tablet(s) Oral every 4 hours PRN Severe Pain (7 - 10)  polyethylene glycol 3350 17 Gram(s) Oral daily PRN Constipation  senna 2 Tablet(s) Oral at bedtime PRN Constipation  simethicone 80 milliGRAM(s) Chew four times a day PRN Gas  zolpidem 5 milliGRAM(s) Oral at bedtime PRN Insomnia      Allergies    Cipro (Unknown)  latex (Unknown)  theophylline (Hives)    Intolerances        Review of Systems:    General:  No wt loss, fevers, chills, night sweats, fatigue   Eyes:  Good vision, no reported pain  ENT:  No sore throat, pain, runny nose, dysphagia  CV:  No pain, palpitations, hypo/hypertension  Resp:  No dyspnea, cough, tachypnea, wheezing  GI:  No pain, No nausea, No vomiting, No diarrhea, No constipation, No weight loss, No fever, No pruritis, No rectal bleeding, No melena, No dysphagia  :  No pain, bleeding, incontinence, nocturia  Muscle:  No pain, weakness  Neuro:  No weakness, tingling, memory problems  Psych:  No fatigue, insomnia, mood problems, depression  Endocrine:  No polyuria, polydypsia, cold/heat intolerance  Heme:  No petechiae, ecchymosis, easy bruisability  Skin:  No rash, tattoos, scars, edema      Vital Signs Last 24 Hrs  T(C): 36.4 (11 Mar 2020 05:36), Max: 36.8 (10 Mar 2020 14:25)  T(F): 97.6 (11 Mar 2020 05:36), Max: 98.2 (10 Mar 2020 14:25)  HR: 69 (11 Mar 2020 08:04) (54 - 82)  BP: 124/75 (11 Mar 2020 05:36) (124/75 - 133/79)  BP(mean): --  RR: 17 (11 Mar 2020 05:36) (17 - 20)  SpO2: 100% (11 Mar 2020 08:04) (99% - 100%)    PHYSICAL EXAM:    Constitutional: NAD  HEENT: EOMI, throat clear  Neck: No LAD, supple  Respiratory: CTA and P  Cardiovascular: S1 and S2, RRR, no M  Gastrointestinal: BS+, soft, NT/ND, neg HSM,  Extremities: No peripheral edema, neg clubbing, cyanosis  Vascular: 2+ peripheral pulses  Neurological: A/O x 3, no focal deficits  Psychiatric: Normal mood, normal affect  Skin: No rashes      LABS:                        8.9    5.05  )-----------( 379      ( 10 Mar 2020 05:30 )             27.7     03-10    142  |  106  |  2<L>  ----------------------------<  92  3.5   |  26  |  0.61    Ca    9.0      10 Mar 2020 05:30  Phos  3.5     03-10  Mg     1.7     03-10    TPro  6.0  /  Alb  3.2<L>  /  TBili  0.3  /  DBili  x   /  AST  14  /  ALT  9   /  AlkPhos  60  03-10    PT/INR - ( 09 Mar 2020 13:00 )   PT: 13.8 SEC;   INR: 1.20          PTT - ( 09 Mar 2020 13:00 )  PTT:50.4 SEC  Urinalysis Basic - ( 09 Mar 2020 14:45 )    Color: COLORLESS / Appearance: CLEAR / S.004 / pH: 6.0  Gluc: NEGATIVE / Ketone: NEGATIVE  / Bili: NEGATIVE / Urobili: NORMAL   Blood: NEGATIVE / Protein: NEGATIVE / Nitrite: NEGATIVE   Leuk Esterase: NEGATIVE / RBC: x / WBC x   Sq Epi: x / Non Sq Epi: x / Bacteria: x        RADIOLOGY & ADDITIONAL TESTS:

## 2020-03-11 NOTE — PROVIDER CONTACT NOTE (OTHER) - SITUATION
Pt refusing bisacodyl and polyethylene glycol prior to scheduled colonoscopy 3/12, due to confusion on colonoscopy and Trach exchange being done at one time.

## 2020-03-11 NOTE — PROGRESS NOTE ADULT - SUBJECTIVE AND OBJECTIVE BOX
Follow Up:      [place]val History/ROS:Patient is a 59y old  Female who presents with a chief complaint of FTT (11 Mar 2020 12:04)    C/o feeling congested  No fever    Allergies    Cipro (Unknown)  latex (Unknown)  theophylline (Hives)    Intolerances        ANTIMICROBIALS:  azithromycin   Tablet 250 <User Schedule>      OTHER MEDS:  acetylcysteine 20%  Inhalation 4 milliLiter(s) Inhalation four times a day PRN  ALBUTerol    90 MICROgram(s) HFA Inhaler 1 Puff(s) Inhalation every 6 hours  albuterol/ipratropium for Nebulization. 3 milliLiter(s) Nebulizer every 6 hours PRN  aspirin  chewable 81 milliGRAM(s) Oral daily  bisacodyl 5 milliGRAM(s) Oral daily PRN  bisacodyl 10 milliGRAM(s) Oral every 4 hours  buDESOnide    Inhalation Suspension 0.5 milliGRAM(s) Inhalation two times a day PRN  budesonide 160 MICROgram(s)/formoterol 4.5 MICROgram(s) Inhaler 2 Puff(s) Inhalation two times a day  chlorhexidine 0.12% Liquid 15 milliLiter(s) Oral Mucosa every 12 hours  chlorhexidine 4% Liquid 1 Application(s) Topical daily  clonazePAM Oral Disintegrating Tablet 0.5 milliGRAM(s) Oral every 8 hours PRN  enoxaparin Injectable 40 milliGRAM(s) SubCutaneous daily  influenza   Vaccine 0.5 milliLiter(s) IntraMuscular once  ipratropium 17 MICROgram(s) HFA Inhaler 1 Puff(s) Inhalation every 6 hours  lactated ringers. 1000 milliLiter(s) IV Continuous <Continuous>  multivitamin 1 Tablet(s) Oral daily  oxycodone    5 mG/acetaminophen 325 mG 1 Tablet(s) Oral every 4 hours PRN  pantoprazole    Tablet 40 milliGRAM(s) Oral before breakfast  polyethylene glycol 3350 17 Gram(s) Oral daily PRN  polyethylene glycol/electrolyte Solution 1 Liter(s) Oral every 4 hours  senna 2 Tablet(s) Oral at bedtime PRN  simethicone 80 milliGRAM(s) Chew four times a day PRN  thiamine 100 milliGRAM(s) Oral daily  zolpidem 5 milliGRAM(s) Oral at bedtime PRN      Vital Signs Last 24 Hrs  T(C): 36.7 (11 Mar 2020 15:01), Max: 36.8 (10 Mar 2020 22:06)  T(F): 98 (11 Mar 2020 15:01), Max: 98.2 (10 Mar 2020 22:06)  HR: 80 (11 Mar 2020 17:04) (54 - 80)  BP: 131/87 (11 Mar 2020 15:01) (124/75 - 133/79)  BP(mean): --  RR: 16 (11 Mar 2020 15:01) (16 - 20)  SpO2: 100% (11 Mar 2020 17:04) (99% - 100%)    PHYSICAL EXAM:  General: [ x] trach  HEAD/EYES: [ ] PERRL [x ] white sclera [ ] icterus  ENT:  [ ] normal [ ] supple [ ] thrush [ ] pharyngeal exudate  Cardiovascular:   [ ] murmur [x ] normal [ ] PPM/AICD  Respiratory:  [ x] rhonchi  GI:  [ x] soft, non-tender, normal bowel sounds  :  [ ] irizarry [ ] no CVA tenderness   Musculoskeletal:  [ ] no synovitis  Neurologic:  [ ] non-focal exam   Skin:  [ x] no rash  Lymph: [ ] no lymphadenopathy  Psychiatric:  [ x] appropriate affect [ ] alert & oriented  Lines:  [ x] no phlebitis [ ] central line                                8.9    5.05  )-----------( 379      ( 10 Mar 2020 05:30 )             27.7       03-10    142  |  106  |  2<L>  ----------------------------<  92  3.5   |  26  |  0.61    Ca    9.0      10 Mar 2020 05:30  Phos  3.5     03-10  Mg     1.7     03-10    TPro  6.0  /  Alb  3.2<L>  /  TBili  0.3  /  DBili  x   /  AST  14  /  ALT  9   /  AlkPhos  60  03-10          MICROBIOLOGY:Culture Results:   No growth to date. (03-09-20 @ 16:51)  Culture Results:   No growth to date. (03-09-20 @ 16:51)  Culture Results:   No growth (03-09-20 @ 13:10)      RADIOLOGY:    < from: CT Chest w/ IV Cont (03.10.20 @ 14:37) >  EXAM:  CT CHEST IC      EXAM:  CT ABDOMEN AND PELVIS IC        PROCEDURE DATE:  Mar 10 2020         INTERPRETATION:  CLINICAL INFORMATION: Chronic tracheostomy. Significant weight loss. Evaluate for mass.    COMPARISON: CT abdomen/pelvis 7/3/2019, CT chest 3/15/2018    PROCEDURE:   CT of the Chest, Abdomen and Pelvis was performed with intravenous contrast.   Intravenous contrast: 90 ml Omnipaque 350. 10 ml discarded.  Oral contrast: None.  Sagittal and coronal reformats were performed.    FINDINGS:    CHEST:     LUNGS AND LARGE AIRWAYS: Tracheostomy tube. Patent central airways. Areas of bronchiectasis bilaterally, likely sequela from prior infection..  PLEURA: No pleural effusion.  VESSELS: Within normal limits.  HEART: Heart size is normal. No pericardial effusion.  MEDIASTINUM AND GARETH: No lymphadenopathy.  CHEST WALL AND LOWER NECK: Within normal limits.    ABDOMEN AND PELVIS:    LIVER: No focal lesion.  BILE DUCTS: Normal caliber.  GALLBLADDER: Within normal limits.  SPLEEN: Within normal limits.  PANCREAS: Unremarkable.  ADRENALS: Within normal limits.  KIDNEYS/URETERS: Within normal limits.    BLADDER: Within normal limits.  REPRODUCTIVE ORGANS: Uterus and adnexa within normal limits.    BOWEL: No bowel obstruction. Appendixis not visualized. No evidence of inflammation in the pericecal region.  PERITONEUM: Trace perihepatic and pelvic ascites.  VESSELS: Within normal limits.  RETROPERITONEUM/LYMPH NODES: No lymphadenopathy.    ABDOMINAL WALL: Injection granulomas within the left subcutaneous soft tissues.  BONES: Sclerotic focus within the left iliac bone, likely bone island, unchanged.    IMPRESSION:     No evidence of malignancy.    < end of copied text >

## 2020-03-11 NOTE — DIETITIAN INITIAL EVALUATION ADULT. - ADD RECOMMEND
1). Suggest Ensure Pudding x 3/day. 2). Encourage and monitor oral intake, especially of suggested supplement. 3). Monitor weights, labs, BM's, skin integrity and edema. 4). Follow pt as per protocol.

## 2020-03-11 NOTE — PHYSICAL THERAPY INITIAL EVALUATION ADULT - ADDITIONAL COMMENTS
Patient reports she lives alone with 24/7 home health aide. Patient lives in an apartment, denies steps to negotiate. Patient reports she requires assistance with ADLs and states she did not use an assistive device for ambulation.    Patient was left semi-supine in bed as found, all lines/tubes intact and call [place] within reach, RN Laquita andrade

## 2020-03-11 NOTE — PROVIDER CONTACT NOTE (OTHER) - BACKGROUND
Pt admitted for respiratory distress. PMH of lupus, tracheomalacia, COPD, HTN, anxiety, pancreatic cyst, tracheostomy dependent

## 2020-03-11 NOTE — PROGRESS NOTE ADULT - SUBJECTIVE AND OBJECTIVE BOX
CHIEF COMPLAINT: Patient is a 59y old  Female who presents with a chief complaint of FTT (10 Mar 2020 12:44)    Interval Events:      REVIEW OF SYSTEMS:  Constitutional:   Eyes:  ENT:  CV:  Resp:  GI:  :  MSK:  Integumentary:  Neurological:  Psychiatric:  Endocrine:  Hematologic/Lymphatic:  Allergic/Immunologic:  [ ] All other systems negative  [ ] Unable to assess ROS because ________      OBJECTIVE:  ICU Vital Signs Last 24 Hrs  T(C): 36.4 (11 Mar 2020 05:36), Max: 36.8 (10 Mar 2020 14:25)  T(F): 97.6 (11 Mar 2020 05:36), Max: 98.2 (10 Mar 2020 14:25)  HR: 56 (11 Mar 2020 05:36) (54 - 82)  BP: 124/75 (11 Mar 2020 05:36) (124/75 - 133/79)  BP(mean): --  ABP: --  ABP(mean): --  RR: 17 (11 Mar 2020 05:36) (17 - 20)  SpO2: 100% (11 Mar 2020 05:36) (99% - 100%)    Mode: AC/ CMV (Assist Control/ Continuous Mandatory Ventilation), RR (machine): 12, TV (machine): 450, FiO2: 35, PEEP: 5, MAP: 8, PIP: 20    03-09 @ 07:01  -  03-10 @ 07:00  --------------------------------------------------------  IN: 75 mL / OUT: 0 mL / NET: 75 mL    HOSPITAL MEDICATIONS:  MEDICATIONS  (STANDING):  ALBUTerol    90 MICROgram(s) HFA Inhaler 1 Puff(s) Inhalation every 6 hours  aspirin  chewable 81 milliGRAM(s) Oral daily  azithromycin   Tablet 250 milliGRAM(s) Oral <User Schedule>  budesonide 160 MICROgram(s)/formoterol 4.5 MICROgram(s) Inhaler 2 Puff(s) Inhalation two times a day  chlorhexidine 0.12% Liquid 15 milliLiter(s) Oral Mucosa every 12 hours  chlorhexidine 4% Liquid 1 Application(s) Topical daily  enoxaparin Injectable 40 milliGRAM(s) SubCutaneous daily  influenza   Vaccine 0.5 milliLiter(s) IntraMuscular once  ipratropium 17 MICROgram(s) HFA Inhaler 1 Puff(s) Inhalation every 6 hours  lactated ringers. 1000 milliLiter(s) (50 mL/Hr) IV Continuous <Continuous>  multivitamin 1 Tablet(s) Oral daily  pantoprazole    Tablet 40 milliGRAM(s) Oral before breakfast  thiamine 100 milliGRAM(s) Oral daily    MEDICATIONS  (PRN):  acetylcysteine 20%  Inhalation 4 milliLiter(s) Inhalation four times a day PRN secretions  albuterol/ipratropium for Nebulization. 3 milliLiter(s) Nebulizer every 6 hours PRN Shortness of Breath and/or Wheezing  bisacodyl 5 milliGRAM(s) Oral daily PRN Constipation  buDESOnide    Inhalation Suspension 0.5 milliGRAM(s) Inhalation two times a day PRN can be substituted for symbicort bid  clonazePAM Oral Disintegrating Tablet 0.5 milliGRAM(s) Oral every 8 hours PRN anxiety  oxycodone    5 mG/acetaminophen 325 mG 1 Tablet(s) Oral every 4 hours PRN Severe Pain (7 - 10)  polyethylene glycol 3350 17 Gram(s) Oral daily PRN Constipation  senna 2 Tablet(s) Oral at bedtime PRN Constipation  simethicone 80 milliGRAM(s) Chew four times a day PRN Gas  zolpidem 5 milliGRAM(s) Oral at bedtime PRN Insomnia      LABS:                        8.9    5.05  )-----------( 379      ( 10 Mar 2020 05:30 )             27.7     03-10    142  |  106  |  2<L>  ----------------------------<  92  3.5   |  26  |  0.61    Ca    9.0      10 Mar 2020 05:30  Phos  3.5     03-10  Mg     1.7     03-10    TPro  6.0  /  Alb  3.2<L>  /  TBili  0.3  /  DBili  x   /  AST  14  /  ALT  9   /  AlkPhos  60  03-10    PT/INR - ( 09 Mar 2020 13:00 )   PT: 13.8 SEC;   INR: 1.20          PTT - ( 09 Mar 2020 13:00 )  PTT:50.4 SEC  Urinalysis Basic - ( 09 Mar 2020 14:45 )    Color: COLORLESS / Appearance: CLEAR / S.004 / pH: 6.0  Gluc: NEGATIVE / Ketone: NEGATIVE  / Bili: NEGATIVE / Urobili: NORMAL   Blood: NEGATIVE / Protein: NEGATIVE / Nitrite: NEGATIVE   Leuk Esterase: NEGATIVE / RBC: x / WBC x   Sq Epi: x / Non Sq Epi: x / Bacteria: x        Venous Blood Gas:   @ 13:00  7.28/69/< 24/26/22.6  VBG Lactate: 1.5      MICROBIOLOGY:     RADIOLOGY:  [ ] Reviewed and interpreted by me    PULMONARY FUNCTION TESTS:    EKG: CHIEF COMPLAINT: Patient is a 59y old  Female who presents with a chief complaint of FTT (10 Mar 2020 12:44)    Interval Events: none overnight      REVIEW OF SYSTEMS:  Constitutional: no complaints overall  CV: denies   Resp: denies   GI: denies   [x] All other systems negative  [ ] Unable to assess ROS because ________      OBJECTIVE:  ICU Vital Signs Last 24 Hrs  T(C): 36.4 (11 Mar 2020 05:36), Max: 36.8 (10 Mar 2020 14:25)  T(F): 97.6 (11 Mar 2020 05:36), Max: 98.2 (10 Mar 2020 14:25)  HR: 56 (11 Mar 2020 05:36) (54 - 82)  BP: 124/75 (11 Mar 2020 05:36) (124/75 - 133/79)  BP(mean): --  ABP: --  ABP(mean): --  RR: 17 (11 Mar 2020 05:36) (17 - 20)  SpO2: 100% (11 Mar 2020 05:36) (99% - 100%)    Mode: AC/ CMV (Assist Control/ Continuous Mandatory Ventilation), RR (machine): 12, TV (machine): 450, FiO2: 35, PEEP: 5, MAP: 8, PIP: 20    03-09 @ 07:01  -  03-10 @ 07:00  --------------------------------------------------------  IN: 75 mL / OUT: 0 mL / NET: 75 mL    HOSPITAL MEDICATIONS:  MEDICATIONS  (STANDING):  ALBUTerol    90 MICROgram(s) HFA Inhaler 1 Puff(s) Inhalation every 6 hours  aspirin  chewable 81 milliGRAM(s) Oral daily  azithromycin   Tablet 250 milliGRAM(s) Oral <User Schedule>  budesonide 160 MICROgram(s)/formoterol 4.5 MICROgram(s) Inhaler 2 Puff(s) Inhalation two times a day  chlorhexidine 0.12% Liquid 15 milliLiter(s) Oral Mucosa every 12 hours  chlorhexidine 4% Liquid 1 Application(s) Topical daily  enoxaparin Injectable 40 milliGRAM(s) SubCutaneous daily  influenza   Vaccine 0.5 milliLiter(s) IntraMuscular once  ipratropium 17 MICROgram(s) HFA Inhaler 1 Puff(s) Inhalation every 6 hours  lactated ringers. 1000 milliLiter(s) (50 mL/Hr) IV Continuous <Continuous>  multivitamin 1 Tablet(s) Oral daily  pantoprazole    Tablet 40 milliGRAM(s) Oral before breakfast  thiamine 100 milliGRAM(s) Oral daily    MEDICATIONS  (PRN):  acetylcysteine 20%  Inhalation 4 milliLiter(s) Inhalation four times a day PRN secretions  albuterol/ipratropium for Nebulization. 3 milliLiter(s) Nebulizer every 6 hours PRN Shortness of Breath and/or Wheezing  bisacodyl 5 milliGRAM(s) Oral daily PRN Constipation  buDESOnide    Inhalation Suspension 0.5 milliGRAM(s) Inhalation two times a day PRN can be substituted for symbicort bid  clonazePAM Oral Disintegrating Tablet 0.5 milliGRAM(s) Oral every 8 hours PRN anxiety  oxycodone    5 mG/acetaminophen 325 mG 1 Tablet(s) Oral every 4 hours PRN Severe Pain (7 - 10)  polyethylene glycol 3350 17 Gram(s) Oral daily PRN Constipation  senna 2 Tablet(s) Oral at bedtime PRN Constipation  simethicone 80 milliGRAM(s) Chew four times a day PRN Gas  zolpidem 5 milliGRAM(s) Oral at bedtime PRN Insomnia      LABS:               Complete Blood Count + Automated Diff in AM (03.10.20 @ 05:30)    Nucleated RBC #: 0 K/uL    WBC Count: 5.05 K/uL    RBC Count: 3.25 M/uL    Hemoglobin: 8.9 g/dL    Hematocrit: 27.7 %    Mean Cell Volume: 85.2 fL    Mean Cell Hemoglobin: 27.4 pg    Mean Cell Hemoglobin Conc: 32.1 %    Red Cell Distrib Width: 14.0 %    Platelet Count - Automated: 379 K/uL    MPV: 10.2 fl    Auto Neutrophil #: 2.05 K/uL    Auto Lymphocyte #: 2.29 K/uL    Auto Monocyte #: 0.45 K/uL    Auto Eosinophil #: 0.19 K/uL    Auto Basophil #: 0.07 K/uL    Auto Neutrophil %: 40.6 %    Auto Lymphocyte %: 45.3 %    Auto Monocyte %: 8.9 %    Auto Eosinophil %: 3.8 %    Auto Basophil %: 1.4 %    Auto Immature Granulocyte %: 0: (Includes meta, myelo and promyelocytes) %    Basic Metabolic Panel w/Mg &amp; Inorg Phos (03.10.20 @ 05:30)    Sodium, Serum: 142 mmol/L    Potassium, Serum: 3.5 mmol/L    Chloride, Serum: 106: Delta: 99 on 03/09/  Delta: 99 on 03/09/ mmol/L    Carbon Dioxide, Serum: 26 mmol/L    Anion Gap, Serum: 10 mmo/L    Blood Urea Nitrogen, Serum: 2 mg/dL    Creatinine, Serum: 0.61 mg/dL    Glucose, Serum: 92 mg/dL    Calcium, Total Serum: 9.0 mg/dL    Magnesium, Serum: 1.7 mg/dL    Phosphorus Level, Serum: 3.5 mg/dL    eGFR if Non : 99: The units for eGFR are ml/min/1.73m2 (normalized body  surface area). The eGFR is calculated from a serum  creatinine using the CKD-EPI equation. Other variables  required for calculation are race, age and sex. Among  patients with chronic kidney disease (CKD), the eGFR is  useful in determining the stage of disease according to  KDOQI CKD classification. All eGFR results are reported  numerically with the following interpretation.    GFR  (ml/min/1.73 m2)          W/KIDNEY DAMAGE    W/O KIDNEY DMG  ==========================================================  >= 90.......................Stage 1..............Normal  60-89.......................Stage 2...........Decreased GFR  30-59.......................Stage 3..............Stage 3  15-29.......................Stage 4..............Stage 4  < 15........................Stage 5..............Stage 5    Each stage of CKD assumes that the associated GFR level  has been in effect for at least 3 months. Determination of  stages one and two (with eGFR > 59ml/min/m2) requires  estimation of kidney damage for at least 3 months as  defined by structural or functional abnormalities.    Limitations: All estimates of GFR will be less accurate  for patients at extremes of muscle mass (including but  not limited to frail elderly, critically ill, or cancer  patients), those with unusual diets, and those with  conditions associated with reduced secretion or  extrarenal elimination of creatinine. The eGFR equation  is not recommended for use in patients with unstable  creatinine levels. mL/min    eGFR if : 115 mL/min      Culture - Blood (03.09.20 @ 16:51)    Specimen Source: .Blood Blood-Peripheral    Culture Results:   No growth to date.    Culture - Blood (03.09.20 @ 16:51)    Specimen Source: .Blood Blood-Peripheral    Culture Results:   No growth to date.      Culture - Urine (03.09.20 @ 13:10)    Specimen Source: .Urine Clean Catch (Midstream)    Culture Results:   No growth

## 2020-03-12 ENCOUNTER — RESULT REVIEW (OUTPATIENT)
Age: 60
End: 2020-03-12

## 2020-03-12 LAB
ANION GAP SERPL CALC-SCNC: 10 MMO/L — SIGNIFICANT CHANGE UP (ref 7–14)
BUN SERPL-MCNC: 2 MG/DL — LOW (ref 7–23)
CALCIUM SERPL-MCNC: 9.3 MG/DL — SIGNIFICANT CHANGE UP (ref 8.4–10.5)
CHLORIDE SERPL-SCNC: 105 MMOL/L — SIGNIFICANT CHANGE UP (ref 98–107)
CO2 SERPL-SCNC: 28 MMOL/L — SIGNIFICANT CHANGE UP (ref 22–31)
CREAT SERPL-MCNC: 0.59 MG/DL — SIGNIFICANT CHANGE UP (ref 0.5–1.3)
GLUCOSE SERPL-MCNC: 85 MG/DL — SIGNIFICANT CHANGE UP (ref 70–99)
GRAM STN FLD: SIGNIFICANT CHANGE UP
HCT VFR BLD CALC: 30.7 % — LOW (ref 34.5–45)
HGB BLD-MCNC: 9.6 G/DL — LOW (ref 11.5–15.5)
MCHC RBC-ENTMCNC: 27.3 PG — SIGNIFICANT CHANGE UP (ref 27–34)
MCHC RBC-ENTMCNC: 31.3 % — LOW (ref 32–36)
MCV RBC AUTO: 87.2 FL — SIGNIFICANT CHANGE UP (ref 80–100)
NRBC # FLD: 0 K/UL — SIGNIFICANT CHANGE UP (ref 0–0)
PLATELET # BLD AUTO: 390 K/UL — SIGNIFICANT CHANGE UP (ref 150–400)
PMV BLD: 10.7 FL — SIGNIFICANT CHANGE UP (ref 7–13)
POTASSIUM SERPL-MCNC: 3.5 MMOL/L — SIGNIFICANT CHANGE UP (ref 3.5–5.3)
POTASSIUM SERPL-SCNC: 3.5 MMOL/L — SIGNIFICANT CHANGE UP (ref 3.5–5.3)
RBC # BLD: 3.52 M/UL — LOW (ref 3.8–5.2)
RBC # FLD: 14.4 % — SIGNIFICANT CHANGE UP (ref 10.3–14.5)
SODIUM SERPL-SCNC: 143 MMOL/L — SIGNIFICANT CHANGE UP (ref 135–145)
SPECIMEN SOURCE: SIGNIFICANT CHANGE UP
WBC # BLD: 5.07 K/UL — SIGNIFICANT CHANGE UP (ref 3.8–10.5)
WBC # FLD AUTO: 5.07 K/UL — SIGNIFICANT CHANGE UP (ref 3.8–10.5)

## 2020-03-12 PROCEDURE — 88312 SPECIAL STAINS GROUP 1: CPT | Mod: 26

## 2020-03-12 PROCEDURE — 99232 SBSQ HOSP IP/OBS MODERATE 35: CPT

## 2020-03-12 PROCEDURE — 88305 TISSUE EXAM BY PATHOLOGIST: CPT | Mod: 26

## 2020-03-12 PROCEDURE — 99232 SBSQ HOSP IP/OBS MODERATE 35: CPT | Mod: GC

## 2020-03-12 RX ADMIN — ENOXAPARIN SODIUM 40 MILLIGRAM(S): 100 INJECTION SUBCUTANEOUS at 12:27

## 2020-03-12 RX ADMIN — OXYCODONE AND ACETAMINOPHEN 1 TABLET(S): 5; 325 TABLET ORAL at 23:20

## 2020-03-12 RX ADMIN — CHLORHEXIDINE GLUCONATE 1 APPLICATION(S): 213 SOLUTION TOPICAL at 12:27

## 2020-03-12 RX ADMIN — Medication 3 MILLILITER(S): at 23:16

## 2020-03-12 RX ADMIN — Medication 1 LITER(S): at 05:08

## 2020-03-12 RX ADMIN — SODIUM CHLORIDE 50 MILLILITER(S): 9 INJECTION, SOLUTION INTRAVENOUS at 13:45

## 2020-03-12 RX ADMIN — SIMETHICONE 80 MILLIGRAM(S): 80 TABLET, CHEWABLE ORAL at 23:02

## 2020-03-12 RX ADMIN — CHLORHEXIDINE GLUCONATE 15 MILLILITER(S): 213 SOLUTION TOPICAL at 05:09

## 2020-03-12 RX ADMIN — OXYCODONE AND ACETAMINOPHEN 1 TABLET(S): 5; 325 TABLET ORAL at 22:49

## 2020-03-12 NOTE — PROGRESS NOTE ADULT - SUBJECTIVE AND OBJECTIVE BOX
CHIEF COMPLAINT: Patient is a 59y old  Female who presents with a chief complaint of FTT (11 Mar 2020 22:55)    Interval Events:      REVIEW OF SYSTEMS:  Constitutional:   Eyes:  ENT:  CV:  Resp:  GI:  :  MSK:  Integumentary:  Neurological:  Psychiatric:  Endocrine:  Hematologic/Lymphatic:  Allergic/Immunologic:  [ ] All other systems negative  [ ] Unable to assess ROS because ________      OBJECTIVE:  ICU Vital Signs Last 24 Hrs  T(C): 36.4 (12 Mar 2020 05:06), Max: 36.9 (11 Mar 2020 20:33)  T(F): 97.5 (12 Mar 2020 05:06), Max: 98.4 (11 Mar 2020 20:33)  HR: 58 (12 Mar 2020 05:06) (56 - 87)  BP: 139/78 (12 Mar 2020 05:06) (118/66 - 139/78)  BP(mean): --  ABP: --  ABP(mean): --  RR: 12 (12 Mar 2020 05:06) (12 - 17)  SpO2: 100% (12 Mar 2020 05:06) (93% - 100%)    Mode: AC/ CMV (Assist Control/ Continuous Mandatory Ventilation), RR (machine): 12, TV (machine): 450, FiO2: 35, PEEP: 5, MAP: 7.8, PIP: 19    03-11 @ 07:01  -  03-12 @ 07:00  --------------------------------------------------------  IN: 2200 mL / OUT: 0 mL / NET: 2200 mL    HOSPITAL MEDICATIONS:  MEDICATIONS  (STANDING):  ALBUTerol    90 MICROgram(s) HFA Inhaler 1 Puff(s) Inhalation every 6 hours  aspirin  chewable 81 milliGRAM(s) Oral daily  azithromycin   Tablet 250 milliGRAM(s) Oral <User Schedule>  budesonide 160 MICROgram(s)/formoterol 4.5 MICROgram(s) Inhaler 2 Puff(s) Inhalation two times a day  chlorhexidine 0.12% Liquid 15 milliLiter(s) Oral Mucosa every 12 hours  chlorhexidine 4% Liquid 1 Application(s) Topical daily  enoxaparin Injectable 40 milliGRAM(s) SubCutaneous daily  influenza   Vaccine 0.5 milliLiter(s) IntraMuscular once  ipratropium 17 MICROgram(s) HFA Inhaler 1 Puff(s) Inhalation every 6 hours  lactated ringers. 1000 milliLiter(s) (50 mL/Hr) IV Continuous <Continuous>  multivitamin 1 Tablet(s) Oral daily  pantoprazole    Tablet 40 milliGRAM(s) Oral before breakfast  thiamine 100 milliGRAM(s) Oral daily    MEDICATIONS  (PRN):  acetylcysteine 20%  Inhalation 4 milliLiter(s) Inhalation four times a day PRN secretions  albuterol/ipratropium for Nebulization. 3 milliLiter(s) Nebulizer every 6 hours PRN Shortness of Breath and/or Wheezing  bisacodyl 5 milliGRAM(s) Oral daily PRN Constipation  buDESOnide    Inhalation Suspension 0.5 milliGRAM(s) Inhalation two times a day PRN can be substituted for symbicort bid  clonazePAM Oral Disintegrating Tablet 0.5 milliGRAM(s) Oral every 8 hours PRN anxiety  oxycodone    5 mG/acetaminophen 325 mG 1 Tablet(s) Oral every 4 hours PRN Severe Pain (7 - 10)  polyethylene glycol 3350 17 Gram(s) Oral daily PRN Constipation  senna 2 Tablet(s) Oral at bedtime PRN Constipation  simethicone 80 milliGRAM(s) Chew four times a day PRN Gas  zolpidem 5 milliGRAM(s) Oral at bedtime PRN Insomnia      LABS:                        9.6    5.07  )-----------( 390      ( 12 Mar 2020 02:31 )             30.7     03-12    143  |  105  |  2<L>  ----------------------------<  85  3.5   |  28  |  0.59    Ca    9.3      12 Mar 2020 02:31                MICROBIOLOGY:     RADIOLOGY:  [ ] Reviewed and interpreted by me    PULMONARY FUNCTION TESTS:    EKG: CHIEF COMPLAINT: Patient is a 59y old  Female who presents with a chief complaint of FTT (11 Mar 2020 22:55)    Interval Events: none overnight       REVIEW OF SYSTEMS:  Constitutional: no complaints  CV: denies   Resp: denies   GI: denies   [x] All other systems negative  [ ] Unable to assess ROS because ________      OBJECTIVE:  ICU Vital Signs Last 24 Hrs  T(C): 36.4 (12 Mar 2020 05:06), Max: 36.9 (11 Mar 2020 20:33)  T(F): 97.5 (12 Mar 2020 05:06), Max: 98.4 (11 Mar 2020 20:33)  HR: 58 (12 Mar 2020 05:06) (56 - 87)  BP: 139/78 (12 Mar 2020 05:06) (118/66 - 139/78)  BP(mean): --  ABP: --  ABP(mean): --  RR: 12 (12 Mar 2020 05:06) (12 - 17)  SpO2: 100% (12 Mar 2020 05:06) (93% - 100%)    Mode: AC/ CMV (Assist Control/ Continuous Mandatory Ventilation), RR (machine): 12, TV (machine): 450, FiO2: 35, PEEP: 5, MAP: 7.8, PIP: 19    03-11 @ 07:01  -  03-12 @ 07:00  --------------------------------------------------------  IN: 2200 mL / OUT: 0 mL / NET: 2200 mL    HOSPITAL MEDICATIONS:  MEDICATIONS  (STANDING):  ALBUTerol    90 MICROgram(s) HFA Inhaler 1 Puff(s) Inhalation every 6 hours  aspirin  chewable 81 milliGRAM(s) Oral daily  azithromycin   Tablet 250 milliGRAM(s) Oral <User Schedule>  budesonide 160 MICROgram(s)/formoterol 4.5 MICROgram(s) Inhaler 2 Puff(s) Inhalation two times a day  chlorhexidine 0.12% Liquid 15 milliLiter(s) Oral Mucosa every 12 hours  chlorhexidine 4% Liquid 1 Application(s) Topical daily  enoxaparin Injectable 40 milliGRAM(s) SubCutaneous daily  influenza   Vaccine 0.5 milliLiter(s) IntraMuscular once  ipratropium 17 MICROgram(s) HFA Inhaler 1 Puff(s) Inhalation every 6 hours  lactated ringers. 1000 milliLiter(s) (50 mL/Hr) IV Continuous <Continuous>  multivitamin 1 Tablet(s) Oral daily  pantoprazole    Tablet 40 milliGRAM(s) Oral before breakfast  thiamine 100 milliGRAM(s) Oral daily    MEDICATIONS  (PRN):  acetylcysteine 20%  Inhalation 4 milliLiter(s) Inhalation four times a day PRN secretions  albuterol/ipratropium for Nebulization. 3 milliLiter(s) Nebulizer every 6 hours PRN Shortness of Breath and/or Wheezing  bisacodyl 5 milliGRAM(s) Oral daily PRN Constipation  buDESOnide    Inhalation Suspension 0.5 milliGRAM(s) Inhalation two times a day PRN can be substituted for symbicort bid  clonazePAM Oral Disintegrating Tablet 0.5 milliGRAM(s) Oral every 8 hours PRN anxiety  oxycodone    5 mG/acetaminophen 325 mG 1 Tablet(s) Oral every 4 hours PRN Severe Pain (7 - 10)  polyethylene glycol 3350 17 Gram(s) Oral daily PRN Constipation  senna 2 Tablet(s) Oral at bedtime PRN Constipation  simethicone 80 milliGRAM(s) Chew four times a day PRN Gas  zolpidem 5 milliGRAM(s) Oral at bedtime PRN Insomnia      LABS:                        9.6    5.07  )-----------( 390      ( 12 Mar 2020 02:31 )             30.7     03-12    143  |  105  |  2<L>  ----------------------------<  85  3.5   |  28  |  0.59    Ca    9.3      12 Mar 2020 02:31        MICROBIOLOGY:     Culture - Sputum . (03.11.20 @ 18:54)    Gram Stain:   Moderate polymorphonuclear leukocytes per low power field  Moderate Squamous epithelial cells per low power field  Numerous Gram Negative Rods per oil power field  Results consistent with oropharyngeal contamination    Specimen Source: .Sputum Sputum - tracheal aspirate    Culture - Blood (03.09.20 @ 16:51)    Specimen Source: .Blood Blood-Peripheral    Culture Results:   No growth to date.

## 2020-03-12 NOTE — CHART NOTE - NSCHARTNOTEFT_GEN_A_CORE
Notified by RN that patient refuses to take bowel prep for 03/12 EGD/Colonoscopy. Patient states that she does not want to have her colonoscopy the same time as her trach exchange. I spoke with Dr. Burrows from ENT who informed me that the trach exchange will take place before the patient's planned colonoscopy. Patient agreed to be NPO after midnight and will continue her bowel prep.    Yumiko Tay PA-C  pager [phone] Notified by RN that patient refuses to take bowel prep for 03/12 EGD/Colonoscopy. Patient states that she does not want to have her colonoscopy the same time as her trach exchange. I spoke with Dr. Burrows from ENT who informed me that the trach exchange will take place before the patient's planned colonoscopy. Patient agreed to be NPO after midnight and will continue her bowel prep.  Will continue to monitor.  Yumiko Tay PA-C  pager [phone] Notified by RN re: pt refusing to take bowel prep as there was a conflict with ENT schedule. Spoke with ENT. Explained the benefits of getting the EGD/colonoscopy tomorrow to the patient. Pt agreed to take the bowel prep.     Yumiko Tay PA-C  pager [phone]

## 2020-03-12 NOTE — PROGRESS NOTE ADULT - SUBJECTIVE AND OBJECTIVE BOX
Follow Up:      Inverval History/ROS:Patient is a 59y old  Female who presents with a chief complaint of FTT (12 Mar 2020 07:12)    No fever  On Vent   Allergies    Cipro (Unknown)  latex (Unknown)  theophylline (Hives)    Intolerances        ANTIMICROBIALS:  azithromycin   Tablet 250 <User Schedule>      OTHER MEDS:  acetylcysteine 20%  Inhalation 4 milliLiter(s) Inhalation four times a day PRN  ALBUTerol    90 MICROgram(s) HFA Inhaler 1 Puff(s) Inhalation every 6 hours  albuterol/ipratropium for Nebulization. 3 milliLiter(s) Nebulizer every 6 hours PRN  aspirin  chewable 81 milliGRAM(s) Oral daily  bisacodyl 5 milliGRAM(s) Oral daily PRN  buDESOnide    Inhalation Suspension 0.5 milliGRAM(s) Inhalation two times a day PRN  budesonide 160 MICROgram(s)/formoterol 4.5 MICROgram(s) Inhaler 2 Puff(s) Inhalation two times a day  chlorhexidine 0.12% Liquid 15 milliLiter(s) Oral Mucosa every 12 hours  chlorhexidine 4% Liquid 1 Application(s) Topical daily  clonazePAM Oral Disintegrating Tablet 0.5 milliGRAM(s) Oral every 8 hours PRN  enoxaparin Injectable 40 milliGRAM(s) SubCutaneous daily  influenza   Vaccine 0.5 milliLiter(s) IntraMuscular once  ipratropium 17 MICROgram(s) HFA Inhaler 1 Puff(s) Inhalation every 6 hours  lactated ringers. 1000 milliLiter(s) IV Continuous <Continuous>  multivitamin 1 Tablet(s) Oral daily  oxycodone    5 mG/acetaminophen 325 mG 1 Tablet(s) Oral every 4 hours PRN  pantoprazole    Tablet 40 milliGRAM(s) Oral before breakfast  polyethylene glycol 3350 17 Gram(s) Oral daily PRN  senna 2 Tablet(s) Oral at bedtime PRN  simethicone 80 milliGRAM(s) Chew four times a day PRN  thiamine 100 milliGRAM(s) Oral daily  zolpidem 5 milliGRAM(s) Oral at bedtime PRN      Vital Signs Last 24 Hrs  T(C): 36.9 (12 Mar 2020 13:00), Max: 36.9 (11 Mar 2020 20:33)  T(F): 98.4 (12 Mar 2020 13:00), Max: 98.4 (11 Mar 2020 20:33)  HR: 59 (12 Mar 2020 13:00) (56 - 87)  BP: 137/88 (12 Mar 2020 13:00) (118/66 - 139/78)  BP(mean): --  RR: 18 (12 Mar 2020 13:00) (12 - 18)  SpO2: 100% (12 Mar 2020 13:00) (93% - 100%)    PHYSICAL EXAM:  General: [ x] non-toxic  HEAD/EYES: [ ] PERRL [ x] white sclera [x ] trach  ENT:  [ ] normal [ x] supple [ ] thrush [ ] pharyngeal exudate  Cardiovascular:   [ ] murmur [ x] normal [ ] PPM/AICD  Respiratory:  [x ] clear to ausculation bilaterally  GI:  [ x] soft, non-tender, normal bowel sounds  :  [ ] irizarry [x ] no CVA tenderness   Musculoskeletal:  [ ] no synovitis  Neurologic:  [ ] non-focal exam   Skin:  [x ] no rash  Lymph: [ ] no lymphadenopathy  Psychiatric:  [ ] appropriate affect [ ] alert & oriented  Lines:  [ x no phlebitis [ ] central line                                9.6    5.07  )-----------( 390      ( 12 Mar 2020 02:31 )             30.7       03-12    143  |  105  |  2<L>  ----------------------------<  85  3.5   |  28  |  0.59    Ca    9.3      12 Mar 2020 02:31            MICROBIOLOGY:Culture Results:   No growth to date. (03-09-20 @ 16:51)  Culture Results:   No growth to date. (03-09-20 @ 16:51)  Culture Results:   No growth (03-09-20 @ 13:10)      RADIOLOGY:

## 2020-03-12 NOTE — PROVIDER CONTACT NOTE (OTHER) - BACKGROUND
Pt admitted for respiratory distress. PMH of lupus, tracheomalacia, COPD, HTN, sickle cell trait, anxiety

## 2020-03-12 NOTE — PROGRESS NOTE ADULT - SUBJECTIVE AND OBJECTIVE BOX
Brief ENT note    Patient was encountered in the [facility] endoscopy suite after her scheduled EGD was complete for trach change. Her 6LPC cuffed trach was deflated with a 10cc syringe and removed from the stoma. Using an obturator and lubrication, a new 6LPC trach was placed into the stoma without difficulty. No obstruction or granulation tissue was encountered. The balloon was inflated with 8cc of air.  A flexible endoscope was inserted through the trach tube to confirm placement. Trachea was visualized down to [place]. The patient was placed back on the vent. The patient was turned back to the care of anesthesia.

## 2020-03-12 NOTE — PROVIDER CONTACT NOTE (OTHER) - REASON
Pt agreed to start bowel prep. Pt drank half of the bisacodyl polyethylene and stated that she cannot finish the remaining 1 and 1/2 liters.

## 2020-03-12 NOTE — PROVIDER CONTACT NOTE (OTHER) - SITUATION
Pt agreed to start bowel prep. Pt drank half of the bisacodyl polyethylene and stated that she cannot finish the remaining 1 and 1/2 liters

## 2020-03-12 NOTE — PROVIDER CONTACT NOTE (OTHER) - SITUATION
Pt returned from endoscopy, colonoscopy and had a Trach exchange done. Pt restless. BP elevated above baseline.

## 2020-03-13 ENCOUNTER — TRANSCRIPTION ENCOUNTER (OUTPATIENT)
Age: 60
End: 2020-03-13

## 2020-03-13 VITALS — OXYGEN SATURATION: 100 %

## 2020-03-13 PROCEDURE — 99232 SBSQ HOSP IP/OBS MODERATE 35: CPT | Mod: GC

## 2020-03-13 RX ORDER — OXYCODONE AND ACETAMINOPHEN 5; 325 MG/1; MG/1
1 TABLET ORAL
Qty: 12 | Refills: 0
Start: 2020-03-13 | End: 2020-03-15

## 2020-03-13 RX ORDER — SENNA PLUS 8.6 MG/1
1 TABLET ORAL
Qty: 15 | Refills: 0
Start: 2020-03-13 | End: 2020-04-11

## 2020-03-13 RX ORDER — BUDESONIDE, MICRONIZED 100 %
200 POWDER (GRAM) MISCELLANEOUS
Qty: 0 | Refills: 0 | DISCHARGE

## 2020-03-13 RX ORDER — PANTOPRAZOLE SODIUM 20 MG/1
1 TABLET, DELAYED RELEASE ORAL
Qty: 30 | Refills: 0
Start: 2020-03-13 | End: 2020-04-11

## 2020-03-13 RX ORDER — SIMETHICONE 80 MG/1
1 TABLET, CHEWABLE ORAL
Qty: 120 | Refills: 0
Start: 2020-03-13 | End: 2020-04-11

## 2020-03-13 RX ORDER — CHLORHEXIDINE GLUCONATE 213 G/1000ML
15 SOLUTION TOPICAL EVERY 12 HOURS
Refills: 0 | Status: DISCONTINUED | OUTPATIENT
Start: 2020-03-13 | End: 2020-03-13

## 2020-03-13 RX ORDER — BUDESONIDE, MICRONIZED 100 %
0 POWDER (GRAM) MISCELLANEOUS
Qty: 0 | Refills: 0 | DISCHARGE

## 2020-03-13 RX ORDER — THIAMINE MONONITRATE (VIT B1) 100 MG
1 TABLET ORAL
Qty: 30 | Refills: 0
Start: 2020-03-13 | End: 2020-04-11

## 2020-03-13 RX ORDER — AZITHROMYCIN 500 MG/1
0.5 TABLET, FILM COATED ORAL
Qty: 6.4 | Refills: 0
Start: 2020-03-13 | End: 2020-04-11

## 2020-03-13 RX ORDER — SENNA PLUS 8.6 MG/1
1 TABLET ORAL
Qty: 0 | Refills: 0 | DISCHARGE

## 2020-03-13 RX ORDER — PANTOPRAZOLE SODIUM 20 MG/1
1 TABLET, DELAYED RELEASE ORAL
Qty: 0 | Refills: 0 | DISCHARGE

## 2020-03-13 RX ORDER — AZITHROMYCIN 500 MG/1
1 TABLET, FILM COATED ORAL
Qty: 13 | Refills: 0
Start: 2020-03-13 | End: 2020-04-11

## 2020-03-13 RX ORDER — BUDESONIDE AND FORMOTEROL FUMARATE DIHYDRATE 160; 4.5 UG/1; UG/1
2 AEROSOL RESPIRATORY (INHALATION)
Qty: 1 | Refills: 0
Start: 2020-03-13 | End: 2020-04-11

## 2020-03-13 RX ORDER — AZITHROMYCIN 500 MG/1
0 TABLET, FILM COATED ORAL
Qty: 0 | Refills: 0 | DISCHARGE

## 2020-03-13 RX ORDER — ALBUTEROL 90 UG/1
2 AEROSOL, METERED ORAL
Qty: 0 | Refills: 0 | DISCHARGE

## 2020-03-13 RX ORDER — SIMETHICONE 80 MG/1
1 TABLET, CHEWABLE ORAL
Qty: 0 | Refills: 0 | DISCHARGE

## 2020-03-13 RX ADMIN — OXYCODONE AND ACETAMINOPHEN 1 TABLET(S): 5; 325 TABLET ORAL at 13:30

## 2020-03-13 RX ADMIN — ENOXAPARIN SODIUM 40 MILLIGRAM(S): 100 INJECTION SUBCUTANEOUS at 12:37

## 2020-03-13 RX ADMIN — Medication 3 MILLILITER(S): at 05:20

## 2020-03-13 RX ADMIN — AZITHROMYCIN 250 MILLIGRAM(S): 500 TABLET, FILM COATED ORAL at 05:10

## 2020-03-13 RX ADMIN — PANTOPRAZOLE SODIUM 40 MILLIGRAM(S): 20 TABLET, DELAYED RELEASE ORAL at 05:10

## 2020-03-13 RX ADMIN — Medication 1 TABLET(S): at 12:37

## 2020-03-13 RX ADMIN — CHLORHEXIDINE GLUCONATE 15 MILLILITER(S): 213 SOLUTION TOPICAL at 05:10

## 2020-03-13 RX ADMIN — ALBUTEROL 1 PUFF(S): 90 AEROSOL, METERED ORAL at 16:40

## 2020-03-13 RX ADMIN — Medication 100 MILLIGRAM(S): at 12:36

## 2020-03-13 RX ADMIN — BUDESONIDE AND FORMOTEROL FUMARATE DIHYDRATE 2 PUFF(S): 160; 4.5 AEROSOL RESPIRATORY (INHALATION) at 10:19

## 2020-03-13 RX ADMIN — Medication 81 MILLIGRAM(S): at 12:36

## 2020-03-13 RX ADMIN — Medication 1 PUFF(S): at 10:13

## 2020-03-13 RX ADMIN — CHLORHEXIDINE GLUCONATE 1 APPLICATION(S): 213 SOLUTION TOPICAL at 12:37

## 2020-03-13 RX ADMIN — OXYCODONE AND ACETAMINOPHEN 1 TABLET(S): 5; 325 TABLET ORAL at 12:36

## 2020-03-13 RX ADMIN — Medication 1 PUFF(S): at 16:40

## 2020-03-13 RX ADMIN — ALBUTEROL 1 PUFF(S): 90 AEROSOL, METERED ORAL at 10:13

## 2020-03-13 NOTE — PROGRESS NOTE ADULT - PROBLEM SELECTOR PLAN 5
- amlodipine 5mg, losartan 25mg on hold currently, pt was hypotensive upon admission  - cont to monitor BP and restart as necessary

## 2020-03-13 NOTE — DISCHARGE NOTE PROVIDER - NSDCMRMEDTOKEN_GEN_ALL_CORE_FT
acetylcysteine 20% inhalation solution: 4 milliliter(s) inhaled 4 times a day, As Needed  albuterol 90 mcg/inh inhalation aerosol: 2 puff(s) inhaled 4 times a day  Ambien 5 mg oral tablet: 1 tab(s) orally once a day (at bedtime), As Needed insomnia MDD:1 tablet  aspirin 81 mg oral tablet, chewable: 1 tab(s) orally once a day. Last dose 19  azithromycin 250 mg oral capsule: orally 3 times a week  clonazePAM 0.5 mg oral tablet, disintegratin tab(s) orally every 8 hours, As Needed -anxiety - for agitation MDD:3 tablets  ipratropium-albuterol 0.5 mg-2.5 mg/3 mLinhalation solution: 3 milliliter(s) inhaled every 4 hours, As needed, Shortness of Breath and/or Wheezing  lactulose 10 g/15 mL oral syrup: 15 milliliter(s) orally once a day, As Needed  losartan 25 mg oral tablet: 1 tab(s) orally once a day  MiraLax oral powder for reconstitution: orally every other day (at bedtime)  multivitamin: 1  orally once a day. Last dose 19.  Norvasc 5 mg oral tablet: 1 tab(s) orally once a day  Oxygen 3L on ventilator:   pantoprazole 40 mg oral delayed release tablet: 1 tab(s) orally once a day  Percocet 5/325 oral tablet: 1 tab(s) orally every 6 hours MDD:4  polyethylene glycol 3350 oral powder for reconstitution: 17 gram(s) orally once a day, As needed, Constipation  Pulmicort Turbuhaler 200 mcg/inh inhalation powder:   Red Rubber Catheters: Dispense 60 catheters. ICD10 K11.7. Medicaid ID: __________, DARIUS: 99 (lifetime), NPI: __________  saline:   Senna 8.6 mg oral tablet: 1 tab(s) orally every other day (at bedtime)  simethicone 125 mg oral capsule: 1 cap(s) orally 3 to 4 times a day  sterile water irrigation solution: irrigation once a day, As Needed acetylcysteine 20% inhalation solution: 4 milliliter(s) inhaled 4 times a day, As Needed  albuterol 90 mcg/inh inhalation aerosol: 2 puff(s) inhaled 4 times a day, As Needed  Ambien 5 mg oral tablet: 1 tab(s) orally once a day (at bedtime), As Needed insomnia MDD:1 tablet  aspirin 81 mg oral tablet, chewable: 1 tab(s) orally once a day. Last dose 19  azithromycin 250 mg oral capsule: orally 3 times a week  clonazePAM 0.5 mg oral tablet, disintegratin tab(s) orally every 8 hours, As Needed -anxiety - for agitation MDD:3 tablets  ipratropium-albuterol 0.5 mg-2.5 mg/3 mLinhalation solution: 3 milliliter(s) inhaled every 4 hours, As needed, Shortness of Breath and/or Wheezing  lactulose 10 g/15 mL oral syrup: 15 milliliter(s) orally once a day, As Needed  losartan 25 mg oral tablet: 1 tab(s) orally once a day  MiraLax oral powder for reconstitution: orally every other day (at bedtime)  multivitamin: 1  orally once a day. Last dose 19.  Norvasc 5 mg oral tablet: 1 tab(s) orally once a day  pantoprazole 40 mg oral delayed release tablet: 1 tab(s) orally once a day  Percocet 5/325 oral tablet: 1 tab(s) orally every 6 hours MDD:4  polyethylene glycol 3350 oral powder for reconstitution: 17 gram(s) orally once a day, As needed, Constipation  Pulmicort Turbuhaler 200 mcg/inh inhalation powder:   Senna 8.6 mg oral tablet: 1 tab(s) orally every other day (at bedtime)  simethicone 125 mg oral capsule: 1 cap(s) orally 3 to 4 times a day  sterile water irrigation solution: irrigation once a day, As Needed  thiamine 100 mg oral tablet: 1 tab(s) orally once a day acetylcysteine 20% inhalation solution: 4 milliliter(s) inhaled 4 times a day, As Needed  albuterol 90 mcg/inh inhalation aerosol: 2 puff(s) inhaled 4 times a day, As Needed  Ambien 5 mg oral tablet: 1 tab(s) orally once a day (at bedtime), As Needed insomnia MDD:1 tablet  aspirin 81 mg oral tablet, chewable: 1 tab(s) orally once a day. Last dose 19  azithromycin 500 mg oral tablet: 0.5 tab(s) orally 3 times a week   clonazePAM 0.5 mg oral tablet, disintegratin tab(s) orally every 8 hours, As Needed -anxiety - for agitation MDD:3 tablets  ipratropium-albuterol 0.5 mg-2.5 mg/3 mLinhalation solution: 3 milliliter(s) inhaled every 4 hours, As needed, Shortness of Breath and/or Wheezing  lactulose 10 g/15 mL oral syrup: 15 milliliter(s) orally once a day, As Needed  losartan 25 mg oral tablet: 1 tab(s) orally once a day  MiraLax oral powder for reconstitution: orally every other day (at bedtime)  multivitamin: 1  orally once a day. Last dose 19.  Norvasc 5 mg oral tablet: 1 tab(s) orally once a day  pantoprazole 40 mg oral delayed release tablet: 1 tab(s) orally once a day  Percocet 5/325 oral tablet: 1 tab(s) orally every 6 hours, As Needed MDD:4   polyethylene glycol 3350 oral powder for reconstitution: 17 gram(s) orally once a day, As needed, Constipation  Senna 8.6 mg oral tablet: 1 tab(s) orally every other day (at bedtime)  simethicone 125 mg oral capsule: 1 cap(s) orally 3 to 4 times a day, As Needed   sterile water irrigation solution: irrigation once a day, As Needed  Symbicort 160 mcg-4.5 mcg/inh inhalation aerosol: 2 puff(s) inhaled 2 times a day   thiamine 100 mg oral tablet: 1 tab(s) orally once a day

## 2020-03-13 NOTE — PROGRESS NOTE ADULT - GASTROINTESTINAL DETAILS
nontender/no distention/soft
nontender/soft/no distention
soft/no distention/nontender
nontender/no distention/soft

## 2020-03-13 NOTE — DISCHARGE NOTE NURSING/CASE MANAGEMENT/SOCIAL WORK - NSSCTYPOFSERV_GEN_ALL_CORE
Your Private Duty Nurse will be available at your Home today @ 5PM. Your Private Duty Nurse will be available at your Home today @ 5PM (Nurse Ariana Blas).

## 2020-03-13 NOTE — PROGRESS NOTE ADULT - GASTROINTESTINAL
Addended by: CLAUDE RODAS on: 1/14/2020 11:31 AM     Modules accepted: Orders, SmartSet    
detailed exam

## 2020-03-13 NOTE — DISCHARGE NOTE PROVIDER - HOSPITAL COURSE
58 y/o F with PMHx of tracheomalacia s/p trach (2004) s/p stomaplasty 10/2019 c/b chronic vent dependence and COPD who presents with subjective fevers and throat pain and a 100 lb weight loss over the past 3 months.    Concerning for infection vs. malignancy.    Blood and urine cultures negative.    Sputum culture with pseudomonas but pt with chronic trach and without signs of infection/fever/leukocytosis so monitored off abx as per ID recs.    Malignancy workup negative. EGD/colon negative.    ENT also changed trach during EGD under anesthesia.     Optimized for discharge with outpatient follow up.            dispo: home

## 2020-03-13 NOTE — PROGRESS NOTE ADULT - PROBLEM SELECTOR PLAN 2
- GI consulted for EGD/colon, pending for tomorrow  - NPO after 12 with prep   - CT C/A/P with IV negative for malignancy
- s/p EGD/colon 3/12 with negative findings - bx sent for h pylori  - GI note appreciated   - CT C/A/P with IV negative for malignancy
- GI consulted for EGD/colon, likely this week  - CT C/A/P with IV r/o malignancy
- GI consulted for EGD/colon, pending for today  - CT C/A/P with IV negative for malignancy

## 2020-03-13 NOTE — PROGRESS NOTE ADULT - RS GEN PE MLT RESP DETAILS PC
airway patent/breath sounds equal/good air movement
airway patent/breath sounds equal/good air movement/respirations non-labored
good air movement/airway patent/breath sounds equal
breath sounds equal/good air movement/airway patent

## 2020-03-13 NOTE — PROGRESS NOTE ADULT - SUBJECTIVE AND OBJECTIVE BOX
CHIEF COMPLAINT: Patient is a 59y old  Female who presents with a chief complaint of FTT (12 Mar 2020 20:31)    Interval Events:      REVIEW OF SYSTEMS:  Constitutional:   Eyes:  ENT:  CV:  Resp:  GI:  :  MSK:  Integumentary:  Neurological:  Psychiatric:  Endocrine:  Hematologic/Lymphatic:  Allergic/Immunologic:  [ ] All other systems negative  [ ] Unable to assess ROS because ________      OBJECTIVE:  ICU Vital Signs Last 24 Hrs  T(C): 36.4 (13 Mar 2020 05:07), Max: 36.9 (12 Mar 2020 13:00)  T(F): 97.5 (13 Mar 2020 05:07), Max: 98.4 (12 Mar 2020 13:00)  HR: 56 (13 Mar 2020 06:31) (56 - 85)  BP: 135/78 (13 Mar 2020 05:07) (135/78 - 156/81)  BP(mean): --  ABP: --  ABP(mean): --  RR: 15 (13 Mar 2020 05:07) (12 - 18)  SpO2: 100% (13 Mar 2020 06:31) (99% - 100%)    Mode: CPAP with PS, FiO2: 35, PEEP: 5, PS: 18, ITime: 50, MAP: 7.4, PIP: 23    03-12 @ 07:01  -  03-13 @ 07:00  --------------------------------------------------------  IN: 950 mL / OUT: 0 mL / NET: 950 mL    HOSPITAL MEDICATIONS:  MEDICATIONS  (STANDING):  ALBUTerol    90 MICROgram(s) HFA Inhaler 1 Puff(s) Inhalation every 6 hours  aspirin  chewable 81 milliGRAM(s) Oral daily  azithromycin   Tablet 250 milliGRAM(s) Oral <User Schedule>  budesonide 160 MICROgram(s)/formoterol 4.5 MICROgram(s) Inhaler 2 Puff(s) Inhalation two times a day  chlorhexidine 0.12% Liquid 15 milliLiter(s) Oral Mucosa every 12 hours  chlorhexidine 4% Liquid 1 Application(s) Topical daily  enoxaparin Injectable 40 milliGRAM(s) SubCutaneous daily  influenza   Vaccine 0.5 milliLiter(s) IntraMuscular once  ipratropium 17 MICROgram(s) HFA Inhaler 1 Puff(s) Inhalation every 6 hours  multivitamin 1 Tablet(s) Oral daily  pantoprazole    Tablet 40 milliGRAM(s) Oral before breakfast  thiamine 100 milliGRAM(s) Oral daily    MEDICATIONS  (PRN):  acetylcysteine 20%  Inhalation 4 milliLiter(s) Inhalation four times a day PRN secretions  albuterol/ipratropium for Nebulization. 3 milliLiter(s) Nebulizer every 6 hours PRN Shortness of Breath and/or Wheezing  bisacodyl 5 milliGRAM(s) Oral daily PRN Constipation  buDESOnide    Inhalation Suspension 0.5 milliGRAM(s) Inhalation two times a day PRN can be substituted for symbicort bid  clonazePAM Oral Disintegrating Tablet 0.5 milliGRAM(s) Oral every 8 hours PRN anxiety  oxycodone    5 mG/acetaminophen 325 mG 1 Tablet(s) Oral every 4 hours PRN Severe Pain (7 - 10)  polyethylene glycol 3350 17 Gram(s) Oral daily PRN Constipation  senna 2 Tablet(s) Oral at bedtime PRN Constipation  simethicone 80 milliGRAM(s) Chew four times a day PRN Gas  zolpidem 5 milliGRAM(s) Oral at bedtime PRN Insomnia      LABS:                        9.6    5.07  )-----------( 390      ( 12 Mar 2020 02:31 )             30.7     03-12    143  |  105  |  2<L>  ----------------------------<  85  3.5   |  28  |  0.59    Ca    9.3      12 Mar 2020 02:31                MICROBIOLOGY:     RADIOLOGY:  [ ] Reviewed and interpreted by me    PULMONARY FUNCTION TESTS:    EKG: CHIEF COMPLAINT: Patient is a 59y old  Female who presents with a chief complaint of FTT (12 Mar 2020 20:31)    Interval Events: s/p EGD/colon yesterday and trach exchange       REVIEW OF SYSTEMS:  Constitutional: no complaints   CV: denies   Resp: denies   GI: denies   [x] All other systems negative  [ ] Unable to assess ROS because ________      OBJECTIVE:  ICU Vital Signs Last 24 Hrs  T(C): 36.4 (13 Mar 2020 05:07), Max: 36.9 (12 Mar 2020 13:00)  T(F): 97.5 (13 Mar 2020 05:07), Max: 98.4 (12 Mar 2020 13:00)  HR: 56 (13 Mar 2020 06:31) (56 - 85)  BP: 135/78 (13 Mar 2020 05:07) (135/78 - 156/81)  BP(mean): --  ABP: --  ABP(mean): --  RR: 15 (13 Mar 2020 05:07) (12 - 18)  SpO2: 100% (13 Mar 2020 06:31) (99% - 100%)    Mode: CPAP with PS, FiO2: 35, PEEP: 5, PS: 18, ITime: 50, MAP: 7.4, PIP: 23    03-12 @ 07:01  -  03-13 @ 07:00  --------------------------------------------------------  IN: 950 mL / OUT: 0 mL / NET: 950 mL    HOSPITAL MEDICATIONS:  MEDICATIONS  (STANDING):  ALBUTerol    90 MICROgram(s) HFA Inhaler 1 Puff(s) Inhalation every 6 hours  aspirin  chewable 81 milliGRAM(s) Oral daily  azithromycin   Tablet 250 milliGRAM(s) Oral <User Schedule>  budesonide 160 MICROgram(s)/formoterol 4.5 MICROgram(s) Inhaler 2 Puff(s) Inhalation two times a day  chlorhexidine 0.12% Liquid 15 milliLiter(s) Oral Mucosa every 12 hours  chlorhexidine 4% Liquid 1 Application(s) Topical daily  enoxaparin Injectable 40 milliGRAM(s) SubCutaneous daily  influenza   Vaccine 0.5 milliLiter(s) IntraMuscular once  ipratropium 17 MICROgram(s) HFA Inhaler 1 Puff(s) Inhalation every 6 hours  multivitamin 1 Tablet(s) Oral daily  pantoprazole    Tablet 40 milliGRAM(s) Oral before breakfast  thiamine 100 milliGRAM(s) Oral daily    MEDICATIONS  (PRN):  acetylcysteine 20%  Inhalation 4 milliLiter(s) Inhalation four times a day PRN secretions  albuterol/ipratropium for Nebulization. 3 milliLiter(s) Nebulizer every 6 hours PRN Shortness of Breath and/or Wheezing  bisacodyl 5 milliGRAM(s) Oral daily PRN Constipation  buDESOnide    Inhalation Suspension 0.5 milliGRAM(s) Inhalation two times a day PRN can be substituted for symbicort bid  clonazePAM Oral Disintegrating Tablet 0.5 milliGRAM(s) Oral every 8 hours PRN anxiety  oxycodone    5 mG/acetaminophen 325 mG 1 Tablet(s) Oral every 4 hours PRN Severe Pain (7 - 10)  polyethylene glycol 3350 17 Gram(s) Oral daily PRN Constipation  senna 2 Tablet(s) Oral at bedtime PRN Constipation  simethicone 80 milliGRAM(s) Chew four times a day PRN Gas  zolpidem 5 milliGRAM(s) Oral at bedtime PRN Insomnia

## 2020-03-13 NOTE — PROGRESS NOTE ADULT - NEUROLOGICAL DETAILS
alert and oriented x 3/responds to verbal commands/responds to pain
responds to pain/responds to verbal commands/alert and oriented x 3
responds to verbal commands/responds to pain/alert and oriented x 3
alert and oriented x 3/responds to pain/responds to verbal commands

## 2020-03-13 NOTE — PROGRESS NOTE ADULT - PROBLEM SELECTOR PLAN 3
- trach placed in 2004, vent dependent since  - follows [facility] [address], attempting to wean from ventilator  - current vent settings: CPAP with PS 18, PEEP 5, FiO2 35%  - emergency settings: RR 12, , FiO2 35%, PEEP 5  - trach collar as tolerated  - ENT note appreciated - s/p exchange of trach yesterday 3/12 without incident
- trach placed in 2004, vent dependent since  - follows [facility] [address] [place], attempting to wean from ventilator  - current vent settings: CPAP with PS 18, PEEP 5, FiO2 35%  - emergency settings: RR 12, , FiO2 35%, PEEP 5  - trach collar as tolerated
- trach placed in 2004, vent dependent since  - follows [facility] [address] [facility], attempting to wean from ventilator  - current vent settings: CPAP with PS 18, PEEP 5, FiO2 35%  - emergency settings: RR 12, , FiO2 35%, PEEP 5  - trach collar as tolerated
- trach placed in 2004, vent dependent since  - follows [facility] [address] [facility], attempting to wean from ventilator  - current vent settings: CPAP with PS 18, PEEP 5, FiO2 35%  - emergency settings: RR 12, , FiO2 35%, PEEP 5  - trach collar as tolerated  - ENT note appreciated - planned for trach exchange during EGD today while under sedation

## 2020-03-13 NOTE — PROGRESS NOTE ADULT - MS EXT PE MLT D E PC
no clubbing/no cyanosis/no pedal edema
no clubbing/no pedal edema/no cyanosis
no pedal edema/no clubbing/no cyanosis
no cyanosis/no pedal edema/no clubbing

## 2020-03-13 NOTE — DISCHARGE NOTE PROVIDER - CARE PROVIDERS DIRECT ADDRESSES
[email or web address]

## 2020-03-13 NOTE — PROGRESS NOTE ADULT - ASSESSMENT
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) and chronic vent dependence (on trach collar ~2 hours per day), COPD, GERD, anxiety who presents with complaint of 100 lb weight loss over the past 3 months    Unintentional Weight Loss   r/o gi malignancy given bowel habit/appetite changes   trend daily labs   iron studies normal range  tumor markers normal range  CT Chest/Abdomen/Pelvis without malignancy  clear liquid diet today; bowel prep ordered  NPO p MN for EGD/Colonoscopy tomorrow afternoon   trach to be cuffed for procedure     h/o Tracheomalacia  s/p tracheostomy in 2004  vent dependent   cont management per RCU/appreciated     Advanced care planning was discussed with patient and family.  Advanced care planning forms were reviewed and discussed.  Risks, benefits and alternatives of gastroenterologic procedures were discussed in detail and all questions were answered.  30 minutes spent.
59 year old with tracheal stenosis/ malacia with chronic respiratory failure and COPD>    She presents with subjective fever and complaint of feeling dry with cough.    ? exacerbation of chronic lung disease   CT chest without clear pneumonia      WBC is normal and afebrile here.    Continue azithromycin prophylaxis    Monitor for clinical change    Call Id service with questions
59 year old with tracheal stenosis/ malacia with chronic respiratory failure and COPD>    She presents with subjective fever and complaint of feeling dry with cough.    ? exacerbation of chronic lung disease   CT chest without clear pneumonia      WBC is normal and afebrile here.    Continue azithromycin prophylaxis    Monitor for clinical change    Call Id service with questions
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) and chronic vent dependence (on trach collar ~2 hours per day), COPD, GERD, anxiety who presents with complaint of 100 lb weight loss over the past 3 months    FTT  trend daily labs   iron studies normal range  tumor markers normal range  CT Chest/Abdomen/Pelvis without malignancy  s/p Colonoscopy with internal hemorrhoids   s/p EGD with gastritis   fu pathology  encourage po intake; consider appetite stimulant   no gi objection to dc planning per primary team     h/o Tracheomalacia  s/p tracheostomy in 2004  vent dependent   cont management per RCU/appreciated     Advanced care planning was discussed with patient and family.  Advanced care planning forms were reviewed and discussed.  Risks, benefits and alternatives of gastroenterologic procedures were discussed in detail and all questions were answered.  30 minutes spent.
This is a 60 y/o F with PMHx of tracheomalacia s/p trach (2004) s/p stomaplasty 10/2019 c/b chronic vent dependence and COPD who presents with subjective fevers and throat pain and a 100 lb weight loss over the past 3 months. Reports unintentional weight loss but has not been eating very well. Afebrile and hemodynamically stable in the ED, cultures sent and given 1 g meropenem x1 in ED. Patient has no leukocytosis and CXR does not reveal focal consolidation. She does not clinically appear septic, however weight loss is very concerning. Patient is being admitted to monitor for signs of sepsis as well as workup for failure to thrive, with suspicion for occult malignancy.

## 2020-03-13 NOTE — DISCHARGE NOTE NURSING/CASE MANAGEMENT/SOCIAL WORK - PATIENT PORTAL LINK FT
You can access the FollowMyHealth Patient Portal offered by [facility] by registering at the following website: [email or web address]. By joining [facility]’s FollowMyHealth portal, you will also be able to view your health information using other applications (apps) compatible with our system.

## 2020-03-13 NOTE — PROGRESS NOTE ADULT - SUBJECTIVE AND OBJECTIVE BOX
ANESTHESIA POSTOP CHECK    59y Female POSTOP DAY 1     Vital Signs Last 24 Hrs  T(C): 36.4 (13 Mar 2020 05:07), Max: 36.9 (12 Mar 2020 13:00)  T(F): 97.5 (13 Mar 2020 05:07), Max: 98.4 (12 Mar 2020 13:00)  HR: 58 (13 Mar 2020 10:20) (56 - 85)  BP: 135/78 (13 Mar 2020 05:07) (135/78 - 156/81)  BP(mean): --  RR: 15 (13 Mar 2020 05:07) (12 - 18)  SpO2: 100% (13 Mar 2020 10:20) (96% - 100%)  I&O's Summary    12 Mar 2020 07:01  -  13 Mar 2020 07:00  --------------------------------------------------------  IN: 950 mL / OUT: 0 mL / NET: 950 mL        NO APPARENT ANESTHESIA COMPLICATIONS

## 2020-03-13 NOTE — DISCHARGE NOTE PROVIDER - PROVIDER TOKENS
PROVIDER:[TOKEN:[[id number]]],PROVIDER:[TOKEN:[[id number]]],PROVIDER:[TOKEN:[[id number]]]

## 2020-03-13 NOTE — PROGRESS NOTE ADULT - PROBLEM SELECTOR PLAN 4
- cont nebs, pulmicort  - mucomyst PRN for secretions  - zithro TIW  - cont trach to vent, trach collar as tolerated (pt tolerates few hours of the day)

## 2020-03-13 NOTE — PROGRESS NOTE ADULT - [facility] MLT D E PC
no rub/regular rate and rhythm/no murmur
regular rate and rhythm/no rub/no murmur
regular rate and rhythm/no rub/no murmur
no rub/no murmur/regular rate and rhythm

## 2020-03-13 NOTE — DISCHARGE NOTE PROVIDER - CARE PROVIDER_API CALL
Gaurang Rodriguez; PhD)  Otolaryngology  [facility] [place]  Dept of Otolaryngology, [address] [place] [address]  Phone: [phone]  Fax: [phone]  Follow Up Time:     Mayito Eduardo)  Gastroenterology; Internal Medicine  [address] [place] [address]  Phone: [phone]  Fax: [phone]  Follow Up Time:     Maco Nova)  Internal Medicine; Pulmonary Disease  [address] [facility], [address]  [place] [address]  Phone: [phone]  Fax: [phone]  Follow Up Time:

## 2020-03-13 NOTE — PROGRESS NOTE ADULT - SUBJECTIVE AND OBJECTIVE BOX
INTERVAL HPI/OVERNIGHT EVENTS:    tolerated egd/colonoscopy well yesterday  without new complaints this morning     MEDICATIONS  (STANDING):  ALBUTerol    90 MICROgram(s) HFA Inhaler 1 Puff(s) Inhalation every 6 hours  aspirin  chewable 81 milliGRAM(s) Oral daily  azithromycin   Tablet 250 milliGRAM(s) Oral <User Schedule>  budesonide 160 MICROgram(s)/formoterol 4.5 MICROgram(s) Inhaler 2 Puff(s) Inhalation two times a day  chlorhexidine 0.12% Liquid 15 milliLiter(s) Oral Mucosa every 12 hours  chlorhexidine 4% Liquid 1 Application(s) Topical daily  enoxaparin Injectable 40 milliGRAM(s) SubCutaneous daily  influenza   Vaccine 0.5 milliLiter(s) IntraMuscular once  ipratropium 17 MICROgram(s) HFA Inhaler 1 Puff(s) Inhalation every 6 hours  multivitamin 1 Tablet(s) Oral daily  pantoprazole    Tablet 40 milliGRAM(s) Oral before breakfast  thiamine 100 milliGRAM(s) Oral daily    MEDICATIONS  (PRN):  acetylcysteine 20%  Inhalation 4 milliLiter(s) Inhalation four times a day PRN secretions  albuterol/ipratropium for Nebulization. 3 milliLiter(s) Nebulizer every 6 hours PRN Shortness of Breath and/or Wheezing  bisacodyl 5 milliGRAM(s) Oral daily PRN Constipation  buDESOnide    Inhalation Suspension 0.5 milliGRAM(s) Inhalation two times a day PRN can be substituted for symbicort bid  clonazePAM Oral Disintegrating Tablet 0.5 milliGRAM(s) Oral every 8 hours PRN anxiety  oxycodone    5 mG/acetaminophen 325 mG 1 Tablet(s) Oral every 4 hours PRN Severe Pain (7 - 10)  polyethylene glycol 3350 17 Gram(s) Oral daily PRN Constipation  senna 2 Tablet(s) Oral at bedtime PRN Constipation  simethicone 80 milliGRAM(s) Chew four times a day PRN Gas  zolpidem 5 milliGRAM(s) Oral at bedtime PRN Insomnia      Allergies    Cipro (Unknown)  latex (Unknown)  theophylline (Hives)    Intolerances        Review of Systems:    General:  No wt loss, fevers, chills, night sweats, fatigue   Eyes:  Good vision, no reported pain  ENT:  No sore throat, pain, runny nose, dysphagia  CV:  No pain, palpitations, hypo/hypertension  Resp:  No dyspnea, cough, tachypnea, wheezing  GI:  No pain, No nausea, No vomiting, No diarrhea, No constipation, No weight loss, No fever, No pruritis, No rectal bleeding, No melena, No dysphagia  :  No pain, bleeding, incontinence, nocturia  Muscle:  No pain, weakness  Neuro:  No weakness, tingling, memory problems  Psych:  No fatigue, insomnia, mood problems, depression  Endocrine:  No polyuria, polydypsia, cold/heat intolerance  Heme:  No petechiae, ecchymosis, easy bruisability  Skin:  No rash, tattoos, scars, edema      Vital Signs Last 24 Hrs  T(C): 36.4 (13 Mar 2020 05:07), Max: 36.9 (12 Mar 2020 13:00)  T(F): 97.5 (13 Mar 2020 05:07), Max: 98.4 (12 Mar 2020 13:00)  HR: 58 (13 Mar 2020 10:20) (56 - 85)  BP: 135/78 (13 Mar 2020 05:07) (135/78 - 156/81)  BP(mean): --  RR: 15 (13 Mar 2020 05:07) (12 - 18)  SpO2: 100% (13 Mar 2020 10:20) (96% - 100%)    PHYSICAL EXAM:    Constitutional: NAD  HEENT: EOMI, throat clear  Neck: No LAD, supple  Respiratory: CTA and P  Cardiovascular: S1 and S2, RRR, no M  Gastrointestinal: BS+, soft, NT/ND, neg HSM,  Extremities: No peripheral edema, neg clubbing, cyanosis  Vascular: 2+ peripheral pulses  Neurological: A/O x 3, no focal deficits  Psychiatric: Normal mood, normal affect  Skin: No rashes      LABS:                        9.6    5.07  )-----------( 390      ( 12 Mar 2020 02:31 )             30.7     03-12    143  |  105  |  2<L>  ----------------------------<  85  3.5   |  28  |  0.59    Ca    9.3      12 Mar 2020 02:31            RADIOLOGY & ADDITIONAL TESTS:    < from: Colonoscopy (03.12.20 @ 18:10) >    [facility]  _______________________________________________________________________________  Patient Name: Lizzie Bo          Procedure Date: 3/12/2020 6:10 PM  MRN: [id number]                     Account Number: [id number]  YOB: 1960              Admit Type: Inpatient  Room: [place] [address]                         Gender: Female  Attending MD: Mayito Eduardo MD      _______________________________________________________________________________     Procedure:           Colonoscopy  Indications:         Gastrointestinal occult blood loss  Providers:           Mayito Eduardo MD  Medicines:           Monitored Anesthesia Care  Complications:       No immediate complications.  Procedure:           Pre-Anesthesia Assessment:                       - Prior to the procedure, a History and Physical was                        performed, and patient medications and allergies were                        reviewed. The patient is competent. The risks and                    benefits of the procedure and the sedation options and                        risks were discussed with the patient. All questions                        were answered and informed consent was obtained. Patient   identification and proposed procedure were verified by                        the physician, the nurse and the anesthesiologist in the                        pre-procedure area in the procedure room. Mental Status                        Examination: alert and oriented. Airway Examination:                        normal oropharyngeal airway and neck mobility.                        Respiratory Examination: clear to auscultation. CV                        Examination: normal. Prophylactic Antibiotics: The                        patient does not require prophylactic antibiotics. Prior                        Anticoagulants: The patient has taken no previous                        anticoagulant or antiplatelet agents. ASA Grade     Assessment: IV - A patient with severe systemic disease                        that is a constant threat to life. After reviewing the                        risks and benefits, the patient was deemed in                        satisfactory conditionto undergo the procedure. The                        anesthesia plan was to use monitored anesthesia care                        (MAC). Immediately prior to administration of                        medications, the patient was re-assessed for adequacy to                        receive sedatives. The heart rate, respiratory rate,                        oxygen saturations, blood pressure, adequacy of                        pulmonary ventilation, and response to care were                        monitored throughout the procedure. The physical status                        of the patient was re-assessed after the procedure.                       After I obtained informed consent, the scope was passed                        under direct vision. Throughout the procedure, the                        patient's blood pressure, pulse, and oxygen saturations                        were monitored continuously. The Colonoscope was                        introduced through the anus and advanced to the cecum,                        identified by appendiceal orifice and ileocecal valve.                        The colonoscopy was somewhat difficult due to a tortuous                        colon. The patient tolerated the procedure well. The              quality of the bowel preparation was evaluated using the                        BBPS ([place] Bowel Preparation Scale) with scores of:                        Right Colon = 2 (minor amount of residual staining,                        small fragments of stool and/or opaque liquid, but                        mucosa seen well), Transverse Colon = 2 (minor amount of                        residual staining, small fragments of stool and/or                        opaque liquid, but mucosa seenwell) and Left Colon = 3                        (entire mucosa seen well with no residual staining,                        small fragments of stool or opaque liquid). The total                        BBPS score equals 7.                                                      Findings:       Internal hemorrhoids were found during retroflexion. The hemorrhoids        were medium-sized.       The exam was otherwise normal throughout the examined colon.                                                               Impression:          - Internal hemorrhoids.                       - No specimens collected.  Recommendation:      - Return patient to hospital cates for ongoing care.                       - Advance diet as tolerated.                       -EGD Today                                                                                   Attending Participation:       I personally performed the entire procedure.                                                      Mayito Eduardo  ___________________  Mayito Eduardo MD  3/12/2020 7:55:21 PM  This report has been signed electronically.  Number of Addenda: 0    Note Initiated On: 3/12/2020 6:10 PM    < end of copied text >  < from: Upper Endoscopy (03.12.20 @ 18:09) >    [facility]  _______________________________________________________________________________  Patient Name: Lizzie Bo          Procedure Date: 3/12/2020 6:09 PM  MRN: [id number]                     Account Number: [id number]  YOB: 1960              Admit Type: Inpatient  Room: ENDO 04                         Gender: Female  Attending MD: Mayito Eduardo MD      _______________________________________________________________________________     Procedure:           Upper GI endoscopy  Indications:         Dyspepsia  Providers:           Mayito Eduardo MD  Medicines:           Monitored Anesthesia Care  Complications:       No immediate complications.  Procedure:           Pre-Anesthesia Assessment:                       - Prior to the procedure, a History and Physical was                        performed, and patient medications and allergies were                        reviewed. The patient is competent. The risks and                        benefits of the procedure and the sedation options and                        risks were discussed with the patient. All questions                        were answered and informed consent was obtained. Patient                        identification and proposed procedure were verified by                        the physician, the nurse and the anesthesiologist in the                        pre-procedure area in the procedure room. Mental Status                        Examination: alert and oriented. Airway Examination:                        normal oropharyngeal airway and neck mobility.                        Respiratory Examination: clear to auscultation. CV                        Examination: normal. Prophylactic Antibiotics: The              patient does not require prophylactic antibiotics. Prior                        Anticoagulants: The patient has taken no previous                        anticoagulant or antiplatelet agents. ASA Grade                        Assessment: III- A patient with severe systemic                        disease. After reviewing the risks and benefits, the                        patient was deemed in satisfactory condition to undergo                        the procedure. The anesthesia plan was to use monitored                        anesthesia care (MAC). Immediately prior to                        administration of medications, the patient was                        re-assessed for adequacy to receive sedatives. The heart        rate, respiratory rate, oxygen saturations, blood                        pressure, adequacy of pulmonary ventilation, and                        response to care were monitored throughout the                        procedure. The physical statusof the patient was                        re-assessed after the procedure.                       After obtaining informed consent, the endoscope was                        passed under direct vision. Throughout the procedure,                        the patient's blood pressure, pulse, and oxygen                        saturations were monitored continuously. The Endoscope                        was introduced through the mouth, and advanced to the                        third part of duodenum. The upper GI endoscopy was                        accomplished without difficulty. The patient tolerated                        the procedure well.                                                                                   Findings:       The examined esophagus was normal. Biopsies were taken with a cold        forceps for histology.       Mild inflammation characterized by erythema was found in the gastric        body and in the gastric antrum. Biopsies were taken with a cold forceps        for Helicobacter pylori testing.       The examined duodenum was normal.                                                                                   Impression:          - Normal esophagus. Biopsied.                       - Gastritis. Biopsied.                       - Normal examined duodenum.  Recommendation:      - Return patient to hospital cates for ongoing care.                       - Advance diet as tolerated.                       - Await pathology results.                                                        Attending Participation:       I personally performed the entire procedure.                                                                                     Mayito Eduardo  ___________________  Mayito Eduardo MD  3/12/2020 7:58:17 PM  This report has been signed electronically.  Number of Addenda: 0    Note Initiated On: 3/12/2020 6:09 PM    < end of copied text >

## 2020-03-13 NOTE — PROGRESS NOTE ADULT - PROBLEM SELECTOR PLAN 1
- reported 100 lb weight loss in past 3 months - according to chart review, appears to only be about 30-40lbs   - does not appear clinically infected, will monitor off antibiotics for time being  - CXR clear, UA clear  - f/u blood, urine and sputum cultures - negative to date currently
- reported 100 lb weight loss in past 3 months - according to chart review, appears to only be about 30-40lbs   - does not appear clinically infected, will monitor off antibiotics for time being  - CXR clear, UA clear  - f/u blood, urine cultures negative  - sputum culture with pseudomonas, but pt does not appear clinically infected, will continue to monitor off abx for now - pt with hx of multi drug resistance
- reported 100 lb weight loss in past 3 months  - does not appear clinically infected, will monitor off antibiotics for time being  - CXR clear, UA clear  - f/u blood, urine and sputum cultures
- reported 100 lb weight loss in past 3 months - according to chart review, appears to only be about 30-40lbs   - does not appear clinically infected, will monitor off antibiotics for time being  - CXR clear, UA clear  - f/u blood, urine and sputum cultures - negative to date currently

## 2020-03-13 NOTE — DISCHARGE NOTE PROVIDER - NSDCFUSCHEDAPPT_GEN_ALL_CORE_FT
PATRIZIA NAVARRETE ; 03/19/2020 ; NPP OtoLaryng [address] [facility]  PATRIZIA NAVARRETE ; 03/25/2020 ; NPP PulmMed [address] [facility]  PATRIZIA NAVARRETE ; 04/23/2020 ; NPP OtoLaryng [address] [facility]  PATRIZIA NAVARRETE ; 05/08/2020 ; NPP PulmMed [address] [facility]  PATRIZIA NAVARRETE ; 05/28/2020 ; NPP OtoLaryng [address] [facility] PATRIZIA NAVARRETE ; 03/19/2020 ; NPP OtoLaryng [address] [facility]  PATRIZIA NAVARRETE ; 03/25/2020 ; NPP PulmMed [address] [facility]  PATRIZIA NAVARRETE ; 04/23/2020 ; NPP OtoLaryng [address] [facility]  PATRIZIA NAVARRETE ; 05/08/2020 ; NPP PulmMed [address] [facility]  PATRIZIA NAVARRETE ; 05/28/2020 ; NPP OtoLaryng [address] [facility] PATRIZIA NAVARRETE ; 03/19/2020 ; NPP OtoLaryng [address] [facility]  PATRIZIA NAVARRETE ; 03/25/2020 ; NPP PulmMed [address] [facility]  PATRIZIA NAVARRETE ; 04/23/2020 ; NPP OtoLaryng [address] [facility]  PATRIZIA NAVARRETE ; 05/08/2020 ; NPP PulmMed [address] [facility]  PATRIZIA NAVARRETE ; 05/28/2020 ; NPP OtoLaryng [address] [facility]

## 2020-03-13 NOTE — DISCHARGE NOTE PROVIDER - NSDCCPCAREPLAN_GEN_ALL_CORE_FT
PRINCIPAL DISCHARGE DIAGNOSIS  Diagnosis: Failure to thrive in adult  Assessment and Plan of Treatment: Continue with oral intake and supplementation.   EGD/colonoscopy without evidence of malignancy.   Labwork negative.      SECONDARY DISCHARGE DIAGNOSES  Diagnosis: Sputum culture positive for Pseudomonas  Assessment and Plan of Treatment: Monitored off antibiotics as per infectious disease recommendations.  No fevers or leukocytosis.    Diagnosis: Chronic obstructive pulmonary disease, unspecified COPD type  Assessment and Plan of Treatment: Continue with medications.  Please follow up with your pulmonologist within 1-2 weeks of discharge.    Diagnosis: Tracheomalacia  Assessment and Plan of Treatment: Tracheostomy exchanged on 3/12/2020 to cuffed 6LPC.  Trach to vent settings PS18/5/35%.  Continue trach care, suction PRN.    Diagnosis: Hypertension, unspecified type  Assessment and Plan of Treatment: Blood pressure stable - continue medications.

## 2020-03-13 NOTE — PROGRESS NOTE ADULT - ATTENDING COMMENTS
59F PMH COPD and tracheobronchomalacia s/p trach (2004), vent-dependent, s/p stomoplasty (10/2019), and HTN who presets with subjective fevers and reported 100-lb weight loss. Review of hospital records more consistent with about 35 lb weight loss since 10/2019. She remains afebrile, hemodynamically stable, no leukocytosis, and no secretions. Low suspicion for infection.    - Observe off antibiotics. Endotracheal sample with epithelial cells, likely with oropharyngeal contamination. Gram stain with GNR. Likely colonization. Blood and urine cultures are negative. She has no significant secretions despite recent culture revealing MDR Klebsiella and pseudomonas, likely colonizers. Recently completed course of cefepime, PICC removed  - I recommend trach collar trials daily, although patient is refusing. She is aware that the longer she is on the vent, the higher the risk of bacterial colonization of her airways with resistant organisms  - Continue to encourage trach collar trials daily in the upright position given her tracheomalacia  - Needs vent at night for chronic hypercapnia and tracheomalacia, which will worsen in the supine position   - CT chest with minimal areas of bronchiectasis and atelectasis/scarring, likely sequelae of prior infection. No evidence of active infection. No lung nodules or mass. No lymphadenopathy or pleural effusion  - CT A/P without evidence of malignancy  - Plan for EGD/colonoscopy today. She is medically optimized from a pulmonary perspective to proceed with planned procedures. She has trach and connected to vent, is afebrile and HD stable  - Unfortunately, she is very resistant to complying with recommendations and I believe she is doing more harm to herself. She is insisting on antibiotics therapy despite lack of fevers, leukocytosis, or secretions. She insists on eating only with vent attached. I counseled her that she should be on trach collar with cuff deflated when she is eating  - Continue Duoneb + Budesonide for her COPD  - Continue azithromycin 250 mg MWF for severe COPD  - DVT ppx  - Overall prognosis guarded, d/c planning after EGD/colonoscopy
59F PMH COPD and tracheobronchomalacia s/p trach (2004), vent-dependent, s/p stomoplasty (10/2019), and HTN who presets with subjective fevers and reported 100-lb weight loss. Review of hospital records more consistent with about 35 lb weight loss since 10/2019. She remains afebrile, hemodynamically stable, no leukocytosis, and no secretions. Low suspicion for infection.    - Observe off antibiotics. Endotracheal sample with epithelial cells, likely with oropharyngeal contamination. Culture with pseudomonas, which is colonization. Blood and urine cultures are negative. She has no significant secretions despite recent culture revealing MDR Klebsiella and pseudomonas, likely colonizers. Recently completed course of cefepime, PICC removed  - I recommend trach collar trials daily, although patient is refusing. She is aware that the longer she is on the vent, the higher the risk of bacterial colonization of her airways with resistant organisms  - Continue to encourage trach collar trials daily in the upright position given her tracheomalacia  - Needs vent at night for chronic hypercapnia and tracheomalacia, which will worsen in the supine position   - CT chest with minimal areas of bronchiectasis and atelectasis/scarring, likely sequelae of prior infection. No evidence of active infection. No lung nodules or mass. No lymphadenopathy or pleural effusion  - CT A/P without evidence of malignancy  - EGD/Colonoscopy performed 3/12 without evidence of malignancy  - Unfortunately, she is very resistant to complying with recommendations and I believe she is doing more harm to herself. She is insisting on antibiotics therapy despite lack of fevers, leukocytosis, or secretions. She insists on eating only with vent attached. I counseled her that she should be on trach collar with cuff deflated when she is eating  - Continue Duoneb + Budesonide for her COPD  - Continue azithromycin 250 mg MWF for severe COPD  - DVT ppx  - Overall prognosis guarded given non-adherence  - D/c home today
59F PMH COPD and tracheobronchomalacia s/p trach (2004), vent-dependent, s/p stomoplasty (10/2019), and HTN who presets with subjective fevers and reported 100-lb weight loss. Review of hospital records more consistent with about 35 lb weight loss. She remains afebrile, hemodynamically stable, no leukocytosis, and no secretions. Low suspicion for infection.    - Observe off antibiotics. Refusing to allow us to obtain endotracheal sample. Blood and urine cultures are negative. She has no significant secretions despite recent culture revealing MDR Klebsiella and pseudomonas, likely colonizers. Recently completed course of cefepime, PICC removed  - Refusing trach collar trial today. I advised her that the longer she is on the vent, the higher the risk of bacterial colonization of her airways with resistant organisms  - Continue to encourage trach collar trials daily in the upright position given her tracheomalacia  - Needs vent at night for chronic hypercapnia and tracheomalacia, which will worsen in the supine position   - CT chest with minimal areas of bronchiectasis and atelectasis/scarring, likely sequelae of prior infection. No evidence of active infection. No lung nodules or mass. No lymphadenopathy or pleural effusion  - CT A/P without evidence of malignancy  - Plan for EGD/colonoscopy in AM. She is medically optimized from a pulmonary perspective to proceed with planned procedures. She has trach and connected to vent, is afebrile and HD stable  - Unfortunately, she is very resistant to complying with recommendations and I believe she is doing more harm to herself. She is insisting on antibiotics therapy despite lack of fevers, leukocytosis, or secretions. She insists on eating only with vent attached. I counseled her that she should be on trach collar with cuff deflated when she is eating  - Continue Duoneb + Budesonide for her COPD  - Continue azithromycin 250 mg MWF for severe COPD  - DVT ppx  - Overall prognosis guarded, d/c planning after EGD/colonoscopy
59F PMH COPD and tracheobronchomalacia s/p trach (2004), vent-dependent, s/p stomoplasty (10/2019), and HTN who presets with subjective fevers and reported 100-lb weight loss.     She is currently afebrile and hemodynamically stable without increased secretions and with normal WBC count. There is no evidence of infection. Observe off antibiotics. Check baseline sputum culture. Recent culture from 2/25 with MDR Klebsiella and pseudomonas, likely colonizers. Recently completed course of cefepime, PICC removed.     She now is on vent, but was taken off and placed on trach collar without desaturation. Recommend that she be in an upright position when she is on trach collar given her tracheomalacia. Recommend prolonged duration as much as tolerates off vent to decrease pathogenic organisms. Needs vent at night for chronic hypercapnia and tracheomalacia, which will worsen in the supine position.     She is very resistant to complying with our recommendations. Insists on eating only with vent attached. I counseled her that she should be on trach collar with cuff deflated when she is eating.     Continue Duonebs & Pulmicort nebs. Does not require systemic steroids. Continue azithromycin MWF for severe COPD. No significant peripheral eosinophilia.     Needs CT C/A/P r/o occult malignancy given weight loss. GI eval for EGD/colonoscopy.     DVT ppx.     Overall prognosis guarded.

## 2020-03-14 LAB
-  AMIKACIN: SIGNIFICANT CHANGE UP
-  AZTREONAM: SIGNIFICANT CHANGE UP
-  CEFEPIME: SIGNIFICANT CHANGE UP
-  CEFTAZIDIME: SIGNIFICANT CHANGE UP
-  CIPROFLOXACIN: SIGNIFICANT CHANGE UP
-  GENTAMICIN: SIGNIFICANT CHANGE UP
-  IMIPENEM: SIGNIFICANT CHANGE UP
-  LEVOFLOXACIN: SIGNIFICANT CHANGE UP
-  MEROPENEM: SIGNIFICANT CHANGE UP
-  PIPERACILLIN/TAZOBACTAM: SIGNIFICANT CHANGE UP
-  TOBRAMYCIN: SIGNIFICANT CHANGE UP
CULTURE RESULTS: SIGNIFICANT CHANGE UP
METHOD TYPE: SIGNIFICANT CHANGE UP
ORGANISM # SPEC MICROSCOPIC CNT: SIGNIFICANT CHANGE UP
ORGANISM # SPEC MICROSCOPIC CNT: SIGNIFICANT CHANGE UP
SPECIMEN SOURCE: SIGNIFICANT CHANGE UP

## 2020-03-17 NOTE — PHYSICAL EXAM
[FreeTextEntry1] : stretcher-bound, on oxygen and ventilator through trach, no retractions or distress [de-identified] : trach tube closer to stoma, trach ties in place, minimal peristomal granulation [Midline] : trachea located in midline position [Laryngoscopy Performed] : laryngoscopy was performed, see procedure section for findings [Normal] : no rashes [de-identified] : improved voice, mild hyperfunctional

## 2020-03-17 NOTE — HISTORY OF PRESENT ILLNESS
[de-identified] : breathing well since last visit but c/o severe dysphagia and weight loss\par GI workup is pending, no egd yet\par denies BRBPR or melana, hemotpysis\par c/o feeling soemthing stuck in the neck or throat\par brings chest CT from OSH, reviewed images and discussed with patient\par voice has stabliized and has been easy since surgery\par continues on vent and oxygen at all times now\par

## 2020-03-19 ENCOUNTER — APPOINTMENT (OUTPATIENT)
Dept: OTOLARYNGOLOGY | Facility: CLINIC | Age: 60
End: 2020-03-19
Payer: MEDICAID

## 2020-03-19 PROCEDURE — 99214 OFFICE O/P EST MOD 30 MIN: CPT | Mod: 25

## 2020-03-19 PROCEDURE — 31615 TRCHEOBRNCHSC EST TRACHS INC: CPT

## 2020-03-20 ENCOUNTER — APPOINTMENT (OUTPATIENT)
Dept: GASTROENTEROLOGY | Facility: CLINIC | Age: 60
End: 2020-03-20

## 2020-03-22 RX ORDER — CIPROFLOXACIN AND DEXAMETHASONE 3; 1 MG/ML; MG/ML
0.3-0.1 SUSPENSION/ DROPS AURICULAR (OTIC) TWICE DAILY
Qty: 1 | Refills: 2 | Status: ACTIVE | COMMUNITY
Start: 2020-03-22 | End: 1900-01-01

## 2020-03-28 NOTE — HISTORY OF PRESENT ILLNESS
[de-identified] : 59 year old female follow up s/p Micro direct laryngoscopy with excision of laryngeal stenosis, bronchoscopy with excision of tracheal stenosis, tracheostomaplasty with flaps and placement of new 6 LPC Shiley tracheostomy  tube 10/14/19. Reports having pneumonia, admitted for a month from 12/2019 into 01/2020 in [facility], currently has a PICC line. Now eating pureed foods, increased throat dryness. Losing weight, unable to digest food, reports increased constipation. Reports decreased secretions. Reports dyspnea.  States trach was last changed in 10/2019. Pt complains of scar tissue in trach area causing soreness. Pt states the trach is currently broken and needs to be replaced. Went for EGD but they wouldn't do it because of chronic infecton.\par \par

## 2020-03-28 NOTE — REASON FOR VISIT
[Subsequent Evaluation] : a subsequent evaluation for [Formal Caregiver] : formal caregiver [FreeTextEntry2] : chronic resp failure

## 2020-03-28 NOTE — PHYSICAL EXAM
[FreeTextEntry1] : stretcher-bound, on oxygen and ventilator through trach, no retractions or distress [de-identified] : trach tube closer to stoma, trach ties in place, minimal peristomal granulation [Midline] : trachea located in midline position [Laryngoscopy Performed] : laryngoscopy was performed, see procedure section for findings [Normal] : no rashes [de-identified] : improved voice, mild hyperfunctional

## 2020-04-01 ENCOUNTER — APPOINTMENT (OUTPATIENT)
Dept: PULMONOLOGY | Facility: CLINIC | Age: 60
End: 2020-04-01
Payer: MEDICAID

## 2020-04-01 VITALS
DIASTOLIC BLOOD PRESSURE: 70 MMHG | BODY MASS INDEX: 28.68 KG/M2 | HEIGHT: 64 IN | WEIGHT: 168 LBS | SYSTOLIC BLOOD PRESSURE: 104 MMHG | HEART RATE: 62 BPM | OXYGEN SATURATION: 96 % | RESPIRATION RATE: 18 BRPM

## 2020-04-01 PROCEDURE — 99214 OFFICE O/P EST MOD 30 MIN: CPT | Mod: 25

## 2020-04-02 NOTE — ADDENDUM
[FreeTextEntry1] : Documented by Shad Gonzalez acting as a scribe for Dr. Maco Nova on 04/01/2020 \par \par All medical record entries made by the Scribe were at my, Dr. Maco Nova's, direction and personally dictated by me on 04/01/2020 . I have reviewed the chart and agree that the record accurately reflects my personal performance of the history, physical exam, assessment and plan. I have also personally directed, reviewed, and agree with the discharge instructions.

## 2020-04-02 NOTE — PROCEDURE
[FreeTextEntry1] : CXR (3.10.2020) reveals Area of bronchiectasis b/l, Sequelae from prior Infection, and no evidence of new nodules

## 2020-04-02 NOTE — ASSESSMENT
[FreeTextEntry1] : Ms. Bo is a 59 year old female with a history of former smoker, COPD, respiratory failure for 10 years, tracheostomy,  tracheostenosis and tracheomalacia who is s/p bronchoscopy with revision of the tracheostomy with dilation of the tracheostomy. She is s/p ENT visit. She presents with tracheomalacia and end stage COPD with issues with ventilator. She is s/p resistant pseudomonas aeruginosa. [facility] colonoscopy (preop). Chronic colonization of aeruginosa with pseudomonas, Difficulty with mucus clearance \par \par Her chronic respiratory failure is multifactorial due to:\par -underlying COPD/severe persistent asthma\par -respiratory muscle weakness\par -tracheomalacia\par \par problem 1: tracheostomy / chronic respiratory failure \par -Continue to wean off the ventilator as tolerable in short trials throughout the day  \par -recommended to use hypertonic saline in clearing of tracheostomy \par -Rx given for elastic pads, dressings, disposable inner cannulas, and tracheostomy collar to improve collar. Rx given for Metaplex Lite. \par -recommended f/u with Dr. Brett Laughlin and Dr. Edward Rodriguez\par \par Problem 1A: PNA  - no evidence CT 3/2020\par - Radiologically cleared\par - Your chest xray/CT reveals an infiltrate c/w pneumonia; this based on your clinical scenario required additional therapy. Any change in your status will require hospitalization at the nearest hospital and al local ambulance will need to be called. Our group is on staff at [facility] and [facility] as- consultants. The patient/family is instructed to notify our office with any change in condition. \par \par Problem 2: Chronic Respiratory Failure \par -Trach collar- goal up to 12hrs per day (at 2 hours now)\par - 3 L SIMV: VT- 450; PC 20; PS-18; PEEP- 5\par - Night CPAP 5/PS 18 (back up 8 B/mm)\par \par problem 3:  COPD/asthma\par -continue Combivent 1 inhalation up to QID to replace Symbicort \par -continue Performist nebulizer 1 unit dose BID, followed by Pulmicort 0.5 nebulizer BID \par -continue Albuterol via nebulizer for rescue up to QiD \par -continue on NAC Q8H\par -continue Yulerpi 1 unit dose QD via nebulizer\par \par -COPD is a progressive disease and although it can’t be cured , appropriate management can slow its progression, reduce frequency and severity of exacerbations, and improve symptoms and the patient quality of life. Hospitalizations are the greatest contributor to the total COPD costs and account for up to 87% of total COPD related costs. Exacerbations are the main cause of admissions and subsequently account for the 40-75% of COPD costs. Inhaled maintenance therapy reduces the incidence of exacerbations in patients with stable COPD. Incorrect inhaler use and nonadherence are major obstacles to achieving COPD treatment goals. Many COPD patients have challenges (impaired inhalation, limited dexterity, reduced cognition: that limit their ability to correctly use their COPD treatment devices resulting in reduced symptom control. Of most importance is smoking cessation and early intervention with respiratory illnesses and contemplation for pulmonary rehab to enhance quality of life.\par \par Problem 3A: Abnormal CT: confirm Bronchiectasis / pulmonary nodule\par -s/p CT of the chest -(no present) - Next 3/2020\par -Complete high resolution chest CT, prone and supine, to r/o pulmonary nodules - next  5/2020\par -Recommended to take Slippery Elm Tea and pill for cough and mucus clearing \par -based upon results of CT scan, will apply for chest vest therapy\par \par -CAT scans are the only radiological modality to identify abnormalities w/in the lungs with regards to nodules/masses/lymph nodes. Risks, benefits were reviewed in detail. The guidelines for abnormalities include follow up CT scans at various intervals which could range from 6 weeks to 1 year intervals. If there is a change for the worse then consideration for a biopsy will be considered if you are a candidate. Second opinion evaluation with a thoracic surgeon or an interventional radiologist could be offered. \par - Seen on the CT of the chest or chest x-ray signifies damaged bronchial tubes focal or diffuse which can be sites of recurrent infections. These areas can be colonized by various organisms including bacteria (hemophilous influenzae/Pseudomonas species etc.) as well as acid fast bacilli (mycobacterial disease- inclusive of TB/MT etc.).  The patient has previously used acapella, nebulizer and breathing techniques for airway clearance.  These methods have not provided appropriate secretion manage in this patient.Cough length has been greater than 6 months, hence, initiating vest therapy is necessary.\par \par \par problem 3b: pseudomonas aeruginosa.- chronic colonization\par -Apply for vest pack failed Conventional therapy. \par -get infectious disease f/u PRN \par -chronic colonization\par \par problem 4: GERD -(s/p EgD +/- colonoscopy- negative ) \par - add Pepcid Complete 40 mg QHS\par -continue Protonix 40 mg qAM\par -continue Baclofen 5 mg premeal, QHS \par -Things to avoid including overeating, spicy foods, tight clothing, eating within three hours of bed, this list is not all inclusive. \par -For treatment of reflux, possible options discussed including diet control, H2 blockers, PPIs, as well as coating motility agents discussed as treatment options. Timing of meals and proximity of last meal to sleep were discussed. If symptoms persist, a formal gastrointestinal evaluation is needed.\par -Rule of 2's: Avoid eating too late, too fast, too much, too spicy or within two hours of bedtime\par \par Problem 5: Immunodeficiency\par - Continue Zithromax 250 mg Monday, Wednesday, and Friday\par -Due to the fact that this pt has had more infections than would be expected and immunological blood work is indicated this would include: IgG subclasses, quantitative immunoglobulins, Strep pneumoniae titers as well as Vitamin D levels. Based on this blood work we will be able to decide where the pt needs additional pneumococcal vaccine either Prevnar 13 or Pneumovax. Immunology evaluation will also be potentially indicated. \par \par Problem 6: Sensory neuropathic Cough\par - Continue Amitriptyline 10 mg up to TID\par -Sensory neuropathic cough is an etiology of cough that is often realized once someone has been ruled out for common disease such as: asthma, COPD, eosinophilic bronchitis, bronchiectasis, post nasal drip, and GERD. It sometimes develops following a URI, herpes zoster outbreak in pharynx or thyroid or cervical spine injury. However, many patients have no identifiable antecedent explanation. \par \par problem 7: tracheomalacia/tracheal tumor \par -follow up with Dr. Kelby Larios for thoracic evaluation for potential tracheoplasty - s/p new tracheostomy\par \par Tracheomalacia is usually acquired in adults and common causes include damage by tracheostomy or endotracheal intubation damaging the tracheal cartilage with increase risk with multiple intubations, prolonged intubation, and concurrent high dose steroid therapy; external chest wall trauma and surgery; chronic compression of the trachea by benign etiologies (eg, benign mediastinal goiter) or malignancy; relapsing polychondritis; or recurrent infection. Tracheomalacia can be asymptomatic, however signs or symptoms can develop as the severity of the airway narrowing progresses with major symptoms include dyspnea, cough, and sputum retention. Other symptoms include severe paroxysms of coughing, wheezing or stridor, barking cough and may be exacerbated by forced expiration, cough, and Valsalva maneuver. Tracheomalacia is diagnosed by a bronchoscopic visualization of dynamic airway collapse on dynamic chest CT. Therapy is warranted in symptomatic patients with severe tracheomalacia and includes surgical repair as tracheobronchoplasty. The patient was referred to Dr. Willy Kay or Dr. Kelby Larios, at [facility] for a surgical consult. \par \par problem 8: dysphonia/sore throat\par -recommended Fisherman Friend's lozenges \par -recommended to use Slippery Elm Lozenge / Tea \par -add Evoxac 30 q 8\par \par problem 10: health maintenance \par - recommended to f/u with Dr. Palomares(GI) \par -instructed to increase physical activity as much as possible\par - She was administered an influenza vaccination 11/19\par -recommended strep pneumonia vaccines: Prevnar-13 vaccine, followed by Pneumo vaccine 23 one year following (completed)\par -recommended early intervention for URIs\par -recommended regular osteoporosis evaluations\par -recommended early dermatological evaluations\par -recommended after the age of 50 to the age of 70, colonoscopy every 5 years \par \par \par Follow up in 2 months.\par Patient is encouraged to call with any changes, concerns or questions.

## 2020-04-02 NOTE — HISTORY OF PRESENT ILLNESS
[FreeTextEntry1] : Ms. Bo is a 59 year old female with a history of atypical chest pain, COPD, chronic hypoxemia, dysphagia, GERD, laryngeal stenosis, OW, dependence on tracheostomy, and tracheomalacia presenting to the office today for a follow up visit. Her chief complain is \par -lost about a pound. \par -She is not sleeping and has a swelling of the right leg.\par -She states that no secretion is coming up. \par -She feels like there is a lot of mucus there but nothing is coming up. \par -She is using saline to help her bring up the mucus. \par -she uses the saline before eating too. \par -She feels hot and cold but no direct fevers or chills. \par -SOB is present but no chest pain or chest pressure\par -The food does not go all the way down ; dysphagia when eating solids due to narrowing of the tube. \par -Dr. Rodriguez was unable to do change the Trach. \par -During the colonoscopy and endoscopy they traumatized her breathing and never told her. \par -Her neck has become swollen and had trouble swallowing. \par -Saw Dr. Stevenson (infectious disease specialist) for sputum abnormality. \par -was admitted at [facility] for a week, treated at the hospital. \par -she did not have any pneumonia during her visit. She had the procedures completed and the doctors did not feel the need for ABx. \par -She has been more SOB than usual. \par \par -She denies any headaches, nausea, vomiting, fever, chills, sweats, chest pain, chest pressure, diarrhea, constipation, dizziness, sour taste in the mouth, leg swelling, leg pain, itchy eyes, itchy ears, heartburn, reflux, myalgias or arthralgias. \par \par

## 2020-04-06 RX ORDER — POLYETHYLENE GLYCOL 3350 17 G/17G
17 POWDER, FOR SOLUTION ORAL DAILY
Qty: 30 | Refills: 3 | Status: ACTIVE | COMMUNITY
Start: 2020-04-06 | End: 1900-01-01

## 2020-04-06 RX ORDER — MAG HYDROX/ALUMINUM HYD/SIMETH 400-400-40
400-400-40 SUSPENSION, ORAL (FINAL DOSE FORM) ORAL
Qty: 2 | Refills: 3 | Status: ACTIVE | COMMUNITY
Start: 2020-04-06 | End: 1900-01-01

## 2020-04-10 ENCOUNTER — APPOINTMENT (OUTPATIENT)
Dept: OTOLARYNGOLOGY | Facility: CLINIC | Age: 60
End: 2020-04-10
Payer: MEDICAID

## 2020-04-10 PROCEDURE — 99214 OFFICE O/P EST MOD 30 MIN: CPT | Mod: 95

## 2020-04-13 NOTE — HISTORY OF PRESENT ILLNESS
[Home] : at home, [unfilled] , at the time of the visit. [Other Location: e.g. Home (Enter Location, City,State)___] : at [unfilled] [Patient] : the patient [de-identified] : "severe pain" all the time, c/o difficulty swallowing and breathing, though no desats\par minimal mucus through trach or mouth, no bleeding\par complains of pain around stoma\par complains that "the meat" is coming back\par also says that the trach has cracked and that the IC won't stay in\par video attempts failed many times before her nurse was able to get it working

## 2020-04-13 NOTE — PHYSICAL EXAM
[FreeTextEntry1] : stretcher-bound, on oxygen and ventilator through trach, no retractions or distress [de-identified] : trach tube closer to stoma, trach ties in place, minimal peristomal granulation [Midline] : trachea located in midline position [Laryngoscopy Performed] : laryngoscopy was performed, see procedure section for findings [Normal] : no rashes [de-identified] : improved voice, mild hyperfunctional

## 2020-04-13 NOTE — HISTORY OF PRESENT ILLNESS
[de-identified] : 60 year old female follow up s/p Micro direct laryngoscopy with excision of laryngeal stenosis, bronchoscopy with excision of tracheal stenosis, tracheostomaplasty with flaps and placement of new 6 LPC Shiley tracheostomy tube 10/14/19. Recently hospitalized last week, states she is not doing well. Trach changed after colonoscopy. Reports minimal to no secretions, consistently using saline. Reports swelling and dysphonia, occasionally losing voice. Reports loss of appetite, only tolerating pureed foods.

## 2020-04-13 NOTE — PHYSICAL EXAM
[FreeTextEntry1] : bed-bound [de-identified] : as seen on video: the connector to the IC on the trach has cracked and the IC can be placed but not locked, no obvious erythema or mass around stoma [Normal] : assessment of respiratory effort is normal [FreeTextEntry2] : NO RESP DISTRESS, VOICE CLEAR

## 2020-04-16 ENCOUNTER — APPOINTMENT (OUTPATIENT)
Dept: OTOLARYNGOLOGY | Facility: CLINIC | Age: 60
End: 2020-04-16
Payer: MEDICAID

## 2020-04-16 VITALS
HEART RATE: 76 BPM | BODY MASS INDEX: 27.31 KG/M2 | DIASTOLIC BLOOD PRESSURE: 81 MMHG | WEIGHT: 160 LBS | HEIGHT: 64 IN | SYSTOLIC BLOOD PRESSURE: 118 MMHG

## 2020-04-16 VITALS — TEMPERATURE: 98.9 F

## 2020-04-16 PROCEDURE — 99214 OFFICE O/P EST MOD 30 MIN: CPT | Mod: 25

## 2020-04-16 PROCEDURE — 31615 TRCHEOBRNCHSC EST TRACHS INC: CPT

## 2020-04-16 RX ORDER — HYDROCORTISONE 10 MG/G
1 OINTMENT TOPICAL TWICE DAILY
Qty: 1 | Refills: 0 | Status: COMPLETED | COMMUNITY
Start: 2019-04-10 | End: 2020-04-16

## 2020-04-16 RX ORDER — POLYETHYLENE GLYCOL 3350, SODIUM CHLORIDE, SODIUM BICARBONATE AND POTASSIUM CHLORIDE WITH LEMON FLAVOR 420; 11.2; 5.72; 1.48 G/4L; G/4L; G/4L; G/4L
420 POWDER, FOR SOLUTION ORAL
Qty: 1 | Refills: 0 | Status: COMPLETED | COMMUNITY
Start: 2019-11-11 | End: 2020-04-16

## 2020-04-16 RX ORDER — POLYETHYLENE GLYCOL 3350, SODIUM CHLORIDE, SODIUM BICARBONATE AND POTASSIUM CHLORIDE WITH LEMON FLAVOR 420; 11.2; 5.72; 1.48 G/4L; G/4L; G/4L; G/4L
420 POWDER, FOR SOLUTION ORAL
Qty: 1 | Refills: 0 | Status: COMPLETED | COMMUNITY
Start: 2020-03-11 | End: 2020-04-16

## 2020-04-16 RX ORDER — POLYETHYLENE GLYCOL 3350, SODIUM CHLORIDE, SODIUM BICARBONATE AND POTASSIUM CHLORIDE WITH LEMON FLAVOR 420; 11.2; 5.72; 1.48 G/4L; G/4L; G/4L; G/4L
420 POWDER, FOR SOLUTION ORAL
Qty: 1 | Refills: 0 | Status: COMPLETED | COMMUNITY
Start: 2019-12-27 | End: 2020-04-16

## 2020-04-16 RX ORDER — ENALAPRIL MALEATE 2.5 MG/1
2.5 TABLET ORAL
Refills: 0 | Status: ACTIVE | COMMUNITY

## 2020-04-16 NOTE — CONSULT LETTER
[Dear  ___] : Dear  [unfilled], [Courtesy Letter:] : I had the pleasure of seeing your patient, [unfilled], in my office today. [Please see my note below.] : Please see my note below. [Sincerely,] : Sincerely, [FreeTextEntry2] : Brandon Lee [FreeTextEntry3] : Gaurang Rodriguez MD, PhD\par Chief, Division of Laryngology\par Department of Otolaryngology\par [facility]\par Pediatric Otolaryngology, [facility]\par  of Otolaryngology\par [employer] [employer]\par \par

## 2020-04-16 NOTE — HISTORY OF PRESENT ILLNESS
[de-identified] : 60 year female follow up for chronic respiraotry failure and trach malfunction. The connection with inner cannula broke and needs to be changed. Cannot stay on the vent.\par C/o pain and dysphagia and difficulty breathing similar to the last few months\par no hemoptysis, no bleeding\par no purulence\par concerned about covid\par  \par \par

## 2020-04-16 NOTE — PHYSICAL EXAM
[FreeTextEntry1] : bed-bound [de-identified] : trach in place, the connector to the IC on the trach has cracked and the IC can be placed but not locked, no obvious erythema or mass around stoma [Midline] : trachea located in midline position [de-identified] : poor dentition [Normal] : cranial nerves 2-12 intact [FreeTextEntry2] : NO RESP DISTRESS, VOICE CLEAR [de-identified] : voice stable

## 2020-04-22 RX ORDER — MEGESTROL ACETATE 40 MG/1
40 TABLET ORAL 3 TIMES DAILY
Qty: 90 | Refills: 2 | Status: ACTIVE | COMMUNITY
Start: 2020-04-22 | End: 1900-01-01

## 2020-04-23 ENCOUNTER — APPOINTMENT (OUTPATIENT)
Dept: OTOLARYNGOLOGY | Facility: CLINIC | Age: 60
End: 2020-04-23

## 2020-04-23 ENCOUNTER — APPOINTMENT (OUTPATIENT)
Dept: PULMONOLOGY | Facility: CLINIC | Age: 60
End: 2020-04-23
Payer: MEDICAID

## 2020-04-23 PROCEDURE — 99442: CPT

## 2020-04-23 NOTE — ASSESSMENT
[FreeTextEntry1] : Ms. Bo is a 59 year old female with a history of former smoker, COPD, respiratory failure for 10 years, tracheostomy,  tracheostenosis and tracheomalacia who is s/p bronchoscopy with revision of the tracheostomy with dilation of the tracheostomy. She is s/p ENT visit. She presents with tracheomalacia and end stage COPD with issues with ventilator. She is s/p resistant pseudomonas aeruginosa. PNA ([facility])- s/p colonoscopy (preop). Chronic colonization of aeruginosa with pseudomonas, Difficulty with mucus clearance (still has not received vest). \par \par Her chronic respiratory failure is multifactorial due to:\par -underlying COPD/severe persistent asthma\par -respiratory muscle weakness\par -tracheomalacia\par \par problem 1: tracheostomy / chronic respiratory failure \par -Continue to wean off the ventilator as tolerable in short trials throughout the day  \par -recommended to use hypertonic saline in clearing of tracheostomy \par -Rx given for elastic pads, dressings, disposable inner cannulas, and tracheostomy collar to improve collar. Rx given for Metaplex Lite. \par -recommended f/u with Dr. Brett Laughlin and Dr. Edward Rodriguez\par \par Problem 1A: PNA  - no evidence CT 3/2020\par - Radiologically cleared\par - Your chest xray/CT reveals an infiltrate c/w pneumonia; this based on your clinical scenario required additional therapy. Any change in your status will require hospitalization at the nearest hospital and al local ambulance will need to be called. Our group is on staff at [facility] and [facility] as- consultants. The patient/family is instructed to notify our office with any change in condition. \par \par Problem 2: Chronic Respiratory Failure \par -Trach collar- goal up to 12hrs per day (at 2 hours now)\par - 3 L SIMV: VT- 450; PC 20; PS-18; PEEP- 5\par - Night CPAP 5/PS 18 (back up 8 B/mm)\par \par problem 3:  COPD/asthma\par -continue Combivent 1 inhalation up to QID to replace Symbicort \par -continue Performist nebulizer 1 unit dose BID, followed by Pulmicort 0.5 nebulizer BID \par -continue Albuterol via nebulizer for rescue up to QiD \par -continue on NAC Q8H\par -continue Yuperli 1 unit dose QD via nebulizer\par \par -COPD is a progressive disease and although it can’t be cured , appropriate management can slow its progression, reduce frequency and severity of exacerbations, and improve symptoms and the patient quality of life. Hospitalizations are the greatest contributor to the total COPD costs and account for up to 87% of total COPD related costs. Exacerbations are the main cause of admissions and subsequently account for the 40-75% of COPD costs. Inhaled maintenance therapy reduces the incidence of exacerbations in patients with stable COPD. Incorrect inhaler use and nonadherence are major obstacles to achieving COPD treatment goals. Many COPD patients have challenges (impaired inhalation, limited dexterity, reduced cognition: that limit their ability to correctly use their COPD treatment devices resulting in reduced symptom control. Of most importance is smoking cessation and early intervention with respiratory illnesses and contemplation for pulmonary rehab to enhance quality of life.\par \par Problem 3A: Abnormal CT: confirm Bronchiectasis / pulmonary nodule\par -s/p CT of the chest -(no present) - Next 3/2020\par -Complete high resolution chest CT, prone and supine, to r/o pulmonary nodules - next  5/2020\par -Recommended to take Slippery Elm Tea and pill for cough and mucus clearing \par -based upon results of CT scan, will apply for chest vest therapy\par \par -CAT scans are the only radiological modality to identify abnormalities w/in the lungs with regards to nodules/masses/lymph nodes. Risks, benefits were reviewed in detail. The guidelines for abnormalities include follow up CT scans at various intervals which could range from 6 weeks to 1 year intervals. If there is a change for the worse then consideration for a biopsy will be considered if you are a candidate. Second opinion evaluation with a thoracic surgeon or an interventional radiologist could be offered. \par - Seen on the CT of the chest or chest x-ray signifies damaged bronchial tubes focal or diffuse which can be sites of recurrent infections. These areas can be colonized by various organisms including bacteria (hemophilous influenzae/Pseudomonas species etc.) as well as acid fast bacilli (mycobacterial disease- inclusive of TB/MT etc.).  The patient has previously used acapella, nebulizer and breathing techniques for airway clearance.  These methods have not provided appropriate secretion manage in this patient.Cough length has been greater than 6 months, hence, initiating vest therapy is necessary.\par \par \par problem 3b: pseudomonas aeruginosa.- chronic colonization\par -Apply for vest pack failed Conventional therapy. \par -get infectious disease f/u PRN \par -chronic colonization\par \par problem 4: GERD -(s/p EgD +/- colonoscopy- negative ) \par - add Pepcid Complete 40 mg QHS\par -continue Protonix 40 mg qAM\par -continue Baclofen 5 mg premeal, QHS \par -Things to avoid including overeating, spicy foods, tight clothing, eating within three hours of bed, this list is not all inclusive. \par -For treatment of reflux, possible options discussed including diet control, H2 blockers, PPIs, as well as coating motility agents discussed as treatment options. Timing of meals and proximity of last meal to sleep were discussed. If symptoms persist, a formal gastrointestinal evaluation is needed.\par -Rule of 2's: Avoid eating too late, too fast, too much, too spicy or within two hours of bedtime\par \par Problem 5: Immunodeficiency\par - Continue Zithromax 250 mg Monday, Wednesday, and Friday\par -Due to the fact that this pt has had more infections than would be expected and immunological blood work is indicated this would include: IgG subclasses, quantitative immunoglobulins, Strep pneumoniae titers as well as Vitamin D levels. Based on this blood work we will be able to decide where the pt needs additional pneumococcal vaccine either Prevnar 13 or Pneumovax. Immunology evaluation will also be potentially indicated. \par \par Problem 6: Sensory neuropathic Cough\par - Continue Amitriptyline 10 mg up to TID\par -Sensory neuropathic cough is an etiology of cough that is often realized once someone has been ruled out for common disease such as: asthma, COPD, eosinophilic bronchitis, bronchiectasis, post nasal drip, and GERD. It sometimes develops following a URI, herpes zoster outbreak in pharynx or thyroid or cervical spine injury. However, many patients have no identifiable antecedent explanation. \par \par problem 7: tracheomalacia/tracheal tumor \par -follow up with Dr. Kelby Larios for thoracic evaluation for potential tracheoplasty - s/p new tracheostomy\par \par Tracheomalacia is usually acquired in adults and common causes include damage by tracheostomy or endotracheal intubation damaging the tracheal cartilage with increase risk with multiple intubations, prolonged intubation, and concurrent high dose steroid therapy; external chest wall trauma and surgery; chronic compression of the trachea by benign etiologies (eg, benign mediastinal goiter) or malignancy; relapsing polychondritis; or recurrent infection. Tracheomalacia can be asymptomatic, however signs or symptoms can develop as the severity of the airway narrowing progresses with major symptoms include dyspnea, cough, and sputum retention. Other symptoms include severe paroxysms of coughing, wheezing or stridor, barking cough and may be exacerbated by forced expiration, cough, and Valsalva maneuver. Tracheomalacia is diagnosed by a bronchoscopic visualization of dynamic airway collapse on dynamic chest CT. Therapy is warranted in symptomatic patients with severe tracheomalacia and includes surgical repair as tracheobronchoplasty. The patient was referred to Dr. Willy Kay or Dr. Kelby Larios, at [facility] for a surgical consult. \par \par problem 8: dysphonia/sore throat\par -recommended Fisherman Friend's lozenges \par -recommended to use Slippery Elm Lozenge / Tea \par -add Evoxac 30 q 8\par \par Problem 9: Health Maintenance/COVID19 Precautions \par - Clean your hands often. Wash your hands often with soap and water for at least 20 seconds, especially after blowing your nose, coughing, or sneezing, or having been in a public place.\par - If soap and water are not available, use a hand  that contains at least 60% alcohol.\par - To the extent possible, avoid touching high-touch surfaces in public places - elevator buttons, door handles, handrails, handshaking with people, etc. Use a tissue or your sleeve to cover your hand or finger if you must touch something.\par - Wash your hands after touching surfaces in public places.\par - Avoid touching your face, nose, eyes, etc.\par - Clean and disinfect your home to remove germs: practice routine cleaning of frequently touched surfaces (for example: tables, doorknobs, light switches, handles, desks, toilets, faucets, sinks & cell phones)\par - Avoid crowds, especially in poorly ventilated spaces. Your risk of exposure to respiratory viruses like COVID-19 may increase in crowded, closed-in settings with little air circulation if there are people in the crowd who are sick. All patients are recommended to practice social distancing and stay at least 6 feet away from others. \par - Avoid all non-essential travel including plane trips, and especially avoid embarking on cruise ships.\par -If COVID-19 is spreading in your community, take extra measures to put distance between yourself and other people to further reduce your risk of being exposed to this new virus.\par -Stay home as much as possible.\par - Consider ways of getting food brought to your house through family, social, or commercial networks\par -Be aware that the virus has been known to live in the air up to 3 hours post exposure, cardboard up to 24 hours post exposure, copper up to 4 hours post exposure, steel and plastic up to 2-3 days post exposure. Risk of transmission from these surfaces are affected by many variables.\par COVID-19 precautionary Immune Support Recommendations:\par -OTC Vitamin C 500mg BID \par -OTC Quercetin 250-500mg BID \par -OTC Zinc 75-100mg per day \par -OTC Melatonin 1 or 2mg a night \par -OTC Vitamin D 1-4000mg per day \par -OTC Tonic Water 8oz per day\par -Water 0.5-1 gallon per day\par Asthma and COVID19:\par You need to make sure your asthma is under control. This often requires the use of inhaled corticosteroids (and sometimes oral corticosteroids). Inhaled corticosteroids do not likely reduce your immune system’s ability to fight infections, but oral corticosteroids may. It is important to use the steps above to protect yourself to limit your exposure to any respiratory virus. \par \par problem 10: health maintenance \par - recommended to f/u with Dr. Palomares(GI) \par -instructed to increase physical activity as much as possible\par - She was administered an influenza vaccination 11/19\par -recommended strep pneumonia vaccines: Prevnar-13 vaccine, followed by Pneumo vaccine 23 one year following (completed)\par -recommended early intervention for URIs\par -recommended regular osteoporosis evaluations\par -recommended early dermatological evaluations\par -recommended after the age of 50 to the age of 70, colonoscopy every 5 years \par \par \par Follow up in 2 months.\par Patient is encouraged to call with any changes, concerns or questions.

## 2020-04-23 NOTE — ADDENDUM
[FreeTextEntry1] : Documented by Marva Rivera acting as a scribe for Dr. Maco Nova on 04/23/2020.\par \par All medical record entries made by the Scribe were at my, Dr. Maco Nova's, direction and personally dictated by me on 04/23/2020. I have reviewed the chart and agree that the record accurately reflects my personal performance of the history, physical exam, assessment and plan. I have also personally directed, reviewed, and agree with the discharge instructions.\par

## 2020-04-23 NOTE — HISTORY OF PRESENT ILLNESS
[Medical Office: ([facility])___] : at the medical office located in  [Home] : at home, [unfilled] , at the time of the visit. [Self] : self [Patient] : the patient [FreeTextEntry3] : Lizzie Bo [FreeTextEntry1] : Ms. Bo is a 60 year old female with a history of atypical chest pain, COPD, chronic hypoxemia, dysphagia, GERD, laryngeal stenosis, OW, dependence on tracheostomy, and tracheomalacia who was spoken to today via telephone calll for a follow up. Her chief complain is \par - She is losing weight\par - She has a poor appetite\par - Awaiting vest device \par - Cant bring up mucus\par - She is having a lot of trouble breathing and seems very distressed \par -she denies any headaches, nausea, vomiting, fever, chills, sweats, chest pain, chest pressure, diarrhea, constipation, dysphagia, dizziness, sour taste in the mouth, leg swelling, leg pain, itchy eyes, itchy ears, heartburn, reflux, myalgias or arthralgias.\par

## 2020-04-23 NOTE — REASON FOR VISIT
[Follow-Up] : a follow-up visit [FreeTextEntry1] : telephone call - atypical chest pain, COPD, chronic hypoxemia, dysphagia, GERD, laryngeal stnosis, OW, dependence on tracheostomy, and tracheomalacia

## 2020-04-23 NOTE — END OF VISIT
[Time Spent: ___ minutes] : I have spent [unfilled] minutes of face to face time with the patient [FreeTextEntry3] : I have spent 15 minutes of time on the phone with the patient with their consent.\par

## 2020-05-06 RX ORDER — NUTRITIONAL SUPPLEMENT 0.06 G-1.5
LIQUID (ML) ORAL 3 TIMES DAILY
Qty: 711 | Refills: 3 | Status: ACTIVE | COMMUNITY
Start: 2020-05-06 | End: 1900-01-01

## 2020-05-08 ENCOUNTER — APPOINTMENT (OUTPATIENT)
Dept: PULMONOLOGY | Facility: CLINIC | Age: 60
End: 2020-05-08
Payer: MEDICAID

## 2020-05-08 PROCEDURE — 99214 OFFICE O/P EST MOD 30 MIN: CPT | Mod: 95

## 2020-05-08 NOTE — PHYSICAL EXAM
[No Acute Distress] : no acute distress [Well Nourished] : well nourished [Normal Appearance] : normal appearance [Well Groomed] : well groomed [No Deformities] : no deformities [Well Developed] : well developed [TextBox_2] : seen on respirator

## 2020-05-08 NOTE — HISTORY OF PRESENT ILLNESS
[Medical Office: ([facility])___] : at the medical office located in  [Home] : at home, [unfilled] , at the time of the visit. [Patient] : the patient [Self] : self [FreeTextEntry2] : PATRIZIA NAVARRETE  [FreeTextEntry1] : Ms. Bo is a 60 year old female with a history of atypical chest pain, COPD, chronic hypoxemia, dysphagia, GERD, laryngeal stenosis, OW, dependence on tracheostomy, and tracheomalacia who was spoken to today via video call for a follow up. Her chief complain is unable to eat and sputum is stuck. Pt states:\par -yesterday her lungs closed up on her, she was Dx with pneumonia \par    - x-ray done at home with 's on Call\par -a lot of chest pressure\par -admits dysphagia\par -admits dizziness \par -unable to eat \par -sputum is not coming up\par    - says her vest is not delivered yet \par -RLE ankle swelling\par -throat is very dry\par -energy level is \par -today she denies any headaches, nausea, vomiting, fever, chills, sweats, chest pain, diarrhea, constipation, leg pain, itchy eyes, itchy ears, heartburn, reflux, or sour taste in the mouth.

## 2020-05-08 NOTE — ASSESSMENT
[FreeTextEntry1] : Ms. Bo is a 60 year old female, presenting via video today, with a history of former smoker, COPD, respiratory failure for 10 years, tracheostomy,  tracheostenosis and tracheomalacia who is s/p bronchoscopy with revision of the tracheostomy with dilation of the tracheostomy. She is s/p ENT visit. She presents with tracheomalacia and end stage COPD with issues with ventilator. She is s/p resistant pseudomonas aeruginosa. PNA ([facility])- s/p colonoscopy (preop). Chronic colonization of aeruginosa with pseudomonas, Difficulty with mucus clearance (still has not received vest) and eating problem. \par \par Her chronic respiratory failure is multifactorial due to:\par -underlying COPD/severe persistent asthma\par -respiratory muscle weakness\par -tracheomalacia\par \par problem 1: tracheostomy / chronic respiratory failure \par -Continue to wean off the ventilator as tolerable in short trials throughout the day  \par -recommended to use hypertonic saline in clearing of tracheostomy \par -Rx given for elastic pads, dressings, disposable inner cannulas, and tracheostomy collar to improve collar. Rx given for Metaplex Lite. \par -recommended f/u with Dr. Brett Laughlin and Dr. Edward Rodriguez\par \par Problem 1A: PNA  - no evidence CT 3/2020\par - Radiologically cleared\par - Your chest xray/CT reveals an infiltrate c/w pneumonia; this based on your clinical scenario required additional therapy. Any change in your status will require hospitalization at the nearest hospital and al local ambulance will need to be called. Our group is on staff at [facility] and [facility] as- consultants. The patient/family is instructed to notify our office with any change in condition. \par \par Problem 2: Chronic Respiratory Failure \par -Trach collar- goal up to 12hrs per day (at 2 hours now)\par - 3 L SIMV: VT- 450; PC 20; PS-18; PEEP- 5\par - Night CPAP 5/PS 18 (back up 8 B/mm)\par \par problem 3:  COPD/asthma\par -continue Combivent 1 inhalation up to QID to replace Symbicort \par -continue Performist nebulizer 1 unit dose BID, followed by Pulmicort 0.5 nebulizer BID \par -continue Albuterol via nebulizer for rescue up to QiD \par -continue on NAC Q8H\par -continue Yuperli 1 unit dose QD via nebulizer\par \par -COPD is a progressive disease and although it can’t be cured , appropriate management can slow its progression, reduce frequency and severity of exacerbations, and improve symptoms and the patient quality of life. Hospitalizations are the greatest contributor to the total COPD costs and account for up to 87% of total COPD related costs. Exacerbations are the main cause of admissions and subsequently account for the 40-75% of COPD costs. Inhaled maintenance therapy reduces the incidence of exacerbations in patients with stable COPD. Incorrect inhaler use and nonadherence are major obstacles to achieving COPD treatment goals. Many COPD patients have challenges (impaired inhalation, limited dexterity, reduced cognition: that limit their ability to correctly use their COPD treatment devices resulting in reduced symptom control. Of most importance is smoking cessation and early intervention with respiratory illnesses and contemplation for pulmonary rehab to enhance quality of life.\par \par Problem 3A: Abnormal CT: confirm Bronchiectasis / pulmonary nodule\par -s/p CT of the chest -(no present) - Next 11/2020\par -Complete high resolution chest CT, prone and supine, to r/o pulmonary nodules - next  11/2020\par -Recommended to take Slippery Elm Tea and pill for cough and mucus clearing \par -based upon results of CT scan, will apply for chest vest therapy\par \par -CAT scans are the only radiological modality to identify abnormalities w/in the lungs with regards to nodules/masses/lymph nodes. Risks, benefits were reviewed in detail. The guidelines for abnormalities include follow up CT scans at various intervals which could range from 6 weeks to 1 year intervals. If there is a change for the worse then consideration for a biopsy will be considered if you are a candidate. Second opinion evaluation with a thoracic surgeon or an interventional radiologist could be offered. \par - Seen on the CT of the chest or chest x-ray signifies damaged bronchial tubes focal or diffuse which can be sites of recurrent infections. These areas can be colonized by various organisms including bacteria (hemophilous influenzae/Pseudomonas species etc.) as well as acid fast bacilli (mycobacterial disease- inclusive of TB/MT etc.).  The patient has previously used acapella, nebulizer and breathing techniques for airway clearance.  These methods have not provided appropriate secretion manage in this patient.Cough length has been greater than 6 months, hence, initiating vest therapy is necessary.\par \par \par problem 3b: pseudomonas aeruginosa.- chronic colonization\par -Apply for vest pack (failed Conventional therapy.) - pending delivery  \par -get infectious disease f/u PRN \par -chronic colonization\par \par problem 4: GERD -(s/p EgD +/- colonoscopy- negative ) \par - Recommend GI f/u\par - continue Pepcid Complete 40 mg QHS\par -change Protonix 40 mg qAM ;  Neglan 5 mg qHS\par -continue Baclofen 5 mg premeal, QHS \par -Things to avoid including overeating, spicy foods, tight clothing, eating within three hours of bed, this list is not all inclusive. \par -For treatment of reflux, possible options discussed including diet control, H2 blockers, PPIs, as well as coating motility agents discussed as treatment options. Timing of meals and proximity of last meal to sleep were discussed. If symptoms persist, a formal gastrointestinal evaluation is needed.\par -Rule of 2's: Avoid eating too late, too fast, too much, too spicy or within two hours of bedtime\par \par Problem 5: Immunodeficiency\par - Continue Zithromax 250 mg Monday, Wednesday, and Friday\par -Due to the fact that this pt has had more infections than would be expected and immunological blood work is indicated this would include: IgG subclasses, quantitative immunoglobulins, Strep pneumoniae titers as well as Vitamin D levels. Based on this blood work we will be able to decide where the pt needs additional pneumococcal vaccine either Prevnar 13 or Pneumovax. Immunology evaluation will also be potentially indicated. \par \par Problem 6: Sensory neuropathic Cough\par - Continue Amitriptyline 10 mg up to TID\par -Sensory neuropathic cough is an etiology of cough that is often realized once someone has been ruled out for common disease such as: asthma, COPD, eosinophilic bronchitis, bronchiectasis, post nasal drip, and GERD. It sometimes develops following a URI, herpes zoster outbreak in pharynx or thyroid or cervical spine injury. However, many patients have no identifiable antecedent explanation. \par \par problem 7: tracheomalacia/tracheal tumor \par -follow up with Dr. Kelby Larios for thoracic evaluation for potential tracheoplasty - s/p new tracheostomy\par \par Tracheomalacia is usually acquired in adults and common causes include damage by tracheostomy or endotracheal intubation damaging the tracheal cartilage with increase risk with multiple intubations, prolonged intubation, and concurrent high dose steroid therapy; external chest wall trauma and surgery; chronic compression of the trachea by benign etiologies (eg, benign mediastinal goiter) or malignancy; relapsing polychondritis; or recurrent infection. Tracheomalacia can be asymptomatic, however signs or symptoms can develop as the severity of the airway narrowing progresses with major symptoms include dyspnea, cough, and sputum retention. Other symptoms include severe paroxysms of coughing, wheezing or stridor, barking cough and may be exacerbated by forced expiration, cough, and Valsalva maneuver. Tracheomalacia is diagnosed by a bronchoscopic visualization of dynamic airway collapse on dynamic chest CT. Therapy is warranted in symptomatic patients with severe tracheomalacia and includes surgical repair as tracheobronchoplasty. The patient was referred to Dr. Willy Kay or Dr. Kelby Larios, at [facility] for a surgical consult. \par \par problem 8: dysphonia/sore throat\par -recommended Fisherman Friend's lozenges \par -recommended to use Slippery Elm Lozenge / Tea \par -continue Evoxac 30 q 8\par \par Problem 9: Health Maintenance/COVID19 Precautions \par - Clean your hands often. Wash your hands often with soap and water for at least 20 seconds, especially after blowing your nose, coughing, or sneezing, or having been in a public place.\par - If soap and water are not available, use a hand  that contains at least 60% alcohol.\par - To the extent possible, avoid touching high-touch surfaces in public places - elevator buttons, door handles, handrails, handshaking with people, etc. Use a tissue or your sleeve to cover your hand or finger if you must touch something.\par - Wash your hands after touching surfaces in public places.\par - Avoid touching your face, nose, eyes, etc.\par - Clean and disinfect your home to remove germs: practice routine cleaning of frequently touched surfaces (for example: tables, doorknobs, light switches, handles, desks, toilets, faucets, sinks & cell phones)\par - Avoid crowds, especially in poorly ventilated spaces. Your risk of exposure to respiratory viruses like COVID-19 may increase in crowded, closed-in settings with little air circulation if there are people in the crowd who are sick. All patients are recommended to practice social distancing and stay at least 6 feet away from others. \par - Avoid all non-essential travel including plane trips, and especially avoid embarking on cruise ships.\par -If COVID-19 is spreading in your community, take extra measures to put distance between yourself and other people to further reduce your risk of being exposed to this new virus.\par -Stay home as much as possible.\par - Consider ways of getting food brought to your house through family, social, or commercial networks\par -Be aware that the virus has been known to live in the air up to 3 hours post exposure, cardboard up to 24 hours post exposure, copper up to 4 hours post exposure, steel and plastic up to 2-3 days post exposure. Risk of transmission from these surfaces are affected by many variables.\par COVID-19 precautionary Immune Support Recommendations:\par -OTC Vitamin C 500mg BID \par -OTC Quercetin 250-500mg BID \par -OTC Zinc 75-100mg per day \par -OTC Melatonin 1 or 2mg a night \par -OTC Vitamin D 1-4000mg per day \par -OTC Tonic Water 8oz per day\par -Water 0.5-1 gallon per day\par Asthma and COVID19:\par You need to make sure your asthma is under control. This often requires the use of inhaled corticosteroids (and sometimes oral corticosteroids). Inhaled corticosteroids do not likely reduce your immune system’s ability to fight infections, but oral corticosteroids may. It is important to use the steps above to protect yourself to limit your exposure to any respiratory virus. \par \par problem 10: health maintenance \par - recommended to f/u with Dr. Palomares(GI) \par -instructed to increase physical activity as much as possible\par - She was administered an influenza vaccination 11/19\par -recommended strep pneumonia vaccines: Prevnar-13 vaccine, followed by Pneumo vaccine 23 one year following (completed)\par -recommended early intervention for URIs\par -recommended regular osteoporosis evaluations\par -recommended early dermatological evaluations\par -recommended after the age of 50 to the age of 70, colonoscopy every 5 years \par \par \par Follow up in 2 months.\par Patient is encouraged to call with any changes, concerns or questions.

## 2020-05-08 NOTE — ADDENDUM
[FreeTextEntry1] : Documented by Jarda Davis acting as a scribe for Dr. Maco Nova on 05/08/2020.\par \par All medical record entries made by the Scribe were at my, Dr. Maco Nova's, direction and personally dictated by me on 05/08/2020. I have reviewed the chart and agree that the record accurately reflects my personal performance of the history, physical exam, assessment and plan. I have also personally directed, reviewed, and agree with the discharge instructions.

## 2020-05-08 NOTE — REASON FOR VISIT
[Follow-Up] : a follow-up visit [FreeTextEntry1] : video call - atypical chest pain, COPD, chronic hypoxemia, dysphagia, GERD, laryngeal stnosis, OW, dependence on tracheostomy, and tracheomalacia

## 2020-05-12 ENCOUNTER — APPOINTMENT (OUTPATIENT)
Dept: OTOLARYNGOLOGY | Facility: CLINIC | Age: 60
End: 2020-05-12
Payer: MEDICAID

## 2020-05-12 DIAGNOSIS — R09.3 ABNORMAL SPUTUM: ICD-10-CM

## 2020-05-12 PROCEDURE — 99214 OFFICE O/P EST MOD 30 MIN: CPT | Mod: 95

## 2020-05-15 ENCOUNTER — APPOINTMENT (OUTPATIENT)
Dept: INFECTIOUS DISEASE | Facility: CLINIC | Age: 60
End: 2020-05-15
Payer: MEDICAID

## 2020-05-15 ENCOUNTER — OUTPATIENT (OUTPATIENT)
Dept: OUTPATIENT SERVICES | Facility: HOSPITAL | Age: 60
LOS: 1 days | End: 2020-05-15
Payer: MEDICAID

## 2020-05-15 DIAGNOSIS — J39.8 OTHER SPECIFIED DISEASES OF UPPER RESPIRATORY TRACT: Chronic | ICD-10-CM

## 2020-05-15 DIAGNOSIS — R84.5 ABNORMAL MICROBIOLOGICAL FINDINGS IN SPECIMENS FROM RESPIRATORY ORGANS AND THORAX: ICD-10-CM

## 2020-05-15 DIAGNOSIS — Z43.0 ENCOUNTER FOR ATTENTION TO TRACHEOSTOMY: Chronic | ICD-10-CM

## 2020-05-15 DIAGNOSIS — Z98.891 HISTORY OF UTERINE SCAR FROM PREVIOUS SURGERY: Chronic | ICD-10-CM

## 2020-05-15 DIAGNOSIS — Z93.0 TRACHEOSTOMY STATUS: Chronic | ICD-10-CM

## 2020-05-15 DIAGNOSIS — B97.89 OTHER VIRAL AGENTS AS THE CAUSE OF DISEASES CLASSIFIED ELSEWHERE: ICD-10-CM

## 2020-05-15 PROCEDURE — G0463: CPT

## 2020-05-15 PROCEDURE — 99214 OFFICE O/P EST MOD 30 MIN: CPT | Mod: GC,95

## 2020-05-15 NOTE — HISTORY OF PRESENT ILLNESS
[Patient] : the patient [Medical Office: ([facility])___] : at the medical office located in  [Home] : at home, [unfilled] , at the time of the visit. [Self] : self [de-identified] : Patient called with no answer on May 12\par Re-called and spoke with patient and her nurse on Friday May 15\par \par doctor on call got xray, concerned pna, IV abx\par has known chronic tracheitis\par c/o dryness, started pulmicort\par c/o SOB, trach with minimal secretions, no bleeding\par saw ID today\par using many saline rinses\par no fevers\par continuing to lose weight, not eating well\par not sleeping well, frequently doesn't fall asleep until the morning\par \par

## 2020-05-15 NOTE — PHYSICAL EXAM
[Midline] : trachea located in midline position [Normal] : orientation to person, place, and time: normal [FreeTextEntry1] : clear voice [de-identified] : trach in place, no peristomal granulation tissue

## 2020-05-15 NOTE — REASON FOR VISIT
[Subsequent Evaluation] : a subsequent evaluation for [FreeTextEntry2] : chronic tracheostomy dependence

## 2020-05-17 ENCOUNTER — INPATIENT (INPATIENT)
Facility: HOSPITAL | Age: 60
LOS: 9 days | Discharge: HOME | End: 2020-05-27
Attending: STUDENT IN AN ORGANIZED HEALTH CARE EDUCATION/TRAINING PROGRAM | Admitting: STUDENT IN AN ORGANIZED HEALTH CARE EDUCATION/TRAINING PROGRAM
Payer: MEDICAID

## 2020-05-17 VITALS
OXYGEN SATURATION: 100 % | HEART RATE: 81 BPM | TEMPERATURE: 98 F | DIASTOLIC BLOOD PRESSURE: 74 MMHG | SYSTOLIC BLOOD PRESSURE: 117 MMHG | RESPIRATION RATE: 18 BRPM

## 2020-05-17 DIAGNOSIS — J39.8 OTHER SPECIFIED DISEASES OF UPPER RESPIRATORY TRACT: Chronic | ICD-10-CM

## 2020-05-17 DIAGNOSIS — Z98.891 HISTORY OF UTERINE SCAR FROM PREVIOUS SURGERY: Chronic | ICD-10-CM

## 2020-05-17 DIAGNOSIS — Z43.0 ENCOUNTER FOR ATTENTION TO TRACHEOSTOMY: Chronic | ICD-10-CM

## 2020-05-17 DIAGNOSIS — Z93.0 TRACHEOSTOMY STATUS: Chronic | ICD-10-CM

## 2020-05-17 DIAGNOSIS — R62.7 ADULT FAILURE TO THRIVE: ICD-10-CM

## 2020-05-17 LAB
ALBUMIN SERPL ELPH-MCNC: 4 G/DL — SIGNIFICANT CHANGE UP (ref 3.3–5)
ALP SERPL-CCNC: 57 U/L — SIGNIFICANT CHANGE UP (ref 40–120)
ALT FLD-CCNC: 10 U/L — SIGNIFICANT CHANGE UP (ref 4–33)
ANION GAP SERPL CALC-SCNC: 12 MMO/L — SIGNIFICANT CHANGE UP (ref 7–14)
APPEARANCE UR: CLEAR — SIGNIFICANT CHANGE UP
AST SERPL-CCNC: 23 U/L — SIGNIFICANT CHANGE UP (ref 4–32)
BACTERIA # UR AUTO: NEGATIVE — SIGNIFICANT CHANGE UP
BASE EXCESS BLDV CALC-SCNC: 5.1 MMOL/L — SIGNIFICANT CHANGE UP
BASOPHILS # BLD AUTO: 0.09 K/UL — SIGNIFICANT CHANGE UP (ref 0–0.2)
BASOPHILS NFR BLD AUTO: 1.4 % — SIGNIFICANT CHANGE UP (ref 0–2)
BILIRUB SERPL-MCNC: 1 MG/DL — SIGNIFICANT CHANGE UP (ref 0.2–1.2)
BILIRUB UR-MCNC: NEGATIVE — SIGNIFICANT CHANGE UP
BLOOD GAS VENOUS - CREATININE: 0.7 MG/DL — SIGNIFICANT CHANGE UP (ref 0.5–1.3)
BLOOD UR QL VISUAL: NEGATIVE — SIGNIFICANT CHANGE UP
BUN SERPL-MCNC: 2 MG/DL — LOW (ref 7–23)
CALCIUM SERPL-MCNC: 9.7 MG/DL — SIGNIFICANT CHANGE UP (ref 8.4–10.5)
CHLORIDE BLDV-SCNC: 105 MMOL/L — SIGNIFICANT CHANGE UP (ref 96–108)
CHLORIDE SERPL-SCNC: 103 MMOL/L — SIGNIFICANT CHANGE UP (ref 98–107)
CHLORIDE UR-SCNC: 28 MMOL/L — SIGNIFICANT CHANGE UP
CO2 SERPL-SCNC: 26 MMOL/L — SIGNIFICANT CHANGE UP (ref 22–31)
COLOR SPEC: SIGNIFICANT CHANGE UP
CREAT ?TM UR-MCNC: 57.6 MG/DL — SIGNIFICANT CHANGE UP
CREAT SERPL-MCNC: 0.67 MG/DL — SIGNIFICANT CHANGE UP (ref 0.5–1.3)
EOSINOPHIL # BLD AUTO: 0.14 K/UL — SIGNIFICANT CHANGE UP (ref 0–0.5)
EOSINOPHIL NFR BLD AUTO: 2.2 % — SIGNIFICANT CHANGE UP (ref 0–6)
GAS PNL BLDV: 140 MMOL/L — SIGNIFICANT CHANGE UP (ref 136–146)
GLUCOSE BLDV-MCNC: 86 MG/DL — SIGNIFICANT CHANGE UP (ref 70–99)
GLUCOSE SERPL-MCNC: 90 MG/DL — SIGNIFICANT CHANGE UP (ref 70–99)
GLUCOSE UR-MCNC: NEGATIVE — SIGNIFICANT CHANGE UP
HCO3 BLDV-SCNC: 27 MMOL/L — SIGNIFICANT CHANGE UP (ref 20–27)
HCT VFR BLD CALC: 32.4 % — LOW (ref 34.5–45)
HCT VFR BLDV CALC: 35.2 % — SIGNIFICANT CHANGE UP (ref 34.5–45)
HGB BLD-MCNC: 10.6 G/DL — LOW (ref 11.5–15.5)
HGB BLDV-MCNC: 11.4 G/DL — LOW (ref 11.5–15.5)
HYALINE CASTS # UR AUTO: NEGATIVE — SIGNIFICANT CHANGE UP
IMM GRANULOCYTES NFR BLD AUTO: 0.3 % — SIGNIFICANT CHANGE UP (ref 0–1.5)
KETONES UR-MCNC: SIGNIFICANT CHANGE UP
LACTATE BLDV-MCNC: 1.6 MMOL/L — SIGNIFICANT CHANGE UP (ref 0.5–2)
LEUKOCYTE ESTERASE UR-ACNC: SIGNIFICANT CHANGE UP
LYMPHOCYTES # BLD AUTO: 1.99 K/UL — SIGNIFICANT CHANGE UP (ref 1–3.3)
LYMPHOCYTES # BLD AUTO: 31.3 % — SIGNIFICANT CHANGE UP (ref 13–44)
MAGNESIUM SERPL-MCNC: 1.7 MG/DL — SIGNIFICANT CHANGE UP (ref 1.6–2.6)
MCHC RBC-ENTMCNC: 27 PG — SIGNIFICANT CHANGE UP (ref 27–34)
MCHC RBC-ENTMCNC: 32.7 % — SIGNIFICANT CHANGE UP (ref 32–36)
MCV RBC AUTO: 82.7 FL — SIGNIFICANT CHANGE UP (ref 80–100)
MONOCYTES # BLD AUTO: 0.61 K/UL — SIGNIFICANT CHANGE UP (ref 0–0.9)
MONOCYTES NFR BLD AUTO: 9.6 % — SIGNIFICANT CHANGE UP (ref 2–14)
NEUTROPHILS # BLD AUTO: 3.51 K/UL — SIGNIFICANT CHANGE UP (ref 1.8–7.4)
NEUTROPHILS NFR BLD AUTO: 55.2 % — SIGNIFICANT CHANGE UP (ref 43–77)
NITRITE UR-MCNC: NEGATIVE — SIGNIFICANT CHANGE UP
NRBC # FLD: 0 K/UL — SIGNIFICANT CHANGE UP (ref 0–0)
PCO2 BLDV: 60 MMHG — HIGH (ref 41–51)
PH BLDV: 7.33 PH — SIGNIFICANT CHANGE UP (ref 7.32–7.43)
PH UR: 6 — SIGNIFICANT CHANGE UP (ref 5–8)
PHOSPHATE SERPL-MCNC: 3.2 MG/DL — SIGNIFICANT CHANGE UP (ref 2.5–4.5)
PLATELET # BLD AUTO: 321 K/UL — SIGNIFICANT CHANGE UP (ref 150–400)
PMV BLD: 10.7 FL — SIGNIFICANT CHANGE UP (ref 7–13)
PO2 BLDV: < 24 MMHG — LOW (ref 35–40)
POTASSIUM BLDV-SCNC: 3.7 MMOL/L — SIGNIFICANT CHANGE UP (ref 3.4–4.5)
POTASSIUM SERPL-MCNC: 3.6 MMOL/L — SIGNIFICANT CHANGE UP (ref 3.5–5.3)
POTASSIUM SERPL-SCNC: 3.6 MMOL/L — SIGNIFICANT CHANGE UP (ref 3.5–5.3)
POTASSIUM UR-SCNC: 20.7 MMOL/L — SIGNIFICANT CHANGE UP
PROT SERPL-MCNC: 7.4 G/DL — SIGNIFICANT CHANGE UP (ref 6–8.3)
PROT UR-MCNC: NEGATIVE — SIGNIFICANT CHANGE UP
RBC # BLD: 3.92 M/UL — SIGNIFICANT CHANGE UP (ref 3.8–5.2)
RBC # FLD: 14.6 % — HIGH (ref 10.3–14.5)
RBC CASTS # UR COMP ASSIST: SIGNIFICANT CHANGE UP (ref 0–?)
SAO2 % BLDV: 20 % — LOW (ref 60–85)
SARS-COV-2 RNA SPEC QL NAA+PROBE: SIGNIFICANT CHANGE UP
SODIUM SERPL-SCNC: 141 MMOL/L — SIGNIFICANT CHANGE UP (ref 135–145)
SODIUM UR-SCNC: 30 MMOL/L — SIGNIFICANT CHANGE UP
SP GR SPEC: 1 — SIGNIFICANT CHANGE UP (ref 1–1.04)
SQUAMOUS # UR AUTO: SIGNIFICANT CHANGE UP
UROBILINOGEN FLD QL: NORMAL — SIGNIFICANT CHANGE UP
WBC # BLD: 6.36 K/UL — SIGNIFICANT CHANGE UP (ref 3.8–10.5)
WBC # FLD AUTO: 6.36 K/UL — SIGNIFICANT CHANGE UP (ref 3.8–10.5)
WBC UR QL: SIGNIFICANT CHANGE UP (ref 0–?)

## 2020-05-17 PROCEDURE — 71045 X-RAY EXAM CHEST 1 VIEW: CPT | Mod: 26

## 2020-05-17 RX ORDER — SODIUM CHLORIDE 9 MG/ML
1000 INJECTION INTRAMUSCULAR; INTRAVENOUS; SUBCUTANEOUS ONCE
Refills: 0 | Status: COMPLETED | OUTPATIENT
Start: 2020-05-17 | End: 2020-05-17

## 2020-05-17 RX ORDER — ENOXAPARIN SODIUM 100 MG/ML
40 INJECTION SUBCUTANEOUS DAILY
Refills: 0 | Status: DISCONTINUED | OUTPATIENT
Start: 2020-05-17 | End: 2020-05-21

## 2020-05-17 RX ORDER — CHLORHEXIDINE GLUCONATE 213 G/1000ML
15 SOLUTION TOPICAL EVERY 12 HOURS
Refills: 0 | Status: DISCONTINUED | OUTPATIENT
Start: 2020-05-17 | End: 2020-05-27

## 2020-05-17 RX ORDER — CHLORHEXIDINE GLUCONATE 213 G/1000ML
1 SOLUTION TOPICAL
Refills: 0 | Status: DISCONTINUED | OUTPATIENT
Start: 2020-05-17 | End: 2020-05-27

## 2020-05-17 RX ADMIN — SODIUM CHLORIDE 1000 MILLILITER(S): 9 INJECTION INTRAMUSCULAR; INTRAVENOUS; SUBCUTANEOUS at 19:21

## 2020-05-17 NOTE — ED PROVIDER NOTE - CLINICAL SUMMARY MEDICAL DECISION MAKING FREE TEXT BOX
61 yo F pmhx of tracheomalacia, sp trach 2004 and stomaplast 2019, chronic vent dependent, COPD, admitted in March for FTT, pw FTT. likely anemia, electrolyte disturbance, dysphagia, UTI, PNA. CBC, CMP, CXR, VBG, UA. Likely require admission. 61 yo F pmhx of tracheomalacia, sp trach 2004 and stomaplast 2019, chronic vent dependent, COPD, admitted in March for FTT, pw FTT. likely anemia, electrolyte disturbance, dysphagia, UTI, PNA. CBC, CMP, CXR, VBG, UA. Likely require admission f/t FTT.

## 2020-05-17 NOTE — ED ADULT NURSE NOTE - OBJECTIVE STATEMENT
pt arrived to room 10, sent in by pcp for further testing. pt a&ox3, calm and cooperative, home nurse at bedside. pt chronically vented, c/o of poor PO intake (pureed), weight loss and feeling of dehydration. pt denies chest pain, sob, n/d. Respiratory at bedside, placed on vent, settings at CPAP 5, FIO2 32%,  and RR 20. Respirations even/unlabored, abdomen soft/nontender, no peripheral edema noted, provider at bedside to eval. 20g iv to right ac, labs drawn and sent. will continue to monitor

## 2020-05-17 NOTE — ED PROVIDER NOTE - OBJECTIVE STATEMENT
59 yo F pmhx of tracheomalacia, sp trach 2004 and stomaplast 2019, chronic vent dependent, COPD, admitted in March for FTT, pw FTT. Per home nurse at bedside, pt has had loss of appetite for last 3 days. Pt endorse sensation of loss of appetite, no abd pain, no diarrhea, nausea, vomiting, urinary symptoms, fever, chills. Pt had tele visit with ENT Dr. Rodriguez who told her to go to ED and will see her tomorrow. 61 yo F pmhx of tracheomalacia, sp trach 2004 and stomaplast 2019, chronic vent dependent, COPD, admitted in March for FTT, pw FTT. Per home nurse at bedside, pt has had loss of appetite for last 3 days. Pt endorse sensation of loss of appetite, no abd pain, no diarrhea, nausea, vomiting, urinary symptoms, fever, chills. Pt had tele visit with ENT Dr. Rodriguez who told her to go to ED and will see her tomorrow.  Pt states she have lost 100 lbs over last few weeks (though home aide disputes this itmeline). Is otherwise in [facility].

## 2020-05-17 NOTE — ED PROVIDER NOTE - NS ED ROS FT
GENERAL: No fever, chills  EYES: no vision changes, no discharge.   ENT: + food stuck sensation.   CARDIAC: no chest pain/pressure, SOB, +chronic LE swelling  PULMONARY: no cough, SOB  GI: no abdominal pain, n/v/d + loss of appetite.   : no dysuria  SKIN: no rashes  NEURO: no headache, lightheadedness.   MSK: No joint pain, myalgia, weakness.

## 2020-05-17 NOTE — ED ADULT NURSE REASSESSMENT NOTE - NS ED NURSE REASSESS COMMENT FT1
assumed care from jax RN. PT. NAD at this time. Pt respirations even and unlabored. denies chest pain placed on cardiac monitor. Will continue to monitor.

## 2020-05-17 NOTE — ED PROVIDER NOTE - ENMT, MLM
Airway patent, Nasal mucosa clear. Mouth with normal mucosa. Throat has no vesicles, no oropharyngeal exudates and uvula is midline.  Trach site well appearing.

## 2020-05-17 NOTE — ED PROVIDER NOTE - RESPIRATORY, MLM
Pre-Procedure Text: The treatment areas were cleaned and prepped in the usual fashion. Breath sounds clear and equal bilaterally.

## 2020-05-17 NOTE — ED PROVIDER NOTE - PROGRESS NOTE DETAILS
Per RCU fellow likely no bed available, recc admit to MICU. I jaswinder'w Dr Maldonado who accepted, pending COVID swab .

## 2020-05-17 NOTE — ED PROVIDER NOTE - ATTENDING CONTRIBUTION TO CARE
Attending Attestation: Dr. Smith  I have personally performed a history and physical examination of the patient and discussed management with the resident as well as the patient.  I reviewed the resident's note and agree with the documented findings and plan of care.  I have authored and modified critical sections of the Provider Note, including but not limited to HPI, Physical Exam and MDM. 59 yo F pmhx of tracheomalacia, sp trach 2004 and stomaplast 2019, chronic vent dependent, COPD, admitted in March for FTT, pw FTT. likely anemia, electrolyte disturbance, dysphagia, UTI, PNA. CBC, CMP, CXR, VBG, UA. Likely require admission f/t FTT.

## 2020-05-17 NOTE — ED PROVIDER NOTE - CARE PLAN
Principal Discharge DX:	Failure to thrive in adult  Secondary Diagnosis:	Dependence on tracheostomy  Secondary Diagnosis:	Tracheomalacia

## 2020-05-17 NOTE — ED PROVIDER NOTE - PHYSICAL EXAMINATION
GEN: Patient awake alert NAD.   HEENT: + trache on home vent settings. normocephalic, atraumatic, EOMI, + dry moist MM  CARDIAC: RRR, S1, S2, no murmur.   PULM: + stridor, no wheeze, rhonchi, rales.   ABD: soft NT, ND, no rebound no guarding, no CVA tenderness.   MSK: Moving all extremities, +1 pitting edema  NEURO: A&Ox3, normal gait without assistance, no focal neurological deficits, CN 2-12 grossly intact  SKIN: warm, dry, no rash.

## 2020-05-17 NOTE — ED ADULT TRIAGE NOTE - CHIEF COMPLAINT QUOTE
pt brought in by her nurse, c/o failure to thrive. states has not been eating over past month and reports rapid weight loss. sent to ED by PMD. pt has a trach and is on a portable vent. denies fever/chills/sob/cough.

## 2020-05-18 LAB
ANION GAP SERPL CALC-SCNC: 10 MMO/L — SIGNIFICANT CHANGE UP (ref 7–14)
APPEARANCE UR: CLEAR — SIGNIFICANT CHANGE UP
APTT BLD: 46.2 SEC — HIGH (ref 27.5–36.3)
BASOPHILS # BLD AUTO: 0.08 K/UL — SIGNIFICANT CHANGE UP (ref 0–0.2)
BASOPHILS NFR BLD AUTO: 1.2 % — SIGNIFICANT CHANGE UP (ref 0–2)
BILIRUB UR-MCNC: NEGATIVE — SIGNIFICANT CHANGE UP
BLOOD UR QL VISUAL: NEGATIVE — SIGNIFICANT CHANGE UP
BUN SERPL-MCNC: 2 MG/DL — LOW (ref 7–23)
CALCIUM SERPL-MCNC: 9.4 MG/DL — SIGNIFICANT CHANGE UP (ref 8.4–10.5)
CHLORIDE SERPL-SCNC: 105 MMOL/L — SIGNIFICANT CHANGE UP (ref 98–107)
CO2 SERPL-SCNC: 28 MMOL/L — SIGNIFICANT CHANGE UP (ref 22–31)
COLOR SPEC: SIGNIFICANT CHANGE UP
CREAT SERPL-MCNC: 0.6 MG/DL — SIGNIFICANT CHANGE UP (ref 0.5–1.3)
CULTURE RESULTS: SIGNIFICANT CHANGE UP
EOSINOPHIL # BLD AUTO: 0.21 K/UL — SIGNIFICANT CHANGE UP (ref 0–0.5)
EOSINOPHIL NFR BLD AUTO: 3 % — SIGNIFICANT CHANGE UP (ref 0–6)
GLUCOSE SERPL-MCNC: 99 MG/DL — SIGNIFICANT CHANGE UP (ref 70–99)
GLUCOSE UR-MCNC: NEGATIVE — SIGNIFICANT CHANGE UP
GRAM STN FLD: SIGNIFICANT CHANGE UP
HCT VFR BLD CALC: 30.2 % — LOW (ref 34.5–45)
HGB BLD-MCNC: 9.8 G/DL — LOW (ref 11.5–15.5)
IMM GRANULOCYTES NFR BLD AUTO: 0.3 % — SIGNIFICANT CHANGE UP (ref 0–1.5)
INR BLD: 1.23 — HIGH (ref 0.88–1.17)
KETONES UR-MCNC: SIGNIFICANT CHANGE UP
LEUKOCYTE ESTERASE UR-ACNC: NEGATIVE — SIGNIFICANT CHANGE UP
LYMPHOCYTES # BLD AUTO: 2.2 K/UL — SIGNIFICANT CHANGE UP (ref 1–3.3)
LYMPHOCYTES # BLD AUTO: 31.9 % — SIGNIFICANT CHANGE UP (ref 13–44)
MAGNESIUM SERPL-MCNC: 1.8 MG/DL — SIGNIFICANT CHANGE UP (ref 1.6–2.6)
MCHC RBC-ENTMCNC: 26.8 PG — LOW (ref 27–34)
MCHC RBC-ENTMCNC: 32.5 % — SIGNIFICANT CHANGE UP (ref 32–36)
MCV RBC AUTO: 82.7 FL — SIGNIFICANT CHANGE UP (ref 80–100)
MONOCYTES # BLD AUTO: 0.72 K/UL — SIGNIFICANT CHANGE UP (ref 0–0.9)
MONOCYTES NFR BLD AUTO: 10.4 % — SIGNIFICANT CHANGE UP (ref 2–14)
NEUTROPHILS # BLD AUTO: 3.66 K/UL — SIGNIFICANT CHANGE UP (ref 1.8–7.4)
NEUTROPHILS NFR BLD AUTO: 53.2 % — SIGNIFICANT CHANGE UP (ref 43–77)
NITRITE UR-MCNC: NEGATIVE — SIGNIFICANT CHANGE UP
NRBC # FLD: 0 K/UL — SIGNIFICANT CHANGE UP (ref 0–0)
PH UR: 6 — SIGNIFICANT CHANGE UP (ref 5–8)
PHOSPHATE SERPL-MCNC: 3 MG/DL — SIGNIFICANT CHANGE UP (ref 2.5–4.5)
PLATELET # BLD AUTO: 263 K/UL — SIGNIFICANT CHANGE UP (ref 150–400)
PMV BLD: 10.7 FL — SIGNIFICANT CHANGE UP (ref 7–13)
POTASSIUM SERPL-MCNC: 3.4 MMOL/L — LOW (ref 3.5–5.3)
POTASSIUM SERPL-SCNC: 3.4 MMOL/L — LOW (ref 3.5–5.3)
PROT UR-MCNC: NEGATIVE — SIGNIFICANT CHANGE UP
PROTHROM AB SERPL-ACNC: 14.1 SEC — HIGH (ref 9.8–13.1)
RBC # BLD: 3.65 M/UL — LOW (ref 3.8–5.2)
RBC # FLD: 14.6 % — HIGH (ref 10.3–14.5)
SODIUM SERPL-SCNC: 143 MMOL/L — SIGNIFICANT CHANGE UP (ref 135–145)
SP GR SPEC: 1.01 — SIGNIFICANT CHANGE UP (ref 1–1.04)
SPECIMEN SOURCE: SIGNIFICANT CHANGE UP
SPECIMEN SOURCE: SIGNIFICANT CHANGE UP
UROBILINOGEN FLD QL: NORMAL — SIGNIFICANT CHANGE UP
WBC # BLD: 6.89 K/UL — SIGNIFICANT CHANGE UP (ref 3.8–10.5)
WBC # FLD AUTO: 6.89 K/UL — SIGNIFICANT CHANGE UP (ref 3.8–10.5)

## 2020-05-18 PROCEDURE — 99291 CRITICAL CARE FIRST HOUR: CPT

## 2020-05-18 PROCEDURE — 99223 1ST HOSP IP/OBS HIGH 75: CPT

## 2020-05-18 RX ORDER — ALBUTEROL 90 UG/1
2 AEROSOL, METERED ORAL EVERY 6 HOURS
Refills: 0 | Status: DISCONTINUED | OUTPATIENT
Start: 2020-05-18 | End: 2020-05-18

## 2020-05-18 RX ORDER — TIOTROPIUM BROMIDE 18 UG/1
1 CAPSULE ORAL; RESPIRATORY (INHALATION) DAILY
Refills: 0 | Status: DISCONTINUED | OUTPATIENT
Start: 2020-05-18 | End: 2020-05-19

## 2020-05-18 RX ORDER — SODIUM CHLORIDE 9 MG/ML
500 INJECTION INTRAMUSCULAR; INTRAVENOUS; SUBCUTANEOUS ONCE
Refills: 0 | Status: COMPLETED | OUTPATIENT
Start: 2020-05-18 | End: 2020-05-18

## 2020-05-18 RX ORDER — ACETYLCYSTEINE 200 MG/ML
4 VIAL (ML) MISCELLANEOUS THREE TIMES A DAY
Refills: 0 | Status: DISCONTINUED | OUTPATIENT
Start: 2020-05-18 | End: 2020-05-19

## 2020-05-18 RX ORDER — ZOLPIDEM TARTRATE 10 MG/1
5 TABLET ORAL AT BEDTIME
Refills: 0 | Status: DISCONTINUED | OUTPATIENT
Start: 2020-05-18 | End: 2020-05-25

## 2020-05-18 RX ORDER — IPRATROPIUM/ALBUTEROL SULFATE 18-103MCG
3 AEROSOL WITH ADAPTER (GRAM) INHALATION EVERY 6 HOURS
Refills: 0 | Status: DISCONTINUED | OUTPATIENT
Start: 2020-05-18 | End: 2020-05-19

## 2020-05-18 RX ORDER — AZITHROMYCIN 500 MG/1
250 TABLET, FILM COATED ORAL
Refills: 0 | Status: DISCONTINUED | OUTPATIENT
Start: 2020-05-18 | End: 2020-05-27

## 2020-05-18 RX ORDER — BUDESONIDE AND FORMOTEROL FUMARATE DIHYDRATE 160; 4.5 UG/1; UG/1
2 AEROSOL RESPIRATORY (INHALATION)
Refills: 0 | Status: DISCONTINUED | OUTPATIENT
Start: 2020-05-18 | End: 2020-05-27

## 2020-05-18 RX ORDER — POTASSIUM CHLORIDE 20 MEQ
40 PACKET (EA) ORAL EVERY 4 HOURS
Refills: 0 | Status: COMPLETED | OUTPATIENT
Start: 2020-05-18 | End: 2020-05-18

## 2020-05-18 RX ORDER — CLONAZEPAM 1 MG
0.5 TABLET ORAL EVERY 8 HOURS
Refills: 0 | Status: DISCONTINUED | OUTPATIENT
Start: 2020-05-18 | End: 2020-05-25

## 2020-05-18 RX ADMIN — CHLORHEXIDINE GLUCONATE 15 MILLILITER(S): 213 SOLUTION TOPICAL at 12:24

## 2020-05-18 RX ADMIN — CHLORHEXIDINE GLUCONATE 15 MILLILITER(S): 213 SOLUTION TOPICAL at 18:16

## 2020-05-18 RX ADMIN — Medication 3 MILLILITER(S): at 15:19

## 2020-05-18 RX ADMIN — ALBUTEROL 2 PUFF(S): 90 AEROSOL, METERED ORAL at 09:15

## 2020-05-18 RX ADMIN — BUDESONIDE AND FORMOTEROL FUMARATE DIHYDRATE 2 PUFF(S): 160; 4.5 AEROSOL RESPIRATORY (INHALATION) at 22:26

## 2020-05-18 RX ADMIN — Medication 40 MILLIEQUIVALENT(S): at 21:27

## 2020-05-18 RX ADMIN — ENOXAPARIN SODIUM 40 MILLIGRAM(S): 100 INJECTION SUBCUTANEOUS at 12:28

## 2020-05-18 RX ADMIN — Medication 40 MILLIEQUIVALENT(S): at 13:19

## 2020-05-18 RX ADMIN — AZITHROMYCIN 250 MILLIGRAM(S): 500 TABLET, FILM COATED ORAL at 09:12

## 2020-05-18 RX ADMIN — CHLORHEXIDINE GLUCONATE 1 APPLICATION(S): 213 SOLUTION TOPICAL at 11:00

## 2020-05-18 RX ADMIN — Medication 3 MILLILITER(S): at 22:26

## 2020-05-18 RX ADMIN — BUDESONIDE AND FORMOTEROL FUMARATE DIHYDRATE 2 PUFF(S): 160; 4.5 AEROSOL RESPIRATORY (INHALATION) at 09:10

## 2020-05-18 RX ADMIN — SODIUM CHLORIDE 500 MILLILITER(S): 9 INJECTION INTRAMUSCULAR; INTRAVENOUS; SUBCUTANEOUS at 16:00

## 2020-05-18 NOTE — CHART NOTE - NSCHARTNOTEFT_GEN_A_CORE
Patient w/ 2 neg COVID, normal parenchyma on CT chest from 5/17. Very low suspicion for COVID, can take off contact precautions  Ja Flowers PGY III  [id number]

## 2020-05-18 NOTE — H&P ADULT - NSHPLABSRESULTS_GEN_ALL_CORE
141  |  103  |  2<L>  ----------------------------<  90  3.6   |  26  |  0.67    Ca    9.7      17 May 2020 18:00  Phos  3.2       Mg     1.7         TPro  7.4  /  Alb  4.0  /  TBili  1.0  /  DBili  x   /  AST  23  /  ALT  10  /  AlkPhos  57                              10.6   6.36  )-----------( 321      ( 17 May 2020 18:00 )             32.4             LIVER FUNCTIONS - ( 17 May 2020 18:00 )  Alb: 4.0 g/dL / Pro: 7.4 g/dL / ALK PHOS: 57 u/L / ALT: 10 u/L / AST: 23 u/L / GGT: x                 Urinalysis Basic - ( 18 May 2020 22:00 )    Color: LIGHT YELLOW / Appearance: CLEAR / S.007 / pH: 6.0  Gluc: NEGATIVE / Ketone: TRACE  / Bili: NEGATIVE / Urobili: NORMAL   Blood: NEGATIVE / Protein: NEGATIVE / Nitrite: NEGATIVE   Leuk Esterase: NEGATIVE / RBC: x / WBC x   Sq Epi: x / Non Sq Epi: x / Bacteria: x

## 2020-05-18 NOTE — H&P ADULT - ATTENDING COMMENTS
Patient seen and examined.   61 yo woman with TBM, s/p trach (vent dependent most of the time, TC for a few hours a day) with hx of MRSA/Pseudomonas/Serratia/Achromobacter/Klebs  in the sputum admitted with FTT/need for tracheostomy change. No e/o acute infection at this time. TC during the day today. Aggressive pulmonary toilet. Will discuss needs for trach change with Dr Rodriguez. Had recent negative workup for wt loss, not interested in PEG tube.   Can be discharged home if now plans for tracheostomy or PEG>

## 2020-05-18 NOTE — H&P ADULT - NSHPPHYSICALEXAM_GEN_ALL_CORE
VITALS:   Vital Signs Last 24 Hrs  T(C): 36.6 (18 May 2020 00:46), Max: 36.9 (17 May 2020 17:03)  T(F): 97.9 (18 May 2020 00:46), Max: 98.5 (17 May 2020 17:03)  HR: 59 (18 May 2020 03:22) (59 - 81)  BP: 117/80 (18 May 2020 02:00) (104/58 - 121/71)  BP(mean): 90 (18 May 2020 02:00) (83 - 90)  RR: 20 (18 May 2020 02:00) (18 - 23)  SpO2: 100% (18 May 2020 03:22) (100% - 100%)    GENERAL: NAD, lying in bed, with trach  HEAD:  Atraumatic, Normocephalic  EYES: EOMI, PERRLA, conjunctiva and sclera clear  ENT: Moist mucous membranes  NECK: Supple, No JVD  CHEST/LUNG: Clear to auscultation bilaterally; No rales, rhonchi, wheezing, or rubs. Unlabored respirations  HEART: Regular rate and rhythm; No murmurs, rubs, or gallops  ABDOMEN: Bowel sounds present; Soft, Nontender, Nondistended.   EXTREMITIES:  2+ Peripheral Pulses,No clubbing, cyanosis, or edema  NERVOUS SYSTEM:  Alert & Oriented X3, speech clear. No deficits   MSK: FROM all 4 extremities, full and equal strength  SKIN: No rashes or lesions

## 2020-05-18 NOTE — H&P ADULT - HISTORY OF PRESENT ILLNESS
Patient is a 61 y/o F with PMHx of 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 Compared to last hospitalization in March patient noted to have 16 lb weight loss due to decreased po intake from dysphagia. The patient had telehealth visit with ENT, who instructed the patient to go to ED for dehydration and possible G-tube placement. Otherwise, denies chills, malaise, cough, sputum production, hemoptysis, chest pain, abdominal pain, n/v/d, urinary symptoms, LE edema.    The patient had a recent hospitalization (3/9 - 3/13/20) where she also noted a 100lb weight loss over the course of 3 months. Comparing her weight from prior hospitalizations revealed a weight loss of 31 pounds over that time frame. The patient went an extensive infectious/malignancy workup. The patient's malignancy workup and EGD/colonoscopy were negative. The patient did speciate Pseudomonas on sputum, and was followed by ID. Since no other symptoms of infection the patient was monitored off antibiotics. The patient also had her trac changed by ENT. The patient had a recent telehealth visit with pulmonlogy where she had an x-ray performed at home and was diagnosed with PNA. The patient had a sputum culture which grew multiple bacteria, and was started on doxycycline     Patient is a 61 y/o F with PMHx of tracheomalacia s/p tracheostomy (2004) c/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) and chronic vent dependence (on trach collar ~2 hours per day), COPD, GERD, anxiety who presents for FTT

## 2020-05-18 NOTE — CHART NOTE - NSCHARTNOTEFT_GEN_A_CORE
Paged Dr. Stevenson, pts ID doctor. Spoke to Dr. Nova, pts Pulmonologist, who recommends referring to his most recent note on [employer].  Waiting to hear back from ENT, will page again shortly.   Spoke to pt who appears comfortable, eating pureed food, requesting albuterol/ipratropium by nebulizer at inhaler dries her out. Will switch order.  Will f/u on ENT/ID

## 2020-05-18 NOTE — PROGRESS NOTE ADULT - SUBJECTIVE AND OBJECTIVE BOX
INTERVAL HPI/OVERNIGHT EVENTS:    SUBJECTIVE: Patient seen and examined at bedside.     CONSTITUTIONAL: No weakness, fevers or chills  EYES/ENT: No visual changes;  No vertigo or throat pain   NECK: No pain or stiffness  RESPIRATORY: No cough, wheezing, hemoptysis; No shortness of breath  CARDIOVASCULAR: No chest pain or palpitations  GASTROINTESTINAL: No abdominal or epigastric pain. No nausea, vomiting, or hematemesis; No diarrhea or constipation. No melena or hematochezia.  GENITOURINARY: No dysuria, frequency or hematuria  NEUROLOGICAL: No numbness or weakness  SKIN: No itching, rashes    OBJECTIVE:    VITAL SIGNS:  ICU Vital Signs Last 24 Hrs  T(C): 36.9 (18 May 2020 12:00), Max: 36.9 (17 May 2020 17:03)  T(F): 98.4 (18 May 2020 12:00), Max: 98.5 (17 May 2020 17:03)  HR: 66 (18 May 2020 15:19) (52 - 84)  BP: 113/92 (18 May 2020 12:00) (100/58 - 124/105)  BP(mean): 109 (18 May 2020 10:00) (69 - 109)  ABP: --  ABP(mean): --  RR: 22 (18 May 2020 14:00) (10 - 28)  SpO2: 100% (18 May 2020 15:19) (92% - 100%)    Mode: AC/ CMV (Assist Control/ Continuous Mandatory Ventilation), RR (machine): 12, TV (machine): 400, FiO2: 32, PEEP: 5, MAP: 8, PIP: 26    CAPILLARY BLOOD GLUCOSE          PHYSICAL EXAM:    General: NAD  HEENT: NC/AT; PERRL, clear conjunctiva  Neck: supple  Respiratory: CTA b/l  Cardiovascular: +S1/S2; RRR  Abdomen: soft, NT/ND; +BS x4  Extremities: WWP, 2+ peripheral pulses b/l; no LE edema  Skin: normal color and turgor; no rash  Neurological:    MEDICATIONS:  MEDICATIONS  (STANDING):  albuterol/ipratropium for Nebulization. 3 milliLiter(s) Nebulizer every 6 hours  azithromycin   Tablet 250 milliGRAM(s) Oral <User Schedule>  budesonide 160 MICROgram(s)/formoterol 4.5 MICROgram(s) Inhaler 2 Puff(s) Inhalation two times a day  chlorhexidine 0.12% Liquid 15 milliLiter(s) Oral Mucosa every 12 hours  chlorhexidine 4% Liquid 1 Application(s) Topical <User Schedule>  enoxaparin Injectable 40 milliGRAM(s) SubCutaneous daily  potassium chloride   Solution 40 milliEquivalent(s) Oral every 4 hours  sodium chloride 0.9% Bolus 500 milliLiter(s) IV Bolus once  tiotropium 18 MICROgram(s) Capsule 1 Capsule(s) Inhalation daily    MEDICATIONS  (PRN):  acetylcysteine 20%  Inhalation 4 milliLiter(s) Inhalation three times a day PRN Secretions  clonazePAM Oral Disintegrating Tablet 0.5 milliGRAM(s) Oral every 8 hours PRN anxiety  zolpidem 5 milliGRAM(s) Oral at bedtime PRN Insomnia  zolpidem 5 milliGRAM(s) Oral at bedtime PRN Insomnia      ALLERGIES:  Allergies    Cipro (Unknown)  latex (Unknown)  theophylline (Hives)    Intolerances        LABS:                        9.8    6.89  )-----------( 263      ( 18 May 2020 06:30 )             30.2     05-    143  |  105  |  2<L>  ----------------------------<  99  3.4<L>   |  28  |  0.60    Ca    9.4      18 May 2020 06:30  Phos  3.0       Mg     1.8         TPro  7.4  /  Alb  4.0  /  TBili  1.0  /  DBili  x   /  AST  23  /  ALT  10  /  AlkPhos  57  05-17    PT/INR - ( 18 May 2020 06:30 )   PT: 14.1 SEC;   INR: 1.23          PTT - ( 18 May 2020 06:30 )  PTT:46.2 SEC  Urinalysis Basic - ( 18 May 2020 22:00 )    Color: LIGHT YELLOW / Appearance: CLEAR / S.007 / pH: 6.0  Gluc: NEGATIVE / Ketone: TRACE  / Bili: NEGATIVE / Urobili: NORMAL   Blood: NEGATIVE / Protein: NEGATIVE / Nitrite: NEGATIVE   Leuk Esterase: NEGATIVE / RBC: x / WBC x   Sq Epi: x / Non Sq Epi: x / Bacteria: x        RADIOLOGY & ADDITIONAL TESTS: Reviewed. INTERVAL HPI/OVERNIGHT EVENTS:    SUBJECTIVE: Patient seen and examined at bedside. States she was sent in for Trach change (by Dr. Rodriguez) and IV Abx (by Dr. Stevenson). ENT states Trach placement can occur tomorrow but pt doesn't need to stay for it, can be done out patient. Dr. Stevenson and ID team saw pt, they don't recommend IV abx.      CONSTITUTIONAL: No weakness, fevers or chills  EYES/ENT: No visual changes;  No vertigo or throat pain   NECK: No pain or stiffness  RESPIRATORY: No cough, wheezing, hemoptysis; No shortness of breath  CARDIOVASCULAR: No chest pain or palpitations  GASTROINTESTINAL: No abdominal or epigastric pain. No nausea, vomiting, or hematemesis; No diarrhea or constipation. No melena or hematochezia.  GENITOURINARY: No dysuria, frequency or hematuria  NEUROLOGICAL: No numbness or weakness  SKIN: No itching, rashes    OBJECTIVE:    VITAL SIGNS:  ICU Vital Signs Last 24 Hrs  T(C): 36.9 (18 May 2020 12:00), Max: 36.9 (17 May 2020 17:03)  T(F): 98.4 (18 May 2020 12:00), Max: 98.5 (17 May 2020 17:03)  HR: 66 (18 May 2020 15:19) (52 - 84)  BP: 113/92 (18 May 2020 12:00) (100/58 - 124/105)  BP(mean): 109 (18 May 2020 10:00) (69 - 109)  ABP: --  ABP(mean): --  RR: 22 (18 May 2020 14:00) (10 - 28)  SpO2: 100% (18 May 2020 15:19) (92% - 100%)    Mode: AC/ CMV (Assist Control/ Continuous Mandatory Ventilation), RR (machine): 12, TV (machine): 400, FiO2: 32, PEEP: 5, MAP: 8, PIP: 26    CAPILLARY BLOOD GLUCOSE          PHYSICAL EXAM:    General: NAD  HEENT: NC/AT; PERRL, clear conjunctiva  Neck: supple  Respiratory: CTA b/l  Cardiovascular: +S1/S2; RRR  Abdomen: soft, NT/ND; +BS x4  Extremities: WWP, 2+ peripheral pulses b/l; no LE edema  Skin: normal color and turgor; no rash  Neurological:    MEDICATIONS:  MEDICATIONS  (STANDING):  albuterol/ipratropium for Nebulization. 3 milliLiter(s) Nebulizer every 6 hours  azithromycin   Tablet 250 milliGRAM(s) Oral <User Schedule>  budesonide 160 MICROgram(s)/formoterol 4.5 MICROgram(s) Inhaler 2 Puff(s) Inhalation two times a day  chlorhexidine 0.12% Liquid 15 milliLiter(s) Oral Mucosa every 12 hours  chlorhexidine 4% Liquid 1 Application(s) Topical <User Schedule>  enoxaparin Injectable 40 milliGRAM(s) SubCutaneous daily  potassium chloride   Solution 40 milliEquivalent(s) Oral every 4 hours  sodium chloride 0.9% Bolus 500 milliLiter(s) IV Bolus once  tiotropium 18 MICROgram(s) Capsule 1 Capsule(s) Inhalation daily    MEDICATIONS  (PRN):  acetylcysteine 20%  Inhalation 4 milliLiter(s) Inhalation three times a day PRN Secretions  clonazePAM Oral Disintegrating Tablet 0.5 milliGRAM(s) Oral every 8 hours PRN anxiety  zolpidem 5 milliGRAM(s) Oral at bedtime PRN Insomnia  zolpidem 5 milliGRAM(s) Oral at bedtime PRN Insomnia      ALLERGIES:  Allergies    Cipro (Unknown)  latex (Unknown)  theophylline (Hives)    Intolerances        LABS:                        9.8    6.89  )-----------( 263      ( 18 May 2020 06:30 )             30.2     05-18    143  |  105  |  2<L>  ----------------------------<  99  3.4<L>   |  28  |  0.60    Ca    9.4      18 May 2020 06:30  Phos  3.0     -  Mg     1.8         TPro  7.4  /  Alb  4.0  /  TBili  1.0  /  DBili  x   /  AST  23  /  ALT  10  /  AlkPhos  57  05-17    PT/INR - ( 18 May 2020 06:30 )   PT: 14.1 SEC;   INR: 1.23          PTT - ( 18 May 2020 06:30 )  PTT:46.2 SEC  Urinalysis Basic - ( 18 May 2020 22:00 )    Color: LIGHT YELLOW / Appearance: CLEAR / S.007 / pH: 6.0  Gluc: NEGATIVE / Ketone: TRACE  / Bili: NEGATIVE / Urobili: NORMAL   Blood: NEGATIVE / Protein: NEGATIVE / Nitrite: NEGATIVE   Leuk Esterase: NEGATIVE / RBC: x / WBC x   Sq Epi: x / Non Sq Epi: x / Bacteria: x        RADIOLOGY & ADDITIONAL TESTS: Reviewed. INTERVAL HPI/OVERNIGHT EVENTS:    SUBJECTIVE: Patient seen and examined at bedside. States she was sent in for Trach change (by Dr. Rodriguez) and IV Abx (by Dr. Stevenson). ENT states Trach placement can occur tomorrow but pt doesn't need to stay for it, can be done out patient. Dr. Stevenson and ID team saw pt, they don't recommend IV abx.  No acute events, pt eating pureed foods and on vent for the day.    CONSTITUTIONAL: No weakness, fevers or chills  EYES/ENT: dry mouth, no pain/bleeding at trach site  RESPIRATORY: No cough, wheezing, hemoptysis; No shortness of breath or change in vent settings  CARDIOVASCULAR: No chest pain or palpitations  GASTROINTESTINAL: pt has some dysphagia when eating solids and sometimes with pureed foods. Decreased  PO intake/ weight loss. No melena or hematochezia.  GENITOURINARY: No dysuria, frequency or hematuria  NEUROLOGICAL: No numbness or weakness  SKIN: No itching, rashes    OBJECTIVE:    VITAL SIGNS:  ICU Vital Signs Last 24 Hrs  T(C): 36.9 (18 May 2020 12:00), Max: 36.9 (17 May 2020 17:03)  T(F): 98.4 (18 May 2020 12:00), Max: 98.5 (17 May 2020 17:03)  HR: 66 (18 May 2020 15:19) (52 - 84)  BP: 113/92 (18 May 2020 12:00) (100/58 - 124/105)  BP(mean): 109 (18 May 2020 10:00) (69 - 109)  ABP: --  ABP(mean): --  RR: 22 (18 May 2020 14:00) (10 - 28)  SpO2: 100% (18 May 2020 15:19) (92% - 100%)    Mode: AC/ CMV (Assist Control/ Continuous Mandatory Ventilation), RR (machine): 12, TV (machine): 400, FiO2: 32, PEEP: 5, MAP: 8, PIP: 26    CAPILLARY BLOOD GLUCOSE          PHYSICAL EXAM:    General: NAD  HEENT: mouth dry with poor dentition, no cellulitic signs/dried blood surrounding trach site  Respiratory: CTA b/l, on minimal vent settings without increased wob  Cardiovascular: +S1/S2; RRR  Abdomen: soft, NT/ND  Extremities: WWP; no LE edema  Skin: normal color and turgor; no rash  Neurological: AAOx3, moving all extremities without lateralization    MEDICATIONS:  MEDICATIONS  (STANDING):  albuterol/ipratropium for Nebulization. 3 milliLiter(s) Nebulizer every 6 hours  azithromycin   Tablet 250 milliGRAM(s) Oral <User Schedule>  budesonide 160 MICROgram(s)/formoterol 4.5 MICROgram(s) Inhaler 2 Puff(s) Inhalation two times a day  chlorhexidine 0.12% Liquid 15 milliLiter(s) Oral Mucosa every 12 hours  chlorhexidine 4% Liquid 1 Application(s) Topical <User Schedule>  enoxaparin Injectable 40 milliGRAM(s) SubCutaneous daily  potassium chloride   Solution 40 milliEquivalent(s) Oral every 4 hours  sodium chloride 0.9% Bolus 500 milliLiter(s) IV Bolus once  tiotropium 18 MICROgram(s) Capsule 1 Capsule(s) Inhalation daily    MEDICATIONS  (PRN):  acetylcysteine 20%  Inhalation 4 milliLiter(s) Inhalation three times a day PRN Secretions  clonazePAM Oral Disintegrating Tablet 0.5 milliGRAM(s) Oral every 8 hours PRN anxiety  zolpidem 5 milliGRAM(s) Oral at bedtime PRN Insomnia  zolpidem 5 milliGRAM(s) Oral at bedtime PRN Insomnia      ALLERGIES:  Allergies    Cipro (Unknown)  latex (Unknown)  theophylline (Hives)    Intolerances        LABS:                        9.8    6.89  )-----------( 263      ( 18 May 2020 06:30 )             30.2     05-18    143  |  105  |  2<L>  ----------------------------<  99  3.4<L>   |  28  |  0.60    Ca    9.4      18 May 2020 06:30  Phos  3.0     05-18  Mg     1.8     -    TPro  7.4  /  Alb  4.0  /  TBili  1.0  /  DBili  x   /  AST  23  /  ALT  10  /  AlkPhos  57  05-17    PT/INR - ( 18 May 2020 06:30 )   PT: 14.1 SEC;   INR: 1.23          PTT - ( 18 May 2020 06:30 )  PTT:46.2 SEC  Urinalysis Basic - ( 18 May 2020 22:00 )    Color: LIGHT YELLOW / Appearance: CLEAR / S.007 / pH: 6.0  Gluc: NEGATIVE / Ketone: TRACE  / Bili: NEGATIVE / Urobili: NORMAL   Blood: NEGATIVE / Protein: NEGATIVE / Nitrite: NEGATIVE   Leuk Esterase: NEGATIVE / RBC: x / WBC x   Sq Epi: x / Non Sq Epi: x / Bacteria: x        RADIOLOGY & ADDITIONAL TESTS: Reviewed.

## 2020-05-18 NOTE — CONSULT NOTE ADULT - SUBJECTIVE AND OBJECTIVE BOX
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 Compared to last hospitalization in March patient noted to have 16 lb weight loss due to decreased po intake from dysphagia. The patient had telehealth visit with ENT, who instructed the patient to go to ED for dehydration and possible G-tube placement. Otherwise, denies chills, malaise, cough, sputum production, hemoptysis, chest pain, abdominal pain, n/v/d, urinary symptoms, LE edema.    The patient had a recent hospitalization (3/9 - 3/13/20) where she also noted a 100lb weight loss over the course of 3 months. Comparing her weight from prior hospitalizations revealed a weight loss of 31 pounds over that time frame. The patient went an extensive infectious/malignancy workup. The patient's malignancy workup and EGD/colonoscopy were negative. The patient did speciate Pseudomonas on sputum, and was followed by ID. Since no other symptoms of infection the patient was monitored off antibiotics. The patient also had her trac changed by ENT. The patient had a recent telehealth visit with pulmonlogy where she had an x-ray performed at home and was diagnosed with PNA. The patient had a sputum culture which grew multiple bacteria, and was started on doxycycline.     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    Patient remains afebrile.  Remains vented, FiO2 32%.  Speaking clearly.  WBC WNL.  COVID negative.  CXR no acute pulmonary disease.    PAST MEDICAL & SURGICAL HISTORY:  Lupus anticoagulant syndrome  Tracheomalacia  Pancreatic cyst  Anxiety disorder  Sickle cell trait  Hypertension  COPD (chronic obstructive pulmonary disease)  Tracheostomy dependent  S/P :   Tracheal stenosis: excision of tracheal stenosis 10/2017  revision of tracheal stoma 3/2018  Acute tracheostomy management  Dependence on tracheostomy    Allergies    Cipro (Unknown)  latex (Unknown)  theophylline (Hives)    Intolerances    ANTIMICROBIALS:  azithromycin   Tablet 250 <User Schedule>    OTHER MEDS:  acetylcysteine 20%  Inhalation 4 milliLiter(s) Inhalation three times a day PRN  albuterol/ipratropium for Nebulization. 3 milliLiter(s) Nebulizer every 6 hours  budesonide 160 MICROgram(s)/formoterol 4.5 MICROgram(s) Inhaler 2 Puff(s) Inhalation two times a day  chlorhexidine 0.12% Liquid 15 milliLiter(s) Oral Mucosa every 12 hours  chlorhexidine 4% Liquid 1 Application(s) Topical <User Schedule>  clonazePAM Oral Disintegrating Tablet 0.5 milliGRAM(s) Oral every 8 hours PRN  enoxaparin Injectable 40 milliGRAM(s) SubCutaneous daily  potassium chloride   Solution 40 milliEquivalent(s) Oral every 4 hours  tiotropium 18 MICROgram(s) Capsule 1 Capsule(s) Inhalation daily  zolpidem 5 milliGRAM(s) Oral at bedtime PRN  zolpidem 5 milliGRAM(s) Oral at bedtime PRN    SOCIAL HISTORY: No alcohol, smoking, or drug use.    FAMILY HISTORY:  Family history of heart disease (Grandparent)  Family history of leukemia  Family history of lung disease (Sibling)      Drug Dosing Weight  Height (cm): 162.6 (18 May 2020 00:46)  Weight (kg): 64.7 (18 May 2020 00:46)  BMI (kg/m2): 24.5 (18 May 2020 00:46)  BSA (m2): 1.69 (18 May 2020 00:46)    PE:    Vital Signs Last 24 Hrs  T(C): 37 (18 May 2020 14:00), Max: 37 (18 May 2020 14:00)  T(F): 98.6 (18 May 2020 14:00), Max: 98.6 (18 May 2020 14:00)  HR: 66 (18 May 2020 15:19) (52 - 84)  BP: 134/79 (18 May 2020 14:00) (100/58 - 134/79)  BP(mean): 109 (18 May 2020 10:00) (69 - 109)  RR: 22 (18 May 2020 14:00) (10 - 28)  SpO2: 100% (18 May 2020 15:19) (92% - 100%)    Gen: AOx3, NAD  HEENT: trach, vented  CV: S1+S2 normal, no murmurs  Resp: Clear bilat, no resp distress  Abd: Soft, nontender, +BS  Ext: No LE edema, no wounds  : No Reich  IV/Skin: No thrombophlebitis  Msk: No low back pain, no arthralgias, no joint swelling  Neuro: No sensory deficits, no motor deficits    LABS:                          9.8    6.89  )-----------( 263      ( 18 May 2020 06:30 )             30.2           143  |  105  |  2<L>  ----------------------------<  99  3.4<L>   |  28  |  0.60    Ca    9.4      18 May 2020 06:30  Phos  3.0       Mg     1.8         TPro  7.4  /  Alb  4.0  /  TBili  1.0  /  DBili  x   /  AST  23  /  ALT  10  /  AlkPhos  57        Urinalysis Basic - ( 18 May 2020 22:00 )    Color: LIGHT YELLOW / Appearance: CLEAR / S.007 / pH: 6.0  Gluc: NEGATIVE / Ketone: TRACE  / Bili: NEGATIVE / Urobili: NORMAL   Blood: NEGATIVE / Protein: NEGATIVE / Nitrite: NEGATIVE   Leuk Esterase: NEGATIVE / RBC: x / WBC x   Sq Epi: x / Non Sq Epi: x / Bacteria: x    MICROBIOLOGY:  v    RADIOLOGY:    < from: Xray Chest 1 View AP/PA (20 @ 18:40) >  IMPRESSION: No acute pulmonary disease.    < end of copied text >

## 2020-05-18 NOTE — H&P ADULT - ASSESSMENT
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) and chronic vent dependence (on trach collar ~2 hours per day), COPD, GERD, anxiety who presents for FTT    ##neuro  -no acute issues  -ISTOP Reference #: [id number]  -patient takes zolpidem 5mg prn and clonezepam 0.5mg TID prn  -will continue    ##CV  -no acute issues  -home losartan and norvasc was held last admission  -to restart prn    ##pulmonary  Tracheomalacia s/p trach  -vent dependence, on collar appx 2 hours/day  -goal up to 12 hours/day  -as outpatient attempting to wean off vent as tolerated   -on the following settings at home: 3 L SIMV: VT- 450; PC 20; PS-18; PEEP- 5  - Night CPAP 5/PS 18 (back up 8 B/mm)    COPD  -on azithromycin 500 TIW for severe COPD  -outpatient pulmonology recently changed medications  -mucomyst PRN for secretions  -on azithromycin TIW  -continue Combivent 1 inhalation up to QID  -Performist nebulizer 1 unit dose BID, followed by Pulmicort 0.5 nebulizer BID   -Albuterol via nebulizer for rescue up to QiD   -continue on NAC Q8H  -continue Yuperli 1 unit dose QD via nebulizer    ##renal  -no acute issues    ##GI    FTT  -has had prior EGD and colonoscopy which were unremarkable  -prior infectious and malignancy workup in March also unrevealing   -no associated lab derangements  -evaluated by nutrition on last admission  -dysphagia eval, placed on dysphagia diet  -potential PEG placement?  -c/w PPI    ##ID  -started on doxycycline (today is day 3 as per patient) for presummed PNA  -has history of polymicrobial sputum culture (KRSAl klebsiella, serratia, achromobacter) and chronic psuedomonal colonization  -patient taking azithromycin 500 TIW as above  -currently does not meet any SIRS criteria  -will hold off other antibiotics for now  -to consult ID in AM  -outpatient pulmonology suspicious of potential immunodeficiency given higher rate of infection than anticipated  -to consider sending IgG subclasses, quantitative immunoglobulins, Strep pneumoniae titers, Vitamin D levels    ##heme  -history of anemia  -consistent with baseline  -no acute issues    ##endocrine  no acute issues 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) and chronic vent dependence (on trach collar ~2 hours per day), COPD, GERD, anxiety who presents for FTT    ##neuro  -no acute issues  -ISTOP Reference #: [id number]  -patient takes zolpidem 5mg prn and clonezepam 0.5mg TID prn  -will continue    ##CV  -no acute issues  -home losartan and norvasc was held last admission  -to restart prn    ##pulmonary  Tracheomalacia s/p trach  -vent dependence, on collar appx 2 hours/day  -goal up to 12 hours/day  -as outpatient attempting to wean off vent as tolerated   -on the following settings at home: 3 L SIMV: VT- 450; PC 20; PS-18; PEEP- 5  - Night CPAP 5/PS 18 (back up 8 B/mm)    COPD  -on azithromycin TIW for severe COPD  -outpatient pulmonology recently changed medications  -mucomyst PRN for secretions  -continue Combivent 1 inhalation up to QID  -symbicort BID  -has multiple other inhaler prescriptions, unclear what patient takes at home  -will need to call pharmacy in AM    ##renal  -no acute issues    ##GI    FTT  -has had prior EGD and colonoscopy which were unremarkable  -prior infectious and malignancy workup in March also unrevealing   -no associated lab derangements  -evaluated by nutrition on last admission  -dysphagia eval, placed on dysphagia diet  -potential PEG placement?      ##ID  -started on doxycycline (today is day 3 as per patient) for presummed PNA  -has history of polymicrobial sputum culture (MRSAl klebsiella, serratia, achromobacter) and chronic psuedomonas colonization  -patient taking azithromycin TIW as above  -currently does not meet any SIRS criteria  -will hold off other antibiotics for now  -to consult ID in AM  -outpatient pulmonology suspicious of potential immunodeficiency given higher rate of infection than anticipated  -to consider sending IgG subclasses, quantitative immunoglobulins, Strep pneumoniae titers, Vitamin D levels    ##heme  -history of anemia  -consistent with baseline  -no acute issues    ##endocrine  no acute issues

## 2020-05-18 NOTE — PROGRESS NOTE ADULT - ASSESSMENT
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) and chronic vent dependence (on trach collar ~2 hours per day), COPD, GERD, anxiety who presents for FTT    ##neuro  -no acute issues  -ISTOP Reference #: [id number]  -patient takes zolpidem 5mg prn and clonezepam 0.5mg TID prn  -will continue    ##CV  -no acute issues  -home losartan and norvasc was held last admission  -to restart prn    ##pulmonary  Tracheomalacia s/p trach  -vent dependence, on collar appx 2 hours/day  -goal up to 12 hours/day  -as outpatient attempting to wean off vent as tolerated   -on the following settings at home: 3 L SIMV: VT- 450; PC 20; PS-18; PEEP- 5  - Night CPAP 5/PS 18 (back up 8 B/mm)    COPD  -on azithromycin TIW for severe COPD  -outpatient pulmonology recently changed medications  -mucomyst PRN for secretions  -continue Combivent 1 inhalation up to QID  -symbicort BID  -has multiple other inhaler prescriptions, unclear what patient takes at home  -will need to call pharmacy in AM    ##renal  -no acute issues    ##GI    FTT  -has had prior EGD and colonoscopy which were unremarkable  -prior infectious and malignancy workup in March also unrevealing   -no associated lab derangements  -evaluated by nutrition on last admission  -dysphagia eval, placed on dysphagia diet  -potential PEG placement?      ##ID  -started on doxycycline (today is day 3 as per patient) for presummed PNA  -has history of polymicrobial sputum culture (MRSAl klebsiella, serratia, achromobacter) and chronic psuedomonas colonization  -patient taking azithromycin TIW as above  -currently does not meet any SIRS criteria  -will hold off other antibiotics for now  -to consult ID in AM  -outpatient pulmonology suspicious of potential immunodeficiency given higher rate of infection than anticipated  -to consider sending IgG subclasses, quantitative immunoglobulins, Strep pneumoniae titers, Vitamin D levels    ##heme  -history of anemia  -consistent with baseline  -no acute issues    ##endocrine  no acute issues Patient is a 61 y/o F with PMHx of tracheomalacia s/p tracheostomy (2004) c/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    ##neuro  -no acute issues  -ISTOP Reference #: [id number]  -patient takes zolpidem 5mg prn and clonezepam 0.5mg TID prn  -will continue    ##CV  -no acute issues  -home losartan and norvasc was held last admission  -to restart prn    ##pulmonary  Tracheomalacia s/p trach, pt states she came for trach change, spoke with ENT team who said if pt is there in AM they can change trach   -vent dependence, on collar appx 2 hours/day  -goal up to 12 hours/day  -as outpatient attempting to wean off vent as tolerated   -on the following settings at home: 3 L SIMV: VT- 450; PC 20; PS-18; PEEP- 5, no increase in settings  - Night CPAP 5/PS 18 (back up 8 B/mm)    COPD  -on azithromycin TIW for severe COPD  -outpatient pulmonology recently changed medications  -mucomyst PRN for secretions  -continue Combivent 1 inhalation up to QID  -symbicort BID  -has multiple other inhaler prescriptions, unclear what patient takes at home (per home nurse, pt takes albuterol nebs as inhalers dry her mouth)  -will need to call pharmacy in AM    ##renal  -no acute issues    ##GI  - Offered to arrange PEG tube, pt doesn't want it    FTT  -has had prior EGD and colonoscopy which were unremarkable  -prior infectious and malignancy workup in March also unrevealing   -no associated lab derangements  -evaluated by nutrition on last admission  -dysphagia eval, placed on dysphagia diet  -potential PEG placement?      ##ID  -started on doxycycline (today is day 3 as per patient) for presumed PNA  -has history of polymicrobial sputum culture (MRSAl klebsiella, serratia, achromobacter) and chronic psuedomonas colonization  -patient taking azithromycin TIW as above  -currently does not meet any SIRS criteria  -will hold off other antibiotics for now  -ID saw patient, no IV abx needed as pt was requesting  -outpatient pulmonology suspicious of potential immunodeficiency given higher rate of infection than anticipated  -to consider sending IgG subclasses, quantitative immunoglobulins, Strep pneumoniae titers, Vitamin D levels    ##heme  -history of anemia  -consistent with baseline  -no acute issues    ##endocrine  no acute issues

## 2020-05-19 ENCOUNTER — TRANSCRIPTION ENCOUNTER (OUTPATIENT)
Age: 60
End: 2020-05-19

## 2020-05-19 DIAGNOSIS — J39.8 OTHER SPECIFIED DISEASES OF UPPER RESPIRATORY TRACT: ICD-10-CM

## 2020-05-19 LAB
ANION GAP SERPL CALC-SCNC: 9 MMO/L — SIGNIFICANT CHANGE UP (ref 7–14)
BASOPHILS # BLD AUTO: 0.08 K/UL — SIGNIFICANT CHANGE UP (ref 0–0.2)
BASOPHILS NFR BLD AUTO: 1 % — SIGNIFICANT CHANGE UP (ref 0–2)
BUN SERPL-MCNC: < 2 MG/DL — LOW (ref 7–23)
CALCIUM SERPL-MCNC: 9.2 MG/DL — SIGNIFICANT CHANGE UP (ref 8.4–10.5)
CHLORIDE SERPL-SCNC: 105 MMOL/L — SIGNIFICANT CHANGE UP (ref 98–107)
CO2 SERPL-SCNC: 28 MMOL/L — SIGNIFICANT CHANGE UP (ref 22–31)
CREAT SERPL-MCNC: 0.63 MG/DL — SIGNIFICANT CHANGE UP (ref 0.5–1.3)
EOSINOPHIL # BLD AUTO: 0.23 K/UL — SIGNIFICANT CHANGE UP (ref 0–0.5)
EOSINOPHIL NFR BLD AUTO: 3 % — SIGNIFICANT CHANGE UP (ref 0–6)
GLUCOSE SERPL-MCNC: 100 MG/DL — HIGH (ref 70–99)
HCT VFR BLD CALC: 30.8 % — LOW (ref 34.5–45)
HGB BLD-MCNC: 10.2 G/DL — LOW (ref 11.5–15.5)
IMM GRANULOCYTES NFR BLD AUTO: 0.3 % — SIGNIFICANT CHANGE UP (ref 0–1.5)
LYMPHOCYTES # BLD AUTO: 2.25 K/UL — SIGNIFICANT CHANGE UP (ref 1–3.3)
LYMPHOCYTES # BLD AUTO: 29.2 % — SIGNIFICANT CHANGE UP (ref 13–44)
MAGNESIUM SERPL-MCNC: 1.8 MG/DL — SIGNIFICANT CHANGE UP (ref 1.6–2.6)
MCHC RBC-ENTMCNC: 27.8 PG — SIGNIFICANT CHANGE UP (ref 27–34)
MCHC RBC-ENTMCNC: 33.1 % — SIGNIFICANT CHANGE UP (ref 32–36)
MCV RBC AUTO: 83.9 FL — SIGNIFICANT CHANGE UP (ref 80–100)
MONOCYTES # BLD AUTO: 0.76 K/UL — SIGNIFICANT CHANGE UP (ref 0–0.9)
MONOCYTES NFR BLD AUTO: 9.9 % — SIGNIFICANT CHANGE UP (ref 2–14)
NEUTROPHILS # BLD AUTO: 4.37 K/UL — SIGNIFICANT CHANGE UP (ref 1.8–7.4)
NEUTROPHILS NFR BLD AUTO: 56.6 % — SIGNIFICANT CHANGE UP (ref 43–77)
NRBC # FLD: 0 K/UL — SIGNIFICANT CHANGE UP (ref 0–0)
PHOSPHATE SERPL-MCNC: 3.3 MG/DL — SIGNIFICANT CHANGE UP (ref 2.5–4.5)
PLATELET # BLD AUTO: 272 K/UL — SIGNIFICANT CHANGE UP (ref 150–400)
PMV BLD: 10.9 FL — SIGNIFICANT CHANGE UP (ref 7–13)
POTASSIUM SERPL-MCNC: 4.3 MMOL/L — SIGNIFICANT CHANGE UP (ref 3.5–5.3)
POTASSIUM SERPL-SCNC: 4.3 MMOL/L — SIGNIFICANT CHANGE UP (ref 3.5–5.3)
RBC # BLD: 3.67 M/UL — LOW (ref 3.8–5.2)
RBC # FLD: 14.8 % — HIGH (ref 10.3–14.5)
SODIUM SERPL-SCNC: 142 MMOL/L — SIGNIFICANT CHANGE UP (ref 135–145)
WBC # BLD: 7.71 K/UL — SIGNIFICANT CHANGE UP (ref 3.8–10.5)
WBC # FLD AUTO: 7.71 K/UL — SIGNIFICANT CHANGE UP (ref 3.8–10.5)

## 2020-05-19 PROCEDURE — 99232 SBSQ HOSP IP/OBS MODERATE 35: CPT

## 2020-05-19 PROCEDURE — 99222 1ST HOSP IP/OBS MODERATE 55: CPT

## 2020-05-19 PROCEDURE — 99231 SBSQ HOSP IP/OBS SF/LOW 25: CPT

## 2020-05-19 RX ORDER — ALBUTEROL 90 UG/1
4 AEROSOL, METERED ORAL EVERY 6 HOURS
Refills: 0 | Status: DISCONTINUED | OUTPATIENT
Start: 2020-05-19 | End: 2020-05-19

## 2020-05-19 RX ORDER — ALBUTEROL 90 UG/1
2.5 AEROSOL, METERED ORAL EVERY 6 HOURS
Refills: 0 | Status: DISCONTINUED | OUTPATIENT
Start: 2020-05-19 | End: 2020-05-19

## 2020-05-19 RX ORDER — IPRATROPIUM BROMIDE 0.2 MG/ML
2 SOLUTION, NON-ORAL INHALATION EVERY 6 HOURS
Refills: 0 | Status: DISCONTINUED | OUTPATIENT
Start: 2020-05-19 | End: 2020-05-27

## 2020-05-19 RX ORDER — ALBUTEROL 90 UG/1
1 AEROSOL, METERED ORAL EVERY 4 HOURS
Refills: 0 | Status: COMPLETED | OUTPATIENT
Start: 2020-05-19 | End: 2021-04-17

## 2020-05-19 RX ADMIN — BUDESONIDE AND FORMOTEROL FUMARATE DIHYDRATE 2 PUFF(S): 160; 4.5 AEROSOL RESPIRATORY (INHALATION) at 23:07

## 2020-05-19 RX ADMIN — CHLORHEXIDINE GLUCONATE 1 APPLICATION(S): 213 SOLUTION TOPICAL at 13:04

## 2020-05-19 RX ADMIN — ENOXAPARIN SODIUM 40 MILLIGRAM(S): 100 INJECTION SUBCUTANEOUS at 13:04

## 2020-05-19 RX ADMIN — BUDESONIDE AND FORMOTEROL FUMARATE DIHYDRATE 2 PUFF(S): 160; 4.5 AEROSOL RESPIRATORY (INHALATION) at 09:36

## 2020-05-19 RX ADMIN — Medication 3 MILLILITER(S): at 04:42

## 2020-05-19 RX ADMIN — Medication 2 PUFF(S): at 23:07

## 2020-05-19 RX ADMIN — Medication 3 MILLILITER(S): at 09:40

## 2020-05-19 NOTE — PROGRESS NOTE ADULT - SUBJECTIVE AND OBJECTIVE BOX
INTERVAL HPI/OVERNIGHT EVENTS:    SUBJECTIVE: Patient seen and examined at bedside.     CONSTITUTIONAL: No weakness, fevers or chills  EYES/ENT: No visual changes;  No vertigo or throat pain   NECK: No pain or stiffness  RESPIRATORY: No cough, wheezing, hemoptysis; No shortness of breath  CARDIOVASCULAR: No chest pain or palpitations  GASTROINTESTINAL: No abdominal or epigastric pain. No nausea, vomiting, or hematemesis; No diarrhea or constipation. No melena or hematochezia.  GENITOURINARY: No dysuria, frequency or hematuria  NEUROLOGICAL: No numbness or weakness  SKIN: No itching, rashes    OBJECTIVE:    VITAL SIGNS:  ICU Vital Signs Last 24 Hrs  T(C): 36.3 (19 May 2020 04:00), Max: 37 (18 May 2020 14:00)  T(F): 97.3 (19 May 2020 04:00), Max: 98.6 (18 May 2020 14:00)  HR: 64 (19 May 2020 09:40) (50 - 84)  BP: 118/79 (19 May 2020 06:00) (109/78 - 134/79)  BP(mean): 88 (19 May 2020 06:00) (79 - 88)  ABP: --  ABP(mean): --  RR: 17 (19 May 2020 06:00) (11 - 22)  SpO2: 100% (19 May 2020 07:11) (92% - 100%)    Mode: AC/ CMV (Assist Control/ Continuous Mandatory Ventilation), RR (machine): 12, TV (machine): 400, FiO2: 32, PEEP: 5, MAP: 8, PIP: 23    05-18 @ 07:01  -  05-19 @ 07:00  --------------------------------------------------------  IN: 2680 mL / OUT: 1050 mL / NET: 1630 mL      CAPILLARY BLOOD GLUCOSE          PHYSICAL EXAM:    General: NAD  HEENT: NC/AT; PERRL, clear conjunctiva  Neck: supple  Respiratory: CTA b/l  Cardiovascular: +S1/S2; RRR  Abdomen: soft, NT/ND; +BS x4  Extremities: WWP, 2+ peripheral pulses b/l; no LE edema  Skin: normal color and turgor; no rash  Neurological:    MEDICATIONS:  MEDICATIONS  (STANDING):  albuterol/ipratropium for Nebulization. 3 milliLiter(s) Nebulizer every 6 hours  azithromycin   Tablet 250 milliGRAM(s) Oral <User Schedule>  budesonide 160 MICROgram(s)/formoterol 4.5 MICROgram(s) Inhaler 2 Puff(s) Inhalation two times a day  chlorhexidine 0.12% Liquid 15 milliLiter(s) Oral Mucosa every 12 hours  chlorhexidine 4% Liquid 1 Application(s) Topical <User Schedule>  enoxaparin Injectable 40 milliGRAM(s) SubCutaneous daily  tiotropium 18 MICROgram(s) Capsule 1 Capsule(s) Inhalation daily    MEDICATIONS  (PRN):  acetylcysteine 20%  Inhalation 4 milliLiter(s) Inhalation three times a day PRN Secretions  clonazePAM Oral Disintegrating Tablet 0.5 milliGRAM(s) Oral every 8 hours PRN anxiety  zolpidem 5 milliGRAM(s) Oral at bedtime PRN Insomnia  zolpidem 5 milliGRAM(s) Oral at bedtime PRN Insomnia      ALLERGIES:  Allergies    Cipro (Unknown)  latex (Unknown)  theophylline (Hives)    Intolerances        LABS:                        10.2   7.71  )-----------( 272      ( 19 May 2020 03:00 )             30.8     05-    142  |  105  |  < 2<L>  ----------------------------<  100<H>  4.3   |  28  |  0.63    Ca    9.2      19 May 2020 02:33  Phos  3.3       Mg     1.8         TPro  7.4  /  Alb  4.0  /  TBili  1.0  /  DBili  x   /  AST  23  /  ALT  10  /  AlkPhos  57  05-17    PT/INR - ( 18 May 2020 06:30 )   PT: 14.1 SEC;   INR: 1.23          PTT - ( 18 May 2020 06:30 )  PTT:46.2 SEC  Urinalysis Basic - ( 18 May 2020 22:00 )    Color: LIGHT YELLOW / Appearance: CLEAR / S.007 / pH: 6.0  Gluc: NEGATIVE / Ketone: TRACE  / Bili: NEGATIVE / Urobili: NORMAL   Blood: NEGATIVE / Protein: NEGATIVE / Nitrite: NEGATIVE   Leuk Esterase: NEGATIVE / RBC: x / WBC x   Sq Epi: x / Non Sq Epi: x / Bacteria: x        RADIOLOGY & ADDITIONAL TESTS: Reviewed. INTERVAL HPI/OVERNIGHT EVENTS:    SUBJECTIVE: Patient seen and examined at bedside. No acute events overnight, pt waiting to see Dr. Rodriguez.    CONSTITUTIONAL: No weakness, fevers or chills  EYES/ENT: No visual changes;  No vertigo or throat pain   NECK: No pain or stiffness  RESPIRATORY: No cough, wheezing, hemoptysis; No shortness of breath  CARDIOVASCULAR: No chest pain or palpitations  GASTROINTESTINAL: No abdominal or epigastric pain. No nausea, vomiting, or hematemesis; No diarrhea or constipation. No melena or hematochezia.  GENITOURINARY: No dysuria, frequency or hematuria  NEUROLOGICAL: No numbness or weakness  SKIN: No itching, rashes    OBJECTIVE:    VITAL SIGNS:  ICU Vital Signs Last 24 Hrs  T(C): 36.3 (19 May 2020 04:00), Max: 37 (18 May 2020 14:00)  T(F): 97.3 (19 May 2020 04:00), Max: 98.6 (18 May 2020 14:00)  HR: 64 (19 May 2020 09:40) (50 - 84)  BP: 118/79 (19 May 2020 06:00) (109/78 - 134/79)  BP(mean): 88 (19 May 2020 06:00) (79 - 88)  ABP: --  ABP(mean): --  RR: 17 (19 May 2020 06:00) (11 - 22)  SpO2: 100% (19 May 2020 07:11) (92% - 100%)    Mode: AC/ CMV (Assist Control/ Continuous Mandatory Ventilation), RR (machine): 12, TV (machine): 400, FiO2: 32, PEEP: 5, MAP: 8, PIP: 23    05-18 @ 07:01  -  05-19 @ 07:00  --------------------------------------------------------  IN: 2680 mL / OUT: 1050 mL / NET: 1630 mL      CAPILLARY BLOOD GLUCOSE          PHYSICAL EXAM:    General: NAD  HEENT: NC/AT; PERRL, clear conjunctiva, poor dentition, no cellulitic signs surrounding trach  Neck: supple  Respiratory: CTA b/l, no resp distress  Cardiovascular: +S1/S2; RRR  Abdomen: soft, NT/ND  Extremities: WWP, 2+ peripheral pulses b/l; no LE edema  Skin: normal color and turgor; no rash  Neurological: AAOx3, moving all ext without lateralization    MEDICATIONS:  MEDICATIONS  (STANDING):  albuterol/ipratropium for Nebulization. 3 milliLiter(s) Nebulizer every 6 hours  azithromycin   Tablet 250 milliGRAM(s) Oral <User Schedule>  budesonide 160 MICROgram(s)/formoterol 4.5 MICROgram(s) Inhaler 2 Puff(s) Inhalation two times a day  chlorhexidine 0.12% Liquid 15 milliLiter(s) Oral Mucosa every 12 hours  chlorhexidine 4% Liquid 1 Application(s) Topical <User Schedule>  enoxaparin Injectable 40 milliGRAM(s) SubCutaneous daily  tiotropium 18 MICROgram(s) Capsule 1 Capsule(s) Inhalation daily    MEDICATIONS  (PRN):  acetylcysteine 20%  Inhalation 4 milliLiter(s) Inhalation three times a day PRN Secretions  clonazePAM Oral Disintegrating Tablet 0.5 milliGRAM(s) Oral every 8 hours PRN anxiety  zolpidem 5 milliGRAM(s) Oral at bedtime PRN Insomnia  zolpidem 5 milliGRAM(s) Oral at bedtime PRN Insomnia      ALLERGIES:  Allergies    Cipro (Unknown)  latex (Unknown)  theophylline (Hives)    Intolerances        LABS:                        10.2   7.71  )-----------( 272      ( 19 May 2020 03:00 )             30.8     05-19    142  |  105  |  < 2<L>  ----------------------------<  100<H>  4.3   |  28  |  0.63    Ca    9.2      19 May 2020 02:33  Phos  3.3     05-19  Mg     1.8     -19    TPro  7.4  /  Alb  4.0  /  TBili  1.0  /  DBili  x   /  AST  23  /  ALT  10  /  AlkPhos  57  05-17    PT/INR - ( 18 May 2020 06:30 )   PT: 14.1 SEC;   INR: 1.23          PTT - ( 18 May 2020 06:30 )  PTT:46.2 SEC  Urinalysis Basic - ( 18 May 2020 22:00 )    Color: LIGHT YELLOW / Appearance: CLEAR / S.007 / pH: 6.0  Gluc: NEGATIVE / Ketone: TRACE  / Bili: NEGATIVE / Urobili: NORMAL   Blood: NEGATIVE / Protein: NEGATIVE / Nitrite: NEGATIVE   Leuk Esterase: NEGATIVE / RBC: x / WBC x   Sq Epi: x / Non Sq Epi: x / Bacteria: x        RADIOLOGY & ADDITIONAL TESTS: Reviewed.

## 2020-05-19 NOTE — CHART NOTE - NSCHARTNOTEFT_GEN_A_CORE
Saw patient with Dr. Rodriguez, she states she does want a G tube and that she needed to talk to Dr. Rodriguez before she could decide Saw patient with Dr. Rodriguez, she states she does want a G tube and that she needed to talk to Dr. Rodriguez before she could decide.   Will  GI team  Called Yulia in [facility] to cancel home aid reinstatement and transportation

## 2020-05-19 NOTE — CONSULT NOTE ADULT - PROBLEM SELECTOR RECOMMENDATION 9
1. No ENT intervention at this time. Dr. Rodriguez with see patient and patient can be discharged at scheduled time 6pm. Can Follow up as usual with Dr. oRdriguez as an out patient.

## 2020-05-19 NOTE — DISCHARGE NOTE NURSING/CASE MANAGEMENT/SOCIAL WORK - NSSCNAMETXT_GEN_ALL_CORE
24 hour (12 12’s) private duty RN care through [facility]. KAYLEE is Sophie [phone].   HHA 4 days/4 hours through [facility], coordinator nuris Pritchard [phone].

## 2020-05-19 NOTE — PHARMACOTHERAPY INTERVENTION NOTE - COMMENTS
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     Recommendation:  - Consider discontinuing tiotropium as patient is also on standing duoneb

## 2020-05-19 NOTE — DISCHARGE NOTE NURSING/CASE MANAGEMENT/SOCIAL WORK - NSDCDMENAME_GEN_ALL_CORE_FT
[facility] trach+ home vent+supplies [facility] [phone].  Peg feed + supplies: [facility] [phone]

## 2020-05-19 NOTE — PROGRESS NOTE ADULT - ASSESSMENT
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    ##neuro  -no acute issues  -ISTOP Reference #: [id number]  -patient takes zolpidem 5mg prn and clonezepam 0.5mg TID prn  -will continue    ##CV  -no acute issues  -home losartan and norvasc was held last admission  -to restart prn    ##pulmonary  Tracheomalacia s/p trach, pt states she came for trach change, spoke with ENT team who said if pt is there in AM they can change trach   -vent dependence, on collar appx 2 hours/day  -goal up to 12 hours/day  -as outpatient attempting to wean off vent as tolerated   -on the following settings at home: 3 L SIMV: VT- 450; PC 20; PS-18; PEEP- 5, no increase in settings  - Night CPAP 5/PS 18 (back up 8 B/mm)    COPD  -on azithromycin TIW for severe COPD  -outpatient pulmonology recently changed medications  -mucomyst PRN for secretions  -continue Combivent 1 inhalation up to QID  -symbicort BID  -has multiple other inhaler prescriptions, unclear what patient takes at home (per home nurse, pt takes albuterol nebs as inhalers dry her mouth)  -will need to call pharmacy in AM    ##renal  -no acute issues    ##GI  - Offered to arrange PEG tube, pt doesn't want it    FTT  -has had prior EGD and colonoscopy which were unremarkable  -prior infectious and malignancy workup in March also unrevealing   -no associated lab derangements  -evaluated by nutrition on last admission  -dysphagia eval, placed on dysphagia diet  -potential PEG placement?      ##ID  -started on doxycycline (today is day 3 as per patient) for presumed PNA  -has history of polymicrobial sputum culture (MRSAl klebsiella, serratia, achromobacter) and chronic psuedomonas colonization  -patient taking azithromycin TIW as above  -currently does not meet any SIRS criteria  -will hold off other antibiotics for now  -ID saw patient, no IV abx needed as pt was requesting  -outpatient pulmonology suspicious of potential immunodeficiency given higher rate of infection than anticipated  -to consider sending IgG subclasses, quantitative immunoglobulins, Strep pneumoniae titers, Vitamin D levels    ##heme  -history of anemia  -consistent with baseline  -no acute issues    ##endocrine  no acute issues 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    ##neuro  -no acute issues  -ISTOP Reference #: [id number]  -patient takes zolpidem 5mg prn and clonezepam 0.5mg TID prn  -will continue    ##CV  -no acute issues  -home losartan and norvasc was held last admission  -to restart prn    ##pulmonary  Tracheomalacia s/p trach, pt states she came for trach change, spoke with ENT team Dr. Rodriguez to see pt today  -vent dependence, on collar appx 2 hours/day  -goal up to 12 hours/day  -as outpatient attempting to wean off vent as tolerated   -on the following settings at home: 3 L SIMV: VT- 450; PC 20; PS-18; PEEP- 5, no increase in settings  - Night CPAP 5/PS 18 (back up 8 B/mm)    COPD  -on azithromycin TIW for severe COPD  -outpatient pulmonology recently changed medications  -mucomyst PRN for secretions  -continue Combivent 1 inhalation up to QID  -symbicort BID  -has multiple other inhaler prescriptions, unclear what patient takes at home (per home nurse, pt takes albuterol nebs as inhalers dry her mouth)  -will need to call pharmacy in AM    ##renal  -no acute issues    ##GI  - Offered to arrange PEG tube, pt doesn't want it, after Dr. Rodriguez saw patient she's agreeing to peg tube    FTT  -has had prior EGD and colonoscopy which were unremarkable  -prior infectious and malignancy workup in March also unrevealing   -no associated lab derangements  -evaluated by nutrition on last admission  -dysphagia eval, placed on dysphagia diet  -potential PEG placement?      ##ID  -doxy dc'jaswinder, pt with chronic tracheobronchitis on Azithro   -has history of polymicrobial sputum culture (MRSAl klebsiella, serratia, achromobacter) and chronic psuedomonas colonization  -patient taking azithromycin TIW as above  -currently does not meet any SIRS criteria  -will hold off other antibiotics for now  -ID saw patient, no IV abx needed as pt was requesting  -outpatient pulmonology suspicious of potential immunodeficiency given higher rate of infection than anticipated  -to consider sending IgG subclasses, quantitative immunoglobulins, Strep pneumoniae titers, Vitamin D levels    ##heme  -history of anemia  -consistent with baseline  -no acute issues    ##endocrine  no acute issues

## 2020-05-19 NOTE — CHART NOTE - NSCHARTNOTEFT_GEN_A_CORE
Spoke to Yulia with [facility], [id number] [phone], she set up ALS ambulance and reinstated home nursing for 7pm tonight. Waiting for Dr. Rodriguez with ENT to see patient prior to DC.

## 2020-05-19 NOTE — CONSULT NOTE ADULT - SUBJECTIVE AND OBJECTIVE BOX
CC: Dysphagia     HPI: 60 year old female with a history tracheomalacia and chronic tracheostomy tube that came into J stating she was sent for possible procedure ? EGD. Also Stating Dr. Rodriguez told her that he and ID doctor felt she needed to be treated with IV antibiotics for trachitis so he wanted her to be admitted as well. Denies any fevers at this time but has felt "hot" Denies any cough, chills, rigors, sweats or any other complaints.        PAST MEDICAL & SURGICAL HISTORY:  Lupus anticoagulant syndrome  Tracheomalacia  Pancreatic cyst  Anxiety disorder  Sickle cell trait  Hypertension  COPD (chronic obstructive pulmonary disease)  Tracheostomy dependent  S/P :   Tracheal stenosis: excision of tracheal stenosis 10/2017  revision of tracheal stoma 3/2018  Acute tracheostomy management  Dependence on tracheostomy    Allergies    Cipro (Unknown)  latex (Unknown)  theophylline (Hives)    Intolerances      MEDICATIONS  (STANDING):  albuterol/ipratropium for Nebulization. 3 milliLiter(s) Nebulizer every 6 hours  azithromycin   Tablet 250 milliGRAM(s) Oral <User Schedule>  budesonide 160 MICROgram(s)/formoterol 4.5 MICROgram(s) Inhaler 2 Puff(s) Inhalation two times a day  chlorhexidine 0.12% Liquid 15 milliLiter(s) Oral Mucosa every 12 hours  chlorhexidine 4% Liquid 1 Application(s) Topical <User Schedule>  enoxaparin Injectable 40 milliGRAM(s) SubCutaneous daily  tiotropium 18 MICROgram(s) Capsule 1 Capsule(s) Inhalation daily    MEDICATIONS  (PRN):  acetylcysteine 20%  Inhalation 4 milliLiter(s) Inhalation three times a day PRN Secretions  clonazePAM Oral Disintegrating Tablet 0.5 milliGRAM(s) Oral every 8 hours PRN anxiety  zolpidem 5 milliGRAM(s) Oral at bedtime PRN Insomnia  zolpidem 5 milliGRAM(s) Oral at bedtime PRN Insomnia      Social History: **??**    Family history: **??**    ROS:   ENT: all negative except as noted in HPI   CV: denies palpitations  Pulm: denies SOB, cough, hemoptysis  GI: denies change in apetite, indigestion, n/v  : denies pertinent urinary symptoms, urgency  Neuro: denies numbness/tingling, loss of sensation  Psych: denies anxiety  MS: denies muscle weakness, instability  Heme: denies easy bruising or bleeding  Endo: denies heat/cold intolerance, excessive sweating  Vascular: denies LE edema    Vital Signs Last 24 Hrs  T(C): 36.2 (19 May 2020 12:00), Max: 37 (18 May 2020 14:00)  T(F): 97.2 (19 May 2020 12:00), Max: 98.6 (18 May 2020 14:00)  HR: 59 (19 May 2020 12:00) (50 - 82)  BP: 122/94 (19 May 2020 12:00) (109/78 - 134/79)  BP(mean): 99 (19 May 2020 12:00) (79 - 99)  RR: 15 (19 May 2020 12:00) (11 - 23)  SpO2: 100% (19 May 2020 12:00) (96% - 100%)                          10.2   7.71  )-----------( 272      ( 19 May 2020 03:00 )             30.8    05-19    142  |  105  |  < 2<L>  ----------------------------<  100<H>  4.3   |  28  |  0.63    Ca    9.2      19 May 2020 02:33  Phos  3.3     05-  Mg     1.8     -19    TPro  7.4  /  Alb  4.0  /  TBili  1.0  /  DBili  x   /  AST  23  /  ALT  10  /  AlkPhos  57  05-17   PT/INR - ( 18 May 2020 06:30 )   PT: 14.1 SEC;   INR: 1.23          PTT - ( 18 May 2020 06:30 )  PTT:46.2 SEC    PHYSICAL EXAM:  Gen: NAD  Skin: No rashes, bruises, or lesions  Head: Normocephalic, Atraumatic  Face: no edema, erythema, or fluctuance. Parotid glands soft without mass  Eyes: no scleral injection  Nose: Nares bilaterally patent, no discharge  Mouth: No Stridor / Drooling / Trismus.   Neck: Flat, supple, no lymphadenopathy, tracheostomy tube size 6LPC in place/vent tube connected., no masses  Lymphatic: No lymphadenopathy  Resp: breathing easily, no stridor  Peripheral vascular: no JVD or edema  Neuro: facial nerve intact, no facial droop      IMAGING/ADDITIONAL STUDIES:

## 2020-05-19 NOTE — DIETITIAN INITIAL EVALUATION ADULT. - OTHER INFO
Pt admitted w/dx of Failure to thrive in adult.  Pt w/ hx of tracheomalacia, s/p trache, c/b tracheal stenosis.  Unable to conduct a face to face interview or nutrition-focused physical exam due to limited contact restrictions related to Pt's medical condition and isolation precautions. Pt is currently trache to vent.  Pt had reported 100lb wt loss "over few weeks."  On review of wt history on HIE, noted 121.7lb wt loss x2 years.  Wt recorded: 4/2018: 120.2kg; 7/2018: 99.8kg  1/2019: 88kg; 8/2019:  86.2kg; 3/2020 ([facility] admission): 69.9kg.  RDN note from 3/2020 reviewed-noted pt w/persistent lack of appetite.  Swallow evaluation performed on 3/10/2020-recommendation given for pureed consistency foods w/honey consistency fluids.  Pt assessed w/severe malnutrition 2/2 wt loss and stated decrease food intake x3m PTA.  Spoke w/RN today - pt had farina, and tea this morning for breakfast (?)-pt is on honey consistency fluids.  As per flowsheet from yesterday, pt not eating pureed foods.  According to chart, "pt has been on pureed foods at home for the last 4 months, but has been eating less and is afraid to eat because feels like food stuck in chest."  Pt has been offered a PEG, but has refused.

## 2020-05-19 NOTE — DISCHARGE NOTE NURSING/CASE MANAGEMENT/SOCIAL WORK - PATIENT PORTAL LINK FT
You can access the FollowMyHealth Patient Portal offered by [facility] by registering at the following website: [email or web address]. By joining [facility]’s FollowMyHealth portal, you will also be able to view your health information using other applications (apps) compatible with our system.

## 2020-05-20 LAB
-  AMIKACIN: SIGNIFICANT CHANGE UP
-  AMOXICILLIN/CLAVULANIC ACID: SIGNIFICANT CHANGE UP
-  AMPICILLIN/SULBACTAM: SIGNIFICANT CHANGE UP
-  AMPICILLIN: SIGNIFICANT CHANGE UP
-  AZTREONAM: SIGNIFICANT CHANGE UP
-  CEFAZOLIN: SIGNIFICANT CHANGE UP
-  CEFEPIME: SIGNIFICANT CHANGE UP
-  CEFOXITIN: SIGNIFICANT CHANGE UP
-  CEFTRIAXONE: SIGNIFICANT CHANGE UP
-  CIPROFLOXACIN: SIGNIFICANT CHANGE UP
-  ERTAPENEM: SIGNIFICANT CHANGE UP
-  GENTAMICIN: SIGNIFICANT CHANGE UP
-  IMIPENEM: SIGNIFICANT CHANGE UP
-  LEVOFLOXACIN: SIGNIFICANT CHANGE UP
-  MEROPENEM: SIGNIFICANT CHANGE UP
-  PIPERACILLIN/TAZOBACTAM: SIGNIFICANT CHANGE UP
-  TOBRAMYCIN: SIGNIFICANT CHANGE UP
-  TRIMETHOPRIM/SULFAMETHOXAZOLE: SIGNIFICANT CHANGE UP
ANION GAP SERPL CALC-SCNC: 9 MMO/L — SIGNIFICANT CHANGE UP (ref 7–14)
BASOPHILS # BLD AUTO: 0.05 K/UL — SIGNIFICANT CHANGE UP (ref 0–0.2)
BASOPHILS NFR BLD AUTO: 0.9 % — SIGNIFICANT CHANGE UP (ref 0–2)
BUN SERPL-MCNC: 3 MG/DL — LOW (ref 7–23)
CALCIUM SERPL-MCNC: 9.3 MG/DL — SIGNIFICANT CHANGE UP (ref 8.4–10.5)
CHLORIDE SERPL-SCNC: 105 MMOL/L — SIGNIFICANT CHANGE UP (ref 98–107)
CO2 SERPL-SCNC: 27 MMOL/L — SIGNIFICANT CHANGE UP (ref 22–31)
CREAT SERPL-MCNC: 0.55 MG/DL — SIGNIFICANT CHANGE UP (ref 0.5–1.3)
CULTURE RESULTS: SIGNIFICANT CHANGE UP
EOSINOPHIL # BLD AUTO: 0.23 K/UL — SIGNIFICANT CHANGE UP (ref 0–0.5)
EOSINOPHIL NFR BLD AUTO: 4.1 % — SIGNIFICANT CHANGE UP (ref 0–6)
GLUCOSE SERPL-MCNC: 91 MG/DL — SIGNIFICANT CHANGE UP (ref 70–99)
HCT VFR BLD CALC: 29.6 % — LOW (ref 34.5–45)
HGB BLD-MCNC: 9.6 G/DL — LOW (ref 11.5–15.5)
IMM GRANULOCYTES NFR BLD AUTO: 0.2 % — SIGNIFICANT CHANGE UP (ref 0–1.5)
LYMPHOCYTES # BLD AUTO: 2.08 K/UL — SIGNIFICANT CHANGE UP (ref 1–3.3)
LYMPHOCYTES # BLD AUTO: 37.2 % — SIGNIFICANT CHANGE UP (ref 13–44)
MAGNESIUM SERPL-MCNC: 1.7 MG/DL — SIGNIFICANT CHANGE UP (ref 1.6–2.6)
MCHC RBC-ENTMCNC: 26.7 PG — LOW (ref 27–34)
MCHC RBC-ENTMCNC: 32.4 % — SIGNIFICANT CHANGE UP (ref 32–36)
MCV RBC AUTO: 82.5 FL — SIGNIFICANT CHANGE UP (ref 80–100)
METHOD TYPE: SIGNIFICANT CHANGE UP
METHOD TYPE: SIGNIFICANT CHANGE UP
MONOCYTES # BLD AUTO: 0.6 K/UL — SIGNIFICANT CHANGE UP (ref 0–0.9)
MONOCYTES NFR BLD AUTO: 10.7 % — SIGNIFICANT CHANGE UP (ref 2–14)
NEUTROPHILS # BLD AUTO: 2.62 K/UL — SIGNIFICANT CHANGE UP (ref 1.8–7.4)
NEUTROPHILS NFR BLD AUTO: 46.9 % — SIGNIFICANT CHANGE UP (ref 43–77)
NRBC # FLD: 0 K/UL — SIGNIFICANT CHANGE UP (ref 0–0)
ORGANISM # SPEC MICROSCOPIC CNT: SIGNIFICANT CHANGE UP
PHOSPHATE SERPL-MCNC: 3.7 MG/DL — SIGNIFICANT CHANGE UP (ref 2.5–4.5)
PLATELET # BLD AUTO: 264 K/UL — SIGNIFICANT CHANGE UP (ref 150–400)
PMV BLD: 11.2 FL — SIGNIFICANT CHANGE UP (ref 7–13)
POTASSIUM SERPL-MCNC: 3.5 MMOL/L — SIGNIFICANT CHANGE UP (ref 3.5–5.3)
POTASSIUM SERPL-SCNC: 3.5 MMOL/L — SIGNIFICANT CHANGE UP (ref 3.5–5.3)
RBC # BLD: 3.59 M/UL — LOW (ref 3.8–5.2)
RBC # FLD: 14.7 % — HIGH (ref 10.3–14.5)
SODIUM SERPL-SCNC: 141 MMOL/L — SIGNIFICANT CHANGE UP (ref 135–145)
SPECIMEN SOURCE: SIGNIFICANT CHANGE UP
WBC # BLD: 5.59 K/UL — SIGNIFICANT CHANGE UP (ref 3.8–10.5)
WBC # FLD AUTO: 5.59 K/UL — SIGNIFICANT CHANGE UP (ref 3.8–10.5)

## 2020-05-20 PROCEDURE — 99291 CRITICAL CARE FIRST HOUR: CPT

## 2020-05-20 RX ORDER — MAGNESIUM SULFATE 500 MG/ML
1 VIAL (ML) INJECTION ONCE
Refills: 0 | Status: COMPLETED | OUTPATIENT
Start: 2020-05-20 | End: 2020-05-20

## 2020-05-20 RX ORDER — POTASSIUM CHLORIDE 20 MEQ
40 PACKET (EA) ORAL ONCE
Refills: 0 | Status: DISCONTINUED | OUTPATIENT
Start: 2020-05-20 | End: 2020-05-20

## 2020-05-20 RX ORDER — POTASSIUM CHLORIDE 20 MEQ
40 PACKET (EA) ORAL ONCE
Refills: 0 | Status: COMPLETED | OUTPATIENT
Start: 2020-05-20 | End: 2020-05-20

## 2020-05-20 RX ADMIN — Medication 2 PUFF(S): at 04:20

## 2020-05-20 RX ADMIN — BUDESONIDE AND FORMOTEROL FUMARATE DIHYDRATE 2 PUFF(S): 160; 4.5 AEROSOL RESPIRATORY (INHALATION) at 09:14

## 2020-05-20 RX ADMIN — CHLORHEXIDINE GLUCONATE 15 MILLILITER(S): 213 SOLUTION TOPICAL at 18:13

## 2020-05-20 RX ADMIN — BUDESONIDE AND FORMOTEROL FUMARATE DIHYDRATE 2 PUFF(S): 160; 4.5 AEROSOL RESPIRATORY (INHALATION) at 21:53

## 2020-05-20 RX ADMIN — ENOXAPARIN SODIUM 40 MILLIGRAM(S): 100 INJECTION SUBCUTANEOUS at 12:31

## 2020-05-20 RX ADMIN — Medication 40 MILLIEQUIVALENT(S): at 12:31

## 2020-05-20 RX ADMIN — Medication 2 PUFF(S): at 21:53

## 2020-05-20 RX ADMIN — Medication 2 PUFF(S): at 09:13

## 2020-05-20 RX ADMIN — Medication 100 GRAM(S): at 10:28

## 2020-05-20 RX ADMIN — Medication 2 PUFF(S): at 15:27

## 2020-05-20 RX ADMIN — AZITHROMYCIN 250 MILLIGRAM(S): 500 TABLET, FILM COATED ORAL at 10:29

## 2020-05-20 RX ADMIN — CHLORHEXIDINE GLUCONATE 1 APPLICATION(S): 213 SOLUTION TOPICAL at 10:28

## 2020-05-20 NOTE — CONSULT NOTE ADULT - SUBJECTIVE AND OBJECTIVE BOX
Chief Complaint:  Patient is a 60y old  Female who presents with a chief complaint of Failure to Thrive (20 May 2020 11:10)    Lupus anticoagulant syndrome  Tracheomalacia  Pancreatic cyst  Anxiety disorder  Sickle cell trait  Hypertension  COPD (chronic obstructive pulmonary disease)  Tracheostomy dependent  S/P   Tracheal stenosis  Acute tracheostomy management  Dependence on tracheostomy     HPI:  Patient is a 59 y/o F with PMHx of tracheomalacia s/p tracheostomy () c/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) and chronic vent dependence (on trach collar ~2 hours per day), COPD, GERD, anxiety who presents with complaint of 100 lb weight loss over the course of weeks. Compared to last hospitalization in March patient noted to have 16 lb weight loss due to decreased po intake from dysphagia. The patient had telehealth visit with ENT, who instructed the patient to go to ED for dehydration and possible G-tube placement. Otherwise, denies chills, malaise, cough, sputum production, hemoptysis, chest pain, abdominal pain, n/v/d, urinary symptoms, LE edema.    The patient had a recent hospitalization (3/9 - 3/13/20) where she also noted a 100lb weight loss over the course of 3 months. Comparing her weight from prior hospitalizations revealed a weight loss of 31 pounds over that time frame. The patient underwent an extensive infectious/malignancy workup. The patient's malignancy workup and EGD/colonoscopy were negative. The patient did speciate Pseudomonas on sputum, and was followed by ID. Since no other symptoms of infection the patient was monitored off antibiotics. The patient also had her trach changed by ENT. The patient had a recent telehealth visit with pulmonlogy where she had an x-ray performed at home and was diagnosed with PNA. The patient had a sputum culture which grew multiple bacteria, and was started on doxycycline    GI consulted for FTT in need of peg placement. Pt known to us from previous admission for c/o food stuck in epigastrium underwent egd with no acute pathology to explain this. She is able to tolerate pureed food and thin liquids as she was noted to be easily consuming these during my visit. She states that her appetite is poor overall and she does no always want to eat her meals. she is wishing for supplemental feeds for when her discomfort with swallow is at its worst        Cipro (Unknown)  latex (Unknown)  theophylline (Hives)      ALBUTerol    90 MICROgram(s) HFA Inhaler 1 Puff(s) Inhalation every 4 hours  azithromycin   Tablet 250 milliGRAM(s) Oral <User Schedule>  budesonide 160 MICROgram(s)/formoterol 4.5 MICROgram(s) Inhaler 2 Puff(s) Inhalation two times a day  chlorhexidine 0.12% Liquid 15 milliLiter(s) Oral Mucosa every 12 hours  chlorhexidine 4% Liquid 1 Application(s) Topical <User Schedule>  clonazePAM Oral Disintegrating Tablet 0.5 milliGRAM(s) Oral every 8 hours PRN  enoxaparin Injectable 40 milliGRAM(s) SubCutaneous daily  ipratropium 17 MICROgram(s) HFA Inhaler 2 Puff(s) Inhalation every 6 hours  zolpidem 5 milliGRAM(s) Oral at bedtime PRN  zolpidem 5 milliGRAM(s) Oral at bedtime PRN        FAMILY HISTORY:  Family history of heart disease (Grandparent)  Family history of leukemia  Family history of lung disease (Sibling)        Review of Systems:    General:  No wt loss, fevers, chills, night sweats, fatigue  Eyes:  Good vision, no reported pain  ENT:  No sore throat, pain, runny nose, +dysphagia  CV:  No pain, palpitations, no lightheadedness  Resp:  No dyspnea, cough, tachypnea, wheezing  GI: as above  :  No pain, bleeding, incontinence, nocturia  Muscle:  No pain, weakness  Neuro:  No weakness, tingling, memory problems  Psych:  No fatigue, insomnia, mood problems, depression  Endocrine:  No polyuria, polydypsia, cold/heat intolerance  Heme:  No petechiae, ecchymosis, easy bruisability  Skin:  No rash, tattoos, scars, edema    Relevant Family History:   n/c    Relevant Social History: n/c      Physical Exam:    Vital Signs:  Vital Signs Last 24 Hrs  T(C): 36.6 (20 May 2020 12:25), Max: 36.7 (20 May 2020 00:00)  T(F): 97.9 (20 May 2020 12:25), Max: 98.1 (20 May 2020 04:00)  HR: 67 (20 May 2020 12:25) (56 - 87)  BP: 138/87 (20 May 2020 12:25) (94/63 - 155/87)  BP(mean): 99 (20 May 2020 12:25) (71 - 103)  RR: 20 (20 May 2020 12:25) (12 - 25)  SpO2: 100% (20 May 2020 12:25) (93% - 100%)  Daily     Daily     General:  Appears stated age, well-groomed, nad  HEENT:  NC/AT,  conjunctivae clear and pink, no thyromegaly, nodules, adenopathy, no JVD +trach  Chest:  Full & symmetric excursion, no increased effort, breath sounds clear  Cardiovascular:  Regular rhythm, S1, S2, no murmur/rub/S3/S4, no abdominal bruit, no edema  Abdomen:  Soft, non-tender, non-distended, normoactive bowel sounds,  no masses ,no hepatosplenomeagaly, no signs of chronic liver disease  Extremities:  no cyanosis,clubbing or edema  Skin:  No rash/erythema/ecchymoses/petechiae/wounds/abscess/warm/dry  Neuro/Psych:  A&Ox3  , no asterixis, no tremor, no encephalopathy    Laboratory:                            9.6    5.59  )-----------( 264      ( 20 May 2020 02:50 )             29.6     05-20    141  |  105  |  3<L>  ----------------------------<  91  3.5   |  27  |  0.55    Ca    9.3      20 May 2020 02:50  Phos  3.7     05-20  Mg     1.7     05-20          Urinalysis Basic - ( 18 May 2020 22:00 )    Color: LIGHT YELLOW / Appearance: CLEAR / S.007 / pH: 6.0  Gluc: NEGATIVE / Ketone: TRACE  / Bili: NEGATIVE / Urobili: NORMAL   Blood: NEGATIVE / Protein: NEGATIVE / Nitrite: NEGATIVE   Leuk Esterase: NEGATIVE / RBC: x / WBC x   Sq Epi: x / Non Sq Epi: x / Bacteria: x        Imaging:

## 2020-05-20 NOTE — PROGRESS NOTE ADULT - SUBJECTIVE AND OBJECTIVE BOX
INTERVAL HPI/OVERNIGHT EVENTS:    SUBJECTIVE: Patient seen and examined at bedside.     CONSTITUTIONAL: No weakness, fevers or chills  EYES/ENT: No visual changes;  No vertigo or throat pain   NECK: No pain or stiffness  RESPIRATORY: No cough, wheezing, hemoptysis; No shortness of breath  CARDIOVASCULAR: No chest pain or palpitations  GASTROINTESTINAL: No abdominal or epigastric pain. No nausea, vomiting, or hematemesis; No diarrhea or constipation. No melena or hematochezia.  GENITOURINARY: No dysuria, frequency or hematuria  NEUROLOGICAL: No numbness or weakness  SKIN: No itching, rashes    OBJECTIVE:    VITAL SIGNS:  ICU Vital Signs Last 24 Hrs  T(C): 36.4 (20 May 2020 08:00), Max: 36.7 (20 May 2020 00:00)  T(F): 97.6 (20 May 2020 08:00), Max: 98.1 (20 May 2020 04:00)  HR: 65 (20 May 2020 10:30) (56 - 87)  BP: 127/57 (20 May 2020 10:30) (94/63 - 155/87)  BP(mean): 73 (20 May 2020 10:30) (71 - 103)  ABP: --  ABP(mean): --  RR: 18 (20 May 2020 10:30) (12 - 25)  SpO2: 100% (20 May 2020 10:30) (93% - 100%)    Mode: AC/ CMV (Assist Control/ Continuous Mandatory Ventilation), RR (machine): 12, TV (machine): 400, FiO2: 32, PEEP: 5, MAP: 9, PIP: 25    0519 @ 07: @ 07:00  --------------------------------------------------------  IN: 120 mL / OUT: 1350 mL / NET: -1230 mL     @ 07:01  -   @ 11:11  --------------------------------------------------------  IN: 360 mL / OUT: 500 mL / NET: -140 mL      CAPILLARY BLOOD GLUCOSE          PHYSICAL EXAM:    General: NAD  HEENT: NC/AT; PERRL, clear conjunctiva  Neck: supple  Respiratory: CTA b/l  Cardiovascular: +S1/S2; RRR  Abdomen: soft, NT/ND; +BS x4  Extremities: WWP, 2+ peripheral pulses b/l; no LE edema  Skin: normal color and turgor; no rash  Neurological:    MEDICATIONS:  MEDICATIONS  (STANDING):  ALBUTerol    90 MICROgram(s) HFA Inhaler 1 Puff(s) Inhalation every 4 hours  azithromycin   Tablet 250 milliGRAM(s) Oral <User Schedule>  budesonide 160 MICROgram(s)/formoterol 4.5 MICROgram(s) Inhaler 2 Puff(s) Inhalation two times a day  chlorhexidine 0.12% Liquid 15 milliLiter(s) Oral Mucosa every 12 hours  chlorhexidine 4% Liquid 1 Application(s) Topical <User Schedule>  enoxaparin Injectable 40 milliGRAM(s) SubCutaneous daily  ipratropium 17 MICROgram(s) HFA Inhaler 2 Puff(s) Inhalation every 6 hours  potassium chloride   Solution 40 milliEquivalent(s) Oral once    MEDICATIONS  (PRN):  clonazePAM Oral Disintegrating Tablet 0.5 milliGRAM(s) Oral every 8 hours PRN anxiety  zolpidem 5 milliGRAM(s) Oral at bedtime PRN Insomnia  zolpidem 5 milliGRAM(s) Oral at bedtime PRN Insomnia      ALLERGIES:  Allergies    Cipro (Unknown)  latex (Unknown)  theophylline (Hives)    Intolerances        LABS:                        9.6    5.59  )-----------( 264      ( 20 May 2020 02:50 )             29.6     05-20    141  |  105  |  3<L>  ----------------------------<  91  3.5   |  27  |  0.55    Ca    9.3      20 May 2020 02:50  Phos  3.7     05-20  Mg     1.7     05-20        Urinalysis Basic - ( 18 May 2020 22:00 )    Color: LIGHT YELLOW / Appearance: CLEAR / S.007 / pH: 6.0  Gluc: NEGATIVE / Ketone: TRACE  / Bili: NEGATIVE / Urobili: NORMAL   Blood: NEGATIVE / Protein: NEGATIVE / Nitrite: NEGATIVE   Leuk Esterase: NEGATIVE / RBC: x / WBC x   Sq Epi: x / Non Sq Epi: x / Bacteria: x        RADIOLOGY & ADDITIONAL TESTS: Reviewed. INTERVAL HPI/OVERNIGHT EVENTS:    SUBJECTIVE: Patient seen and examined at bedside. No acute events overnight. Pt waiting for G tube placement.     CONSTITUTIONAL: No weakness, fevers or chills  EYES/ENT: No visual changes;  No vertigo or throat pain   NECK: No pain or stiffness  RESPIRATORY: No cough, wheezing, hemoptysis; No shortness of breath  CARDIOVASCULAR: No chest pain or palpitations  GASTROINTESTINAL: No abdominal or epigastric pain. No nausea, vomiting, or hematemesis; No diarrhea or constipation. No melena or hematochezia.  GENITOURINARY: No dysuria, frequency or hematuria  NEUROLOGICAL: No numbness or weakness  SKIN: No itching, rashes    OBJECTIVE:    VITAL SIGNS:  ICU Vital Signs Last 24 Hrs  T(C): 36.4 (20 May 2020 08:00), Max: 36.7 (20 May 2020 00:00)  T(F): 97.6 (20 May 2020 08:00), Max: 98.1 (20 May 2020 04:00)  HR: 65 (20 May 2020 10:30) (56 - 87)  BP: 127/57 (20 May 2020 10:30) (94/63 - 155/87)  BP(mean): 73 (20 May 2020 10:30) (71 - 103)  ABP: --  ABP(mean): --  RR: 18 (20 May 2020 10:30) (12 - 25)  SpO2: 100% (20 May 2020 10:30) (93% - 100%)    Mode: AC/ CMV (Assist Control/ Continuous Mandatory Ventilation), RR (machine): 12, TV (machine): 400, FiO2: 32, PEEP: 5, MAP: 9, PIP: 25    - @ 07: @ 07:00  --------------------------------------------------------  IN: 120 mL / OUT: 1350 mL / NET: -1230 mL    0520 @ 07:01  -   @ 11:11  --------------------------------------------------------  IN: 360 mL / OUT: 500 mL / NET: -140 mL      CAPILLARY BLOOD GLUCOSE          PHYSICAL EXAM:    General: NAD  HEENT: NC/AT; clear conjunctiva, poor dentition, trach in place  Neck: supple, no cellulitic signs surrounding trach  Respiratory: on vent with minimal settings, no increased requirements  Cardiovascular: +S1/S2; RRR  Abdomen: soft, NT/ND  Extremities: WWP, 2+ peripheral pulses b/l; no LE edema  Skin: normal color and turgor; no rash  Neurological: AAOx3, moving all ext without lateralization    MEDICATIONS:  MEDICATIONS  (STANDING):  ALBUTerol    90 MICROgram(s) HFA Inhaler 1 Puff(s) Inhalation every 4 hours  azithromycin   Tablet 250 milliGRAM(s) Oral <User Schedule>  budesonide 160 MICROgram(s)/formoterol 4.5 MICROgram(s) Inhaler 2 Puff(s) Inhalation two times a day  chlorhexidine 0.12% Liquid 15 milliLiter(s) Oral Mucosa every 12 hours  chlorhexidine 4% Liquid 1 Application(s) Topical <User Schedule>  enoxaparin Injectable 40 milliGRAM(s) SubCutaneous daily  ipratropium 17 MICROgram(s) HFA Inhaler 2 Puff(s) Inhalation every 6 hours  potassium chloride   Solution 40 milliEquivalent(s) Oral once    MEDICATIONS  (PRN):  clonazePAM Oral Disintegrating Tablet 0.5 milliGRAM(s) Oral every 8 hours PRN anxiety  zolpidem 5 milliGRAM(s) Oral at bedtime PRN Insomnia  zolpidem 5 milliGRAM(s) Oral at bedtime PRN Insomnia      ALLERGIES:  Allergies    Cipro (Unknown)  latex (Unknown)  theophylline (Hives)    Intolerances        LABS:                        9.6    5.59  )-----------( 264      ( 20 May 2020 02:50 )             29.6     05-20    141  |  105  |  3<L>  ----------------------------<  91  3.5   |  27  |  0.55    Ca    9.3      20 May 2020 02:50  Phos  3.7     05-20  Mg     1.7     05-20        Urinalysis Basic - ( 18 May 2020 22:00 )    Color: LIGHT YELLOW / Appearance: CLEAR / S.007 / pH: 6.0  Gluc: NEGATIVE / Ketone: TRACE  / Bili: NEGATIVE / Urobili: NORMAL   Blood: NEGATIVE / Protein: NEGATIVE / Nitrite: NEGATIVE   Leuk Esterase: NEGATIVE / RBC: x / WBC x   Sq Epi: x / Non Sq Epi: x / Bacteria: x        RADIOLOGY & ADDITIONAL TESTS: Reviewed.

## 2020-05-20 NOTE — CONSULT NOTE ADULT - ASSESSMENT
60 year old female with a history of tracheomalcia that presented to [facility] with FTT.
61 yo F with PMHx of tracheomalacia s/p tracheostomy (2004) c/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) and chronic vent dependence (on trach collar ~2 hours per day), COPD, GERD, anxiety who presents for FTT    FTT  in setting of chronic disease  aspiration precautions   cont current diet   will plan for PEG on Friday morning   check Covid status tomorrow per endo suite protocol     Tracheomalacia   daily trach care   care per primary team/icu appreciated     Advanced care planning was discussed with patient and family.  Advanced care planning forms were reviewed and discussed.  Risks, benefits and alternatives of gastroenterologic procedures were discussed in detail and all questions were answered.  30 minutes spent.
60 year female with tracheomalacia s/p tracheostomy in 2004 with stenosis, stomaplasty, vent dependent, COPD, GERD, anxiety presenting with dehydration and possible PEG tube placement.    Patient remains afebrile.  Remains vented, FiO2 32%.  Speaking clearly.  WBC WNL.  COVID negative.  CXR no acute pulmonary disease - reviewed CXR scan personally  Recently had an outpatient CXR with demonstrated PNA, was started on doxycycline for MRSA in sputum culture, in addition to ESBL Klebm Achromobacter, Pseudomonas, and Serratia    Recommend:  #Positive sputum culture  -Patient with clear cxr, no systemic signs of infection, Fio2 32%, speaking clearly  -Suspect sputum culture is colonization - no signs of PNA at this time  -Would monitor off antibiotics    #Tracheomalacia s/p trach  -F/U ENT regarding trach change    Juan M Stevenson MD  Pager [phone]  After 5pm/weekends call [phone]    Discussed with MICU team.

## 2020-05-20 NOTE — PROGRESS NOTE ADULT - ASSESSMENT
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    ##neuro  -no acute issues  -ISTOP Reference #: [id number]  -patient takes zolpidem 5mg prn and clonezepam 0.5mg TID prn  -will continue    ##CV  -no acute issues  -home losartan and norvasc was held last admission  -to restart prn    ##pulmonary  Tracheomalacia s/p trach, pt states she came for trach change, spoke with ENT team Dr. Rodriguez to see pt today  -vent dependence, on collar appx 2 hours/day  -goal up to 12 hours/day  -as outpatient attempting to wean off vent as tolerated   -on the following settings at home: 3 L SIMV: VT- 450; PC 20; PS-18; PEEP- 5, no increase in settings  - Night CPAP 5/PS 18 (back up 8 B/mm)    COPD  -on azithromycin TIW for severe COPD  -outpatient pulmonology recently changed medications  -mucomyst PRN for secretions  -continue Combivent 1 inhalation up to QID  -symbicort BID  -has multiple other inhaler prescriptions, unclear what patient takes at home (per home nurse, pt takes albuterol nebs as inhalers dry her mouth)  -will need to call pharmacy in AM    ##renal  -no acute issues    ##GI  - Offered to arrange PEG tube, pt doesn't want it, after Dr. Rodriguez saw patient she's agreeing to peg tube    FTT  -has had prior EGD and colonoscopy which were unremarkable  -prior infectious and malignancy workup in March also unrevealing   -no associated lab derangements  -evaluated by nutrition on last admission  -dysphagia eval, placed on dysphagia diet  -potential PEG placement?      ##ID  -doxy dc'jaswinder, pt with chronic tracheobronchitis on Azithro   -has history of polymicrobial sputum culture (MRSAl klebsiella, serratia, achromobacter) and chronic psuedomonas colonization  -patient taking azithromycin TIW as above  -currently does not meet any SIRS criteria  -will hold off other antibiotics for now  -ID saw patient, no IV abx needed as pt was requesting  -outpatient pulmonology suspicious of potential immunodeficiency given higher rate of infection than anticipated  -to consider sending IgG subclasses, quantitative immunoglobulins, Strep pneumoniae titers, Vitamin D levels    ##heme  -history of anemia  -consistent with baseline  -no acute issues    ##endocrine  no acute issues 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    ##neuro  -no acute issues  -ISTOP Reference #: [id number]  -patient takes zolpidem 5mg prn and clonezepam 0.5mg TID prn  -will continue    ##CV  -no acute issues  -home losartan and norvasc was held last admission  -to restart prn    ##pulmonary  Tracheomalacia s/p trach, pt states she came for trach change, ENT team saw pt yesterday, following along for possible trach change if pt to go to OR for G tube placement  -vent dependence, on collar appx 2 hours/day  -goal up to 12 hours/day  -as outpatient attempting to wean off vent as tolerated   -on the following settings at home: 3 L SIMV: VT- 450; PC 20; PS-18; PEEP- 5, no increase in settings  - Night CPAP 5/PS 18 (back up 8 B/mm)    COPD  -on azithromycin TIW for severe COPD  -outpatient pulmonology recently changed medications  -mucomyst PRN for secretions  -continue Combivent 1 inhalation up to QID  -symbicort BID  -has multiple other inhaler prescriptions, unclear what patient takes at home (per home nurse, pt takes albuterol nebs as inhalers dry her mouth)    ##renal  -K and Mg repleted this morning    ##GI  - GI team to see patient today, with scheduled G tube placement for Friday morning    FTT  -has had prior EGD and colonoscopy which were unremarkable  -prior infectious and malignancy workup in March also unrevealing   -no associated lab derangements  -evaluated by nutrition on last admission  -dysphagia eval, placed on dysphagia diet  - To get G tube      ##ID  -doxy dc'jaswinder, pt with chronic tracheobronchitis on Azithro TIW  -has history of polymicrobial sputum culture (MRSAl klebsiella, serratia, achromobacter) and chronic psuedomonas colonization, sputum cx showing kleb pneumo here, no WBC count/ no fevers  -currently does not meet any SIRS criteria  -ID saw patient, no IV abx needed as pt was requesting  -outpatient pulmonology suspicious of potential immunodeficiency given higher rate of infection than anticipated  -to consider sending IgG subclasses, quantitative immunoglobulins, Strep pneumoniae titers, Vitamin D levels    ##heme  -history of anemia  -consistent with baseline  -no acute issues    ##endocrine  no acute issues

## 2020-05-20 NOTE — CHART NOTE - NSCHARTNOTEFT_GEN_A_CORE
Spoke with GI team [phone], said they will see patient today about G tube and he booked a time for Friday morning.   Will have pt NPO Thursday night.

## 2020-05-21 LAB
ANION GAP SERPL CALC-SCNC: 10 MMO/L — SIGNIFICANT CHANGE UP (ref 7–14)
BASOPHILS # BLD AUTO: 0.05 K/UL — SIGNIFICANT CHANGE UP (ref 0–0.2)
BASOPHILS NFR BLD AUTO: 1 % — SIGNIFICANT CHANGE UP (ref 0–2)
BUN SERPL-MCNC: 3 MG/DL — LOW (ref 7–23)
CALCIUM SERPL-MCNC: 9.5 MG/DL — SIGNIFICANT CHANGE UP (ref 8.4–10.5)
CHLORIDE SERPL-SCNC: 106 MMOL/L — SIGNIFICANT CHANGE UP (ref 98–107)
CO2 SERPL-SCNC: 28 MMOL/L — SIGNIFICANT CHANGE UP (ref 22–31)
CREAT SERPL-MCNC: 0.58 MG/DL — SIGNIFICANT CHANGE UP (ref 0.5–1.3)
EOSINOPHIL # BLD AUTO: 0.26 K/UL — SIGNIFICANT CHANGE UP (ref 0–0.5)
EOSINOPHIL NFR BLD AUTO: 5.3 % — SIGNIFICANT CHANGE UP (ref 0–6)
GLUCOSE SERPL-MCNC: 86 MG/DL — SIGNIFICANT CHANGE UP (ref 70–99)
HCT VFR BLD CALC: 29.8 % — LOW (ref 34.5–45)
HGB BLD-MCNC: 9.7 G/DL — LOW (ref 11.5–15.5)
IMM GRANULOCYTES NFR BLD AUTO: 0 % — SIGNIFICANT CHANGE UP (ref 0–1.5)
LYMPHOCYTES # BLD AUTO: 2.03 K/UL — SIGNIFICANT CHANGE UP (ref 1–3.3)
LYMPHOCYTES # BLD AUTO: 41.4 % — SIGNIFICANT CHANGE UP (ref 13–44)
MAGNESIUM SERPL-MCNC: 1.8 MG/DL — SIGNIFICANT CHANGE UP (ref 1.6–2.6)
MCHC RBC-ENTMCNC: 26.6 PG — LOW (ref 27–34)
MCHC RBC-ENTMCNC: 32.6 % — SIGNIFICANT CHANGE UP (ref 32–36)
MCV RBC AUTO: 81.9 FL — SIGNIFICANT CHANGE UP (ref 80–100)
MONOCYTES # BLD AUTO: 0.42 K/UL — SIGNIFICANT CHANGE UP (ref 0–0.9)
MONOCYTES NFR BLD AUTO: 8.6 % — SIGNIFICANT CHANGE UP (ref 2–14)
NEUTROPHILS # BLD AUTO: 2.14 K/UL — SIGNIFICANT CHANGE UP (ref 1.8–7.4)
NEUTROPHILS NFR BLD AUTO: 43.7 % — SIGNIFICANT CHANGE UP (ref 43–77)
NRBC # FLD: 0 K/UL — SIGNIFICANT CHANGE UP (ref 0–0)
PHOSPHATE SERPL-MCNC: 3.5 MG/DL — SIGNIFICANT CHANGE UP (ref 2.5–4.5)
PLATELET # BLD AUTO: 257 K/UL — SIGNIFICANT CHANGE UP (ref 150–400)
PMV BLD: 11 FL — SIGNIFICANT CHANGE UP (ref 7–13)
POTASSIUM SERPL-MCNC: 3.8 MMOL/L — SIGNIFICANT CHANGE UP (ref 3.5–5.3)
POTASSIUM SERPL-SCNC: 3.8 MMOL/L — SIGNIFICANT CHANGE UP (ref 3.5–5.3)
RBC # BLD: 3.64 M/UL — LOW (ref 3.8–5.2)
RBC # FLD: 14.9 % — HIGH (ref 10.3–14.5)
SARS-COV-2 RNA SPEC QL NAA+PROBE: SIGNIFICANT CHANGE UP
SODIUM SERPL-SCNC: 144 MMOL/L — SIGNIFICANT CHANGE UP (ref 135–145)
WBC # BLD: 4.9 K/UL — SIGNIFICANT CHANGE UP (ref 3.8–10.5)
WBC # FLD AUTO: 4.9 K/UL — SIGNIFICANT CHANGE UP (ref 3.8–10.5)

## 2020-05-21 PROCEDURE — 99291 CRITICAL CARE FIRST HOUR: CPT

## 2020-05-21 RX ORDER — ALBUTEROL 90 UG/1
2 AEROSOL, METERED ORAL EVERY 6 HOURS
Refills: 0 | Status: DISCONTINUED | OUTPATIENT
Start: 2020-05-21 | End: 2020-05-21

## 2020-05-21 RX ORDER — ALBUTEROL 90 UG/1
2 AEROSOL, METERED ORAL EVERY 6 HOURS
Refills: 0 | Status: DISCONTINUED | OUTPATIENT
Start: 2020-05-21 | End: 2020-05-27

## 2020-05-21 RX ADMIN — Medication 2 PUFF(S): at 16:23

## 2020-05-21 RX ADMIN — CHLORHEXIDINE GLUCONATE 15 MILLILITER(S): 213 SOLUTION TOPICAL at 05:30

## 2020-05-21 RX ADMIN — ALBUTEROL 2 PUFF(S): 90 AEROSOL, METERED ORAL at 23:50

## 2020-05-21 RX ADMIN — ALBUTEROL 2 PUFF(S): 90 AEROSOL, METERED ORAL at 11:19

## 2020-05-21 RX ADMIN — BUDESONIDE AND FORMOTEROL FUMARATE DIHYDRATE 2 PUFF(S): 160; 4.5 AEROSOL RESPIRATORY (INHALATION) at 23:50

## 2020-05-21 RX ADMIN — Medication 2 PUFF(S): at 04:14

## 2020-05-21 RX ADMIN — CHLORHEXIDINE GLUCONATE 1 APPLICATION(S): 213 SOLUTION TOPICAL at 12:47

## 2020-05-21 RX ADMIN — Medication 2 PUFF(S): at 11:20

## 2020-05-21 RX ADMIN — ENOXAPARIN SODIUM 40 MILLIGRAM(S): 100 INJECTION SUBCUTANEOUS at 12:47

## 2020-05-21 RX ADMIN — Medication 2 PUFF(S): at 23:50

## 2020-05-21 RX ADMIN — CHLORHEXIDINE GLUCONATE 15 MILLILITER(S): 213 SOLUTION TOPICAL at 18:58

## 2020-05-21 RX ADMIN — ALBUTEROL 2 PUFF(S): 90 AEROSOL, METERED ORAL at 16:23

## 2020-05-21 NOTE — PROGRESS NOTE ADULT - SUBJECTIVE AND OBJECTIVE BOX
INTERVAL HPI/OVERNIGHT EVENTS:    SUBJECTIVE: Patient seen and examined at bedside.     CONSTITUTIONAL: No weakness, fevers or chills  EYES/ENT: No visual changes;  No vertigo or throat pain   NECK: No pain or stiffness  RESPIRATORY: No cough, wheezing, hemoptysis; No shortness of breath  CARDIOVASCULAR: No chest pain or palpitations  GASTROINTESTINAL: No abdominal or epigastric pain. No nausea, vomiting, or hematemesis; No diarrhea or constipation. No melena or hematochezia.  GENITOURINARY: No dysuria, frequency or hematuria  NEUROLOGICAL: No numbness or weakness  SKIN: No itching, rashes    OBJECTIVE:    VITAL SIGNS:  ICU Vital Signs Last 24 Hrs  T(C): 36.6 (21 May 2020 08:00), Max: 36.6 (20 May 2020 12:25)  T(F): 97.9 (21 May 2020 08:00), Max: 97.9 (20 May 2020 12:25)  HR: 66 (21 May 2020 11:20) (55 - 86)  BP: 92/53 (21 May 2020 08:00) (92/53 - 143/85)  BP(mean): 63 (21 May 2020 08:00) (63 - 99)  ABP: --  ABP(mean): --  RR: 31 (21 May 2020 08:00) (14 - 31)  SpO2: 100% (21 May 2020 11:20) (98% - 100%)    Mode: CPAP with PS, FiO2: 32, PEEP: 5, PS: 18, MAP: 10    05-20 @ 07:01  -  05-21 @ 07:00  --------------------------------------------------------  IN: 1080 mL / OUT: 2700 mL / NET: -1620 mL      CAPILLARY BLOOD GLUCOSE          PHYSICAL EXAM:    General: NAD  HEENT: NC/AT; PERRL, clear conjunctiva  Neck: supple  Respiratory: CTA b/l  Cardiovascular: +S1/S2; RRR  Abdomen: soft, NT/ND; +BS x4  Extremities: WWP, 2+ peripheral pulses b/l; no LE edema  Skin: normal color and turgor; no rash  Neurological:    MEDICATIONS:  MEDICATIONS  (STANDING):  ALBUTerol    90 MICROgram(s) HFA Inhaler 2 Puff(s) Inhalation every 6 hours  azithromycin   Tablet 250 milliGRAM(s) Oral <User Schedule>  budesonide 160 MICROgram(s)/formoterol 4.5 MICROgram(s) Inhaler 2 Puff(s) Inhalation two times a day  chlorhexidine 0.12% Liquid 15 milliLiter(s) Oral Mucosa every 12 hours  chlorhexidine 4% Liquid 1 Application(s) Topical <User Schedule>  enoxaparin Injectable 40 milliGRAM(s) SubCutaneous daily  ipratropium 17 MICROgram(s) HFA Inhaler 2 Puff(s) Inhalation every 6 hours    MEDICATIONS  (PRN):  clonazePAM Oral Disintegrating Tablet 0.5 milliGRAM(s) Oral every 8 hours PRN anxiety  zolpidem 5 milliGRAM(s) Oral at bedtime PRN Insomnia  zolpidem 5 milliGRAM(s) Oral at bedtime PRN Insomnia      ALLERGIES:  Allergies    Cipro (Unknown)  latex (Unknown)  theophylline (Hives)    Intolerances        LABS:                        9.7    4.90  )-----------( 257      ( 21 May 2020 04:00 )             29.8     05-21    144  |  106  |  3<L>  ----------------------------<  86  3.8   |  28  |  0.58    Ca    9.5      21 May 2020 04:00  Phos  3.5     05-21  Mg     1.8     05-21            RADIOLOGY & ADDITIONAL TESTS: Reviewed. INTERVAL HPI/OVERNIGHT EVENTS:    SUBJECTIVE: Patient seen and examined at bedside. No acute events overnight.     CONSTITUTIONAL: No weakness, fevers or chills  EYES/ENT: No visual changes;  No vertigo or throat pain   NECK: No pain or stiffness  RESPIRATORY: No cough, wheezing, hemoptysis; No shortness of breath  CARDIOVASCULAR: No chest pain or palpitations  GASTROINTESTINAL: No abdominal or epigastric pain. No nausea, vomiting, or hematemesis; No diarrhea or constipation. No melena or hematochezia.  GENITOURINARY: No dysuria, frequency or hematuria  NEUROLOGICAL: No numbness or weakness  SKIN: No itching, rashes    OBJECTIVE:    VITAL SIGNS:  ICU Vital Signs Last 24 Hrs  T(C): 36.6 (21 May 2020 08:00), Max: 36.6 (20 May 2020 12:25)  T(F): 97.9 (21 May 2020 08:00), Max: 97.9 (20 May 2020 12:25)  HR: 66 (21 May 2020 11:20) (55 - 86)  BP: 92/53 (21 May 2020 08:00) (92/53 - 143/85)  BP(mean): 63 (21 May 2020 08:00) (63 - 99)  ABP: --  ABP(mean): --  RR: 31 (21 May 2020 08:00) (14 - 31)  SpO2: 100% (21 May 2020 11:20) (98% - 100%)    Mode: CPAP with PS, FiO2: 32, PEEP: 5, PS: 18, MAP: 10    05-20 @ 07:01  -  05-21 @ 07:00  --------------------------------------------------------  IN: 1080 mL / OUT: 2700 mL / NET: -1620 mL      CAPILLARY BLOOD GLUCOSE          PHYSICAL EXAM:    General: NAD  HEENT: clear conjunctiva, poor oral dentition, slightly dry mucosa  Neck: supple, trach site clean  Respiratory: CTA b/l on min vent settings  Cardiovascular: +S1/S2; RRR  Abdomen: soft, NT/ND  Extremities: WWP, no LE edema  Skin: normal color and turgor; no rash  Neurological: AAOx3, moving all ext without lateralization    MEDICATIONS:  MEDICATIONS  (STANDING):  ALBUTerol    90 MICROgram(s) HFA Inhaler 2 Puff(s) Inhalation every 6 hours  azithromycin   Tablet 250 milliGRAM(s) Oral <User Schedule>  budesonide 160 MICROgram(s)/formoterol 4.5 MICROgram(s) Inhaler 2 Puff(s) Inhalation two times a day  chlorhexidine 0.12% Liquid 15 milliLiter(s) Oral Mucosa every 12 hours  chlorhexidine 4% Liquid 1 Application(s) Topical <User Schedule>  enoxaparin Injectable 40 milliGRAM(s) SubCutaneous daily  ipratropium 17 MICROgram(s) HFA Inhaler 2 Puff(s) Inhalation every 6 hours    MEDICATIONS  (PRN):  clonazePAM Oral Disintegrating Tablet 0.5 milliGRAM(s) Oral every 8 hours PRN anxiety  zolpidem 5 milliGRAM(s) Oral at bedtime PRN Insomnia  zolpidem 5 milliGRAM(s) Oral at bedtime PRN Insomnia      ALLERGIES:  Allergies    Cipro (Unknown)  latex (Unknown)  theophylline (Hives)    Intolerances        LABS:                        9.7    4.90  )-----------( 257      ( 21 May 2020 04:00 )             29.8     05-21    144  |  106  |  3<L>  ----------------------------<  86  3.8   |  28  |  0.58    Ca    9.5      21 May 2020 04:00  Phos  3.5     05-21  Mg     1.8     05-21            RADIOLOGY & ADDITIONAL TESTS: Reviewed.

## 2020-05-21 NOTE — PROGRESS NOTE ADULT - ASSESSMENT
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    ##neuro  -no acute issues  -ISTOP Reference #: [id number]  -patient takes zolpidem 5mg prn and clonezepam 0.5mg TID prn  -will continue    ##CV  -no acute issues  -home losartan and norvasc was held last admission  -to restart prn    ##pulmonary  Tracheomalacia s/p trach, pt states she came for trach change, ENT team saw pt yesterday, following along for possible trach change if pt to go to OR for G tube placement  -vent dependence, on collar appx 2 hours/day  -goal up to 12 hours/day  -as outpatient attempting to wean off vent as tolerated   -on the following settings at home: 3 L SIMV: VT- 450; PC 20; PS-18; PEEP- 5, no increase in settings  - Night CPAP 5/PS 18 (back up 8 B/mm)    COPD  -on azithromycin TIW for severe COPD  -outpatient pulmonology recently changed medications  -mucomyst PRN for secretions  -continue Combivent 1 inhalation up to QID  -symbicort BID  -has multiple other inhaler prescriptions, unclear what patient takes at home (per home nurse, pt takes albuterol nebs as inhalers dry her mouth)    ##renal  -K and Mg repleted this morning    ##GI  - GI team to see patient today, with scheduled G tube placement for Friday morning    FTT  -has had prior EGD and colonoscopy which were unremarkable  -prior infectious and malignancy workup in March also unrevealing   -no associated lab derangements  -evaluated by nutrition on last admission  -dysphagia eval, placed on dysphagia diet  - To get G tube      ##ID  -doxy dc'jaswinder, pt with chronic tracheobronchitis on Azithro TIW  -has history of polymicrobial sputum culture (MRSAl klebsiella, serratia, achromobacter) and chronic psuedomonas colonization, sputum cx showing kleb pneumo here, no WBC count/ no fevers  -currently does not meet any SIRS criteria  -ID saw patient, no IV abx needed as pt was requesting  -outpatient pulmonology suspicious of potential immunodeficiency given higher rate of infection than anticipated  -to consider sending IgG subclasses, quantitative immunoglobulins, Strep pneumoniae titers, Vitamin D levels    ##heme  -history of anemia  -consistent with baseline  -no acute issues    ##endocrine  no acute issues 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    ##neuro  -no acute issues  -ISTOP Reference #: [id number]  -patient takes zolpidem 5mg prn and clonezepam 0.5mg TID prn  -will continue    ##CV  -no acute issues  -home losartan and norvasc was held last admission  -to restart prn    ##pulmonary  Tracheomalacia s/p trach, pt states she came for trach change, ENT team saw pt yesterday, following along for possible trach change if pt to go to OR for G tube placement  -vent dependence, on collar appx 2 hours/day  -goal up to 12 hours/day  -as outpatient attempting to wean off vent as tolerated   -on the following settings at home: 3 L SIMV: VT- 450; PC 20; PS-18; PEEP- 5, no increase in settings  - Night CPAP 5/PS 18 (back up 8 B/mm)    COPD  -on azithromycin TIW for severe COPD  -outpatient pulmonology recently changed medications  -Albuterol/ipratropium inhaler PRN  -has multiple other inhaler prescriptions, unclear what patient takes at home (per home nurse, pt takes albuterol nebs as inhalers dry her mouth)    ##renal  -K and Mg stable today    ##GI  - GI team to see patient today, with scheduled G tube placement for morning of 5/22    FTT  -has had prior EGD and colonoscopy which were unremarkable  -prior infectious and malignancy workup in March also unrevealing   -no associated lab derangements  -evaluated by nutrition on last admission  -dysphagia eval, placed on dysphagia diet  - to get G tube      ##ID  -doxy dc'jaswinder, pt with chronic tracheobronchitis on Azithro TIW  -has history of polymicrobial sputum culture (MRSAl klebsiella, serratia, achromobacter) and chronic psuedomonas colonization, sputum cx showing kleb pneumo here, no WBC count/ no fevers  -currently does not meet any SIRS criteria  -ID saw patient, no IV abx needed as pt was requesting  -outpatient pulmonology suspicious of potential immunodeficiency given higher rate of infection than anticipated  -to consider sending IgG subclasses, quantitative immunoglobulins, Strep pneumoniae titers, Vitamin D levels  - Covid swab today prior to procedure tomorrow    ##heme  -history of anemia  -consistent with baseline  -no acute issues  - holding Lovenox for 5/22 for procedure    ##endocrine  no acute issues

## 2020-05-21 NOTE — PROGRESS NOTE ADULT - ASSESSMENT
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  Advanced care planning forms were reviewed and discussed.  Risks, benefits and alternatives of gastroenterologic procedures were discussed in detail and all questions were answered.  30 minutes spent.

## 2020-05-22 LAB
ANION GAP SERPL CALC-SCNC: 10 MMO/L — SIGNIFICANT CHANGE UP (ref 7–14)
ANTIBODY ID 1_1: SIGNIFICANT CHANGE UP
APTT BLD: 49 SEC — HIGH (ref 27.5–36.3)
APTT BLD: 61.7 SEC — HIGH (ref 27.5–36.3)
BASOPHILS # BLD AUTO: 0.05 K/UL — SIGNIFICANT CHANGE UP (ref 0–0.2)
BASOPHILS NFR BLD AUTO: 0.9 % — SIGNIFICANT CHANGE UP (ref 0–2)
BLD GP AB SCN SERPL QL: POSITIVE — SIGNIFICANT CHANGE UP
BUN SERPL-MCNC: 3 MG/DL — LOW (ref 7–23)
CALCIUM SERPL-MCNC: 9.8 MG/DL — SIGNIFICANT CHANGE UP (ref 8.4–10.5)
CHLORIDE SERPL-SCNC: 100 MMOL/L — SIGNIFICANT CHANGE UP (ref 98–107)
CO2 SERPL-SCNC: 31 MMOL/L — SIGNIFICANT CHANGE UP (ref 22–31)
CREAT SERPL-MCNC: 0.71 MG/DL — SIGNIFICANT CHANGE UP (ref 0.5–1.3)
DAT POLY-SP REAG RBC QL: NEGATIVE — SIGNIFICANT CHANGE UP
EOSINOPHIL # BLD AUTO: 0.17 K/UL — SIGNIFICANT CHANGE UP (ref 0–0.5)
EOSINOPHIL NFR BLD AUTO: 3.1 % — SIGNIFICANT CHANGE UP (ref 0–6)
GLUCOSE SERPL-MCNC: 74 MG/DL — SIGNIFICANT CHANGE UP (ref 70–99)
HCT VFR BLD CALC: 33.1 % — LOW (ref 34.5–45)
HGB BLD-MCNC: 10.9 G/DL — LOW (ref 11.5–15.5)
IMM GRANULOCYTES NFR BLD AUTO: 0.2 % — SIGNIFICANT CHANGE UP (ref 0–1.5)
INR BLD: 1.13 — SIGNIFICANT CHANGE UP (ref 0.88–1.17)
LYMPHOCYTES # BLD AUTO: 1.95 K/UL — SIGNIFICANT CHANGE UP (ref 1–3.3)
LYMPHOCYTES # BLD AUTO: 35.8 % — SIGNIFICANT CHANGE UP (ref 13–44)
MAGNESIUM SERPL-MCNC: 1.7 MG/DL — SIGNIFICANT CHANGE UP (ref 1.6–2.6)
MCHC RBC-ENTMCNC: 27.7 PG — SIGNIFICANT CHANGE UP (ref 27–34)
MCHC RBC-ENTMCNC: 32.9 % — SIGNIFICANT CHANGE UP (ref 32–36)
MCV RBC AUTO: 84 FL — SIGNIFICANT CHANGE UP (ref 80–100)
MONOCYTES # BLD AUTO: 0.48 K/UL — SIGNIFICANT CHANGE UP (ref 0–0.9)
MONOCYTES NFR BLD AUTO: 8.8 % — SIGNIFICANT CHANGE UP (ref 2–14)
NEUTROPHILS # BLD AUTO: 2.79 K/UL — SIGNIFICANT CHANGE UP (ref 1.8–7.4)
NEUTROPHILS NFR BLD AUTO: 51.2 % — SIGNIFICANT CHANGE UP (ref 43–77)
NRBC # FLD: 0 K/UL — SIGNIFICANT CHANGE UP (ref 0–0)
PHOSPHATE SERPL-MCNC: 4 MG/DL — SIGNIFICANT CHANGE UP (ref 2.5–4.5)
PLATELET # BLD AUTO: 277 K/UL — SIGNIFICANT CHANGE UP (ref 150–400)
PMV BLD: 11.1 FL — SIGNIFICANT CHANGE UP (ref 7–13)
POTASSIUM SERPL-MCNC: 3.6 MMOL/L — SIGNIFICANT CHANGE UP (ref 3.5–5.3)
POTASSIUM SERPL-SCNC: 3.6 MMOL/L — SIGNIFICANT CHANGE UP (ref 3.5–5.3)
PROTHROM AB SERPL-ACNC: 13.1 SEC — SIGNIFICANT CHANGE UP (ref 9.8–13.1)
RBC # BLD: 3.94 M/UL — SIGNIFICANT CHANGE UP (ref 3.8–5.2)
RBC # FLD: 15.1 % — HIGH (ref 10.3–14.5)
RH IG SCN BLD-IMP: POSITIVE — SIGNIFICANT CHANGE UP
SODIUM SERPL-SCNC: 141 MMOL/L — SIGNIFICANT CHANGE UP (ref 135–145)
WBC # BLD: 5.45 K/UL — SIGNIFICANT CHANGE UP (ref 3.8–10.5)
WBC # FLD AUTO: 5.45 K/UL — SIGNIFICANT CHANGE UP (ref 3.8–10.5)

## 2020-05-22 PROCEDURE — 99291 CRITICAL CARE FIRST HOUR: CPT

## 2020-05-22 PROCEDURE — 74018 RADEX ABDOMEN 1 VIEW: CPT | Mod: 26

## 2020-05-22 PROCEDURE — 99232 SBSQ HOSP IP/OBS MODERATE 35: CPT

## 2020-05-22 PROCEDURE — 86077 PHYS BLOOD BANK SERV XMATCH: CPT

## 2020-05-22 RX ORDER — ALBUTEROL 90 UG/1
2.5 AEROSOL, METERED ORAL EVERY 4 HOURS
Refills: 0 | Status: DISCONTINUED | OUTPATIENT
Start: 2020-05-22 | End: 2020-05-27

## 2020-05-22 RX ORDER — MIDAZOLAM HYDROCHLORIDE 1 MG/ML
6 INJECTION, SOLUTION INTRAMUSCULAR; INTRAVENOUS ONCE
Refills: 0 | Status: DISCONTINUED | OUTPATIENT
Start: 2020-05-22 | End: 2020-05-22

## 2020-05-22 RX ORDER — ACETAMINOPHEN 500 MG
1000 TABLET ORAL ONCE
Refills: 0 | Status: COMPLETED | OUTPATIENT
Start: 2020-05-22 | End: 2020-05-22

## 2020-05-22 RX ORDER — FENTANYL CITRATE 50 UG/ML
200 INJECTION INTRAVENOUS ONCE
Refills: 0 | Status: DISCONTINUED | OUTPATIENT
Start: 2020-05-22 | End: 2020-05-22

## 2020-05-22 RX ORDER — CEFAZOLIN SODIUM 1 G
2000 VIAL (EA) INJECTION ONCE
Refills: 0 | Status: COMPLETED | OUTPATIENT
Start: 2020-05-22 | End: 2020-05-22

## 2020-05-22 RX ORDER — DIATRIZOATE MEGLUMINE 180 MG/ML
30 INJECTION, SOLUTION INTRAVESICAL ONCE
Refills: 0 | Status: COMPLETED | OUTPATIENT
Start: 2020-05-22 | End: 2020-05-22

## 2020-05-22 RX ORDER — IPRATROPIUM BROMIDE 0.2 MG/ML
500 SOLUTION, NON-ORAL INHALATION EVERY 4 HOURS
Refills: 0 | Status: DISCONTINUED | OUTPATIENT
Start: 2020-05-22 | End: 2020-05-27

## 2020-05-22 RX ORDER — ALBUTEROL 90 UG/1
2.5 AEROSOL, METERED ORAL EVERY 6 HOURS
Refills: 0 | Status: DISCONTINUED | OUTPATIENT
Start: 2020-05-22 | End: 2020-05-22

## 2020-05-22 RX ADMIN — ALBUTEROL 2 PUFF(S): 90 AEROSOL, METERED ORAL at 04:06

## 2020-05-22 RX ADMIN — Medication 100 MILLIGRAM(S): at 09:00

## 2020-05-22 RX ADMIN — Medication 2 PUFF(S): at 10:50

## 2020-05-22 RX ADMIN — CHLORHEXIDINE GLUCONATE 15 MILLILITER(S): 213 SOLUTION TOPICAL at 20:15

## 2020-05-22 RX ADMIN — BUDESONIDE AND FORMOTEROL FUMARATE DIHYDRATE 2 PUFF(S): 160; 4.5 AEROSOL RESPIRATORY (INHALATION) at 22:15

## 2020-05-22 RX ADMIN — BUDESONIDE AND FORMOTEROL FUMARATE DIHYDRATE 2 PUFF(S): 160; 4.5 AEROSOL RESPIRATORY (INHALATION) at 10:51

## 2020-05-22 RX ADMIN — DIATRIZOATE MEGLUMINE 30 MILLILITER(S): 180 INJECTION, SOLUTION INTRAVESICAL at 14:14

## 2020-05-22 RX ADMIN — ALBUTEROL 2 PUFF(S): 90 AEROSOL, METERED ORAL at 15:56

## 2020-05-22 RX ADMIN — Medication 0.5 MILLIGRAM(S): at 22:18

## 2020-05-22 RX ADMIN — AZITHROMYCIN 250 MILLIGRAM(S): 500 TABLET, FILM COATED ORAL at 10:23

## 2020-05-22 RX ADMIN — Medication 2 PUFF(S): at 04:06

## 2020-05-22 RX ADMIN — FENTANYL CITRATE 200 MICROGRAM(S): 50 INJECTION INTRAVENOUS at 08:15

## 2020-05-22 RX ADMIN — ALBUTEROL 2 PUFF(S): 90 AEROSOL, METERED ORAL at 10:51

## 2020-05-22 RX ADMIN — MIDAZOLAM HYDROCHLORIDE 6 MILLIGRAM(S): 1 INJECTION, SOLUTION INTRAMUSCULAR; INTRAVENOUS at 08:15

## 2020-05-22 RX ADMIN — ALBUTEROL 2.5 MILLIGRAM(S): 90 AEROSOL, METERED ORAL at 22:16

## 2020-05-22 RX ADMIN — Medication 2 PUFF(S): at 15:55

## 2020-05-22 RX ADMIN — Medication 400 MILLIGRAM(S): at 12:14

## 2020-05-22 NOTE — PROGRESS NOTE ADULT - SUBJECTIVE AND OBJECTIVE BOX
CC: Patient is a 60y old  Female who presents with a chief complaint of Failure to Thrive (22 May 2020 10:37)    ID following for positive sputum culture    Interval History/ROS: Patient remains on 32%FiO2, vented, s/p PEG tube, no fevers, WBC WNL. Sputum cultures with ESBL Kleb and Myoides.    Rest of ROS negative.    Allergies  Cipro (Unknown)  latex (Unknown)  theophylline (Hives)    ANTIMICROBIALS:  azithromycin   Tablet 250 <User Schedule>    OTHER MEDS:  ALBUTerol    90 MICROgram(s) HFA Inhaler 2 Puff(s) Inhalation every 6 hours  budesonide 160 MICROgram(s)/formoterol 4.5 MICROgram(s) Inhaler 2 Puff(s) Inhalation two times a day  chlorhexidine 0.12% Liquid 15 milliLiter(s) Oral Mucosa every 12 hours  chlorhexidine 4% Liquid 1 Application(s) Topical <User Schedule>  clonazePAM Oral Disintegrating Tablet 0.5 milliGRAM(s) Oral every 8 hours PRN  ipratropium 17 MICROgram(s) HFA Inhaler 2 Puff(s) Inhalation every 6 hours  zolpidem 5 milliGRAM(s) Oral at bedtime PRN  zolpidem 5 milliGRAM(s) Oral at bedtime PRN    PE:    Vital Signs Last 24 Hrs  T(C): 36.1 (22 May 2020 12:00), Max: 37.1 (22 May 2020 04:00)  T(F): 97 (22 May 2020 12:00), Max: 98.8 (22 May 2020 04:00)  HR: 59 (22 May 2020 15:57) (50 - 83)  BP: 115/64 (22 May 2020 12:00) (92/58 - 147/81)  BP(mean): 77 (22 May 2020 12:00) (66 - 98)  RR: 11 (22 May 2020 12:00) (11 - 26)  SpO2: 100% (22 May 2020 15:39) (98% - 100%)    Gen: Awake, alert, NAD  HEENT: trach intact  CV: S1+S2 normal, no murmurs  Resp: Clear bilat, no resp distress  Abd: Soft, nontender, +BS  Ext: No LE edema, no wounds  : No Reich  IV/Skin: No thrombophlebitis  Neuro: no focal deficits    LABS:                          10.9   5.45  )-----------( 277      ( 22 May 2020 03:00 )             33.1       05-22    141  |  100  |  3<L>  ----------------------------<  74  3.6   |  31  |  0.71    Ca    9.8      22 May 2020 03:00  Phos  4.0     05-22  Mg     1.7     05-22    MICROBIOLOGY:  v  .Sputum trach  05-18-20   Moderate Klebsiella pneumoniae ESBL  Normal Respiratory Radha present  --  Klebsiella pneumoniae ESBL  Myroides odoratimimus      .Urine Clean Catch (Midstream)  05-17-20   <10,000 CFU/mL Normal Urogenital Radha  --  --    RADIOLOGY:    < from: Xray Chest 1 View AP/PA (05.17.20 @ 18:40) >  IMPRESSION: No acute pulmonary disease.    < end of copied text >

## 2020-05-22 NOTE — PROGRESS NOTE ADULT - SUBJECTIVE AND OBJECTIVE BOX
INTERVAL HPI/OVERNIGHT EVENTS:    SUBJECTIVE: Patient seen and examined at bedside.     CONSTITUTIONAL: No weakness, fevers or chills  EYES/ENT: No visual changes;  No vertigo or throat pain   NECK: No pain or stiffness  RESPIRATORY: No cough, wheezing, hemoptysis; No shortness of breath  CARDIOVASCULAR: No chest pain or palpitations  GASTROINTESTINAL: No abdominal or epigastric pain. No nausea, vomiting, or hematemesis; No diarrhea or constipation. No melena or hematochezia.  GENITOURINARY: No dysuria, frequency or hematuria  NEUROLOGICAL: No numbness or weakness  SKIN: No itching, rashes    OBJECTIVE:    VITAL SIGNS:  ICU Vital Signs Last 24 Hrs  T(C): 36.6 (22 May 2020 08:00), Max: 37.1 (22 May 2020 04:00)  T(F): 97.9 (22 May 2020 08:00), Max: 98.8 (22 May 2020 04:00)  HR: 62 (22 May 2020 10:00) (50 - 83)  BP: 102/66 (22 May 2020 10:00) (102/66 - 147/81)  BP(mean): 75 (22 May 2020 10:00) (74 - 105)  ABP: --  ABP(mean): --  RR: 20 (22 May 2020 10:00) (12 - 26)  SpO2: 99% (22 May 2020 10:00) (98% - 100%)    Mode: AC/ CMV (Assist Control/ Continuous Mandatory Ventilation), RR (machine): 12, TV (machine): 400, FiO2: 32, PEEP: 5, MAP: 9, PIP: 24    05-21 @ 07:01  -  05-22 @ 07:00  --------------------------------------------------------  IN: 0 mL / OUT: 2500 mL / NET: -2500 mL      CAPILLARY BLOOD GLUCOSE          PHYSICAL EXAM:    General: NAD  HEENT: NC/AT; PERRL, clear conjunctiva  Neck: supple  Respiratory: CTA b/l  Cardiovascular: +S1/S2; RRR  Abdomen: soft, NT/ND; +BS x4  Extremities: WWP, 2+ peripheral pulses b/l; no LE edema  Skin: normal color and turgor; no rash  Neurological:    MEDICATIONS:  MEDICATIONS  (STANDING):  ALBUTerol    90 MICROgram(s) HFA Inhaler 2 Puff(s) Inhalation every 6 hours  azithromycin   Tablet 250 milliGRAM(s) Oral <User Schedule>  budesonide 160 MICROgram(s)/formoterol 4.5 MICROgram(s) Inhaler 2 Puff(s) Inhalation two times a day  chlorhexidine 0.12% Liquid 15 milliLiter(s) Oral Mucosa every 12 hours  chlorhexidine 4% Liquid 1 Application(s) Topical <User Schedule>  ipratropium 17 MICROgram(s) HFA Inhaler 2 Puff(s) Inhalation every 6 hours    MEDICATIONS  (PRN):  clonazePAM Oral Disintegrating Tablet 0.5 milliGRAM(s) Oral every 8 hours PRN anxiety  zolpidem 5 milliGRAM(s) Oral at bedtime PRN Insomnia  zolpidem 5 milliGRAM(s) Oral at bedtime PRN Insomnia      ALLERGIES:  Allergies    Cipro (Unknown)  latex (Unknown)  theophylline (Hives)    Intolerances        LABS:                        10.9   5.45  )-----------( 277      ( 22 May 2020 03:00 )             33.1     05-22    141  |  100  |  3<L>  ----------------------------<  74  3.6   |  31  |  0.71    Ca    9.8      22 May 2020 03:00  Phos  4.0     05-22  Mg     1.7     05-22      PT/INR - ( 22 May 2020 03:00 )   PT: 13.1 SEC;   INR: 1.13          PTT - ( 22 May 2020 06:08 )  PTT:49.0 SEC      RADIOLOGY & ADDITIONAL TESTS: Reviewed. INTERVAL HPI/OVERNIGHT EVENTS:    SUBJECTIVE: Patient seen and examined at bedside. Patient NPO since midnight for G tube procedure today.     CONSTITUTIONAL: No weakness, fevers or chills  EYES/ENT: No visual changes;  No vertigo or throat pain   NECK: No pain or stiffness  RESPIRATORY: No cough, wheezing, hemoptysis; No shortness of breath  CARDIOVASCULAR: No chest pain or palpitations  GASTROINTESTINAL: No abdominal or epigastric pain. No nausea, vomiting, or hematemesis; No diarrhea or constipation. No melena or hematochezia.  GENITOURINARY: No dysuria, frequency or hematuria  NEUROLOGICAL: No numbness or weakness  SKIN: No itching, rashes    OBJECTIVE:    VITAL SIGNS:  ICU Vital Signs Last 24 Hrs  T(C): 36.6 (22 May 2020 08:00), Max: 37.1 (22 May 2020 04:00)  T(F): 97.9 (22 May 2020 08:00), Max: 98.8 (22 May 2020 04:00)  HR: 62 (22 May 2020 10:00) (50 - 83)  BP: 102/66 (22 May 2020 10:00) (102/66 - 147/81)  BP(mean): 75 (22 May 2020 10:00) (74 - 105)  ABP: --  ABP(mean): --  RR: 20 (22 May 2020 10:00) (12 - 26)  SpO2: 99% (22 May 2020 10:00) (98% - 100%)    Mode: AC/ CMV (Assist Control/ Continuous Mandatory Ventilation), RR (machine): 12, TV (machine): 400, FiO2: 32, PEEP: 5, MAP: 9, PIP: 24    05-21 @ 07:01  -  05-22 @ 07:00  --------------------------------------------------------  IN: 0 mL / OUT: 2500 mL / NET: -2500 mL      CAPILLARY BLOOD GLUCOSE          PHYSICAL EXAM:    General: NAD  HEENT: clear conjunctiva  Neck: trach in place  Respiratory: CTA b/l, minimal vent settings  Cardiovascular: +S1/S2; RRR  Abdomen: soft, NT/ND, s/p G tube placement site is c/d/i  Extremities: WWP, no LE edema  Skin: normal color and turgor; no rash  Neurological: AAOx3, moving all ext without lateralization    MEDICATIONS:  MEDICATIONS  (STANDING):  ALBUTerol    90 MICROgram(s) HFA Inhaler 2 Puff(s) Inhalation every 6 hours  azithromycin   Tablet 250 milliGRAM(s) Oral <User Schedule>  budesonide 160 MICROgram(s)/formoterol 4.5 MICROgram(s) Inhaler 2 Puff(s) Inhalation two times a day  chlorhexidine 0.12% Liquid 15 milliLiter(s) Oral Mucosa every 12 hours  chlorhexidine 4% Liquid 1 Application(s) Topical <User Schedule>  ipratropium 17 MICROgram(s) HFA Inhaler 2 Puff(s) Inhalation every 6 hours    MEDICATIONS  (PRN):  clonazePAM Oral Disintegrating Tablet 0.5 milliGRAM(s) Oral every 8 hours PRN anxiety  zolpidem 5 milliGRAM(s) Oral at bedtime PRN Insomnia  zolpidem 5 milliGRAM(s) Oral at bedtime PRN Insomnia      ALLERGIES:  Allergies    Cipro (Unknown)  latex (Unknown)  theophylline (Hives)    Intolerances        LABS:                        10.9   5.45  )-----------( 277      ( 22 May 2020 03:00 )             33.1     05-22    141  |  100  |  3<L>  ----------------------------<  74  3.6   |  31  |  0.71    Ca    9.8      22 May 2020 03:00  Phos  4.0     05-22  Mg     1.7     05-22      PT/INR - ( 22 May 2020 03:00 )   PT: 13.1 SEC;   INR: 1.13          PTT - ( 22 May 2020 06:08 )  PTT:49.0 SEC      RADIOLOGY & ADDITIONAL TESTS: Reviewed.

## 2020-05-22 NOTE — CHART NOTE - NSCHARTNOTEFT_GEN_A_CORE
Source: Patient [ ]    Family [ ]     other [x ] Unable to conduct in-person interview or nutrition-focused physical exam due to COVID19 contact precautions to mitigate spread of illness, pt is covid negative.      Current Diet : Diet, Regular:   Dysphagia 1, Pureed, Honey Consistency Fluid (DYS1HC)  Supplement Feeding Modality:  Oral  Ensure Pudding Cans or Servings Per Day:  1       Frequency:  Three Times a day (05-17-20 @ 23:53)  Diet, NPO after Midnight:      NPO Start Date: 21-May-2020,   NPO Start Time: 23:59 (05-21-20 @ 09:57)  Diet, NPO after Midnight:      NPO Start Date: 21-May-2020,   NPO Start Time: 23:59 (05-21-20 @ 18:04)    Current Weight: Weight (kg): 64.7 (05-18 @ 00:46)    Nutrition follow-up for EN recommendations. 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  Pt with severe malnutrition & significant weight loss of 121.7 lb over 2 years, and recent 11b weight loss in the past two months. Pt with poor PO intake prior to admission per RDN note 5/19. Furthermore, patient likely with increased needs given Hx COPD.  Per d/w team plans to start 3-day calorie count to asses adequacy of PO.  See recommendations for EN below.       __________________ Pertinent Medications__________________   MEDICATIONS  (STANDING):  ALBUTerol    90 MICROgram(s) HFA Inhaler 2 Puff(s) Inhalation every 6 hours  azithromycin   Tablet 250 milliGRAM(s) Oral <User Schedule>  budesonide 160 MICROgram(s)/formoterol 4.5 MICROgram(s) Inhaler 2 Puff(s) Inhalation two times a day  chlorhexidine 0.12% Liquid 15 milliLiter(s) Oral Mucosa every 12 hours  chlorhexidine 4% Liquid 1 Application(s) Topical <User Schedule>  diatrizoate meglumine/diatrizoate sodium. 30 milliLiter(s) Oral once  ipratropium 17 MICROgram(s) HFA Inhaler 2 Puff(s) Inhalation every 6 hours    MEDICATIONS  (PRN):  clonazePAM Oral Disintegrating Tablet 0.5 milliGRAM(s) Oral every 8 hours PRN anxiety  zolpidem 5 milliGRAM(s) Oral at bedtime PRN Insomnia  zolpidem 5 milliGRAM(s) Oral at bedtime PRN Insomnia      __________________ Pertinent Labs__________________   05-22 Na141 mmol/L Glu 74 mg/dL K+ 3.6 mmol/L Cr  0.71 mg/dL BUN 3 mg/dL<L> 05-22 Phos 4.0 mg/dL 05-17 Alb 4.0 g/dL            Estimated Needs:   [ X] no change since previous assessment  [ ] recalculated:           Nutrition Recommendations:    1- If plans are to c/w PEG for nutrition consider Bolus feeds of Jevity 1.2 @ 330ml q6 every 4 hours for a total of 1,320ml, 1,584, 72.3 grams of protein and 1,065 ml free water  2- Monitor weights, labs, BM's, skin integrity, EN tolerance/PO intake  3- Maintain aspiration precautions and provide additional free water per MD discretion.   4- RD remains available to adjust EN PRN, re-consult as needed. Gali Justice RD Pager #[phone]. Source: Patient [ ]    Family [ ]     other [x ] Unable to conduct in-person interview or nutrition-focused physical exam due to COVID19 contact precautions to mitigate spread of illness, pt is covid negative.      Current Diet : Diet, Regular:   Dysphagia 1, Pureed, Honey Consistency Fluid (DYS1HC)  Supplement Feeding Modality:  Oral  Ensure Pudding Cans or Servings Per Day:  1       Frequency:  Three Times a day (05-17-20 @ 23:53)  Diet, NPO after Midnight:      NPO Start Date: 21-May-2020,   NPO Start Time: 23:59 (05-21-20 @ 09:57)  Diet, NPO after Midnight:      NPO Start Date: 21-May-2020,   NPO Start Time: 23:59 (05-21-20 @ 18:04)    Current Weight: Weight (kg): 64.7 (05-18 @ 00:46)    Nutrition follow-up for EN recommendations. 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  Pt with severe malnutrition & significant weight loss of 121.7 lb over 2 years, and recent 11b weight loss in the past two months. Pt with poor PO intake prior to admission per RDN note 5/19. Furthermore, patient likely with increased needs given Hx COPD.  Per d/w team plans to start 3-day calorie count to asses adequacy of PO.  See recommendations for EN below.       __________________ Pertinent Medications__________________   MEDICATIONS  (STANDING):  ALBUTerol    90 MICROgram(s) HFA Inhaler 2 Puff(s) Inhalation every 6 hours  azithromycin   Tablet 250 milliGRAM(s) Oral <User Schedule>  budesonide 160 MICROgram(s)/formoterol 4.5 MICROgram(s) Inhaler 2 Puff(s) Inhalation two times a day  chlorhexidine 0.12% Liquid 15 milliLiter(s) Oral Mucosa every 12 hours  chlorhexidine 4% Liquid 1 Application(s) Topical <User Schedule>  diatrizoate meglumine/diatrizoate sodium. 30 milliLiter(s) Oral once  ipratropium 17 MICROgram(s) HFA Inhaler 2 Puff(s) Inhalation every 6 hours    MEDICATIONS  (PRN):  clonazePAM Oral Disintegrating Tablet 0.5 milliGRAM(s) Oral every 8 hours PRN anxiety  zolpidem 5 milliGRAM(s) Oral at bedtime PRN Insomnia  zolpidem 5 milliGRAM(s) Oral at bedtime PRN Insomnia      __________________ Pertinent Labs__________________   05-22 Na141 mmol/L Glu 74 mg/dL K+ 3.6 mmol/L Cr  0.71 mg/dL BUN 3 mg/dL<L> 05-22 Phos 4.0 mg/dL 05-17 Alb 4.0 g/dL            Estimated Needs:   [ X] no change since previous assessment  [ ] recalculated:           Nutrition Recommendations:    1- If plans are to c/w PEG for nutrition consider Bolus feeds of Jevity 1.2 @ 330ml q6 every 4 hours for a total of 1,320ml, 1,584, 72.3 grams of protein and 1,065 ml free water  May provide 1/2 rate x 1-2 bolus feeds prior to advancing to goal  2- Monitor weights, labs, BM's, skin integrity, EN tolerance/PO intake  3- Maintain aspiration precautions and provide additional free water per MD discretion.   4- RD remains available to adjust EN PRN, re-consult as needed. Gali Justice RD Pager #[phone].

## 2020-05-22 NOTE — PROGRESS NOTE ADULT - ASSESSMENT
60 year female with tracheomalacia s/p tracheostomy in 2004 with stenosis, stomaplasty, vent dependent, COPD, GERD, anxiety presenting with dehydration and possible PEG tube placement.    Patient remains afebrile.  Remains vented, FiO2 32%.  Speaking clearly.  WBC WNL.  COVID negative.  CXR no acute pulmonary disease - reviewed CXR scan personally  Recently had an outpatient CXR with demonstrated PNA, was started on doxycycline for MRSA in sputum culture, in addition to ESBL Klebm Achromobacter, Pseudomonas, and Serratia    Repeat sputum cultures with ESBL Kleb and myroides.    Recommend:  #Positive sputum culture  -Patient with clear cxr, no systemic signs of infection, Fio2 32%, speaking clearly  -Suspect sputum culture is colonization - no signs of PNA at this time  -Would continue to monitor off antibiotics    #Tracheomalacia s/p trach  -F/U ENT regarding trach change    #FTT  -s/p PEG    Juan M Stevenson MD  Pager [phone]  After 5pm/weekends call [phone]    Discussed with MICU team.  Will sign off. Please call with questions.

## 2020-05-22 NOTE — CHART NOTE - NSCHARTNOTEFT_GEN_A_CORE
Esophagogastroduodenoscopy Report  Indication: Dysphagia    Instrument:  #[id number]  Anesthesia: MAC,  Ancef 2 gram   Consent:  Informed consent was obtained from the patient after providing any opportunity for questions  Procedure: The gastroscope was gently passed through the incisoral orifice into the oral cavity and under direct visualization the esophagus was intubated. The endoscope was passed down the esophagus, through the stomach and into the pylorus. Color, texture, mucosa, and anatomy of the esophagus, stomach and duodenum were carefully examined with the scope. A site was located by identification of light trans-illumination and 1:1 finger palpation. The area was prepared in usual sterile fashion and infiltrated with 1 % Lidocaine. The guide wire was inserted and grasped with a snare passed via the endoscope. After pulling the guide wire through the mouth a 20 FR [place] scientific gastrostomy was placed over the same guide wire and then pulled through the newly created ostomy.  An outside bumper was placed and a dressing was applied.     Preparation: NPO   Findings:   Oropharynx	Normal  Esophagus	Normal  EG-junction	Normal   Cardia	Normal  Body	Placement of a 20 [place] BS PEG tube using the procedure described above   Antrum	Normal    Pylorus	Normal  Duodenal Bulb	Normal  2nd portion	Normal    3rd portion	Normal    Date and time:1/8/2020 4:07:10 PM  EBL:0    Impression:1- PEG tube placement      Plan: 1- PEG tube to drainage 2- PEG can be used for water and meds 2 hours post procedure 3- PEG can be used for feeds tomorrow morning if clinically stable 4- Stoma site care as per protocol                         Procedure Start Time:  12:58 pm  Procedure End Time:     1:11 pm             Attending:       Mayito Eduardo M.D.  Date and Time: 1/8/2020 4:07:10 PM

## 2020-05-23 LAB
ANION GAP SERPL CALC-SCNC: 11 MMO/L — SIGNIFICANT CHANGE UP (ref 7–14)
BASOPHILS # BLD AUTO: 0.05 K/UL — SIGNIFICANT CHANGE UP (ref 0–0.2)
BASOPHILS NFR BLD AUTO: 0.5 % — SIGNIFICANT CHANGE UP (ref 0–2)
BUN SERPL-MCNC: 4 MG/DL — LOW (ref 7–23)
CALCIUM SERPL-MCNC: 9.3 MG/DL — SIGNIFICANT CHANGE UP (ref 8.4–10.5)
CHLORIDE SERPL-SCNC: 99 MMOL/L — SIGNIFICANT CHANGE UP (ref 98–107)
CO2 SERPL-SCNC: 28 MMOL/L — SIGNIFICANT CHANGE UP (ref 22–31)
CREAT SERPL-MCNC: 0.59 MG/DL — SIGNIFICANT CHANGE UP (ref 0.5–1.3)
EOSINOPHIL # BLD AUTO: 0.02 K/UL — SIGNIFICANT CHANGE UP (ref 0–0.5)
EOSINOPHIL NFR BLD AUTO: 0.2 % — SIGNIFICANT CHANGE UP (ref 0–6)
GLUCOSE SERPL-MCNC: 92 MG/DL — SIGNIFICANT CHANGE UP (ref 70–99)
HCT VFR BLD CALC: 30.2 % — LOW (ref 34.5–45)
HGB BLD-MCNC: 9.9 G/DL — LOW (ref 11.5–15.5)
IMM GRANULOCYTES NFR BLD AUTO: 0.3 % — SIGNIFICANT CHANGE UP (ref 0–1.5)
LYMPHOCYTES # BLD AUTO: 1.57 K/UL — SIGNIFICANT CHANGE UP (ref 1–3.3)
LYMPHOCYTES # BLD AUTO: 16.2 % — SIGNIFICANT CHANGE UP (ref 13–44)
MAGNESIUM SERPL-MCNC: 1.6 MG/DL — SIGNIFICANT CHANGE UP (ref 1.6–2.6)
MCHC RBC-ENTMCNC: 27.1 PG — SIGNIFICANT CHANGE UP (ref 27–34)
MCHC RBC-ENTMCNC: 32.8 % — SIGNIFICANT CHANGE UP (ref 32–36)
MCV RBC AUTO: 82.7 FL — SIGNIFICANT CHANGE UP (ref 80–100)
MONOCYTES # BLD AUTO: 0.66 K/UL — SIGNIFICANT CHANGE UP (ref 0–0.9)
MONOCYTES NFR BLD AUTO: 6.8 % — SIGNIFICANT CHANGE UP (ref 2–14)
NEUTROPHILS # BLD AUTO: 7.39 K/UL — SIGNIFICANT CHANGE UP (ref 1.8–7.4)
NEUTROPHILS NFR BLD AUTO: 76 % — SIGNIFICANT CHANGE UP (ref 43–77)
NRBC # FLD: 0 K/UL — SIGNIFICANT CHANGE UP (ref 0–0)
PHOSPHATE SERPL-MCNC: 3.7 MG/DL — SIGNIFICANT CHANGE UP (ref 2.5–4.5)
PLATELET # BLD AUTO: 264 K/UL — SIGNIFICANT CHANGE UP (ref 150–400)
PMV BLD: 11 FL — SIGNIFICANT CHANGE UP (ref 7–13)
POTASSIUM SERPL-MCNC: 3.8 MMOL/L — SIGNIFICANT CHANGE UP (ref 3.5–5.3)
POTASSIUM SERPL-SCNC: 3.8 MMOL/L — SIGNIFICANT CHANGE UP (ref 3.5–5.3)
RBC # BLD: 3.65 M/UL — LOW (ref 3.8–5.2)
RBC # FLD: 15 % — HIGH (ref 10.3–14.5)
SODIUM SERPL-SCNC: 138 MMOL/L — SIGNIFICANT CHANGE UP (ref 135–145)
WBC # BLD: 9.72 K/UL — SIGNIFICANT CHANGE UP (ref 3.8–10.5)
WBC # FLD AUTO: 9.72 K/UL — SIGNIFICANT CHANGE UP (ref 3.8–10.5)

## 2020-05-23 PROCEDURE — 99291 CRITICAL CARE FIRST HOUR: CPT

## 2020-05-23 RX ORDER — ENOXAPARIN SODIUM 100 MG/ML
40 INJECTION SUBCUTANEOUS DAILY
Refills: 0 | Status: DISCONTINUED | OUTPATIENT
Start: 2020-05-23 | End: 2020-05-27

## 2020-05-23 RX ORDER — SIMETHICONE 80 MG/1
80 TABLET, CHEWABLE ORAL ONCE
Refills: 0 | Status: COMPLETED | OUTPATIENT
Start: 2020-05-23 | End: 2020-05-23

## 2020-05-23 RX ORDER — ACETAMINOPHEN 500 MG
1000 TABLET ORAL ONCE
Refills: 0 | Status: COMPLETED | OUTPATIENT
Start: 2020-05-23 | End: 2020-05-23

## 2020-05-23 RX ORDER — ACETAMINOPHEN 500 MG
650 TABLET ORAL EVERY 6 HOURS
Refills: 0 | Status: DISCONTINUED | OUTPATIENT
Start: 2020-05-23 | End: 2020-05-27

## 2020-05-23 RX ORDER — SIMETHICONE 80 MG/1
80 TABLET, CHEWABLE ORAL EVERY 6 HOURS
Refills: 0 | Status: DISCONTINUED | OUTPATIENT
Start: 2020-05-23 | End: 2020-05-27

## 2020-05-23 RX ADMIN — ALBUTEROL 2.5 MILLIGRAM(S): 90 AEROSOL, METERED ORAL at 20:05

## 2020-05-23 RX ADMIN — CHLORHEXIDINE GLUCONATE 15 MILLILITER(S): 213 SOLUTION TOPICAL at 05:50

## 2020-05-23 RX ADMIN — ALBUTEROL 2.5 MILLIGRAM(S): 90 AEROSOL, METERED ORAL at 13:25

## 2020-05-23 RX ADMIN — BUDESONIDE AND FORMOTEROL FUMARATE DIHYDRATE 2 PUFF(S): 160; 4.5 AEROSOL RESPIRATORY (INHALATION) at 10:10

## 2020-05-23 RX ADMIN — Medication 500 MICROGRAM(S): at 06:53

## 2020-05-23 RX ADMIN — ENOXAPARIN SODIUM 40 MILLIGRAM(S): 100 INJECTION SUBCUTANEOUS at 11:29

## 2020-05-23 RX ADMIN — CHLORHEXIDINE GLUCONATE 15 MILLILITER(S): 213 SOLUTION TOPICAL at 17:21

## 2020-05-23 RX ADMIN — ALBUTEROL 2.5 MILLIGRAM(S): 90 AEROSOL, METERED ORAL at 06:53

## 2020-05-23 RX ADMIN — BUDESONIDE AND FORMOTEROL FUMARATE DIHYDRATE 2 PUFF(S): 160; 4.5 AEROSOL RESPIRATORY (INHALATION) at 20:04

## 2020-05-23 RX ADMIN — SIMETHICONE 80 MILLIGRAM(S): 80 TABLET, CHEWABLE ORAL at 11:29

## 2020-05-23 RX ADMIN — Medication 2 PUFF(S): at 10:11

## 2020-05-23 RX ADMIN — SIMETHICONE 80 MILLIGRAM(S): 80 TABLET, CHEWABLE ORAL at 20:59

## 2020-05-23 RX ADMIN — ALBUTEROL 2 PUFF(S): 90 AEROSOL, METERED ORAL at 10:10

## 2020-05-23 RX ADMIN — Medication 400 MILLIGRAM(S): at 11:28

## 2020-05-23 RX ADMIN — CHLORHEXIDINE GLUCONATE 1 APPLICATION(S): 213 SOLUTION TOPICAL at 06:07

## 2020-05-23 RX ADMIN — ALBUTEROL 2.5 MILLIGRAM(S): 90 AEROSOL, METERED ORAL at 02:19

## 2020-05-23 RX ADMIN — Medication 400 MILLIGRAM(S): at 21:00

## 2020-05-23 NOTE — PROGRESS NOTE ADULT - ASSESSMENT
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    ##neuro  -no acute issues  -ISTOP Reference #: [id number]  -patient takes zolpidem 5mg prn and clonezepam 0.5mg TID prn  -pt s/p G tube placement with sedation fent/versed, will monitor    ##CV  -no acute issues  -home losartan and norvasc was held last admission  -to restart prn    ##pulmonary  Tracheomalacia s/p trach, pt states she came for trach change, ENT following, attempted trach change done today unsuccessful though doesn't need new trach   -vent dependence, on collar appx 2 hours/day  -goal up to 12 hours/day  -as outpatient attempting to wean off vent as tolerated   -on the following settings at home: 3 L SIMV: VT- 450; PC 20; PS-18; PEEP- 5, no increase in settings  - Night CPAP 5/PS 18 (back up 8 B/mm)    COPD  -on azithromycin TIW for severe COPD  -Albuterol/ipratropium inhaler PRN  -has multiple other inhaler prescriptions, unclear what patient takes at home (per home nurse, pt takes albuterol nebs as inhalers dry her mouth)    ##renal  -no active issues    ##GI  - GI team placed G tube at bedside today, post procedure xray confirming position  - Per Dr. Lazo: patient can eat after procedure, don't use G tube until 24 hrs after procedure    FTT  -Pt weighed 190lbs Dec 19, currently 140lbs, outpatient doctor's notes noting weight loss  -has had prior EGD and colonoscopy which were unremarkable  -prior infectious and malignancy workup in March also unrevealing   -no associated lab derangements  -evaluated by nutrition on last admission  -dysphagia eval, placed on dysphagia diet  - Calorie count for three days (to end Monday after mid day meal)  - Nutrition to eval patient for tube feeds after calorie count      ##ID  -doxy dc'jaswinder, pt with chronic tracheobronchitis on Azithro TIW  -has history of polymicrobial sputum culture (MRSAl klebsiella, serratia, achromobacter) and chronic psuedomonas colonization, sputum cx showing kleb pneumo here, no WBC count/ no fevers  -currently does not meet any SIRS criteria  -ID saw patient, no IV abx needed as pt was requesting  -outpatient pulmonology suspicious of potential immunodeficiency given higher rate of infection than anticipated  -to consider sending IgG subclasses, quantitative immunoglobulins, Strep pneumoniae titers, Vitamin D levels  - Covid neg x2    ##heme  -history of anemia  -consistent with baseline  -no acute issues  - holding Lovenox 5/22 for procedure, continue 5/23    ##endocrine  no acute issues Patient is a 61 y/o F with PMHx of tracheomalacia s/p tracheostomy (2004) c/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    ##neuro  -no acute issues  -ISTOP Reference #: [id number]  -patient takes zolpidem 5mg prn and clonezepam 0.5mg TID prn  -pt s/p G tube placement with sedation fent/versed, will monitor    ##CV  -no acute issues  -home losartan and norvasc was held last admission  -to restart prn    ##pulmonary  Tracheomalacia s/p trach, pt states she came for trach change, ENT following, attempted trach change done today unsuccessful though doesn't need new trach   -vent dependence, on collar appx 2 hours/day  -goal up to 12 hours/day  -as outpatient attempting to wean off vent as tolerated   -on the following settings at home: 3 L SIMV: VT- 450; PC 20; PS-18; PEEP- 5, no increase in settings  - Night CPAP 5/PS 18 (back up 8 B/mm)    COPD  -on azithromycin TIW for severe COPD  -Albuterol/ipratropium inhaler PRN  -has multiple other inhaler prescriptions, unclear what patient takes at home (per home nurse, pt takes albuterol nebs as inhalers dry her mouth)    ##renal  -no active issues    ##GI  - s/p G-tube placement at bedside, post procedure xray confirming position  - restarting feeds today    FTT  -Pt weighed 190lbs Dec 19, currently 140lbs, outpatient doctor's notes noting weight loss  -has had prior EGD and colonoscopy which were unremarkable  -prior infectious and malignancy workup in March also unrevealing   -no associated lab derangements  -evaluated by nutrition on last admission  -dysphagia eval, placed on dysphagia diet  - Calorie count for three days (to end Monday after mid day meal)  - Nutrition to eval patient for tube feeds after calorie count      ##ID  -doxy dc'd, pt with chronic tracheobronchitis on Azithro TIW  -has history of polymicrobial sputum culture (MRSAl klebsiella, serratia, achromobacter) and chronic psuedomonas colonization, sputum cx showing kleb pneumo here, no WBC count/ no fevers  -currently does not meet any SIRS criteria  -ID saw patient, no IV abx needed as pt was requesting  - Covid neg x2    ##heme  -history of anemia  -consistent with baseline  -no acute issues  - holding Lovenox 5/22 for procedure, continue 5/23    ##endocrine  no acute issues

## 2020-05-23 NOTE — PROGRESS NOTE ADULT - SUBJECTIVE AND OBJECTIVE BOX
INTERVAL HPI/OVERNIGHT EVENTS:    SUBJECTIVE: Patient seen and examined at bedside. No acute events overnight  CONSTITUTIONAL: No weakness, fevers or chills  EYES/ENT: No visual changes;  No vertigo or throat pain   NECK: No pain or stiffness  RESPIRATORY: No cough, wheezing, hemoptysis; No shortness of breath  CARDIOVASCULAR: No chest pain or palpitations  GASTROINTESTINAL: No abdominal or epigastric pain. No nausea, vomiting, or hematemesis; No diarrhea or constipation. No melena or hematochezia.  GENITOURINARY: No dysuria, frequency or hematuria  NEUROLOGICAL: No numbness or weakness  SKIN: No itching, rashes    OBJECTIVE:    VITAL SIGNS:  Vital Signs Last 24 Hrs  T(C): 36.7 (23 May 2020 04:00), Max: 37.2 (22 May 2020 20:00)  T(F): 98.1 (23 May 2020 04:00), Max: 98.9 (22 May 2020 20:00)  HR: 65 (23 May 2020 06:54) (51 - 93)  BP: 115/67 (23 May 2020 06:00) (92/58 - 150/112)  BP(mean): 79 (23 May 2020 06:00) (59 - 121)  RR: 13 (23 May 2020 06:00) (11 - 34)  SpO2: 100% (23 May 2020 06:54) (93% - 100%)    Mode: AC/ CMV (Assist Control/ Continuous Mandatory Ventilation), RR (machine): 12, TV (machine): 400, FiO2: 32, PEEP: 5, MAP: 9, PIP: 24    I&O's Summary    22 May 2020 07:01  -  23 May 2020 07:00  --------------------------------------------------------  IN: 1200 mL / OUT: 1450 mL / NET: -250 mL      CAPILLARY BLOOD GLUCOSE          PHYSICAL EXAM:    General: NAD  HEENT: clear conjunctiva  Neck: trach in place  Respiratory: CTA b/l, minimal vent settings  Cardiovascular: +S1/S2; RRR  Abdomen: soft, NT/ND, s/p G tube placement site is c/d/i  Extremities: WWP, no LE edema  Skin: normal color and turgor; no rash  Neurological: AAOx3, moving all ext without lateralization    MEDICATIONS:  MEDICATIONS  (STANDING):  ALBUTerol    90 MICROgram(s) HFA Inhaler 2 Puff(s) Inhalation every 6 hours  azithromycin   Tablet 250 milliGRAM(s) Oral <User Schedule>  budesonide 160 MICROgram(s)/formoterol 4.5 MICROgram(s) Inhaler 2 Puff(s) Inhalation two times a day  chlorhexidine 0.12% Liquid 15 milliLiter(s) Oral Mucosa every 12 hours  chlorhexidine 4% Liquid 1 Application(s) Topical <User Schedule>  ipratropium 17 MICROgram(s) HFA Inhaler 2 Puff(s) Inhalation every 6 hours    MEDICATIONS  (PRN):  clonazePAM Oral Disintegrating Tablet 0.5 milliGRAM(s) Oral every 8 hours PRN anxiety  zolpidem 5 milliGRAM(s) Oral at bedtime PRN Insomnia  zolpidem 5 milliGRAM(s) Oral at bedtime PRN Insomnia      ALLERGIES:  Allergies    Cipro (Unknown)  latex (Unknown)  theophylline (Hives)    Intolerances        LABS:                  9.9    9.72  )-----------( 264      ( 23 May 2020 03:00 )             30.2     05-23    138  |  99  |  4<L>  ----------------------------<  92  3.8   |  28  |  0.59    Ca    9.3      23 May 2020 03:00  Phos  3.7     05-23  Mg     1.6     05-23      PT/INR - ( 22 May 2020 03:00 )   PT: 13.1 SEC;   INR: 1.13          PTT - ( 22 May 2020 06:08 )  PTT:49.0 SEC      RADIOLOGY & ADDITIONAL TESTS: Reviewed.

## 2020-05-23 NOTE — PROGRESS NOTE ADULT - SUBJECTIVE AND OBJECTIVE BOX
INTERVAL HPI/OVERNIGHT EVENTS:  S.P PEG     MEDICATIONS  (STANDING):  ALBUTerol    90 MICROgram(s) HFA Inhaler 2 Puff(s) Inhalation every 6 hours  azithromycin   Tablet 250 milliGRAM(s) Oral <User Schedule>  budesonide 160 MICROgram(s)/formoterol 4.5 MICROgram(s) Inhaler 2 Puff(s) Inhalation two times a day  chlorhexidine 0.12% Liquid 15 milliLiter(s) Oral Mucosa every 12 hours  chlorhexidine 4% Liquid 1 Application(s) Topical <User Schedule>  enoxaparin Injectable 40 milliGRAM(s) SubCutaneous daily  ipratropium 17 MICROgram(s) HFA Inhaler 2 Puff(s) Inhalation every 6 hours    MEDICATIONS  (PRN):  clonazePAM Oral Disintegrating Tablet 0.5 milliGRAM(s) Oral every 8 hours PRN anxiety  zolpidem 5 milliGRAM(s) Oral at bedtime PRN Insomnia  zolpidem 5 milliGRAM(s) Oral at bedtime PRN Insomnia      Allergies      Vital Signs Last 24 Hrs  T(C): 36.6 (21 May 2020 08:00), Max: 36.6 (20 May 2020 12:25)  T(F): 97.9 (21 May 2020 08:00), Max: 97.9 (20 May 2020 12:25)  HR: 65 (21 May 2020 11:25) (55 - 86)  BP: 92/53 (21 May 2020 08:00) (92/53 - 143/85)  BP(mean): 63 (21 May 2020 08:00) (63 - 99)  RR: 31 (21 May 2020 08:00) (14 - 31)  SpO2: 100% (21 May 2020 11:25) (98% - 100%)    PHYSICAL EXAM:    Constitutional: NAD  HEENT: EOMI, throat clear  Neck: No LAD, supple +trach  Respiratory: CTA and P  Cardiovascular: S1 and S2, RRR, no M  Gastrointestinal: PEG site without erythema   Extremities: No peripheral edema, neg clubbing, cyanosis  Vascular: 2+ peripheral pulses  Neurological: A/O x 3, no focal deficits  Psychiatric: Normal mood, normal affect  Skin: No rashes      LABS:                        9.7    4.90  )-----------( 257      ( 21 May 2020 04:00 )             29.8     05-21    144  |  106  |  3<L>  ----------------------------<  86  3.8   |  28  |  0.58    Ca    9.5      21 May 2020 04:00  Phos  3.5     05-21  Mg     1.8     05-21            RADIOLOGY & ADDITIONAL TESTS:

## 2020-05-23 NOTE — PROGRESS NOTE ADULT - ASSESSMENT
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  Advanced care planning forms were reviewed and discussed.  Risks, benefits and alternatives of gastroenterologic procedures were discussed in detail and all questions were answered.  30 minutes spent.

## 2020-05-24 PROCEDURE — 99291 CRITICAL CARE FIRST HOUR: CPT

## 2020-05-24 RX ORDER — SENNA PLUS 8.6 MG/1
15 TABLET ORAL
Refills: 0 | Status: DISCONTINUED | OUTPATIENT
Start: 2020-05-24 | End: 2020-05-27

## 2020-05-24 RX ORDER — POLYETHYLENE GLYCOL 3350 17 G/17G
17 POWDER, FOR SOLUTION ORAL DAILY
Refills: 0 | Status: DISCONTINUED | OUTPATIENT
Start: 2020-05-24 | End: 2020-05-27

## 2020-05-24 RX ORDER — ACETAMINOPHEN 500 MG
650 TABLET ORAL EVERY 6 HOURS
Refills: 0 | Status: DISCONTINUED | OUTPATIENT
Start: 2020-05-24 | End: 2020-05-27

## 2020-05-24 RX ORDER — ACETAMINOPHEN 500 MG
1000 TABLET ORAL ONCE
Refills: 0 | Status: COMPLETED | OUTPATIENT
Start: 2020-05-24 | End: 2020-05-24

## 2020-05-24 RX ORDER — LACTULOSE 10 G/15ML
10 SOLUTION ORAL THREE TIMES A DAY
Refills: 0 | Status: DISCONTINUED | OUTPATIENT
Start: 2020-05-24 | End: 2020-05-27

## 2020-05-24 RX ADMIN — CHLORHEXIDINE GLUCONATE 15 MILLILITER(S): 213 SOLUTION TOPICAL at 18:58

## 2020-05-24 RX ADMIN — ALBUTEROL 2.5 MILLIGRAM(S): 90 AEROSOL, METERED ORAL at 03:47

## 2020-05-24 RX ADMIN — LACTULOSE 10 GRAM(S): 10 SOLUTION ORAL at 19:32

## 2020-05-24 RX ADMIN — ALBUTEROL 2.5 MILLIGRAM(S): 90 AEROSOL, METERED ORAL at 15:47

## 2020-05-24 RX ADMIN — Medication 650 MILLIGRAM(S): at 22:00

## 2020-05-24 RX ADMIN — Medication 10 MILLIGRAM(S): at 22:00

## 2020-05-24 RX ADMIN — Medication 0.5 MILLIGRAM(S): at 20:05

## 2020-05-24 RX ADMIN — ALBUTEROL 2.5 MILLIGRAM(S): 90 AEROSOL, METERED ORAL at 07:49

## 2020-05-24 RX ADMIN — ZOLPIDEM TARTRATE 5 MILLIGRAM(S): 10 TABLET ORAL at 23:23

## 2020-05-24 RX ADMIN — ENOXAPARIN SODIUM 40 MILLIGRAM(S): 100 INJECTION SUBCUTANEOUS at 13:02

## 2020-05-24 RX ADMIN — SIMETHICONE 80 MILLIGRAM(S): 80 TABLET, CHEWABLE ORAL at 16:04

## 2020-05-24 RX ADMIN — BUDESONIDE AND FORMOTEROL FUMARATE DIHYDRATE 2 PUFF(S): 160; 4.5 AEROSOL RESPIRATORY (INHALATION) at 11:35

## 2020-05-24 RX ADMIN — SENNA PLUS 15 MILLILITER(S): 8.6 TABLET ORAL at 13:04

## 2020-05-24 RX ADMIN — Medication 650 MILLIGRAM(S): at 10:39

## 2020-05-24 RX ADMIN — BUDESONIDE AND FORMOTEROL FUMARATE DIHYDRATE 2 PUFF(S): 160; 4.5 AEROSOL RESPIRATORY (INHALATION) at 20:56

## 2020-05-24 RX ADMIN — ALBUTEROL 2.5 MILLIGRAM(S): 90 AEROSOL, METERED ORAL at 20:57

## 2020-05-24 RX ADMIN — LACTULOSE 10 GRAM(S): 10 SOLUTION ORAL at 16:04

## 2020-05-24 NOTE — PROGRESS NOTE ADULT - ASSESSMENT
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    ##neuro  -no acute issues  -ISTOP Reference #: [id number]  -patient takes zolpidem 5mg prn and clonezepam 0.5mg TID prn  -pt s/p G tube placement with sedation fent/versed, will monitor    ##CV  -no acute issues  -home losartan and norvasc was held last admission  -to restart prn    ##pulmonary  Tracheomalacia s/p trach, pt states she came for trach change, ENT following, attempted trach change done today unsuccessful though doesn't need new trach   -vent dependence, on collar appx 2 hours/day  -goal up to 12 hours/day  -as outpatient attempting to wean off vent as tolerated   -on the following settings at home: 3 L SIMV: VT- 450; PC 20; PS-18; PEEP- 5, no increase in settings  - Night CPAP 5/PS 18 (back up 8 B/mm)    COPD  -on azithromycin TIW for severe COPD  -Albuterol/ipratropium inhaler PRN  -has multiple other inhaler prescriptions, unclear what patient takes at home (per home nurse, pt takes albuterol nebs as inhalers dry her mouth)    ##renal  -no active issues    ##GI  - s/p G-tube placement at bedside, post procedure xray confirming position  - restarting feeds today    FTT  -Pt weighed 190lbs Dec 19, currently 140lbs, outpatient doctor's notes noting weight loss  -has had prior EGD and colonoscopy which were unremarkable  -prior infectious and malignancy workup in March also unrevealing   -no associated lab derangements  -evaluated by nutrition on last admission  -dysphagia eval, placed on dysphagia diet  - Calorie count for three days (to end Monday after mid day meal)  - Nutrition to eval patient for tube feeds after calorie count      ##ID  -doxy dc'd, pt with chronic tracheobronchitis on Azithro TIW  -has history of polymicrobial sputum culture (MRSAl klebsiella, serratia, achromobacter) and chronic psuedomonas colonization, sputum cx showing kleb pneumo here, no WBC count/ no fevers  -currently does not meet any SIRS criteria  -ID saw patient, no IV abx needed as pt was requesting  - Covid neg x2    ##heme  -history of anemia  -consistent with baseline  -no acute issues  - holding Lovenox 5/22 for procedure, continue 5/23    ##endocrine  no acute issues 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    ##neuro  -no acute issues  -ISTOP Reference #: [id number]  -patient takes zolpidem 5mg prn and clonezepam 0.5mg TID prn  -pt s/p G tube placement with sedation fent/versed, will monitor    ##CV  -no acute issues  -home losartan and norvasc was held last admission  -to restart prn    ##pulmonary  Tracheomalacia s/p trach, pt states she came for trach change, ENT following, attempted trach change done today unsuccessful though doesn't need new trach   -vent dependence, on collar appx 2 hours/day  -goal up to 12 hours/day  -as outpatient attempting to wean off vent as tolerated   -on the following settings at home: 3 L SIMV: VT- 450; PC 20; PS-18; PEEP- 5, no increase in settings  - Night CPAP 5/PS 18 (back up 8 B/mm)    COPD  -on azithromycin TIW for severe COPD  -Albuterol/ipratropium inhaler PRN  -has multiple other inhaler prescriptions, unclear what patient takes at home (per home nurse, pt takes albuterol nebs as inhalers dry her mouth)    ##renal  -no active issues    ##GI  - s/p G-tube placement at bedside, post procedure xray confirming position  - restarting feeds today    FTT  -Pt weighed 190lbs Dec 19, currently 140lbs, outpatient doctor's notes noting weight loss  -has had prior EGD and colonoscopy which were unremarkable  -prior infectious and malignancy workup in March also unrevealing   -no associated lab derangements  -evaluated by nutrition on last admission  -dysphagia eval, placed on dysphagia diet  - Calorie count for three days (to end Monday after mid day meal)  - Nutrition following along, will be documenting pts fern count over past few days      ##ID  -doxy dc'jaswinder, pt with chronic tracheobronchitis on Azithro TIW  -has history of polymicrobial sputum culture (MRSAl klebsiella, serratia, achromobacter) and chronic psuedomonas colonization, sputum cx showing kleb pneumo here, no WBC count/ no fevers  -currently does not meet any SIRS criteria  -ID saw patient, no IV abx needed as pt was requesting  - Covid neg x2    ##heme  -history of anemia  -consistent with baseline  -no acute issues    ##endocrine  no acute issues

## 2020-05-24 NOTE — PROGRESS NOTE ADULT - SUBJECTIVE AND OBJECTIVE BOX
INTERVAL HPI/OVERNIGHT EVENTS:    SUBJECTIVE: Patient seen and examined at bedside.     CONSTITUTIONAL: No weakness, fevers or chills  EYES/ENT: No visual changes;  No vertigo or throat pain   NECK: No pain or stiffness  RESPIRATORY: No cough, wheezing, hemoptysis; No shortness of breath  CARDIOVASCULAR: No chest pain or palpitations  GASTROINTESTINAL: No abdominal or epigastric pain. No nausea, vomiting, or hematemesis; No diarrhea or constipation. No melena or hematochezia.  GENITOURINARY: No dysuria, frequency or hematuria  NEUROLOGICAL: No numbness or weakness  SKIN: No itching, rashes    OBJECTIVE:    VITAL SIGNS:  ICU Vital Signs Last 24 Hrs  T(C): 36.4 (24 May 2020 12:00), Max: 36.6 (24 May 2020 08:00)  T(F): 97.5 (24 May 2020 12:00), Max: 97.9 (24 May 2020 08:00)  HR: 65 (24 May 2020 12:00) (50 - 73)  BP: 89/64 (24 May 2020 12:00) (82/61 - 119/59)  BP(mean): 67 (24 May 2020 12:00) (65 - 78)  ABP: --  ABP(mean): --  RR: 15 (24 May 2020 12:00) (15 - 20)  SpO2: 100% (24 May 2020 12:00) (98% - 100%)    Mode: CPAP with PS, FiO2: 32, PEEP: 5, PS: 18, MAP: 8    05-23 @ 07:01 - 05-24 @ 07:00  --------------------------------------------------------  IN: 1545 mL / OUT: 3200 mL / NET: -1655 mL    05-24 @ 07:01  -  05-24 @ 13:20  --------------------------------------------------------  IN: 135 mL / OUT: 0 mL / NET: 135 mL      CAPILLARY BLOOD GLUCOSE          PHYSICAL EXAM:    General: NAD  HEENT: NC/AT; PERRL, clear conjunctiva  Neck: supple  Respiratory: CTA b/l  Cardiovascular: +S1/S2; RRR  Abdomen: soft, NT/ND; +BS x4  Extremities: WWP, 2+ peripheral pulses b/l; no LE edema  Skin: normal color and turgor; no rash  Neurological:    MEDICATIONS:  MEDICATIONS  (STANDING):  ALBUTerol    90 MICROgram(s) HFA Inhaler 2 Puff(s) Inhalation every 6 hours  azithromycin   Tablet 250 milliGRAM(s) Oral <User Schedule>  budesonide 160 MICROgram(s)/formoterol 4.5 MICROgram(s) Inhaler 2 Puff(s) Inhalation two times a day  chlorhexidine 0.12% Liquid 15 milliLiter(s) Oral Mucosa every 12 hours  chlorhexidine 4% Liquid 1 Application(s) Topical <User Schedule>  enoxaparin Injectable 40 milliGRAM(s) SubCutaneous daily  ipratropium 17 MICROgram(s) HFA Inhaler 2 Puff(s) Inhalation every 6 hours  lactulose Syrup 10 Gram(s) Oral three times a day  senna Syrup 15 milliLiter(s) Oral two times a day    MEDICATIONS  (PRN):  acetaminophen   Tablet .. 650 milliGRAM(s) Oral every 6 hours PRN Mild Pain (1 - 3)  ALBUTerol    0.083% 2.5 milliGRAM(s) Nebulizer every 4 hours PRN Shortness of Breath and/or Wheezing  clonazePAM Oral Disintegrating Tablet 0.5 milliGRAM(s) Oral every 8 hours PRN anxiety  ipratropium    for Nebulization 500 MICROGram(s) Nebulizer every 4 hours PRN Shortness of Breath  simethicone 80 milliGRAM(s) Chew every 6 hours PRN Gas  zolpidem 5 milliGRAM(s) Oral at bedtime PRN Insomnia  zolpidem 5 milliGRAM(s) Oral at bedtime PRN Insomnia      ALLERGIES:  Allergies    Cipro (Unknown)  latex (Unknown)  theophylline (Hives)    Intolerances        LABS:                        9.9    9.72  )-----------( 264      ( 23 May 2020 03:00 )             30.2     05-23    138  |  99  |  4<L>  ----------------------------<  92  3.8   |  28  |  0.59    Ca    9.3      23 May 2020 03:00  Phos  3.7     05-23  Mg     1.6     05-23            RADIOLOGY & ADDITIONAL TESTS: Reviewed. INTERVAL HPI/OVERNIGHT EVENTS:    SUBJECTIVE: Patient seen and examined at bedside. No acute events overnight.     CONSTITUTIONAL: No weakness, fevers or chills  EYES/ENT: No visual changes;  No vertigo or throat pain   NECK: No pain or stiffness  RESPIRATORY: No cough, wheezing, hemoptysis; No shortness of breath  CARDIOVASCULAR: No chest pain or palpitations  GASTROINTESTINAL: No abdominal or epigastric pain. No nausea, vomiting, or hematemesis; No diarrhea or constipation. No melena or hematochezia.  GENITOURINARY: No dysuria, frequency or hematuria  NEUROLOGICAL: No numbness or weakness  SKIN: No itching, rashes    OBJECTIVE:    VITAL SIGNS:  ICU Vital Signs Last 24 Hrs  T(C): 36.4 (24 May 2020 12:00), Max: 36.6 (24 May 2020 08:00)  T(F): 97.5 (24 May 2020 12:00), Max: 97.9 (24 May 2020 08:00)  HR: 65 (24 May 2020 12:00) (50 - 73)  BP: 89/64 (24 May 2020 12:00) (82/61 - 119/59)  BP(mean): 67 (24 May 2020 12:00) (65 - 78)  ABP: --  ABP(mean): --  RR: 15 (24 May 2020 12:00) (15 - 20)  SpO2: 100% (24 May 2020 12:00) (98% - 100%)    Mode: CPAP with PS, FiO2: 32, PEEP: 5, PS: 18, MAP: 8    05-23 @ 07:01 - 05-24 @ 07:00  --------------------------------------------------------  IN: 1545 mL / OUT: 3200 mL / NET: -1655 mL    05-24 @ 07:01  -  05-24 @ 13:20  --------------------------------------------------------  IN: 135 mL / OUT: 0 mL / NET: 135 mL      CAPILLARY BLOOD GLUCOSE          PHYSICAL EXAM:    General: NAD  HEENT:  clear conjunctiva, poor oral dentition  Neck: supple, trach site c/d/i  Respiratory: CTA b/l  Cardiovascular: +S1/S2; RRR  Abdomen: soft, NT/ND  Extremities: WWP, 2+ peripheral pulses b/l; no LE edema  Skin: normal color and turgor; no rash  Neurological: AAOx3, moving all ext without lateralization    MEDICATIONS:  MEDICATIONS  (STANDING):  ALBUTerol    90 MICROgram(s) HFA Inhaler 2 Puff(s) Inhalation every 6 hours  azithromycin   Tablet 250 milliGRAM(s) Oral <User Schedule>  budesonide 160 MICROgram(s)/formoterol 4.5 MICROgram(s) Inhaler 2 Puff(s) Inhalation two times a day  chlorhexidine 0.12% Liquid 15 milliLiter(s) Oral Mucosa every 12 hours  chlorhexidine 4% Liquid 1 Application(s) Topical <User Schedule>  enoxaparin Injectable 40 milliGRAM(s) SubCutaneous daily  ipratropium 17 MICROgram(s) HFA Inhaler 2 Puff(s) Inhalation every 6 hours  lactulose Syrup 10 Gram(s) Oral three times a day  senna Syrup 15 milliLiter(s) Oral two times a day    MEDICATIONS  (PRN):  acetaminophen   Tablet .. 650 milliGRAM(s) Oral every 6 hours PRN Mild Pain (1 - 3)  ALBUTerol    0.083% 2.5 milliGRAM(s) Nebulizer every 4 hours PRN Shortness of Breath and/or Wheezing  clonazePAM Oral Disintegrating Tablet 0.5 milliGRAM(s) Oral every 8 hours PRN anxiety  ipratropium    for Nebulization 500 MICROGram(s) Nebulizer every 4 hours PRN Shortness of Breath  simethicone 80 milliGRAM(s) Chew every 6 hours PRN Gas  zolpidem 5 milliGRAM(s) Oral at bedtime PRN Insomnia  zolpidem 5 milliGRAM(s) Oral at bedtime PRN Insomnia      ALLERGIES:  Allergies    Cipro (Unknown)  latex (Unknown)  theophylline (Hives)    Intolerances        LABS:                        9.9    9.72  )-----------( 264      ( 23 May 2020 03:00 )             30.2     05-23    138  |  99  |  4<L>  ----------------------------<  92  3.8   |  28  |  0.59    Ca    9.3      23 May 2020 03:00  Phos  3.7     05-23  Mg     1.6     05-23            RADIOLOGY & ADDITIONAL TESTS: Reviewed.

## 2020-05-24 NOTE — PROGRESS NOTE ADULT - ASSESSMENT
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  Advanced care planning forms were reviewed and discussed.  Risks, benefits and alternatives of gastroenterologic procedures were discussed in detail and all questions were answered.  30 minutes spent.

## 2020-05-25 PROCEDURE — 99233 SBSQ HOSP IP/OBS HIGH 50: CPT | Mod: GC

## 2020-05-25 RX ORDER — ZOLPIDEM TARTRATE 10 MG/1
5 TABLET ORAL AT BEDTIME
Refills: 0 | Status: DISCONTINUED | OUTPATIENT
Start: 2020-05-25 | End: 2020-05-25

## 2020-05-25 RX ORDER — CLONAZEPAM 1 MG
0.5 TABLET ORAL EVERY 8 HOURS
Refills: 0 | Status: DISCONTINUED | OUTPATIENT
Start: 2020-05-25 | End: 2020-05-27

## 2020-05-25 RX ORDER — MINERAL OIL
133 OIL (ML) MISCELLANEOUS ONCE
Refills: 0 | Status: COMPLETED | OUTPATIENT
Start: 2020-05-25 | End: 2020-05-25

## 2020-05-25 RX ORDER — ZOLPIDEM TARTRATE 10 MG/1
5 TABLET ORAL AT BEDTIME
Refills: 0 | Status: DISCONTINUED | OUTPATIENT
Start: 2020-05-25 | End: 2020-05-27

## 2020-05-25 RX ADMIN — Medication 2 PUFF(S): at 16:19

## 2020-05-25 RX ADMIN — LACTULOSE 10 GRAM(S): 10 SOLUTION ORAL at 14:57

## 2020-05-25 RX ADMIN — ALBUTEROL 2.5 MILLIGRAM(S): 90 AEROSOL, METERED ORAL at 22:46

## 2020-05-25 RX ADMIN — ALBUTEROL 2.5 MILLIGRAM(S): 90 AEROSOL, METERED ORAL at 03:37

## 2020-05-25 RX ADMIN — ALBUTEROL 2 PUFF(S): 90 AEROSOL, METERED ORAL at 11:26

## 2020-05-25 RX ADMIN — ZOLPIDEM TARTRATE 5 MILLIGRAM(S): 10 TABLET ORAL at 21:38

## 2020-05-25 RX ADMIN — POLYETHYLENE GLYCOL 3350 17 GRAM(S): 17 POWDER, FOR SOLUTION ORAL at 11:54

## 2020-05-25 RX ADMIN — ALBUTEROL 2 PUFF(S): 90 AEROSOL, METERED ORAL at 22:10

## 2020-05-25 RX ADMIN — CHLORHEXIDINE GLUCONATE 15 MILLILITER(S): 213 SOLUTION TOPICAL at 16:47

## 2020-05-25 RX ADMIN — CHLORHEXIDINE GLUCONATE 15 MILLILITER(S): 213 SOLUTION TOPICAL at 09:06

## 2020-05-25 RX ADMIN — Medication 2 PUFF(S): at 22:10

## 2020-05-25 RX ADMIN — ENOXAPARIN SODIUM 40 MILLIGRAM(S): 100 INJECTION SUBCUTANEOUS at 11:58

## 2020-05-25 RX ADMIN — SENNA PLUS 15 MILLILITER(S): 8.6 TABLET ORAL at 09:06

## 2020-05-25 RX ADMIN — Medication 0.5 MILLIGRAM(S): at 19:36

## 2020-05-25 RX ADMIN — AZITHROMYCIN 250 MILLIGRAM(S): 500 TABLET, FILM COATED ORAL at 09:06

## 2020-05-25 RX ADMIN — Medication 2 PUFF(S): at 11:26

## 2020-05-25 RX ADMIN — BUDESONIDE AND FORMOTEROL FUMARATE DIHYDRATE 2 PUFF(S): 160; 4.5 AEROSOL RESPIRATORY (INHALATION) at 11:26

## 2020-05-25 RX ADMIN — CHLORHEXIDINE GLUCONATE 1 APPLICATION(S): 213 SOLUTION TOPICAL at 05:12

## 2020-05-25 RX ADMIN — LACTULOSE 10 GRAM(S): 10 SOLUTION ORAL at 19:37

## 2020-05-25 RX ADMIN — BUDESONIDE AND FORMOTEROL FUMARATE DIHYDRATE 2 PUFF(S): 160; 4.5 AEROSOL RESPIRATORY (INHALATION) at 22:10

## 2020-05-25 RX ADMIN — Medication 133 MILLILITER(S): at 12:32

## 2020-05-25 RX ADMIN — LACTULOSE 10 GRAM(S): 10 SOLUTION ORAL at 09:06

## 2020-05-25 RX ADMIN — Medication 650 MILLIGRAM(S): at 08:45

## 2020-05-25 RX ADMIN — SENNA PLUS 15 MILLILITER(S): 8.6 TABLET ORAL at 16:47

## 2020-05-25 RX ADMIN — ALBUTEROL 2.5 MILLIGRAM(S): 90 AEROSOL, METERED ORAL at 16:26

## 2020-05-25 RX ADMIN — Medication 0.5 MILLIGRAM(S): at 08:45

## 2020-05-25 NOTE — PROGRESS NOTE ADULT - SUBJECTIVE AND OBJECTIVE BOX
INTERVAL HPI/OVERNIGHT EVENTS:    SUBJECTIVE: Patient seen and examined at bedside.     CONSTITUTIONAL: No weakness, fevers or chills  EYES/ENT: No visual changes;  No vertigo or throat pain   NECK: No pain or stiffness  RESPIRATORY: No cough, wheezing, hemoptysis; No shortness of breath  CARDIOVASCULAR: No chest pain or palpitations  GASTROINTESTINAL: No abdominal or epigastric pain. No nausea, vomiting, or hematemesis; No diarrhea or constipation. No melena or hematochezia.  GENITOURINARY: No dysuria, frequency or hematuria  NEUROLOGICAL: No numbness or weakness  SKIN: No itching, rashes    OBJECTIVE:    VITAL SIGNS:  ICU Vital Signs Last 24 Hrs  T(C): 37.1 (25 May 2020 07:47), Max: 37.1 (25 May 2020 07:47)  T(F): 98.8 (25 May 2020 07:47), Max: 98.8 (25 May 2020 07:47)  HR: 68 (25 May 2020 11:24) (53 - 68)  BP: 133/72 (25 May 2020 07:47) (113/65 - 156/80)  BP(mean): 88 (25 May 2020 07:47) (77 - 96)  ABP: --  ABP(mean): --  RR: 14 (25 May 2020 07:47) (12 - 21)  SpO2: 100% (25 May 2020 11:24) (98% - 100%)    Mode: CPAP with PS, FiO2: 32, PEEP: 5, PS: 18, MAP: 11    05-24 @ 07:01  -  05-25 @ 07:00  --------------------------------------------------------  IN: 845 mL / OUT: 1450 mL / NET: -605 mL      CAPILLARY BLOOD GLUCOSE          PHYSICAL EXAM:    General: NAD  HEENT: NC/AT; PERRL, clear conjunctiva  Neck: supple  Respiratory: CTA b/l  Cardiovascular: +S1/S2; RRR  Abdomen: soft, NT/ND; +BS x4  Extremities: WWP, 2+ peripheral pulses b/l; no LE edema  Skin: normal color and turgor; no rash  Neurological:    MEDICATIONS:  MEDICATIONS  (STANDING):  ALBUTerol    90 MICROgram(s) HFA Inhaler 2 Puff(s) Inhalation every 6 hours  azithromycin   Tablet 250 milliGRAM(s) Oral <User Schedule>  budesonide 160 MICROgram(s)/formoterol 4.5 MICROgram(s) Inhaler 2 Puff(s) Inhalation two times a day  chlorhexidine 0.12% Liquid 15 milliLiter(s) Oral Mucosa every 12 hours  chlorhexidine 4% Liquid 1 Application(s) Topical <User Schedule>  enoxaparin Injectable 40 milliGRAM(s) SubCutaneous daily  ipratropium 17 MICROgram(s) HFA Inhaler 2 Puff(s) Inhalation every 6 hours  lactulose Syrup 10 Gram(s) Oral three times a day  mineral oil enema 133 milliLiter(s) Rectal once  polyethylene glycol 3350 17 Gram(s) Oral daily  senna Syrup 15 milliLiter(s) Oral two times a day    MEDICATIONS  (PRN):  acetaminophen    Suspension .. 650 milliGRAM(s) Oral every 6 hours PRN Moderate Pain (4 - 6)  acetaminophen   Tablet .. 650 milliGRAM(s) Oral every 6 hours PRN Mild Pain (1 - 3)  acetaminophen  Suppository .. 650 milliGRAM(s) Rectal every 6 hours PRN Moderate Pain (4 - 6)  ALBUTerol    0.083% 2.5 milliGRAM(s) Nebulizer every 4 hours PRN Shortness of Breath and/or Wheezing  bisacodyl Suppository 10 milliGRAM(s) Rectal daily PRN Constipation  clonazePAM Oral Disintegrating Tablet 0.5 milliGRAM(s) Oral every 8 hours PRN anxiety  ipratropium    for Nebulization 500 MICROGram(s) Nebulizer every 4 hours PRN Shortness of Breath  simethicone 80 milliGRAM(s) Chew every 6 hours PRN Gas  zolpidem 5 milliGRAM(s) Oral at bedtime PRN Insomnia  zolpidem 5 milliGRAM(s) Oral at bedtime PRN Insomnia      ALLERGIES:  Allergies    Cipro (Unknown)  latex (Unknown)  theophylline (Hives)    Intolerances        LABS:                RADIOLOGY & ADDITIONAL TESTS: Reviewed. INTERVAL HPI/OVERNIGHT EVENTS:    SUBJECTIVE: Patient seen and examined at bedside. No acute events overnight.    CONSTITUTIONAL: No weakness, fevers or chills  EYES/ENT: No visual changes;  No vertigo or throat pain   NECK: No pain or stiffness  RESPIRATORY: No cough, wheezing, hemoptysis; No shortness of breath  CARDIOVASCULAR: No chest pain or palpitations  GASTROINTESTINAL: pt without bowel movement while here, chronic prob, some abd discomfort  GENITOURINARY: No dysuria, frequency or hematuria  NEUROLOGICAL: No numbness or weakness  SKIN: No itching, rashes    OBJECTIVE:    VITAL SIGNS:  ICU Vital Signs Last 24 Hrs  T(C): 37.1 (25 May 2020 07:47), Max: 37.1 (25 May 2020 07:47)  T(F): 98.8 (25 May 2020 07:47), Max: 98.8 (25 May 2020 07:47)  HR: 68 (25 May 2020 11:24) (53 - 68)  BP: 133/72 (25 May 2020 07:47) (113/65 - 156/80)  BP(mean): 88 (25 May 2020 07:47) (77 - 96)  ABP: --  ABP(mean): --  RR: 14 (25 May 2020 07:47) (12 - 21)  SpO2: 100% (25 May 2020 11:24) (98% - 100%)    Mode: CPAP with PS, FiO2: 32, PEEP: 5, PS: 18, MAP: 11    05-24 @ 07:01  -  05-25 @ 07:00  --------------------------------------------------------  IN: 845 mL / OUT: 1450 mL / NET: -605 mL      CAPILLARY BLOOD GLUCOSE          PHYSICAL EXAM:    General: NAD  HEENT: clear conjunctiva, poor dentition  Neck: supple, trach site nonerythematous, no purulence/fluctuance  Respiratory: on min vent settings, CTA  Cardiovascular: +S1/S2; RRR  Abdomen: soft, NT/ND  Extremities: WWP, no LE edema  Skin: normal color and turgor; no rash  Neurological: AAOx3, normal speech, moving all ext without lateralization    MEDICATIONS:  MEDICATIONS  (STANDING):  ALBUTerol    90 MICROgram(s) HFA Inhaler 2 Puff(s) Inhalation every 6 hours  azithromycin   Tablet 250 milliGRAM(s) Oral <User Schedule>  budesonide 160 MICROgram(s)/formoterol 4.5 MICROgram(s) Inhaler 2 Puff(s) Inhalation two times a day  chlorhexidine 0.12% Liquid 15 milliLiter(s) Oral Mucosa every 12 hours  chlorhexidine 4% Liquid 1 Application(s) Topical <User Schedule>  enoxaparin Injectable 40 milliGRAM(s) SubCutaneous daily  ipratropium 17 MICROgram(s) HFA Inhaler 2 Puff(s) Inhalation every 6 hours  lactulose Syrup 10 Gram(s) Oral three times a day  mineral oil enema 133 milliLiter(s) Rectal once  polyethylene glycol 3350 17 Gram(s) Oral daily  senna Syrup 15 milliLiter(s) Oral two times a day    MEDICATIONS  (PRN):  acetaminophen    Suspension .. 650 milliGRAM(s) Oral every 6 hours PRN Moderate Pain (4 - 6)  acetaminophen   Tablet .. 650 milliGRAM(s) Oral every 6 hours PRN Mild Pain (1 - 3)  acetaminophen  Suppository .. 650 milliGRAM(s) Rectal every 6 hours PRN Moderate Pain (4 - 6)  ALBUTerol    0.083% 2.5 milliGRAM(s) Nebulizer every 4 hours PRN Shortness of Breath and/or Wheezing  bisacodyl Suppository 10 milliGRAM(s) Rectal daily PRN Constipation  clonazePAM Oral Disintegrating Tablet 0.5 milliGRAM(s) Oral every 8 hours PRN anxiety  ipratropium    for Nebulization 500 MICROGram(s) Nebulizer every 4 hours PRN Shortness of Breath  simethicone 80 milliGRAM(s) Chew every 6 hours PRN Gas  zolpidem 5 milliGRAM(s) Oral at bedtime PRN Insomnia  zolpidem 5 milliGRAM(s) Oral at bedtime PRN Insomnia      ALLERGIES:  Allergies    Cipro (Unknown)  latex (Unknown)  theophylline (Hives)    Intolerances        LABS:                RADIOLOGY & ADDITIONAL TESTS: Reviewed.

## 2020-05-25 NOTE — PROGRESS NOTE ADULT - ASSESSMENT
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    ##neuro  -no acute issues  -ISTOP Reference #: [id number]  -patient takes zolpidem 5mg prn and clonezepam 0.5mg TID prn  -pt s/p G tube placement with sedation fent/versed, will monitor    ##CV  -no acute issues  -home losartan and norvasc was held last admission  -to restart prn    ##pulmonary  Tracheomalacia s/p trach, pt states she came for trach change, ENT following, attempted trach change done today unsuccessful though doesn't need new trach   -vent dependence, on collar appx 2 hours/day  -goal up to 12 hours/day  -as outpatient attempting to wean off vent as tolerated   -on the following settings at home: 3 L SIMV: VT- 450; PC 20; PS-18; PEEP- 5, no increase in settings  - Night CPAP 5/PS 18 (back up 8 B/mm)    COPD  -on azithromycin TIW for severe COPD  -Albuterol/ipratropium inhaler PRN  -has multiple other inhaler prescriptions, unclear what patient takes at home (per home nurse, pt takes albuterol nebs as inhalers dry her mouth)    ##renal  -no active issues    ##GI  - s/p G-tube placement at bedside, post procedure xray confirming position  - restarting feeds today    FTT  -Pt weighed 190lbs Dec 19, currently 140lbs, outpatient doctor's notes noting weight loss  -has had prior EGD and colonoscopy which were unremarkable  -prior infectious and malignancy workup in March also unrevealing   -no associated lab derangements  -evaluated by nutrition on last admission  -dysphagia eval, placed on dysphagia diet  - Calorie count for three days (to end Monday after mid day meal)  - Nutrition following along, will be documenting pts fern count over past few days      ##ID  -doxy dc'jaswinder, pt with chronic tracheobronchitis on Azithro TIW  -has history of polymicrobial sputum culture (MRSAl klebsiella, serratia, achromobacter) and chronic psuedomonas colonization, sputum cx showing kleb pneumo here, no WBC count/ no fevers  -currently does not meet any SIRS criteria  -ID saw patient, no IV abx needed as pt was requesting  - Covid neg x2    ##heme  -history of anemia  -consistent with baseline  -no acute issues    ##endocrine  no acute issues 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    ##neuro  -no acute issues  -ISTOP Reference #: [id number]  -patient takes zolpidem 5mg prn and clonezepam 0.5mg TID prn    ##CV  -no acute issues  -home losartan and norvasc was held last admission  -to restart prn    ##pulmonary  Tracheomalacia s/p trach, pt states she came for trach change, ENT following, attempted trach change done today unsuccessful though doesn't need new trach   -vent dependence, on collar appx 2 hours/day  -goal up to 12 hours/day  -as outpatient attempting to wean off vent as tolerated   -on the following settings at home: 3 L SIMV: VT- 450; PC 20; PS-18; PEEP- 5, no increase in settings  - Night CPAP 5/PS 18 (back up 8 B/mm)  -5/25 contacted ENT, talking with Dr. Rodriguez, may reattempt before leaving    COPD  -on azithromycin TIW for severe COPD  -Albuterol/ipratropium inhaler PRN  -has multiple other inhaler prescriptions, unclear what patient takes at home (per home nurse, pt takes albuterol nebs as inhalers dry her mouth)    ##renal  -no active issues    ##GI  - s/p G-tube placement at bedside, post procedure xray confirming position  - pt with pureed PO diet with low calorie count, tolerating tube feeds    FTT  -Pt weighed 190lbs Dec 19, currently 140lbs, outpatient doctor's notes noting weight loss  -has had prior EGD and colonoscopy which were unremarkable  -prior infectious and malignancy workup in March also unrevealing   -no associated lab derangements  -evaluated by nutrition on last admission  -dysphagia eval, placed on dysphagia diet  - Calorie count for three days showing insufficient calories   - Nutrition following along, tolerating tube feeds      ##ID  -doxy dc'd, pt with chronic tracheobronchitis on Azithro TIW  -has history of polymicrobial sputum culture (MRSAl klebsiella, serratia, achromobacter) and chronic psuedomonas colonization, sputum cx showing kleb pneumo here, no WBC count/ no fevers  -currently does not meet any SIRS criteria  -ID saw patient, no IV abx needed as pt was requesting  - Covid neg x2    ##heme  -history of anemia  -consistent with baseline  -no acute issues    ##endocrine  no acute issues

## 2020-05-25 NOTE — CHART NOTE - NSCHARTNOTEFT_GEN_A_CORE
Nutrition Consult  Reason for follow-up: Calorie Count follow-up and consult.    Information obtained from extensive chart review. Unable to conduct face-to-face interview due to current contact/isolation precautions in setting of COVID-19. Medical record reviewed. Spoke to RN.     Diet Order: Diet, Dysphagia 2 Mechanical Soft-Honey Consistency Fluid:   Tube Feeding Modality: Gastrostomy  Jevity 1.2 Sohail (JEVITY1.2RTH)  Total Volume for 24 Hours (mL): 1080  Continuous  Starting Tube Feed Rate {mL per Hour}: 15  Increase Tube Feed Rate by (mL): 15     Every 6 hours  Until Goal Tube Feed Rate (mL per Hour): 45  Tube Feed Duration (in Hours): 24  Tube Feed Start Time: 13:00 (05-23-20 @ 10:12)    CURRENT EN ORDER PROVIDES:  Total Volume: 1080 mL/day  Energy: 1296Kcal/day  Protein: 59.9g protein/day  Free Water: 871mL/day    Interval Nutrition Related Events: Patient with history of weight loss prior to admission in setting or poor PO intake. Patient has reported difficulty tolerating PO and being "afraid" to eat due to foods feeling stuck. While in-house patient expressed ongoing poor appetite. Now s/p PEG placement (5/22) 2/2 dysphagia, weight loss, severe malnutrition. Calorie Count initiated on 5/22 in order to assess adequacy of PO intake. Calorie count reviewed: based on documentation patient with minimal PO intake. Patient meeting <25% estimated nutrition needs via PO diet. Enteral nutrition via PEG is necessary in order for patient to meet nutrition needs.     Patient previous tolerating PEG feeds however today patient refusing 2/2 abdominal discomfort/gas. Per EMR and discussion with RN patient with nausea this morning. No vomiting.  Abdominal discomfort possibly 2/2 constipation. RN states patient had small smear BM today. Patient ordered for bowel regimen (lactulose, senna, Miralax and dulcolax PRN).     Anthropometric Measurements:   Height (cm): 162.6 (05-18-20 @ 00:46), 162 (03-12-20 @ 18:53)  Weight (kg): 61.3kg (5/23), 64.7 (05-18), 69.9 (03-12-20)  -Noted recent significant 5% weight loss in ~5 days - likely 2/2 poor PO intake.    BMI (kg/m2): 24.5 (05-18-20 @ 00:46), 26.6 (03-12-20 @ 18:53)  Appearance: Unable to conduct nutrition-focused physical exam at this time due to limited contact in setting of isolation precautions related to COVID-19.    Medications: MEDICATIONS  (STANDING):  ALBUTerol    90 MICROgram(s) HFA Inhaler 2 Puff(s) Inhalation every 6 hours  azithromycin   Tablet 250 milliGRAM(s) Oral <User Schedule>  budesonide 160 MICROgram(s)/formoterol 4.5 MICROgram(s) Inhaler 2 Puff(s) Inhalation two times a day  chlorhexidine 0.12% Liquid 15 milliLiter(s) Oral Mucosa every 12 hours  chlorhexidine 4% Liquid 1 Application(s) Topical <User Schedule>  enoxaparin Injectable 40 milliGRAM(s) SubCutaneous daily  ipratropium 17 MICROgram(s) HFA Inhaler 2 Puff(s) Inhalation every 6 hours  lactulose Syrup 10 Gram(s) Oral three times a day  polyethylene glycol 3350 17 Gram(s) Oral daily  senna Syrup 15 milliLiter(s) Oral two times a day    MEDICATIONS  (PRN):  acetaminophen    Suspension .. 650 milliGRAM(s) Oral every 6 hours PRN Moderate Pain (4 - 6)  acetaminophen   Tablet .. 650 milliGRAM(s) Oral every 6 hours PRN Mild Pain (1 - 3)  acetaminophen  Suppository .. 650 milliGRAM(s) Rectal every 6 hours PRN Moderate Pain (4 - 6)  ALBUTerol    0.083% 2.5 milliGRAM(s) Nebulizer every 4 hours PRN Shortness of Breath and/or Wheezing  bisacodyl Suppository 10 milliGRAM(s) Rectal daily PRN Constipation  clonazePAM Oral Disintegrating Tablet 0.5 milliGRAM(s) Oral every 8 hours PRN anxiety  ipratropium    for Nebulization 500 MICROGram(s) Nebulizer every 4 hours PRN Shortness of Breath  simethicone 80 milliGRAM(s) Chew every 6 hours PRN Gas  zolpidem 5 milliGRAM(s) Oral at bedtime PRN Insomnia  zolpidem 5 milliGRAM(s) Oral at bedtime PRN Insomnia    Labs: reviewed, none to note     Skin: No pressure ulcers/DTI noted in flowsheets.   Edema: 1+ generalized    Estimated Nutrition Needs   [x] Recalculated: based on most recent weight of 61.3kg (5/23)  Estimated Energy Needs (25-30Kcal/kg):1530-1839Kcal/day  Estimated Protein Needs (1.2-1.4g protein/kg): 73-85g protein/day    Previous Nutrition Diagnosis:    Severe Malnutrition [x] ongoing     Nutrition Interventions/Recommendations:   1. Patient with minimal PO intake, requires PEG feeds to meet nutrition needs.   --Recommend Jevity 1.2 via PEG @ 55mL/hr x 24 hours [provides 1320mL total volume, 1584Kcal, 73g protein and 1065mL free water daily].  2. Consider transitioning to bolus once tolerance to continuous feeds confirmed:   --Rec Jevity 1.2 via PEG, 330ml bolus q6hr (4 feeds daily). Provide first 1-2 bolus feeds at 1/2 goal rate and assess tolerance, if tolerated proceed to goal.  3. Additional free water per MD discretion.   4. Continue bowel regimen.   5. Can Consider promotility agents as medically appropriate.   6. PO diet ad ky.      Monitoring and Evaluation:   Monitor nutrition provision, tolerance to diet, weights, labs, skin integrity.   RD remains available, please consult as needed. Will follow up per protocol.  Ally Delgado RD, CDN Pager #[phone]

## 2020-05-26 PROCEDURE — 99232 SBSQ HOSP IP/OBS MODERATE 35: CPT

## 2020-05-26 PROCEDURE — 99233 SBSQ HOSP IP/OBS HIGH 50: CPT | Mod: GC

## 2020-05-26 RX ADMIN — Medication 0.5 MILLIGRAM(S): at 05:26

## 2020-05-26 RX ADMIN — ALBUTEROL 2.5 MILLIGRAM(S): 90 AEROSOL, METERED ORAL at 21:27

## 2020-05-26 RX ADMIN — ALBUTEROL 2 PUFF(S): 90 AEROSOL, METERED ORAL at 21:25

## 2020-05-26 RX ADMIN — BUDESONIDE AND FORMOTEROL FUMARATE DIHYDRATE 2 PUFF(S): 160; 4.5 AEROSOL RESPIRATORY (INHALATION) at 09:12

## 2020-05-26 RX ADMIN — CHLORHEXIDINE GLUCONATE 15 MILLILITER(S): 213 SOLUTION TOPICAL at 18:05

## 2020-05-26 RX ADMIN — Medication 650 MILLIGRAM(S): at 00:30

## 2020-05-26 RX ADMIN — ENOXAPARIN SODIUM 40 MILLIGRAM(S): 100 INJECTION SUBCUTANEOUS at 11:50

## 2020-05-26 RX ADMIN — CHLORHEXIDINE GLUCONATE 1 APPLICATION(S): 213 SOLUTION TOPICAL at 05:26

## 2020-05-26 RX ADMIN — ALBUTEROL 2.5 MILLIGRAM(S): 90 AEROSOL, METERED ORAL at 15:26

## 2020-05-26 RX ADMIN — ZOLPIDEM TARTRATE 5 MILLIGRAM(S): 10 TABLET ORAL at 23:04

## 2020-05-26 RX ADMIN — LACTULOSE 10 GRAM(S): 10 SOLUTION ORAL at 23:04

## 2020-05-26 RX ADMIN — LACTULOSE 10 GRAM(S): 10 SOLUTION ORAL at 15:13

## 2020-05-26 RX ADMIN — BUDESONIDE AND FORMOTEROL FUMARATE DIHYDRATE 2 PUFF(S): 160; 4.5 AEROSOL RESPIRATORY (INHALATION) at 21:26

## 2020-05-26 RX ADMIN — ALBUTEROL 2 PUFF(S): 90 AEROSOL, METERED ORAL at 04:18

## 2020-05-26 RX ADMIN — POLYETHYLENE GLYCOL 3350 17 GRAM(S): 17 POWDER, FOR SOLUTION ORAL at 11:50

## 2020-05-26 RX ADMIN — Medication 500 MICROGRAM(S): at 21:26

## 2020-05-26 RX ADMIN — Medication 0.5 MILLIGRAM(S): at 15:13

## 2020-05-26 RX ADMIN — CHLORHEXIDINE GLUCONATE 15 MILLILITER(S): 213 SOLUTION TOPICAL at 05:24

## 2020-05-26 RX ADMIN — Medication 2 PUFF(S): at 21:26

## 2020-05-26 RX ADMIN — LACTULOSE 10 GRAM(S): 10 SOLUTION ORAL at 05:24

## 2020-05-26 RX ADMIN — SENNA PLUS 15 MILLILITER(S): 8.6 TABLET ORAL at 18:05

## 2020-05-26 RX ADMIN — Medication 500 MICROGRAM(S): at 09:11

## 2020-05-26 RX ADMIN — ALBUTEROL 2.5 MILLIGRAM(S): 90 AEROSOL, METERED ORAL at 09:11

## 2020-05-26 RX ADMIN — Medication 650 MILLIGRAM(S): at 05:25

## 2020-05-26 RX ADMIN — SENNA PLUS 15 MILLILITER(S): 8.6 TABLET ORAL at 05:25

## 2020-05-26 RX ADMIN — Medication 500 MICROGRAM(S): at 15:26

## 2020-05-26 NOTE — CHART NOTE - NSCHARTNOTEFT_GEN_A_CORE
Patient with minimal PO intake in the setting of chronic disease/trach on ventilator. Has lost significant weight in past two years (>100 pounds in 2 years, >50 pounds in past 5 months) with a negative Endo/GI/ID/Malignancy workup. Expect patient to remain with minimal PO diet for over 3 months necessitating G tube feeds for nutrition. Patient with minimal PO intake in the setting of chronic disease/trach on ventilator. Has lost significant weight in past two years (>100 pounds in 2 years, >50 pounds in past 5 months) with a negative Endo/GI/ID/Malignancy workup. Expect patient to remain with minimal PO diet for over 3 months necessitating G tube feeds for nutrition.    Please expedite for hospital discharge.

## 2020-05-26 NOTE — CHART NOTE - NSCHARTNOTEFT_GEN_A_CORE
NUTRITION FOLLOW-UP:    Calorie Count of 5/22-5/25 completed.    5/22 - pt intake =516kcal  5/23                   333kcal  5/24                    95kcal   5/25                   330kcal     Pt with continued poor food intake with average intake of 318.5kcal/d consumed for days recorded.  Pt remains tube feeding dependent at this time.  S/P PEG placement on 5/22.  Unable to conduct a face to face interview or nutrition-focused physical exam due to limited contact restrictions related to Pt's medical condition and isolation precautions.  Extensive chart review conducted and spoke w/MD.  Pt appears to be tolerating enteral nutrition support at this time.  Pt has been assessed earlier in this admission with severe malnutrition 2/2 wt loss of 50lb since 12/2019 and poor food intake.  Malnutrition is ongoing 2/2 continued wt loss since admission and 1+ generalized edema.      Weight:  5/23 - 61.3kg     5/18 - 64.7kg         Current Diet:  Dysphagia 2 w/ Honey Consistency Fluids + Jevity 1.2 @45mL/h  Enteral Recommendations:  Enteral nutrition remains primary source of nutritional intake.  Pt currently receiving 1296kca w/59gm protein.  Noted, additional wt loss of 3.4kg since admission.  Suggest adjust TF rate to 55mL/h to provide 1584kcal w/73gm protein (26kcal/kg) and add 1 pack no carb prosurce/d to increase protein intake to 88gms/d to meet current needs (1.4gm protein/kg).     RD to Remain Available:  yes    Additional Recommendations:   1) Monitor daily weights, labs, BM's, skin integrity, p.o. intake, tolerance to TF  2) Advance TF as tolerated to meet current needs and to stabilize wt status  3) Add protein module to provide for protein needs.

## 2020-05-26 NOTE — PROGRESS NOTE ADULT - SUBJECTIVE AND OBJECTIVE BOX
Procedure Note    Trach changed at bedside today to new 6LPC. Patient laid down supine. 6LPC removed. Stoma patent, mildly stenotic. Able to place new 6LPC trach tube without difficulty. Pt tolerated trach change well.    -continue routine trach care  -follow up in clinic with Dr. Rodriguez in 2 months, can call [phone] with questions

## 2020-05-26 NOTE — PROGRESS NOTE ADULT - SUBJECTIVE AND OBJECTIVE BOX
INTERVAL HPI/OVERNIGHT EVENTS:    tolerating peg feeds  no c/o abd pain n/v    MEDICATIONS  (STANDING):  ALBUTerol    90 MICROgram(s) HFA Inhaler 2 Puff(s) Inhalation every 6 hours  azithromycin   Tablet 250 milliGRAM(s) Oral <User Schedule>  budesonide 160 MICROgram(s)/formoterol 4.5 MICROgram(s) Inhaler 2 Puff(s) Inhalation two times a day  chlorhexidine 0.12% Liquid 15 milliLiter(s) Oral Mucosa every 12 hours  chlorhexidine 4% Liquid 1 Application(s) Topical <User Schedule>  enoxaparin Injectable 40 milliGRAM(s) SubCutaneous daily  ipratropium 17 MICROgram(s) HFA Inhaler 2 Puff(s) Inhalation every 6 hours  lactulose Syrup 10 Gram(s) Oral three times a day  polyethylene glycol 3350 17 Gram(s) Oral daily  senna Syrup 15 milliLiter(s) Oral two times a day    MEDICATIONS  (PRN):  acetaminophen    Suspension .. 650 milliGRAM(s) Oral every 6 hours PRN Moderate Pain (4 - 6)  acetaminophen   Tablet .. 650 milliGRAM(s) Oral every 6 hours PRN Mild Pain (1 - 3)  acetaminophen  Suppository .. 650 milliGRAM(s) Rectal every 6 hours PRN Moderate Pain (4 - 6)  ALBUTerol    0.083% 2.5 milliGRAM(s) Nebulizer every 4 hours PRN Shortness of Breath and/or Wheezing  bisacodyl Suppository 10 milliGRAM(s) Rectal daily PRN Constipation  clonazePAM Oral Disintegrating Tablet 0.5 milliGRAM(s) Oral every 8 hours PRN Anxiety  ipratropium    for Nebulization 500 MICROGram(s) Nebulizer every 4 hours PRN Shortness of Breath  simethicone 80 milliGRAM(s) Chew every 6 hours PRN Gas  zolpidem 5 milliGRAM(s) Oral at bedtime PRN Insomnia      Allergies    Cipro (Unknown)  latex (Unknown)  theophylline (Hives)    Intolerances        Review of Systems:    General:  No wt loss, fevers, chills, night sweats, fatigue   Eyes:  Good vision, no reported pain  ENT:  No sore throat, pain, runny nose, dysphagia  CV:  No pain, palpitations, hypo/hypertension  Resp:  No dyspnea, cough, tachypnea, wheezing  GI:  No pain, No nausea, No vomiting, No diarrhea, No constipation, No weight loss, No fever, No pruritis, No rectal bleeding, No melena, No dysphagia  :  No pain, bleeding, incontinence, nocturia  Muscle:  No pain, weakness  Neuro:  No weakness, tingling, memory problems  Psych:  No fatigue, insomnia, mood problems, depression  Endocrine:  No polyuria, polydypsia, cold/heat intolerance  Heme:  No petechiae, ecchymosis, easy bruisability  Skin:  No rash, tattoos, scars, edema      Vital Signs Last 24 Hrs  T(C): 37.1 (26 May 2020 08:00), Max: 37.1 (26 May 2020 08:00)  T(F): 98.8 (26 May 2020 08:00), Max: 98.8 (26 May 2020 08:00)  HR: 81 (26 May 2020 10:49) (54 - 81)  BP: 140/75 (26 May 2020 08:00) (140/75 - 141/70)  BP(mean): 92 (26 May 2020 08:00) (80 - 92)  RR: 17 (26 May 2020 08:00) (14 - 19)  SpO2: 100% (26 May 2020 10:49) (96% - 100%)    PHYSICAL EXAM:    Constitutional: NAD  HEENT: EOMI, throat clear  Neck: No LAD, supple  Respiratory: CTA and P  Cardiovascular: S1 and S2, RRR, no M  Gastrointestinal: BS+, soft, NT/ND, neg HSM,  Extremities: No peripheral edema, neg clubbing, cyanosis  Vascular: 2+ peripheral pulses  Neurological: A/O x 3, no focal deficits  Psychiatric: Normal mood, normal affect  Skin: No rashes      LABS:                RADIOLOGY & ADDITIONAL TESTS:

## 2020-05-26 NOTE — CHART NOTE - NSCHARTNOTEFT_GEN_A_CORE
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 Sent to hospital for G tube placement by her ENT doctor Dr. Rodriguez for FTT. Recent admission showing negative GI/Malig/ID workup for FTT.   Patient in MICU as she is vent dependent. While in MICU, patient initially refusing G tube but eventually seen by Dr. Rodriguez and agreed to tube placement, arranged for bedside G tube placement with Dr. Lazo with post procedure confirmation. Patient also received trach change by ENT team. Spent last 4 days documenting calorie count with patient tolerating tube feeds.  Yulia [id number] currently in process of ensuring patient's insurance covers feeds once that's done she will go forward with arranging patient's home help and transportation.     To follow up:  Tolerate tube feeds   [id number] Yulia, to arrange tube feeds/home aid/transportation          ASSESSMENT & PLAN:   ##neuro  -no acute issues  -ISTOP Reference #: [id number]  -patient takes zolpidem 5mg prn and clonezepam 0.5mg TID prn    ##CV  -no acute issues  -home losartan and norvasc was held last admission, hasn't needed while in MICU    ##pulmonary  Tracheomalacia s/p trach, pt states she came for trach change, ENT following, attempted trach change done today unsuccessful though doesn't need new trach   -vent dependence, on collar appx 2 hours/day  -goal up to 12 hours/day  -on the following settings at home: 3 L SIMV: VT- 450; PC 20; PS-18; PEEP- 5, no increase in settings  - Night CPAP 5/PS 18 (back up 8 B/mm)  -5/26 trach changed    COPD  -on azithromycin TIW for severe COPD  -Albuterol/ipratropium inhaler PRN  -has multiple other inhaler prescriptions, unclear what patient takes at home (per home nurse, pt takes albuterol nebs as inhalers dry her mouth)    ##renal  -no active issues    ##GI  - s/p G-tube placement at bedside, post procedure xray confirming position  - pt with pureed PO diet with low calorie count, tolerating tube feeds, tolerating bolus feed 5/26 nutrition following along    FTT  -Pt weighed 190lbs Dec 19, currently 140lbs, outpatient doctor's notes noting weight loss  -has had prior EGD and colonoscopy which were unremarkable  -prior infectious and malignancy workup in March also unrevealing   -no associated lab derangements  -evaluated by nutrition on last admission  -dysphagia eval, placed on dysphagia diet  - Calorie count for three days showing insufficient calories   - Nutrition following along, tolerating tube feeds      ##ID  -doxy dc'd, pt with chronic tracheobronchitis on Azithro TIW  -has history of polymicrobial sputum culture (MRSAl klebsiella, serratia, achromobacter) and chronic psuedomonas colonization, sputum cx showing kleb pneumo here, no WBC count/ no fevers  -currently does not meet any SIRS criteria  -ID saw patient, no IV abx needed as pt was requesting  - Covid neg x2    ##heme  -history of anemia  -consistent with baseline  -no acute issues    ##endocrine  no acute issues          For Follow-Up:          Vital Signs Last 24 Hrs  T(C): 37.1 (26 May 2020 08:00), Max: 37.1 (26 May 2020 08:00)  T(F): 98.8 (26 May 2020 08:00), Max: 98.8 (26 May 2020 08:00)  HR: 65 (26 May 2020 15:28) (54 - 81)  BP: 140/75 (26 May 2020 08:00) (140/75 - 141/70)  BP(mean): 92 (26 May 2020 08:00) (80 - 92)  RR: 17 (26 May 2020 08:00) (14 - 19)  SpO2: 100% (26 May 2020 15:27) (96% - 100%)  I&O's Summary    25 May 2020 07:01  -  26 May 2020 07:00  --------------------------------------------------------  IN: 680 mL / OUT: 625 mL / NET: 55 mL    26 May 2020 07:01  -  26 May 2020 15:36  --------------------------------------------------------  IN: 350 mL / OUT: 0 mL / NET: 350 mL          MEDICATIONS  (STANDING):  ALBUTerol    90 MICROgram(s) HFA Inhaler 2 Puff(s) Inhalation every 6 hours  azithromycin   Tablet 250 milliGRAM(s) Oral <User Schedule>  budesonide 160 MICROgram(s)/formoterol 4.5 MICROgram(s) Inhaler 2 Puff(s) Inhalation two times a day  chlorhexidine 0.12% Liquid 15 milliLiter(s) Oral Mucosa every 12 hours  chlorhexidine 4% Liquid 1 Application(s) Topical <User Schedule>  enoxaparin Injectable 40 milliGRAM(s) SubCutaneous daily  ipratropium 17 MICROgram(s) HFA Inhaler 2 Puff(s) Inhalation every 6 hours  lactulose Syrup 10 Gram(s) Oral three times a day  polyethylene glycol 3350 17 Gram(s) Oral daily  senna Syrup 15 milliLiter(s) Oral two times a day    MEDICATIONS  (PRN):  acetaminophen    Suspension .. 650 milliGRAM(s) Oral every 6 hours PRN Moderate Pain (4 - 6)  acetaminophen   Tablet .. 650 milliGRAM(s) Oral every 6 hours PRN Mild Pain (1 - 3)  acetaminophen  Suppository .. 650 milliGRAM(s) Rectal every 6 hours PRN Moderate Pain (4 - 6)  ALBUTerol    0.083% 2.5 milliGRAM(s) Nebulizer every 4 hours PRN Shortness of Breath and/or Wheezing  bisacodyl Suppository 10 milliGRAM(s) Rectal daily PRN Constipation  clonazePAM Oral Disintegrating Tablet 0.5 milliGRAM(s) Oral every 8 hours PRN Anxiety  ipratropium    for Nebulization 500 MICROGram(s) Nebulizer every 4 hours PRN Shortness of Breath  simethicone 80 milliGRAM(s) Chew every 6 hours PRN Gas  zolpidem 5 milliGRAM(s) Oral at bedtime PRN Insomnia        LABS MICU Transfer Note    Transfer from: MICU  Transfer to:  (  ) Medicine    (  ) Telemetry    ( x ) RCU    (  ) Palliative    (  ) Stroke Unit    (  ) _______________  Accepting physican:      MICU COURSE:  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 Sent to hospital for G tube placement by her ENT doctor Dr. Rodriguez for FTT. Recent admission showing negative GI/Malig/ID workup for FTT.   Patient in MICU as she is vent dependent. While in MICU, patient initially refusing G tube but eventually seen by Dr. Rodriguez and agreed to tube placement, arranged for bedside G tube placement with Dr. Lazo with post procedure confirmation. Patient also received trach change by ENT team. Patient states she was sent in by ID for IV abx for trach site colonized with MDR organisms chronically, ID saw pt and no abx recommended. Spent last 4 days documenting calorie count with patient tolerating tube feeds.  Yulia [id number] currently in process of ensuring patient's insurance covers feeds once that's done she will go forward with arranging patient's home help and transportation.         ASSESSMENT & PLAN:   ##neuro  -no acute issues  -ISTOP Reference #: [id number]  -patient takes zolpidem 5mg prn and clonezepam 0.5mg TID prn    ##CV  -no acute issues  -home losartan and norvasc was held last admission, hasn't needed while in MICU    ##pulmonary  Tracheomalacia s/p trach, pt states she came for trach change, ENT following, attempted trach change done today unsuccessful though doesn't need new trach   -vent dependence, on collar appx 2 hours/day  -goal up to 12 hours/day  -on the following settings at home: 3 L SIMV: VT- 450; PC 20; PS-18; PEEP- 5, no increase in settings  - Night CPAP 5/PS 18 (back up 8 B/mm)  -5/26 trach changed    COPD  -on azithromycin TIW for severe COPD  -Albuterol/ipratropium inhaler PRN  -has multiple other inhaler prescriptions, unclear what patient takes at home (per home nurse, pt takes albuterol nebs as inhalers dry her mouth)    ##renal  -no active issues    ##GI  - s/p G-tube placement at bedside, post procedure xray confirming position  - pt with pureed PO diet with low calorie count, tolerating tube feeds, tolerating bolus feed 5/26 nutrition following along    FTT  -Pt weighed 190lbs Dec 19, currently 140lbs, outpatient doctor's notes noting weight loss  -has had prior EGD and colonoscopy which were unremarkable  -prior infectious and malignancy workup in March also unrevealing   -no associated lab derangements  -evaluated by nutrition on last admission  -dysphagia eval, placed on dysphagia diet  - Calorie count for three days showing insufficient calories   - Nutrition following along, tolerating tube feeds      ##ID  -doxy dc'd, pt with chronic tracheobronchitis on Azithro TIW  -has history of polymicrobial sputum culture (MRSAl klebsiella, serratia, achromobacter) and chronic psuedomonas colonization, sputum cx showing kleb pneumo here, no WBC count/ no fevers  -currently does not meet any SIRS criteria  -ID saw patient, no IV abx needed as pt was requesting  - Covid neg x2    ##heme  -history of anemia  -consistent with baseline  -no acute issues    ##endocrine  no acute issues          For Follow-Up:  Tolerate tube feeds   [id number] Yulia, to arrange tube feeds/home aid/transportation          Vital Signs Last 24 Hrs  T(C): 37.1 (26 May 2020 08:00), Max: 37.1 (26 May 2020 08:00)  T(F): 98.8 (26 May 2020 08:00), Max: 98.8 (26 May 2020 08:00)  HR: 65 (26 May 2020 15:28) (54 - 81)  BP: 140/75 (26 May 2020 08:00) (140/75 - 141/70)  BP(mean): 92 (26 May 2020 08:00) (80 - 92)  RR: 17 (26 May 2020 08:00) (14 - 19)  SpO2: 100% (26 May 2020 15:27) (96% - 100%)  I&O's Summary    25 May 2020 07:01  -  26 May 2020 07:00  --------------------------------------------------------  IN: 680 mL / OUT: 625 mL / NET: 55 mL    26 May 2020 07:01  -  26 May 2020 15:36  --------------------------------------------------------  IN: 350 mL / OUT: 0 mL / NET: 350 mL          MEDICATIONS  (STANDING):  ALBUTerol    90 MICROgram(s) HFA Inhaler 2 Puff(s) Inhalation every 6 hours  azithromycin   Tablet 250 milliGRAM(s) Oral <User Schedule>  budesonide 160 MICROgram(s)/formoterol 4.5 MICROgram(s) Inhaler 2 Puff(s) Inhalation two times a day  chlorhexidine 0.12% Liquid 15 milliLiter(s) Oral Mucosa every 12 hours  chlorhexidine 4% Liquid 1 Application(s) Topical <User Schedule>  enoxaparin Injectable 40 milliGRAM(s) SubCutaneous daily  ipratropium 17 MICROgram(s) HFA Inhaler 2 Puff(s) Inhalation every 6 hours  lactulose Syrup 10 Gram(s) Oral three times a day  polyethylene glycol 3350 17 Gram(s) Oral daily  senna Syrup 15 milliLiter(s) Oral two times a day    MEDICATIONS  (PRN):  acetaminophen    Suspension .. 650 milliGRAM(s) Oral every 6 hours PRN Moderate Pain (4 - 6)  acetaminophen   Tablet .. 650 milliGRAM(s) Oral every 6 hours PRN Mild Pain (1 - 3)  acetaminophen  Suppository .. 650 milliGRAM(s) Rectal every 6 hours PRN Moderate Pain (4 - 6)  ALBUTerol    0.083% 2.5 milliGRAM(s) Nebulizer every 4 hours PRN Shortness of Breath and/or Wheezing  bisacodyl Suppository 10 milliGRAM(s) Rectal daily PRN Constipation  clonazePAM Oral Disintegrating Tablet 0.5 milliGRAM(s) Oral every 8 hours PRN Anxiety  ipratropium    for Nebulization 500 MICROGram(s) Nebulizer every 4 hours PRN Shortness of Breath  simethicone 80 milliGRAM(s) Chew every 6 hours PRN Gas  zolpidem 5 milliGRAM(s) Oral at bedtime PRN Insomnia        LABS

## 2020-05-26 NOTE — PROGRESS NOTE ADULT - ASSESSMENT
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    ##neuro  -no acute issues  -ISTOP Reference #: [id number]  -patient takes zolpidem 5mg prn and clonezepam 0.5mg TID prn    ##CV  -no acute issues  -home losartan and norvasc was held last admission  -to restart prn    ##pulmonary  Tracheomalacia s/p trach, pt states she came for trach change, ENT following, attempted trach change done today unsuccessful though doesn't need new trach   -vent dependence, on collar appx 2 hours/day  -goal up to 12 hours/day  -as outpatient attempting to wean off vent as tolerated   -on the following settings at home: 3 L SIMV: VT- 450; PC 20; PS-18; PEEP- 5, no increase in settings  - Night CPAP 5/PS 18 (back up 8 B/mm)  -5/25 contacted ENT, talking with Dr. Rodriguez, may reattempt before leaving    COPD  -on azithromycin TIW for severe COPD  -Albuterol/ipratropium inhaler PRN  -has multiple other inhaler prescriptions, unclear what patient takes at home (per home nurse, pt takes albuterol nebs as inhalers dry her mouth)    ##renal  -no active issues    ##GI  - s/p G-tube placement at bedside, post procedure xray confirming position  - pt with pureed PO diet with low calorie count, tolerating tube feeds    FTT  -Pt weighed 190lbs Dec 19, currently 140lbs, outpatient doctor's notes noting weight loss  -has had prior EGD and colonoscopy which were unremarkable  -prior infectious and malignancy workup in March also unrevealing   -no associated lab derangements  -evaluated by nutrition on last admission  -dysphagia eval, placed on dysphagia diet  - Calorie count for three days showing insufficient calories   - Nutrition following along, tolerating tube feeds      ##ID  -doxy dc'd, pt with chronic tracheobronchitis on Azithro TIW  -has history of polymicrobial sputum culture (MRSAl klebsiella, serratia, achromobacter) and chronic psuedomonas colonization, sputum cx showing kleb pneumo here, no WBC count/ no fevers  -currently does not meet any SIRS criteria  -ID saw patient, no IV abx needed as pt was requesting  - Covid neg x2    ##heme  -history of anemia  -consistent with baseline  -no acute issues    ##endocrine  no acute issues 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    ##neuro  -no acute issues  -ISTOP Reference #: [id number]  -patient takes zolpidem 5mg prn and clonezepam 0.5mg TID prn    ##CV  -no acute issues  -home losartan and norvasc was held last admission  -to restart prn    ##pulmonary  Tracheomalacia s/p trach, pt states she came for trach change, ENT following, attempted trach change done today unsuccessful though doesn't need new trach   -vent dependence, on collar appx 2 hours/day  -goal up to 12 hours/day  -as outpatient attempting to wean off vent as tolerated   -on the following settings at home: 3 L SIMV: VT- 450; PC 20; PS-18; PEEP- 5, no increase in settings  - Night CPAP 5/PS 18 (back up 8 B/mm)  -5/26 trach changed    COPD  -on azithromycin TIW for severe COPD  -Albuterol/ipratropium inhaler PRN  -has multiple other inhaler prescriptions, unclear what patient takes at home (per home nurse, pt takes albuterol nebs as inhalers dry her mouth)    ##renal  -no active issues    ##GI  - s/p G-tube placement at bedside, post procedure xray confirming position  - pt with pureed PO diet with low calorie count, tolerating tube feeds, changed to bolus feed 5/26    FTT  -Pt weighed 190lbs Dec 19, currently 140lbs, outpatient doctor's notes noting weight loss  -has had prior EGD and colonoscopy which were unremarkable  -prior infectious and malignancy workup in March also unrevealing   -no associated lab derangements  -evaluated by nutrition on last admission  -dysphagia eval, placed on dysphagia diet  - Calorie count for three days showing insufficient calories   - Nutrition following along, tolerating tube feeds      ##ID  -doxy dc'd, pt with chronic tracheobronchitis on Azithro TIW  -has history of polymicrobial sputum culture (MRSAl klebsiella, serratia, achromobacter) and chronic psuedomonas colonization, sputum cx showing kleb pneumo here, no WBC count/ no fevers  -currently does not meet any SIRS criteria  -ID saw patient, no IV abx needed as pt was requesting  - Covid neg x2    ##heme  -history of anemia  -consistent with baseline  -no acute issues    ##endocrine  no acute issues

## 2020-05-26 NOTE — PROGRESS NOTE ADULT - ASSESSMENT
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  Advanced care planning forms were reviewed and discussed.  Risks, benefits and alternatives of gastroenterologic procedures were discussed in detail and all questions were answered.  30 minutes spent.

## 2020-05-26 NOTE — PROGRESS NOTE ADULT - SUBJECTIVE AND OBJECTIVE BOX
INTERVAL HPI/OVERNIGHT EVENTS:    SUBJECTIVE: Patient seen and examined at bedside.     CONSTITUTIONAL: No weakness, fevers or chills  EYES/ENT: No visual changes;  No vertigo or throat pain   NECK: No pain or stiffness  RESPIRATORY: No cough, wheezing, hemoptysis; No shortness of breath  CARDIOVASCULAR: No chest pain or palpitations  GASTROINTESTINAL: No abdominal or epigastric pain. No nausea, vomiting, or hematemesis; No diarrhea or constipation. No melena or hematochezia.  GENITOURINARY: No dysuria, frequency or hematuria  NEUROLOGICAL: No numbness or weakness  SKIN: No itching, rashes    OBJECTIVE:    VITAL SIGNS:  ICU Vital Signs Last 24 Hrs  T(C): 37.1 (26 May 2020 08:00), Max: 37.1 (26 May 2020 08:00)  T(F): 98.8 (26 May 2020 08:00), Max: 98.8 (26 May 2020 08:00)  HR: 81 (26 May 2020 10:49) (54 - 81)  BP: 140/75 (26 May 2020 08:00) (140/75 - 141/70)  BP(mean): 92 (26 May 2020 08:00) (80 - 92)  ABP: --  ABP(mean): --  RR: 17 (26 May 2020 08:00) (14 - 19)  SpO2: 100% (26 May 2020 10:49) (96% - 100%)    Mode: CPAP with PS, FiO2: 30, PEEP: 5, PS: 18, MAP: 8, PIP: 23    05-25 @ 07:01 - 05-26 @ 07:00  --------------------------------------------------------  IN: 680 mL / OUT: 625 mL / NET: 55 mL    05-26 @ 07:01 - 05-26 @ 12:50  --------------------------------------------------------  IN: 350 mL / OUT: 0 mL / NET: 350 mL      CAPILLARY BLOOD GLUCOSE          PHYSICAL EXAM:    General: NAD  HEENT: NC/AT; PERRL, clear conjunctiva  Neck: supple  Respiratory: CTA b/l  Cardiovascular: +S1/S2; RRR  Abdomen: soft, NT/ND; +BS x4  Extremities: WWP, 2+ peripheral pulses b/l; no LE edema  Skin: normal color and turgor; no rash  Neurological:    MEDICATIONS:  MEDICATIONS  (STANDING):  ALBUTerol    90 MICROgram(s) HFA Inhaler 2 Puff(s) Inhalation every 6 hours  azithromycin   Tablet 250 milliGRAM(s) Oral <User Schedule>  budesonide 160 MICROgram(s)/formoterol 4.5 MICROgram(s) Inhaler 2 Puff(s) Inhalation two times a day  chlorhexidine 0.12% Liquid 15 milliLiter(s) Oral Mucosa every 12 hours  chlorhexidine 4% Liquid 1 Application(s) Topical <User Schedule>  enoxaparin Injectable 40 milliGRAM(s) SubCutaneous daily  ipratropium 17 MICROgram(s) HFA Inhaler 2 Puff(s) Inhalation every 6 hours  lactulose Syrup 10 Gram(s) Oral three times a day  polyethylene glycol 3350 17 Gram(s) Oral daily  senna Syrup 15 milliLiter(s) Oral two times a day    MEDICATIONS  (PRN):  acetaminophen    Suspension .. 650 milliGRAM(s) Oral every 6 hours PRN Moderate Pain (4 - 6)  acetaminophen   Tablet .. 650 milliGRAM(s) Oral every 6 hours PRN Mild Pain (1 - 3)  acetaminophen  Suppository .. 650 milliGRAM(s) Rectal every 6 hours PRN Moderate Pain (4 - 6)  ALBUTerol    0.083% 2.5 milliGRAM(s) Nebulizer every 4 hours PRN Shortness of Breath and/or Wheezing  bisacodyl Suppository 10 milliGRAM(s) Rectal daily PRN Constipation  clonazePAM Oral Disintegrating Tablet 0.5 milliGRAM(s) Oral every 8 hours PRN Anxiety  ipratropium    for Nebulization 500 MICROGram(s) Nebulizer every 4 hours PRN Shortness of Breath  simethicone 80 milliGRAM(s) Chew every 6 hours PRN Gas  zolpidem 5 milliGRAM(s) Oral at bedtime PRN Insomnia      ALLERGIES:  Allergies    Cipro (Unknown)  latex (Unknown)  theophylline (Hives)    Intolerances        LABS:                RADIOLOGY & ADDITIONAL TESTS: Reviewed. INTERVAL HPI/OVERNIGHT EVENTS:    SUBJECTIVE: Patient seen and examined at bedside. No acute events overnight. ENT changed trach this morning.  setting up tube feeds prior to setting up home health aid/transportation.     CONSTITUTIONAL: No weakness, fevers or chills  EYES/ENT: No visual changes;  No vertigo or throat pain   NECK: No pain or stiffness  RESPIRATORY: No cough, wheezing, hemoptysis; No shortness of breath  CARDIOVASCULAR: No chest pain or palpitations  GASTROINTESTINAL: No abdominal or epigastric pain. No nausea, vomiting, or hematemesis; No diarrhea or constipation. No melena or hematochezia.  GENITOURINARY: No dysuria, frequency or hematuria  NEUROLOGICAL: No numbness or weakness  SKIN: No itching, rashes    OBJECTIVE:    VITAL SIGNS:  ICU Vital Signs Last 24 Hrs  T(C): 37.1 (26 May 2020 08:00), Max: 37.1 (26 May 2020 08:00)  T(F): 98.8 (26 May 2020 08:00), Max: 98.8 (26 May 2020 08:00)  HR: 81 (26 May 2020 10:49) (54 - 81)  BP: 140/75 (26 May 2020 08:00) (140/75 - 141/70)  BP(mean): 92 (26 May 2020 08:00) (80 - 92)  ABP: --  ABP(mean): --  RR: 17 (26 May 2020 08:00) (14 - 19)  SpO2: 100% (26 May 2020 10:49) (96% - 100%)    Mode: CPAP with PS, FiO2: 30, PEEP: 5, PS: 18, MAP: 8, PIP: 23    05-25 @ 07:01 - 05-26 @ 07:00  --------------------------------------------------------  IN: 680 mL / OUT: 625 mL / NET: 55 mL    05-26 @ 07:01  -  05-26 @ 12:50  --------------------------------------------------------  IN: 350 mL / OUT: 0 mL / NET: 350 mL      CAPILLARY BLOOD GLUCOSE          PHYSICAL EXAM:    General: NAD  HEENT: PERRL, clear conjunctiva  Neck: trach site c/d/i  Respiratory: sats well on min vent settings, no resp distress  Cardiovascular:  RRR  Abdomen: soft, ND, G tube site c/d/i  Extremities: WWP, no LE edema  Skin: normal color and turgor; no rash  Neurological: AAOx3, normal speech, moving all ext without lateralization     MEDICATIONS:  MEDICATIONS  (STANDING):  ALBUTerol    90 MICROgram(s) HFA Inhaler 2 Puff(s) Inhalation every 6 hours  azithromycin   Tablet 250 milliGRAM(s) Oral <User Schedule>  budesonide 160 MICROgram(s)/formoterol 4.5 MICROgram(s) Inhaler 2 Puff(s) Inhalation two times a day  chlorhexidine 0.12% Liquid 15 milliLiter(s) Oral Mucosa every 12 hours  chlorhexidine 4% Liquid 1 Application(s) Topical <User Schedule>  enoxaparin Injectable 40 milliGRAM(s) SubCutaneous daily  ipratropium 17 MICROgram(s) HFA Inhaler 2 Puff(s) Inhalation every 6 hours  lactulose Syrup 10 Gram(s) Oral three times a day  polyethylene glycol 3350 17 Gram(s) Oral daily  senna Syrup 15 milliLiter(s) Oral two times a day    MEDICATIONS  (PRN):  acetaminophen    Suspension .. 650 milliGRAM(s) Oral every 6 hours PRN Moderate Pain (4 - 6)  acetaminophen   Tablet .. 650 milliGRAM(s) Oral every 6 hours PRN Mild Pain (1 - 3)  acetaminophen  Suppository .. 650 milliGRAM(s) Rectal every 6 hours PRN Moderate Pain (4 - 6)  ALBUTerol    0.083% 2.5 milliGRAM(s) Nebulizer every 4 hours PRN Shortness of Breath and/or Wheezing  bisacodyl Suppository 10 milliGRAM(s) Rectal daily PRN Constipation  clonazePAM Oral Disintegrating Tablet 0.5 milliGRAM(s) Oral every 8 hours PRN Anxiety  ipratropium    for Nebulization 500 MICROGram(s) Nebulizer every 4 hours PRN Shortness of Breath  simethicone 80 milliGRAM(s) Chew every 6 hours PRN Gas  zolpidem 5 milliGRAM(s) Oral at bedtime PRN Insomnia      ALLERGIES:  Allergies    Cipro (Unknown)  latex (Unknown)  theophylline (Hives)    Intolerances        LABS:                RADIOLOGY & ADDITIONAL TESTS: Reviewed.

## 2020-05-27 ENCOUNTER — TRANSCRIPTION ENCOUNTER (OUTPATIENT)
Age: 60
End: 2020-05-27

## 2020-05-27 VITALS — OXYGEN SATURATION: 100 %

## 2020-05-27 RX ADMIN — POLYETHYLENE GLYCOL 3350 17 GRAM(S): 17 POWDER, FOR SOLUTION ORAL at 11:38

## 2020-05-27 RX ADMIN — LACTULOSE 10 GRAM(S): 10 SOLUTION ORAL at 05:30

## 2020-05-27 RX ADMIN — CHLORHEXIDINE GLUCONATE 1 APPLICATION(S): 213 SOLUTION TOPICAL at 05:30

## 2020-05-27 RX ADMIN — BUDESONIDE AND FORMOTEROL FUMARATE DIHYDRATE 2 PUFF(S): 160; 4.5 AEROSOL RESPIRATORY (INHALATION) at 09:30

## 2020-05-27 RX ADMIN — ALBUTEROL 2.5 MILLIGRAM(S): 90 AEROSOL, METERED ORAL at 04:33

## 2020-05-27 RX ADMIN — ENOXAPARIN SODIUM 40 MILLIGRAM(S): 100 INJECTION SUBCUTANEOUS at 11:38

## 2020-05-27 RX ADMIN — ALBUTEROL 2 PUFF(S): 90 AEROSOL, METERED ORAL at 09:31

## 2020-05-27 RX ADMIN — Medication 2 PUFF(S): at 09:32

## 2020-05-27 RX ADMIN — Medication 2 PUFF(S): at 16:34

## 2020-05-27 RX ADMIN — CHLORHEXIDINE GLUCONATE 15 MILLILITER(S): 213 SOLUTION TOPICAL at 05:30

## 2020-05-27 RX ADMIN — AZITHROMYCIN 250 MILLIGRAM(S): 500 TABLET, FILM COATED ORAL at 09:58

## 2020-05-27 RX ADMIN — ALBUTEROL 2 PUFF(S): 90 AEROSOL, METERED ORAL at 16:33

## 2020-05-27 RX ADMIN — Medication 500 MICROGRAM(S): at 04:34

## 2020-05-27 RX ADMIN — Medication 0.5 MILLIGRAM(S): at 00:48

## 2020-05-27 RX ADMIN — SENNA PLUS 15 MILLILITER(S): 8.6 TABLET ORAL at 05:30

## 2020-05-27 NOTE — DISCHARGE NOTE PROVIDER - CARE PROVIDERS DIRECT ADDRESSES
[email or web address]

## 2020-05-27 NOTE — DISCHARGE NOTE PROVIDER - NSDCCPCAREPLAN_GEN_ALL_CORE_FT
PRINCIPAL DISCHARGE DIAGNOSIS  Diagnosis: Failure to thrive in adult  Assessment and Plan of Treatment: While you were hospitalized you had a PEG feeding tube placed, due to dysphagia. The registered dietician recommended -Jevity 1.2 via PEG, 330ml bolus q6hr (4 feeds daily), and 1065 of free water daily. You can keep track of your oral water consumption daily or you can measure out the amount 265 mL and administer directly into your peg tube every 6 hours or 4 times daily while your awake (breakfast, lunch, dinner, and bedtime) at your  discretion. Continue on your oral diet  Dysphagia 2 Mechanical Soft-Honey Consistency Fluid, along with continuing on your bowel regimen. Please follow up with your primary care physician and Gastroenterologist  following your hospitalization in 1-2 weeks.      SECONDARY DISCHARGE DIAGNOSES  Diagnosis: Tracheomalacia  Assessment and Plan of Treatment: While you were admitted you had your trach exchanged. Please follow up with your ENT Dr Rodriguez in 1-2 weeks after discharge. If you have any problems or your trach becomes dislodged call "[phone]" and go to your closest  Emergency Department.    Diagnosis: Dependence on tracheostomy  Assessment and Plan of Treatment: You have remained  on a ventilator on your regular settings, off on  trach collar ~ 2 hours daily as previously, as tolerated. If you have any questions or concerns  Please follow up with your primary care physician for further medical management.

## 2020-05-27 NOTE — PROGRESS NOTE ADULT - REASON FOR ADMISSION
Failure to Thrive

## 2020-05-27 NOTE — DISCHARGE NOTE PROVIDER - HOSPITAL COURSE
This is a 61 y/o F with PMHx of tracheomalacia s/p tracheostomy (2004) c/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) and chronic vent dependence (on trach collar ~2 hours per day), COPD, GERD, anxiety who presents with complaint of 100 lb weight loss over the course of months, most recently since last hospitalization in March patient noted to have over 30 lb weight loss due to decreased po intake from dysphagia. Patient instructed by ENT, to go to ED, and was admitted May 17, 2020 to the MICU for failure to thrive, possible G-tube placement, and  trach exchange. The patient previously underwent an extensive infectious/malignancy workup and EGD/colonoscopy were negative. ID consulted and following, 2/2 positive sputum culture, though had clear chest xray, no systemic signs of infection, culture likely due to colonization, patient tolerating Fio2 32%, speaking clearly, no signs of A course of antibiotics was initiated during your hospital stay - please continue as prescribed. Monitor for worsening of disease, such as, increased cough/sputum, difficulty breathing, fever/chills, or changes in mental status. Follow-up with your PCP as an outpatient for further medical care/recommendations., remains off antibiotics     Nutrition consulted and following. GI consulted, # 20 [place] BS Gastrostomy PEG tube placed, and Jevity TF 1.2 initiated and well tolerated, with patient advancing to intermittent bolus feeds to supplement with dysphagia diet. This is a 60 year old female with PMHx of tracheomalacia s/p tracheostomy (2004) c/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) and chronic vent dependence (on trach collar ~2 hours per day), COPD, GERD, anxiety who presents with complaint of 100 lb weight loss over the course of weeks/months,  most recently since last hospitalization in March patient noted to have over 30 lb weight loss due to decreased po intake from dysphagia. Patient instructed by ENT Dr. Rodriguez to go to [facility] ED, and was admitted May 17, 2020 for failure to thrive, possible G-tube placement, and  trach exchange, directly to the MICU as patient is vent dependant. The patient was previously admitted and underwent an extensive  infectious/malignancy workup Failure to Thrive, along with EGD/colonoscopy all being negative. ID consulted and following, 2/2 positive sputum culture. Speciated sputum culture likely due to colonization, chest xray was clear, and no systemic signs of infection, patient was tolerating Fio2 32%, speaking clearly, and remains off antibiotics.  While in MICU, patient initially refusing G tube but eventually seen by Dr. Rodriguez and agreed to tube placement, arranged for bedside G tube placement with GI Dr. Lazo with post procedure confirmation. Patient also received trach change by ENT team. Nutrition consulted, with documented calorie count with patient tolerating Jevity 1.2 tube feeds. Patient also intermittently tolerating Dysphagia 2 Mechanical Soft-Honey Consistency Fluid oral diet.  Yulia involved, with confirmation of insurance authorization. Patient afebrile, vital signs stable, with resp rate 16-18, maintained on vent with 100%oxygen saturation, with 32% FiO2. Patient medically stable and ready for discharge. Social Work confirmed patient's home help and transportation services available for .        Dispo: Patient seen and evaluated on rounds with Attending Physician Dr. Gita Lisker, patient case reviewed, and Dr Lisker agrees the patient is medically stable and optimized for discharge on May 27, 2020. All medications were reviewed and reconciled. Patient discharged with home Tube Feeding supplies and Jevity 1.2 for 2 days as directed by .

## 2020-05-27 NOTE — DISCHARGE NOTE PROVIDER - INSTRUCTIONS
Oral diet- Dysphagia 2 Mechanical Soft-Honey Consistency Fluid,    Peg Tube -Jevity 1.2 Bolus Tube Feeds 4-(330 mL each feed) feeds daily

## 2020-05-27 NOTE — DISCHARGE NOTE PROVIDER - CARE PROVIDER_API CALL
Mayito Eduardo  GASTROENTEROLOGY  [address] [place] [address]  Phone: [phone]  Fax: [phone]  Follow Up Time:     Hernan Morin  Please follow up with your primary care physician  Phone: (   )    -  Fax: (   )    -  Follow Up Time:     Gaurang Rodriguez  OTOLARYNGOLOGY  [address] [place] [address]  Phone: [phone]  Fax: [phone]  Established Patient  Follow Up Time:

## 2020-05-27 NOTE — PROGRESS NOTE ADULT - ATTENDING COMMENTS
as above  d/c planning
Patient seen and examined.     61 yo woman with TBM, s/p trach (vent dependent most of the time, TC for a few hours a day) with hx of MRSA/Pseudomonas/Serratia/Achromobacter/Klebs  in the sputum admitted with FTT/need for tracheostomy change. Currently growing ESBL Klebs and Myroides but no evidence of active infection at this time. TC during the day as tolerated. s/p PEG tube 5/22.  Stable to transfer out of ICU. Patient still wanting to have trach change. Will reach out to ENT. D/c home tomorrow if tolerates feeds.
Patient seen and examined.     61 yo woman with TBM, s/p trach (vent dependent most of the time, TC for a few hours a day) with hx of MRSA/Pseudomonas/Serratia/Achromobacter/Klebs  in the sputum admitted with FTT/need for tracheostomy change. Currently growing ESBL Klebs and Myroides but no evidence of active infection at this time. TC during the day as tolerated. s/p PEG tube 5/22.  Stable to transfer out of ICU. Patient trach was changed today and continues to be on vent.    Prognosis is guarded.
Patient seen and examined.   59 yo woman with TBM, s/p trach (vent dependent most of the time, TC for a few hours a day) with hx of MRSA/Pseudomonas/Serratia/Achromobacter/Klebs  in the sputum admitted with FTT/need for tracheostomy change. Currently growing ESBL Klebs and Myroides but no e/o active infection at this time. TC during the day as tolerated. s/p PEG tube 5/22.  Stable to transfer out of ICU. D/c home tomorrow if tolerates feeds.
Patient seen and examined.   59 yo woman with TBM, s/p trach (vent dependent most of the time, TC for a few hours a day) with hx of MRSA/Pseudomonas/Serratia/Achromobacter/Klebs  in the sputum admitted with FTT/need for tracheostomy change. Currently growing ESBL Klebs and Myroides but no e/o active infection at this time. TC during the day as tolerated. s/p PEG tube today  Stable to transfer out of ICU.
Patient seen and examined.   59 yo woman with TBM, s/p trach (vent dependent most of the time, TC for a few hours a day) with hx of MRSA/Pseudomonas/Serratia/Achromobacter/Klebs  in the sputum admitted with FTT/need for tracheostomy change. No e/o acute infection at this time. TC during the day as tolerated. Planned for PEG tube.   Stable to transfer out of ICU.
Patient seen and examined.   59 yo woman with TBM, s/p trach (vent dependent most of the time, TC for a few hours a day) with hx of MRSA/Pseudomonas/Serratia/Achromobacter/Klebs  in the sputum admitted with FTT/need for tracheostomy change. No e/o acute infection at this time. TC during the day today. Aggressive pulmonary toilet. Will discuss needs for trach change with Dr Rodriguez. Had recent negative workup for wt loss, not interested in PEG tube.   Can be discharged home as no plans for PEG.
Patient seen and examined.   61 yo woman with TBM, s/p trach (vent dependent most of the time, TC for a few hours a day) with hx of MRSA/Pseudomonas/Serratia/Achromobacter/Klebs  in the sputum admitted with FTT/need for tracheostomy change. Currently growing ESBL Klebs and Myroides but no e/o active infection at this time. TC during the day as tolerated. Planned for PEG tube.   Stable to transfer out of ICU.

## 2020-05-27 NOTE — PROGRESS NOTE ADULT - SUBJECTIVE AND OBJECTIVE BOX
CHIEF COMPLAINT:    Interval Events:    REVIEW OF SYSTEMS:  Constitutional:   Eyes:  ENT:  CV:  Resp:  GI:  :  MSK:  Integumentary:  Neurological:  Psychiatric:  Endocrine:  Hematologic/Lymphatic:  Allergic/Immunologic:  [ ] All other systems negative  [ ] Unable to assess ROS because ________    OBJECTIVE:  ICU Vital Signs Last 24 Hrs  T(C): 37.3 (27 May 2020 05:25), Max: 37.4 (26 May 2020 22:20)  T(F): 99.1 (27 May 2020 05:25), Max: 99.3 (26 May 2020 22:20)  HR: 61 (27 May 2020 05:25) (54 - 88)  BP: 108/59 (27 May 2020 05:25) (105/89 - 147/73)  BP(mean): 82 (26 May 2020 20:00) (82 - 92)  ABP: --  ABP(mean): --  RR: 12 (27 May 2020 05:25) (12 - 24)  SpO2: 100% (27 May 2020 05:25) (98% - 100%)    Mode: CPAP with PS, FiO2: 23, PEEP: 5, PS: 18, MAP: 9    05-25 @ 07:01 - 05-26 @ 07:00  --------------------------------------------------------  IN: 680 mL / OUT: 625 mL / NET: 55 mL    05-26 @ 07:01 - 05-27 @ 06:47  --------------------------------------------------------  IN: 2150 mL / OUT: 200 mL / NET: 1950 mL      CAPILLARY BLOOD GLUCOSE          PHYSICAL EXAM:  General:   HEENT:   Lymph Nodes:  Neck:   Respiratory:   Cardiovascular:   Abdomen:   Extremities:   Skin:   Neurological:  Psychiatry:    HOSPITAL MEDICATIONS:  MEDICATIONS  (STANDING):  ALBUTerol    90 MICROgram(s) HFA Inhaler 2 Puff(s) Inhalation every 6 hours  azithromycin   Tablet 250 milliGRAM(s) Oral <User Schedule>  budesonide 160 MICROgram(s)/formoterol 4.5 MICROgram(s) Inhaler 2 Puff(s) Inhalation two times a day  chlorhexidine 0.12% Liquid 15 milliLiter(s) Oral Mucosa every 12 hours  chlorhexidine 4% Liquid 1 Application(s) Topical <User Schedule>  enoxaparin Injectable 40 milliGRAM(s) SubCutaneous daily  ipratropium 17 MICROgram(s) HFA Inhaler 2 Puff(s) Inhalation every 6 hours  lactulose Syrup 10 Gram(s) Oral three times a day  polyethylene glycol 3350 17 Gram(s) Oral daily  senna Syrup 15 milliLiter(s) Oral two times a day    MEDICATIONS  (PRN):  acetaminophen    Suspension .. 650 milliGRAM(s) Oral every 6 hours PRN Moderate Pain (4 - 6)  acetaminophen   Tablet .. 650 milliGRAM(s) Oral every 6 hours PRN Mild Pain (1 - 3)  acetaminophen  Suppository .. 650 milliGRAM(s) Rectal every 6 hours PRN Moderate Pain (4 - 6)  ALBUTerol    0.083% 2.5 milliGRAM(s) Nebulizer every 4 hours PRN Shortness of Breath and/or Wheezing  bisacodyl Suppository 10 milliGRAM(s) Rectal daily PRN Constipation  clonazePAM Oral Disintegrating Tablet 0.5 milliGRAM(s) Oral every 8 hours PRN Anxiety  ipratropium    for Nebulization 500 MICROGram(s) Nebulizer every 4 hours PRN Shortness of Breath  simethicone 80 milliGRAM(s) Chew every 6 hours PRN Gas  zolpidem 5 milliGRAM(s) Oral at bedtime PRN Insomnia      LABS:                    MICROBIOLOGY:     RADIOLOGY:  [ ] Reviewed and interpreted by me    PULMONARY FUNCTION TESTS:    EKG: CHIEF COMPLAINT: Patient is a 60 year old Female who presents with a chief complaint of Failure to Thrive.      Interval Events: No overnight events    REVIEW OF SYSTEMS:  Constitutional: denies fevers  CV: denies chest pain and palpitations  Resp: denies SOB  GI: denies abd pain and discomfort  [X ] All other systems negative    OBJECTIVE:  ICU Vital Signs Last 24 Hrs  T(C): 37.3 (27 May 2020 05:25), Max: 37.4 (26 May 2020 22:20)  T(F): 99.1 (27 May 2020 05:25), Max: 99.3 (26 May 2020 22:20)  HR: 61 (27 May 2020 05:25) (54 - 88)  BP: 108/59 (27 May 2020 05:25) (105/89 - 147/73)  BP(mean): 82 (26 May 2020 20:00) (82 - 92)  RR: 12 (27 May 2020 05:25) (12 - 24)  SpO2: 100% (27 May 2020 05:25) (98% - 100%)    Mode: CPAP with PS, FiO2: 23, PEEP: 5, PS: 18, MAP: 9    05-25 @ 07:01 - 05-26 @ 07:00  --------------------------------------------------------  IN: 680 mL / OUT: 625 mL / NET: 55 mL    05-26 @ 07:01 - 05-27 @ 06:47  --------------------------------------------------------  IN: 2150 mL / OUT: 200 mL / NET: 1950 mL          HOSPITAL MEDICATIONS:  MEDICATIONS  (STANDING):  ALBUTerol    90 MICROgram(s) HFA Inhaler 2 Puff(s) Inhalation every 6 hours  azithromycin   Tablet 250 milliGRAM(s) Oral <User Schedule>  budesonide 160 MICROgram(s)/formoterol 4.5 MICROgram(s) Inhaler 2 Puff(s) Inhalation two times a day  chlorhexidine 0.12% Liquid 15 milliLiter(s) Oral Mucosa every 12 hours  chlorhexidine 4% Liquid 1 Application(s) Topical <User Schedule>  enoxaparin Injectable 40 milliGRAM(s) SubCutaneous daily  ipratropium 17 MICROgram(s) HFA Inhaler 2 Puff(s) Inhalation every 6 hours  lactulose Syrup 10 Gram(s) Oral three times a day  polyethylene glycol 3350 17 Gram(s) Oral daily  senna Syrup 15 milliLiter(s) Oral two times a day    MEDICATIONS  (PRN):  acetaminophen    Suspension .. 650 milliGRAM(s) Oral every 6 hours PRN Moderate Pain (4 - 6)  acetaminophen   Tablet .. 650 milliGRAM(s) Oral every 6 hours PRN Mild Pain (1 - 3)  acetaminophen  Suppository .. 650 milliGRAM(s) Rectal every 6 hours PRN Moderate Pain (4 - 6)  ALBUTerol    0.083% 2.5 milliGRAM(s) Nebulizer every 4 hours PRN Shortness of Breath and/or Wheezing  bisacodyl Suppository 10 milliGRAM(s) Rectal daily PRN Constipation  clonazePAM Oral Disintegrating Tablet 0.5 milliGRAM(s) Oral every 8 hours PRN Anxiety  ipratropium    for Nebulization 500 MICROGram(s) Nebulizer every 4 hours PRN Shortness of Breath  simethicone 80 milliGRAM(s) Chew every 6 hours PRN Gas  zolpidem 5 milliGRAM(s) Oral at bedtime PRN Insomnia

## 2020-05-27 NOTE — DISCHARGE NOTE PROVIDER - NSDCFUSCHEDAPPT_GEN_ALL_CORE_FT
PATRIZIA NAVARRETE ; 05/28/2020 ; NPP OtoLaryng [address] [facility]  PATRIZIA NAVARRETE ; 07/01/2020 ; NPP PulCoco [address] [facility] PATRIZIA NAVARRETE ; 06/25/2020 ; NPP OtoLaryng [address] [facility]  PATRIZIA NAVARRETE ; 07/01/2020 ; NPP PulCoco [address] [facility]

## 2020-05-27 NOTE — DISCHARGE NOTE PROVIDER - NSDCMRMEDTOKEN_GEN_ALL_CORE_FT
acetylcysteine 20% inhalation solution: 4 milliliter(s) inhaled 4 times a day, As Needed  albuterol 90 mcg/inh inhalation aerosol: 2 puff(s) inhaled 4 times a day, As Needed  Ambien 5 mg oral tablet: 1 tab(s) orally once a day (at bedtime), As Needed insomnia MDD:1 tablet  aspirin 81 mg oral tablet, chewable: 1 tab(s) orally once a day. Last dose 19  azithromycin 500 mg oral tablet: 0.5 tab(s) orally 3 times a week   clonazePAM 0.5 mg oral tablet, disintegratin tab(s) orally every 8 hours, As Needed -anxiety - for agitation MDD:3 tablets  ipratropium-albuterol 0.5 mg-2.5 mg/3 mLinhalation solution: 3 milliliter(s) inhaled every 4 hours, As needed, Shortness of Breath and/or Wheezing  lactulose 10 g/15 mL oral syrup: 15 milliliter(s) orally once a day, As Needed  losartan 25 mg oral tablet: 1 tab(s) orally once a day  MiraLax oral powder for reconstitution: orally every other day (at bedtime)  multivitamin: 1  orally once a day. Last dose 19.  Norvasc 5 mg oral tablet: 1 tab(s) orally once a day  pantoprazole 40 mg oral delayed release tablet: 1 tab(s) orally once a day  Percocet 5/325 oral tablet: 1 tab(s) orally every 6 hours, As Needed MDD:4   polyethylene glycol 3350 oral powder for reconstitution: 17 gram(s) orally once a day, As needed, Constipation  Senna 8.6 mg oral tablet: 1 tab(s) orally every other day (at bedtime)  simethicone 125 mg oral capsule: 1 cap(s) orally 3 to 4 times a day, As Needed   sterile water irrigation solution: irrigation once a day, As Needed  Symbicort 160 mcg-4.5 mcg/inh inhalation aerosol: 2 puff(s) inhaled 2 times a day   thiamine 100 mg oral tablet: 1 tab(s) orally once a day

## 2020-05-27 NOTE — PROGRESS NOTE ADULT - SUBJECTIVE AND OBJECTIVE BOX
INTERVAL HPI/OVERNIGHT EVENTS:    tolerating po and tube feeds    MEDICATIONS  (STANDING):  ALBUTerol    90 MICROgram(s) HFA Inhaler 2 Puff(s) Inhalation every 6 hours  azithromycin   Tablet 250 milliGRAM(s) Oral <User Schedule>  budesonide 160 MICROgram(s)/formoterol 4.5 MICROgram(s) Inhaler 2 Puff(s) Inhalation two times a day  chlorhexidine 0.12% Liquid 15 milliLiter(s) Oral Mucosa every 12 hours  chlorhexidine 4% Liquid 1 Application(s) Topical <User Schedule>  enoxaparin Injectable 40 milliGRAM(s) SubCutaneous daily  ipratropium 17 MICROgram(s) HFA Inhaler 2 Puff(s) Inhalation every 6 hours  lactulose Syrup 10 Gram(s) Oral three times a day  polyethylene glycol 3350 17 Gram(s) Oral daily  senna Syrup 15 milliLiter(s) Oral two times a day    MEDICATIONS  (PRN):  acetaminophen    Suspension .. 650 milliGRAM(s) Oral every 6 hours PRN Moderate Pain (4 - 6)  acetaminophen   Tablet .. 650 milliGRAM(s) Oral every 6 hours PRN Mild Pain (1 - 3)  acetaminophen  Suppository .. 650 milliGRAM(s) Rectal every 6 hours PRN Moderate Pain (4 - 6)  ALBUTerol    0.083% 2.5 milliGRAM(s) Nebulizer every 4 hours PRN Shortness of Breath and/or Wheezing  bisacodyl Suppository 10 milliGRAM(s) Rectal daily PRN Constipation  clonazePAM Oral Disintegrating Tablet 0.5 milliGRAM(s) Oral every 8 hours PRN Anxiety  ipratropium    for Nebulization 500 MICROGram(s) Nebulizer every 4 hours PRN Shortness of Breath  simethicone 80 milliGRAM(s) Chew every 6 hours PRN Gas  zolpidem 5 milliGRAM(s) Oral at bedtime PRN Insomnia      Allergies    Cipro (Unknown)  latex (Unknown)  theophylline (Hives)    Intolerances        Review of Systems:    General:  No wt loss, fevers, chills, night sweats, fatigue   Eyes:  Good vision, no reported pain  ENT:  No sore throat, pain, runny nose, dysphagia  CV:  No pain, palpitations, hypo/hypertension  Resp:  No dyspnea, cough, tachypnea, wheezing  GI:  No pain, No nausea, No vomiting, No diarrhea, No constipation, No weight loss, No fever, No pruritis, No rectal bleeding, No melena, No dysphagia  :  No pain, bleeding, incontinence, nocturia  Muscle:  No pain, weakness  Neuro:  No weakness, tingling, memory problems  Psych:  No fatigue, insomnia, mood problems, depression  Endocrine:  No polyuria, polydypsia, cold/heat intolerance  Heme:  No petechiae, ecchymosis, easy bruisability  Skin:  No rash, tattoos, scars, edema      Vital Signs Last 24 Hrs  T(C): 37.3 (27 May 2020 05:25), Max: 37.4 (26 May 2020 22:20)  T(F): 99.1 (27 May 2020 05:25), Max: 99.3 (26 May 2020 22:20)  HR: 58 (27 May 2020 10:48) (58 - 88)  BP: 108/59 (27 May 2020 05:25) (105/89 - 147/73)  BP(mean): 82 (26 May 2020 20:00) (82 - 90)  RR: 12 (27 May 2020 05:25) (12 - 24)  SpO2: 100% (27 May 2020 10:48) (98% - 100%)    PHYSICAL EXAM:    Constitutional: NAD  HEENT: EOMI, throat clear  Neck: No LAD, supple +trach  Respiratory: CTA and P  Cardiovascular: S1 and S2, RRR, no M  Gastrointestinal: BS+, soft, NT/ND, neg HSM, +peg  Extremities: No peripheral edema, neg clubbing, cyanosis  Vascular: 2+ peripheral pulses  Neurological: A/O x 3, no focal deficits  Psychiatric: Normal mood, normal affect  Skin: No rashes      LABS:                RADIOLOGY & ADDITIONAL TESTS:

## 2020-05-27 NOTE — DISCHARGE NOTE PROVIDER - PROVIDER TOKENS
PROVIDER:[TOKEN:[[id number]]],FREE:[LAST:[Patience],FIRST:[Hernan Cole],PHONE:[(   )    -],FAX:[(   )    -],ADDRESS:[Please follow up with your primary care physician]],PROVIDER:[TOKEN:[[id number]],ESTABLISHEDPATIENT:[T]]

## 2020-05-30 NOTE — PHYSICAL EXAM
[General Appearance - In No Acute Distress] : in no acute distress [General Appearance - Alert] : alert [PERRL With Normal Accommodation] : pupils were equal in size, round, reactive to light [Sclera] : the sclera and conjunctiva were normal [Extraocular Movements] : extraocular movements were intact [Neck Appearance] : the appearance of the neck was normal [Exaggerated Use Of Accessory Muscles For Inspiration] : no accessory muscle use [Respiration, Rhythm And Depth] : normal respiratory rhythm and effort [] : no rash [Oriented To Time, Place, And Person] : oriented to person, place, and time [Affect] : the affect was normal [FreeTextEntry1] : trached

## 2020-05-30 NOTE — PHYSICAL EXAM
[General Appearance - Alert] : alert [General Appearance - In No Acute Distress] : in no acute distress [Sclera] : the sclera and conjunctiva were normal [Extraocular Movements] : extraocular movements were intact [PERRL With Normal Accommodation] : pupils were equal in size, round, reactive to light [Neck Appearance] : the appearance of the neck was normal [Respiration, Rhythm And Depth] : normal respiratory rhythm and effort [Exaggerated Use Of Accessory Muscles For Inspiration] : no accessory muscle use [] : no rash [Oriented To Time, Place, And Person] : oriented to person, place, and time [Affect] : the affect was normal [FreeTextEntry1] : trached

## 2020-05-30 NOTE — HISTORY OF PRESENT ILLNESS
[Home] : at home, [unfilled] , at the time of the visit. [Medical Office: ([facility])___] : at the medical office located in  [Formal Caregiver] : formal caregiver [Self] : self [Patient] : the patient [Verbal consent obtained from patient] : the patient, [unfilled] [FreeTextEntry4] : Formal Caregiver [FreeTextEntry1] : Ms. Bo is a 59 year old female referred by her Pulmonologist Dr. Nova with a history of atypical chest pain, COPD, chronic hypoxemia, dysphagia, GERD, laryngeal stenosis, OW, dependence on tracheostomy, and tracheomalacia presenting for evaluation for a positive sputum culture. \par \par She was started on doxycycline on Monday for polymicrobial sputum culture. She remains trached/ vented. She started having dry mouth and was unable to eat. She was also worried about a mucous plug. She continued doxycycline on Tues and Wed and she stopped Thurs. She remains with lethargy. Still with weight loss states 100 pounds.\par \par No fevers. No sputum production, actually states she is too dry.\par Also, feels dehydrated. On CPAP 3L per patient.\par \par

## 2020-05-30 NOTE — ASSESSMENT
[FreeTextEntry1] : 60 year female with tracheomalacia s/p tracheostomy in 2004 with stenosis, stomaplasty, vent dependent, COPD, GERD, anxiety with positive sputum culture.\par \par Had an outpatient CXR with PNA per patient.\par Sputum culture with:\par MRSA\par ESBL Kleb\par Serratia marcescens\par PA\par Achromobacter\par GNR sent to [facility]\par \par Recommend:\par #Positive sputum culture \par -Most likely most of the organisms represent colonization\par -She was started on doxycycline but could not continue due to dry mouth\par -Per her HHA, not having increased sputum production, speaking in full sentences, tolerating CPAP.\par -Can complete course of doxy for the MRSA\par -She has resistant organisms, only treatment option is IV\par -At this time can continue to monitor\par -If her condition worsens, may need ED for evaluation\par \par #Trach dependent\par -Not in resp distress\par -Continue to follow with LORETA/PULM\par \par RTC in 3-4 weeks

## 2020-05-30 NOTE — HISTORY OF PRESENT ILLNESS
[Home] : at home, [unfilled] , at the time of the visit. [Medical Office: ([facility])___] : at the medical office located in  [Formal Caregiver] : formal caregiver [Self] : self [Patient] : the patient [Verbal consent obtained from patient] : the patient, [unfilled] [FreeTextEntry4] : Formal Caregiver [FreeTextEntry1] : Ms. Bo is a 59 year old female referred by her Pulmonologist Dr. Nova with a history of atypical chest pain, COPD, chronic hypoxemia, dysphagia, GERD, laryngeal stenosis, OW, dependence on tracheostomy, and tracheomalacia presenting for evaluation for a positive sputum culture. \par \par She was started on doxycycline on Monday for polymicrobial sputum culture. She remains trached/ vented. She started having dry mouth and was unable to eat. She was also worried about a mucous plug. She continued doxycycline on Tues and Wed and she stopped Thurs. She remains with lethargy. Still with weight loss states 100 pounds.\par \par No fevers. No sputum production, actually states she is too dry.\par Also, feels dehydrated. On CPAP 3L per patient.\par \par

## 2020-05-30 NOTE — REVIEW OF SYSTEMS
[As Noted in HPI] : as noted in HPI [Negative] : Heme/Lymph [FreeTextEntry2] : weight loss [FreeTextEntry6] : Feels dry [FreeTextEntry4] : trach

## 2020-05-30 NOTE — REVIEW OF SYSTEMS
[As Noted in HPI] : as noted in HPI [Negative] : Heme/Lymph [FreeTextEntry2] : weight loss [FreeTextEntry4] : trach [FreeTextEntry6] : Feels dry

## 2020-06-04 LAB — BACTERIA SPT CF RESP CULT: ABNORMAL

## 2020-06-05 ENCOUNTER — OUTPATIENT (OUTPATIENT)
Dept: OUTPATIENT SERVICES | Facility: HOSPITAL | Age: 60
LOS: 1 days | End: 2020-06-05
Payer: MEDICAID

## 2020-06-05 ENCOUNTER — APPOINTMENT (OUTPATIENT)
Dept: INFECTIOUS DISEASE | Facility: CLINIC | Age: 60
End: 2020-06-05
Payer: MEDICAID

## 2020-06-05 DIAGNOSIS — Z93.0 TRACHEOSTOMY STATUS: Chronic | ICD-10-CM

## 2020-06-05 DIAGNOSIS — Z98.891 HISTORY OF UTERINE SCAR FROM PREVIOUS SURGERY: Chronic | ICD-10-CM

## 2020-06-05 DIAGNOSIS — Z43.0 ENCOUNTER FOR ATTENTION TO TRACHEOSTOMY: Chronic | ICD-10-CM

## 2020-06-05 DIAGNOSIS — R84.5 ABNORMAL MICROBIOLOGICAL FINDINGS IN SPECIMENS FROM RESPIRATORY ORGANS AND THORAX: ICD-10-CM

## 2020-06-05 DIAGNOSIS — R85.5 ABNORMAL MICROBIOLOGICAL FINDINGS IN SPECIMENS FROM DIGESTIVE ORGANS AND ABDOMINAL CAVITY: ICD-10-CM

## 2020-06-05 DIAGNOSIS — J39.8 OTHER SPECIFIED DISEASES OF UPPER RESPIRATORY TRACT: Chronic | ICD-10-CM

## 2020-06-05 PROCEDURE — 99214 OFFICE O/P EST MOD 30 MIN: CPT | Mod: 95

## 2020-06-05 PROCEDURE — G0463: CPT

## 2020-06-08 ENCOUNTER — APPOINTMENT (OUTPATIENT)
Dept: OTOLARYNGOLOGY | Facility: CLINIC | Age: 60
End: 2020-06-08
Payer: MEDICAID

## 2020-06-08 PROCEDURE — 99214 OFFICE O/P EST MOD 30 MIN: CPT | Mod: 95

## 2020-06-08 NOTE — HISTORY OF PRESENT ILLNESS
[Home] : at home, [unfilled] , at the time of the visit. [Medical Office: ([facility])___] : at the medical office located in  [Patient] : the patient [Self] : self [de-identified] : c/o piece of meat hanging near trach but decreased pain and no bleeding\par able to suction\par decreased appetite but using gtube\par wishes for a different GI doctor to follow up, sytorrit was too far\par nurse's number: [phone]\par

## 2020-06-08 NOTE — PHYSICAL EXAM
[Midline] : trachea located in midline position [Normal] : orientation to person, place, and time: normal [FreeTextEntry1] : clear voice [de-identified] : trach in place, no peristomal granulation tissue

## 2020-06-14 PROBLEM — R84.5 POSITIVE SPUTUM CULTURE FOR PSEUDOMONAS: Status: ACTIVE | Noted: 2020-03-09

## 2020-06-14 NOTE — REVIEW OF SYSTEMS
[As Noted in HPI] : as noted in HPI [Constipation] : constipation [Negative] : Psychiatric [FreeTextEntry6] : dry airway

## 2020-06-14 NOTE — HISTORY OF PRESENT ILLNESS
[Home] : at home, [unfilled] , at the time of the visit. [Medical Office: ([facility])___] : at the medical office located in  [Formal Caregiver] : formal caregiver [Verbal consent obtained from patient] : the patient, [unfilled] [FreeTextEntry1] : Ms. Bo is a 59 year old female referred by her Pulmonologist Dr. Nova with a history of atypical chest pain, COPD, chronic hypoxemia, dysphagia, GERD, laryngeal stenosis, OW, dependence on tracheostomy, and tracheomalacia presenting for evaluation for a positive sputum culture. \par \par Patient was admitted to [facility] on 5/17/20 for FTT, G-tube placement, and trach replacement. She had a repeat sputum culture in the hospital which had myroides and ESBL Kleb, but she had a clear CXR and remained afebrile, WBC WNL with no signs of PNA and suspect the sputum culture represented colonization. She remained off antibiotics throughout her hospital stay. \par \par Patient states she feels that her airway is dry. She has no mucous secretions. \par She uses humidified air.\par Receiving tube feeds. Also, eating via oral route as well.\par Also, feels constipated on senna, lactulose, and suppository and enema. \par \par Speaking in full sentences.\par Remains on 3L.\par

## 2020-06-14 NOTE — PHYSICAL EXAM
[General Appearance - Alert] : alert [General Appearance - Well-Appearing] : healthy appearing [General Appearance - In No Acute Distress] : in no acute distress [Outer Ear] : the ears and nose were normal in appearance [Hearing Threshold Finger Rub Not [place]] : hearing was normal [Sclera] : the sclera and conjunctiva were normal [Extraocular Movements] : extraocular movements were intact [Examination Of The Oral Cavity] : the lips and gums were normal [Neck Appearance] : the appearance of the neck was normal [Exaggerated Use Of Accessory Muscles For Inspiration] : no accessory muscle use [Respiration, Rhythm And Depth] : normal respiratory rhythm and effort [] : no rash [Skin Color & Pigmentation] : normal skin color and pigmentation [Auscultation Breath Sounds / Voice Sounds] : lungs were clear to auscultation bilaterally [Oriented To Time, Place, And Person] : oriented to person, place, and time [FreeTextEntry1] : Anxious [Skin Lesions] : no skin lesions

## 2020-06-14 NOTE — ASSESSMENT
[FreeTextEntry1] : 60 year female with tracheomalacia s/p tracheostomy in 2004 with stenosis, stomaplasty, vent dependent, COPD, GERD, anxiety with positive sputum culture.\par \par Patient was admitted to [facility] on 5/17/20 for FTT, G-tube placement, and trach replacement. She had a repeat sputum culture in the hospital which had myroides and ESBL Kleb, but she had a clear CXR and remained afebrile, WBC WNL with no signs of PNA and suspect the sputum culture represented colonization. She remained off antibiotics throughout her hospital stay. \par \par Recommend:\par #Positive sputum culture \par -Given no signs of PNA, represents colonization\par -At this time can continue to monitor\par \par #Trach dependent\par -Not in resp distress\par -Continue to follow with LORETA/PULM\par \par #FTT\par -s/p PEG placement\par \par RTC PRN\par

## 2020-06-15 DIAGNOSIS — R84.5 ABNORMAL MICROBIOLOGICAL FINDINGS IN SPECIMENS FROM RESPIRATORY ORGANS AND THORAX: ICD-10-CM

## 2020-06-15 DIAGNOSIS — J96.11 CHRONIC RESPIRATORY FAILURE WITH HYPOXIA: ICD-10-CM

## 2020-06-15 DIAGNOSIS — E46 UNSPECIFIED PROTEIN-CALORIE MALNUTRITION: ICD-10-CM

## 2020-06-15 DIAGNOSIS — Z93.0 TRACHEOSTOMY STATUS: ICD-10-CM

## 2020-06-19 ENCOUNTER — APPOINTMENT (OUTPATIENT)
Dept: GASTROENTEROLOGY | Facility: CLINIC | Age: 60
End: 2020-06-19
Payer: MEDICAID

## 2020-06-19 ENCOUNTER — APPOINTMENT (OUTPATIENT)
Dept: OTOLARYNGOLOGY | Facility: CLINIC | Age: 60
End: 2020-06-19
Payer: MEDICAID

## 2020-06-19 VITALS
HEART RATE: 52 BPM | HEIGHT: 64 IN | DIASTOLIC BLOOD PRESSURE: 66 MMHG | TEMPERATURE: 98.6 F | BODY MASS INDEX: 25.95 KG/M2 | WEIGHT: 152 LBS | SYSTOLIC BLOOD PRESSURE: 127 MMHG | OXYGEN SATURATION: 100 %

## 2020-06-19 VITALS
OXYGEN SATURATION: 100 % | WEIGHT: 152 LBS | TEMPERATURE: 98.6 F | BODY MASS INDEX: 25.95 KG/M2 | DIASTOLIC BLOOD PRESSURE: 66 MMHG | SYSTOLIC BLOOD PRESSURE: 127 MMHG | HEART RATE: 52 BPM | HEIGHT: 64 IN

## 2020-06-19 DIAGNOSIS — M54.2 CERVICALGIA: ICD-10-CM

## 2020-06-19 PROCEDURE — 31615 TRCHEOBRNCHSC EST TRACHS INC: CPT

## 2020-06-19 PROCEDURE — 99214 OFFICE O/P EST MOD 30 MIN: CPT

## 2020-06-19 PROCEDURE — 99214 OFFICE O/P EST MOD 30 MIN: CPT | Mod: 25

## 2020-06-19 NOTE — HISTORY OF PRESENT ILLNESS
[None] : had no significant interval events [Vomiting] : resolved vomiting [Rectal Pain] : denies rectal pain [Diarrhea] : denies diarrhea [Yellow Skin Or Eyes (Jaundice)] : denies jaundice [Nausea] : nausea [Heartburn] : heartburn [Wt Loss ___ Lbs] : recent [unfilled] ~Upound(s) weight loss [GERD] : gastroesophageal reflux disease [Abdominal Swelling] : abdominal swelling [Constipation] : constipation [Abdominal Pain] : abdominal pain [Wt Gain ___ Lbs] : no recent weight gain [Hiatus Hernia] : no hiatus hernia [Peptic Ulcer Disease] : no peptic ulcer disease [Cholelithiasis] : no cholelithiasis [Pancreatitis] : no pancreatitis [Inflammatory Bowel Disease] : no inflammatory bowel disease [Kidney Stone] : no kidney stone [Irritable Bowel Syndrome] : no irritable bowel syndrome [Diverticulitis] : no diverticulitis [Alcohol Abuse] : no alcohol abuse [Malignancy] : no malignancy [Abdominal Surgery] : no abdominal surgery [Cholecystectomy] : no cholecystectomy [Appendectomy] : no appendectomy [de-identified] : The patient states that she is feeling uncomfortable.   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  The patient complains of 100 lb weight loss over the course of several weeks. The patient was admitted to the hospital by ENT, Dr. Rodriguez for failure to thrive. The patient had a gastrostomy tube placement by Dr. Mayito Eduardo.  The patient had a recent hospitalization with an extensive workup that revealed no evidence of occult malignancy. The CAT scan of the chest, abdomen and pelvis with IV contrast performed on March 10, 2020 revealed no evidence of malignancy. During the hospitalization, the tracheostomy was changed by ENT.  The patient was followed by infectious disease for evaluation for IV antibiotics for pseudomonas colonization bacteria at the tracheostomy site.  The patient had a recent upper endoscopy and colonoscopy performed by Dr. Mayito Eduardo in March 12, 2020 to assess for occult malignancy.  The upper endoscopy performed on March 12, 2020 by Dr. Mayito Eduardo at [facility] revealed mild gastritis with a normal esophagus and duodenum.  The pathology revealed squamous epithelium with no significant pathology, gastric oxyntic mucosa with mild chronic and active gastritis and parietal cell hypertrophy and was negative for Helicobacter pylori and unremarkable duodenal mucosa.  The colonoscopy performed on March 12, 2020 by Dr. Mayito Eduardo [facility] revealed a normal colon and internal hemorrhoids.  There were no polyps, masses, diverticulosis, AVMs or colitis noted.  The patient tolerated the procedures well.  The patient is currently on a dysphagia diet along with peg tube feedings.  The patient was recently treated with doxycycline for a positive polymicrobial sputum culture (MRSAl klebsiella, serratia, achromobacter). The patient is also on azithromycin t.i.w. for Klebsiella pneumoniae for chronic tracheobronchitis.  The patient was negative for polymicrobial sputum culture (MRSAl klebsiella, serratia, achromobacter) OVID 19 x 2.   The patient complains of loss of appetite.  The patient denies any jaundice or pruritus.  The patient complains of chronic lower back pain. The patient complains of abdominal pain.  The patient describes the abdominal pain as a crampy, intermittent diffuse abdominal discomfort that is nonradiating in nature.  The abdominal pain improves and is worse with meals and improves with passing gas or having a bowel movement.  The abdominal pain is described as being mild to moderate in nature.  The abdominal pain occurs at night and in the morning.  The abdominal pain can occur at any time.   The abdominal pain has never awakened the patient from sleep.  The abdominal pain is not relieved with medication.  The abdominal pain is associated with abdominal gas and bloating.  The patient complains of nausea but denies any vomiting.  The patient complains of gastroesophageal reflux disease and dysphagia.  The dysphagia is worse with solids but unrelated to liquids. The gastroesophageal reflux disease is worse after meals and late at night and in the early morning. The gastroesophageal reflux disease is slightly improved with proton pump inhibitors, H2 blockers and antacids.   The patient complains of shortness of breath but denies any atypical chest pain or palpitations.  .  The patient denies any diaphoresis. The patient complains of constipation but denies any diarrhea.  The patient has 1 bowel movement every 1 to 3 days.  The patient complains of a change in bowel habits.  The patient complains of a change in caliber of stool.   The patient denies having mucus discharge with the bowel movements.  The patient denies any bright red blood per rectum, melena or hematemesis.  The patient denies any rectal pain or rectal pruritus.  The patient complains of weight loss and anorexia.  The patient admits to losing ?100 pounds over the past 4 months.  She denies any fevers or chills.  The patient had a CAT scan of the abdomen and pelvis with IV contrast performed on July 3, 2019. The CAT scan of the abdomen and pelvis with IV contrast revealed a markedly limited evaluation of the pancreas. The pancreatic tissue is atrophic. There was beam hardening artifact, motion artifact and lack of small bowel opacification that contributed to limited evaluation. Also noted was a single borderline enlarged periaortic lymph node, a constipated colon and a distended urinary bladder. The blood tests performed on Patricia 3, 2019 was significant for anemia with Hgb/Hct level of 9.9/31. 6, respectively, an elevated rheumatoid factor of 15 and elevated ESR of 36 mm/hr. The patient has a history of pancreatic cyst x 4 years. The patient admits to having a prior upper endoscopy and colonoscopy performed by another gastroenterologist. According to the patient, the upper endoscopic findings were unknown to the patient. The colonoscopic findings were also unknown to the patient. The patient admits to a family history of GI problems. The patient’s father had a history of liver disease.

## 2020-06-23 PROBLEM — M54.2 NECK PAIN: Status: ACTIVE | Noted: 2018-06-24

## 2020-06-23 NOTE — HISTORY OF PRESENT ILLNESS
[de-identified] : c/o piece of meat hanging near trach but decreased pain and no bleeding\par able to suction\par decreased appetite but using gtube\par wishes for a different GI doctor to follow up, [place]\par nurse's number: [phone]\par seen by GI, rec MBS, though all swallow studies thus far have been negaitve\par has been eating better since gtube placed\par

## 2020-06-23 NOTE — PHYSICAL EXAM
[FreeTextEntry1] : bed-bound [de-identified] : trach in place, the connector to the IC on the trach has cracked and the IC can be placed but not locked, no obvious erythema or mass around stoma [Midline] : trachea located in midline position [de-identified] : poor dentition [Normal] : cranial nerves 2-12 intact [FreeTextEntry2] : NO RESP DISTRESS, VOICE CLEAR [de-identified] : voice stable

## 2020-07-22 ENCOUNTER — APPOINTMENT (OUTPATIENT)
Dept: OTOLARYNGOLOGY | Facility: CLINIC | Age: 60
End: 2020-07-22

## 2020-07-29 ENCOUNTER — APPOINTMENT (OUTPATIENT)
Dept: OTOLARYNGOLOGY | Facility: CLINIC | Age: 60
End: 2020-07-29

## 2020-07-31 ENCOUNTER — APPOINTMENT (OUTPATIENT)
Dept: PULMONOLOGY | Facility: CLINIC | Age: 60
End: 2020-07-31

## 2020-08-06 ENCOUNTER — APPOINTMENT (OUTPATIENT)
Dept: OTOLARYNGOLOGY | Facility: CLINIC | Age: 60
End: 2020-08-06
Payer: MEDICAID

## 2020-08-17 ENCOUNTER — APPOINTMENT (OUTPATIENT)
Dept: PULMONOLOGY | Facility: CLINIC | Age: 60
End: 2020-08-17
Payer: MEDICAID

## 2020-08-17 PROCEDURE — 99214 OFFICE O/P EST MOD 30 MIN: CPT | Mod: 95

## 2020-08-17 RX ORDER — ASCORBIC ACID 500 MG
500 TABLET ORAL
Qty: 60 | Refills: 0 | Status: ACTIVE | COMMUNITY
Start: 2020-03-04

## 2020-08-17 RX ORDER — MEGESTROL ACETATE 40 MG/ML
40 SUSPENSION ORAL
Qty: 60 | Refills: 0 | Status: ACTIVE | COMMUNITY
Start: 2020-08-13

## 2020-08-17 RX ORDER — VITAMIN K2 90 MCG
125 MCG CAPSULE ORAL
Qty: 30 | Refills: 0 | Status: ACTIVE | COMMUNITY
Start: 2020-06-09

## 2020-08-17 RX ORDER — IBUPROFEN 800 MG/1
800 TABLET, FILM COATED ORAL
Qty: 90 | Refills: 0 | Status: ACTIVE | COMMUNITY
Start: 2020-02-22

## 2020-08-17 RX ORDER — SUCRALFATE 1 G/1
1 TABLET ORAL
Qty: 60 | Refills: 0 | Status: ACTIVE | COMMUNITY
Start: 2020-06-09

## 2020-08-17 RX ORDER — SIMETHICONE 125 MG/1
125 CAPSULE, LIQUID FILLED ORAL
Qty: 120 | Refills: 0 | Status: ACTIVE | COMMUNITY
Start: 2020-03-20

## 2020-08-17 RX ORDER — MAGNESIUM OXIDE 241.3 MG/1000MG
400 TABLET ORAL
Qty: 30 | Refills: 0 | Status: ACTIVE | COMMUNITY
Start: 2020-03-20

## 2020-08-17 RX ORDER — DM/PE/ACETAMINOPHEN/CHLORPHENR 10-5-325-2
100 TABLET, SEQUENTIAL ORAL
Qty: 30 | Refills: 0 | Status: ACTIVE | COMMUNITY
Start: 2020-03-13

## 2020-08-17 RX ORDER — DEXTROMETHORPHAN HBR, GUAIFENESIN 20; 200 MG/10ML; MG/10ML
10-100 SYRUP ORAL
Qty: 236 | Refills: 0 | Status: ACTIVE | COMMUNITY
Start: 2020-03-20

## 2020-08-17 RX ORDER — SALINE NASAL SPRAY 1.5 OZ
0.65 SOLUTION NASAL
Qty: 44 | Refills: 0 | Status: ACTIVE | COMMUNITY
Start: 2020-08-03

## 2020-08-17 RX ORDER — ZOLPIDEM TARTRATE 5 MG/1
5 TABLET ORAL
Qty: 30 | Refills: 0 | Status: ACTIVE | COMMUNITY
Start: 2020-02-22

## 2020-08-17 RX ORDER — MULTIVITAMIN WITH FOLIC ACID 400 MCG
TABLET ORAL
Qty: 30 | Refills: 0 | Status: ACTIVE | COMMUNITY
Start: 2020-03-04

## 2020-08-17 RX ORDER — MONTELUKAST 10 MG/1
10 TABLET, FILM COATED ORAL
Qty: 30 | Refills: 0 | Status: ACTIVE | COMMUNITY
Start: 2020-02-24

## 2020-08-17 RX ORDER — ERYTHROMYCIN 250 MG/1
250 CAPSULE, DELAYED RELEASE PELLETS ORAL
Qty: 12 | Refills: 0 | Status: ACTIVE | COMMUNITY
Start: 2020-06-16

## 2020-08-17 RX ORDER — PREDNISONE 20 MG/1
20 TABLET ORAL
Qty: 5 | Refills: 0 | Status: ACTIVE | COMMUNITY
Start: 2020-05-04

## 2020-08-17 RX ORDER — ASPIRIN 81 MG/1
81 TABLET, CHEWABLE ORAL
Qty: 30 | Refills: 0 | Status: ACTIVE | COMMUNITY
Start: 2020-03-20

## 2020-08-17 RX ORDER — PANTOPRAZOLE 40 MG/1
40 TABLET, DELAYED RELEASE ORAL
Qty: 30 | Refills: 0 | Status: ACTIVE | COMMUNITY
Start: 2020-03-20

## 2020-08-17 NOTE — ADDENDUM
[FreeTextEntry1] : Documented by Marva Rivera acting as a scribe for Dr. Maco Nova on 08/17/2020.\par \par All medical record entries made by the Scribe were at my, Dr. Maco Nova's, direction and personally dictated by me on 08/17/2020. I have reviewed the chart and agree that the record accurately reflects my personal performance of the history, physical exam, assessment and plan. I have also personally directed, reviewed, and agree with the discharge instructions.

## 2020-08-17 NOTE — HISTORY OF PRESENT ILLNESS
[Medical Office: ([facility])___] : at the medical office located in  [Home] : at home, [unfilled] , at the time of the visit. [Patient] : the patient [Self] : self [FreeTextEntry2] : PATRIZIA NAVARRETE  [FreeTextEntry1] : Ms. Bo is a 60 year old female with a history of atypical chest pain, COPD, chronic hypoxemia, dysphagia, GERD, laryngeal stenosis, OW, dependence on tracheostomy, and tracheomalacia who was spoken to today via video call for a follow up. Her chief complain is unable to eat and sputum is stuck. Pt states:\par - she has been feeling a bit anxious\par - her trache was never sown into place. \par - The last surgery was 1-2 years ago \par -  at [facility] will be doing her next surgery \par - she denies cough / wheezing\par - she has not been eating well \par - she has been producing mucous, its a whitish pinkish color \par - she denies any ankle / leg swelling \par - no visual issues\par - she has been getting enough sleep lately. \par - she has been using the chest vest about twice a day, but usually when she really needs it. \par -she denies any headaches, nausea, vomiting, fever, chills, sweats, chest pain, chest pressure, diarrhea, constipation, dysphagia, dizziness, sour taste in the mouth, leg swelling, leg pain, itchy eyes, itchy ears, heartburn, reflux, myalgias or arthralgias.

## 2020-08-17 NOTE — ASSESSMENT
[FreeTextEntry1] : Ms. Bo is a 60 year old female, presenting via video today, with a history of former smoker, COPD, respiratory failure for 10 years, tracheostomy,  tracheostenosis and tracheomalacia who is s/p bronchoscopy with revision of the tracheostomy with dilation of the tracheostomy. She is s/p ENT visit. She presents with tracheomalacia and end stage COPD with issues with ventilator. She is s/p resistant pseudomonas aeruginosa. PNA ([facility])- s/p colonoscopy (preop). Chronic colonization of aeruginosa with pseudomonas, Difficulty with mucus clearance (still has not received vest) and eating problem. - now awaiting tracheal surgery [facility] ()\par \par \par        ****************************PRE-OP CLEARANCE for Tracheal Revision [facility] *********************\par -at this point in time there are no absolute pulmonary contraindications to go forward with the planned procedure \par -at the time of surgery s/he should have optimal pain control, incentive spirometry, early ambulation, DVT and GI prophylaxis.\par \par \par Her chronic respiratory failure is multifactorial due to:\par -underlying COPD/severe persistent asthma\par -respiratory muscle weakness\par -tracheomalacia\par \par problem 1: tracheostomy / chronic respiratory failure \par -Continue to wean off the ventilator as tolerable in short trials throughout the day  \par -recommended to use hypertonic saline in clearing of tracheostomy \par -Rx given for elastic pads, dressings, disposable inner cannulas, and tracheostomy collar to improve collar. Rx given for Metaplex Lite. \par -recommended f/u with  ([facility]) \par - pre-op tracheal surgery \par \par Problem 1A: PNA  - no evidence CT 3/2020\par - Radiologically cleared\par - Your chest xray/CT reveals an infiltrate c/w pneumonia; this based on your clinical scenario required additional therapy. Any change in your status will require hospitalization at the nearest hospital and al local ambulance will need to be called. Our group is on staff at [facility] and [facility] as- consultants. The patient/family is instructed to notify our office with any change in condition. \par \par Problem 2: Chronic Respiratory Failure \par -Trach collar- goal up to 12hrs per day (at 2 hours now)\par - 3 L SIMV: VT- 450; PC 20; PS-18; PEEP- 5\par - Night CPAP 5/PS 18 (back up 8 B/mm)\par \par problem 3:  COPD/asthma\par -continue Combivent 1 inhalation up to QID to replace Symbicort \par -continue Performist nebulizer 1 unit dose BID, followed by Pulmicort 0.5 nebulizer BID \par -continue Albuterol via nebulizer for rescue up to QiD \par -continue on NAC Q8H\par -continue Yuperli 1 unit dose QD via nebulizer\par \par -COPD is a progressive disease and although it can’t be cured , appropriate management can slow its progression, reduce frequency and severity of exacerbations, and improve symptoms and the patient quality of life. Hospitalizations are the greatest contributor to the total COPD costs and account for up to 87% of total COPD related costs. Exacerbations are the main cause of admissions and subsequently account for the 40-75% of COPD costs. Inhaled maintenance therapy reduces the incidence of exacerbations in patients with stable COPD. Incorrect inhaler use and nonadherence are major obstacles to achieving COPD treatment goals. Many COPD patients have challenges (impaired inhalation, limited dexterity, reduced cognition: that limit their ability to correctly use their COPD treatment devices resulting in reduced symptom control. Of most importance is smoking cessation and early intervention with respiratory illnesses and contemplation for pulmonary rehab to enhance quality of life.\par \par Problem 3A: Abnormal CT: confirm Bronchiectasis / pulmonary nodule\par -s/p CT of the chest -(no present) - Next 11/2020\par -Complete high resolution chest CT, prone and supine, to r/o pulmonary nodules - next  11/2020\par -Recommended to take Slippery Elm Tea and pill for cough and mucus clearing \par -based upon results of CT scan, will apply for chest vest therapy\par \par -CAT scans are the only radiological modality to identify abnormalities w/in the lungs with regards to nodules/masses/lymph nodes. Risks, benefits were reviewed in detail. The guidelines for abnormalities include follow up CT scans at various intervals which could range from 6 weeks to 1 year intervals. If there is a change for the worse then consideration for a biopsy will be considered if you are a candidate. Second opinion evaluation with a thoracic surgeon or an interventional radiologist could be offered. \par - Seen on the CT of the chest or chest x-ray signifies damaged bronchial tubes focal or diffuse which can be sites of recurrent infections. These areas can be colonized by various organisms including bacteria (hemophilous influenzae/Pseudomonas species etc.) as well as acid fast bacilli (mycobacterial disease- inclusive of TB/MT etc.).  The patient has previously used acapella, nebulizer and breathing techniques for airway clearance.  These methods have not provided appropriate secretion manage in this patient.Cough length has been greater than 6 months, hence, initiating vest therapy is necessary.\par \par \par problem 3b: pseudomonas aeruginosa.- chronic colonization\par -using  vest pack (failed Conventional therapy.) - pending delivery  \par -get infectious disease f/u PRN \par -chronic colonization\par \par problem 4: GERD -(s/p EgD +/- colonoscopy- negative ) \par - Recommend GI f/u\par - continue Pepcid 40 mg QHS\par -change Protonix 40 mg qAM ;  Neglan 5 mg qHS\par -continue Baclofen 5 mg premeal, QHS \par -Things to avoid including overeating, spicy foods, tight clothing, eating within three hours of bed, this list is not all inclusive. \par -For treatment of reflux, possible options discussed including diet control, H2 blockers, PPIs, as well as coating motility agents discussed as treatment options. Timing of meals and proximity of last meal to sleep were discussed. If symptoms persist, a formal gastrointestinal evaluation is needed.\par -Rule of 2's: Avoid eating too late, too fast, too much, too spicy or within two hours of bedtime\par \par Problem 5: Immunodeficiency\par - Continue Zithromax 250 mg Monday, Wednesday, and Friday\par -Due to the fact that this pt has had more infections than would be expected and immunological blood work is indicated this would include: IgG subclasses, quantitative immunoglobulins, Strep pneumoniae titers as well as Vitamin D levels. Based on this blood work we will be able to decide where the pt needs additional pneumococcal vaccine either Prevnar 13 or Pneumovax. Immunology evaluation will also be potentially indicated. \par \par Problem 6: Sensory neuropathic Cough\par - Continue Amitriptyline 10 mg up to TID\par -Sensory neuropathic cough is an etiology of cough that is often realized once someone has been ruled out for common disease such as: asthma, COPD, eosinophilic bronchitis, bronchiectasis, post nasal drip, and GERD. It sometimes develops following a URI, herpes zoster outbreak in pharynx or thyroid or cervical spine injury. However, many patients have no identifiable antecedent explanation. \par \par problem 7: tracheomalacia/tracheal tumor \par -follow up with Dr. Kelby Larios for thoracic evaluation for potential tracheoplasty - s/p new tracheostomy\par \par Tracheomalacia is usually acquired in adults and common causes include damage by tracheostomy or endotracheal intubation damaging the tracheal cartilage with increase risk with multiple intubations, prolonged intubation, and concurrent high dose steroid therapy; external chest wall trauma and surgery; chronic compression of the trachea by benign etiologies (eg, benign mediastinal goiter) or malignancy; relapsing polychondritis; or recurrent infection. Tracheomalacia can be asymptomatic, however signs or symptoms can develop as the severity of the airway narrowing progresses with major symptoms include dyspnea, cough, and sputum retention. Other symptoms include severe paroxysms of coughing, wheezing or stridor, barking cough and may be exacerbated by forced expiration, cough, and Valsalva maneuver. Tracheomalacia is diagnosed by a bronchoscopic visualization of dynamic airway collapse on dynamic chest CT. Therapy is warranted in symptomatic patients with severe tracheomalacia and includes surgical repair as tracheobronchoplasty. The patient was referred to Dr. Willy Kay or Dr. Kelby Larios, at [facility] for a surgical consult. \par \par problem 8: dysphonia/sore throat\par -recommended Fisherman Friend's lozenges \par -recommended to use Slippery Elm Lozenge / Tea \par -continue Evoxac 30 q 8\par \par Problem 9: Health Maintenance/COVID19 Precautions \par - Clean your hands often. Wash your hands often with soap and water for at least 20 seconds, especially after blowing your nose, coughing, or sneezing, or having been in a public place.\par - If soap and water are not available, use a hand  that contains at least 60% alcohol.\par - To the extent possible, avoid touching high-touch surfaces in public places - elevator buttons, door handles, handrails, handshaking with people, etc. Use a tissue or your sleeve to cover your hand or finger if you must touch something.\par - Wash your hands after touching surfaces in public places.\par - Avoid touching your face, nose, eyes, etc.\par - Clean and disinfect your home to remove germs: practice routine cleaning of frequently touched surfaces (for example: tables, doorknobs, light switches, handles, desks, toilets, faucets, sinks & cell phones)\par - Avoid crowds, especially in poorly ventilated spaces. Your risk of exposure to respiratory viruses like COVID-19 may increase in crowded, closed-in settings with little air circulation if there are people in the crowd who are sick. All patients are recommended to practice social distancing and stay at least 6 feet away from others. \par - Avoid all non-essential travel including plane trips, and especially avoid embarking on cruise ships.\par -If COVID-19 is spreading in your community, take extra measures to put distance between yourself and other people to further reduce your risk of being exposed to this new virus.\par -Stay home as much as possible.\par - Consider ways of getting food brought to your house through family, social, or commercial networks\par -Be aware that the virus has been known to live in the air up to 3 hours post exposure, cardboard up to 24 hours post exposure, copper up to 4 hours post exposure, steel and plastic up to 2-3 days post exposure. Risk of transmission from these surfaces are affected by many variables.\par COVID-19 precautionary Immune Support Recommendations:\par -OTC Vitamin C 500mg BID \par -OTC Quercetin 250-500mg BID \par -OTC Zinc 75-100mg per day \par -OTC Melatonin 1 or 2mg a night \par -OTC Vitamin D 1-4000mg per day \par -OTC Tonic Water 8oz per day\par -Water 0.5-1 gallon per day\par Asthma and COVID19:\par You need to make sure your asthma is under control. This often requires the use of inhaled corticosteroids (and sometimes oral corticosteroids). Inhaled corticosteroids do not likely reduce your immune system’s ability to fight infections, but oral corticosteroids may. It is important to use the steps above to protect yourself to limit your exposure to any respiratory virus. \par \par problem 10: health maintenance \par - recommended to f/u with Dr. Palomares(GI) \par -instructed to increase physical activity as much as possible\par - She was administered an influenza vaccination 11/19\par -recommended strep pneumonia vaccines: Prevnar-13 vaccine, followed by Pneumo vaccine 23 one year following (completed)\par -recommended early intervention for URIs\par -recommended regular osteoporosis evaluations\par -recommended early dermatological evaluations\par -recommended after the age of 50 to the age of 70, colonoscopy every 5 years \par \par \par Follow up in 2 months.\par Patient is encouraged to call with any changes, concerns or questions. \par \par \par      *****************************PRE-OP CLEARANCE for Tracheal Revision at [facility] with *********************\par -at this point in time there are no absolute pulmonary contraindications to go forward with the planned procedure \par -at the time of surgery s/he should have optimal pain control, incentive spirometry, early ambulation, DVT and GI prophylaxis.

## 2020-08-17 NOTE — REASON FOR VISIT
[Follow-Up] : a follow-up visit [FreeTextEntry1] : video call - pre-op, atypical chest pain, COPD, chronic hypoxemia, dysphagia, GERD, laryngeal stnosis, OW, dependence on tracheostomy, and tracheomalacia

## 2020-08-18 ENCOUNTER — APPOINTMENT (OUTPATIENT)
Dept: PULMONOLOGY | Facility: CLINIC | Age: 60
End: 2020-08-18

## 2020-08-20 ENCOUNTER — APPOINTMENT (OUTPATIENT)
Dept: OTOLARYNGOLOGY | Facility: CLINIC | Age: 60
End: 2020-08-20

## 2020-08-24 RX ORDER — SUCRALFATE 1 G/1
1 TABLET ORAL 4 TIMES DAILY
Qty: 120 | Refills: 3 | Status: ACTIVE | COMMUNITY
Start: 2020-08-24 | End: 1900-01-01

## 2020-08-31 ENCOUNTER — APPOINTMENT (OUTPATIENT)
Dept: RADIOLOGY | Facility: HOSPITAL | Age: 60
End: 2020-08-31

## 2020-08-31 ENCOUNTER — APPOINTMENT (OUTPATIENT)
Dept: SPEECH THERAPY | Facility: HOSPITAL | Age: 60
End: 2020-08-31

## 2020-09-17 ENCOUNTER — APPOINTMENT (OUTPATIENT)
Dept: OTOLARYNGOLOGY | Facility: CLINIC | Age: 60
End: 2020-09-17

## 2020-09-24 ENCOUNTER — APPOINTMENT (OUTPATIENT)
Dept: OTOLARYNGOLOGY | Facility: CLINIC | Age: 60
End: 2020-09-24
Payer: MEDICAID

## 2020-09-24 ENCOUNTER — OUTPATIENT (OUTPATIENT)
Dept: OUTPATIENT SERVICES | Facility: HOSPITAL | Age: 60
LOS: 1 days | Discharge: HOME | End: 2020-09-24

## 2020-09-24 DIAGNOSIS — Z43.0 ENCOUNTER FOR ATTENTION TO TRACHEOSTOMY: Chronic | ICD-10-CM

## 2020-09-24 DIAGNOSIS — J39.8 OTHER SPECIFIED DISEASES OF UPPER RESPIRATORY TRACT: Chronic | ICD-10-CM

## 2020-09-24 DIAGNOSIS — Z98.891 HISTORY OF UTERINE SCAR FROM PREVIOUS SURGERY: Chronic | ICD-10-CM

## 2020-09-24 DIAGNOSIS — Z93.0 TRACHEOSTOMY STATUS: Chronic | ICD-10-CM

## 2020-09-24 PROCEDURE — 31615 TRCHEOBRNCHSC EST TRACHS INC: CPT

## 2020-09-24 PROCEDURE — 99214 OFFICE O/P EST MOD 30 MIN: CPT | Mod: 25

## 2020-10-09 ENCOUNTER — APPOINTMENT (OUTPATIENT)
Dept: GASTROENTEROLOGY | Facility: CLINIC | Age: 60
End: 2020-10-09
Payer: MEDICAID

## 2020-10-09 VITALS
OXYGEN SATURATION: 95 % | SYSTOLIC BLOOD PRESSURE: 120 MMHG | TEMPERATURE: 98.8 F | HEART RATE: 56 BPM | DIASTOLIC BLOOD PRESSURE: 76 MMHG

## 2020-10-09 DIAGNOSIS — R11.0 NAUSEA: ICD-10-CM

## 2020-10-09 DIAGNOSIS — R07.89 OTHER CHEST PAIN: ICD-10-CM

## 2020-10-09 PROCEDURE — 99213 OFFICE O/P EST LOW 20 MIN: CPT

## 2020-10-09 RX ORDER — DICYCLOMINE HYDROCHLORIDE 10 MG/1
10 CAPSULE ORAL 3 TIMES DAILY
Qty: 90 | Refills: 3 | Status: ACTIVE | COMMUNITY
Start: 2020-10-09 | End: 1900-01-01

## 2020-10-09 NOTE — HISTORY OF PRESENT ILLNESS
[None] : had no significant interval events [Vomiting] : denies vomiting [Diarrhea] : denies diarrhea [Yellow Skin Or Eyes (Jaundice)] : denies jaundice [Rectal Pain] : denies rectal pain [Wt Gain ___ Lbs] : no recent weight gain [Wt Loss ___ Lbs] : recent [unfilled] ~Upound(s) weight loss [Heartburn] : heartburn [Nausea] : nausea [Constipation] : constipation [Abdominal Pain] : abdominal pain [Abdominal Swelling] : abdominal swelling [GERD] : gastroesophageal reflux disease [Hiatus Hernia] : no hiatus hernia [Peptic Ulcer Disease] : no peptic ulcer disease [Pancreatitis] : no pancreatitis [Cholelithiasis] : no cholelithiasis [Kidney Stone] : no kidney stone [Inflammatory Bowel Disease] : no inflammatory bowel disease [Irritable Bowel Syndrome] : no irritable bowel syndrome [Diverticulitis] : no diverticulitis [Alcohol Abuse] : no alcohol abuse [Malignancy] : no malignancy [Abdominal Surgery] : abdominal surgery [Appendectomy] : no appendectomy [Cholecystectomy] : no cholecystectomy [de-identified] : The patient states that she is feeling uncomfortable. The patient denies any jaundice or pruritus.  The patient complains of chronic lower back pain. The patient complains of abdominal pain.  The patient describes the abdominal pain as a crampy, intermittent diffuse abdominal discomfort that is nonradiating in nature.  The abdominal pain is unrelated to passing gas or having bowel movements.  The abdominal pain is worse with meals.  The abdominal pain is described as being mild to moderate in nature.  The abdominal pain occurs at night and in the morning.  The abdominal pain can occur at any time.   The abdominal pain has never awakened the patient from sleep.  The abdominal pain is not relieved with medication.  The abdominal pain is associated with abdominal gas and bloating.  The patient complains of nausea but denies any vomiting.  The patient complains of gastroesophageal reflux disease and dysphagia.  The dysphagia is worse with solids but unrelated to liquids. The gastroesophageal reflux disease is worse after meals and late at night and in the early morning. The gastroesophageal reflux disease is slightly improved with proton pump inhibitors, H2 blockers and antacids.   The patient complains of atypical chest pain, shortness of breath and palpitations secondary to stress.  The chest pain is described as a pressure, intermittent substernal discomfort that is nonradiating in nature.  The patient admits to occasional episodes of diaphoresis.  The chest pain is described as being 5 out of 10 in intensity.  The chest pain can occur at any time.  The chest pain is worse at night and early morning.  The chest pain is unrelated to meals and passing gas.  The chest pain has never awakened the patient from sleep.  The patient complains of constipation but denies any diarrhea.  The patient has 1 bowel movement a day.  The patient complains of a change in bowel habits.  The patient complains of a change in caliber of stool.   The patient denies having mucus discharge with the bowel movements.  The patient denies any bright red blood per rectum, melena or hematemesis.  The patient denies any rectal pain or rectal pruritus.  The patient complains of weight loss and anorexia.  The patient admitted to losing ?100 pounds over 4 months. She denies any fevers or chills.  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 The patient complains of 100 lb weight loss over the course of several weeks. The patient was admitted to the hospital by ENT, Dr. Rodriguez for failure to thrive. The patient had a gastrostomy tube placement by Dr. Mayito Eduardo. The patient had a recent hospitalization with an extensive workup that revealed no evidence of occult malignancy. The CAT scan of the chest, abdomen and pelvis with IV contrast performed on March 10, 2020 revealed no evidence of malignancy. During the hospitalization, the tracheostomy was changed by ENT. The patient was followed by infectious disease for evaluation for IV antibiotics for pseudomonas colonization bacteria at the tracheostomy site. The patient had a recent upper endoscopy and colonoscopy performed by Dr. Mayito Eduardo in March 12, 2020 to assess for occult malignancy. The upper endoscopy performed on March 12, 2020 by Dr. Mayito Eduardo at [facility] revealed mild gastritis with a normal esophagus and duodenum. The pathology revealed squamous epithelium with no significant pathology, gastric oxyntic mucosa with mild chronic and active gastritis and parietal cell hypertrophy and was negative for Helicobacter pylori and unremarkable duodenal mucosa. The colonoscopy performed on March 12, 2020 by Dr. Mayito Eduardo [facility] revealed a normal colon and internal hemorrhoids. There were no polyps, masses, diverticulosis, AVMs or colitis noted. The patient tolerated the procedures well. The patient is currently on a dysphagia diet along with peg tube feedings. The patient was recently treated with doxycycline for a positive polymicrobial sputum culture (MRSAl klebsiella, serratia, achromobacter). The patient is also on azithromycin t.i.w. for Klebsiella pneumoniae for chronic tracheobronchitis. The patient was negative for polymicrobial sputum culture (MRSAl klebsiella, serratia, achromobacter) COVID 19 x 2.  The patient had a CAT scan of the abdomen and pelvis with IV contrast performed on July 3, 2019. The CAT scan of the abdomen and pelvis with IV contrast revealed a markedly limited evaluation of the pancreas. The pancreatic tissue is atrophic. There was beam hardening artifact, motion artifact and lack of small bowel opacification that contributed to limited evaluation. Also noted was a single borderline enlarged periaortic lymph node, a constipated colon and a distended urinary bladder. The blood tests performed on Patricia 3, 2019 was significant for anemia with Hgb/Hct level of 9.9/31. 6, respectively, an elevated rheumatoid factor of 15 and elevated ESR of 36 mm/hr. The patient has a history of pancreatic cyst x 4 years. The patient admits to having a prior upper endoscopy and colonoscopy performed by another gastroenterologist. According to the patient, the upper endoscopic findings were unknown to the patient. The colonoscopic findings were also unknown to the patient. The patient admits to a family history of GI problems. The patient’s father had a history of liver disease.  [de-identified] : PEG placement

## 2020-10-09 NOTE — ASSESSMENT
[FreeTextEntry1] : Abdominal Pain: The patient complains of abdominal pain. The patient is to avoid nonsteroidal anti-inflammatory drugs and aspirin.  I recommend a low FOD-MAP diet.  I recommend a trial of Dicyclomine 10 mg tablet PO 3 times a day PRN for the abdominal pain.\par Dyspepsia: The patient complains of dyspeptic symptoms.  The patient was advised to abide by an anti-gas diet.  The patient was given a pamphlet for anti-gas.  The patient and I reviewed the anti-gas diet at length. The patient is to continue on a trial of Phazyme one tablet 3 times a day p.r.n. abdominal pain and gas.\par GERD: The patient was advised to avoid late-night meals and dietary indiscretions.  The patient was advised to avoid fried and fatty foods.  The patient was advised to abide by an anti-GERD diet. The patient was given a pamphlet for anti-GERD.  The patient and I reviewed the anti-GERD diet at length. I recommend continue on a trial of Pantoprazole 40 mg once a day x 3 months for the symptoms.\par Nausea: The patient complains of nausea. If the symptoms of nausea persists, the patient may require a trial of Zofran 4 mg twice a day. \par Weight Loss: The patient complains of weight loss and anorexia.  The patient admitted to losing ?100 pounds over 4 months. \par I recommend an abdominal X- ray to assess the abdominal pain.  If the symptoms persist, the patient may require a CAT scan of the abdomen and pelvis. \par History of Pancreatic Cyst: The patient was previously diagnosed with a pancreatic cystic lesion on prior imaging study. The patient denies any jaundice, pruritus or back pain. The patient complains of abdominal pain. The patient denies any prior history of EtOH abuse. The patient had a CAT scan of the abdomen and pelvis with IV contrast performed on July 3, 2019. The CAT scan of the abdomen and pelvis with IV contrast revealed a markedly limited evaluation of the pancreas. The pancreatic tissue is atrophic. There was beam hardening artifact, motion artifact and lack of small bowel opacification that contributed to limited evaluation. Also noted was a single borderline enlarged periaortic lymph node, a constipated colon and a distended urinary bladder. The recent CAT scan of the abdomen and pelvis did not reveal a pancreatic cystic lesion. I recommend a repeat imaging study of the pancreas with IV contrast to reassess the pancreatic cystic lesions in 1 year unless symptomatic. The patient is aware of the potential risks of pancreatic cysts progressing to pancreatic cancer. The patient may require an endoscopic ultrasound of pancreas with possible fine-needle aspiration to better assess the pancreatic cystic lesions pending results of the repeat imaging study. The patient is aware of the importance for followup. The patient agrees and will followup. \par Follow-up: The patient is to follow-up in the office in 1 to 2 months to reassess the symptoms. The patient was told to call the office if any further problems. \par \par \par \par \par \par \par \par

## 2020-10-10 NOTE — CONSULT LETTER
[Dear  ___] : Dear  [unfilled], [Consult Letter:] : I had the pleasure of evaluating your patient, [unfilled]. [Please see my note below.] : Please see my note below. [Consult Closing:] : Thank you very much for allowing me to participate in the care of this patient.  If you have any questions, please do not hesitate to contact me. [Sincerely,] : Sincerely, [FreeTextEntry3] : Gaurang Rodriguez MD, PhD\par Chief, Division of Laryngology\par Department of Otolaryngology\par [facility]\par Pediatric Otolaryngology, [facility]\par  of Otolaryngology\par [employer]\par \par \par

## 2020-10-10 NOTE — PHYSICAL EXAM
[Midline] : trachea located in midline position [Laryngoscopy Performed] : laryngoscopy was performed, see procedure section for findings [Normal] : no rashes [FreeTextEntry1] : stretcher-bound, on oxygen and ventilator through trach, no retractions or distress [de-identified] : trach tube in place with multiple gauze and loose , 6 LPC trach  [de-identified] : mildly raspy voice, hyperfunctional

## 2020-10-16 DIAGNOSIS — J39.8 OTHER SPECIFIED DISEASES OF UPPER RESPIRATORY TRACT: ICD-10-CM

## 2020-10-16 DIAGNOSIS — J96.11 CHRONIC RESPIRATORY FAILURE WITH HYPOXIA: ICD-10-CM

## 2020-10-16 DIAGNOSIS — L92.9 GRANULOMATOUS DISORDER OF THE SKIN AND SUBCUTANEOUS TISSUE, UNSPECIFIED: ICD-10-CM

## 2020-10-16 DIAGNOSIS — J04.10 ACUTE TRACHEITIS WITHOUT OBSTRUCTION: ICD-10-CM

## 2020-10-16 DIAGNOSIS — K21.9 GASTRO-ESOPHAGEAL REFLUX DISEASE WITHOUT ESOPHAGITIS: ICD-10-CM

## 2020-10-16 DIAGNOSIS — R13.12 DYSPHAGIA, OROPHARYNGEAL PHASE: ICD-10-CM

## 2020-10-16 DIAGNOSIS — R49.0 DYSPHONIA: ICD-10-CM

## 2020-10-22 ENCOUNTER — APPOINTMENT (OUTPATIENT)
Dept: OTOLARYNGOLOGY | Facility: CLINIC | Age: 60
End: 2020-10-22
Payer: MEDICAID

## 2020-10-22 PROCEDURE — 31615 TRCHEOBRNCHSC EST TRACHS INC: CPT

## 2020-10-22 PROCEDURE — 99072 ADDL SUPL MATRL&STAF TM PHE: CPT

## 2020-10-22 PROCEDURE — 99214 OFFICE O/P EST MOD 30 MIN: CPT | Mod: 25

## 2020-10-22 PROCEDURE — 17250 CHEM CAUT OF GRANLTJ TISSUE: CPT

## 2020-10-30 ENCOUNTER — APPOINTMENT (OUTPATIENT)
Dept: GASTROENTEROLOGY | Facility: CLINIC | Age: 60
End: 2020-10-30
Payer: MEDICAID

## 2020-10-30 PROCEDURE — 99442: CPT

## 2020-11-08 NOTE — HISTORY OF PRESENT ILLNESS
[de-identified] : 59yo F here for f/u laryngotracheal stenosis. Feels very tight. Has a lot of yellow secretions. Had increased dysphagia since last visit, dilation over summer did help swallowing but slowly returning. Some bleeding. Felt sore inside. Feels like the hole is tight. Voice is stable. No new SOB. Conitnues on vent\par \par

## 2020-11-08 NOTE — REASON FOR VISIT
[Subsequent Evaluation] : a subsequent evaluation for [FreeTextEntry2] : chronic respiratory failure

## 2020-11-08 NOTE — CONSULT LETTER
[Dear  ___] : Dear  [unfilled], [Consult Letter:] : I had the pleasure of evaluating your patient, [unfilled]. [Please see my note below.] : Please see my note below. [Consult Closing:] : Thank you very much for allowing me to participate in the care of this patient.  If you have any questions, please do not hesitate to contact me. [Sincerely,] : Sincerely, [FreeTextEntry3] : Gaurang Rodriguez MD, PhD\par Chief, Division of Laryngology\par Department of Otolaryngology\par [facility]\par Pediatric Otolaryngology, [facility]\par  of Otolaryngology\par [employer] [employer]\par \par \par

## 2020-11-08 NOTE — PHYSICAL EXAM
[Midline] : trachea located in midline position [Laryngoscopy Performed] : laryngoscopy was performed, see procedure section for findings [Normal] : no rashes [FreeTextEntry1] : stretcher-bound, on oxygen and ventilator through trach, no retractions or distress [de-identified] : trach tube in place with multiple gauze and loose , 6 LPC trach  [de-identified] : mildly raspy voice, hyperfunctional

## 2020-11-12 ENCOUNTER — APPOINTMENT (OUTPATIENT)
Dept: OTOLARYNGOLOGY | Facility: CLINIC | Age: 60
End: 2020-11-12

## 2021-01-04 ENCOUNTER — APPOINTMENT (OUTPATIENT)
Dept: GASTROENTEROLOGY | Facility: CLINIC | Age: 61
End: 2021-01-04

## 2021-01-07 ENCOUNTER — APPOINTMENT (OUTPATIENT)
Dept: GASTROENTEROLOGY | Facility: CLINIC | Age: 61
End: 2021-01-07

## 2021-01-15 ENCOUNTER — EMERGENCY (EMERGENCY)
Facility: HOSPITAL | Age: 61
LOS: 1 days | Discharge: HOME | End: 2021-01-15
Attending: EMERGENCY MEDICINE
Payer: MEDICAID

## 2021-01-15 ENCOUNTER — APPOINTMENT (OUTPATIENT)
Dept: PULMONOLOGY | Facility: CLINIC | Age: 61
End: 2021-01-15
Payer: MEDICAID

## 2021-01-15 ENCOUNTER — APPOINTMENT (OUTPATIENT)
Dept: PULMONOLOGY | Facility: CLINIC | Age: 61
End: 2021-01-15

## 2021-01-15 VITALS
SYSTOLIC BLOOD PRESSURE: 134 MMHG | OXYGEN SATURATION: 98 % | HEART RATE: 71 BPM | TEMPERATURE: 98.4 F | RESPIRATION RATE: 18 BRPM | DIASTOLIC BLOOD PRESSURE: 76 MMHG

## 2021-01-15 VITALS
HEART RATE: 76 BPM | WEIGHT: 164.91 LBS | TEMPERATURE: 98 F | RESPIRATION RATE: 20 BRPM | SYSTOLIC BLOOD PRESSURE: 134 MMHG | DIASTOLIC BLOOD PRESSURE: 72 MMHG | HEIGHT: 64 IN | OXYGEN SATURATION: 97 %

## 2021-01-15 DIAGNOSIS — Z93.0 TRACHEOSTOMY STATUS: Chronic | ICD-10-CM

## 2021-01-15 DIAGNOSIS — Z87.01 PERSONAL HISTORY OF PNEUMONIA (RECURRENT): ICD-10-CM

## 2021-01-15 DIAGNOSIS — J39.8 OTHER SPECIFIED DISEASES OF UPPER RESPIRATORY TRACT: Chronic | ICD-10-CM

## 2021-01-15 DIAGNOSIS — Z98.891 HISTORY OF UTERINE SCAR FROM PREVIOUS SURGERY: Chronic | ICD-10-CM

## 2021-01-15 DIAGNOSIS — Z43.0 ENCOUNTER FOR ATTENTION TO TRACHEOSTOMY: Chronic | ICD-10-CM

## 2021-01-15 DIAGNOSIS — Z93.0 TRACHEOSTOMY STATUS: ICD-10-CM

## 2021-01-15 DIAGNOSIS — Z01.811 ENCOUNTER FOR PREPROCEDURAL RESPIRATORY EXAMINATION: ICD-10-CM

## 2021-01-15 LAB
ALBUMIN SERPL ELPH-MCNC: 4.3 G/DL — SIGNIFICANT CHANGE UP (ref 3.3–5)
ALP SERPL-CCNC: 114 U/L — SIGNIFICANT CHANGE UP (ref 40–120)
ALT FLD-CCNC: 30 U/L — SIGNIFICANT CHANGE UP (ref 10–45)
ANION GAP SERPL CALC-SCNC: 7 MMOL/L — SIGNIFICANT CHANGE UP (ref 5–17)
AST SERPL-CCNC: 31 U/L — SIGNIFICANT CHANGE UP (ref 10–40)
BASOPHILS # BLD AUTO: 0.07 K/UL — SIGNIFICANT CHANGE UP (ref 0–0.2)
BASOPHILS NFR BLD AUTO: 0.9 % — SIGNIFICANT CHANGE UP (ref 0–2)
BILIRUB SERPL-MCNC: 0.6 MG/DL — SIGNIFICANT CHANGE UP (ref 0.2–1.2)
BUN SERPL-MCNC: 9 MG/DL — SIGNIFICANT CHANGE UP (ref 7–23)
CALCIUM SERPL-MCNC: 10.3 MG/DL — SIGNIFICANT CHANGE UP (ref 8.4–10.5)
CHLORIDE SERPL-SCNC: 101 MMOL/L — SIGNIFICANT CHANGE UP (ref 96–108)
CO2 SERPL-SCNC: 34 MMOL/L — HIGH (ref 22–31)
CREAT SERPL-MCNC: 0.69 MG/DL — SIGNIFICANT CHANGE UP (ref 0.5–1.3)
EOSINOPHIL # BLD AUTO: 0.39 K/UL — SIGNIFICANT CHANGE UP (ref 0–0.5)
EOSINOPHIL NFR BLD AUTO: 5 % — SIGNIFICANT CHANGE UP (ref 0–6)
GLUCOSE SERPL-MCNC: 87 MG/DL — SIGNIFICANT CHANGE UP (ref 70–99)
HCT VFR BLD CALC: 34.2 % — LOW (ref 34.5–45)
HGB BLD-MCNC: 10.9 G/DL — LOW (ref 11.5–15.5)
IMM GRANULOCYTES NFR BLD AUTO: 0.3 % — SIGNIFICANT CHANGE UP (ref 0–1.5)
LYMPHOCYTES # BLD AUTO: 2.56 K/UL — SIGNIFICANT CHANGE UP (ref 1–3.3)
LYMPHOCYTES # BLD AUTO: 32.6 % — SIGNIFICANT CHANGE UP (ref 13–44)
MCHC RBC-ENTMCNC: 27.5 PG — SIGNIFICANT CHANGE UP (ref 27–34)
MCHC RBC-ENTMCNC: 31.9 GM/DL — LOW (ref 32–36)
MCV RBC AUTO: 86.4 FL — SIGNIFICANT CHANGE UP (ref 80–100)
MONOCYTES # BLD AUTO: 0.9 K/UL — SIGNIFICANT CHANGE UP (ref 0–0.9)
MONOCYTES NFR BLD AUTO: 11.5 % — SIGNIFICANT CHANGE UP (ref 2–14)
NEUTROPHILS # BLD AUTO: 3.91 K/UL — SIGNIFICANT CHANGE UP (ref 1.8–7.4)
NEUTROPHILS NFR BLD AUTO: 49.7 % — SIGNIFICANT CHANGE UP (ref 43–77)
NRBC # BLD: 0 /100 WBCS — SIGNIFICANT CHANGE UP (ref 0–0)
NT-PROBNP SERPL-SCNC: 444 PG/ML — HIGH (ref 0–300)
PLATELET # BLD AUTO: 286 K/UL — SIGNIFICANT CHANGE UP (ref 150–400)
POTASSIUM SERPL-MCNC: 4.3 MMOL/L — SIGNIFICANT CHANGE UP (ref 3.5–5.3)
POTASSIUM SERPL-SCNC: 4.3 MMOL/L — SIGNIFICANT CHANGE UP (ref 3.5–5.3)
PROT SERPL-MCNC: 8.1 G/DL — SIGNIFICANT CHANGE UP (ref 6–8.3)
RBC # BLD: 3.96 M/UL — SIGNIFICANT CHANGE UP (ref 3.8–5.2)
RBC # FLD: 13.7 % — SIGNIFICANT CHANGE UP (ref 10.3–14.5)
SODIUM SERPL-SCNC: 142 MMOL/L — SIGNIFICANT CHANGE UP (ref 135–145)
TROPONIN T, HIGH SENSITIVITY RESULT: 11 NG/L — SIGNIFICANT CHANGE UP (ref 0–51)
TROPONIN T, HIGH SENSITIVITY RESULT: 13 NG/L — SIGNIFICANT CHANGE UP (ref 0–51)
WBC # BLD: 7.85 K/UL — SIGNIFICANT CHANGE UP (ref 3.8–10.5)
WBC # FLD AUTO: 7.85 K/UL — SIGNIFICANT CHANGE UP (ref 3.8–10.5)

## 2021-01-15 PROCEDURE — 93005 ELECTROCARDIOGRAM TRACING: CPT | Mod: XU

## 2021-01-15 PROCEDURE — 84484 ASSAY OF TROPONIN QUANT: CPT

## 2021-01-15 PROCEDURE — 93010 ELECTROCARDIOGRAM REPORT: CPT

## 2021-01-15 PROCEDURE — 80053 COMPREHEN METABOLIC PANEL: CPT

## 2021-01-15 PROCEDURE — 83880 ASSAY OF NATRIURETIC PEPTIDE: CPT

## 2021-01-15 PROCEDURE — U0005: CPT

## 2021-01-15 PROCEDURE — 99284 EMERGENCY DEPT VISIT MOD MDM: CPT | Mod: 25

## 2021-01-15 PROCEDURE — 71045 X-RAY EXAM CHEST 1 VIEW: CPT | Mod: 26

## 2021-01-15 PROCEDURE — 99072 ADDL SUPL MATRL&STAF TM PHE: CPT

## 2021-01-15 PROCEDURE — 31525 DX LARYNGOSCOPY EXCL NB: CPT

## 2021-01-15 PROCEDURE — U0003: CPT

## 2021-01-15 PROCEDURE — 99214 OFFICE O/P EST MOD 30 MIN: CPT

## 2021-01-15 PROCEDURE — 94640 AIRWAY INHALATION TREATMENT: CPT

## 2021-01-15 PROCEDURE — 94002 VENT MGMT INPAT INIT DAY: CPT

## 2021-01-15 PROCEDURE — 99285 EMERGENCY DEPT VISIT HI MDM: CPT

## 2021-01-15 PROCEDURE — 85025 COMPLETE CBC W/AUTO DIFF WBC: CPT

## 2021-01-15 PROCEDURE — 71045 X-RAY EXAM CHEST 1 VIEW: CPT

## 2021-01-15 RX ORDER — IPRATROPIUM/ALBUTEROL SULFATE 18-103MCG
3 AEROSOL WITH ADAPTER (GRAM) INHALATION ONCE
Refills: 0 | Status: COMPLETED | OUTPATIENT
Start: 2021-01-15 | End: 2021-01-15

## 2021-01-15 RX ORDER — CHLORHEXIDINE GLUCONATE 213 G/1000ML
15 SOLUTION TOPICAL EVERY 12 HOURS
Refills: 0 | Status: DISCONTINUED | OUTPATIENT
Start: 2021-01-15 | End: 2021-01-19

## 2021-01-15 RX ADMIN — Medication 3 MILLILITER(S): at 20:47

## 2021-01-15 NOTE — ED PROVIDER NOTE - ATTENDING CONTRIBUTION TO CARE
60F, s/p tracheostomy, with copd, HTN, lupus anticoagulant syndrome, SC trait, presents with resistance noted while suctioning trach, with also some intermittent bleeding when attempts to suction. Pt spoe to her pulmonologist via phone today, who referred her to the ED. No diff breathing. Does report mouth is dry. No epistaxis. No other bleeding, no easy brusiing. Has been experiencing intermittent chest pain x mo, unchanged recently, no clear provoking/alleviating factors, no pain today. No sob, abd pain, f/c, cough, congestion, n/v/d.    PE: NAD, NCAT, MMM, Tracheostomy in place, No active bleeding, Normal conjunctiva, lungs with minimal expiratory wheeze, S1/S2 RRR, Normal perfusion, 2+ radial pulses bilat, Abdomen Soft, NTND, No rebound/guarding, No LE edema, No deformity of extremities, No rashes,  No focal motor or sensory deficits.    Pt appears well, in no resp distress. VS without sig abnormality. Will consult ENT for eval of trach. An EKG was obtained which does reveal inferior, lateral twi, only inferior twi noted on old, will check troponin however hx does not suggest acs. Check CBC eval for anemia, cmp eval for metabolic derangement. CXR. Appreciate ENT recs. - Buck Patel MD

## 2021-01-15 NOTE — ED PROVIDER NOTE - NS ED ROS FT
General: denies fever, chills, weight loss/weight gain.  HENT: denies nasal congestion, sore throat, rhinorrhea, ear pain.  Eyes: denies visual changes, blurred vision, eye discharge, eye redness.  Neck: denies neck pain, neck swelling.  CV: denies chest pain, palpitations.  Resp: difficulty suctioning trach tube.  Abdominal: denies nausea, vomiting, diarrhea, abdominal pain, blood in stool, dark stool.  MSK: denies muscle aches, bony pain, leg pain, leg swelling.  Neuro: denies headaches, numbness, tingling, dizziness, lightheadedness.  Skin: denies rashes, cuts, bruises.  Hematologic: denies unexplained bruises.

## 2021-01-15 NOTE — ED ADULT TRIAGE NOTE - CHIEF COMPLAINT QUOTE
resistance when suctioning trach, intermittent SOB/CP, hx COPD, pt on ventilator, pt Pseudomonas+ in sputum "resistance when suctioning trach", intermittent SOB/CP, hx COPD, pt on ventilator, pt Pseudomonas+ in sputum

## 2021-01-15 NOTE — CHART NOTE - NSCHARTNOTEFT_GEN_A_CORE
LMSW received a referral from the medical team for discharge transportation.  Patient is a 60 year old female presented to the ED  for concerns regarding her trach.  Patient is alert and oriented x 4 spheres. Patient requires ambulance for transfer.  Patient has [facility] Medicaid insurance benefit.  DOMISW contacted [facility] transportation to arrange transportation provider.  (Trip # [id number] - [phone])  [facility] to provide services.   Update to the medical team.

## 2021-01-15 NOTE — ED ADULT NURSE REASSESSMENT NOTE - NS ED NURSE REASSESS COMMENT FT1
Received report from ED FRANCIE Clemons; patient A&Ox3, afebrile and ambulatory; VSSm 20 g IV in R AC patent and site WNL, patient waiting for ambulette.

## 2021-01-15 NOTE — ED ADULT NURSE NOTE - NSIMPLEMENTINTERV_GEN_ALL_ED
Implemented All Fall Risk Interventions:  [place] to call system. Call bell, personal items and telephone within reach. Instruct patient to call for assistance. Room bathroom lighting operational. Non-slip footwear when patient is off stretcher. Physically safe environment: no spills, clutter or unnecessary equipment. Stretcher in lowest position, wheels locked, appropriate side rails in place. Provide visual cue, wrist band, yellow gown, etc. Monitor gait and stability. Monitor for mental status changes and reorient to person, place, and time. Review medications for side effects contributing to fall risk. Reinforce activity limits and safety measures with patient and family.

## 2021-01-15 NOTE — CONSULT NOTE ADULT - ASSESSMENT
59 YO F with PMH of  tracheomalacia  s/p tracheostomy (2004) c/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) and chronic vent dependence , COPD, GERD, anxiety , FTT, S/P PEG presents to [facility] ED stating  resistance while suctioning her trach tubing for the last few weeks and blood tinged secretions. . ENT was consulted for Trach evaluation under Tracheoscopy. Pt denies any episodes of Hypoxia/ SOB at home. No changes in Ventilation per pt.

## 2021-01-15 NOTE — ED ADULT NURSE NOTE - NSFALLRSKASSESSDT_ED_ALL_ED
Dispo: needs better control of seizures and eventual addressing of nutritional status, MRI to be reattempted when patient more stable    PT recs rehab, family declining SNF, planning to take patient home after rehab with private hire per care coordination note, 15-Peter-2021 19:00

## 2021-01-15 NOTE — ADDENDUM
[FreeTextEntry1] : Documented by Marva Rivera acting as a scribe for Dr. Maco Nova on 01/15/2021 \par \par All medical record entries made by the Scribe were at my, Dr. Maco Nova's, direction and personally dictated by me on 01/15/2021 . I have reviewed the chart and agree that the record accurately reflects my personal performance of the history, physical exam, assessment and plan. I have also personally directed, reviewed, and agree with the discharge instructions.

## 2021-01-15 NOTE — ED PROVIDER NOTE - OBJECTIVE STATEMENT
60F, trach dependent since 2003 p/w resistance while suctioning her trach tubing for the last few weeks.  States occasionally while suctioning, she feels resistance and pushes through and sometimes have experienced bleeding.  Called her pulmonologist today, who stated that she should go to the hospital for ENT eval.  Denies any difficulty w/ ventilation, but states her mouth has been extremely dry.

## 2021-01-15 NOTE — ED PROVIDER NOTE - PATIENT PORTAL LINK FT
You can access the FollowMyHealth Patient Portal offered by [facility] by registering at the following website: [email or web address]. By joining [employer]’s FollowMyHealth portal, you will also be able to view your health information using other applications (apps) compatible with our system.

## 2021-01-15 NOTE — REASON FOR VISIT
[Follow-Up] : a follow-up visit [FreeTextEntry1] : atypical chest pain, COPD, chronic hypoxemia, dysphagia, GERD, laryngeal stenosis, OW, dependence on tracheostomy, and tracheomalacia

## 2021-01-15 NOTE — CONSULT NOTE ADULT - PROBLEM SELECTOR RECOMMENDATION 9
- Keep the Trach site clean and dry.   - Tracheoscope: Multiple minimal abrasions 2/2 trauma from suctioning. Not actively bleeding.   - Use red rubber suction cath at home.   - Avoid aggressive suctioning.   - Elevate HOB.   - -C/W azithromycin  for severe COPD.  - -Albuterol/ipratropium inhaler PRN  - Humidified O2 prn.   - F/u with Pulmonary Dr. Nova.   - Call ENT with questions.     COLLETTE CARABALLO PA-C.   # [id number]  ENT PA. - Keep the Trach site clean and dry.   - Tracheoscope: Multiple minimal abrasions 2/2 trauma from suctioning. Not actively bleeding.   - Use red rubber suction cath at home.   - Avoid aggressive suctioning.   - Elevate HOB.   - C/W azithromycin  for severe COPD.  - Albuterol/ipratropium inhaler PRN  - Humidified O2 prn.   - F/u with Pulmonary Dr. Nova.   - Call ENT with questions.     COLLETTE CARABALLO PA-C.   # [id number]  ENT PA.

## 2021-01-15 NOTE — ED PROVIDER NOTE - PROGRESS NOTE DETAILS
[facility] PGY2 - Pulm fellow called and stated that from his end, if ENT is unable to find anything after scoping, pt. should be okay to go home.  But he will speak w/ Dr. Nova and make sure.  Pulm fellow's cell: [phone]. pt's ekg does reveal inferior, lateral precordial twi's. inferior twi's noted on old, along with precordial twi. reviewed sx, pt states cp intermittent x mo's and no change recently. pending delta trop. pt acknowledges need to f/u closely with her cardiologist regarding this and will. given no change in sx, appropriate for outpatient f/u if delta trop neg. - Buck Patel MD pt's ekg does reveal inferior, lateral precordial twi's. inferior twi's noted on old, along with precordial twi. reviewed sx, pt states cp intermittent x mo's and no change recently. no sx of cp while in ED. pending delta trop. pt acknowledges need to f/u closely with her cardiologist regarding this and will call monday. given no change in sx, appropriate for outpatient f/u if delta trop neg. - Buck Patel MD

## 2021-01-15 NOTE — PHYSICAL EXAM
[No Acute Distress] : no acute distress [Well Nourished] : well nourished [Well Groomed] : well groomed [No Deformities] : no deformities [Well Developed] : well developed [Normal Oropharynx] : normal oropharynx [Normal Appearance] : normal appearance [No Neck Mass] : no neck mass [Normal Rate/Rhythm] : normal rate/rhythm [Normal S1, S2] : normal s1, s2 [No Murmurs] : no murmurs [No Resp Distress] : no resp distress [Clear to Auscultation Bilaterally] : clear to auscultation bilaterally [No Abnormalities] : no abnormalities [Benign] : benign [Normal Gait] : normal gait [No Clubbing] : no clubbing [No Cyanosis] : no cyanosis [No Edema] : no edema [FROM] : FROM [Normal Color/ Pigmentation] : normal color/ pigmentation [No Focal Deficits] : no focal deficits [Oriented x3] : oriented x3 [Normal Affect] : normal affect [II] : Mallampati Class: II [TextBox_2] : trach in place  [TextBox_68] : I:E 1:3, very mild inspiratory wheeze

## 2021-01-15 NOTE — ED PROVIDER NOTE - CLINICAL SUMMARY MEDICAL DECISION MAKING FREE TEXT BOX
60F p/w trach dysfunction.  Respiratory called to bedside.  Will call ENT to scope at bedside.  Will call pt.'s pulmonologist to obtain additional info and see if pt. needs to be admitted or dc/d.  Will reassess.

## 2021-01-15 NOTE — ED ADULT NURSE NOTE - OBJECTIVE STATEMENT
60 y/ofemale arrives to the ER complaining of tracheostomy is clog. Pt is A&Ox4, speaking coherently. PMH ....Pt states. ....On assessment airway is patent, breathing spontaneously and unlabored, skin is dry, warm and color appropriate to race. abdomen is soft, non distended, non tender, full ROM in all extremities. Pt denies SOB, chest pain, N/V/D, fevers, chills. Comfort measures provided. call bell within reach, bed locked in the lowest position. will continue to reassess . 61 y/o female arrives to the ER complaining of resistant  tracheostomy suction. PMH of copd, htn, lupus, pancreatic cyst, tracheomalacia, tracheostomy dependant. Pt is A&Ox4, speaking coherently. Pt states for the last few weeks she is being having trouble suctioning  herself, she finds resistance while suctioning and sometimes she bleeds a little when trying to suction. Pt called her pulmonologist who sent her to the ER to be evaluated by a ENT. On assessment airway is patent, breathing spontaneously and unlabored, skin is dry, warm and color appropriate to race. abdomen is soft, non distended, non tender, full ROM in all extremities. Pt denies SOB, chest pain, N/V/D, fevers, chills. Comfort measures provided. call bell within reach, bed locked in the lowest position. will continue to reassess . 61 y/o female arrives to the ER complaining of resistant  tracheostomy suction. PMH of copd, htn, lupus, pancreatic cyst, tracheomalacia, tracheostomy dependant. Pt is A&Ox4, speaking coherently. Pt states for the last few weeks she is being having trouble suctioning  herself, she finds resistance while suctioning and sometimes she bleeds a little when trying to suction. Pt called her pulmonologist who sent her to the ER to be evaluated by a ENT. On assessment airway is patent, breathing through tracheostomy  tube on ventilator. Skin is dry, warm and color appropriate to race. abdomen is soft, non distended, non tender, full ROM in all extremities. Pt denies SOB, chest pain, N/V/D, fevers, chills. Comfort measures provided. call bell within reach, bed locked in the lowest position. will continue to reassess .

## 2021-01-15 NOTE — HISTORY OF PRESENT ILLNESS
[FreeTextEntry1] : Ms. Bo is a 60 year old female with a history of atypical chest pain, COPD, chronic hypoxemia, dysphagia, GERD, laryngeal stenosis, OW, dependence on tracheostomy, and tracheomalacia who presents into the office for a follow up. Her chief complain is unable to eat and sputum is stuck.\par - she notes she has not been having any secretions, she feels the secretions have been stuck\par - she has been feeling very dry \par - she has been having pains\par - no difficulty swallowing \par - she just complains mostly of not being able to get any secretions up even when she puts saline down there, this morning she woke up she had no secretions all day long \par - when she puts saline down the saline comes up, she notes only little pieces will come up, sometimes pieces of blood \par - she notes when she went into the bathroom, she felt like she was about to pass out \par - she has been having some problems with constipation \par - her food is now puree \par - she notes she has been in and out the hospital, the last time she was in the hospital was last month, she was SOB. It was found she had a mucus plug. \par - she notes she has been using the chest vest in the morning or as many times as she can to try and bring up secretions. \par - she notes when she stopped her medication she started to feel bad again \par - her last ENT was Dr. Rodriguez \par - She  denies any visual issues, headaches, nausea, vomiting, fever, chills, sweats, chest pains, chest pressure, diarrhea, constipation, dysphagia, myalgia, dizziness, leg swelling, leg pain, itchy eyes, itchy ears, heartburn, reflux, or sour taste in the mouth.

## 2021-01-15 NOTE — ASSESSMENT
[FreeTextEntry1] : Ms. Bo is a 60 year old female, presenting into the office with a history of former smoker, COPD, respiratory failure for 10 years, tracheostomy,  tracheostenosis and tracheomalacia who is s/p bronchoscopy with revision of the tracheostomy with dilation of the tracheostomy.  She presents with tracheomalacia and end stage COPD with issues with ventilator. PNA ([facility])- s/p colonoscopy (preop). Chronic colonization of aeruginosa with pseudomonas, Difficulty with mucus clearance despite chest vest, and eating problem (on puree) \par \par Her chronic respiratory failure is multifactorial due to:\par -underlying COPD/severe persistent asthma\par -respiratory muscle weakness\par -tracheomalacia\par \par \par - \par problem 1: tracheostomy / chronic respiratory failure \par -Continue to wean off the ventilator as tolerable in short trials throughout the day  \par -recommended to use hypertonic saline in clearing of tracheostomy \par -Rx given for elastic pads, dressings, disposable inner cannulas, and tracheostomy collar to improve collar. Rx given for Metaplex Lite. \par -recommended f/u with Dr. Brett Laughlin and Dr. Edward Rodriguez\par \par Problem 1A: PNA  - no evidence CT 3/2020\par - Radiologically cleared\par - Your chest xray/CT reveals an infiltrate c/w pneumonia; this based on your clinical scenario required additional therapy. Any change in your status will require hospitalization at the nearest hospital and al local ambulance will need to be called. Our group is on staff at [facility] and [facility] as- consultants. The patient/family is instructed to notify our office with any change in condition. \par \par \par  Problem 1a: mucopurulent chronic bronchitis \par - Add Pulmozyme .5 BID \par - chest vest in place \par You have a clinical scenario most c/q acute bronchitis the etiology of which is unknown but empiric antibiotics are indicated. Hydration, mucolytics including mucinex, robitussin and the like are indicated. Cough controlling agents will be needed. \par \par Problem 2: Chronic Respiratory Failure \par -Trach collar- goal up to 12hrs per day (at 2 hours now)\par - 3 L SIMV: VT- 450; PC 20; PS-18; PEEP- 5\par - Night CPAP 5/PS 18 (back up 8 B/mm)\par \par problem 3:  COPD/asthma\par -continue Combivent 1 inhalation up to QID to replace Symbicort \par -continue Performist nebulizer 1 unit dose BID, followed by Pulmicort 0.5 nebulizer BID \par -continue Albuterol via nebulizer for rescue up to QiD \par -continue on NAC Q8H\par -continue Yuperli 1 unit dose QD via nebulizer\par - add \par -COPD is a progressive disease and although it can’t be cured , appropriate management can slow its progression, reduce frequency and severity of exacerbations, and improve symptoms and the patient quality of life. Hospitalizations are the greatest contributor to the total COPD costs and account for up to 87% of total COPD related costs. Exacerbations are the main cause of admissions and subsequently account for the 40-75% of COPD costs. Inhaled maintenance therapy reduces the incidence of exacerbations in patients with stable COPD. Incorrect inhaler use and nonadherence are major obstacles to achieving COPD treatment goals. Many COPD patients have challenges (impaired inhalation, limited dexterity, reduced cognition: that limit their ability to correctly use their COPD treatment devices resulting in reduced symptom control. Of most importance is smoking cessation and early intervention with respiratory illnesses and contemplation for pulmonary rehab to enhance quality of life.\par \par Problem 3A: Abnormal CT: confirm Bronchiectasis / pulmonary nodule\par -s/p CT of the chest -(no present) - Next 11/2020  (past due) \par -Complete high resolution chest CT, prone and supine, to r/o pulmonary nodules - next  11/2020\par -Recommended to take Slippery Elm Tea and pill for cough and mucus clearing \par -based upon results of CT scan, will apply for chest vest therapy\par \par -CAT scans are the only radiological modality to identify abnormalities w/in the lungs with regards to nodules/masses/lymph nodes. Risks, benefits were reviewed in detail. The guidelines for abnormalities include follow up CT scans at various intervals which could range from 6 weeks to 1 year intervals. If there is a change for the worse then consideration for a biopsy will be considered if you are a candidate. Second opinion evaluation with a thoracic surgeon or an interventional radiologist could be offered. \par - Seen on the CT of the chest or chest x-ray signifies damaged bronchial tubes focal or diffuse which can be sites of recurrent infections. These areas can be colonized by various organisms including bacteria (hemophilous influenzae/Pseudomonas species etc.) as well as acid fast bacilli (mycobacterial disease- inclusive of TB/MT etc.).  The patient has previously used acapella, nebulizer and breathing techniques for airway clearance.  These methods have not provided appropriate secretion manage in this patient.Cough length has been greater than 6 months, hence, initiating vest therapy is necessary.\par \par \par problem 3b: pseudomonas aeruginosa.- chronic colonization (kubulek) \par -Apply for vest pack (failed Conventional therapy.) - pending delivery  \par -get infectious disease f/u PRN \par -chronic colonization\par \par problem 4: GERD -(s/p EgD +/- colonoscopy- negative ) \par - Recommend GI f/u\par - continue Pepcid Complete 40 mg QHS\par -change Protonix 40 mg qAM ;  Neglan 5 mg qHS\par -continue Baclofen 5 mg premeal, QHS \par -Things to avoid including overeating, spicy foods, tight clothing, eating within three hours of bed, this list is not all inclusive. \par -For treatment of reflux, possible options discussed including diet control, H2 blockers, PPIs, as well as coating motility agents discussed as treatment options. Timing of meals and proximity of last meal to sleep were discussed. If symptoms persist, a formal gastrointestinal evaluation is needed.\par -Rule of 2's: Avoid eating too late, too fast, too much, too spicy or within two hours of bedtime\par \par Problem 5: Immunodeficiency\par - Continue Zithromax 250 mg Monday, Wednesday, and Friday\par -Due to the fact that this pt has had more infections than would be expected and immunological blood work is indicated this would include: IgG subclasses, quantitative immunoglobulins, Strep pneumoniae titers as well as Vitamin D levels. Based on this blood work we will be able to decide where the pt needs additional pneumococcal vaccine either Prevnar 13 or Pneumovax. Immunology evaluation will also be potentially indicated. \par \par Problem 6: Sensory neuropathic Cough\par - Continue Amitriptyline 10 mg up to TID\par -Sensory neuropathic cough is an etiology of cough that is often realized once someone has been ruled out for common disease such as: asthma, COPD, eosinophilic bronchitis, bronchiectasis, post nasal drip, and GERD. It sometimes develops following a URI, herpes zoster outbreak in pharynx or thyroid or cervical spine injury. However, many patients have no identifiable antecedent explanation. \par \par problem 7: tracheomalacia/tracheal tumor \par -follow up with Dr. Kelby Larios for thoracic evaluation for potential tracheoplasty - s/p new tracheostomy\par \par Tracheomalacia is usually acquired in adults and common causes include damage by tracheostomy or endotracheal intubation damaging the tracheal cartilage with increase risk with multiple intubations, prolonged intubation, and concurrent high dose steroid therapy; external chest wall trauma and surgery; chronic compression of the trachea by benign etiologies (eg, benign mediastinal goiter) or malignancy; relapsing polychondritis; or recurrent infection. Tracheomalacia can be asymptomatic, however signs or symptoms can develop as the severity of the airway narrowing progresses with major symptoms include dyspnea, cough, and sputum retention. Other symptoms include severe paroxysms of coughing, wheezing or stridor, barking cough and may be exacerbated by forced expiration, cough, and Valsalva maneuver. Tracheomalacia is diagnosed by a bronchoscopic visualization of dynamic airway collapse on dynamic chest CT. Therapy is warranted in symptomatic patients with severe tracheomalacia and includes surgical repair as tracheobronchoplasty. The patient was referred to Dr. Willy Kay or Dr. Kelby Larios, at [facility] for a surgical consult. \par \par problem 8: dysphonia/sore throat\par -recommended Fisherman Friend's lozenges \par -recommended to use Slippery Elm Lozenge / Tea \par -continue Evoxac 30 q 8\par \par Problem 9: Health Maintenance/COVID19 Precautions \par - Clean your hands often. Wash your hands often with soap and water for at least 20 seconds, especially after blowing your nose, coughing, or sneezing, or having been in a public place.\par - If soap and water are not available, use a hand  that contains at least 60% alcohol.\par - To the extent possible, avoid touching high-touch surfaces in public places - elevator buttons, door handles, handrails, handshaking with people, etc. Use a tissue or your sleeve to cover your hand or finger if you must touch something.\par - Wash your hands after touching surfaces in public places.\par - Avoid touching your face, nose, eyes, etc.\par - Clean and disinfect your home to remove germs: practice routine cleaning of frequently touched surfaces (for example: tables, doorknobs, light switches, handles, desks, toilets, faucets, sinks & cell phones)\par - Avoid crowds, especially in poorly ventilated spaces. Your risk of exposure to respiratory viruses like COVID-19 may increase in crowded, closed-in settings with little air circulation if there are people in the crowd who are sick. All patients are recommended to practice social distancing and stay at least 6 feet away from others. \par - Avoid all non-essential travel including plane trips, and especially avoid embarking on cruise ships.\par -If COVID-19 is spreading in your community, take extra measures to put distance between yourself and other people to further reduce your risk of being exposed to this new virus.\par -Stay home as much as possible.\par - Consider ways of getting food brought to your house through family, social, or commercial networks\par -Be aware that the virus has been known to live in the air up to 3 hours post exposure, cardboard up to 24 hours post exposure, copper up to 4 hours post exposure, steel and plastic up to 2-3 days post exposure. Risk of transmission from these surfaces are affected by many variables.\par COVID-19 precautionary Immune Support Recommendations:\par -OTC Vitamin C 500mg BID \par -OTC Quercetin 250-500mg BID \par -OTC Zinc 75-100mg per day \par -OTC Melatonin 1 or 2mg a night \par -OTC Vitamin D 1-4000mg per day \par -OTC Tonic Water 8oz per day\par -Water 0.5-1 gallon per day\par Asthma and COVID19:\par You need to make sure your asthma is under control. This often requires the use of inhaled corticosteroids (and sometimes oral corticosteroids). Inhaled corticosteroids do not likely reduce your immune system’s ability to fight infections, but oral corticosteroids may. It is important to use the steps above to protect yourself to limit your exposure to any respiratory virus. \par \par problem 10: health maintenance \par - recommended to f/u with Dr. Palomares(GI) \par -instructed to increase physical activity as much as possible\par - She was administered an influenza vaccination 11/19\par -recommended strep pneumonia vaccines: Prevnar-13 vaccine, followed by Pneumo vaccine 23 one year following (completed)\par -recommended early intervention for URIs\par -recommended regular osteoporosis evaluations\par -recommended early dermatological evaluations\par -recommended after the age of 50 to the age of 70, colonoscopy every 5 years \par \par \par Follow up in 2 months.\par Patient is encouraged to call with any changes, concerns or questions.

## 2021-01-15 NOTE — CONSULT NOTE ADULT - SUBJECTIVE AND OBJECTIVE BOX
CC: Resistance while suctioning Trach/ Blood Tinged secretions.     HPI: 59 YO F with PMH of  tracheomalacia  s/p tracheostomy () c/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) and chronic vent dependence , COPD, GERD, anxiety , FTT, S/P PEG presents to [facility] ED stating  resistance while suctioning her trach tubing for the last few weeks.  States occasionally while suctioning, she feels resistance and pushes through and sometimes have experienced bleeding.  Called her pulmonologist today, who stated that she should go to the hospital for ENT eval. ENT was consulted for Trach evaluation under Tracheoscopy. Pt denies any episodes of Hypoxia/ SOB at home. No changes in Ventilation per pt. Pt's Trach changed to # 6LPC Trach at [facility] in 2020. Chronic vent dependence  and Night CPAP 5/PS 18. Pt with hx of MRSA/Pseudomonas /Serratia/Achromobacter/Klebs  in the sputum.     PAST MEDICAL & SURGICAL HISTORY:  Lupus anticoagulant syndrome.  Tracheomalacia.  Pancreatic cyst.  Anxiety disorder.  Sickle cell trait.  Hypertension.  COPD (chronic obstructive pulmonary disease).  Tracheostomy dependent.  S/P    Tracheal stenosis  excision of tracheal stenosis 10/2017  revision of tracheal stoma 3/2018  Acute tracheostomy management   Dependence on tracheostomy    Allergies.  Cipro (Unknown)  latex (Unknown)  theophylline (Hives)    Intolerances.  MEDICATIONS  (STANDING):  chlorhexidine 0.12% Liquid 15 milliLiter(s) Oral Mucosa every 12 hours   MEDICATIONS  (PRN):     Social History: Unable to obtain.  Family history:  Unable to obtain.   ROS:   ENT: all negative except as noted in HPI     Vital Signs Last 24 Hrs  T(C): 36.8 (15 Peter 2021 18:38), Max: 36.8 (15 Peter 2021 18:38)  T(F): 98.2 (15 Peter 2021 18:38), Max: 98.2 (15 Peter 2021 18:38)  HR: 77 (15 Peter 2021 18:38) (76 - 77)  BP: 131/87 (15 Peter 2021 18:38) (131/87 - 134/72)  BP(mean): --  RR: 20 (15 Peter 2021 18:38) (20 - 20)  SpO2: 100% (15 Peter 2021 18:38) (97% - 100%)                         10.9   7.85  )-----------( 286      ( 15 Peter 2021 21:12 )             34.2    01-15    142  |  101  |  9   ----------------------------<  87  4.3   |  34<H>  |  0.69    Ca    10.3      15 Peter 2021 21:12    TPro  8.1  /  Alb  4.3  /  TBili  0.6  /  DBili  x   /  AST  31  /  ALT  30  /  AlkPhos  114  01-15 .     PHYSICAL EXAM:  Gen: NAD, On Vent.  Skin: No rashes, bruises, or lesions.  Head: Normocephalic, Atraumatic  Face: no edema, erythema, or fluctuance. Parotid glands soft without mass  Eyes: no scleral injection  Nose: Nares bilaterally patent, no discharge  Mouth: No Stridor / Drooling / Trismus.  Mucosa moist, tongue/uvula midline, oropharynx clear  Neck: # 6 LPC Trach secured with Velcro Tie. Stoma site clean and dry. Flat, supple, no lymphadenopathy, trachea midline, no masses  Lymphatic: No lymphadenopathy  Resp: On Vent.   CV: no peripheral edema/cyanosis  GI: nondistended   Peripheral vascular: no JVD or edema  Neuro: facial nerve intact, no facial droop.    Tracheoscopy (scope #2 used):  Scope advanced through trach tube. No bleeding noted along trachea.  Trach tube in proper position, centered in trachea. Carinal bifurcation visualized.     IMAGING/ADDITIONAL STUDIES:   CXR: FINDINGS:  Tracheostomy tube with tip in the upper trachea at the level of the clavicles.  Right apical scarring. Bibasilar atelectasis. No focal consolidation.  No pleural effusion. No pneumothorax.  Cardiac size is within normal limits.  Arthrosis of the bilateral glenohumeral joints.     IMPRESSION: No focal consolidation.  CC: Resistance while suctioning Trach/ Blood Tinged secretions.     HPI: 59 YO F with PMH of  tracheomalacia  s/p tracheostomy () c/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) and chronic vent dependence , COPD, GERD, anxiety , FTT, S/P PEG presents to [facility] ED stating  resistance while suctioning her trach tubing for the last few weeks.  States occasionally while suctioning, she feels resistance and pushes through and sometimes have experienced bleeding.  Called her pulmonologist today, who stated that she should go to the hospital for ENT eval. ENT was consulted for Trach evaluation under Tracheoscopy. Pt denies any episodes of Hypoxia/ SOB at home. No changes in Ventilation per pt. Pt's Trach changed to # 6LPC Trach at [facility] in 2020. Chronic vent dependence  and Night CPAP 5/PS 18. Pt with hx of MRSA/Pseudomonas /Serratia/Achromobacter/Klebs  in the sputum.     PAST MEDICAL & SURGICAL HISTORY:  Lupus anticoagulant syndrome.  Tracheomalacia.  Pancreatic cyst.  Anxiety disorder.  Sickle cell trait.  Hypertension.  COPD (chronic obstructive pulmonary disease).  Tracheostomy dependent.  S/P    Tracheal stenosis  excision of tracheal stenosis 10/2017  revision of tracheal stoma 3/2018  Acute tracheostomy management   Dependence on tracheostomy    Allergies.  Cipro (Unknown)  latex (Unknown)  theophylline (Hives)    Intolerances.  MEDICATIONS  (STANDING):  chlorhexidine 0.12% Liquid 15 milliLiter(s) Oral Mucosa every 12 hours   MEDICATIONS  (PRN):     Social History: Unable to obtain.  Family history:  Unable to obtain.   ROS:   ENT: all negative except as noted in HPI     Vital Signs Last 24 Hrs  T(C): 36.8 (15 Peter 2021 18:38), Max: 36.8 (15 Peter 2021 18:38)  T(F): 98.2 (15 Peter 2021 18:38), Max: 98.2 (15 Peter 2021 18:38)  HR: 77 (15 Peter 2021 18:38) (76 - 77)  BP: 131/87 (15 Peter 2021 18:38) (131/87 - 134/72)  BP(mean): --  RR: 20 (15 Peter 2021 18:38) (20 - 20)  SpO2: 100% (15 Peter 2021 18:38) (97% - 100%)                         10.9   7.85  )-----------( 286      ( 15 Peter 2021 21:12 )             34.2    01-15    142  |  101  |  9   ----------------------------<  87  4.3   |  34<H>  |  0.69    Ca    10.3      15 Peter 2021 21:12    TPro  8.1  /  Alb  4.3  /  TBili  0.6  /  DBili  x   /  AST  31  /  ALT  30  /  AlkPhos  114  01-15 .     PHYSICAL EXAM:  Gen: NAD, On Vent.  Skin: No rashes, bruises, or lesions.  Head: Normocephalic, Atraumatic  Face: no edema, erythema, or fluctuance. Parotid glands soft without mass  Eyes: no scleral injection  Nose: Nares bilaterally patent, no discharge  Mouth: No Stridor / Drooling / Trismus.  Mucosa moist, tongue/uvula midline, oropharynx clear  Neck: # 6 LPC Trach secured with Velcro Tie. Stoma site clean and dry. Flat, supple, no lymphadenopathy, trachea midline, no masses  Lymphatic: No lymphadenopathy  Resp: On Vent.   CV: no peripheral edema/cyanosis  GI: nondistended   Peripheral vascular: no JVD or edema  Neuro: facial nerve intact, no facial droop.    Tracheoscopy (scope #2 used):  Scope advanced through trach tube. No bleeding noted along trachea.  Trach tube in proper position, centered in trachea. Carinal bifurcation visualized. Multiple minimal abrasions 2/2 trauma from suctioning. Not actively bleeding.     IMAGING/ADDITIONAL STUDIES:   CXR: FINDINGS:  Tracheostomy tube with tip in the upper trachea at the level of the clavicles.  Right apical scarring. Bibasilar atelectasis. No focal consolidation.  No pleural effusion. No pneumothorax.  Cardiac size is within normal limits.  Arthrosis of the bilateral glenohumeral joints.     IMPRESSION: No focal consolidation.

## 2021-01-15 NOTE — ED PROVIDER NOTE - PHYSICAL EXAMINATION
GENERAL: Patient awake alert NAD.  HEENT: NC/AT.  LUNGS: CTAB, no wheezes or crackles, trach connected to CPAP, pt. in no acute respiratory distress.  CARDIAC: RRR, no m/r/g.  ABDOMEN: Soft, NT, ND, No rebound, guarding, PEG tube placed.  EXT: No edema. No calf tenderness. CV 2+DP/PT bilaterally.  MSK: No pain with movement, no deformities.  NEURO: A&Ox3. Moving all extremities.  SKIN: Warm and dry. No rash.  PSYCH: Normal affect.

## 2021-01-15 NOTE — ED ADULT NURSE NOTE - CHIEF COMPLAINT QUOTE
"resistance when suctioning trach", intermittent SOB/CP, hx COPD, pt on ventilator, pt Pseudomonas+ in sputum

## 2021-01-15 NOTE — ED PROVIDER NOTE - NSFOLLOWUPINSTRUCTIONS_ED_ALL_ED_FT
- Lab and imaging results, if performed, were discussed with you along with your discharge diagnosis    - Follow up with your doctor in 1 week - bring copies of your results if you were given.     - Return to the ED for any new, worsening, or concerning symptoms to you    - Continue all prescribed medications    - Rest and keep yourself hydrated with fluids

## 2021-01-15 NOTE — ED ADULT NURSE NOTE - NSFALLRSKINDICATORS_ED_ALL_ED
Bed: 04  Expected date: 3/16/18  Expected time: 6:28 PM  Means of arrival: [facility]  Comments:  Dago YIN   no

## 2021-01-16 VITALS
HEART RATE: 80 BPM | RESPIRATION RATE: 20 BRPM | OXYGEN SATURATION: 100 % | SYSTOLIC BLOOD PRESSURE: 148 MMHG | DIASTOLIC BLOOD PRESSURE: 87 MMHG

## 2021-01-16 LAB — SARS-COV-2 RNA SPEC QL NAA+PROBE: SIGNIFICANT CHANGE UP

## 2021-02-03 ENCOUNTER — APPOINTMENT (OUTPATIENT)
Dept: OTOLARYNGOLOGY | Facility: CLINIC | Age: 61
End: 2021-02-03
Payer: MEDICAID

## 2021-02-03 PROCEDURE — 99214 OFFICE O/P EST MOD 30 MIN: CPT | Mod: 25

## 2021-02-03 PROCEDURE — 31575 DIAGNOSTIC LARYNGOSCOPY: CPT | Mod: 59

## 2021-02-03 PROCEDURE — 17250 CHEM CAUT OF GRANLTJ TISSUE: CPT

## 2021-02-03 PROCEDURE — 31615 TRCHEOBRNCHSC EST TRACHS INC: CPT | Mod: 59

## 2021-02-03 PROCEDURE — 99072 ADDL SUPL MATRL&STAF TM PHE: CPT

## 2021-02-14 NOTE — HISTORY OF PRESENT ILLNESS
[de-identified] : 61yo F here for f/u laryngotracheal stenosis. Feels very tight. Has a lot of yellow secretions. Had increased dysphagia since last visit, dilation over summer did help swallowing but slowly returning. Some bleeding. Felt sore inside. Feels like the hole is tight. Voice is stable. No new SOB.Was in  hospital for a few weeks for bacteremia. Was also in hospital this morning because she felt weak. She got IV fluids and was discharged. \par \par

## 2021-02-14 NOTE — PHYSICAL EXAM
[Midline] : trachea located in midline position [Laryngoscopy Performed] : laryngoscopy was performed, see procedure section for findings [Normal] : no rashes [FreeTextEntry1] : stretcher-bound, on oxygen and ventilator through trach, no retractions or distress [de-identified] : trach tube in place with multiple gauze and loose , 6 LPC trach  [de-identified] : mildly raspy voice, hyperfunctional, easily understood

## 2021-02-14 NOTE — CONSULT LETTER
[Dear  ___] : Dear  [unfilled], [Consult Letter:] : I had the pleasure of evaluating your patient, [unfilled]. [Please see my note below.] : Please see my note below. [Consult Closing:] : Thank you very much for allowing me to participate in the care of this patient.  If you have any questions, please do not hesitate to contact me. [Sincerely,] : Sincerely, [FreeTextEntry3] : Gaurang Rodriguez MD, PhD\par Chief, Division of Laryngology\par Department of Otolaryngology\par [facility]\par Pediatric Otolaryngology, [facility]\par  of Otolaryngology\par [facility] [employer]\par \par \par

## 2021-02-26 ENCOUNTER — APPOINTMENT (OUTPATIENT)
Dept: GASTROENTEROLOGY | Facility: CLINIC | Age: 61
End: 2021-02-26

## 2021-03-04 ENCOUNTER — APPOINTMENT (OUTPATIENT)
Dept: OTOLARYNGOLOGY | Facility: CLINIC | Age: 61
End: 2021-03-04

## 2021-03-25 ENCOUNTER — OUTPATIENT (OUTPATIENT)
Dept: OUTPATIENT SERVICES | Facility: HOSPITAL | Age: 61
LOS: 1 days | Discharge: HOME | End: 2021-03-25

## 2021-03-25 ENCOUNTER — APPOINTMENT (OUTPATIENT)
Dept: OTOLARYNGOLOGY | Facility: CLINIC | Age: 61
End: 2021-03-25
Payer: MEDICAID

## 2021-03-25 DIAGNOSIS — Z43.0 ENCOUNTER FOR ATTENTION TO TRACHEOSTOMY: Chronic | ICD-10-CM

## 2021-03-25 DIAGNOSIS — Z98.891 HISTORY OF UTERINE SCAR FROM PREVIOUS SURGERY: Chronic | ICD-10-CM

## 2021-03-25 DIAGNOSIS — Z93.0 TRACHEOSTOMY STATUS: Chronic | ICD-10-CM

## 2021-03-25 DIAGNOSIS — J39.8 OTHER SPECIFIED DISEASES OF UPPER RESPIRATORY TRACT: Chronic | ICD-10-CM

## 2021-03-25 PROCEDURE — 31615 TRCHEOBRNCHSC EST TRACHS INC: CPT

## 2021-03-25 PROCEDURE — 99214 OFFICE O/P EST MOD 30 MIN: CPT | Mod: 25

## 2021-03-25 PROCEDURE — 99072 ADDL SUPL MATRL&STAF TM PHE: CPT

## 2021-04-01 ENCOUNTER — NON-APPOINTMENT (OUTPATIENT)
Age: 61
End: 2021-04-01

## 2021-04-02 ENCOUNTER — APPOINTMENT (OUTPATIENT)
Dept: PULMONOLOGY | Facility: CLINIC | Age: 61
End: 2021-04-02
Payer: MEDICAID

## 2021-04-02 VITALS
RESPIRATION RATE: 18 BRPM | BODY MASS INDEX: 25.68 KG/M2 | HEIGHT: 61 IN | SYSTOLIC BLOOD PRESSURE: 146 MMHG | DIASTOLIC BLOOD PRESSURE: 90 MMHG | HEART RATE: 93 BPM | OXYGEN SATURATION: 100 % | WEIGHT: 136 LBS | TEMPERATURE: 97.3 F

## 2021-04-02 DIAGNOSIS — J93.9 PNEUMOTHORAX, UNSPECIFIED: ICD-10-CM

## 2021-04-02 PROCEDURE — 99214 OFFICE O/P EST MOD 30 MIN: CPT | Mod: 25

## 2021-04-02 PROCEDURE — 71046 X-RAY EXAM CHEST 2 VIEWS: CPT

## 2021-04-02 PROCEDURE — 99072 ADDL SUPL MATRL&STAF TM PHE: CPT

## 2021-04-07 ENCOUNTER — NON-APPOINTMENT (OUTPATIENT)
Age: 61
End: 2021-04-07

## 2021-04-15 ENCOUNTER — APPOINTMENT (OUTPATIENT)
Dept: PULMONOLOGY | Facility: CLINIC | Age: 61
End: 2021-04-15

## 2021-04-20 NOTE — HISTORY OF PRESENT ILLNESS
[de-identified] : 62yo F here for f/u laryngotracheal stenosis. Pt had pneumothorax. Unclear etiology. Was in hospital for few weeks. Discharged March 3rd.  Feels very tight. Has a lot of yellow secretions. Trach keeps falling out. When suctioning feels like something is hitting. Felt sore inside.\par

## 2021-04-20 NOTE — PHYSICAL EXAM
[Midline] : trachea located in midline position [Laryngoscopy Performed] : laryngoscopy was performed, see procedure section for findings [Normal] : no rashes [FreeTextEntry1] : stretcher-bound, on oxygen and ventilator through trach, no retractions or distress [de-identified] : trach tube in place with multiple gauze and loose , 6 LPC trach  [de-identified] : mildly raspy voice, hyperfunctional, easily understood

## 2021-04-20 NOTE — CONSULT LETTER
[Dear  ___] : Dear  [unfilled], [Consult Letter:] : I had the pleasure of evaluating your patient, [unfilled]. [Please see my note below.] : Please see my note below. [Consult Closing:] : Thank you very much for allowing me to participate in the care of this patient.  If you have any questions, please do not hesitate to contact me. [Sincerely,] : Sincerely, [FreeTextEntry3] : Gaurang Rodriguez MD, PhD\par Chief, Division of Laryngology\par Department of Otolaryngology\par [facility]\par Pediatric Otolaryngology, [facility]\par  of Otolaryngology\par [facility] [employer]\par \par \par

## 2021-04-21 ENCOUNTER — APPOINTMENT (OUTPATIENT)
Dept: OTOLARYNGOLOGY | Facility: CLINIC | Age: 61
End: 2021-04-21

## 2021-04-23 ENCOUNTER — APPOINTMENT (OUTPATIENT)
Dept: GASTROENTEROLOGY | Facility: CLINIC | Age: 61
End: 2021-04-23

## 2021-04-26 ENCOUNTER — NON-APPOINTMENT (OUTPATIENT)
Age: 61
End: 2021-04-26

## 2021-04-26 NOTE — ASU PREOP CHECKLIST - ANTIBIOTIC
Gestational hypertension PIH (pregnancy induced hypertension) PIH (pregnancy induced hypertension) n/a

## 2021-04-27 RX ORDER — NUTRITIONAL SUPPLEMENT 0.06 G-1.5
LIQUID (ML) ORAL 3 TIMES DAILY
Qty: 711 | Refills: 3 | Status: ACTIVE | COMMUNITY
Start: 2021-04-27 | End: 1900-01-01

## 2021-04-28 RX ORDER — NUTRITIONAL SUPPLEMENT 0.06 G-1.5
LIQUID (ML) ORAL 3 TIMES DAILY
Qty: 1422 | Refills: 3 | Status: ACTIVE | OUTPATIENT
Start: 2021-04-28

## 2021-04-29 DIAGNOSIS — R49.0 DYSPHONIA: ICD-10-CM

## 2021-04-29 DIAGNOSIS — J39.8 OTHER SPECIFIED DISEASES OF UPPER RESPIRATORY TRACT: ICD-10-CM

## 2021-04-29 DIAGNOSIS — J04.10 ACUTE TRACHEITIS WITHOUT OBSTRUCTION: ICD-10-CM

## 2021-04-29 DIAGNOSIS — K21.9 GASTRO-ESOPHAGEAL REFLUX DISEASE WITHOUT ESOPHAGITIS: ICD-10-CM

## 2021-04-29 DIAGNOSIS — J96.11 CHRONIC RESPIRATORY FAILURE WITH HYPOXIA: ICD-10-CM

## 2021-04-29 DIAGNOSIS — L92.9 GRANULOMATOUS DISORDER OF THE SKIN AND SUBCUTANEOUS TISSUE, UNSPECIFIED: ICD-10-CM

## 2021-04-29 DIAGNOSIS — R13.10 DYSPHAGIA, UNSPECIFIED: ICD-10-CM

## 2021-04-29 DIAGNOSIS — J38.6 STENOSIS OF LARYNX: ICD-10-CM

## 2021-04-30 ENCOUNTER — NON-APPOINTMENT (OUTPATIENT)
Age: 61
End: 2021-04-30

## 2021-05-04 RX ORDER — LACTOSE-REDUCED FOOD/FIBER 0.06 G-1.2
LIQUID (ML) ORAL 3 TIMES DAILY
Qty: 42660 | Refills: 3 | Status: ACTIVE | OUTPATIENT
Start: 2021-05-04

## 2021-05-04 NOTE — ASSESSMENT
[FreeTextEntry1] : Ms. Bo is a 61 year old female, presenting into the office with a history of former smoker, COPD, respiratory failure for 10 years, tracheostomy,  tracheostenosis and tracheomalacia who is s/p bronchoscopy with revision of the tracheostomy with dilation of the tracheostomy.  She presents with tracheomalacia and end stage COPD with issues with ventilator. PNA ([facility])- s/p colonoscopy (preop). Chronic colonization of aeruginosa with pseudomonas, Difficulty with mucus clearance despite chest vest, s/p recent PTX on the right -  chronic respiratory complaint \par \par ******************************************PREOP CLEARANCE FOR TRACH SURGERY***********************************\par -at this point in time there are no absolute pulmonary contraindications to go forward with the planned procedure \par -at the time of surgery s/he should have optimal pain control, incentive spirometry, early ambulation, DVT and GI prophylaxis.\par **********************************************************************************************************************************\par \par Her chronic respiratory failure is multifactorial due to:\par -underlying COPD/severe persistent asthma\par -respiratory muscle weakness\par -tracheomalacia\par \par \par problem 1: tracheostomy / chronic respiratory failure \par -Continue to wean off the ventilator as tolerable in short trials throughout the day  \par -recommended to use hypertonic saline in clearing of tracheostomy \par -Rx given for elastic pads, dressings, disposable inner cannulas, and tracheostomy collar to improve collar. Rx given for Metaplex Lite. \par -recommended f/u with Dr. Brett Laughlin and Dr. Edward Rodriguez\par \par Problem 1A: PNA  - no evidence CT 3/2020\par - Radiologically cleared\par - Your chest xray/CT reveals an infiltrate c/w pneumonia; this based on your clinical scenario required additional therapy. Any change in your status will require hospitalization at the nearest hospital and al local ambulance will need to be called. Our group is on staff at [facility] and [facility] as- consultants. The patient/family is instructed to notify our office with any change in condition. \par \par \par  Problem 1a: mucopurulent chronic bronchitis \par - continue Pulmozyme .5 BID PRN \par - chest vest in place \par You have a clinical scenario most c/q acute bronchitis the etiology of which is unknown but empiric antibiotics are indicated. Hydration, mucolytics including mucinex, robitussin and the like are indicated. Cough controlling agents will be needed. \par \par Problem 1B: PTX (right side)- resolved\par The patient has a pneumothorax from trauma, iatrogenic, post surgical. You are at risk for one in the future and may need hospitalization. Thoracic surgical evaluation is needed for chest tube and other intervention. \par \par Problem 2: Chronic Respiratory Failure \par -Trach collar- goal up to 12hrs per day (at 2 hours now)\par - 3 L SIMV: VT- 450; PC 20; PS-18; PEEP- 5\par - Night CPAP 5/PS 18 (back up 8 B/mm)\par \par problem 3:  COPD/asthma\par -continue Combivent 1 inhalation up to QID to replace Symbicort \par -continue Performist nebulizer 1 unit dose BID, followed by Pulmicort 0.5 nebulizer BID \par -continue Albuterol via nebulizer for rescue up to QiD \par -continue on NAC Q8H\par -continue Yuperli 1 unit dose QD via nebulizer\par \par -COPD is a progressive disease and although it can’t be cured , appropriate management can slow its progression, reduce frequency and severity of exacerbations, and improve symptoms and the patient quality of life. Hospitalizations are the greatest contributor to the total COPD costs and account for up to 87% of total COPD related costs. Exacerbations are the main cause of admissions and subsequently account for the 40-75% of COPD costs. Inhaled maintenance therapy reduces the incidence of exacerbations in patients with stable COPD. Incorrect inhaler use and nonadherence are major obstacles to achieving COPD treatment goals. Many COPD patients have challenges (impaired inhalation, limited dexterity, reduced cognition: that limit their ability to correctly use their COPD treatment devices resulting in reduced symptom control. Of most importance is smoking cessation and early intervention with respiratory illnesses and contemplation for pulmonary rehab to enhance quality of life.\par \par Problem 3A: Abnormal CT: confirm Bronchiectasis / pulmonary nodule\par -s/p CT of the chest -(no present) - Next 11/2020  (past due) \par -Complete high resolution chest CT, prone and supine, to r/o pulmonary nodules - next  11/2020\par -Recommended to take Slippery Elm Tea and pill for cough and mucus clearing \par -based upon results of CT scan, will apply for chest vest therapy\par \par -CAT scans are the only radiological modality to identify abnormalities w/in the lungs with regards to nodules/masses/lymph nodes. Risks, benefits were reviewed in detail. The guidelines for abnormalities include follow up CT scans at various intervals which could range from 6 weeks to 1 year intervals. If there is a change for the worse then consideration for a biopsy will be considered if you are a candidate. Second opinion evaluation with a thoracic surgeon or an interventional radiologist could be offered. \par - Seen on the CT of the chest or chest x-ray signifies damaged bronchial tubes focal or diffuse which can be sites of recurrent infections. These areas can be colonized by various organisms including bacteria (hemophilous influenzae/Pseudomonas species etc.) as well as acid fast bacilli (mycobacterial disease- inclusive of TB/MT etc.).  The patient has previously used acapella, nebulizer and breathing techniques for airway clearance.  These methods have not provided appropriate secretion manage in this patient.Cough length has been greater than 6 months, hence, initiating vest therapy is necessary.\par \par \par problem 3b: pseudomonas aeruginosa.- chronic colonization (kubulek) \par -Apply for vest pack (failed Conventional therapy.) - pending delivery  \par -get infectious disease f/u PRN \par -chronic colonization\par \par problem 4: GERD -(s/p EgD +/- colonoscopy- negative ) \par - Recommend GI f/u\par - continue Pepcid Complete 40 mg QHS\par -change Protonix 40 mg qAM ;  Neglan 5 mg qHS\par -continue Baclofen 5 mg premeal, QHS \par -Things to avoid including overeating, spicy foods, tight clothing, eating within three hours of bed, this list is not all inclusive. \par -For treatment of reflux, possible options discussed including diet control, H2 blockers, PPIs, as well as coating motility agents discussed as treatment options. Timing of meals and proximity of last meal to sleep were discussed. If symptoms persist, a formal gastrointestinal evaluation is needed.\par -Rule of 2's: Avoid eating too late, too fast, too much, too spicy or within two hours of bedtime\par \par Problem 5: Immunodeficiency\par - Continue Zithromax 250 mg Monday, Wednesday, and Friday\par -Due to the fact that this pt has had more infections than would be expected and immunological blood work is indicated this would include: IgG subclasses, quantitative immunoglobulins, Strep pneumoniae titers as well as Vitamin D levels. Based on this blood work we will be able to decide where the pt needs additional pneumococcal vaccine either Prevnar 13 or Pneumovax. Immunology evaluation will also be potentially indicated. \par \par Problem 6: Sensory neuropathic Cough\par - Continue Amitriptyline 10 mg up to TID\par -Sensory neuropathic cough is an etiology of cough that is often realized once someone has been ruled out for common disease such as: asthma, COPD, eosinophilic bronchitis, bronchiectasis, post nasal drip, and GERD. It sometimes develops following a URI, herpes zoster outbreak in pharynx or thyroid or cervical spine injury. However, many patients have no identifiable antecedent explanation. \par \par problem 7: tracheomalacia/tracheal tumor \par -follow up with Dr. Kelby Larios for thoracic evaluation for potential tracheoplasty - s/p new tracheostomy\par \par Tracheomalacia is usually acquired in adults and common causes include damage by tracheostomy or endotracheal intubation damaging the tracheal cartilage with increase risk with multiple intubations, prolonged intubation, and concurrent high dose steroid therapy; external chest wall trauma and surgery; chronic compression of the trachea by benign etiologies (eg, benign mediastinal goiter) or malignancy; relapsing polychondritis; or recurrent infection. Tracheomalacia can be asymptomatic, however signs or symptoms can develop as the severity of the airway narrowing progresses with major symptoms include dyspnea, cough, and sputum retention. Other symptoms include severe paroxysms of coughing, wheezing or stridor, barking cough and may be exacerbated by forced expiration, cough, and Valsalva maneuver. Tracheomalacia is diagnosed by a bronchoscopic visualization of dynamic airway collapse on dynamic chest CT. Therapy is warranted in symptomatic patients with severe tracheomalacia and includes surgical repair as tracheobronchoplasty. The patient was referred to Dr. Willy Kay or Dr. Kleby Larios, at [facility] for a surgical consult. \par \par problem 8: dysphonia/sore throat\par -recommended Fisherman Friend's lozenges \par -recommended to use Slippery Elm Lozenge / Tea \par -continue Evoxac 30 q 8\par \par Problem 9: Health Maintenance/COVID19 Precautions \par - Clean your hands often. Wash your hands often with soap and water for at least 20 seconds, especially after blowing your nose, coughing, or sneezing, or having been in a public place.\par - If soap and water are not available, use a hand  that contains at least 60% alcohol.\par - To the extent possible, avoid touching high-touch surfaces in public places - elevator buttons, door handles, handrails, handshaking with people, etc. Use a tissue or your sleeve to cover your hand or finger if you must touch something.\par - Wash your hands after touching surfaces in public places.\par - Avoid touching your face, nose, eyes, etc.\par - Clean and disinfect your home to remove germs: practice routine cleaning of frequently touched surfaces (for example: tables, doorknobs, light switches, handles, desks, toilets, faucets, sinks & cell phones)\par - Avoid crowds, especially in poorly ventilated spaces. Your risk of exposure to respiratory viruses like COVID-19 may increase in crowded, closed-in settings with little air circulation if there are people in the crowd who are sick. All patients are recommended to practice social distancing and stay at least 6 feet away from others. \par - Avoid all non-essential travel including plane trips, and especially avoid embarking on cruise ships.\par -If COVID-19 is spreading in your community, take extra measures to put distance between yourself and other people to further reduce your risk of being exposed to this new virus.\par -Stay home as much as possible.\par - Consider ways of getting food brought to your house through family, social, or commercial networks\par -Be aware that the virus has been known to live in the air up to 3 hours post exposure, cardboard up to 24 hours post exposure, copper up to 4 hours post exposure, steel and plastic up to 2-3 days post exposure. Risk of transmission from these surfaces are affected by many variables.\par COVID-19 precautionary Immune Support Recommendations:\par -OTC Vitamin C 500mg BID \par -OTC Quercetin 250-500mg BID \par -OTC Zinc 75-100mg per day \par -OTC Melatonin 1 or 2mg a night \par -OTC Vitamin D 1-4000mg per day \par -OTC Tonic Water 8oz per day\par -Water 0.5-1 gallon per day\par Asthma and COVID19:\par You need to make sure your asthma is under control. This often requires the use of inhaled corticosteroids (and sometimes oral corticosteroids). Inhaled corticosteroids do not likely reduce your immune system’s ability to fight infections, but oral corticosteroids may. It is important to use the steps above to protect yourself to limit your exposure to any respiratory virus. \par \par problem 10: health maintenance \par - recommended to f/u with Dr. Palomares(GI) \par -instructed to increase physical activity as much as possible\par - She was administered an influenza vaccination 11/19\par -recommended strep pneumonia vaccines: Prevnar-13 vaccine, followed by Pneumo vaccine 23 one year following (completed)\par -recommended early intervention for URIs\par -recommended regular osteoporosis evaluations\par -recommended early dermatological evaluations\par -recommended after the age of 50 to the age of 70, colonoscopy every 5 years \par \par \par Follow up in 2 months.\par Patient is encouraged to call with any changes, concerns or questions.

## 2021-05-04 NOTE — PROCEDURE
[FreeTextEntry1] : CXR reveals normal sized heart, hyperinflation PTX, chronic changes b/l, trach in place

## 2021-05-04 NOTE — HISTORY OF PRESENT ILLNESS
[FreeTextEntry1] : Ms. Bo is a 61 year old female with a history of atypical chest pain, COPD, chronic hypoxemia, dysphagia, GERD, laryngeal stenosis, OW, dependence on tracheostomy, and tracheomalacia who presents into the office for a follow up. Her chief complain is unable to eat and sputum is\par - she was in the hospital for a month due to her right lung collapsing, she had a tube in. she was in the hospital till march 8th. \par - she notes she has been having a hard time bringing up mucus \par - she notes her Yulpelri is what she is taking \par - she notes the pulmazyne dries her up too much \par - she has been dealing with constipation \par - she has been having difficulty swallowing \par - she notes sometimes things will get stuck in her throat \par - she has been having a lot of leg cramps, she cramps up a lot \par - her energy levels have not been good \par - her sinuses have been dry \par - she notes she needs her new CPAP supplies \par - she has healed from her pneumothorax \par - she has been bringing up green mucus \par - she has been using the chest vest for her mucus \par - She  denies any visual issues, headaches, nausea, vomiting, fever, chills, sweats, chest pains, chest pressure, dysphagia, myalgia, dizziness, leg swelling, leg pain, itchy eyes, itchy ears.

## 2021-05-04 NOTE — PHYSICAL EXAM
[No Acute Distress] : no acute distress [Well Nourished] : well nourished [Well Groomed] : well groomed [No Deformities] : no deformities [Well Developed] : well developed [Normal Oropharynx] : normal oropharynx [II] : Mallampati Class: II [Normal Appearance] : normal appearance [No Neck Mass] : no neck mass [Normal Rate/Rhythm] : normal rate/rhythm [Normal S1, S2] : normal s1, s2 [No Murmurs] : no murmurs [No Resp Distress] : no resp distress [Clear to Auscultation Bilaterally] : clear to auscultation bilaterally [No Abnormalities] : no abnormalities [Benign] : benign [Normal Gait] : normal gait [No Clubbing] : no clubbing [No Cyanosis] : no cyanosis [No Edema] : no edema [FROM] : FROM [Normal Color/ Pigmentation] : normal color/ pigmentation [No Focal Deficits] : no focal deficits [Oriented x3] : oriented x3 [Normal Affect] : normal affect [TextBox_2] : trach in place, wheelchar  [TextBox_68] : I:E 1:3, expiratory wheezes b/l expiratory crackles

## 2021-05-04 NOTE — ADDENDUM
[FreeTextEntry1] : ******************Amended by Smith Newsome on 05/04/2021 ***************** \par \par Documented by Marva Rivera acting as a scribe for Dr. Maco Nova on 04/02/2021 \par \par All medical record entries made by the Scribe were at my, Dr. Maco Nova's, direction and personally dictated by me on 04/02/2021 . I have reviewed the chart and agree that the record accurately reflects my personal performance of the history, physical exam, assessment and plan. I have also personally directed, reviewed, and agree with the discharge instructions.

## 2021-05-11 RX ORDER — NUT.TX.IMP.RENAL FXN,LAC-REDUC 0.08 G-1.8
LIQUID (ML) ORAL 3 TIMES DAILY
Qty: 21330 | Refills: 6 | Status: ACTIVE | OUTPATIENT
Start: 2021-05-10

## 2021-05-14 ENCOUNTER — APPOINTMENT (OUTPATIENT)
Dept: GASTROENTEROLOGY | Facility: CLINIC | Age: 61
End: 2021-05-14

## 2021-05-19 ENCOUNTER — APPOINTMENT (OUTPATIENT)
Dept: OTOLARYNGOLOGY | Facility: CLINIC | Age: 61
End: 2021-05-19

## 2021-06-02 ENCOUNTER — APPOINTMENT (OUTPATIENT)
Dept: GASTROENTEROLOGY | Facility: CLINIC | Age: 61
End: 2021-06-02

## 2021-06-14 ENCOUNTER — APPOINTMENT (OUTPATIENT)
Dept: PULMONOLOGY | Facility: CLINIC | Age: 61
End: 2021-06-14

## 2021-06-18 ENCOUNTER — APPOINTMENT (OUTPATIENT)
Dept: GASTROENTEROLOGY | Facility: CLINIC | Age: 61
End: 2021-06-18

## 2021-06-18 ENCOUNTER — NON-APPOINTMENT (OUTPATIENT)
Age: 61
End: 2021-06-18

## 2021-07-08 ENCOUNTER — APPOINTMENT (OUTPATIENT)
Dept: OTOLARYNGOLOGY | Facility: CLINIC | Age: 61
End: 2021-07-08
Payer: MEDICAID

## 2021-07-08 PROCEDURE — 31615 TRCHEOBRNCHSC EST TRACHS INC: CPT | Mod: 59

## 2021-07-08 PROCEDURE — 31575 DIAGNOSTIC LARYNGOSCOPY: CPT | Mod: 59

## 2021-07-08 PROCEDURE — 99214 OFFICE O/P EST MOD 30 MIN: CPT | Mod: 25

## 2021-07-08 RX ORDER — DIPHENHYDRAMINE HYDROCHLORIDE AND LIDOCAINE HYDROCHLORIDE AND ALUMINUM HYDROXIDE AND MAGNESIUM HYDRO
KIT
Qty: 1 | Refills: 0 | Status: ACTIVE | COMMUNITY
Start: 2021-07-08 | End: 1900-01-01

## 2021-07-09 ENCOUNTER — APPOINTMENT (OUTPATIENT)
Dept: PULMONOLOGY | Facility: CLINIC | Age: 61
End: 2021-07-09
Payer: MEDICAID

## 2021-07-09 PROCEDURE — 99442: CPT

## 2021-07-09 RX ORDER — LEVOFLOXACIN 750 MG/1
750 TABLET, FILM COATED ORAL
Qty: 14 | Refills: 0 | Status: DISCONTINUED | COMMUNITY
Start: 2021-04-13

## 2021-07-09 RX ORDER — AMOXICILLIN AND CLAVULANATE POTASSIUM 875; 125 MG/1; MG/1
875-125 TABLET, COATED ORAL
Qty: 10 | Refills: 0 | Status: DISCONTINUED | COMMUNITY
Start: 2020-10-15 | End: 2021-07-09

## 2021-07-09 RX ORDER — AMOXICILLIN AND CLAVULANATE POTASSIUM 875; 125 MG/1; MG/1
875-125 TABLET, COATED ORAL
Qty: 20 | Refills: 0 | Status: DISCONTINUED | COMMUNITY
Start: 2021-04-06 | End: 2021-07-09

## 2021-07-09 RX ORDER — MECLIZINE HYDROCHLORIDE 25 MG/1
25 TABLET ORAL
Qty: 20 | Refills: 0 | Status: ACTIVE | COMMUNITY
Start: 2021-02-02

## 2021-07-09 RX ORDER — MAGNESIUM OXIDE 400 MG
400 (241.3 MG) TABLET ORAL
Qty: 30 | Refills: 0 | Status: ACTIVE | COMMUNITY
Start: 2021-06-24

## 2021-07-12 NOTE — REASON FOR VISIT
[Follow-Up] : a follow-up visit [FreeTextEntry1] : via telephonic visit - atypical chest pain, COPD, chronic hypoxemia, dysphagia, GERD, laryngeal stenosis, OW, dependence on tracheostomy, and tracheomalacia

## 2021-07-12 NOTE — PROCEDURE
[FreeTextEntry1] : CT (MAY.24.2021) IMPRESSION: 1. largely stable chronic scarring changes, nodularity and bronchiectasis upper lungs, right middle lobe, lingula and lower lungs. \par 2. new endobronchial plugging anterier segmental bronchus right lower lobe. Etiology indeterminate. inflammatory change, mucous plugging or endobronchial lesion could give this appearance. \par \par \par

## 2021-07-12 NOTE — ADDENDUM
[FreeTextEntry1] : Documented by Marva Rivera acting as a scribe for Dr. Maco Nova on 07/09/2021 \par \par All medical record entries made by the Scribe were at my, Dr. Maco Nova's, direction and personally dictated by me on 07/09/2021 . I have reviewed the chart and agree that the record accurately reflects my personal performance of the history, physical exam, assessment and plan. I have also personally directed, reviewed, and agree with the discharge instructions.

## 2021-07-12 NOTE — ASSESSMENT
[FreeTextEntry1] : Ms. Bo is a 61 year old female, presenting into the office via telephonic visit with a history of former smoker, COPD, respiratory failure for 10 years, tracheostomy,  tracheostenosis and tracheomalacia who is s/p bronchoscopy with revision of the tracheostomy with dilation of the tracheostomy.  She presents with tracheomalacia and end stage COPD with issues with ventilator. PNA ([facility])- s/p colonoscopy (preop). Chronic colonization of aeruginosa with pseudomonas, Difficulty with mucus clearance despite chest vest, s/p recent PTX on the right -  chronic respiratory complaint s/p colonoscopy (Non Dx) - ?Lung Bx ([facility])\par \par \par Her chronic respiratory failure is multifactorial due to:\par -underlying COPD/severe persistent asthma\par -respiratory muscle weakness\par -tracheomalacia\par \par \par problem 1: tracheostomy / chronic respiratory failure \par -Continue to wean off the ventilator as tolerable in short trials throughout the day  \par -recommended to use hypertonic saline in clearing of tracheostomy \par -Rx given for elastic pads, dressings, disposable inner cannulas, and tracheostomy collar to improve collar. Rx given for Metaplex Lite. \par -recommended f/u with Dr. Brett Laughlin and Dr. Edward Rodriguez\par \par \par  Problem 1a: mucopurulent chronic bronchitis \par - continue Pulmozyme .5 BID PRN \par - chest vest in place \par You have a clinical scenario most c/q acute bronchitis the etiology of which is unknown but empiric antibiotics are indicated. Hydration, mucolytics including mucinex, robitussin and the like are indicated. Cough controlling agents will be needed. \par \par Problem 1B: PTX (right side)- resolved (2020) \par The patient has a pneumothorax from trauma, iatrogenic, post surgical. You are at risk for one in the future and may need hospitalization. Thoracic surgical evaluation is needed for chest tube and other intervention. \par \par Problem 1C: ?Lung Bx ([facility]) \par - obtain records \par \par Problem 2: Chronic Respiratory Failure \par -Trach collar- goal up to 12hrs per day (at 2 hours now)\par - 3 L SIMV: VT- 450; PC 20; PS-18; PEEP- 5\par - Night CPAP 5/PS 18 (back up 8 B/mm)\par \par problem 3:  COPD/asthma\par -continue Combivent 1 inhalation up to QID to replace Symbicort \par -continue Performist nebulizer 1 unit dose BID, followed by Pulmicort 0.5 nebulizer BID \par -continue Albuterol via nebulizer for rescue up to QiD \par -continue on NAC Q8H\par -continue Yuperli 1 unit dose QD via nebulizer\par - add Mucomyst 2cc (107.) qSH \par \par -COPD is a progressive disease and although it can’t be cured , appropriate management can slow its progression, reduce frequency and severity of exacerbations, and improve symptoms and the patient quality of life. Hospitalizations are the greatest contributor to the total COPD costs and account for up to 87% of total COPD related costs. Exacerbations are the main cause of admissions and subsequently account for the 40-75% of COPD costs. Inhaled maintenance therapy reduces the incidence of exacerbations in patients with stable COPD. Incorrect inhaler use and nonadherence are major obstacles to achieving COPD treatment goals. Many COPD patients have challenges (impaired inhalation, limited dexterity, reduced cognition: that limit their ability to correctly use their COPD treatment devices resulting in reduced symptom control. Of most importance is smoking cessation and early intervention with respiratory illnesses and contemplation for pulmonary rehab to enhance quality of life.\par \par Problem 3A: Abnormal CT: confirm Bronchiectasis / pulmonary nodule\par -s/p CT of the chest -(no present) - Next 11/2020  (past due) \par -Complete high resolution chest CT, prone and supine, to r/o pulmonary nodules - next  11/2020\par -Recommended to take Slippery Elm Tea and pill for cough and mucus clearing \par -based upon results of CT scan, will apply for chest vest therapy\par \par -CAT scans are the only radiological modality to identify abnormalities w/in the lungs with regards to nodules/masses/lymph nodes. Risks, benefits were reviewed in detail. The guidelines for abnormalities include follow up CT scans at various intervals which could range from 6 weeks to 1 year intervals. If there is a change for the worse then consideration for a biopsy will be considered if you are a candidate. Second opinion evaluation with a thoracic surgeon or an interventional radiologist could be offered. \par - Seen on the CT of the chest or chest x-ray signifies damaged bronchial tubes focal or diffuse which can be sites of recurrent infections. These areas can be colonized by various organisms including bacteria (hemophilous influenzae/Pseudomonas species etc.) as well as acid fast bacilli (mycobacterial disease- inclusive of TB/MT etc.).  The patient has previously used acapella, nebulizer and breathing techniques for airway clearance.  These methods have not provided appropriate secretion manage in this patient.Cough length has been greater than 6 months, hence, initiating vest therapy is necessary.\par \par \par problem 3b: pseudomonas aeruginosa.- chronic colonization (kubulek) \par -Apply for vest pack (failed Conventional therapy.) - pending delivery  \par -get infectious disease f/u PRN \par -chronic colonization\par \par problem 4: GERD -(s/p EgD +/- colonoscopy- negative ) \par - Recommend GI f/u\par - continue Pepcid Complete 40 mg QHS\par -change Protonix 40 mg qAM ;  Neglan 5 mg qHS\par -continue Baclofen 5 mg premeal, QHS \par -Things to avoid including overeating, spicy foods, tight clothing, eating within three hours of bed, this list is not all inclusive. \par -For treatment of reflux, possible options discussed including diet control, H2 blockers, PPIs, as well as coating motility agents discussed as treatment options. Timing of meals and proximity of last meal to sleep were discussed. If symptoms persist, a formal gastrointestinal evaluation is needed.\par -Rule of 2's: Avoid eating too late, too fast, too much, too spicy or within two hours of bedtime\par \par Problem 5: Immunodeficiency\par - Continue Zithromax 250 mg Monday, Wednesday, and Friday\par -Due to the fact that this pt has had more infections than would be expected and immunological blood work is indicated this would include: IgG subclasses, quantitative immunoglobulins, Strep pneumoniae titers as well as Vitamin D levels. Based on this blood work we will be able to decide where the pt needs additional pneumococcal vaccine either Prevnar 13 or Pneumovax. Immunology evaluation will also be potentially indicated. \par \par Problem 6: Sensory neuropathic Cough\par - Continue Amitriptyline 10 mg up to TID\par -Sensory neuropathic cough is an etiology of cough that is often realized once someone has been ruled out for common disease such as: asthma, COPD, eosinophilic bronchitis, bronchiectasis, post nasal drip, and GERD. It sometimes develops following a URI, herpes zoster outbreak in pharynx or thyroid or cervical spine injury. However, many patients have no identifiable antecedent explanation. \par \par problem 7: tracheomalacia/tracheal tumor \par -follow up with Dr. Kelby Larios for thoracic evaluation for potential tracheoplasty - s/p new tracheostomy\par \par Tracheomalacia is usually acquired in adults and common causes include damage by tracheostomy or endotracheal intubation damaging the tracheal cartilage with increase risk with multiple intubations, prolonged intubation, and concurrent high dose steroid therapy; external chest wall trauma and surgery; chronic compression of the trachea by benign etiologies (eg, benign mediastinal goiter) or malignancy; relapsing polychondritis; or recurrent infection. Tracheomalacia can be asymptomatic, however signs or symptoms can develop as the severity of the airway narrowing progresses with major symptoms include dyspnea, cough, and sputum retention. Other symptoms include severe paroxysms of coughing, wheezing or stridor, barking cough and may be exacerbated by forced expiration, cough, and Valsalva maneuver. Tracheomalacia is diagnosed by a bronchoscopic visualization of dynamic airway collapse on dynamic chest CT. Therapy is warranted in symptomatic patients with severe tracheomalacia and includes surgical repair as tracheobronchoplasty. The patient was referred to Dr. Willy Kay or Dr. Kelby Larios, at [facility] for a surgical consult. \par \par problem 8: dysphonia/sore throat\par -recommended Fisherman Friend's lozenges \par -recommended to use Slippery Elm Lozenge / Tea \par -continue Evoxac 30 q 8\par \par Problem 9: Health Maintenance/COVID19 Precautions - not vaccinated \par - Clean your hands often. Wash your hands often with soap and water for at least 20 seconds, especially after blowing your nose, coughing, or sneezing, or having been in a public place.\par - If soap and water are not available, use a hand  that contains at least 60% alcohol.\par - To the extent possible, avoid touching high-touch surfaces in public places - elevator buttons, door handles, handrails, handshaking with people, etc. Use a tissue or your sleeve to cover your hand or finger if you must touch something.\par - Wash your hands after touching surfaces in public places.\par - Avoid touching your face, nose, eyes, etc.\par - Clean and disinfect your home to remove germs: practice routine cleaning of frequently touched surfaces (for example: tables, doorknobs, light switches, handles, desks, toilets, faucets, sinks & cell phones)\par - Avoid crowds, especially in poorly ventilated spaces. Your risk of exposure to respiratory viruses like COVID-19 may increase in crowded, closed-in settings with little air circulation if there are people in the crowd who are sick. All patients are recommended to practice social distancing and stay at least 6 feet away from others. \par - Avoid all non-essential travel including plane trips, and especially avoid embarking on cruise ships.\par -If COVID-19 is spreading in your community, take extra measures to put distance between yourself and other people to further reduce your risk of being exposed to this new virus.\par -Stay home as much as possible.\par - Consider ways of getting food brought to your house through family, social, or commercial networks\par -Be aware that the virus has been known to live in the air up to 3 hours post exposure, cardboard up to 24 hours post exposure, copper up to 4 hours post exposure, steel and plastic up to 2-3 days post exposure. Risk of transmission from these surfaces are affected by many variables.\par COVID-19 precautionary Immune Support Recommendations:\par -OTC Vitamin C 500mg BID \par -OTC Quercetin 250-500mg BID \par -OTC Zinc 75-100mg per day \par -OTC Melatonin 1 or 2mg a night \par -OTC Vitamin D 1-4000mg per day \par -OTC Tonic Water 8oz per day\par -Water 0.5-1 gallon per day\par Asthma and COVID19:\par You need to make sure your asthma is under control. This often requires the use of inhaled corticosteroids (and sometimes oral corticosteroids). Inhaled corticosteroids do not likely reduce your immune system’s ability to fight infections, but oral corticosteroids may. It is important to use the steps above to protect yourself to limit your exposure to any respiratory virus. \par \par problem 10: health maintenance \par - recommended to f/u with Dr. Palomares(GI) \par -instructed to increase physical activity as much as possible\par - She was administered an influenza vaccination 11/19\par -recommended strep pneumonia vaccines: Prevnar-13 vaccine, followed by Pneumo vaccine 23 one year following (completed)\par -recommended early intervention for URIs\par -recommended regular osteoporosis evaluations\par -recommended early dermatological evaluations\par -recommended after the age of 50 to the age of 70, colonoscopy every 5 years \par \par \par Follow up in 2 months.\par Patient is encouraged to call with any changes, concerns or questions.

## 2021-07-12 NOTE — HISTORY OF PRESENT ILLNESS
[Home] : at home, [unfilled] , at the time of the visit. [Medical Office: ([facility])___] : at the medical office located in  [Verbal consent obtained from patient] : the patient, [unfilled] [FreeTextEntry1] : Ms. Bo is a 61 year old female with a history of atypical chest pain, COPD, chronic hypoxemia, dysphagia, GERD, laryngeal stenosis, OW, dependence on tracheostomy, and tracheomalacia who presents into the office via telephone visit for a follow up. Her chief complain is \par - she notes she came out the hospital \par - she had a colonoscopy and it came out fine \par - she had been losing weight \par - she has been having a hard time bringing up mucus \par - she notes she had an EKG and blood work done \par - she notes she had a biopsy of her lung done, she was not aware until she woke up at [facility]. \par - she was originally at [facility] at the ER because she was SOB. \par - She  denies any visual issues, headaches, nausea, vomiting, fever, chills, sweats, chest pains, chest pressure, diarrhea, constipation, dysphagia, myalgia, dizziness, leg swelling, leg pain, itchy eyes, itchy ears, heartburn, reflux, or sour taste in the mouth.

## 2021-07-30 ENCOUNTER — APPOINTMENT (OUTPATIENT)
Dept: GASTROENTEROLOGY | Facility: CLINIC | Age: 61
End: 2021-07-30
Payer: MEDICAID

## 2021-07-30 VITALS
BODY MASS INDEX: 25.68 KG/M2 | SYSTOLIC BLOOD PRESSURE: 116 MMHG | HEART RATE: 51 BPM | OXYGEN SATURATION: 100 % | TEMPERATURE: 96.8 F | HEIGHT: 61 IN | WEIGHT: 136 LBS | DIASTOLIC BLOOD PRESSURE: 77 MMHG

## 2021-07-30 DIAGNOSIS — R10.84 GENERALIZED ABDOMINAL PAIN: ICD-10-CM

## 2021-07-30 PROCEDURE — 99214 OFFICE O/P EST MOD 30 MIN: CPT

## 2021-07-30 NOTE — ASSESSMENT
[FreeTextEntry1] : PEG Tube: The patient is anxious regarding the PEG tube.  The patient states that the PEG tube is not anchored correctly.  The patient states that she feels the balloon on her own.  The patient has returned to the emergency room to have the replacement PEG changed several times.  The patient is currently receiving Pulmocare and Jevity via the PEG tube.  The patient was advised to avoid using the balloon on the replacement PEG tube because of the risk of rupturing the balloon and accidental dislodgment of the gastrostomy tube.  The patient was told that I did not have a replacement gastrostomy tube at this site.  If the replacement gastrostomy tube continues to be an issue, the patient may require a permanent gastrostomy tube to be placed that would require endoscopic placement at the hospital.  The patient understands and will decide at a later time.\par Abdominal Pain: The patient complains of abdominal pain. The patient is to avoid nonsteroidal anti-inflammatory drugs and aspirin. I recommend continue on a trial of Pantoprazole 40 mg once a day for 3 months for the symptoms.  \par Dyspepsia: The patient complains of dyspeptic symptoms.  The patient was advised to abide by an anti-gas diet.  The patient was given a pamphlet for anti-gas.  The patient and I reviewed the anti-gas diet at length. The patient is to start on a trial of Phazyme one tablet 3 times a day p.r.n. abdominal pain and gas.\par GERD: The patient was advised to avoid late-night meals and dietary indiscretions.  The patient was advised to avoid fried and fatty foods.  The patient was advised to abide by an anti-GERD diet. The patient was previously given a pamphlet for anti-GERD.  The patient and I previously reviewed the anti-GERD diet at length. I recommend continue on a trial of Pantoprazole 40 mg once a day x 3 months for the symptoms.\par Weight Loss: The patient was previously hospitalized at the [facility] at on March 3, 2020 for failure to thrive with a ?100 pound weight loss over 3 months.   The patient currently denies any weight loss or anorexia.  The patient admits to a stable weight.  The patient is currently receiving Pulmocare and Jevity via the PEG tube.  I recommend a nutrition consult for the weight loss.\par Gastritis: The patient has a history of gastritis. The patient is to avoid nonsteroidal anti-inflammatory drugs and aspirin. I recommend a trial of pantoprazole 40 mg once a day for 3 months for the symptoms. \par Internal Hemorrhoids: The patient is to consider a trial of Anusol H. C. suppositories one per rectum nightly and Anusol HC2 .5% cream apply to affected area twice a day p.r.n. hemorrhoidal bleeding or pain. \par I recommend a repeat colonoscopy in 9 years to reassess for colonic polyps pending patient’s health unless symptomatic.  The patient agreed and will follow up for the procedure. \par Constipation: The patient complains of constipation. I recommend a high-fiber diet. I recommend a trial of a probiotic such as Align once a day. I recommend a trial of Metamucil once a day for fiber supplementation. I recommend a trial of Miralax 1 packet once a day for the constipation.  The patient agreed and will followup to reassess the symptoms. \par Anemia: The patient was previously found to have anemia on prior blood work. The upper endoscopy and colonoscopy were inconclusive of the etiology of the anemia. The patient denies any bright red blood per rectum, melena or hematemesis.  I recommend following to hemoglobin/hematocrit level. I recommend sending stool guaiacs x 3 to assess for occult blood in the stool.  The patient was also advised to follow up with her PMD regarding the anemia for possible hematologic evaluation.  If the anemia persists, the patient may require a capsule endoscopy.\par History of Pancreatic Cyst: The patient was previously diagnosed with a pancreatic cystic lesion on prior imaging study. The patient denies any jaundice, pruritus or back pain. The patient complains of abdominal pain. The patient denies any prior history of EtOH abuse. The patient had a CAT scan of the abdomen and pelvis with IV contrast performed on July 3, 2019. The CAT scan of the abdomen and pelvis with IV contrast revealed a markedly limited evaluation of the pancreas. The pancreatic tissue is atrophic. There was beam hardening artifact, motion artifact and lack of small bowel opacification that contributed to limited evaluation. Also noted was a single borderline enlarged periaortic lymph node, a constipated colon and a distended urinary bladder. I recommend a repeat imaging study of the pancreas with IV contrast to reassess the pancreatic cystic lesions after the endoscopic evaluation. The patient is aware of the potential risks of pancreatic cysts progressing to pancreatic cancer. The patient may require an endoscopic ultrasound of pancreas with possible fine-needle aspiration to better assess the pancreatic cystic lesions pending repeat imaging study. The patient is aware of the importance for followup. The patient agrees and will followup. \par Follow-up: The patient is to follow-up in the office in 4 weeks to reassess the symptoms. The patient was told to call the office if any further problems. \par

## 2021-07-30 NOTE — HISTORY OF PRESENT ILLNESS
[None] : had no significant interval events [Vomiting] : denies vomiting [Diarrhea] : denies diarrhea [Yellow Skin Or Eyes (Jaundice)] : denies jaundice [Rectal Pain] : denies rectal pain [Wt Loss ___ Lbs] : recent [unfilled] ~Upound(s) weight loss [Heartburn] : heartburn [Nausea] : nausea [Constipation] : constipation [Abdominal Pain] : abdominal pain [Abdominal Swelling] : abdominal swelling [GERD] : gastroesophageal reflux disease [Wt Gain ___ Lbs] : no recent weight gain [Hiatus Hernia] : no hiatus hernia [Peptic Ulcer Disease] : no peptic ulcer disease [Pancreatitis] : no pancreatitis [Cholelithiasis] : no cholelithiasis [Kidney Stone] : no kidney stone [Inflammatory Bowel Disease] : no inflammatory bowel disease [Irritable Bowel Syndrome] : no irritable bowel syndrome [Diverticulitis] : no diverticulitis [Alcohol Abuse] : no alcohol abuse [Malignancy] : no malignancy [Abdominal Surgery] : no abdominal surgery [Appendectomy] : no appendectomy [Cholecystectomy] : no cholecystectomy [de-identified] : The patient states that she is feeling anxious.  The patient is concerned about the PEG tube.  The patient states that the PEG tube is not anchored correctly.  The patient states that she feels the balloon on her own.  The patient has returned to the emergency room to have the replacement PEG changed several times.  The patient is currently receiving Pulmocare and Jevity via the PEG tube.  The patient denies any jaundice or pruritus.  The patient complains of chronic lower back pain. The patient complains of abdominal pain.  The patient describes the abdominal pain as a sharp, intermittent periumbilical discomfort that is nonradiating in nature.  The abdominal pain is worse with meals and improves with passing gas or having a bowel movement.  The abdominal pain is described as mild in nature.  The abdominal pain occurs at night and in the morning.  The abdominal pain can occur at any time.   The abdominal pain has never awakened the patient from sleep.  The abdominal pain is not relieved with medication.  The abdominal pain is associated with abdominal gas and bloating. The patient complains of nausea but denies any vomiting.  The patient complains of gastroesophageal reflux disease and dysphagia.  The dysphagia is worse with solids but unrelated to liquids.  The gastroesophageal reflux disease is worse after meals and late at night and in the early morning. The gastroesophageal reflux disease is improved with proton pump inhibitors, H2 blockers and antacids.   The patient complains of shortness of breath but denies any atypical chest pain or palpitations.  The patient denies any diaphoresis. The patient complains of constipation but denies any diarrhea.  The patient has 1 bowel movement every other day.  The patient complains of a change in bowel habits.  The patient complains of a change in caliber of stool.   The patient denies having mucus discharge with the bowel movements.  The patient denies any bright red blood per rectum, melena or hematemesis.  The patient denies any rectal pain or rectal pruritus.  The patient denies any weight loss or anorexia.  The patient admits to stable weight.  She denies any fevers or chills.  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  The patient was recently hospitalized at [facility] for pneumonia with a combination of antibiotics (cefepime, levaquin and bactrim). The patient was recently hospitalized at the [facility] at on March 3, 2020 for failure to thrive with a ?100 pound weight loss over the past 3 months.   The patient was hospitalized for 4 days for the symptoms. The portable frontal chest performed on March 9, 2020 revealed tracheostomy tube again seen, Vertically oriented streaky opacities in medial right lower lung with associated focal medial right hemidiaphragm tenting compatible with subsegmental atelectatic change or scarring with clear remaining visualized lungs and no gross pleural effusions and no pneumothorax.   The cardiac and mediastinal surgical suture within normal limits for the projection.  The trachea was midline.  Also noted was generalized osteopenia and bilateral glenohumeral degenerative change again noted.  The blood work performed was remarkable for anemia with Hgb/hct level of 10.7/33.1, respectively, an elevated platelet count of 492,000 and elevated PT/INR/PTT of 13.8/1.2/50.4, respectively.  The blood cultures x 2 and urine culture was negative.  The CAT scan of the chest, abdomen and pelvis with IV contrast performed on March 11, 2020 revealed no evidence of malignancy.  The patient was evaluated by ENT, Dr. Gaurang Rodriguez.  The fiberoptic laryngoscopy revealed significant tracheomalacia with no mucus or pus or bleeding.  The patient had an upper endoscopy and colonoscopy to the cecum performed at the [facility] GI endoscopy suite on March 12, 2020. The upper endoscopy revealed mild diffuse gastritis. The pathology revealed distal esophagus with unremarkable squamous esophageal epithelium, gastric antral and body mucosa with gastric oxyntic mucosa with mild chronic inactive gastritis with parietal cell hyperplasia that was negative for Helicobacter pylori and unremarkable duodenal mucosa.  The colonoscopy to the cecum revealed a normal but long and tortuous colon and median sized internal hemorrhoids.  There were no polyps, masses, diverticulosis, AVMs or colitis noted.  The patient tolerated the procedures well.  The patient was observed for 4 days with resolution of the symptoms and was discharged to followup in the office.  I reviewed the blood work and imaging studies performed during the hospitalization.  The patient had a CAT scan of the abdomen and pelvis with IV contrast performed on July 3, 2019. The CAT scan of the abdomen and pelvis with IV contrast revealed a markedly limited evaluation of the pancreas. The pancreatic tissue is atrophic. There was beam hardening artifact, motion artifact and lack of small bowel opacification that contributed to limited evaluation. Also noted was a single borderline enlarged periaortic lymph node, a constipated colon and a distended urinary bladder. The blood tests performed on Patricia 3, 2019 was significant for anemia with Hgb/Hct level of 9.9/31. 6, respectively, an elevated rheumatoid factor of 15 and elevated ESR of 36 mm/hr. The patient has a history of pancreatic cyst x 4 years. The patient admits to having a prior upper endoscopy and colonoscopy performed by another gastroenterologist. According to the patient, the upper endoscopic findings were unknown to the patient. The colonoscopic findings were also unknown to the patient. The patient admits to a family history of GI problems. The patient’s father had a history of liver disease.

## 2021-08-03 NOTE — PHYSICAL EXAM
[Midline] : trachea located in midline position [Laryngoscopy Performed] : laryngoscopy was performed, see procedure section for findings [Normal] : no rashes [FreeTextEntry1] : stretcher-bound, on oxygen and ventilator through trach, no retractions or distress [de-identified] : trach tube in place with multiple gauze and loose , 6 LPC trach  [de-identified] : mildly raspy voice, hyperfunctional, easily understood

## 2021-08-03 NOTE — ASU PATIENT PROFILE, ADULT - PMH
Ophthalmology Follow-up    Referred by: Referred by Friend    Chief Complaint:  4mo undilated surface check    HISTORY OF PRESENT ILLNESS: Alexa Rucker is a 65 year old female who presents today 4mo undilated surface check. She says she has noticed she has trouble with her near vision. She says her distance vision has decreased also.   She says since the plugs were put in she still has dry eyes but not nearly as bad as before. She says she is unsure whether plugs are still in place. She says she instills artifical tears 3+5 times a day. Patient is taking 200mg 2x daily of Plaquenil for Sjogrens for over 10 years. Dr. Bergman is her rheumotologist.  The patient denies having any new floaters, loss of side vision or flashes. Her cataracts don't bother her much, things are stable.     Had lymph node biopsy.      Had right knee surgery and hiatal hernia surgery in the last year    +dry mouth. +migraines-not unusual for patient    No results found for: 5GLYH, HGBA1C  Tech notes reviewed and edited.    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      H/O nonmelanoma skin cancer                                   Esophageal reflux                                             COPD (chronic obstructive pulmonary disease) (*               Anemia                                                        OA (osteoarthritis) of knee                     1/7/2015      Urinary tract infection                                       Blood clot associated with vein wall inflammat*                 Comment: leg traveled to lung    Small fiber neuropathy                          8/21/2018     GERD (gastroesophageal reflux  disease)                          Comment: with esophagitis    Hemorrhoids                                                   IBS (irritable bowel syndrome)                                Skin cancer                                                   Hiatal hernia                                                 RAD (reactive airway disease)                                 Pneumonia                                                     Unspecified tinnitus                            5/22/2014     Sensorineural hearing loss, bilateral           5/22/2014     Other chronic otitis externa                    5/22/2014     Dysfunction of eustachian tube                  5/22/2014     Unspecified intestinal malabsorption            9/17/2014     Abdominal pain                                  12/28/2015    Lumbar facet joint pain                         8/30/2016     Sialoadenitis                                   5/22/2014     Other dyspnea and respiratory abnormality       5/1/2014      PONV (postoperative nausea and vomiting)                      Thyroid condition                                             Acquired hypothyroidism                         12/3/2020     Social:  reports that she quit smoking about 27 years ago. Her smoking use included cigarettes. She has a 30.00 pack-year smoking history. She has never used smokeless tobacco. She reports current alcohol use. She reports that she does not use drugs..    PROBLEM LIST:  Patient Active Problem List   Diagnosis   • Sjogren's syndrome (CMS/HCC)   • Fibromyalgia   • Cutaneous amyloidosis (CMS/HCC)   • Chronic rhinitis   • Iron deficiency anemia, unspecified   • Unspecified intestinal malabsorption   • OA (osteoarthritis) of knee   • Long-term use of Plaquenil   • Lumbar radiculopathy   • Small fiber neuropathy   • Acquired hypothyroidism        Past Surgical History:   Procedure Laterality Date   • Breast biopsy  04/21/2014    amyloid tumor   • Breast surgery       August 2014   • Colonoscopy diagnostic     • Dexa bone density axial skeleton  10/15/2002   • Esophagogastroduodenoscopy  05/23/2018   • Ir lymph node(s) biopsy Right 05/20/2021    pelvic   • Joint replacement Left 12/08/15    total knee   • Knee surgery     • Laparoscopic nissen fundoplication  10/21/2019    Dr. Murillo   • Lymph node dissection     • Radiofrequency ablation Bilateral     lumbar   • Removal gallbladder     • Service to gastroenterology  10/24/14    colonoscopy/EGD       FH:  I reviewed the technicians history as outlined in EPIC.; there are no additions.  FAMILY OCULAR HX    Glaucoma: no    Retinal detachment: no    Macular degeneration:  no    Amblyopia (lazy eye), strabismus (ET, XT, etc.): no    Eye surgery: no    Other eye diseases: yes, Cataracts-mother    Eye meds: Artificial tears BID+ both eyes     ALLERGIES:   Allergen Reactions   • Dapsone HIVES       Pertinent systemic meds: See EPIC for full med list   Plaquenil-currently takes 200mg BID    Review of Systems:  General: No fevers, chills, sweats,   Skin: No rash, bruising, bleeding   Head:  no changes in hearing   Eyes: see HPI.  Chest: No cough, No shortness of breath, chest pain, palpitations   GI/: No abdominal pain, diarrhea, constipation, dysuria, increased frequency of urination, urgency to urinate,   Muscle/Skeletal: No fatigue   Psych: No anxiety, depression, thoughts of hurting self or others   Neuro: No loss of consciousness, numbness or tingling in extremities,   Endocrine: Denies weight changes, changes in thirst, chills, hot or cold intolerance      EXAM:  Mood, Affect:  Alert and oriented x 3, happy.   Base Eye Exam     Visual Acuity (Snellen - Linear)       Right Left    Dist sc 20/25 20/30 -2    Dist ph sc No Improvement No Improvement          Tonometry (I Care, 3:13 PM)       Right Left    Pressure 16 14          Pupils       Pupils APD    Right PERRL Negative    Left PERRL Negative          Visual Fields       Left  Right     Full Full          Extraocular Movement       Right Left     Full, Ortho Full, Ortho          Neuro/Psych     Oriented x3: Yes    Mood/Affect: Normal            Additional Tests     Keratometry (Automated)       K1 Axis K2 Axis    Right 42.75 093 43.50 003    Left 43.50 111 43.75 021            Slit Lamp and Fundus Exam     External Exam       Right Left    External Normal Normal          Slit Lamp Exam       Right Left    Lids/Lashes RLL plug not present  LLL plug present     Conjunctiva/Sclera 1+ conj staingin nasal  1+ conj staingin nasal     Cornea Clear Clear    Anterior Chamber Deep and quiet Deep and quiet    Iris Round and regular Round and regular    Lens 2+ Nuclear sclerosis , , 1+ Anterior cortical changes 2+ Nuclear sclerosis, 2+ Anterior cortical changes - 1 spoke central     Vitreous Clear Clear            Refraction     Wearing Rx       Sphere Cylinder Axis    Right -1.25 +0.75 007    Left -1.25 +0.50 170    Age: 1 year    Type: Distance Single vision            Wearing Rx #2       Sphere Cylinder Axis    Right +1.50 Sphere     Left +1.50 Sphere     Age: 2 years    Type: OTC Near Single vision           Manifest Refraction       Sphere Cylinder [place] Dist VA    Right [place] +1.00 008 20/30    Left -0.25 +0.00 165 20/30          Manifest Refraction #2 (Auto)       Sphere Cylinder Axis Dist VA    Right +0.50 +1.00 008 20/25-2    Left +0.50 +0.75 014 20/60                  OCT (optical coherence tomography) INTERPRETATION:  MACULA SCAN (08/30/2019)  CENTRAL MACULAR THICKNESS:  OD (right eye):  239   SS  9/10                                                              OS (left eye):  233   SS:  8/10     OD (right eye):  Normal foveal contour, no drusen, no edema     OS (left eye):  Normal foveal contour, no drusen, no edema               VISUAL FIELD TEST INTERPRETATION:   [place] VF 10-2 (08/30/2019)    Patient Reliability:    OD:  excellent    OS:  excellent      Visual Field Findings:    OD:   normal Visual field  OS:  normal Visual field      VISUAL FIELD TEST INTERPRETATION:   [place]  VF 24-2    Patient Reliability:    Right eye:  good    Left eye:  good      Visual Field Findings:    Right eye:  normal Visual field  Left eye:    normal Visual field    OCT (optical coherence tomography) INTERPRETATION:  MACULA SCAN  09/04/20    CENTRAL MACULAR THICKNESS:  OD (right eye):  236   SS  8/10                                                              OS (left eye):  235   SS:  8/10     OD (right eye):  Normal foveal contour, no drusen, no edema    OS (left eye):  Normal foveal contour, no drusen, no edema       OCT (optical coherence tomography) INTERPRETATION:  MACULA SCAN  08/03/21    CENTRAL MACULAR THICKNESS:  OD (right eye):  237   SS  6/10                                                              OS (left eye):  238   SS:  7/10     OD (right eye):  Normal foveal contour, no drusen, no edema     OS (left eye):  Normal foveal contour, no drusen, no edema       Last OCT Mac 09/04/20  VF 10-2   09/04/20  Last dilated 09/04/20    ASSESSMENT/PLAN:    1.  Plaquenil Use - On 200mg twice/day. Has been on for 10 years-- PT thinks more like 20 years or more.   For Sjogrens      109.3 kg  - 3.6 mg/kg/day     - Mac OCT and visual field  10-2 - unremarkable.      The low risk of macular toxicity was reviewed at length along with the need for annual dilated exam, OCT macula, and 10-2 visual field analysis. The patient should follow the recommendations of the rheumatologist/primary care doctor regarding dosing given the cumulative dosing effect of plaquenil  S/S other eye associations with rheumatologic disease (dry eye, uveitis, etc.) were reviewed.  Multifocal ERG analysis at the [facility] as another means of testing was reviewed.       2. Cataracts - OS>OD   - MILDLY  visually significant at this time left eye   - no need for distance Rx update - Rx not written     PT sees better with no correction in her  dominant eye - advised to not wear Rx and use OTC cheaters for newa + 2.25        3.  Sjogren's - primary KCS left eye >> right eye      Surface looks good today     - increase AT use ]    - consider  restasis/xiidra long term        S/p plugs 09/20 -- stil present size small LEFT lower lid -- SURFACE MUCH improved     -replaced plug in RIGHT eye RLL- size medium     Procedure Note:  CC: The patient c/o dry eye Symptoms despite AT use QIDs+     PREOP DIAGNOSIS:dry eye sjognres POSTOP DIAGNOSIS: dry eye   Anesthesia: none      Procedure performed: Punctal Plug insertion right LLs  Size used: MEDIUM   Type:  Silicone, permanent    Placed plug with probe into lower eyelid puncta with ease at the slit lamp.       No complications, tolerated procedure well     PT given handout about dry eyes.  IF uncomfortable or excessive tearing that persists, RTC       Thank you for allowing me to participate in your patient's care. Please call our office at  [phone] if you have any questions.     RTC in 6 months for undilated surface check     Anxiety disorder    COPD (chronic obstructive pulmonary disease)    Hypertension    Lupus anticoagulant syndrome    Pancreatic cyst    Sickle cell trait    Tracheomalacia    Tracheostomy dependent

## 2021-08-03 NOTE — HISTORY OF PRESENT ILLNESS
[de-identified] : 60yo F here for f/u laryngotracheal stenosis. Pt had been in hospital in June for continuous unexplained weight loss. She Had a lot of imaging done and they found a mass in throat and they did a biopsy but did not get results back. Also had colonoscopy was WNL.  Has breathy voice and hard to speak. Had a swallow study done was WNL. Secretions are dry. Had some bleeding last week. Was discharged from [facility] after a week without any explanations. Referred her to GI. \par

## 2021-08-03 NOTE — CONSULT LETTER
[Dear  ___] : Dear  [unfilled], [Consult Letter:] : I had the pleasure of evaluating your patient, [unfilled]. [Please see my note below.] : Please see my note below. [Consult Closing:] : Thank you very much for allowing me to participate in the care of this patient.  If you have any questions, please do not hesitate to contact me. [Sincerely,] : Sincerely, [FreeTextEntry3] : Gaurang Rodriguez MD, PhD\par Chief, Division of Laryngology\par Department of Otolaryngology\par [facility]\par Pediatric Otolaryngology, [facility]\par  of Otolaryngology\par [facility] [employer]\par \par \par

## 2021-08-04 ENCOUNTER — APPOINTMENT (OUTPATIENT)
Dept: OTOLARYNGOLOGY | Facility: CLINIC | Age: 61
End: 2021-08-04
Payer: MEDICAID

## 2021-08-04 PROCEDURE — 99213 OFFICE O/P EST LOW 20 MIN: CPT | Mod: 25

## 2021-08-04 PROCEDURE — 31615 TRCHEOBRNCHSC EST TRACHS INC: CPT

## 2021-08-09 NOTE — CONSULT NOTE ADULT - PROBLEM SELECTOR PROBLEM 2
Goal Outcome Evaluation:  Plan of Care Reviewed With: patient        Progress: improving  Outcome Summary: Patient has been tearful, upset and overall emotional about his family situation. Resident is awaiting acceptance to inpatient psych facility or outpatient.  has visited, and support has been provided.   Trachea, stenosis

## 2021-08-12 ENCOUNTER — APPOINTMENT (OUTPATIENT)
Dept: PULMONOLOGY | Facility: CLINIC | Age: 61
End: 2021-08-12
Payer: MEDICAID

## 2021-08-12 VITALS
HEART RATE: 68 BPM | OXYGEN SATURATION: 96 % | SYSTOLIC BLOOD PRESSURE: 120 MMHG | RESPIRATION RATE: 18 BRPM | DIASTOLIC BLOOD PRESSURE: 70 MMHG | TEMPERATURE: 98.2 F

## 2021-08-12 PROCEDURE — 99214 OFFICE O/P EST MOD 30 MIN: CPT

## 2021-08-12 RX ORDER — DOXYCYCLINE HYCLATE 100 MG/1
100 TABLET ORAL
Qty: 20 | Refills: 0 | Status: DISCONTINUED | COMMUNITY
Start: 2020-05-12 | End: 2021-08-12

## 2021-08-12 NOTE — PROCEDURE
[FreeTextEntry1] : CT 5/24/21 reveals largely stable chornic scarring changes, nodularity and bronchiectasis upper lungs, right middle lobe and lower lungs. New endobronchial plugging anterior segment bronchus RLL.\par \par -Images and procedures reviewed in detail and discussed with patient.

## 2021-08-12 NOTE — HISTORY OF PRESENT ILLNESS
[FreeTextEntry1] : Ms. Bo is a 61 year old female with a history of atypical chest pain, COPD, chronic hypoxemia, dysphagia, GERD, laryngeal stenosis, OW, dependence on tracheostomy, and tracheomalacia who presents into the office via telephone visit for a follow up. Her chief complain is \par -She notes secretions are not coming up despite the chest vest\par -She denies having enough calories through her feeding tube, and is seeing a nutritionist 8/13/21\par -She notes having the feeding tube put in [facility] and has been used intermittently, and is able to eat through her mouth\par -She notes having more difficulty bringing food down when eating through her mouth, and is using 140mL water through the peg after eating \par -She notes drinking 1-2 Gallons of water through her mouth per day \par -She notes having difficulty seeing\par -She denies sleeping well due to difficulty sleeping \par -She notes taking Saline\par -She notes using Albuterol, Budesonide\par -She is having difficulty getting refills on her medication due to issues with pharmacy\par -She notes bringing up yellow and green mucus\par -She notes taking Mucinex regularly in liquid form\par -She denies being able to get cough assist\par -She denies using Pulmozyme \par \par - patient denies any headaches, nausea, vomiting, fever, chills, sweats, chest pain, chest pressure, palpitations, coughing, wheezing, fatigue, diarrhea, constipation, dysphagia, myalgias, dizziness, leg swelling, leg pain, itchy eyes, itchy ears, heartburn, reflux or sour taste in the mouth

## 2021-08-12 NOTE — ADDENDUM
[FreeTextEntry1] : Documented by Geoff Maharaj acting as a scribe for Dr. Maco Nova on (08/12/2021).\par \par All medical record entries made by the Scribe were at my, Dr. Maco Nova's, direction and personally dictated by me on (08/12/2021). I have reviewed the chart and agree that the record accurately reflects my personal performance of the history, physical exam, assessment and plan. I have also personally directed, reviewed, and agree with the discharge instructions.\par

## 2021-08-12 NOTE — ASSESSMENT
[FreeTextEntry1] : Ms. Bo is a 61 year old female, presenting into the office via telephonic visit with a history of former smoker, COPD, respiratory failure for 10 years, tracheostomy,  tracheostenosis and tracheomalacia who is s/p bronchoscopy with revision of the tracheostomy with dilation of the tracheostomy.  She presents with tracheomalacia and end stage COPD with issues with ventilator. PNA ([facility])- s/p colonoscopy (preop). Chronic colonization of aeruginosa with pseudomonas, Difficulty with mucus clearance despite chest vest, s/p recent PTX on the right -  chronic respiratory complaint s/p colonoscopy (Non Dx) - ?Lung Bx ([facility]) Her #1 issue is mucus clearance, and her #2 issue is weight loss\par \par \par Her chronic respiratory failure is multifactorial due to:\par -underlying COPD/severe persistent asthma\par -respiratory muscle weakness\par -tracheomalacia\par \par \par problem 1: tracheostomy / chronic respiratory failure \par -Continue to wean off the ventilator as tolerable in short trials throughout the day  \par -recommended to use hypertonic saline in clearing of tracheostomy \par -Rx given for elastic pads, dressings, disposable inner cannulas, and tracheostomy collar to improve collar. Rx given for Metaplex Lite. \par -recommended f/u with Dr. Brett Laughlin and Dr. Edward Rodriguez\par \par \par  Problem 1a: mucopurulent chronic bronchitis \par - continue Pulmozyme .5 BID PRN  -NC\par - chest vest in place \par You have a clinical scenario most c/q acute bronchitis the etiology of which is unknown but empiric antibiotics are indicated. Hydration, mucolytics including mucinex, robitussin and the like are indicated. Cough controlling agents will be needed. \par \par Problem 1B: PTX (right side)- resolved (2020) \par The patient has a pneumothorax from trauma, iatrogenic, post surgical. You are at risk for one in the future and may need hospitalization. Thoracic surgical evaluation is needed for chest tube and other intervention. \par \par Problem 1C: ?Lung Bx ([facility]) \par - obtain records (not available)\par \par Problem 2: Chronic Respiratory Failure \par -Trach collar- goal up to 12hrs per day (not doing)\par - 3 L SIMV: VT- 450; PC 20; PS-18; PEEP- 5\par - Night CPAP 5/PS 18 (back up 8 B/mm)\par \par problem 3:  COPD/asthma\par -continue Combivent 1 inhalation up to QID to replace Symbicort \par -continue Performist nebulizer 1 unit dose BID, followed by Pulmicort 0.5 nebulizer BID \par -continue Albuterol via nebulizer for rescue up to QiD \par -continue on NAC Q8H\par -continue Yuperli 1 unit dose QD via nebulizer\par - continue Mucomyst 2cc (107.) q8H to qSH\par \par -COPD is a progressive disease and although it can’t be cured , appropriate management can slow its progression, reduce frequency and severity of exacerbations, and improve symptoms and the patient quality of life. Hospitalizations are the greatest contributor to the total COPD costs and account for up to 87% of total COPD related costs. Exacerbations are the main cause of admissions and subsequently account for the 40-75% of COPD costs. Inhaled maintenance therapy reduces the incidence of exacerbations in patients with stable COPD. Incorrect inhaler use and nonadherence are major obstacles to achieving COPD treatment goals. Many COPD patients have challenges (impaired inhalation, limited dexterity, reduced cognition: that limit their ability to correctly use their COPD treatment devices resulting in reduced symptom control. Of most importance is smoking cessation and early intervention with respiratory illnesses and contemplation for pulmonary rehab to enhance quality of life.\par \par Problem 3A: Abnormal CT: confirm Bronchiectasis / pulmonary nodule\par -s/p CT of the chest -(no present) - Next 6/2022\par -Complete high resolution chest CT, prone and supine, to r/o pulmonary nodules - next  11/2020\par -Recommended to take Slippery Elm Tea and pill for cough and mucus clearing \par -based upon results of CT scan, will apply for chest vest therapy\par \par -CAT scans are the only radiological modality to identify abnormalities w/in the lungs with regards to nodules/masses/lymph nodes. Risks, benefits were reviewed in detail. The guidelines for abnormalities include follow up CT scans at various intervals which could range from 6 weeks to 1 year intervals. If there is a change for the worse then consideration for a biopsy will be considered if you are a candidate. Second opinion evaluation with a thoracic surgeon or an interventional radiologist could be offered. \par - Seen on the CT of the chest or chest x-ray signifies damaged bronchial tubes focal or diffuse which can be sites of recurrent infections. These areas can be colonized by various organisms including bacteria (hemophilous influenzae/Pseudomonas species etc.) as well as acid fast bacilli (mycobacterial disease- inclusive of TB/MT etc.).  The patient has previously used acapella, nebulizer and breathing techniques for airway clearance.  These methods have not provided appropriate secretion manage in this patient.Cough length has been greater than 6 months, hence, initiating vest therapy is necessary.\par \par \par problem 3b: pseudomonas aeruginosa.- chronic colonization (kubulek) \par -Apply for vest pack (failed Conventional therapy.) - pending delivery  \par -get infectious disease f/u PRN \par -chronic colonization\par \par problem 4: GERD -(s/p EgD +/- colonoscopy- negative ) \par - Recommend GI f/u\par - continue Pepcid Complete 40 mg QHS\par -change Protonix 40 mg qAM ;  Neglan 5 mg qHS\par -continue Baclofen 5 mg premeal, QHS \par -Things to avoid including overeating, spicy foods, tight clothing, eating within three hours of bed, this list is not all inclusive. \par -For treatment of reflux, possible options discussed including diet control, H2 blockers, PPIs, as well as coating motility agents discussed as treatment options. Timing of meals and proximity of last meal to sleep were discussed. If symptoms persist, a formal gastrointestinal evaluation is needed.\par -Rule of 2's: Avoid eating too late, too fast, too much, too spicy or within two hours of bedtime\par \par Problem 5: Immunodeficiency\par - Continue Zithromax 250 mg Monday, Wednesday, and Friday\par -Due to the fact that this pt has had more infections than would be expected and immunological blood work is indicated this would include: IgG subclasses, quantitative immunoglobulins, Strep pneumoniae titers as well as Vitamin D levels. Based on this blood work we will be able to decide where the pt needs additional pneumococcal vaccine either Prevnar 13 or Pneumovax. Immunology evaluation will also be potentially indicated. \par \par Problem 6: Sensory neuropathic Cough\par - Continue Amitriptyline 10 mg up to TID\par -Sensory neuropathic cough is an etiology of cough that is often realized once someone has been ruled out for common disease such as: asthma, COPD, eosinophilic bronchitis, bronchiectasis, post nasal drip, and GERD. It sometimes develops following a URI, herpes zoster outbreak in pharynx or thyroid or cervical spine injury. However, many patients have no identifiable antecedent explanation. \par \par problem 7: tracheomalacia/tracheal tumor \par -follow up with Dr. Kelby Larios for thoracic evaluation for potential tracheoplasty - s/p new tracheostomy\par \par Tracheomalacia is usually acquired in adults and common causes include damage by tracheostomy or endotracheal intubation damaging the tracheal cartilage with increase risk with multiple intubations, prolonged intubation, and concurrent high dose steroid therapy; external chest wall trauma and surgery; chronic compression of the trachea by benign etiologies (eg, benign mediastinal goiter) or malignancy; relapsing polychondritis; or recurrent infection. Tracheomalacia can be asymptomatic, however signs or symptoms can develop as the severity of the airway narrowing progresses with major symptoms include dyspnea, cough, and sputum retention. Other symptoms include severe paroxysms of coughing, wheezing or stridor, barking cough and may be exacerbated by forced expiration, cough, and Valsalva maneuver. Tracheomalacia is diagnosed by a bronchoscopic visualization of dynamic airway collapse on dynamic chest CT. Therapy is warranted in symptomatic patients with severe tracheomalacia and includes surgical repair as tracheobronchoplasty. The patient was referred to Dr. Willy Kay or Dr. Kelby Larios, at [facility] for a surgical consult. \par \par problem 8: dysphonia/sore throat\par -recommended Fisherman Friend's lozenges \par -recommended to use Slippery Elm Lozenge / Tea \par -continue Evoxac 30 q 8\par \par Problem 9: Health Maintenance/COVID19 Precautions - not vaccinated \par - Clean your hands often. Wash your hands often with soap and water for at least 20 seconds, especially after blowing your nose, coughing, or sneezing, or having been in a public place.\par - If soap and water are not available, use a hand  that contains at least 60% alcohol.\par - To the extent possible, avoid touching high-touch surfaces in public places - elevator buttons, door handles, handrails, handshaking with people, etc. Use a tissue or your sleeve to cover your hand or finger if you must touch something.\par - Wash your hands after touching surfaces in public places.\par - Avoid touching your face, nose, eyes, etc.\par - Clean and disinfect your home to remove germs: practice routine cleaning of frequently touched surfaces (for example: tables, doorknobs, light switches, handles, desks, toilets, faucets, sinks & cell phones)\par - Avoid crowds, especially in poorly ventilated spaces. Your risk of exposure to respiratory viruses like COVID-19 may increase in crowded, closed-in settings with little air circulation if there are people in the crowd who are sick. All patients are recommended to practice social distancing and stay at least 6 feet away from others. \par - Avoid all non-essential travel including plane trips, and especially avoid embarking on cruise ships.\par -If COVID-19 is spreading in your community, take extra measures to put distance between yourself and other people to further reduce your risk of being exposed to this new virus.\par -Stay home as much as possible.\par - Consider ways of getting food brought to your house through family, social, or commercial networks\par -Be aware that the virus has been known to live in the air up to 3 hours post exposure, cardboard up to 24 hours post exposure, copper up to 4 hours post exposure, steel and plastic up to 2-3 days post exposure. Risk of transmission from these surfaces are affected by many variables.\par COVID-19 precautionary Immune Support Recommendations:\par -OTC Vitamin C 500mg BID \par -OTC Quercetin 250-500mg BID \par -OTC Zinc 75-100mg per day \par -OTC Melatonin 1 or 2mg a night \par -OTC Vitamin D 1-4000mg per day \par -OTC Tonic Water 8oz per day\par -Water 0.5-1 gallon per day\par Asthma and COVID19:\par You need to make sure your asthma is under control. This often requires the use of inhaled corticosteroids (and sometimes oral corticosteroids). Inhaled corticosteroids do not likely reduce your immune system’s ability to fight infections, but oral corticosteroids may. It is important to use the steps above to protect yourself to limit your exposure to any respiratory virus. \par \par problem 10: health maintenance \par -REcommended "Throat Coat Tea"\par - recommended to f/u with Dr. Palomares(GI) \par -instructed to increase physical activity as much as possible\par - She was administered an influenza vaccination 11/19\par -recommended strep pneumonia vaccines: Prevnar-13 vaccine, followed by Pneumo vaccine 23 one year following (completed)\par -recommended early intervention for URIs\par -recommended regular osteoporosis evaluations\par -recommended early dermatological evaluations\par -recommended after the age of 50 to the age of 70, colonoscopy every 5 years \par \par \par Follow up in 2 months.\par Patient is encouraged to call with any changes, concerns or questions.

## 2021-08-12 NOTE — PHYSICAL EXAM
[No Acute Distress] : no acute distress [Normal Oropharynx] : normal oropharynx [II] : Mallampati Class: II [Normal Appearance] : normal appearance [No Neck Mass] : no neck mass [Normal Rate/Rhythm] : normal rate/rhythm [Normal S1, S2] : normal s1, s2 [No Murmurs] : no murmurs [No Resp Distress] : no resp distress [Clear to Auscultation Bilaterally] : clear to auscultation bilaterally [No Abnormalities] : no abnormalities [Benign] : benign [Normal Gait] : normal gait [No Clubbing] : no clubbing [No Cyanosis] : no cyanosis [No Edema] : no edema [FROM] : FROM [Normal Color/ Pigmentation] : normal color/ pigmentation [No Focal Deficits] : no focal deficits [Oriented x3] : oriented x3 [Normal Affect] : normal affect [TextBox_2] : tracheostomy in place [TextBox_68] : I:E 1:3; mild expiratory wheeze

## 2021-08-12 NOTE — REASON FOR VISIT
[Follow-Up] : a follow-up visit [Formal Caregiver] : formal caregiver [FreeTextEntry1] : via telephonic visit - atypical chest pain, COPD, chronic hypoxemia, dysphagia, GERD, laryngeal stenosis, OW, dependence on tracheostomy, and tracheomalacia

## 2021-08-18 ENCOUNTER — NON-APPOINTMENT (OUTPATIENT)
Age: 61
End: 2021-08-18

## 2021-08-30 ENCOUNTER — APPOINTMENT (OUTPATIENT)
Dept: GASTROENTEROLOGY | Facility: CLINIC | Age: 61
End: 2021-08-30

## 2021-09-08 ENCOUNTER — NON-APPOINTMENT (OUTPATIENT)
Age: 61
End: 2021-09-08

## 2021-10-08 NOTE — REASON FOR VISIT
[Subsequent Evaluation] : a subsequent evaluation for [FreeTextEntry2] : chronic respiratory failure.

## 2021-10-08 NOTE — CONSULT LETTER
[Dear  ___] : Dear  [unfilled], [Consult Letter:] : I had the pleasure of evaluating your patient, [unfilled]. [Please see my note below.] : Please see my note below. [Consult Closing:] : Thank you very much for allowing me to participate in the care of this patient.  If you have any questions, please do not hesitate to contact me. [Sincerely,] : Sincerely, [FreeTextEntry3] : Gaurang Rodriguez MD, PhD\par Chief, Division of Laryngology\par Department of Otolaryngology\par [facility]\par Pediatric Otolaryngology, [facility]\par  of Otolaryngology\par [employer] [facility] [employer]\par

## 2021-10-08 NOTE — HISTORY OF PRESENT ILLNESS
[de-identified] : 61 year old female here for f/u laryngotracheal stenosis. Reports has been hospitalized 3x since last visit due to PEG tube keeps coming out. Reports has had a lot of phlegm, unable to expectorate, uses saline with mild relief, causing some bleeding from trach. Reports throat pain, currently on pureed foods. Denies bruising, swelling around the trach. Reports foul smelling secretions. Stomach biopsy 06/11/2021 at [facility].

## 2021-10-08 NOTE — PHYSICAL EXAM
[FreeTextEntry1] : stretcher-bound, on oxygen and ventilator through trach, no retractions or distress [de-identified] : trach tube in place with multiple gauze and loose , 6 LPC trach  [Midline] : trachea located in midline position [Laryngoscopy Performed] : laryngoscopy was performed, see procedure section for findings [Normal] : no rashes [de-identified] : mildly raspy voice, hyperfunctional, easily understood

## 2021-10-20 ENCOUNTER — APPOINTMENT (OUTPATIENT)
Dept: OTOLARYNGOLOGY | Facility: CLINIC | Age: 61
End: 2021-10-20

## 2021-10-21 NOTE — H&P PST ADULT - BLOOD TRANSFUSION, PREVIOUS, PROFILE
A catheter was exchanged for a (Catheter 6fr Jr4 Crv 100cm [place] Brtp Xl Lum) catheter.  yes/1981

## 2021-11-02 ENCOUNTER — APPOINTMENT (OUTPATIENT)
Dept: PULMONOLOGY | Facility: CLINIC | Age: 61
End: 2021-11-02
Payer: MEDICAID

## 2021-11-02 DIAGNOSIS — Z87.01 PERSONAL HISTORY OF PNEUMONIA (RECURRENT): ICD-10-CM

## 2021-11-02 PROCEDURE — 99214 OFFICE O/P EST MOD 30 MIN: CPT | Mod: 95

## 2021-11-02 RX ORDER — DIPHENHYDRAMINE HYDROCHLORIDE 2.5 MG/ML
12.5 LIQUID ORAL
Qty: 40 | Refills: 0 | Status: ACTIVE | COMMUNITY
Start: 2021-07-22

## 2021-11-02 RX ORDER — BISACODYL 5 MG/1
5 TABLET ORAL
Qty: 30 | Refills: 0 | Status: ACTIVE | COMMUNITY
Start: 2021-10-04

## 2021-11-02 RX ORDER — SIMETHICONE 80 MG/1
80 TABLET, CHEWABLE ORAL
Qty: 120 | Refills: 0 | Status: ACTIVE | COMMUNITY
Start: 2021-10-23

## 2021-11-02 RX ORDER — LIDOCAINE HYDROCHLORIDE 20 MG/ML
2 SOLUTION ORAL; TOPICAL
Qty: 40 | Refills: 0 | Status: ACTIVE | COMMUNITY
Start: 2021-07-22

## 2021-11-02 RX ORDER — ACETAMINOPHEN 325 MG/1
325 TABLET ORAL
Qty: 30 | Refills: 0 | Status: ACTIVE | COMMUNITY
Start: 2021-10-23

## 2021-11-02 RX ORDER — LEVOFLOXACIN 500 MG/1
500 TABLET, FILM COATED ORAL
Qty: 10 | Refills: 0 | Status: DISCONTINUED | COMMUNITY
Start: 2020-05-04 | End: 2021-11-02

## 2021-11-02 NOTE — ASSESSMENT
[FreeTextEntry1] : Ms. Bo is a 61 year old female, presenting into the office via telephonic visit with a history of former smoker, COPD, respiratory failure for 10 years, tracheostomy,  tracheostenosis and tracheomalacia who is s/p bronchoscopy with revision of the tracheostomy with dilation of the tracheostomy.  She presents with tracheomalacia and end stage COPD with issues with ventilator. PNA ([facility])- s/p colonoscopy (preop). Chronic colonization of aeruginosa with pseudomonas, Difficulty with mucus clearance despite chest vest, s/p recent PTX on the right -  chronic respiratory complaint s/p colonoscopy (Non Dx) - ?Lung Bx ([facility]) Her #1 issue is mucus clearance, and her #2 issue is weight loss\par \par \par Her chronic respiratory failure is multifactorial due to:\par -underlying COPD/severe persistent asthma\par -respiratory muscle weakness\par -tracheomalacia\par \par \par problem 1: tracheostomy / chronic respiratory failure \par -Continue to wean off the ventilator as tolerable in short trials throughout the day  \par -recommended to use hypertonic saline in clearing of tracheostomy \par -Rx given for elastic pads, dressings, disposable inner cannulas, and tracheostomy collar to improve collar. Rx given for Metaplex Lite. \par -recommended f/u with Dr. Brett Laughlin and Dr. Edward Rodriguez\par \par \par  Problem 1a: mucopurulent chronic bronchitis \par - continue Pulmozyme .5 BID PRN  -NC\par - chest vest in place \par You have a clinical scenario most c/q acute bronchitis the etiology of which is unknown but empiric antibiotics are indicated. Hydration, mucolytics including mucinex, robitussin and the like are indicated. Cough controlling agents will be needed. \par \par Problem 1B: PTX (right side)- resolved (2020) \par The patient has a pneumothorax from trauma, iatrogenic, post surgical. You are at risk for one in the future and may need hospitalization. Thoracic surgical evaluation is needed for chest tube and other intervention. \par \par Problem 1C: ?Lung Bx ([facility]) \par - obtain records (not available)\par \par Problem 2: Chronic Respiratory Failure \par -Trach collar- goal up to 12hrs per day (not doing)\par - 3 L SIMV: VT- 450; PC 20; PS-18; PEEP- 5\par - Night CPAP 5/PS 18 (back up 8 B/mm)\par \par problem 3:  COPD/asthma\par -continue Combivent 1 inhalation up to QID to replace Symbicort \par -continue Performist nebulizer 1 unit dose BID, followed by Pulmicort 0.5 nebulizer BID \par -continue Albuterol via nebulizer for rescue up to QiD \par -continue on NAC Q8H\par -continue Yuperli 1 unit dose QD via nebulizer\par - continue Mucomyst 2cc (107.) q8H to qSH\par \par -COPD is a progressive disease and although it can’t be cured , appropriate management can slow its progression, reduce frequency and severity of exacerbations, and improve symptoms and the patient quality of life. Hospitalizations are the greatest contributor to the total COPD costs and account for up to 87% of total COPD related costs. Exacerbations are the main cause of admissions and subsequently account for the 40-75% of COPD costs. Inhaled maintenance therapy reduces the incidence of exacerbations in patients with stable COPD. Incorrect inhaler use and nonadherence are major obstacles to achieving COPD treatment goals. Many COPD patients have challenges (impaired inhalation, limited dexterity, reduced cognition: that limit their ability to correctly use their COPD treatment devices resulting in reduced symptom control. Of most importance is smoking cessation and early intervention with respiratory illnesses and contemplation for pulmonary rehab to enhance quality of life.\par \par Problem 3A: Abnormal CT: confirm Bronchiectasis / pulmonary nodule\par -s/p CT of the chest -(no present) - Next 6/2022\par -Complete high resolution chest CT, prone and supine, to r/o pulmonary nodules - next  11/2020\par -Recommended to take Slippery Elm Tea and pill for cough and mucus clearing \par -based upon results of CT scan, will apply for chest vest therapy\par \par -CAT scans are the only radiological modality to identify abnormalities w/in the lungs with regards to nodules/masses/lymph nodes. Risks, benefits were reviewed in detail. The guidelines for abnormalities include follow up CT scans at various intervals which could range from 6 weeks to 1 year intervals. If there is a change for the worse then consideration for a biopsy will be considered if you are a candidate. Second opinion evaluation with a thoracic surgeon or an interventional radiologist could be offered. \par - Seen on the CT of the chest or chest x-ray signifies damaged bronchial tubes focal or diffuse which can be sites of recurrent infections. These areas can be colonized by various organisms including bacteria (hemophilous influenzae/Pseudomonas species etc.) as well as acid fast bacilli (mycobacterial disease- inclusive of TB/MT etc.).  The patient has previously used acapella, nebulizer and breathing techniques for airway clearance.  These methods have not provided appropriate secretion manage in this patient.Cough length has been greater than 6 months, hence, initiating vest therapy is necessary.\par \par \par problem 3b: pseudomonas aeruginosa.- chronic colonization (kubulek) \par -Apply for vest pack (failed Conventional therapy.) - pending delivery  \par -get infectious disease f/u PRN \par -chronic colonization\par \par problem 4: GERD -(s/p EgD +/- colonoscopy- negative ) \par - Recommend GI f/u\par - continue Pepcid Complete 40 mg QHS\par -change Protonix 40 mg qAM ;  Neglan 5 mg qHS\par -continue Baclofen 5 mg premeal, QHS \par -Things to avoid including overeating, spicy foods, tight clothing, eating within three hours of bed, this list is not all inclusive. \par -For treatment of reflux, possible options discussed including diet control, H2 blockers, PPIs, as well as coating motility agents discussed as treatment options. Timing of meals and proximity of last meal to sleep were discussed. If symptoms persist, a formal gastrointestinal evaluation is needed.\par -Rule of 2's: Avoid eating too late, too fast, too much, too spicy or within two hours of bedtime\par \par Problem 5: Immunodeficiency\par - Continue Zithromax 250 mg Monday, Wednesday, and Friday\par -Due to the fact that this pt has had more infections than would be expected and immunological blood work is indicated this would include: IgG subclasses, quantitative immunoglobulins, Strep pneumoniae titers as well as Vitamin D levels. Based on this blood work we will be able to decide where the pt needs additional pneumococcal vaccine either Prevnar 13 or Pneumovax. Immunology evaluation will also be potentially indicated. \par \par Problem 6: Sensory neuropathic Cough\par - Continue Amitriptyline 10 mg up to TID\par -Sensory neuropathic cough is an etiology of cough that is often realized once someone has been ruled out for common disease such as: asthma, COPD, eosinophilic bronchitis, bronchiectasis, post nasal drip, and GERD. It sometimes develops following a URI, herpes zoster outbreak in pharynx or thyroid or cervical spine injury. However, many patients have no identifiable antecedent explanation. \par \par problem 7: tracheomalacia/tracheal tumor \par -follow up with Dr. Kelby Larios for thoracic evaluation for potential tracheoplasty - s/p new tracheostomy\par \par Tracheomalacia is usually acquired in adults and common causes include damage by tracheostomy or endotracheal intubation damaging the tracheal cartilage with increase risk with multiple intubations, prolonged intubation, and concurrent high dose steroid therapy; external chest wall trauma and surgery; chronic compression of the trachea by benign etiologies (eg, benign mediastinal goiter) or malignancy; relapsing polychondritis; or recurrent infection. Tracheomalacia can be asymptomatic, however signs or symptoms can develop as the severity of the airway narrowing progresses with major symptoms include dyspnea, cough, and sputum retention. Other symptoms include severe paroxysms of coughing, wheezing or stridor, barking cough and may be exacerbated by forced expiration, cough, and Valsalva maneuver. Tracheomalacia is diagnosed by a bronchoscopic visualization of dynamic airway collapse on dynamic chest CT. Therapy is warranted in symptomatic patients with severe tracheomalacia and includes surgical repair as tracheobronchoplasty. The patient was referred to Dr. Willy Kay or Dr. Kelby Larios, at [facility] for a surgical consult. \par \par problem 8: dysphonia/sore throat\par -recommended Fisherman Friend's lozenges \par -recommended to use Slippery Elm Lozenge / Tea \par -continue Evoxac 30 q 8\par \par Problem 9: Health Maintenance/COVID19 Precautions - not vaccinated \par - Clean your hands often. Wash your hands often with soap and water for at least 20 seconds, especially after blowing your nose, coughing, or sneezing, or having been in a public place.\par - If soap and water are not available, use a hand  that contains at least 60% alcohol.\par - To the extent possible, avoid touching high-touch surfaces in public places - elevator buttons, door handles, handrails, handshaking with people, etc. Use a tissue or your sleeve to cover your hand or finger if you must touch something.\par - Wash your hands after touching surfaces in public places.\par - Avoid touching your face, nose, eyes, etc.\par - Clean and disinfect your home to remove germs: practice routine cleaning of frequently touched surfaces (for example: tables, doorknobs, light switches, handles, desks, toilets, faucets, sinks & cell phones)\par - Avoid crowds, especially in poorly ventilated spaces. Your risk of exposure to respiratory viruses like COVID-19 may increase in crowded, closed-in settings with little air circulation if there are people in the crowd who are sick. All patients are recommended to practice social distancing and stay at least 6 feet away from others. \par - Avoid all non-essential travel including plane trips, and especially avoid embarking on cruise ships.\par -If COVID-19 is spreading in your community, take extra measures to put distance between yourself and other people to further reduce your risk of being exposed to this new virus.\par -Stay home as much as possible.\par - Consider ways of getting food brought to your house through family, social, or commercial networks\par -Be aware that the virus has been known to live in the air up to 3 hours post exposure, cardboard up to 24 hours post exposure, copper up to 4 hours post exposure, steel and plastic up to 2-3 days post exposure. Risk of transmission from these surfaces are affected by many variables.\par COVID-19 precautionary Immune Support Recommendations:\par -OTC Vitamin C 500mg BID \par -OTC Quercetin 250-500mg BID \par -OTC Zinc 75-100mg per day \par -OTC Melatonin 1 or 2mg a night \par -OTC Vitamin D 1-4000mg per day \par -OTC Tonic Water 8oz per day\par -Water 0.5-1 gallon per day\par Asthma and COVID19:\par You need to make sure your asthma is under control. This often requires the use of inhaled corticosteroids (and sometimes oral corticosteroids). Inhaled corticosteroids do not likely reduce your immune system’s ability to fight infections, but oral corticosteroids may. It is important to use the steps above to protect yourself to limit your exposure to any respiratory virus. \par \par problem 10: health maintenance \par -REcommended "Throat Coat Tea"\par - recommended to f/u with Dr. Palomares(GI) \par -instructed to increase physical activity as much as possible\par - She was administered an influenza vaccination 11/19\par -recommended strep pneumonia vaccines: Prevnar-13 vaccine, followed by Pneumo vaccine 23 one year following (completed)\par -recommended early intervention for URIs\par -recommended regular osteoporosis evaluations\par -recommended early dermatological evaluations\par -recommended after the age of 50 to the age of 70, colonoscopy every 5 years \par \par \par Follow up in 2 months.\par Patient is encouraged to call with any changes, concerns or questions.

## 2021-11-02 NOTE — HISTORY OF PRESENT ILLNESS
[Home] : at home, [unfilled] , at the time of the visit. [Medical Office: ([facility])___] : at the medical office located in  [Verbal consent obtained from patient] : the patient, [unfilled] [FreeTextEntry1] : Ms. Bo is a 61 year old female with a history of atypical chest pain, COPD, chronic hypoxemia, dysphagia, GERD, laryngeal stenosis, OW, dependence on tracheostomy, and tracheomalacia who presents into the office via telehealth visit due to recent hospitalization for PNA from 10/18 to 10/22. \par \par \par \par -She notes secretions are not coming up despite the chest vest\par -She denies having enough calories through her feeding tube, and is seeing a nutritionist 8/13/21\par -She notes having the feeding tube put in at [facility] and has been used intermittently, and is able to eat through her mouth\par -She notes having more difficulty bringing food down when eating through her mouth, and is using 140mL water through the peg after eating \par -She notes drinking 1-2 Gallons of water through her mouth per day \par -She notes having difficulty seeing\par -She denies sleeping well due to difficulty sleeping \par -She notes taking Saline\par -She notes using Albuterol, Budesonide\par -She is having difficulty getting refills on her medication due to issues with pharmacy\par -She notes bringing up yellow and green mucus\par -She notes taking Mucinex regularly in liquid form\par -She denies being able to get cough assist\par -She denies using Pulmozyme \par \par - patient denies any headaches, nausea, vomiting, fever, chills, sweats, chest pain, chest pressure, palpitations, coughing, wheezing, fatigue, diarrhea, constipation, dysphagia, myalgias, dizziness, leg swelling, leg pain, itchy eyes, itchy ears, heartburn, reflux or sour taste in the mouth

## 2021-11-02 NOTE — REASON FOR VISIT
[Follow-Up - From Hospitalization] : a hospitalization follow-up [Formal Caregiver] : formal caregiver [FreeTextEntry1] : telephonic visit - atypical chest pain, COPD, chronic hypoxemia, dysphagia, GERD, laryngeal stenosis, OW, dependence on tracheostomy, and tracheomalacia [TextBox_44] : Recent PNA

## 2021-11-02 NOTE — PHYSICAL EXAM
[No Acute Distress] : no acute distress [Well Nourished] : well nourished [No Deformities] : no deformities [Well Developed] : well developed [TextBox_2] : tracheostomy in place [TextBox_11] : unable to assess [TextBox_44] : unable to assess [TextBox_54] : unable to assess [TextBox_68] : unable to assess [TextBox_80] : unable to assess [TextBox_89] : unable to assess [TextBox_99] : unable to assess [TextBox_105] : unable to assess [TextBox_125] : unable to assess [TextBox_132] : unable to assess [TextBox_140] : unable to assess

## 2021-11-02 NOTE — HISTORY OF PRESENT ILLNESS
Surgery Off-Premise Visit Arrived   [Home] : at home, [unfilled] , at the time of the visit. [Medical Office: ([facility])___] : at the medical office located in  [Verbal consent obtained from patient] : the patient, [unfilled] [FreeTextEntry1] : Ms. Bo is a 61 year old female with a history of atypical chest pain, COPD, chronic hypoxemia, dysphagia, GERD, laryngeal stenosis, OW, dependence on tracheostomy, and tracheomalacia who presents into the office via telehealth visit due to recent hospitalization for PNA from 10/18 to 10/22. \par \par \par \par -She notes secretions are not coming up despite the chest vest\par -She denies having enough calories through her feeding tube, and is seeing a nutritionist 8/13/21\par -She notes having the feeding tube put in at [facility] and has been used intermittently, and is able to eat through her mouth\par -She notes having more difficulty bringing food down when eating through her mouth, and is using 140mL water through the peg after eating \par -She notes drinking 1-2 Gallons of water through her mouth per day \par -She notes having difficulty seeing\par -She denies sleeping well due to difficulty sleeping \par -She notes taking Saline\par -She notes using Albuterol, Budesonide\par -She is having difficulty getting refills on her medication due to issues with pharmacy\par -She notes bringing up yellow and green mucus\par -She notes taking Mucinex regularly in liquid form\par -She denies being able to get cough assist\par -She denies using Pulmozyme \par \par - patient denies any headaches, nausea, vomiting, fever, chills, sweats, chest pain, chest pressure, palpitations, coughing, wheezing, fatigue, diarrhea, constipation, dysphagia, myalgias, dizziness, leg swelling, leg pain, itchy eyes, itchy ears, heartburn, reflux or sour taste in the mouth

## 2021-11-02 NOTE — ASSESSMENT
[FreeTextEntry1] : Ms. Bo is a 61 year old female, presenting into the office via telephonic visit with a history of former smoker, COPD, respiratory failure for 10 years, tracheostomy,  tracheostenosis and tracheomalacia who is s/p bronchoscopy with revision of the tracheostomy with dilation of the tracheostomy.  She presents with tracheomalacia and end stage COPD with issues with ventilator. PNA ([facility])- s/p colonoscopy (preop). Chronic colonization of aeruginosa with pseudomonas, Difficulty with mucus clearance despite chest vest, s/p recent PTX on the right -  chronic respiratory complaint s/p colonoscopy (Non Dx) - ?Lung Bx ([facility]) Her #1 issue is mucus clearance, and her #2 issue is weight loss\par \par \par Her chronic respiratory failure is multifactorial due to:\par -underlying COPD/severe persistent asthma\par -respiratory muscle weakness\par -tracheomalacia\par \par \par problem 1: tracheostomy / chronic respiratory failure \par -Continue to wean off the ventilator as tolerable in short trials throughout the day  \par -recommended to use hypertonic saline in clearing of tracheostomy \par -Rx given for elastic pads, dressings, disposable inner cannulas, and tracheostomy collar to improve collar. Rx given for Metaplex Lite. \par -recommended f/u with Dr. Brett Laughlin and Dr. Edward Rodriguez\par \par \par  Problem 1a: mucopurulent chronic bronchitis \par - continue Pulmozyme .5 BID PRN  -NC\par - chest vest in place \par You have a clinical scenario most c/q acute bronchitis the etiology of which is unknown but empiric antibiotics are indicated. Hydration, mucolytics including mucinex, robitussin and the like are indicated. Cough controlling agents will be needed. \par \par Problem 1B: PTX (right side)- resolved (2020) \par The patient has a pneumothorax from trauma, iatrogenic, post surgical. You are at risk for one in the future and may need hospitalization. Thoracic surgical evaluation is needed for chest tube and other intervention. \par \par Problem 1C: ?Lung Bx ([facility]) \par - obtain records (not available)\par \par Problem 2: Chronic Respiratory Failure \par -Trach collar- goal up to 12hrs per day (not doing)\par - 3 L SIMV: VT- 450; PC 20; PS-18; PEEP- 5\par - Night CPAP 5/PS 18 (back up 8 B/mm)\par \par problem 3:  COPD/asthma\par -continue Combivent 1 inhalation up to QID to replace Symbicort \par -continue Performist nebulizer 1 unit dose BID, followed by Pulmicort 0.5 nebulizer BID \par -continue Albuterol via nebulizer for rescue up to QiD \par -continue on NAC Q8H\par -continue Yuperli 1 unit dose QD via nebulizer\par - continue Mucomyst 2cc (107.) q8H to qSH\par \par -COPD is a progressive disease and although it can’t be cured , appropriate management can slow its progression, reduce frequency and severity of exacerbations, and improve symptoms and the patient quality of life. Hospitalizations are the greatest contributor to the total COPD costs and account for up to 87% of total COPD related costs. Exacerbations are the main cause of admissions and subsequently account for the 40-75% of COPD costs. Inhaled maintenance therapy reduces the incidence of exacerbations in patients with stable COPD. Incorrect inhaler use and nonadherence are major obstacles to achieving COPD treatment goals. Many COPD patients have challenges (impaired inhalation, limited dexterity, reduced cognition: that limit their ability to correctly use their COPD treatment devices resulting in reduced symptom control. Of most importance is smoking cessation and early intervention with respiratory illnesses and contemplation for pulmonary rehab to enhance quality of life.\par \par Problem 3A: Abnormal CT: confirm Bronchiectasis / pulmonary nodule\par -s/p CT of the chest -(no present) - Next 6/2022\par -Complete high resolution chest CT, prone and supine, to r/o pulmonary nodules - next  11/2020\par -Recommended to take Slippery Elm Tea and pill for cough and mucus clearing \par -based upon results of CT scan, will apply for chest vest therapy\par \par -CAT scans are the only radiological modality to identify abnormalities w/in the lungs with regards to nodules/masses/lymph nodes. Risks, benefits were reviewed in detail. The guidelines for abnormalities include follow up CT scans at various intervals which could range from 6 weeks to 1 year intervals. If there is a change for the worse then consideration for a biopsy will be considered if you are a candidate. Second opinion evaluation with a thoracic surgeon or an interventional radiologist could be offered. \par - Seen on the CT of the chest or chest x-ray signifies damaged bronchial tubes focal or diffuse which can be sites of recurrent infections. These areas can be colonized by various organisms including bacteria (hemophilous influenzae/Pseudomonas species etc.) as well as acid fast bacilli (mycobacterial disease- inclusive of TB/MT etc.).  The patient has previously used acapella, nebulizer and breathing techniques for airway clearance.  These methods have not provided appropriate secretion manage in this patient.Cough length has been greater than 6 months, hence, initiating vest therapy is necessary.\par \par \par problem 3b: pseudomonas aeruginosa.- chronic colonization (kubulek) \par -Apply for vest pack (failed Conventional therapy.) - pending delivery  \par -get infectious disease f/u PRN \par -chronic colonization\par \par problem 4: GERD -(s/p EgD +/- colonoscopy- negative ) \par - Recommend GI f/u\par - continue Pepcid Complete 40 mg QHS\par -change Protonix 40 mg qAM ;  Neglan 5 mg qHS\par -continue Baclofen 5 mg premeal, QHS \par -Things to avoid including overeating, spicy foods, tight clothing, eating within three hours of bed, this list is not all inclusive. \par -For treatment of reflux, possible options discussed including diet control, H2 blockers, PPIs, as well as coating motility agents discussed as treatment options. Timing of meals and proximity of last meal to sleep were discussed. If symptoms persist, a formal gastrointestinal evaluation is needed.\par -Rule of 2's: Avoid eating too late, too fast, too much, too spicy or within two hours of bedtime\par \par Problem 5: Immunodeficiency\par - Continue Zithromax 250 mg Monday, Wednesday, and Friday\par -Due to the fact that this pt has had more infections than would be expected and immunological blood work is indicated this would include: IgG subclasses, quantitative immunoglobulins, Strep pneumoniae titers as well as Vitamin D levels. Based on this blood work we will be able to decide where the pt needs additional pneumococcal vaccine either Prevnar 13 or Pneumovax. Immunology evaluation will also be potentially indicated. \par \par Problem 6: Sensory neuropathic Cough\par - Continue Amitriptyline 10 mg up to TID\par -Sensory neuropathic cough is an etiology of cough that is often realized once someone has been ruled out for common disease such as: asthma, COPD, eosinophilic bronchitis, bronchiectasis, post nasal drip, and GERD. It sometimes develops following a URI, herpes zoster outbreak in pharynx or thyroid or cervical spine injury. However, many patients have no identifiable antecedent explanation. \par \par problem 7: tracheomalacia/tracheal tumor \par -follow up with Dr. Kelby Larios for thoracic evaluation for potential tracheoplasty - s/p new tracheostomy\par \par Tracheomalacia is usually acquired in adults and common causes include damage by tracheostomy or endotracheal intubation damaging the tracheal cartilage with increase risk with multiple intubations, prolonged intubation, and concurrent high dose steroid therapy; external chest wall trauma and surgery; chronic compression of the trachea by benign etiologies (eg, benign mediastinal goiter) or malignancy; relapsing polychondritis; or recurrent infection. Tracheomalacia can be asymptomatic, however signs or symptoms can develop as the severity of the airway narrowing progresses with major symptoms include dyspnea, cough, and sputum retention. Other symptoms include severe paroxysms of coughing, wheezing or stridor, barking cough and may be exacerbated by forced expiration, cough, and Valsalva maneuver. Tracheomalacia is diagnosed by a bronchoscopic visualization of dynamic airway collapse on dynamic chest CT. Therapy is warranted in symptomatic patients with severe tracheomalacia and includes surgical repair as tracheobronchoplasty. The patient was referred to Dr. Willy Kay or Dr. Kelby Larios, at [facility] for a surgical consult. \par \par problem 8: dysphonia/sore throat\par -recommended Fisherman Friend's lozenges \par -recommended to use Slippery Elm Lozenge / Tea \par -continue Evoxac 30 q 8\par \par Problem 9: Health Maintenance/COVID19 Precautions - not vaccinated \par - Clean your hands often. Wash your hands often with soap and water for at least 20 seconds, especially after blowing your nose, coughing, or sneezing, or having been in a public place.\par - If soap and water are not available, use a hand  that contains at least 60% alcohol.\par - To the extent possible, avoid touching high-touch surfaces in public places - elevator buttons, door handles, handrails, handshaking with people, etc. Use a tissue or your sleeve to cover your hand or finger if you must touch something.\par - Wash your hands after touching surfaces in public places.\par - Avoid touching your face, nose, eyes, etc.\par - Clean and disinfect your home to remove germs: practice routine cleaning of frequently touched surfaces (for example: tables, doorknobs, light switches, handles, desks, toilets, faucets, sinks & cell phones)\par - Avoid crowds, especially in poorly ventilated spaces. Your risk of exposure to respiratory viruses like COVID-19 may increase in crowded, closed-in settings with little air circulation if there are people in the crowd who are sick. All patients are recommended to practice social distancing and stay at least 6 feet away from others. \par - Avoid all non-essential travel including plane trips, and especially avoid embarking on cruise ships.\par -If COVID-19 is spreading in your community, take extra measures to put distance between yourself and other people to further reduce your risk of being exposed to this new virus.\par -Stay home as much as possible.\par - Consider ways of getting food brought to your house through family, social, or commercial networks\par -Be aware that the virus has been known to live in the air up to 3 hours post exposure, cardboard up to 24 hours post exposure, copper up to 4 hours post exposure, steel and plastic up to 2-3 days post exposure. Risk of transmission from these surfaces are affected by many variables.\par COVID-19 precautionary Immune Support Recommendations:\par -OTC Vitamin C 500mg BID \par -OTC Quercetin 250-500mg BID \par -OTC Zinc 75-100mg per day \par -OTC Melatonin 1 or 2mg a night \par -OTC Vitamin D 1-4000mg per day \par -OTC Tonic Water 8oz per day\par -Water 0.5-1 gallon per day\par Asthma and COVID19:\par You need to make sure your asthma is under control. This often requires the use of inhaled corticosteroids (and sometimes oral corticosteroids). Inhaled corticosteroids do not likely reduce your immune system’s ability to fight infections, but oral corticosteroids may. It is important to use the steps above to protect yourself to limit your exposure to any respiratory virus. \par \par problem 10: health maintenance \par -REcommended "Throat Coat Tea"\par - recommended to f/u with Dr. Palomares(GI) \par -instructed to increase physical activity as much as possible\par - She was administered an influenza vaccination 11/19\par -recommended strep pneumonia vaccines: Prevnar-13 vaccine, followed by Pneumo vaccine 23 one year following (completed)\par -recommended early intervention for URIs\par -recommended regular osteoporosis evaluations\par -recommended early dermatological evaluations\par -recommended after the age of 50 to the age of 70, colonoscopy every 5 years \par \par \par Follow up in 2 months.\par Patient is encouraged to call with any changes, concerns or questions.

## 2021-12-10 RX ORDER — MAG HYDROX/ALUMINUM HYD/SIMETH 400-400-40
400-400-40 SUSPENSION, ORAL (FINAL DOSE FORM) ORAL
Qty: 2 | Refills: 3 | Status: ACTIVE | COMMUNITY
Start: 2021-12-10 | End: 1900-01-01

## 2021-12-15 ENCOUNTER — APPOINTMENT (OUTPATIENT)
Dept: OTOLARYNGOLOGY | Facility: CLINIC | Age: 61
End: 2021-12-15
Payer: MEDICAID

## 2021-12-15 PROCEDURE — 99214 OFFICE O/P EST MOD 30 MIN: CPT | Mod: 25

## 2021-12-15 PROCEDURE — 31615 TRCHEOBRNCHSC EST TRACHS INC: CPT

## 2021-12-15 RX ORDER — TOBRAMYCIN AND DEXAMETHASONE 3; 1 MG/ML; MG/ML
0.3-0.1 SUSPENSION/ DROPS OPHTHALMIC
Qty: 1 | Refills: 1 | Status: ACTIVE | COMMUNITY
Start: 2020-03-24 | End: 1900-01-01

## 2021-12-15 NOTE — PHYSICAL EXAM
[Midline] : trachea located in midline position [Laryngoscopy Performed] : laryngoscopy was performed, see procedure section for findings [Normal] : no rashes [FreeTextEntry1] : stretcher-bound, on oxygen and ventilator through trach, no retractions or distress [de-identified] : trach tube in place with multiple gauze and loose , 6 LPC trach  [de-identified] : mildly raspy voice, hyperfunctional, easily understood

## 2021-12-15 NOTE — HISTORY OF PRESENT ILLNESS
[de-identified] : 61 year old female here for f/u laryngotracheal stenosis. States was admitted to [facility] 10/06/21-10/14/21 due to pneumonia and suspicion for blood clot in lung. Reports increased heartburn, needs refill on Famotidine. Denies bleeding, bruising, swelling around the trach. Denies foul smelling secretions. Current trach size is 6LPC. Last trach change was 10/2021. Denies throat pain. States currently on pureed foods. States need refill for Acetylcysteine.

## 2021-12-15 NOTE — CONSULT LETTER
[Dear  ___] : Dear  [unfilled], [Consult Letter:] : I had the pleasure of evaluating your patient, [unfilled]. [Please see my note below.] : Please see my note below. [Consult Closing:] : Thank you very much for allowing me to participate in the care of this patient.  If you have any questions, please do not hesitate to contact me. [Sincerely,] : Sincerely, [FreeTextEntry3] : Gaurang Rodriguez MD, PhD\par Chief, Division of Laryngology\par Department of Otolaryngology\par [facility]\par Pediatric Otolaryngology, [facility]\par  of Otolaryngology\par [employer] [facility] [employer]\par

## 2022-01-11 ENCOUNTER — APPOINTMENT (OUTPATIENT)
Dept: PULMONOLOGY | Facility: CLINIC | Age: 62
End: 2022-01-11
Payer: MEDICAID

## 2022-01-11 VITALS — BODY MASS INDEX: 20.49 KG/M2 | HEIGHT: 64 IN | WEIGHT: 120 LBS

## 2022-01-11 PROCEDURE — 99214 OFFICE O/P EST MOD 30 MIN: CPT | Mod: 95

## 2022-01-11 NOTE — ADDENDUM
[FreeTextEntry1] : Documented by Geoff Maharaj acting as a scribe for Dr. Maco Nova on (01/11/2022).\par \par All medical record entries made by the Scribe were at my, Dr. Maco Nova's, direction and personally dictated by me on (01/11/2022). I have reviewed the chart and agree that the record accurately reflects my personal performance of the history, physical exam, assessment and plan. I have also personally directed, reviewed, and agree with the discharge instructions.\par

## 2022-01-11 NOTE — ASSESSMENT
[FreeTextEntry1] : Ms. Bo is a 61 year old female, presenting into the office via video call with a history of former smoker, COPD, respiratory failure for 10 years, tracheostomy,  tracheostenosis and tracheomalacia who is s/p bronchoscopy with revision of the tracheostomy with dilation of the tracheostomy.  She presents with tracheomalacia and end stage COPD with issues with ventilator. PNA ([facility])- s/p colonoscopy (preop). Chronic colonization of aeruginosa with pseudomonas, Difficulty with mucus clearance despite chest vest, s/p recent PTX on the right -  chronic respiratory complaint s/p colonoscopy (Non Dx) - ?Lung Bx ([facility])  s/p PNA and hospitalization 11/2021 - now plagued by reflux \par \par \par Her chronic respiratory failure is multifactorial due to:\par -underlying COPD/severe persistent asthma\par -respiratory muscle weakness\par -tracheomalacia\par \par Problem 1A: s/p PNA\par -Clinically cleared\par -Using cough assist device for tracheomalacia\par -Add NAC 2cc 10% q8H\par -Your chest xray/CT reveals an infiltrate c/w pneumonia; this based on your clinical scenario required additional therapy. Any change in your status will require hospitalization at the nearest hospital and al local ambulance will need to be called. Our group is on staff at [facility] and [facility] as- consultants. The patient/family is instructed to notify our office with any change in condition. \par \par \par problem 1: tracheostomy / chronic respiratory failure \par -Continue to wean off the ventilator as tolerable in short trials throughout the day  \par -recommended to use hypertonic saline in clearing of tracheostomy \par -Rx given for elastic pads, dressings, disposable inner cannulas, and tracheostomy collar to improve collar. Rx given for Metaplex Lite. \par -recommended f/u with Dr. Brett Laughlin and Dr. Edward Rodriguez\par \par \par  Problem 1a: mucopurulent chronic bronchitis \par - continue Pulmozyme .5 BID PRN  -NC\par - chest vest in place \par You have a clinical scenario most c/q acute bronchitis the etiology of which is unknown but empiric antibiotics are indicated. Hydration, mucolytics including mucinex, robitussin and the like are indicated. Cough controlling agents will be needed. \par \par Problem 1B: PTX (right side)- resolved (2020) \par The patient has a pneumothorax from trauma, iatrogenic, post surgical. You are at risk for one in the future and may need hospitalization. Thoracic surgical evaluation is needed for chest tube and other intervention. \par \par Problem 1C: ?Lung Bx ([facility]) \par - obtain records (not available)\par \par Problem 2: Chronic Respiratory Failure \par -Trach collar- goal up to 12hrs per day (not doing)\par - 3 L SIMV: VT- 450; PC 20; PS-18; PEEP- 5\par - Night CPAP 5/PS 18 (back up 8 B/mm)\par \par problem 3:  COPD/asthma\par -continue Combivent 1 inhalation up to QID to replace Symbicort \par -continue Performist nebulizer 1 unit dose BID, followed by Pulmicort 0.5 nebulizer BID \par -continue Albuterol via nebulizer for rescue up to QiD \par -continue on NAC Q8H\par -continue Yuperli 1 unit dose QD via nebulizer\par - continue Mucomyst 2cc (107.) q8H to qSH\par \par -COPD is a progressive disease and although it can’t be cured , appropriate management can slow its progression, reduce frequency and severity of exacerbations, and improve symptoms and the patient quality of life. Hospitalizations are the greatest contributor to the total COPD costs and account for up to 87% of total COPD related costs. Exacerbations are the main cause of admissions and subsequently account for the 40-75% of COPD costs. Inhaled maintenance therapy reduces the incidence of exacerbations in patients with stable COPD. Incorrect inhaler use and nonadherence are major obstacles to achieving COPD treatment goals. Many COPD patients have challenges (impaired inhalation, limited dexterity, reduced cognition: that limit their ability to correctly use their COPD treatment devices resulting in reduced symptom control. Of most importance is smoking cessation and early intervention with respiratory illnesses and contemplation for pulmonary rehab to enhance quality of life.\par \par Problem 3A: Abnormal CT: confirm Bronchiectasis / pulmonary nodule\par -s/p CT of the chest -(no present) - Next 6/2022\par -Complete high resolution chest CT, prone and supine, to r/o pulmonary nodules - next  11/2020\par -Recommended to take Slippery Elm Tea and pill for cough and mucus clearing \par -based upon results of CT scan, will apply for chest vest therapy\par \par -CAT scans are the only radiological modality to identify abnormalities w/in the lungs with regards to nodules/masses/lymph nodes. Risks, benefits were reviewed in detail. The guidelines for abnormalities include follow up CT scans at various intervals which could range from 6 weeks to 1 year intervals. If there is a change for the worse then consideration for a biopsy will be considered if you are a candidate. Second opinion evaluation with a thoracic surgeon or an interventional radiologist could be offered. \par - Seen on the CT of the chest or chest x-ray signifies damaged bronchial tubes focal or diffuse which can be sites of recurrent infections. These areas can be colonized by various organisms including bacteria (hemophilous influenzae/Pseudomonas species etc.) as well as acid fast bacilli (mycobacterial disease- inclusive of TB/MT etc.).  The patient has previously used acapella, nebulizer and breathing techniques for airway clearance.  These methods have not provided appropriate secretion manage in this patient.Cough length has been greater than 6 months, hence, initiating vest therapy is necessary.\par \par \par problem 3b: pseudomonas aeruginosa.- chronic colonization (kubulek) \par -Apply for vest pack (failed Conventional therapy.) - pending delivery  \par -get infectious disease f/u PRN \par -chronic colonization\par \par problem 4: GERD -(s/p EgD +/- colonoscopy- negative ) #1 issue\par - Recommend GI f/u\par -add Pepcid 40 mg QHS\par -add Reglan 5 mg pre-meal, QHS\par -change Protonix 40 mg qAM ;\par -Things to avoid including overeating, spicy foods, tight clothing, eating within three hours of bed, this list is not all inclusive. \par -For treatment of reflux, possible options discussed including diet control, H2 blockers, PPIs, as well as coating motility agents discussed as treatment options. Timing of meals and proximity of last meal to sleep were discussed. If symptoms persist, a formal gastrointestinal evaluation is needed.\par -Rule of 2's: Avoid eating too late, too fast, too much, too spicy or within two hours of bedtime\par \par Problem 5: Immunodeficiency\par - Continue Zithromax 250 mg Monday, Wednesday, and Friday\par -Due to the fact that this pt has had more infections than would be expected and immunological blood work is indicated this would include: IgG subclasses, quantitative immunoglobulins, Strep pneumoniae titers as well as Vitamin D levels. Based on this blood work we will be able to decide where the pt needs additional pneumococcal vaccine either Prevnar 13 or Pneumovax. Immunology evaluation will also be potentially indicated. \par \par Problem 6: Sensory neuropathic Cough\par - Continue Amitriptyline 10 mg up to TID\par -Sensory neuropathic cough is an etiology of cough that is often realized once someone has been ruled out for common disease such as: asthma, COPD, eosinophilic bronchitis, bronchiectasis, post nasal drip, and GERD. It sometimes develops following a URI, herpes zoster outbreak in pharynx or thyroid or cervical spine injury. However, many patients have no identifiable antecedent explanation. \par \par problem 7: tracheomalacia/tracheal tumor \par -follow up with Dr. Kelby Larios for thoracic evaluation for potential tracheoplasty - s/p new tracheostomy\par \par Tracheomalacia is usually acquired in adults and common causes include damage by tracheostomy or endotracheal intubation damaging the tracheal cartilage with increase risk with multiple intubations, prolonged intubation, and concurrent high dose steroid therapy; external chest wall trauma and surgery; chronic compression of the trachea by benign etiologies (eg, benign mediastinal goiter) or malignancy; relapsing polychondritis; or recurrent infection. Tracheomalacia can be asymptomatic, however signs or symptoms can develop as the severity of the airway narrowing progresses with major symptoms include dyspnea, cough, and sputum retention. Other symptoms include severe paroxysms of coughing, wheezing or stridor, barking cough and may be exacerbated by forced expiration, cough, and Valsalva maneuver. Tracheomalacia is diagnosed by a bronchoscopic visualization of dynamic airway collapse on dynamic chest CT. Therapy is warranted in symptomatic patients with severe tracheomalacia and includes surgical repair as tracheobronchoplasty. The patient was referred to Dr. Willy Kay or Dr. Kelby Larios, at [facility] for a surgical consult. \par \par problem 8: dysphonia/sore throat\par -recommended Fisherman Friend's lozenges \par -recommended to use Slippery Elm Lozenge / Tea \par -continue Evoxac 30 q 8\par \par Problem 9: Health Maintenance/COVID19 Precautions \par - S/p Covid 19 vaccine (Pfizer) x1 \par - Clean your hands often. Wash your hands often with soap and water for at least 20 seconds, especially after blowing your nose, coughing, or sneezing, or having been in a public place.\par - If soap and water are not available, use a hand  that contains at least 60% alcohol.\par - To the extent possible, avoid touching high-touch surfaces in public places - elevator buttons, door handles, handrails, handshaking with people, etc. Use a tissue or your sleeve to cover your hand or finger if you must touch something.\par - Wash your hands after touching surfaces in public places.\par - Avoid touching your face, nose, eyes, etc.\par - Clean and disinfect your home to remove germs: practice routine cleaning of frequently touched surfaces (for example: tables, doorknobs, light switches, handles, desks, toilets, faucets, sinks & cell phones)\par - Avoid crowds, especially in poorly ventilated spaces. Your risk of exposure to respiratory viruses like COVID-19 may increase in crowded, closed-in settings with little air circulation if there are people in the crowd who are sick. All patients are recommended to practice social distancing and stay at least 6 feet away from others. \par - Avoid all non-essential travel including plane trips, and especially avoid embarking on cruise ships.\par -If COVID-19 is spreading in your community, take extra measures to put distance between yourself and other people to further reduce your risk of being exposed to this new virus.\par -Stay home as much as possible.\par - Consider ways of getting food brought to your house through family, social, or commercial networks\par -Be aware that the virus has been known to live in the air up to 3 hours post exposure, cardboard up to 24 hours post exposure, copper up to 4 hours post exposure, steel and plastic up to 2-3 days post exposure. Risk of transmission from these surfaces are affected by many variables.\par COVID-19 precautionary Immune Support Recommendations:\par -OTC Vitamin C 500mg BID \par -OTC Quercetin 250-500mg BID \par -OTC Zinc 75-100mg per day \par -OTC Melatonin 1 or 2mg a night \par -OTC Vitamin D 1-4000mg per day \par -OTC Tonic Water 8oz per day\par -Water 0.5-1 gallon per day\par Asthma and COVID19:\par You need to make sure your asthma is under control. This often requires the use of inhaled corticosteroids (and sometimes oral corticosteroids). Inhaled corticosteroids do not likely reduce your immune system’s ability to fight infections, but oral corticosteroids may. It is important to use the steps above to protect yourself to limit your exposure to any respiratory virus. \par \par problem 10: health maintenance \par -Recommended "Throat Coat Tea"\par - recommended to f/u with Dr. Palomares(GI) \par -instructed to increase physical activity as much as possible\par - She was administered an influenza vaccination 11/19\par -recommended strep pneumonia vaccines: Prevnar-13 vaccine, followed by Pneumo vaccine 23 one year following (completed)\par -recommended early intervention for URIs\par -recommended regular osteoporosis evaluations\par -recommended early dermatological evaluations\par -recommended after the age of 50 to the age of 70, colonoscopy every 5 years \par \par \par Follow up in 2 months.\par Patient is encouraged to call with any changes, concerns or questions.

## 2022-01-11 NOTE — PHYSICAL EXAM
[No Acute Distress] : no acute distress [Well Nourished] : well nourished [No Deformities] : no deformities [Well Developed] : well developed [Normal Appearance] : normal appearance [Well Groomed] : well groomed [TextBox_2] : appears well

## 2022-01-11 NOTE — HISTORY OF PRESENT ILLNESS
[Home] : at home, [unfilled] , at the time of the visit. [Medical Office: ([facility])___] : at the medical office located in  [Verbal consent obtained from patient] : the patient, [unfilled] [FreeTextEntry1] : Ms. Bo is a 61 year old female with a history of atypical chest pain, COPD, chronic hypoxemia, dysphagia, GERD, laryngeal stenosis, OW, dependence on tracheostomy, and tracheomalacia who presents into the office via video call for a follow up. Her chief complain is \par -She is feeling well in general \par -she notes active reflux \par -she notes difficulty bringing up mucus \par -she was given high dose blood thinners while in the hospital\par -she notes mild fever after her first Pfizer, and is awaiting second vaccine \par -she notes diarrhea, and has recently lost weight \par -she notes anemia, and is awaiting blood work \par -Her reflux is now treated with Dr. Palomares (GI)\par -she notes issues with her esophagus \par -She notes difficulty swallowing pills\par \par - patient denies any headaches, nausea, vomiting, fever, chills, sweats, chest pain, chest pressure, palpitations, coughing, wheezing, fatigue, constipation, dysphagia, myalgias, dizziness, leg swelling, leg pain, itchy eyes, itchy ears, heartburn, reflux or sour taste in the mouth

## 2022-01-11 NOTE — REASON FOR VISIT
[Formal Caregiver] : formal caregiver [Follow-Up] : a follow-up visit [FreeTextEntry1] : video call - atypical chest pain, COPD, chronic hypoxemia, dysphagia, GERD, laryngeal stenosis, OW, dependence on tracheostomy, and tracheomalacia

## 2022-01-19 ENCOUNTER — NON-APPOINTMENT (OUTPATIENT)
Age: 62
End: 2022-01-19

## 2022-01-21 ENCOUNTER — NON-APPOINTMENT (OUTPATIENT)
Age: 62
End: 2022-01-21

## 2022-01-26 ENCOUNTER — NON-APPOINTMENT (OUTPATIENT)
Age: 62
End: 2022-01-26

## 2022-02-03 ENCOUNTER — NON-APPOINTMENT (OUTPATIENT)
Age: 62
End: 2022-02-03

## 2022-02-08 ENCOUNTER — NON-APPOINTMENT (OUTPATIENT)
Age: 62
End: 2022-02-08

## 2022-03-25 ENCOUNTER — APPOINTMENT (OUTPATIENT)
Dept: GASTROENTEROLOGY | Facility: CLINIC | Age: 62
End: 2022-03-25
Payer: MEDICAID

## 2022-03-25 VITALS
WEIGHT: 125 LBS | TEMPERATURE: 98.1 F | HEART RATE: 88 BPM | BODY MASS INDEX: 21.34 KG/M2 | HEIGHT: 64 IN | DIASTOLIC BLOOD PRESSURE: 82 MMHG | SYSTOLIC BLOOD PRESSURE: 134 MMHG | OXYGEN SATURATION: 100 %

## 2022-03-25 DIAGNOSIS — R63.4 ABNORMAL WEIGHT LOSS: ICD-10-CM

## 2022-03-25 DIAGNOSIS — K86.2 CYST OF PANCREAS: ICD-10-CM

## 2022-03-25 PROCEDURE — 99214 OFFICE O/P EST MOD 30 MIN: CPT

## 2022-03-25 RX ORDER — SUCRALFATE 1 G/10ML
1 SUSPENSION ORAL
Qty: 1200 | Refills: 3 | Status: ACTIVE | COMMUNITY
Start: 2022-03-25 | End: 1900-01-01

## 2022-03-25 RX ORDER — SIMETHICONE 40MG/0.6ML
40 SUSPENSION, DROPS(FINAL DOSAGE FORM)(ML) ORAL
Qty: 10 | Refills: 3 | Status: ACTIVE | COMMUNITY
Start: 2022-03-25 | End: 1900-01-01

## 2022-03-25 NOTE — HISTORY OF PRESENT ILLNESS
[None] : had no significant interval events [Nausea] : denies nausea [Vomiting] : denies vomiting [Diarrhea] : denies diarrhea [Yellow Skin Or Eyes (Jaundice)] : denies jaundice [Abdominal Pain] : denies abdominal pain [Rectal Pain] : denies rectal pain [Wt Loss ___ Lbs] : recent [unfilled] ~Upound(s) weight loss [Heartburn] : heartburn [Constipation] : constipation [Abdominal Swelling] : abdominal swelling [GERD] : gastroesophageal reflux disease [Wt Gain ___ Lbs] : no recent weight gain [Hiatus Hernia] : no hiatus hernia [Peptic Ulcer Disease] : no peptic ulcer disease [Pancreatitis] : no pancreatitis [Cholelithiasis] : no cholelithiasis [Kidney Stone] : no kidney stone [Inflammatory Bowel Disease] : no inflammatory bowel disease [Irritable Bowel Syndrome] : no irritable bowel syndrome [Diverticulitis] : no diverticulitis [Alcohol Abuse] : no alcohol abuse [Malignancy] : no malignancy [Abdominal Surgery] : no abdominal surgery [Appendectomy] : no appendectomy [Cholecystectomy] : no cholecystectomy [de-identified] : The patient states that she is feeling tired and weak. The patient denies any jaundice or pruritus.  The patient complains of chronic lower back pain. The patient denies any abdominal pain.  The patient complains of abdominal gas and bloating.  The patient denies any nausea or vomiting.  The patient complains of gastroesophageal reflux disease and dysphagia.  The dysphagia occurs with both solids and liquids.  The patient denies taking medications for the gastroesophageal reflux disease.  The gastroesophageal reflux disease is worse after meals and late at night and in the early morning. The gastroesophageal reflux disease is improved with proton pump inhibitors, H2 blockers and antacids.   The patient complains of shortness of breath but denies any atypical chest pain or palpitations.  The patient denies any diaphoresis. The patient complains of occasional constipation but denies any diarrhea.  The patient has 1 to 2 bowel movements a day. The patient complains of a change in bowel habits.  The patient complains of a change in caliber of stool. The patient denies having mucus discharge with the bowel movements.  The patient denies any bright red blood per rectum, melena or hematemesis.  The patient denies any rectal pain or rectal pruritus.  The patient complains of weight loss and anorexia.  The patient admits to losing an unknown amount of weight over the past few months. The patient attributes the weight loss to recent poor diet.  She denies any fevers or chills.  The patient states that she was recently hospitalized at [facility] of pneumonia requiring IV antibiotics x 2 weeks. The patient was told of possible pulmonary embolism and treated with anticoagulation. The patient developed? hemoptysis vs. hematemesis. The patient had an EUS of the pancreas to assess the pancreatic cyst. According to the patient, the EUS of the pancreas did not reveal pancreatic cancer. The patient was discharged.The patient has a history of tracheomalacia s/p tracheostomy (2004) with resultant tracheal stenosis s/p stomaplasty in October 2019 and chronic vent dependence on trach collar ~2 hours per day), COPD, GERD, anxiety. The patient was recently hospitalized at [facility] for pneumonia with a combination of antibiotics (cefepime, levaquin and bactrim). The patient was recently hospitalized at the [facility] at on March 3, 2020 for failure to thrive with a ?100 pound weight loss over the past 3 months. The patient was hospitalized for 4 days for the symptoms. The portable frontal chest performed on March 9, 2020 revealed tracheostomy tube again seen, Vertically oriented streaky opacities in medial right lower lung with associated focal medial right hemidiaphragm tenting compatible with subsegmental atelectatic change or scarring with clear remaining visualized lungs and no gross pleural effusions and no pneumothorax. The cardiac and mediastinal surgical suture within normal limits for the projection. The trachea was midline. Also noted was generalized osteopenia and bilateral glenohumeral degenerative change again noted. The blood work performed was remarkable for anemia with Hgb/hct level of 10.7/33.1, respectively, an elevated platelet count of 492,000 and elevated PT/INR/PTT of 13.8/1.2/50.4, respectively. The blood cultures x 2 and urine culture was negative. The CAT scan of the chest, abdomen and pelvis with IV contrast performed on March 11, 2020 revealed no evidence of malignancy. The patient was evaluated by ENT, Dr. Gaurang Rodriguez. The fiberoptic laryngoscopy revealed significant tracheomalacia with no mucus or pus or bleeding. The patient had an upper endoscopy and colonoscopy to the cecum performed at the [facility] GI endoscopy suite on March 12, 2020. The upper endoscopy revealed mild diffuse gastritis. The pathology revealed distal esophagus with unremarkable squamous esophageal epithelium, gastric antral and body mucosa with gastric oxyntic mucosa with mild chronic inactive gastritis with parietal cell hyperplasia that was negative for Helicobacter pylori and unremarkable duodenal mucosa. The colonoscopy to the cecum revealed a normal but long and tortuous colon and median sized internal hemorrhoids. There were no polyps, masses, diverticulosis, AVMs or colitis noted. The patient tolerated the procedures well. The patient was observed for 4 days with resolution of the symptoms and was discharged to followup in the office. I reviewed the blood work and imaging studies performed during the hospitalization. The patient had a CAT scan of the abdomen and pelvis with IV contrast performed on July 3, 2019. The CAT scan of the abdomen and pelvis with IV contrast revealed a markedly limited evaluation of the pancreas. The pancreatic tissue is atrophic. There was beam hardening artifact, motion artifact and lack of small bowel opacification that contributed to limited evaluation. Also noted was a single borderline enlarged periaortic lymph node, a constipated colon and a distended urinary bladder. The blood tests performed on Patricia 3, 2019 was significant for anemia with Hgb/Hct level of 9.9/31. 6, respectively, an elevated rheumatoid factor of 15 and elevated ESR of 36 mm/hr. The patient has a history of pancreatic cyst x 4 years. The patient admits to having a prior upper endoscopy and colonoscopy performed by another gastroenterologist. According to the patient, the upper endoscopic findings were unknown to the patient. The colonoscopic findings were also unknown to the patient. The patient admits to a family history of GI problems. The patient’s father had a history of liver disease.

## 2022-03-25 NOTE — ASSESSMENT
[FreeTextEntry1] : Dyspepsia: The patient complains of dyspeptic symptoms. The patient is to continue on a trial of Simethicone one tablet via PEG tube 4 times a day p.r.n. abdominal pain and gas.\par Constipation: The patient complains of constipation. I recommend increase fluids  The patient agreed and will followup to reassess the symptoms.  \par Weight Loss: The patient was previously hospitalized at the [facility] at on March 3, 2020 for failure to thrive with a ?100 pound weight loss over 3 months. The patient still complains of weight loss and anorexia. The patient is currently receiving Osmolyte via the PEG tube but now wants another tube feeds because of feeling gassy and bloated. I recommend a nutrition consult for the weight loss.\par Gastritis: The patient has a history of gastritis. The patient is to avoid nonsteroidal anti-inflammatory drugs and aspirin. I recommend continue on a trial of Sucralfate 4 times a day for 3 months for the symptoms. \par Internal Hemorrhoids: The patient is to consider a trial of Anusol H. C. suppositories one per rectum nightly and Anusol HC2.5% cream apply to affected area twice a day p.r.n. hemorrhoidal bleeding or pain. \par I recommend a repeat colonoscopy in 9 years to reassess for colonic polyps pending patient’s health unless symptomatic. The patient agreed and will follow up for the procedure. \par Anemia: The patient was previously found to have anemia on prior blood work. The upper endoscopy and colonoscopy were inconclusive of the etiology of the anemia. The patient denies any bright red blood per rectum, melena or hematemesis. I recommend following to hemoglobin/hematocrit level. The patient was also advised to follow up with her PMD regarding the anemia for possible hematologic evaluation. If the anemia persists, the patient may require a capsule endoscopy.\par History of Pancreatic Cyst: The patient was previously diagnosed with a pancreatic cystic lesion on prior imaging study. The patient denies any jaundice, pruritus or back pain. The patient complains of abdominal pain. The patient denies any prior history of EtOH abuse. The patient had a CAT scan of the abdomen and pelvis with IV contrast performed on July 3, 2019. The CAT scan of the abdomen and pelvis with IV contrast revealed a markedly limited evaluation of the pancreas. The pancreatic tissue is atrophic. There was beam hardening artifact, motion artifact and lack of small bowel opacification that contributed to limited evaluation. Also noted was a single borderline enlarged periaortic lymph node, a constipated colon and a distended urinary bladder. I recommend a repeat imaging study of the pancreas with IV contrast to reassess the pancreatic cystic lesions after the endoscopic evaluation. The patient is aware of the potential risks of pancreatic cysts progressing to pancreatic cancer. The patient may require an endoscopic ultrasound of pancreas with possible fine-needle aspiration to better assess the pancreatic cystic lesions pending repeat imaging study. The patient is aware of the importance for followup. The patient agrees and will followup. \par Follow-up: The patient is to follow-up in the office in 6 months to reassess the symptoms. The patient was told to call the office if any further problems. \par \par

## 2022-03-30 ENCOUNTER — NON-APPOINTMENT (OUTPATIENT)
Age: 62
End: 2022-03-30

## 2022-04-01 ENCOUNTER — NON-APPOINTMENT (OUTPATIENT)
Age: 62
End: 2022-04-01

## 2022-04-04 ENCOUNTER — NON-APPOINTMENT (OUTPATIENT)
Age: 62
End: 2022-04-04

## 2022-04-05 ENCOUNTER — NON-APPOINTMENT (OUTPATIENT)
Age: 62
End: 2022-04-05

## 2022-04-06 ENCOUNTER — APPOINTMENT (OUTPATIENT)
Dept: OTOLARYNGOLOGY | Facility: CLINIC | Age: 62
End: 2022-04-06
Payer: MEDICAID

## 2022-04-06 VITALS — HEIGHT: 64 IN | WEIGHT: 125 LBS | BODY MASS INDEX: 21.34 KG/M2

## 2022-04-06 PROCEDURE — 31615 TRCHEOBRNCHSC EST TRACHS INC: CPT

## 2022-04-06 PROCEDURE — 99214 OFFICE O/P EST MOD 30 MIN: CPT | Mod: 25

## 2022-04-06 NOTE — HISTORY OF PRESENT ILLNESS
BCx growing coag neg staph 1/2 bottles, likely contaminant. Patient has a distant hx of IVDU.   - s/p 1 g vancomycin 11/9   - f/u repeat bcx [de-identified] : 62 year old female here for f/u laryngotracheal stenosis. [facility] was admitted to [facility] 10/06/21-10/14/21 due to pneumonia and suspicion for blood clot in lung. Denies any recent hospitalization since last visit. Reports sore throat, take tylenol with some relief. Denies bleeding, bruising, swelling around the trach. Denies foul smelling secretions. Current trach size is 6LPC. Last trach change was 12/2021. States currently on pureed foods. States need refill for Acetylcysteine. Reports increased drowsiness from mucinex due to acetylcysteine shortage.

## 2022-04-06 NOTE — PHYSICAL EXAM
[Midline] : trachea located in midline position [Laryngoscopy Performed] : laryngoscopy was performed, see procedure section for findings [Normal] : no rashes [FreeTextEntry1] : stretcher-bound, on oxygen and ventilator through trach, no retractions or distress [de-identified] : trach tube in place with multiple gauze and loose , 6 LPC trach  [de-identified] : mildly raspy voice, hyperfunctional, easily understood

## 2022-04-06 NOTE — CONSULT LETTER
[Dear  ___] : Dear  [unfilled], [Consult Letter:] : I had the pleasure of evaluating your patient, [unfilled]. [Please see my note below.] : Please see my note below. [Consult Closing:] : Thank you very much for allowing me to participate in the care of this patient.  If you have any questions, please do not hesitate to contact me. [Sincerely,] : Sincerely, [FreeTextEntry3] : Gaurang Rodriguez MD, PhD\par Chief, Division of Laryngology\par Department of Otolaryngology\par [facility]\par Pediatric Otolaryngology, [facility]\par  of Otolaryngology\par [employer] [facility] [employer]\par

## 2022-04-07 ENCOUNTER — APPOINTMENT (OUTPATIENT)
Dept: PULMONOLOGY | Facility: CLINIC | Age: 62
End: 2022-04-07
Payer: MEDICAID

## 2022-04-07 VITALS — BODY MASS INDEX: 21.34 KG/M2 | HEIGHT: 64 IN | WEIGHT: 125 LBS

## 2022-04-07 DIAGNOSIS — Z71.89 OTHER SPECIFIED COUNSELING: ICD-10-CM

## 2022-04-07 PROCEDURE — 99214 OFFICE O/P EST MOD 30 MIN: CPT | Mod: 25,95

## 2022-04-07 RX ORDER — AMOXICILLIN AND CLAVULANATE POTASSIUM 875; 125 MG/1; MG/1
875-125 TABLET, COATED ORAL
Qty: 20 | Refills: 0 | Status: DISCONTINUED | COMMUNITY
Start: 2021-11-15 | End: 2022-04-07

## 2022-04-07 RX ORDER — LEVALBUTEROL HYDROCHLORIDE 0.63 MG/3ML
0.63 SOLUTION RESPIRATORY (INHALATION)
Qty: 120 | Refills: 3 | Status: ACTIVE | COMMUNITY
Start: 2019-03-11 | End: 1900-01-01

## 2022-04-07 NOTE — REASON FOR VISIT
[Follow-Up] : a follow-up visit [Formal Caregiver] : formal caregiver [FreeTextEntry1] : video call - atypical chest pain, COPD, chronic hypoxemia, dysphagia, GERD, laryngeal stenosis, OW, dependence on tracheostomy, and tracheomalacia

## 2022-04-07 NOTE — HISTORY OF PRESENT ILLNESS
[Home] : at home, [unfilled] , at the time of the visit. [Medical Office: ([facility])___] : at the medical office located in  [Verbal consent obtained from patient] : the patient, [unfilled] [FreeTextEntry1] : Ms. Bo is a 61 year old female with a history of atypical chest pain, COPD, chronic hypoxemia, dysphagia, GERD, laryngeal stenosis, OW, dependence on tracheostomy, and tracheomalacia who presents into the office via video call for a follow up. Her chief complain is \par \par -she notes aspiration\par -she notes having a feeding tube\par -she notes food is going down the wrong tube\par -she notes needing a nebulizer\par -she notes having dysphasia\par -she notes needing a nebulizer but is unable to find a nebulizer at a pharmacy\par \par \par patient denies any headaches, nausea, vomiting, fever, chills, sweats, chest pain, chest pressure, palpitations, coughing, wheezing, fatigue, diarrhea, constipation, dysphagia, myalgias, dizziness, leg swelling, leg pain, itchy eyes, itchy ears, heartburn, reflux or sour taste in the mouth

## 2022-04-07 NOTE — ADDENDUM
[FreeTextEntry1] : Documented by Richardson Pool acting as a scribe for Dr. Maco Nova on 04/07/2022 .\par \par All medical record entries made by the Scribe were at my, Dr. Maco Nova's, direction and personally dictated by me on. I have reviewed the chart and agree that the record accurately reflects my personal performance of the history, physical exam, assessment and plan. I have also personally directed, reviewed, and agree with the discharge instructions.

## 2022-04-07 NOTE — PHYSICAL EXAM
[No Acute Distress] : no acute distress [Well Nourished] : well nourished [Normal Appearance] : normal appearance [Well Groomed] : well groomed [No Deformities] : no deformities [Well Developed] : well developed [TextBox_2] : appears well

## 2022-04-07 NOTE — ASSESSMENT
[FreeTextEntry1] : Ms. Bo is a 62 year old female, presenting into the office via video call with a history of former smoker, COPD, respiratory failure for 10 years, tracheostomy,  tracheostenosis and tracheomalacia who is s/p bronchoscopy with revision of the tracheostomy with dilation of the tracheostomy.  She presents with tracheomalacia and end stage COPD with issues with ventilator. PNA ([facility])- s/p colonoscopy (preop). Chronic colonization of aeruginosa with pseudomonas, Difficulty with mucus clearance despite chest vest, s/p recent PTX on the right -  chronic respiratory complaint s/p colonoscopy (Non Dx) - ?Lung Bx ([facility])  s/p PNA and hospitalization 11/2021 - now plagued by reflux, #1 issue is dysphasia \par \par \par Her chronic respiratory failure is multifactorial due to:\par -underlying COPD/severe persistent asthma\par -respiratory muscle weakness\par -tracheomalacia\par \par Problem 1A: s/p PNA (resolved)\par -Clinically cleared\par -Using cough assist device for tracheomalacia\par -Add NAC 2cc 10% q8H\par -Your chest xray/CT reveals an infiltrate c/w pneumonia; this based on your clinical scenario required additional therapy. Any change in your status will require hospitalization at the nearest hospital and al local ambulance will need to be called. Our group is on staff at [facility] and [facility] as- consultants. The patient/family is instructed to notify our office with any change in condition. \par \par \par problem 1: tracheostomy / chronic respiratory failure \par -Continue to wean off the ventilator as tolerable in short trials throughout the day  \par -recommended to use hypertonic saline in clearing of tracheostomy \par -Rx given for elastic pads, dressings, disposable inner cannulas, and tracheostomy collar to improve collar. Rx given for Metaplex Lite. \par -recommended f/u with Dr. Brett Laughlin and Dr. Edward Rodriguez\par \par \par  Problem 1a: mucopurulent chronic bronchitis \par - continue Pulmozyme .5 BID PRN  -NC\par - chest vest in place \par You have a clinical scenario most c/q acute bronchitis the etiology of which is unknown but empiric antibiotics are indicated. Hydration, mucolytics including mucinex, robitussin and the like are indicated. Cough controlling agents will be needed. \par \par Problem 1B: PTX (right side)- resolved (2020) \par The patient has a pneumothorax from trauma, iatrogenic, post surgical. You are at risk for one in the future and may need hospitalization. Thoracic surgical evaluation is needed for chest tube and other intervention. \par \par Problem 1C: ?Lung Bx ([facility]) \par - obtain records (not available)\par \par Problem 2: Chronic Respiratory Failure \par -Trach collar- goal up to 12hrs per day (not doing)\par - 3 L SIMV: VT- 450; PC 20; PS-18; PEEP- 5\par - Night CPAP 5/PS 18 (back up 8 B/mm)\par \par problem 3:  COPD/asthma\par -continue Combivent 1 inhalation up to QID to replace Symbicort \par -continue Performist nebulizer 1 unit dose BID, followed by Pulmicort 0.5 nebulizer BID \par -continue Albuterol via nebulizer for rescue up to QiD \par -continue on NAC Q8H\par -continue Yuperli 1 unit dose QD via nebulizer\par - continue Mucomyst 2cc (107.) q8H \par -COPD is a progressive disease and although it can’t be cured , appropriate management can slow its progression, reduce frequency and severity of exacerbations, and improve symptoms and the patient quality of life. Hospitalizations are the greatest contributor to the total COPD costs and account for up to 87% of total COPD related costs. Exacerbations are the main cause of admissions and subsequently account for the 40-75% of COPD costs. Inhaled maintenance therapy reduces the incidence of exacerbations in patients with stable COPD. Incorrect inhaler use and nonadherence are major obstacles to achieving COPD treatment goals. Many COPD patients have challenges (impaired inhalation, limited dexterity, reduced cognition: that limit their ability to correctly use their COPD treatment devices resulting in reduced symptom control. Of most importance is smoking cessation and early intervention with respiratory illnesses and contemplation for pulmonary rehab to enhance quality of life.\par \par Problem 3A: Abnormal CT: confirm Bronchiectasis / pulmonary nodule\par -s/p CT of the chest -(no present) - Next 6/2022\par -Complete high resolution chest CT, prone and supine, to r/o pulmonary nodules - next  11/2020\par -Recommended to take Slippery Elm Tea and pill for cough and mucus clearing \par -based upon results of CT scan, will apply for chest vest therapy\par \par -CAT scans are the only radiological modality to identify abnormalities w/in the lungs with regards to nodules/masses/lymph nodes. Risks, benefits were reviewed in detail. The guidelines for abnormalities include follow up CT scans at various intervals which could range from 6 weeks to 1 year intervals. If there is a change for the worse then consideration for a biopsy will be considered if you are a candidate. Second opinion evaluation with a thoracic surgeon or an interventional radiologist could be offered. \par - Seen on the CT of the chest or chest x-ray signifies damaged bronchial tubes focal or diffuse which can be sites of recurrent infections. These areas can be colonized by various organisms including bacteria (hemophilous influenzae/Pseudomonas species etc.) as well as acid fast bacilli (mycobacterial disease- inclusive of TB/MT etc.).  The patient has previously used acapella, nebulizer and breathing techniques for airway clearance.  These methods have not provided appropriate secretion manage in this patient.Cough length has been greater than 6 months, hence, initiating vest therapy is necessary.\par \par \par problem 3b: pseudomonas aeruginosa.- chronic colonization (kubulek) \par -Apply for vest pack (failed Conventional therapy.) - pending delivery  \par -get infectious disease f/u PRN \par -chronic colonization\par \par problem 4: GERD -(s/p EgD +/- colonoscopy- negative ) #1 issue\par - Recommend GI f/u\par -add Pepcid 40 mg QHS\par -add Reglan 5 mg pre-meal, QHS\par -change Protonix 40 mg qAM ;\par -Things to avoid including overeating, spicy foods, tight clothing, eating within three hours of bed, this list is not all inclusive. \par -For treatment of reflux, possible options discussed including diet control, H2 blockers, PPIs, as well as coating motility agents discussed as treatment options. Timing of meals and proximity of last meal to sleep were discussed. If symptoms persist, a formal gastrointestinal evaluation is needed.\par -Rule of 2's: Avoid eating too late, too fast, too much, too spicy or within two hours of bedtime\par \par Problem 5: Immunodeficiency\par - Continue Zithromax 250 mg Monday, Wednesday, and Friday\par -Due to the fact that this pt has had more infections than would be expected and immunological blood work is indicated this would include: IgG subclasses, quantitative immunoglobulins, Strep pneumoniae titers as well as Vitamin D levels. Based on this blood work we will be able to decide where the pt needs additional pneumococcal vaccine either Prevnar 13 or Pneumovax. Immunology evaluation will also be potentially indicated. \par \par Problem 6: Sensory neuropathic Cough\par - Continue Amitriptyline 10 mg up to TID\par -Sensory neuropathic cough is an etiology of cough that is often realized once someone has been ruled out for common disease such as: asthma, COPD, eosinophilic bronchitis, bronchiectasis, post nasal drip, and GERD. It sometimes develops following a URI, herpes zoster outbreak in pharynx or thyroid or cervical spine injury. However, many patients have no identifiable antecedent explanation. \par \par problem 7: tracheomalacia/tracheal tumor \par -follow up with Dr. Kelby Larios for thoracic evaluation for potential tracheoplasty - s/p new tracheostomy\par \par Tracheomalacia is usually acquired in adults and common causes include damage by tracheostomy or endotracheal intubation damaging the tracheal cartilage with increase risk with multiple intubations, prolonged intubation, and concurrent high dose steroid therapy; external chest wall trauma and surgery; chronic compression of the trachea by benign etiologies (eg, benign mediastinal goiter) or malignancy; relapsing polychondritis; or recurrent infection. Tracheomalacia can be asymptomatic, however signs or symptoms can develop as the severity of the airway narrowing progresses with major symptoms include dyspnea, cough, and sputum retention. Other symptoms include severe paroxysms of coughing, wheezing or stridor, barking cough and may be exacerbated by forced expiration, cough, and Valsalva maneuver. Tracheomalacia is diagnosed by a bronchoscopic visualization of dynamic airway collapse on dynamic chest CT. Therapy is warranted in symptomatic patients with severe tracheomalacia and includes surgical repair as tracheobronchoplasty. The patient was referred to Dr. Willy Kay or Dr. Kelby Larios, at [facility] for a surgical consult. \par \par problem 8: dysphonia/sore throat\par -recommended Fisherman Friend's lozenges \par -recommended to use Slippery Elm Lozenge / Tea \par -continue Evoxac 30 q 8\par \par Problem 8A: Dysphasia\par -recommended swallow therapy\par \par Problem 9: Health Maintenance/COVID19 Precautions \par - S/p Covid 19 vaccine (Pfizer) x1 \par - Clean your hands often. Wash your hands often with soap and water for at least 20 seconds, especially after blowing your nose, coughing, or sneezing, or having been in a public place.\par - If soap and water are not available, use a hand  that contains at least 60% alcohol.\par - To the extent possible, avoid touching high-touch surfaces in public places - elevator buttons, door handles, handrails, handshaking with people, etc. Use a tissue or your sleeve to cover your hand or finger if you must touch something.\par - Wash your hands after touching surfaces in public places.\par - Avoid touching your face, nose, eyes, etc.\par - Clean and disinfect your home to remove germs: practice routine cleaning of frequently touched surfaces (for example: tables, doorknobs, light switches, handles, desks, toilets, faucets, sinks & cell phones)\par - Avoid crowds, especially in poorly ventilated spaces. Your risk of exposure to respiratory viruses like COVID-19 may increase in crowded, closed-in settings with little air circulation if there are people in the crowd who are sick. All patients are recommended to practice social distancing and stay at least 6 feet away from others. \par - Avoid all non-essential travel including plane trips, and especially avoid embarking on cruise ships.\par -If COVID-19 is spreading in your community, take extra measures to put distance between yourself and other people to further reduce your risk of being exposed to this new virus.\par -Stay home as much as possible.\par - Consider ways of getting food brought to your house through family, social, or commercial networks\par -Be aware that the virus has been known to live in the air up to 3 hours post exposure, cardboard up to 24 hours post exposure, copper up to 4 hours post exposure, steel and plastic up to 2-3 days post exposure. Risk of transmission from these surfaces are affected by many variables.\par COVID-19 precautionary Immune Support Recommendations:\par -OTC Vitamin C 500mg BID \par -OTC Quercetin 250-500mg BID \par -OTC Zinc 75-100mg per day \par -OTC Melatonin 1 or 2mg a night \par -OTC Vitamin D 1-4000mg per day \par -OTC Tonic Water 8oz per day\par -Water 0.5-1 gallon per day\par Asthma and COVID19:\par You need to make sure your asthma is under control. This often requires the use of inhaled corticosteroids (and sometimes oral corticosteroids). Inhaled corticosteroids do not likely reduce your immune system’s ability to fight infections, but oral corticosteroids may. It is important to use the steps above to protect yourself to limit your exposure to any respiratory virus. \par \par problem 10: health maintenance \par -Recommended "Throat Coat Tea"\par - recommended to f/u with Dr. Palomares(GI) \par -instructed to increase physical activity as much as possible\par - She was administered an influenza vaccination 11/19\par -recommended strep pneumonia vaccines: Prevnar-13 vaccine, followed by Pneumo vaccine 23 one year following (completed)\par -recommended early intervention for URIs\par -recommended regular osteoporosis evaluations\par -recommended early dermatological evaluations\par -recommended after the age of 50 to the age of 70, colonoscopy every 5 years \par \par \par Follow up in 2 months.\par Patient is encouraged to call with any changes, concerns or questions.

## 2022-04-13 ENCOUNTER — NON-APPOINTMENT (OUTPATIENT)
Age: 62
End: 2022-04-13

## 2022-04-15 ENCOUNTER — NON-APPOINTMENT (OUTPATIENT)
Age: 62
End: 2022-04-15

## 2022-04-18 ENCOUNTER — NON-APPOINTMENT (OUTPATIENT)
Age: 62
End: 2022-04-18

## 2022-05-23 ENCOUNTER — NON-APPOINTMENT (OUTPATIENT)
Age: 62
End: 2022-05-23

## 2022-05-24 ENCOUNTER — APPOINTMENT (OUTPATIENT)
Dept: OTOLARYNGOLOGY | Facility: CLINIC | Age: 62
End: 2022-05-24

## 2022-06-02 ENCOUNTER — NON-APPOINTMENT (OUTPATIENT)
Age: 62
End: 2022-06-02

## 2022-06-09 ENCOUNTER — NON-APPOINTMENT (OUTPATIENT)
Age: 62
End: 2022-06-09

## 2022-06-15 ENCOUNTER — NON-APPOINTMENT (OUTPATIENT)
Age: 62
End: 2022-06-15

## 2022-06-17 NOTE — HISTORY OF PRESENT ILLNESS
This was sent back to me without a reply.    [de-identified] : 61yo F here for f/ trach. Got PEG tube put in with Dr. Palomares. Keeps getting granulation tissue around PEG and will be f/u with Marielle coming. Having pain around the stoma. When she goes to suction she feels pain and she states she hits some "meat". When she gargles with hot water it gets better. Has some bleeding. Secretions are thick. Currently with 6 LPC has had trach changed since since August. Had esophageal balloon dilation and during the surgery had trach changed. Swallowing has improved a bit but still complains of dysphagia.

## 2022-06-20 ENCOUNTER — APPOINTMENT (OUTPATIENT)
Dept: PULMONOLOGY | Facility: CLINIC | Age: 62
End: 2022-06-20
Payer: MEDICAID

## 2022-06-20 DIAGNOSIS — J95.00 UNSPECIFIED TRACHEOSTOMY COMPLICATION: ICD-10-CM

## 2022-06-20 PROCEDURE — 99214 OFFICE O/P EST MOD 30 MIN: CPT | Mod: 95

## 2022-06-20 RX ORDER — FUROSEMIDE 20 MG/1
20 TABLET ORAL
Qty: 30 | Refills: 0 | Status: ACTIVE | COMMUNITY
Start: 2022-03-16

## 2022-06-20 RX ORDER — FERROUS SULFATE TAB 325 MG (65 MG ELEMENTAL FE) 325 (65 FE) MG
325 (65 FE) TAB ORAL
Qty: 60 | Refills: 0 | Status: ACTIVE | COMMUNITY
Start: 2022-01-06

## 2022-06-20 RX ORDER — CHROMIUM 200 MCG
25 MCG TABLET ORAL
Qty: 30 | Refills: 0 | Status: ACTIVE | COMMUNITY
Start: 2022-03-10

## 2022-06-20 RX ORDER — CITALOPRAM HYDROBROMIDE 10 MG/5ML
10 SOLUTION ORAL
Qty: 150 | Refills: 0 | Status: ACTIVE | COMMUNITY
Start: 2022-05-25

## 2022-06-20 RX ORDER — ERGOCALCIFEROL 1.25 MG/1
1.25 MG CAPSULE, LIQUID FILLED ORAL
Qty: 4 | Refills: 0 | Status: ACTIVE | COMMUNITY
Start: 2022-02-28

## 2022-06-20 RX ORDER — ALUMINUM HYDROXIDE, MAGNESIUM HYDROXIDE, SIMETHICONE 400; 400; 40 MG/10ML; MG/10ML; MG/10ML
200-200-20 SUSPENSION ORAL
Qty: 710 | Refills: 0 | Status: ACTIVE | COMMUNITY
Start: 2022-01-22

## 2022-06-20 RX ORDER — CHLORHEXIDINE GLUCONATE 4 %
400 (240 MG) LIQUID (ML) TOPICAL
Qty: 30 | Refills: 0 | Status: ACTIVE | COMMUNITY
Start: 2022-01-13

## 2022-06-20 NOTE — ADDENDUM
[FreeTextEntry1] : Documented by Richardson Pool acting as a scribe for Dr. Maco Nova on 06/20/2022.\par \par All medical record entries made by the Scribe were at my, Dr. Maco Nova's, direction and personally dictated by me on 06/20/2022. I have reviewed the chart and agree that the record accurately reflects my personal performance of the history, physical exam, assessment and plan. I have also personally directed, reviewed, and agree with the discharge instructions.

## 2022-06-20 NOTE — ASSESSMENT
[FreeTextEntry1] : Ms. Bo is a 62 year old female, presenting into the office via video call with a history of former smoker, COPD, respiratory failure for 10 years, tracheostomy,  tracheostenosis and tracheomalacia who is s/p bronchoscopy with revision of the tracheostomy with dilation of the tracheostomy.  She presents with tracheomalacia and end stage COPD with issues with ventilator. PNA ([facility])- s/p colonoscopy (preop). Chronic colonization, Difficulty with mucus clearance despite chest vest, concerned, repairing trachea, decannulation\par \par Her chronic respiratory failure is multifactorial due to:\par -underlying COPD/severe persistent asthma\par -respiratory muscle weakness\par -tracheomalacia\par \par problem 1: tracheostomy / chronic respiratory failure \par -Continue to wean off the ventilator as tolerable in short trials throughout the day  \par -recommended to use hypertonic saline in clearing of tracheostomy \par -Rx given for elastic pads, dressings, disposable inner cannulas, and tracheostomy collar to improve collar. Rx given for Metaplex Lite. \par -recommended f/u with Dr. Brett Laughlin and Dr. Edward Rodriguez\par \par \par  Problem 1a: mucopurulent chronic bronchitis \par - continue Pulmozyme .5 BID PRN  -NC\par - chest vest in place \par You have a clinical scenario most c/q acute bronchitis the etiology of which is unknown but empiric antibiotics are indicated. Hydration, mucolytics including mucinex, robitussin and the like are indicated. Cough controlling agents will be needed. \par \par Problem 1B: PTX (right side)- resolved (2020) \par The patient has a pneumothorax from trauma, iatrogenic, post surgical. You are at risk for one in the future and may need hospitalization. Thoracic surgical evaluation is needed for chest tube and other intervention. \par \par Problem 1C: ?Lung Bx ([facility]) \par - obtain records (not available) -negative\par \par Problem 2: Chronic Respiratory Failure \par -Trach collar- goal up to 12hrs per day (not doing)\par - 3 L SIMV: VT- 450; PC 20; PS-18; PEEP- 5\par - Night CPAP 5/PS 18 (back up 8 B/mm)\par \par problem 3:  COPD/asthma\par -continue Combivent 1 inhalation up to QID to replace Symbicort \par -continue Performist nebulizer 1 unit dose BID, followed by Pulmicort 0.5 nebulizer BID \par -continue Albuterol via nebulizer for rescue up to QiD \par -continue on NAC Q8H\par -continue Yuperli 1 unit dose QD via nebulizer\par - continue Mucomyst 2cc (107.) q8H \par -COPD is a progressive disease and although it can’t be cured , appropriate management can slow its progression, reduce frequency and severity of exacerbations, and improve symptoms and the patient quality of life. Hospitalizations are the greatest contributor to the total COPD costs and account for up to 87% of total COPD related costs. Exacerbations are the main cause of admissions and subsequently account for the 40-75% of COPD costs. Inhaled maintenance therapy reduces the incidence of exacerbations in patients with stable COPD. Incorrect inhaler use and nonadherence are major obstacles to achieving COPD treatment goals. Many COPD patients have challenges (impaired inhalation, limited dexterity, reduced cognition: that limit their ability to correctly use their COPD treatment devices resulting in reduced symptom control. Of most importance is smoking cessation and early intervention with respiratory illnesses and contemplation for pulmonary rehab to enhance quality of life.\par \par Problem 3A: Abnormal CT: confirm Bronchiectasis / pulmonary nodule\par -s/p CT of the chest -(no present) - Next 6/2022\par -Complete high resolution chest CT, prone and supine, to r/o pulmonary nodules - next  11/2020\par -Recommended to take Slippery Elm Tea and pill for cough and mucus clearing \par -based upon results of CT scan, will apply for chest vest therapy\par \par -CAT scans are the only radiological modality to identify abnormalities w/in the lungs with regards to nodules/masses/lymph nodes. Risks, benefits were reviewed in detail. The guidelines for abnormalities include follow up CT scans at various intervals which could range from 6 weeks to 1 year intervals. If there is a change for the worse then consideration for a biopsy will be considered if you are a candidate. Second opinion evaluation with a thoracic surgeon or an interventional radiologist could be offered. \par - Seen on the CT of the chest or chest x-ray signifies damaged bronchial tubes focal or diffuse which can be sites of recurrent infections. These areas can be colonized by various organisms including bacteria (hemophilous influenzae/Pseudomonas species etc.) as well as acid fast bacilli (mycobacterial disease- inclusive of TB/MT etc.).  The patient has previously used acapella, nebulizer and breathing techniques for airway clearance.  These methods have not provided appropriate secretion manage in this patient.Cough length has been greater than 6 months, hence, initiating vest therapy is necessary.\par \par \par problem 3b: pseudomonas aeruginosa.- chronic colonization (kubulek) \par -Apply for vest pack (failed Conventional therapy.) - pending delivery  \par -get infectious disease f/u PRN \par -chronic colonization\par \par problem 4: GERD -(s/p EgD +/- colonoscopy- negative )\par - Recommend GI f/u\par -add Pepcid 40 mg QHS\par -add Reglan 5 mg pre-meal, QHS\par -change Protonix 40 mg qAM ;\par -Things to avoid including overeating, spicy foods, tight clothing, eating within three hours of bed, this list is not all inclusive. \par -For treatment of reflux, possible options discussed including diet control, H2 blockers, PPIs, as well as coating motility agents discussed as treatment options. Timing of meals and proximity of last meal to sleep were discussed. If symptoms persist, a formal gastrointestinal evaluation is needed.\par -Rule of 2's: Avoid eating too late, too fast, too much, too spicy or within two hours of bedtime\par \par Problem 5: Immunodeficiency\par - Continue Zithromax 250 mg Monday, Wednesday, and Friday\par -Due to the fact that this pt has had more infections than would be expected and immunological blood work is indicated this would include: IgG subclasses, quantitative immunoglobulins, Strep pneumoniae titers as well as Vitamin D levels. Based on this blood work we will be able to decide where the pt needs additional pneumococcal vaccine either Prevnar 13 or Pneumovax. Immunology evaluation will also be potentially indicated. \par \par Problem 6: Sensory neuropathic Cough\par - Continue Amitriptyline 10 mg up to TID\par -Sensory neuropathic cough is an etiology of cough that is often realized once someone has been ruled out for common disease such as: asthma, COPD, eosinophilic bronchitis, bronchiectasis, post nasal drip, and GERD. It sometimes develops following a URI, herpes zoster outbreak in pharynx or thyroid or cervical spine injury. However, many patients have no identifiable antecedent explanation. \par \par problem 7: tracheomalacia/tracheal tumor \par -follow up with Dr. Edmar Stonefor thoracic evaluation for potential tracheoplasty - s/p new tracheostomy\par \par Tracheomalacia is usually acquired in adults and common causes include damage by tracheostomy or endotracheal intubation damaging the tracheal cartilage with increase risk with multiple intubations, prolonged intubation, and concurrent high dose steroid therapy; external chest wall trauma and surgery; chronic compression of the trachea by benign etiologies (eg, benign mediastinal goiter) or malignancy; relapsing polychondritis; or recurrent infection. Tracheomalacia can be asymptomatic, however signs or symptoms can develop as the severity of the airway narrowing progresses with major symptoms include dyspnea, cough, and sputum retention. Other symptoms include severe paroxysms of coughing, wheezing or stridor, barking cough and may be exacerbated by forced expiration, cough, and Valsalva maneuver. Tracheomalacia is diagnosed by a bronchoscopic visualization of dynamic airway collapse on dynamic chest CT. Therapy is warranted in symptomatic patients with severe tracheomalacia and includes surgical repair as tracheobronchoplasty. The patient was referred to Dr. Willy Kay or Dr. Kelby Larios, at [facility] for a surgical consult. \par \par problem 8: dysphonia/sore throat\par -recommended Fisherman Friend's lozenges \par -recommended to use Slippery Elm Lozenge / Tea \par -continue Evoxac 30 q 8\par \par Problem 8A: Dysphonia\par -recommended swallow therapy\par \par Problem 9: Health Maintenance/COVID19 Precautions \par - S/p Covid 19 vaccine (Pfizer) x1 \par - Clean your hands often. Wash your hands often with soap and water for at least 20 seconds, especially after blowing your nose, coughing, or sneezing, or having been in a public place.\par - If soap and water are not available, use a hand  that contains at least 60% alcohol.\par - To the extent possible, avoid touching high-touch surfaces in public places - elevator buttons, door handles, handrails, handshaking with people, etc. Use a tissue or your sleeve to cover your hand or finger if you must touch something.\par - Wash your hands after touching surfaces in public places.\par - Avoid touching your face, nose, eyes, etc.\par - Clean and disinfect your home to remove germs: practice routine cleaning of frequently touched surfaces (for example: tables, doorknobs, light switches, handles, desks, toilets, faucets, sinks & cell phones)\par - Avoid crowds, especially in poorly ventilated spaces. Your risk of exposure to respiratory viruses like COVID-19 may increase in crowded, closed-in settings with little air circulation if there are people in the crowd who are sick. All patients are recommended to practice social distancing and stay at least 6 feet away from others. \par - Avoid all non-essential travel including plane trips, and especially avoid embarking on cruise ships.\par -If COVID-19 is spreading in your community, take extra measures to put distance between yourself and other people to further reduce your risk of being exposed to this new virus.\par -Stay home as much as possible.\par - Consider ways of getting food brought to your house through family, social, or commercial networks\par -Be aware that the virus has been known to live in the air up to 3 hours post exposure, cardboard up to 24 hours post exposure, copper up to 4 hours post exposure, steel and plastic up to 2-3 days post exposure. Risk of transmission from these surfaces are affected by many variables.\par COVID-19 precautionary Immune Support Recommendations:\par -OTC Vitamin C 500mg BID \par -OTC Quercetin 250-500mg BID \par -OTC Zinc 75-100mg per day \par -OTC Melatonin 1 or 2mg a night \par -OTC Vitamin D 1-4000mg per day \par -OTC Tonic Water 8oz per day\par -Water 0.5-1 gallon per day\par Asthma and COVID19:\par You need to make sure your asthma is under control. This often requires the use of inhaled corticosteroids (and sometimes oral corticosteroids). Inhaled corticosteroids do not likely reduce your immune system’s ability to fight infections, but oral corticosteroids may. It is important to use the steps above to protect yourself to limit your exposure to any respiratory virus. \par \par problem 10: health maintenance \par -Recommended "Throat Coat Tea"\par - recommended to f/u with Dr. Palomares(GI) \par -instructed to increase physical activity as much as possible\par - She was administered an influenza vaccination 11/19\par -recommended strep pneumonia vaccines: Prevnar-13 vaccine, followed by Pneumo vaccine 23 one year following (completed)\par -recommended early intervention for URIs\par -recommended regular osteoporosis evaluations\par -recommended early dermatological evaluations\par -recommended after the age of 50 to the age of 70, colonoscopy every 5 years \par \par \par Follow up in 2 months.\par Patient is encouraged to call with any changes, concerns or questions.

## 2022-06-20 NOTE — HISTORY OF PRESENT ILLNESS
[Home] : at home, [unfilled] , at the time of the visit. [Medical Office: ([facility] [place]___] : at the medical office located in  [Verbal consent obtained from patient] : the patient, [unfilled] [FreeTextEntry1] : Ms. Bo is a 62 year old female with a history of atypical chest pain, COPD, chronic hypoxemia, dysphagia, GERD, laryngeal stenosis, OW, dependence on tracheostomy, and tracheomalacia who presents into the office via video call for a follow up. Her chief complain is \par -she notes feeling better\par -she notes seeing Dr. Batres\par -she notes that she was told her trach could come out by an ENT \par -she notes that her breathing is not ok right now\par -she denies SOB currently but she does note SOB in the middle of the night\par -she notes dysphagia\par \par -patient denies any headaches, nausea, vomiting, fever, chills, sweats, chest pain, chest pressure, palpitations, coughing, wheezing, fatigue, diarrhea, constipation, myalgias, dizziness, leg swelling, leg pain, itchy eyes, itchy ears, heartburn, reflux or sour taste in the mouth

## 2022-06-22 ENCOUNTER — APPOINTMENT (OUTPATIENT)
Dept: OTOLARYNGOLOGY | Facility: CLINIC | Age: 62
End: 2022-06-22

## 2022-06-28 ENCOUNTER — NON-APPOINTMENT (OUTPATIENT)
Age: 62
End: 2022-06-28

## 2022-07-15 ENCOUNTER — APPOINTMENT (OUTPATIENT)
Dept: THORACIC SURGERY | Facility: CLINIC | Age: 62
End: 2022-07-15

## 2022-07-27 ENCOUNTER — NON-APPOINTMENT (OUTPATIENT)
Age: 62
End: 2022-07-27

## 2022-07-27 RX ORDER — METHYLPREDNISOLONE 4 MG/1
4 TABLET ORAL
Qty: 21 | Refills: 0 | Status: ACTIVE | COMMUNITY
Start: 2022-07-27 | End: 1900-01-01

## 2022-07-28 ENCOUNTER — NON-APPOINTMENT (OUTPATIENT)
Age: 62
End: 2022-07-28

## 2022-08-01 RX ORDER — ACETYLCYSTEINE 100 MG/ML
10 SOLUTION ORAL; RESPIRATORY (INHALATION)
Qty: 180 | Refills: 0 | Status: DISCONTINUED | COMMUNITY
Start: 2020-04-07 | End: 2022-08-01

## 2022-08-01 RX ORDER — OMEPRAZOLE
2 KIT TWICE DAILY
Qty: 2 | Refills: 4 | Status: DISCONTINUED | COMMUNITY
Start: 2022-01-21 | End: 2022-08-01

## 2022-08-01 RX ORDER — ACETYLCYSTEINE 100 MG/ML
10 SOLUTION ORAL; RESPIRATORY (INHALATION)
Qty: 270 | Refills: 1 | Status: DISCONTINUED | COMMUNITY
Start: 2021-11-02 | End: 2022-08-01

## 2022-08-01 RX ORDER — ALBUTEROL SULFATE 90 UG/1
108 (90 BASE) INHALANT RESPIRATORY (INHALATION) EVERY 8 HOURS
Qty: 3 | Refills: 0 | Status: DISCONTINUED | COMMUNITY
Start: 2019-12-26 | End: 2022-08-01

## 2022-08-01 RX ORDER — LACTOSE-REDUCED FOOD/FIBER 0.06 G-1.2
LIQUID (ML) ORAL 3 TIMES DAILY
Qty: 21330 | Refills: 6 | Status: DISCONTINUED | OUTPATIENT
Start: 2021-05-10 | End: 2022-08-01

## 2022-08-01 RX ORDER — SIMETHICONE 125 MG/1
125 TABLET, CHEWABLE ORAL
Qty: 90 | Refills: 0 | Status: DISCONTINUED | COMMUNITY
Start: 2020-02-24 | End: 2022-08-01

## 2022-08-01 RX ORDER — NUTRITIONAL SUPPLEMENT 0.06 G-1.5
LIQUID (ML) ORAL 3 TIMES DAILY
Qty: 711 | Refills: 3 | Status: DISCONTINUED | COMMUNITY
Start: 2020-09-17 | End: 2022-08-01

## 2022-08-05 ENCOUNTER — APPOINTMENT (OUTPATIENT)
Dept: THORACIC SURGERY | Facility: CLINIC | Age: 62
End: 2022-08-05

## 2022-08-05 ENCOUNTER — OUTPATIENT (OUTPATIENT)
Dept: OUTPATIENT SERVICES | Facility: HOSPITAL | Age: 62
LOS: 1 days | End: 2022-08-05
Payer: MEDICAID

## 2022-08-05 VITALS
TEMPERATURE: 97 F | HEIGHT: 64 IN | HEART RATE: 66 BPM | OXYGEN SATURATION: 66 % | DIASTOLIC BLOOD PRESSURE: 76 MMHG | BODY MASS INDEX: 20.49 KG/M2 | RESPIRATION RATE: 13 BRPM | SYSTOLIC BLOOD PRESSURE: 116 MMHG | WEIGHT: 120 LBS

## 2022-08-05 DIAGNOSIS — Z43.0 ENCOUNTER FOR ATTENTION TO TRACHEOSTOMY: Chronic | ICD-10-CM

## 2022-08-05 DIAGNOSIS — J39.8 OTHER SPECIFIED DISEASES OF UPPER RESPIRATORY TRACT: Chronic | ICD-10-CM

## 2022-08-05 DIAGNOSIS — Z93.0 TRACHEOSTOMY STATUS: Chronic | ICD-10-CM

## 2022-08-05 DIAGNOSIS — Z01.818 ENCOUNTER FOR OTHER PREPROCEDURAL EXAMINATION: ICD-10-CM

## 2022-08-05 DIAGNOSIS — Z98.891 HISTORY OF UTERINE SCAR FROM PREVIOUS SURGERY: Chronic | ICD-10-CM

## 2022-08-05 LAB
ALBUMIN SERPL ELPH-MCNC: 4 G/DL — SIGNIFICANT CHANGE UP (ref 3.3–5)
ALP SERPL-CCNC: 72 U/L — SIGNIFICANT CHANGE UP (ref 40–120)
ALT FLD-CCNC: 14 U/L — SIGNIFICANT CHANGE UP (ref 10–45)
ANION GAP SERPL CALC-SCNC: 9 MMOL/L — SIGNIFICANT CHANGE UP (ref 5–17)
APTT BLD: 52.8 SEC — HIGH (ref 27.5–35.5)
AST SERPL-CCNC: 22 U/L — SIGNIFICANT CHANGE UP (ref 10–40)
BASOPHILS # BLD AUTO: 0.09 K/UL — SIGNIFICANT CHANGE UP (ref 0–0.2)
BASOPHILS NFR BLD AUTO: 1.5 % — SIGNIFICANT CHANGE UP (ref 0–2)
BILIRUB SERPL-MCNC: 0.4 MG/DL — SIGNIFICANT CHANGE UP (ref 0.2–1.2)
BUN SERPL-MCNC: 5 MG/DL — LOW (ref 7–23)
CALCIUM SERPL-MCNC: 9.6 MG/DL — SIGNIFICANT CHANGE UP (ref 8.4–10.5)
CHLORIDE SERPL-SCNC: 101 MMOL/L — SIGNIFICANT CHANGE UP (ref 96–108)
CO2 SERPL-SCNC: 33 MMOL/L — HIGH (ref 22–31)
CREAT SERPL-MCNC: 0.7 MG/DL — SIGNIFICANT CHANGE UP (ref 0.5–1.3)
EGFR: 98 ML/MIN/1.73M2 — SIGNIFICANT CHANGE UP
EOSINOPHIL # BLD AUTO: 0.23 K/UL — SIGNIFICANT CHANGE UP (ref 0–0.5)
EOSINOPHIL NFR BLD AUTO: 3.7 % — SIGNIFICANT CHANGE UP (ref 0–6)
GLUCOSE SERPL-MCNC: 82 MG/DL — SIGNIFICANT CHANGE UP (ref 70–99)
HCT VFR BLD CALC: 28.6 % — LOW (ref 34.5–45)
HGB BLD-MCNC: 9.4 G/DL — LOW (ref 11.5–15.5)
IMM GRANULOCYTES NFR BLD AUTO: 0.3 % — SIGNIFICANT CHANGE UP (ref 0–1.5)
INR BLD: 1.08 — SIGNIFICANT CHANGE UP (ref 0.88–1.16)
LYMPHOCYTES # BLD AUTO: 2.55 K/UL — SIGNIFICANT CHANGE UP (ref 1–3.3)
LYMPHOCYTES # BLD AUTO: 41.5 % — SIGNIFICANT CHANGE UP (ref 13–44)
MCHC RBC-ENTMCNC: 27.3 PG — SIGNIFICANT CHANGE UP (ref 27–34)
MCHC RBC-ENTMCNC: 32.9 GM/DL — SIGNIFICANT CHANGE UP (ref 32–36)
MCV RBC AUTO: 83.1 FL — SIGNIFICANT CHANGE UP (ref 80–100)
MONOCYTES # BLD AUTO: 0.52 K/UL — SIGNIFICANT CHANGE UP (ref 0–0.9)
MONOCYTES NFR BLD AUTO: 8.5 % — SIGNIFICANT CHANGE UP (ref 2–14)
NEUTROPHILS # BLD AUTO: 2.73 K/UL — SIGNIFICANT CHANGE UP (ref 1.8–7.4)
NEUTROPHILS NFR BLD AUTO: 44.5 % — SIGNIFICANT CHANGE UP (ref 43–77)
NRBC # BLD: 0 /100 WBCS — SIGNIFICANT CHANGE UP (ref 0–0)
PLATELET # BLD AUTO: 248 K/UL — SIGNIFICANT CHANGE UP (ref 150–400)
POTASSIUM SERPL-MCNC: 3.3 MMOL/L — LOW (ref 3.5–5.3)
POTASSIUM SERPL-SCNC: 3.3 MMOL/L — LOW (ref 3.5–5.3)
PROT SERPL-MCNC: 6.5 G/DL — SIGNIFICANT CHANGE UP (ref 6–8.3)
PROTHROM AB SERPL-ACNC: 12.9 SEC — SIGNIFICANT CHANGE UP (ref 10.5–13.4)
RBC # BLD: 3.44 M/UL — LOW (ref 3.8–5.2)
RBC # FLD: 13.7 % — SIGNIFICANT CHANGE UP (ref 10.3–14.5)
SODIUM SERPL-SCNC: 143 MMOL/L — SIGNIFICANT CHANGE UP (ref 135–145)
WBC # BLD: 6.14 K/UL — SIGNIFICANT CHANGE UP (ref 3.8–10.5)
WBC # FLD AUTO: 6.14 K/UL — SIGNIFICANT CHANGE UP (ref 3.8–10.5)

## 2022-08-05 PROCEDURE — 99202 OFFICE O/P NEW SF 15 MIN: CPT

## 2022-08-05 PROCEDURE — 85610 PROTHROMBIN TIME: CPT

## 2022-08-05 PROCEDURE — 80053 COMPREHEN METABOLIC PANEL: CPT

## 2022-08-05 PROCEDURE — 36415 COLL VENOUS BLD VENIPUNCTURE: CPT

## 2022-08-05 PROCEDURE — 85025 COMPLETE CBC W/AUTO DIFF WBC: CPT

## 2022-08-05 PROCEDURE — 85730 THROMBOPLASTIN TIME PARTIAL: CPT

## 2022-08-05 PROCEDURE — 93000 ELECTROCARDIOGRAM COMPLETE: CPT

## 2022-08-05 NOTE — PHYSICAL EXAM
[General Appearance - Alert] : alert [] : no respiratory distress [Respiration, Rhythm And Depth] : normal respiratory rhythm and effort [Exaggerated Use Of Accessory Muscles For Inspiration] : no accessory muscle use [Auscultation Breath Sounds / Voice Sounds] : lungs were clear to auscultation bilaterally [Apical Impulse] : the apical impulse was normal [Heart Rate And Rhythm] : heart rate was normal and rhythm regular [Heart Sounds] : normal S1 and S2 [Examination Of The Chest] : the chest was normal in appearance [2+] : left 2+ [Oriented To Time, Place, And Person] : oriented to person, place, and time

## 2022-08-08 NOTE — ED ADULT NURSE NOTE - NSFALLRSKPASTHIST_ED_ALL_ED
[de-identified] : 06/22/22 Delayed B/L Breast KYUNG Flap Reconstruction with Dr. Lerman on 6/22/22 at [facility].\par \par 06/22/22 Delayed B/L Breast KYUNG Flap Reconstruction
no

## 2022-08-09 NOTE — ASSESSMENT
[FreeTextEntry1] : 62 year old F, former smoker (1-2 cigarettes daily on and off for 20 years), with PMHx of GERD, anxiety, COPD, respiratory failure for 10 years, tracheomalacia s/p tracheostomy (2004), c/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), and chronic vent dependence, who is referred by Dr. Maco Nova for surgical evaluation for potential tracheoplasty.  \par \par CT chest on 06/01/2021\par - largely stable chronic scarring changes, nodularity and bronchiectasis upper lungs, right middle lobe, lingula and lower lungs\par - new endobronchial plugging anterior segmental bronchus right lower lobe. \par \par Patient arrived today in a stretcher currently ventilator dependent. Patient referred for evaluation of TBM possible tracheobronchoplasty. Will obtain a CT chest since prior imaging was from one year ago. Will plan for a flexible bronchoscopy to evaluate anatomy. \par \par Plan:\par 1. CT chest without contrast\par 2. Flexible bronchoscopy 9/2/22\par 3. Labs and EKG done in the office today \par \par I, WILBERT SKINNER , am scribing for and in the presence of ROMEL MUÑOZ the following sections: history of present illness, past medical/family/surgical/family/social history, review of systems, vital signs, physical exam, and disposition.\par \par I ROMEL MUÑOZ, personally performed the service described in the documentation, reviewed the documentation recorded by the scribe in my presence and it accurately and completely records my words and actions. \par

## 2022-08-09 NOTE — CONSULT LETTER
[Dear  ___] : Dear  [unfilled], [Consult Letter:] : I had the pleasure of evaluating your patient, [unfilled]. [Please see my note below.] : Please see my note below. [Consult Closing:] : Thank you very much for allowing me to participate in the care of this patient.  If you have any questions, please do not hesitate to contact me. [Sincerely,] : Sincerely, [FreeTextEntry2] : Maco Sow [FreeTextEntry3] : Edmar Stone MD\par Professor, Cardiovascular & Thoracic Surgery\par [employer]\par Director of the [facility] \par Director of Thoracic Surgery, [facility]\par \par [facility]\par [address]\par [facility] 4th Floor\par [place] [address]\par Phone: [phone]\par Fax: [phone]\par \par \par

## 2022-08-09 NOTE — HISTORY OF PRESENT ILLNESS
[FreeTextEntry1] : PATRIZIA NAVARRETE is a 62 year old F, former smoker (1-2 cigarettes daily on and off for 20 years), with PMHx of GERD, anxiety, COPD, respiratory failure for 10 years, tracheomalacia s/p tracheostomy (2004), c/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), and chronic vent dependence, who is referred by Dr. Maco Nova for surgical evaluation for potential tracheoplasty.  \par \par CT chest on 06/01/2021\par - largely stable chronic scarring changes, nodularity and bronchiectasis upper lungs, right middle lobe, lingula and lower lungs\par - new endobronchial plugging anterior segmental bronchus right lower lobe. \par \par

## 2022-08-10 ENCOUNTER — NON-APPOINTMENT (OUTPATIENT)
Age: 62
End: 2022-08-10

## 2022-08-12 ENCOUNTER — NON-APPOINTMENT (OUTPATIENT)
Age: 62
End: 2022-08-12

## 2022-08-15 ENCOUNTER — APPOINTMENT (OUTPATIENT)
Dept: CT IMAGING | Facility: HOSPITAL | Age: 62
End: 2022-08-15

## 2022-08-15 ENCOUNTER — OUTPATIENT (OUTPATIENT)
Dept: OUTPATIENT SERVICES | Facility: HOSPITAL | Age: 62
LOS: 1 days | End: 2022-08-15
Payer: MEDICAID

## 2022-08-15 ENCOUNTER — RESULT REVIEW (OUTPATIENT)
Age: 62
End: 2022-08-15

## 2022-08-15 DIAGNOSIS — Z93.0 TRACHEOSTOMY STATUS: Chronic | ICD-10-CM

## 2022-08-15 DIAGNOSIS — Z43.0 ENCOUNTER FOR ATTENTION TO TRACHEOSTOMY: Chronic | ICD-10-CM

## 2022-08-15 DIAGNOSIS — Z98.891 HISTORY OF UTERINE SCAR FROM PREVIOUS SURGERY: Chronic | ICD-10-CM

## 2022-08-15 DIAGNOSIS — J39.8 OTHER SPECIFIED DISEASES OF UPPER RESPIRATORY TRACT: Chronic | ICD-10-CM

## 2022-08-15 PROCEDURE — 71250 CT THORAX DX C-: CPT | Mod: 26

## 2022-08-15 PROCEDURE — 71250 CT THORAX DX C-: CPT

## 2022-08-18 ENCOUNTER — NON-APPOINTMENT (OUTPATIENT)
Age: 62
End: 2022-08-18

## 2022-08-22 ENCOUNTER — NON-APPOINTMENT (OUTPATIENT)
Age: 62
End: 2022-08-22

## 2022-08-29 ENCOUNTER — NON-APPOINTMENT (OUTPATIENT)
Age: 62
End: 2022-08-29

## 2022-08-30 ENCOUNTER — NON-APPOINTMENT (OUTPATIENT)
Age: 62
End: 2022-08-30

## 2022-08-30 NOTE — ASSESSMENT
Called and talked to Pt. She is sched for F/U on 9/23/22 @ 10am w/ oP [place] PA-C. Pt is aware to get Xray before appt. Mailed appt reminder. Thanks- [id number]   [FreeTextEntry1] : 60 year female with tracheomalacia s/p tracheostomy in 2004 with stenosis, stomaplasty, vent dependent, COPD, GERD, anxiety with positive sputum culture.\par \par Had an outpatient CXR with PNA per patient.\par Sputum culture with:\par MRSA\par ESBL Kleb\par Serratia marcescens\par PA\par Achromobacter\par GNR sent to [facility]\par \par Recommend:\par #Positive sputum culture \par -Most likely most of the organisms represent colonization\par -She was started on doxycycline but could not continue due to dry mouth\par -Per her HHA, not having increased sputum production, speaking in full sentences, tolerating CPAP.\par -Can complete course of doxy for the MRSA\par -She has resistant organisms, only treatment option is IV\par -At this time can continue to monitor\par -If her condition worsens, may need ED for evaluation\par \par #Trach dependent\par -Not in resp distress\par -Continue to follow with LORETA/PULM\par \par RTC in 3-4 weeks

## 2022-08-31 ENCOUNTER — NON-APPOINTMENT (OUTPATIENT)
Age: 62
End: 2022-08-31

## 2022-09-01 ENCOUNTER — NON-APPOINTMENT (OUTPATIENT)
Age: 62
End: 2022-09-01

## 2022-09-02 ENCOUNTER — NON-APPOINTMENT (OUTPATIENT)
Age: 62
End: 2022-09-02

## 2022-09-02 ENCOUNTER — APPOINTMENT (OUTPATIENT)
Dept: THORACIC SURGERY | Facility: HOSPITAL | Age: 62
End: 2022-09-02

## 2022-09-02 ENCOUNTER — APPOINTMENT (OUTPATIENT)
Dept: OTOLARYNGOLOGY | Facility: CLINIC | Age: 62
End: 2022-09-02

## 2022-09-02 VITALS
OXYGEN SATURATION: 100 % | SYSTOLIC BLOOD PRESSURE: 133 MMHG | DIASTOLIC BLOOD PRESSURE: 82 MMHG | RESPIRATION RATE: 20 BRPM | HEART RATE: 50 BPM

## 2022-09-02 PROCEDURE — 31615 TRCHEOBRNCHSC EST TRACHS INC: CPT | Mod: 59

## 2022-09-02 PROCEDURE — 99214 OFFICE O/P EST MOD 30 MIN: CPT | Mod: 25

## 2022-09-02 PROCEDURE — 31575 DIAGNOSTIC LARYNGOSCOPY: CPT | Mod: 59

## 2022-09-06 ENCOUNTER — NON-APPOINTMENT (OUTPATIENT)
Age: 62
End: 2022-09-06

## 2022-09-08 ENCOUNTER — NON-APPOINTMENT (OUTPATIENT)
Age: 62
End: 2022-09-08

## 2022-09-19 ENCOUNTER — APPOINTMENT (OUTPATIENT)
Dept: PULMONOLOGY | Facility: CLINIC | Age: 62
End: 2022-09-19

## 2022-09-19 VITALS
SYSTOLIC BLOOD PRESSURE: 120 MMHG | HEART RATE: 64 BPM | OXYGEN SATURATION: 98 % | WEIGHT: 130 LBS | TEMPERATURE: 97.9 F | DIASTOLIC BLOOD PRESSURE: 66 MMHG | HEIGHT: 64 IN | RESPIRATION RATE: 20 BRPM | BODY MASS INDEX: 22.2 KG/M2

## 2022-09-19 DIAGNOSIS — Z01.811 ENCOUNTER FOR PREPROCEDURAL RESPIRATORY EXAMINATION: ICD-10-CM

## 2022-09-19 PROCEDURE — 99214 OFFICE O/P EST MOD 30 MIN: CPT

## 2022-09-19 RX ORDER — AMOXICILLIN AND CLAVULANATE POTASSIUM 875; 125 MG/1; MG/1
875-125 TABLET, COATED ORAL
Qty: 20 | Refills: 0 | Status: DISCONTINUED | COMMUNITY
Start: 2022-07-27 | End: 2022-09-19

## 2022-09-19 RX ORDER — CIPROFLOXACIN AND DEXAMETHASONE 3; 1 MG/ML; MG/ML
0.3-0.1 SUSPENSION/ DROPS AURICULAR (OTIC)
Qty: 7.5 | Refills: 2 | Status: DISCONTINUED | COMMUNITY
Start: 2021-12-15 | End: 2022-09-19

## 2022-09-19 RX ORDER — HYDROXYZINE HYDROCHLORIDE 10 MG/1
10 TABLET ORAL
Qty: 90 | Refills: 0 | Status: ACTIVE | COMMUNITY
Start: 2022-06-22

## 2022-09-26 ENCOUNTER — NON-APPOINTMENT (OUTPATIENT)
Age: 62
End: 2022-09-26

## 2022-09-28 RX ORDER — NUTRITIONAL SUPPLEMENT/FIBER 0.06 G-1.4
LIQUID (ML) ORAL
Refills: 0 | Status: ACTIVE | COMMUNITY

## 2022-09-28 RX ORDER — NUTRITIONAL SUPPLEMENT/FIBER 0.06 G-1.4
LIQUID (ML) ORAL
Qty: 117000 | Refills: 2 | Status: ACTIVE | OUTPATIENT

## 2022-10-09 NOTE — HISTORY OF PRESENT ILLNESS
[de-identified] : 62 year old female here for follow up for chronic respiratory failure.\par History of laryngotracheal stenosis.\par Current trach size is 6LPC. Last trach change: April 2022 \par Reports some difficulty swallowing soft foods \par Currently has PEG tube place, tolerating bolus feeding (Ensure). \par Reports foul smelling secretions with green to brown mucus production. \par Continues to reports sore throat treated with Tylenol with relief. \par Patient reports shortness of breath throughout day-currently using inhaler and nebulizers with relief.

## 2022-10-09 NOTE — PHYSICAL EXAM
[FreeTextEntry1] : stretcher-bound, on oxygen and ventilator through trach, no retractions or distress [de-identified] : trach tube in place with multiple gauze and loose , 6 LPC trach  [Midline] : trachea located in midline position [Laryngoscopy Performed] : laryngoscopy was performed, see procedure section for findings [Normal] : no rashes [de-identified] : mildly raspy voice, hyperfunctional, easily understood

## 2022-10-13 RX ORDER — BUDESONIDE AND FORMOTEROL FUMARATE DIHYDRATE 160; 4.5 UG/1; UG/1
160-4.5 AEROSOL RESPIRATORY (INHALATION) TWICE DAILY
Qty: 1 | Refills: 3 | Status: ACTIVE | COMMUNITY
Start: 1900-01-01 | End: 1900-01-01

## 2022-10-13 RX ORDER — BUDESONIDE 0.5 MG/2ML
0.5 INHALANT ORAL TWICE DAILY
Qty: 60 | Refills: 3 | Status: ACTIVE | COMMUNITY
Start: 2022-04-07 | End: 1900-01-01

## 2022-10-13 RX ORDER — ALBUTEROL SULFATE 90 UG/1
108 (90 BASE) AEROSOL, METERED RESPIRATORY (INHALATION)
Qty: 1 | Refills: 0 | Status: ACTIVE | COMMUNITY
Start: 2018-07-09 | End: 1900-01-01

## 2022-10-28 ENCOUNTER — NON-APPOINTMENT (OUTPATIENT)
Age: 62
End: 2022-10-28

## 2022-11-03 RX ORDER — PREDNISONE 10 MG/1
10 TABLET ORAL
Qty: 21 | Refills: 0 | Status: ACTIVE | COMMUNITY
Start: 2022-11-03 | End: 1900-01-01

## 2022-11-04 NOTE — ASSESSMENT
[FreeTextEntry1] : Ms. Bo is a 62 year old female, presenting into the office with a history of former smoker, COPD, respiratory failure for 10 years, tracheostomy,  tracheostenosis and tracheomalacia who is s/p bronchoscopy with revision of the tracheostomy with dilation of the tracheostomy.  She presents with tracheomalacia and end stage COPD with issues with ventilator. PNA ([facility])- s/p colonoscopy (preop). Chronic colonization of aeruginosa with pseudomonas, Difficulty with mucus clearance despite chest vest, s/p recent PTX on the right -  chronic respiratory complaint s/p colonoscopy - ?Lung Bx ([facility])  s/p PNA and hospitalization 11/2021 - now plagued by reflux, #1 issue is dysphasia ; awaiting intervention by Edmar Stone (trachea)/ Michael (throat)\par \par \par ********PRE-OP CLEARANCE FOR MDL, Bronchoscopy, Stomaplasty, esophagoscopy, dilation esophagus, and MDL w/ injection)  (Dr. Edmar Stone and Dr. Rodriguez)******\par -at this point in time there are no absolute pulmonary contraindications to go forward with the planned procedure \par -at the time of surgery s/he should have optimal pain control, incentive spirometry, early ambulation, DVT and GI prophylaxis. \par \par \par Her chronic respiratory failure is multifactorial due to:\par -underlying COPD/severe persistent asthma\par -respiratory muscle weakness\par -tracheomalacia\par \par Problem 1A: s/p PNA (resolved)\par -Clinically cleared\par -Using cough assist device for tracheomalacia\par -Add NAC 2cc 10% q8H\par -Your chest xray/CT reveals an infiltrate c/w pneumonia; this based on your clinical scenario required additional therapy. Any change in your status will require hospitalization at the nearest hospital and al local ambulance will need to be called. Our group is on staff at [facility] and [facility] as- consultants. The patient/family is instructed to notify our office with any change in condition. \par \par \par problem 1: tracheostomy / chronic respiratory failure \par -Continue to wean off the ventilator as tolerable in short trials throughout the day  \par -recommended to use hypertonic saline in clearing of tracheostomy \par -Rx given for elastic pads, dressings, disposable inner cannulas, and tracheostomy collar to improve collar. Rx given for Metaplex Lite. \par -recommended f/u with Dr. Brett Laughlin and Dr. Edward Rodriguez\par \par \par  Problem 1a: mucopurulent chronic bronchitis \par - continue Pulmozyme .5 BID PRN  -NC\par - chest vest in place \par You have a clinical scenario most c/q acute bronchitis the etiology of which is unknown but empiric antibiotics are indicated. Hydration, mucolytics including mucinex, robitussin and the like are indicated. Cough controlling agents will be needed. \par \par Problem 1B: PTX (right side)- resolved (2020) \par The patient has a pneumothorax from trauma, iatrogenic, post surgical. You are at risk for one in the future and may need hospitalization. Thoracic surgical evaluation is needed for chest tube and other intervention. \par \par Problem 1C: ?Lung Bx ([facility]) \par - obtain records (not available)\par \par Problem 2: Chronic Respiratory Failure \par -Trach collar- goal up to 12hrs per day (not doing)\par - 3 L SIMV: VT- 450; PC 20; PS-18; PEEP- 5\par - Night CPAP 5/PS 18 (back up 8 B/mm)\par \par problem 3:  COPD/asthma\par -continue Combivent 1 inhalation up to QID to replace Symbicort \par -continue Performist nebulizer 1 unit dose BID, followed by Pulmicort 0.5 nebulizer BID \par -continue Albuterol via nebulizer for rescue up to QiD \par -continue on NAC Q8H\par -continue Yuperli 1 unit dose QD via nebulizer\par - continue Mucomyst 2cc (107.) q8H \par -COPD is a progressive disease and although it can’t be cured , appropriate management can slow its progression, reduce frequency and severity of exacerbations, and improve symptoms and the patient quality of life. Hospitalizations are the greatest contributor to the total COPD costs and account for up to 87% of total COPD related costs. Exacerbations are the main cause of admissions and subsequently account for the 40-75% of COPD costs. Inhaled maintenance therapy reduces the incidence of exacerbations in patients with stable COPD. Incorrect inhaler use and nonadherence are major obstacles to achieving COPD treatment goals. Many COPD patients have challenges (impaired inhalation, limited dexterity, reduced cognition: that limit their ability to correctly use their COPD treatment devices resulting in reduced symptom control. Of most importance is smoking cessation and early intervention with respiratory illnesses and contemplation for pulmonary rehab to enhance quality of life.\par \par Problem 3A: Abnormal CT: confirm Bronchiectasis / pulmonary nodule\par -s/p CT of the chest -(no present) -s/p 8/2022- next 8/2023\par -Complete high resolution chest CT, prone and supine, to r/o pulmonary nodules - next  8/2023\par -Recommended to take Slippery Elm Tea and pill for cough and mucus clearing \par -based upon results of CT scan, will apply for chest vest therapy\par \par -CAT scans are the only radiological modality to identify abnormalities w/in the lungs with regards to nodules/masses/lymph nodes. Risks, benefits were reviewed in detail. The guidelines for abnormalities include follow up CT scans at various intervals which could range from 6 weeks to 1 year intervals. If there is a change for the worse then consideration for a biopsy will be considered if you are a candidate. Second opinion evaluation with a thoracic surgeon or an interventional radiologist could be offered. \par - Seen on the CT of the chest or chest x-ray signifies damaged bronchial tubes focal or diffuse which can be sites of recurrent infections. These areas can be colonized by various organisms including bacteria (hemophilous influenzae/Pseudomonas species etc.) as well as acid fast bacilli (mycobacterial disease- inclusive of TB/MT etc.).  The patient has previously used acapella, nebulizer and breathing techniques for airway clearance.  These methods have not provided appropriate secretion manage in this patient.Cough length has been greater than 6 months, hence, initiating vest therapy is necessary.\par \par \par problem 3b: pseudomonas aeruginosa.- chronic colonization (kubulek) \par -Apply for vest pack (failed Conventional therapy.) - pending delivery  \par -get infectious disease f/u PRN \par -chronic colonization\par \par problem 4: GERD -(s/p EgD +/- colonoscopy- negative ) #1 issue\par - Recommend GI f/u\par -add Pepcid 40 mg QHS\par -add Reglan 5 mg pre-meal, QHS\par -change Protonix 40 mg qAM ;\par -Things to avoid including overeating, spicy foods, tight clothing, eating within three hours of bed, this list is not all inclusive. \par -For treatment of reflux, possible options discussed including diet control, H2 blockers, PPIs, as well as coating motility agents discussed as treatment options. Timing of meals and proximity of last meal to sleep were discussed. If symptoms persist, a formal gastrointestinal evaluation is needed.\par -Rule of 2's: Avoid eating too late, too fast, too much, too spicy or within two hours of bedtime\par \par Problem 5: Immunodeficiency\par - Continue Zithromax 250 mg Monday, Wednesday, and Friday\par -Due to the fact that this pt has had more infections than would be expected and immunological blood work is indicated this would include: IgG subclasses, quantitative immunoglobulins, Strep pneumoniae titers as well as Vitamin D levels. Based on this blood work we will be able to decide where the pt needs additional pneumococcal vaccine either Prevnar 13 or Pneumovax. Immunology evaluation will also be potentially indicated. \par \par Problem 6: Sensory neuropathic Cough\par - Continue Amitriptyline 10 mg up to TID\par -Sensory neuropathic cough is an etiology of cough that is often realized once someone has been ruled out for common disease such as: asthma, COPD, eosinophilic bronchitis, bronchiectasis, post nasal drip, and GERD. It sometimes develops following a URI, herpes zoster outbreak in pharynx or thyroid or cervical spine injury. However, many patients have no identifiable antecedent explanation. \par \par problem 7: tracheomalacia/tracheal tumor \par -follow up with Dr. Kelby Larios for thoracic evaluation for potential tracheoplasty - s/p new tracheostomy\par \par Tracheomalacia is usually acquired in adults and common causes include damage by tracheostomy or endotracheal intubation damaging the tracheal cartilage with increase risk with multiple intubations, prolonged intubation, and concurrent high dose steroid therapy; external chest wall trauma and surgery; chronic compression of the trachea by benign etiologies (eg, benign mediastinal goiter) or malignancy; relapsing polychondritis; or recurrent infection. Tracheomalacia can be asymptomatic, however signs or symptoms can develop as the severity of the airway narrowing progresses with major symptoms include dyspnea, cough, and sputum retention. Other symptoms include severe paroxysms of coughing, wheezing or stridor, barking cough and may be exacerbated by forced expiration, cough, and Valsalva maneuver. Tracheomalacia is diagnosed by a bronchoscopic visualization of dynamic airway collapse on dynamic chest CT. Therapy is warranted in symptomatic patients with severe tracheomalacia and includes surgical repair as tracheobronchoplasty. The patient was referred to Dr. Willy Kay or Dr. Kelby Larios, at [facility] for a surgical consult. \par \par problem 8: dysphonia/sore throat\par -recommended Fisherman Friend's lozenges \par -recommended to use Slippery Elm Lozenge / Tea \par -continue Evoxac 30 q 8\par \par Problem 8A: Dysphasia\par -recommended swallow therapy\par \par Problem 9: Health Maintenance/COVID19 Precautions \par - S/p Covid 19 vaccine (Pfizer) x1 \par - Clean your hands often. Wash your hands often with soap and water for at least 20 seconds, especially after blowing your nose, coughing, or sneezing, or having been in a public place.\par - If soap and water are not available, use a hand  that contains at least 60% alcohol.\par - To the extent possible, avoid touching high-touch surfaces in public places - elevator buttons, door handles, handrails, handshaking with people, etc. Use a tissue or your sleeve to cover your hand or finger if you must touch something.\par - Wash your hands after touching surfaces in public places.\par - Avoid touching your face, nose, eyes, etc.\par - Clean and disinfect your home to remove germs: practice routine cleaning of frequently touched surfaces (for example: tables, doorknobs, light switches, handles, desks, toilets, faucets, sinks & cell phones)\par - Avoid crowds, especially in poorly ventilated spaces. Your risk of exposure to respiratory viruses like COVID-19 may increase in crowded, closed-in settings with little air circulation if there are people in the crowd who are sick. All patients are recommended to practice social distancing and stay at least 6 feet away from others. \par - Avoid all non-essential travel including plane trips, and especially avoid embarking on cruise ships.\par -If COVID-19 is spreading in your community, take extra measures to put distance between yourself and other people to further reduce your risk of being exposed to this new virus.\par -Stay home as much as possible.\par - Consider ways of getting food brought to your house through family, social, or commercial networks\par -Be aware that the virus has been known to live in the air up to 3 hours post exposure, cardboard up to 24 hours post exposure, copper up to 4 hours post exposure, steel and plastic up to 2-3 days post exposure. Risk of transmission from these surfaces are affected by many variables.\par COVID-19 precautionary Immune Support Recommendations:\par -OTC Vitamin C 500mg BID \par -OTC Quercetin 250-500mg BID \par -OTC Zinc 75-100mg per day \par -OTC Melatonin 1 or 2mg a night \par -OTC Vitamin D 1-4000mg per day \par -OTC Tonic Water 8oz per day\par -Water 0.5-1 gallon per day\par Asthma and COVID19:\par You need to make sure your asthma is under control. This often requires the use of inhaled corticosteroids (and sometimes oral corticosteroids). Inhaled corticosteroids do not likely reduce your immune system’s ability to fight infections, but oral corticosteroids may. It is important to use the steps above to protect yourself to limit your exposure to any respiratory virus. \par \par problem 10: health maintenance \par -Recommended "Throat Coat Tea"\par - recommended to f/u with Dr. Palomares(GI) \par -instructed to increase physical activity as much as possible\par - She was administered an influenza vaccination 11/19\par -recommended strep pneumonia vaccines: Prevnar-13 vaccine, followed by Pneumo vaccine 23 one year following (completed)\par -recommended early intervention for URIs\par -recommended regular osteoporosis evaluations\par -recommended early dermatological evaluations\par -recommended after the age of 50 to the age of 70, colonoscopy every 5 years \par \par \par Follow up in 2 months.\par Patient is encouraged to call with any changes, concerns or questions.

## 2022-11-04 NOTE — HISTORY OF PRESENT ILLNESS
[FreeTextEntry1] : Ms. Bo is a 62 year old female with a history of atypical chest pain, COPD, chronic hypoxemia, dysphagia, GERD, laryngeal stenosis, OW, dependence on tracheostomy, and tracheomalacia who presents into the office for a follow up. Her chief complain is \par \par -she notes feeling too good \par -she notes going to Dr. Edmar Stone (2 weeks ago) \par -she notes having reflux into her chest and taking her breathe away\par -she notes no fevers chills and sweats \par -she notes losing a lot of weight \par -she notes eating decnt amont \par -she notes sleep is not good \par -she notes not sleeping much because of having a hard time breathing \par -she notes right leg swelling \par -pt denies any new medications, supplements, or vitamins \par -she notes breathing has not been well \par -she notes having thick and dry mucous \par -she notes using saline \par patient denies any headaches, nausea, vomiting, fever, chills, sweats, chest pain, chest pressure, palpitations, coughing, wheezing, fatigue, diarrhea, constipation, dysphagia, myalgias, dizziness, leg swelling, leg pain, itchy eyes, itchy ears, heartburn, reflux or sour taste in the mouth

## 2022-11-04 NOTE — ADDENDUM
[FreeTextEntry1] : *******AMENDED BY ADELIA BEYER 11/4/2022************\par \par \par Documented by Maycol Lee acting as a scribe for Dr. Maco Nova on 09/19/2022 .\par \par All medical record entries made by the Scribe were at my, Dr. Maco Nova's, direction and personally dictated by me on. I have reviewed the chart and agree that the record accurately reflects my personal performance of the history, physical exam, assessment and plan. I have also personally directed, reviewed, and agree with the discharge instructions.

## 2022-11-04 NOTE — REASON FOR VISIT
[Follow-Up] : a follow-up visit [Formal Caregiver] : formal caregiver [FreeTextEntry1] : atypical chest pain, COPD, chronic hypoxemia, dysphagia, GERD, laryngeal stenosis, OW, dependence on tracheostomy, and tracheomalacia

## 2022-11-21 NOTE — H&P PST ADULT - MAMMOGRAM, LAST, PROFILE
Influenza vaccine:  No    Medical ID: No  Pranay has been informed of its benefit. No    Date of last dental exam: 2+ years    Type of meter or continuous glucose monitor:  Free Style Lite           2017

## 2022-12-02 DIAGNOSIS — Z01.818 ENCOUNTER FOR OTHER PREPROCEDURAL EXAMINATION: ICD-10-CM

## 2022-12-02 NOTE — ASU PATIENT PROFILE, ADULT - FALL HARM RISK - HARM RISK INTERVENTIONS

## 2022-12-04 ENCOUNTER — TRANSCRIPTION ENCOUNTER (OUTPATIENT)
Age: 62
End: 2022-12-04

## 2022-12-05 ENCOUNTER — APPOINTMENT (OUTPATIENT)
Dept: OTOLARYNGOLOGY | Facility: HOSPITAL | Age: 62
End: 2022-12-05

## 2022-12-05 ENCOUNTER — RESULT REVIEW (OUTPATIENT)
Age: 62
End: 2022-12-05

## 2022-12-05 ENCOUNTER — INPATIENT (INPATIENT)
Facility: HOSPITAL | Age: 62
LOS: 0 days | Discharge: HOME | End: 2022-12-06
Attending: OTOLARYNGOLOGY | Admitting: OTOLARYNGOLOGY
Payer: MEDICAID

## 2022-12-05 ENCOUNTER — TRANSCRIPTION ENCOUNTER (OUTPATIENT)
Age: 62
End: 2022-12-05

## 2022-12-05 VITALS
TEMPERATURE: 98 F | OXYGEN SATURATION: 100 % | HEART RATE: 65 BPM | HEIGHT: 65 IN | DIASTOLIC BLOOD PRESSURE: 83 MMHG | WEIGHT: 119.93 LBS | RESPIRATION RATE: 20 BRPM | SYSTOLIC BLOOD PRESSURE: 124 MMHG

## 2022-12-05 DIAGNOSIS — Z93.0 TRACHEOSTOMY STATUS: Chronic | ICD-10-CM

## 2022-12-05 DIAGNOSIS — Z43.0 ENCOUNTER FOR ATTENTION TO TRACHEOSTOMY: Chronic | ICD-10-CM

## 2022-12-05 DIAGNOSIS — Z98.891 HISTORY OF UTERINE SCAR FROM PREVIOUS SURGERY: Chronic | ICD-10-CM

## 2022-12-05 DIAGNOSIS — J39.8 OTHER SPECIFIED DISEASES OF UPPER RESPIRATORY TRACT: Chronic | ICD-10-CM

## 2022-12-05 DIAGNOSIS — J38.1 POLYP OF VOCAL CORD AND LARYNX: ICD-10-CM

## 2022-12-05 LAB
GAS PNL BLDV: 140 MMOL/L — SIGNIFICANT CHANGE UP (ref 136–145)
GLUCOSE BLDC GLUCOMTR-MCNC: 99 MG/DL — SIGNIFICANT CHANGE UP (ref 70–99)
GLUCOSE BLDV-MCNC: 66 MG/DL — LOW (ref 70–99)
HCT VFR BLDA CALC: 29 % — LOW (ref 34.5–46.5)
HGB BLD CALC-MCNC: 9.6 G/DL — LOW (ref 11.5–15.5)
POTASSIUM BLDV-SCNC: 3.4 MMOL/L — LOW (ref 3.5–5.1)

## 2022-12-05 PROCEDURE — 43191 ESOPHAGOSCOPY RIGID TRNSO DX: CPT

## 2022-12-05 PROCEDURE — 31614 TRACHEOSTOMA REVJ COMPLEX: CPT

## 2022-12-05 PROCEDURE — 71045 X-RAY EXAM CHEST 1 VIEW: CPT | Mod: 26

## 2022-12-05 PROCEDURE — 31545 REMOVE VC LESION W/SCOPE: CPT

## 2022-12-05 PROCEDURE — 31641 BRONCHOSCOPY TREAT BLOCKAGE: CPT

## 2022-12-05 PROCEDURE — 88305 TISSUE EXAM BY PATHOLOGIST: CPT | Mod: 26

## 2022-12-05 PROCEDURE — 43450 DILATE ESOPHAGUS 1/MULT PASS: CPT

## 2022-12-05 PROCEDURE — 31571 LARYNGOSCOP W/VC INJ + SCOPE: CPT | Mod: 59

## 2022-12-05 DEVICE — FILLER GEL COSMETIC JUVEDERM VOLUMA XC
Type: IMPLANTABLE DEVICE | Status: NON-FUNCTIONAL
Removed: 2022-12-05

## 2022-12-05 DEVICE — TUBE TRACH SZ 6 CUFF FLEX DISP
Type: IMPLANTABLE DEVICE | Status: NON-FUNCTIONAL
Removed: 2022-12-05

## 2022-12-05 RX ORDER — SENNA PLUS 8.6 MG/1
8.8 TABLET ORAL AT BEDTIME
Refills: 0 | Status: DISCONTINUED | OUTPATIENT
Start: 2022-12-05 | End: 2022-12-06

## 2022-12-05 RX ORDER — METOCLOPRAMIDE HCL 10 MG
10 TABLET ORAL ONCE
Refills: 0 | Status: DISCONTINUED | OUTPATIENT
Start: 2022-12-05 | End: 2022-12-05

## 2022-12-05 RX ORDER — ONDANSETRON 8 MG/1
4 TABLET, FILM COATED ORAL ONCE
Refills: 0 | Status: DISCONTINUED | OUTPATIENT
Start: 2022-12-05 | End: 2022-12-06

## 2022-12-05 RX ORDER — PANTOPRAZOLE SODIUM 20 MG/1
40 TABLET, DELAYED RELEASE ORAL DAILY
Refills: 0 | Status: DISCONTINUED | OUTPATIENT
Start: 2022-12-05 | End: 2022-12-06

## 2022-12-05 RX ORDER — ASPIRIN/CALCIUM CARB/MAGNESIUM 324 MG
81 TABLET ORAL DAILY
Refills: 0 | Status: DISCONTINUED | OUTPATIENT
Start: 2022-12-05 | End: 2022-12-06

## 2022-12-05 RX ORDER — ACETYLCYSTEINE 200 MG/ML
4 VIAL (ML) MISCELLANEOUS
Refills: 0 | Status: DISCONTINUED | OUTPATIENT
Start: 2022-12-05 | End: 2022-12-06

## 2022-12-05 RX ORDER — IPRATROPIUM/ALBUTEROL SULFATE 18-103MCG
1 AEROSOL WITH ADAPTER (GRAM) INHALATION
Refills: 0 | Status: DISCONTINUED | OUTPATIENT
Start: 2022-12-05 | End: 2022-12-06

## 2022-12-05 RX ORDER — CHLORHEXIDINE GLUCONATE 213 G/1000ML
15 SOLUTION TOPICAL EVERY 12 HOURS
Refills: 0 | Status: DISCONTINUED | OUTPATIENT
Start: 2022-12-05 | End: 2022-12-06

## 2022-12-05 RX ORDER — BUDESONIDE AND FORMOTEROL FUMARATE DIHYDRATE 160; 4.5 UG/1; UG/1
2 AEROSOL RESPIRATORY (INHALATION)
Refills: 0 | Status: DISCONTINUED | OUTPATIENT
Start: 2022-12-05 | End: 2022-12-06

## 2022-12-05 RX ORDER — DEXAMETHASONE 0.5 MG/5ML
8 ELIXIR ORAL ONCE
Refills: 0 | Status: COMPLETED | OUTPATIENT
Start: 2022-12-06 | End: 2022-12-06

## 2022-12-05 RX ORDER — ZOLPIDEM TARTRATE 10 MG/1
5 TABLET ORAL AT BEDTIME
Refills: 0 | Status: DISCONTINUED | OUTPATIENT
Start: 2022-12-05 | End: 2022-12-06

## 2022-12-05 RX ORDER — LOSARTAN POTASSIUM 100 MG/1
25 TABLET, FILM COATED ORAL DAILY
Refills: 0 | Status: DISCONTINUED | OUTPATIENT
Start: 2022-12-05 | End: 2022-12-06

## 2022-12-05 RX ORDER — DEXTROSE 50 % IN WATER 50 %
25 SYRINGE (ML) INTRAVENOUS ONCE
Refills: 0 | Status: COMPLETED | OUTPATIENT
Start: 2022-12-05 | End: 2022-12-05

## 2022-12-05 RX ORDER — FENTANYL CITRATE 50 UG/ML
50 INJECTION INTRAVENOUS
Refills: 0 | Status: DISCONTINUED | OUTPATIENT
Start: 2022-12-05 | End: 2022-12-06

## 2022-12-05 RX ORDER — AMLODIPINE BESYLATE 2.5 MG/1
5 TABLET ORAL DAILY
Refills: 0 | Status: DISCONTINUED | OUTPATIENT
Start: 2022-12-05 | End: 2022-12-06

## 2022-12-05 RX ORDER — CLONAZEPAM 1 MG
0.5 TABLET ORAL EVERY 8 HOURS
Refills: 0 | Status: DISCONTINUED | OUTPATIENT
Start: 2022-12-05 | End: 2022-12-06

## 2022-12-05 RX ORDER — ACETAMINOPHEN 500 MG
650 TABLET ORAL EVERY 6 HOURS
Refills: 0 | Status: DISCONTINUED | OUTPATIENT
Start: 2022-12-05 | End: 2022-12-06

## 2022-12-05 RX ORDER — SIMETHICONE 80 MG/1
80 TABLET, CHEWABLE ORAL THREE TIMES A DAY
Refills: 0 | Status: DISCONTINUED | OUTPATIENT
Start: 2022-12-05 | End: 2022-12-06

## 2022-12-05 RX ORDER — PANTOPRAZOLE SODIUM 20 MG/1
40 TABLET, DELAYED RELEASE ORAL
Refills: 0 | Status: DISCONTINUED | OUTPATIENT
Start: 2022-12-05 | End: 2022-12-05

## 2022-12-05 RX ORDER — HYDROMORPHONE HYDROCHLORIDE 2 MG/ML
0.25 INJECTION INTRAMUSCULAR; INTRAVENOUS; SUBCUTANEOUS
Refills: 0 | Status: DISCONTINUED | OUTPATIENT
Start: 2022-12-05 | End: 2022-12-06

## 2022-12-05 RX ORDER — ALBUTEROL 90 UG/1
2 AEROSOL, METERED ORAL EVERY 6 HOURS
Refills: 0 | Status: DISCONTINUED | OUTPATIENT
Start: 2022-12-05 | End: 2022-12-06

## 2022-12-05 RX ORDER — OXYCODONE HYDROCHLORIDE 5 MG/1
5 TABLET ORAL EVERY 6 HOURS
Refills: 0 | Status: DISCONTINUED | OUTPATIENT
Start: 2022-12-05 | End: 2022-12-06

## 2022-12-05 RX ORDER — ALBUTEROL 90 UG/1
2.5 AEROSOL, METERED ORAL ONCE
Refills: 0 | Status: COMPLETED | OUTPATIENT
Start: 2022-12-05 | End: 2022-12-05

## 2022-12-05 RX ORDER — SENNA PLUS 8.6 MG/1
2 TABLET ORAL AT BEDTIME
Refills: 0 | Status: DISCONTINUED | OUTPATIENT
Start: 2022-12-05 | End: 2022-12-05

## 2022-12-05 RX ORDER — POLYETHYLENE GLYCOL 3350 17 G/17G
17 POWDER, FOR SOLUTION ORAL DAILY
Refills: 0 | Status: DISCONTINUED | OUTPATIENT
Start: 2022-12-05 | End: 2022-12-06

## 2022-12-05 RX ADMIN — ALBUTEROL 2.5 MILLIGRAM(S): 90 AEROSOL, METERED ORAL at 14:28

## 2022-12-05 RX ADMIN — BUDESONIDE AND FORMOTEROL FUMARATE DIHYDRATE 2 PUFF(S): 160; 4.5 AEROSOL RESPIRATORY (INHALATION) at 23:24

## 2022-12-05 RX ADMIN — HYDROMORPHONE HYDROCHLORIDE 0.25 MILLIGRAM(S): 2 INJECTION INTRAMUSCULAR; INTRAVENOUS; SUBCUTANEOUS at 21:44

## 2022-12-05 RX ADMIN — HYDROMORPHONE HYDROCHLORIDE 0.25 MILLIGRAM(S): 2 INJECTION INTRAMUSCULAR; INTRAVENOUS; SUBCUTANEOUS at 20:34

## 2022-12-05 RX ADMIN — ALBUTEROL 2 PUFF(S): 90 AEROSOL, METERED ORAL at 22:10

## 2022-12-05 RX ADMIN — CHLORHEXIDINE GLUCONATE 15 MILLILITER(S): 213 SOLUTION TOPICAL at 23:23

## 2022-12-05 NOTE — BRIEF OPERATIVE NOTE - OPERATION/FINDINGS
1) Esophagoscopy and esophageal dilation  2) DLB  3) Stomaplasty and excision of tracheal stenosis  4) Excision of L vocal fold lesion

## 2022-12-05 NOTE — PATIENT PROFILE ADULT - FALL HARM RISK - CONCLUSION
[FreeTextEntry8] : Has noticed urine is very foamy past 4 weeks.  Malodorous in am only.  Denies hematuria, dysuria.+frequency and urgency.  Having intermittent BL flank discomfort.  Hospitalized 2 years ago with  flank pain and dx'd with renal stones, told  her they would pass.  No return of symptoms.  Never had FU with urologist.  NO abdominal pain.  \par Hospitalized  5 months ago in [place] with inguinal and LLE pain.  Dx'd with nerve pain and started on Gabapentin 600 mg HS .  No further symptoms.  
Fall with Harm Risk

## 2022-12-05 NOTE — PATIENT PROFILE ADULT - FALL HARM RISK - HARM RISK INTERVENTIONS

## 2022-12-05 NOTE — H&P ADULT - HISTORY OF PRESENT ILLNESS
H&P    HPI: 62yF with chronic pulmonary disease, trach/vent dependence, dysphagia and recent hospitalization for PNA and FTT. Now here for L vocal fold polyp excision, stomaplasty and esophageal dilation.    PAST MEDICAL & SURGICAL HISTORY:  Tracheostomy dependent      COPD (chronic obstructive pulmonary disease)      Hypertension      Sickle cell trait      Anxiety disorder      Pancreatic cyst      Tracheomalacia      Lupus anticoagulant syndrome      Dependence on tracheostomy      Acute tracheostomy management      Tracheal stenosis  excision of tracheal stenosis 10/2017  revision of tracheal stoma 3/2018      S/P   1986        Cipro (Unknown)  latex (Unknown)  theophylline (Hives)    MEDICATIONS  (STANDING):  aspirin  chewable 81 milliGRAM(s) Oral daily  budesonide 160 MICROgram(s)/formoterol 4.5 MICROgram(s) Inhaler 2 Puff(s) Inhalation two times a day  chlorhexidine 0.12% Liquid 15 milliLiter(s) Oral Mucosa every 12 hours  losartan 25 milliGRAM(s) Oral daily    MEDICATIONS  (PRN):  acetylcysteine 20%  Inhalation 4 milliLiter(s) Inhalation four times a day PRN SOB  albuterol    90 MICROgram(s) HFA Inhaler 2 Puff(s) Inhalation every 6 hours PRN Shortness of Breath and/or Wheezing  albuterol/ipratropium (CFC free) Inhaler. 1 Puff(s) Inhalation four times a day PRN Shortness of Breath and/or Wheezing  clonazePAM Oral Disintegrating Tablet 0.5 milliGRAM(s) Oral every 8 hours PRN anxiety  zolpidem 5 milliGRAM(s) Oral at bedtime PRN Insomnia      Objective    ICU Vital Signs Last 24 Hrs  T(C): 36.6 (05 Dec 2022 13:41), Max: 36.6 (05 Dec 2022 13:41)  T(F): 97.9 (05 Dec 2022 13:41), Max: 97.9 (05 Dec 2022 13:41)  HR: 65 (05 Dec 2022 14:35) (65 - 65)  BP: 124/83 (05 Dec 2022 13:41) (124/83 - 124/83)  BP(mean): --  ABP: --  ABP(mean): --  RR: 20 (05 Dec 2022 13:41) (20 - 20)  SpO2: 100% (05 Dec 2022 14:35) (100% - 100%)        PHYSICAL EXAM:    RESPIRATORY: Respirations unlabored, no increased work of breathing with use of accessory muscles and retractions. No stridor.  CARDIAC: Warm extremities, no cyanosis.               I&O's Summary      A/P: 62yF with chronic pulmonary disease, trach/vent dependence, dysphagia and recent hospitalization for PNA and FTT. Now here for L vocal fold polyp excision, stomaplasty and esophageal dilation.    - OR today

## 2022-12-05 NOTE — PATIENT PROFILE ADULT - FALL HARM RISK - FACTORS NURSING JUDGEMENT
Nosebleeds Normal Treatment: I explained this is common when taking isotretinoin. I recommended saline mist in each nostril multiple times a day. If this worsens they will contact us. Yes

## 2022-12-06 ENCOUNTER — TRANSCRIPTION ENCOUNTER (OUTPATIENT)
Age: 62
End: 2022-12-06

## 2022-12-06 VITALS — RESPIRATION RATE: 22 BRPM | OXYGEN SATURATION: 96 %

## 2022-12-06 PROCEDURE — 99239 HOSP IP/OBS DSCHRG MGMT >30: CPT

## 2022-12-06 RX ORDER — ALBUTEROL 90 UG/1
2.5 AEROSOL, METERED ORAL ONCE
Refills: 0 | Status: COMPLETED | OUTPATIENT
Start: 2022-12-06 | End: 2022-12-06

## 2022-12-06 RX ADMIN — ALBUTEROL 2.5 MILLIGRAM(S): 90 AEROSOL, METERED ORAL at 09:57

## 2022-12-06 RX ADMIN — Medication 101.6 MILLIGRAM(S): at 02:17

## 2022-12-06 RX ADMIN — AMLODIPINE BESYLATE 5 MILLIGRAM(S): 2.5 TABLET ORAL at 06:31

## 2022-12-06 RX ADMIN — Medication 4 MILLILITER(S): at 09:27

## 2022-12-06 RX ADMIN — LOSARTAN POTASSIUM 25 MILLIGRAM(S): 100 TABLET, FILM COATED ORAL at 06:30

## 2022-12-06 RX ADMIN — ALBUTEROL 2.5 MILLIGRAM(S): 90 AEROSOL, METERED ORAL at 13:14

## 2022-12-06 RX ADMIN — OXYCODONE HYDROCHLORIDE 5 MILLIGRAM(S): 5 TABLET ORAL at 12:50

## 2022-12-06 RX ADMIN — OXYCODONE HYDROCHLORIDE 5 MILLIGRAM(S): 5 TABLET ORAL at 13:30

## 2022-12-06 RX ADMIN — CHLORHEXIDINE GLUCONATE 15 MILLILITER(S): 213 SOLUTION TOPICAL at 06:30

## 2022-12-06 RX ADMIN — BUDESONIDE AND FORMOTEROL FUMARATE DIHYDRATE 2 PUFF(S): 160; 4.5 AEROSOL RESPIRATORY (INHALATION) at 08:20

## 2022-12-06 NOTE — PROGRESS NOTE ADULT - SUBJECTIVE AND OBJECTIVE BOX
63yo w trach/vent dependence sp L TVF polyp excision stomaplasty, esophageal dilation 12/5    interval hx  12/6: tolerating trach, vent overnight, tolerating TF     Vital Signs Last 24 Hrs  T(C): 37.1 (06 Dec 2022 04:00), Max: 37.1 (06 Dec 2022 00:00)  T(F): 98.8 (06 Dec 2022 04:00), Max: 98.8 (06 Dec 2022 00:00)  HR: 56 (06 Dec 2022 06:00) (51 - 86)  BP: 117/65 (06 Dec 2022 06:00) (92/56 - 148/55)  BP(mean): 77 (06 Dec 2022 06:00) (61 - 122)  RR: 15 (06 Dec 2022 06:00) (11 - 20)  SpO2: 96% (06 Dec 2022 06:00) (89% - 100%)    Parameters below as of 05 Dec 2022 21:00  Patient On (Oxygen Delivery Method): ventilator    PHYSICAL EXAM:    CONSTITUTIONAL: well nourished, well developed, and in no acute distress.    EYES: pupils equal and round and no abnormalities of the conjunctivae and lids.   RESPIRATORY: respirations unlabored, no increased work of breathing with use of accessory muscles and retractions. NO STRIDOR.  CARDIAC: warm extremities, no cyanosis.  PSYCHIATRIC: age APPROPRIATE behavior.   HEAD: normocephalic, atraumatic.  NOSE: External Nose: normal  Anterior Nasal Cavity: the right nasal cavity was normal and the left nasal cavity was normal.  NECK: normal with no obvious cervical lesions  Trach 6LPC in place secured with soft collar  No vent leak     A/P: 63yo w trach/vent dependence sp L TVF polyp excision stomaplasty, esophageal dilation 12/5 CXR without pneumo or emphysema   - discharge this morning

## 2022-12-06 NOTE — DISCHARGE NOTE NURSING/CASE MANAGEMENT/SOCIAL WORK - PATIENT PORTAL LINK FT
You can access the FollowMyHealth Patient Portal offered by [facility] by registering at the following website: [email or web address]. By joining [facility]’s FollowMyHealth portal, you will also be able to view your health information using other applications (apps) compatible with our system.

## 2022-12-06 NOTE — ASU DISCHARGE PLAN (ADULT/PEDIATRIC) - CARE PROVIDER_API CALL
Gaurang Rodriguez  OTOLARYNGOLOGY  [address] [address] [place] [address]  Phone: [phone]  Fax: [phone]  Follow Up Time: 1 week

## 2022-12-06 NOTE — DISCHARGE NOTE NURSING/CASE MANAGEMENT/SOCIAL WORK - NSSCNAMETXT_GEN_ALL_CORE
[facility],  [phone]/resumption of your Private Duty Nursing Services today.

## 2022-12-07 LAB — SURGICAL PATHOLOGY STUDY: SIGNIFICANT CHANGE UP

## 2022-12-13 ENCOUNTER — RX RENEWAL (OUTPATIENT)
Age: 62
End: 2022-12-13

## 2022-12-15 ENCOUNTER — NON-APPOINTMENT (OUTPATIENT)
Age: 62
End: 2022-12-15

## 2022-12-19 ENCOUNTER — APPOINTMENT (OUTPATIENT)
Dept: PULMONOLOGY | Facility: CLINIC | Age: 62
End: 2022-12-19

## 2022-12-19 ENCOUNTER — NON-APPOINTMENT (OUTPATIENT)
Age: 62
End: 2022-12-19

## 2022-12-19 DIAGNOSIS — J96.11 CHRONIC RESPIRATORY FAILURE WITH HYPOXIA: ICD-10-CM

## 2022-12-19 PROCEDURE — 99214 OFFICE O/P EST MOD 30 MIN: CPT | Mod: 95

## 2022-12-19 RX ORDER — AMOXICILLIN AND CLAVULANATE POTASSIUM 400; 57 MG/5ML; MG/5ML
400-57 POWDER, FOR SUSPENSION ORAL
Qty: 2 | Refills: 0 | Status: DISCONTINUED | COMMUNITY
Start: 2022-08-31 | End: 2022-12-19

## 2022-12-19 RX ORDER — NUT.TX.IMP.RENAL FXN,LAC-REDUC 0.08 G-1.8
LIQUID (ML) ORAL 3 TIMES DAILY
Qty: 21330 | Refills: 6 | Status: ACTIVE | COMMUNITY
Start: 2022-03-25 | End: 1900-01-01

## 2022-12-19 RX ORDER — IPRATROPIUM BROMIDE 0.5 MG/2.5ML
0.02 SOLUTION RESPIRATORY (INHALATION)
Qty: 360 | Refills: 1 | Status: ACTIVE | COMMUNITY
Start: 2020-02-24 | End: 1900-01-01

## 2022-12-19 NOTE — HISTORY OF PRESENT ILLNESS
[Home] : at home, [unfilled] , at the time of the visit. [Medical Office: ([facility])___] : at the medical office located in  [Verbal consent obtained from patient] : the patient, [unfilled] [FreeTextEntry1] : Ms. Bo is a 62 year old female with a history of atypical chest pain, COPD, chronic hypoxemia, dysphagia, GERD, laryngeal stenosis, OW, dependence on tracheostomy, and tracheomalacia who presents into the office via video call for a follow up. Her chief complain is \par - she notes feeling okay in general\par - she notes not getting [facility] \par - she notes hoarseness\par - she notes coughing \par - she notes feeling hot and cold \par - she notes she will be seeing Dr. Rodriguez today \par - she notes flatus \par \par \par - She  denies any headaches, nausea, vomiting, fever, chills, sweats, chest pains, chest pressure, diarrhea, constipation, dysphagia, myalgia, dizziness, leg swelling, leg pain, itchy eyes, itchy ears, heartburn, reflux, or sour taste in the mouth.

## 2022-12-19 NOTE — REASON FOR VISIT
[Follow-Up] : a follow-up visit [Formal Caregiver] : formal caregiver [FreeTextEntry1] : video call: atypical chest pain, COPD, chronic hypoxemia, dysphagia, GERD, laryngeal stenosis, OW, dependence on tracheostomy, and tracheomalacia

## 2022-12-19 NOTE — ADDENDUM
[FreeTextEntry1] : Documented by Jarad Waters acting as a scribe for Dr. Maco Nova on (12/19/2022).\par \par All medical record entries made by the Scribe were at my, Dr. Maco Nova's, direction and personally dictated by me on (12/19/2022). I have reviewed the chart and agree that the record accurately reflects my personal performance of the history, physical exam, assessment and plan. I have personally directed, reviewed, and agree with the discharge instructions.

## 2022-12-20 ENCOUNTER — NON-APPOINTMENT (OUTPATIENT)
Age: 62
End: 2022-12-20

## 2022-12-21 ENCOUNTER — APPOINTMENT (OUTPATIENT)
Dept: OTOLARYNGOLOGY | Facility: CLINIC | Age: 62
End: 2022-12-21

## 2022-12-21 ENCOUNTER — EMERGENCY (EMERGENCY)
Facility: HOSPITAL | Age: 62
LOS: 1 days | Discharge: HOME | End: 2022-12-21
Attending: EMERGENCY MEDICINE | Admitting: EMERGENCY MEDICINE

## 2022-12-21 VITALS
HEIGHT: 65 IN | TEMPERATURE: 98 F | OXYGEN SATURATION: 96 % | RESPIRATION RATE: 17 BRPM | DIASTOLIC BLOOD PRESSURE: 80 MMHG | SYSTOLIC BLOOD PRESSURE: 130 MMHG | HEART RATE: 72 BPM

## 2022-12-21 VITALS
SYSTOLIC BLOOD PRESSURE: 147 MMHG | OXYGEN SATURATION: 97 % | DIASTOLIC BLOOD PRESSURE: 82 MMHG | HEART RATE: 65 BPM | RESPIRATION RATE: 18 BRPM

## 2022-12-21 VITALS
HEART RATE: 61 BPM | HEIGHT: 65 IN | SYSTOLIC BLOOD PRESSURE: 138 MMHG | WEIGHT: 125 LBS | BODY MASS INDEX: 20.83 KG/M2 | DIASTOLIC BLOOD PRESSURE: 69 MMHG

## 2022-12-21 DIAGNOSIS — Z43.0 ENCOUNTER FOR ATTENTION TO TRACHEOSTOMY: Chronic | ICD-10-CM

## 2022-12-21 DIAGNOSIS — R06.9 UNSPECIFIED ABNORMALITIES OF BREATHING: ICD-10-CM

## 2022-12-21 DIAGNOSIS — Z93.0 TRACHEOSTOMY STATUS: Chronic | ICD-10-CM

## 2022-12-21 DIAGNOSIS — Z98.891 HISTORY OF UTERINE SCAR FROM PREVIOUS SURGERY: Chronic | ICD-10-CM

## 2022-12-21 DIAGNOSIS — J39.8 OTHER SPECIFIED DISEASES OF UPPER RESPIRATORY TRACT: Chronic | ICD-10-CM

## 2022-12-21 PROCEDURE — 74019 RADEX ABDOMEN 2 VIEWS: CPT | Mod: 26

## 2022-12-21 PROCEDURE — 31615 TRCHEOBRNCHSC EST TRACHS INC: CPT

## 2022-12-21 PROCEDURE — 99284 EMERGENCY DEPT VISIT MOD MDM: CPT | Mod: 25

## 2022-12-21 PROCEDURE — 43762 RPLC GTUBE NO REVJ TRC: CPT

## 2022-12-21 PROCEDURE — 99213 OFFICE O/P EST LOW 20 MIN: CPT | Mod: 24,25

## 2022-12-21 RX ORDER — ALBUTEROL 90 UG/1
2.5 AEROSOL, METERED ORAL ONCE
Refills: 0 | Status: COMPLETED | OUTPATIENT
Start: 2022-12-21 | End: 2022-12-21

## 2022-12-21 RX ORDER — IOHEXOL 300 MG/ML
30 INJECTION, SOLUTION INTRAVENOUS ONCE
Refills: 0 | Status: DISCONTINUED | OUTPATIENT
Start: 2022-12-21 | End: 2022-12-25

## 2022-12-21 RX ORDER — CHLORHEXIDINE GLUCONATE 213 G/1000ML
15 SOLUTION TOPICAL EVERY 12 HOURS
Refills: 0 | Status: DISCONTINUED | OUTPATIENT
Start: 2022-12-21 | End: 2022-12-25

## 2022-12-21 RX ADMIN — ALBUTEROL 2.5 MILLIGRAM(S): 90 AEROSOL, METERED ORAL at 18:41

## 2022-12-21 NOTE — ED PROVIDER NOTE - NS ED ATTENDING STATEMENT MOD
This was a shared visit with the SILVIA. I reviewed and verified the documentation and independently performed the documented:

## 2022-12-21 NOTE — ED ADULT TRIAGE NOTE - TEMPERATURE IN CELSIUS (DEGREES C)
Pt wearing mask. This RN wearing appropriate PPE, including mask and eye protection, during all pt care.       Amy Encarnacion, RN  08/20/20 0504     36.7

## 2022-12-21 NOTE — PROVIDER CONTACT NOTE (OTHER) - ASSESSMENT
Arranged [facility] with Ventilator [phone]. Trip# [id number]. P/U 10;30. Pt is going home. Pt's Aide is with pt to accompany. SHAKIRA Authl Inv# [id number]

## 2022-12-21 NOTE — ED PROVIDER NOTE - PATIENT PORTAL LINK FT
You can access the FollowMyHealth Patient Portal offered by [facility] by registering at the following website: [email or web address]. By joining [facility]’s FollowMyHealth portal, you will also be able to view your health information using other applications (apps) compatible with our system.

## 2022-12-21 NOTE — PHYSICAL EXAM
[Midline] : trachea located in midline position [Laryngoscopy Performed] : laryngoscopy was performed, see procedure section for findings [Normal] : no rashes [FreeTextEntry1] : stretcher-bound, on oxygen and ventilator through trach, no retractions or distress [de-identified] : trach tube in place with multiple gauze and loose , 6 LPC trach  [de-identified] : mildly raspy voice, hyperfunctional, easily understood

## 2022-12-21 NOTE — HISTORY OF PRESENT ILLNESS
[de-identified] : 62 year old female here for follow up for chronic respiratory failure.\par History of laryngotracheal stenosis.\par Current trach size is 6LPC. Last trach change: April 2022 \par Reports some difficulty swallowing soft foods \par Currently has PEG tube place, tolerating bolus feeding (Ensure). \par Reports foul smelling secretions with green to brown mucus production. \par Continues to reports sore throat treated with Tylenol with relief. \par Patient reports shortness of breath throughout day-currently using inhaler and nebulizers with relief.

## 2022-12-21 NOTE — ED ADULT NURSE REASSESSMENT NOTE - NS ED NURSE REASSESS COMMENT FT1
Received pt in bed asleep but arouses to voice, [id number], and in ED for dislodged peg. pegtube has been replaced, pt awaiting confirmatory xray and dispo. caregiver at bedside with pt. pt is currently sating 98% on 60% FiO2, PEEP 5, , RR 16.

## 2022-12-21 NOTE — ED ADULT NURSE REASSESSMENT NOTE - NS ED NURSE REASSESS COMMENT FT1
post confirmatory xray pt d/c to home, transported on O2 to [facility] via stretcher/ambulance by [facility]. at the time of exit, pt was in NAD.

## 2022-12-21 NOTE — ED ADULT TRIAGE NOTE - CHIEF COMPLAINT QUOTE
Presents to ED BIB EMS from home for G tube malfunction. Pt ventilator dependent via trach, PMH of COPD.

## 2022-12-21 NOTE — ED PROVIDER NOTE - GASTROINTESTINAL, MLM
Abdomen soft, non-tender, no guarding. + PEG; PEG stoma w/o redness/swelling/tenderness/crepitus Abdomen soft, non-tender, no guarding. + PEG; PEG stoma w/o redness/swelling/tenderness/crepitus.

## 2022-12-21 NOTE — ED PROVIDER NOTE - NSFOLLOWUPINSTRUCTIONS_ED_ALL_ED_FT
Advance activity as tolerated.  Continue all previously prescribed medications as directed unless otherwise instructed.  Follow up with your primary care physician in 48-72 hours- bring copies of your results.  Return to the ER for worsening or persistent symptoms, including but not limited to worsening/persistent fevers, leakage from gastrostomy tube, pain at gastrostomy tube site, and/or ANY NEW OR CONCERNING SYMPTOMS. If you have issues obtaining follow up, please call: [phone] to obtain a doctor or specialist who takes your insurance in your area.  You may call [phone] to make an appointment with the internal medicine clinic. Advance activity as tolerated.  Continue all previously prescribed medications as directed unless otherwise instructed.  Follow up with your primary care physician in 48-72 hours- bring copies of your results.  Return to the ER for worsening or persistent symptoms, including but not limited to worsening/persistent fevers, leakage from gastrostomy tube, pain at gastrostomy tube site, and/or ANY NEW OR CONCERNING SYMPTOMS. If you have issues obtaining follow up, please call: [phone] to obtain a doctor or specialist who takes your insurance in your area.  You may call [phone] to make an appointment with the internal medicine clinic.      How to Use and Care for Your PEG Tube    WHAT YOU NEED TO KNOW:    Healthcare providers will teach you how to put liquid food and certain medicines through the tube. You will also be taught how to care for the PEG tube and the skin where the tube enters your body.    DISCHARGE INSTRUCTIONS:    Call your doctor or gastroenterologist if:    You start coughing or vomiting during or after a feeding.    You have severe abdominal pain.    Blood or tube feeding fluid leaks from the PEG tube site.    Your PEG tube is shorter than it was when it was put in.    Your PEG tube comes out.    Your mouth feels dry, your heart feels like it is beating too fast, or you feel weak.    You have nausea, diarrhea, or abdominal bloating or discomfort.    You have stomach pain after each feeding or when you move around.    You have discomfort or pain around your PEG tube site.    The skin around your PEG tube is red, swollen, or draining pus.    You weigh less than your healthcare provider says you should.    Your PEG tube is longer than it was when it was put in.    You have questions or concerns about your condition or care.  How to use a PEG tube for feedings: Your healthcare provider will tell you when and how often to use your PEG tube for feedings. A bolus feeding means nutrition is given over a short period of time. An intermittent feeding is scheduled for certain times throughout the day. Continuous feedings run all the time. The following are types of PEG tube systems:    A feeding syringe helps liquid food to flow steadily into the PEG tube. The syringe is connected to the end of the PEG tube. You will pour the liquid into the syringe and hold it up high. The syringe plunger may be used to gently push the last of the liquid through the PEG tube.    A gravity drip bag allows liquid food to drip more slowly into the PEG tube. The tubing from the gravity drip bag is connected to the end of the PEG tube. You will pour the liquid into the bag. The bag hangs on a medical pole or similar device. You can adjust the flow rate on the tubing according to your healthcare provider's instructions.    An electric feeding pump controls the flow of the liquid food into your PEG tube. Your healthcare provider will teach you how to set up and use the pump.  How to care for your PEG tube:    Always wash your hands before and after each use. This helps prevent infections. Use soap and water to wash your hands.  Handwashing      Always flush your PEG tube before and after each use. This helps prevent blockage from formula or medicine. Use at least 30 milliliters (mL) of water to flush the tube. Follow directions for flushing your PEG tube.    If your PEG tube becomes clogged, try to unclog it as soon as you can. Flush your PEG tube with a 60 mL syringe filled with warm water. Never use a wire to unclog the tube. A wire can poke a hole in the tube. Your healthcare provider may have you use a medicine or a plastic brush to help unclog your tube.    Check the PEG tube daily:  Check the length of the tube from the end to where it goes into your body. If it gets longer, it may be at risk for coming out. If it gets shorter, let your healthcare provider know right away.    Check the bumper. The bumper is a piece that goes around the tube, next to your skin. It should be snug against your skin. Tell your healthcare provider if the bumper seems too tight or too loose.    Use an alcohol pad to clean the end of your PEG tube. Clean before you connect tubing or a syringe to your PEG tube and after you remove it. Do not let the end of the PEG tube touch anything.  How do I care for the skin around my PEG tube?    Do not remove the stitches or medical tape. Stitches or medical tape hold your PEG tube in place when you first get it. Your healthcare provider will take them off once the skin around your tube heals. Leave clean bandages over the tube area for the first 24 hours after the tube is put in. You may not need to use bandages after 24 hours if the skin around the tube looks dry. Ask when you can shower or bathe.    Routine skin care:  Clean the skin around your tube 1 to 2 times each day. Ask your healthcare provider what you should use to clean your skin. Check for redness, swelling, or pus in the area where the tube goes into your body. Check for fluid draining from your stoma (the hole where the tube was put in).    Gently turn your tube daily after your stitches come out. This may decrease pressure on your skin under the bumper. It may also help prevent an infection.    Keep the skin around your PEG tube dry. This will help prevent skin irritation and infection.    Use topical medicines as directed. You may need to put antibiotic cream on the skin around your tube after you are done cleaning it.  Other tips to know about a PEG tube:    Keep a record of liquids you have each day. You may also need to keep a record of how much you urinate and how many times you have a bowel movement each day. Bring this record to your follow-up visits.    Check your weight as directed. Keep a record of your weights and bring it to your follow-up visits. Your healthcare provider may need to change your feedings if your weight changes too quickly.  Weight Checks GT      Take your medicines as directed. Learn which of your medicines can be crushed, mixed with water, and given through the PEG tube. Certain medicines should not be crushed or may clog the PEG tube.    Go to all follow-up appointments. You may need to have blood tests and other tests when you see your healthcare provider.  Follow up with your doctor or gastroenterologist as directed: Write down your questions so you remember to ask them during your visits.

## 2022-12-21 NOTE — ED PROVIDER NOTE - CLINICAL SUMMARY MEDICAL DECISION MAKING FREE TEXT BOX
Patient is a 62-year-old female trach/vent dependent, dysphagia, hypertension, tracheomalacia, PEG tube and unresponsive seconds presents with PEG tube dislodgement x 1 week. -- leaking PEG tube -- replace PEG

## 2022-12-21 NOTE — ED PROVIDER NOTE - OBJECTIVE STATEMENT
Patient is a 62-year-old female trach/vent dependent, dysphagia, hypertension, tracheomalacia, PEG tube and unresponsive seconds presents with PEG tube dislodgement x 1 week.  Patient reports her PEG tube repeatedly gets dislodged and intermittently leaks for the past week.  Patient denies any fevers, chills, chest pain, shortness of breath, abdominal pain, diarrhea, constipation or any specific somatic complaints.  Supplemental history provided by accompanying nurse, Debbie.

## 2022-12-21 NOTE — ED PROVIDER NOTE - ATTENDING APP SHARED VISIT CONTRIBUTION OF CARE
The patient is a 62y Female who has a past medical and surgery history of Tracheostomy/Tracheomalacia/stenosis dependent COPD Hypertension Sickle cell trait Anxiety disorder Pancreatic cyst Lupus Anticoagulant  PTED with Presents to ED BIB EMS from home for G tube malfunction noted during routine  followup . Pt ventilator dependent via trach but in no distress eating oreo cookies during stay   Vital Signs Last 24 Hrs  T(F): 98 HR: 72 BP: 130/80 RR: 17 SpO2: 96% (21 Dec 2022 16:14)   PE: as described; my additions and exceptions are noted in the chart    IMPRESSION/RISK:  Dx= G tube dislodgment   Consideration include: will replace and confirm with film  Plan  as above  d/c if negative for contrast leak The patient is a 62y Female who has a past medical and surgery history of Tracheostomy/Tracheomalacia/stenosis dependent COPD Hypertension Sickle cell trait Anxiety disorder Pancreatic cyst Lupus Anticoagulant  PTED with Presents to ED BIB EMS from home for G tube malfunction noted during routine  visit. Patient in no distress; eating oreos despite tube dislodgement   Vital Signs Last 24 Hrs  T(F): 98 HR: 72 BP: 130/80 RR: 17 SpO2: 96% (21 Dec 2022 16:14)   PE: as described; my additions and exceptions are noted in the chart    DATA:  EKG: pending at time of evaluation  LAB: Pending at time of evaluation    IMPRESSION/RISK:  Dx=Peg Tube dislodgement    Consideration include Will replace/confirm placement w/ axr with contrast  Plan  As above  d/c if negative

## 2022-12-21 NOTE — ED ADULT NURSE NOTE - OBJECTIVE STATEMENT
Receive  pt in spot 14 trach/vent dependent. self care sent for peg tube dislodgement x 1 week.  Denies chest pain, sob, dizziness  Vent Setting  RR 16' ; Peep5 , F102 60. Aide at bedside

## 2023-01-05 ENCOUNTER — NON-APPOINTMENT (OUTPATIENT)
Age: 63
End: 2023-01-05

## 2023-01-06 ENCOUNTER — NON-APPOINTMENT (OUTPATIENT)
Age: 63
End: 2023-01-06

## 2023-01-09 RX ORDER — AZITHROMYCIN 500 MG/1
1 TABLET, FILM COATED ORAL
Qty: 1 | Refills: 0
Start: 2023-01-09

## 2023-01-09 RX ORDER — BUDESONIDE, MICRONIZED 100 %
2 POWDER (GRAM) MISCELLANEOUS
Qty: 40 | Refills: 0
Start: 2023-01-09 | End: 2023-01-18

## 2023-01-10 ENCOUNTER — NON-APPOINTMENT (OUTPATIENT)
Age: 63
End: 2023-01-10

## 2023-01-10 RX ORDER — METHYLPREDNISOLONE 4 MG/1
4 TABLET ORAL
Qty: 1 | Refills: 1 | Status: ACTIVE | COMMUNITY
Start: 2023-01-10 | End: 1900-01-01

## 2023-01-11 ENCOUNTER — RX RENEWAL (OUTPATIENT)
Age: 63
End: 2023-01-11

## 2023-01-11 RX ORDER — NUTRITIONAL SUPPLEMENT/FIBER 0.06 G-1.4
LIQUID (ML) ORAL
Qty: 84 | Refills: 6 | Status: ACTIVE | COMMUNITY
Start: 2022-12-13 | End: 1900-01-01

## 2023-01-17 ENCOUNTER — NON-APPOINTMENT (OUTPATIENT)
Age: 63
End: 2023-01-17

## 2023-01-19 ENCOUNTER — NON-APPOINTMENT (OUTPATIENT)
Age: 63
End: 2023-01-19

## 2023-01-23 ENCOUNTER — NON-APPOINTMENT (OUTPATIENT)
Age: 63
End: 2023-01-23

## 2023-01-24 ENCOUNTER — NON-APPOINTMENT (OUTPATIENT)
Age: 63
End: 2023-01-24

## 2023-01-25 ENCOUNTER — NON-APPOINTMENT (OUTPATIENT)
Age: 63
End: 2023-01-25

## 2023-01-25 ENCOUNTER — APPOINTMENT (OUTPATIENT)
Dept: OTOLARYNGOLOGY | Facility: CLINIC | Age: 63
End: 2023-01-25
Payer: MEDICAID

## 2023-01-25 DIAGNOSIS — J38.1 POLYP OF VOCAL CORD AND LARYNX: ICD-10-CM

## 2023-01-25 DIAGNOSIS — J20.9 ACUTE BRONCHITIS, UNSPECIFIED: ICD-10-CM

## 2023-01-25 PROCEDURE — 31615 TRCHEOBRNCHSC EST TRACHS INC: CPT | Mod: 58

## 2023-01-25 PROCEDURE — 99214 OFFICE O/P EST MOD 30 MIN: CPT | Mod: 24,25

## 2023-01-30 ENCOUNTER — NON-APPOINTMENT (OUTPATIENT)
Age: 63
End: 2023-01-30

## 2023-02-07 NOTE — PHYSICAL EXAM
[Midline] : trachea located in midline position [Laryngoscopy Performed] : laryngoscopy was performed, see procedure section for findings [Normal] : no rashes [FreeTextEntry1] : stretcher-bound, on oxygen and ventilator through trach, no retractions or distress [de-identified] : trach tube in place with multiple gauze and loose , 6 LPC trach  [de-identified] : mildly raspy voice, hyperfunctional, easily understood

## 2023-02-07 NOTE — HISTORY OF PRESENT ILLNESS
[de-identified] : 62 year old female here for follow up for chronic respiratory failure.\par History of laryngotracheal stenosis.\par Current trach size is 6LPC. Last trach change: April 2022 \par Having difficulty swallowing solid food. \par Currently has PEG tube place, tolerating bolus feeding (Ensure). \par Denies foul smell or secretion\par Pt states mouth has been very dry and unable to consume organic food. \par Continues to reports sore throat treated with Tylenol with relief.

## 2023-02-10 ENCOUNTER — RX RENEWAL (OUTPATIENT)
Age: 63
End: 2023-02-10

## 2023-02-13 ENCOUNTER — NON-APPOINTMENT (OUTPATIENT)
Age: 63
End: 2023-02-13

## 2023-02-14 ENCOUNTER — NON-APPOINTMENT (OUTPATIENT)
Age: 63
End: 2023-02-14

## 2023-02-16 ENCOUNTER — APPOINTMENT (OUTPATIENT)
Dept: RADIOLOGY | Facility: HOSPITAL | Age: 63
End: 2023-02-16

## 2023-02-22 NOTE — DIETITIAN INITIAL EVALUATION ADULT. - PROBLEM SELECTOR PROBLEM 5
Anxiety disorder, unspecified type Solaraze Pregnancy And Lactation Text: This medication is Pregnancy Category B and is considered safe. There is some data to suggest avoiding during the third trimester. It is unknown if this medication is excreted in breast milk.

## 2023-02-24 ENCOUNTER — NON-APPOINTMENT (OUTPATIENT)
Age: 63
End: 2023-02-24

## 2023-03-07 ENCOUNTER — APPOINTMENT (OUTPATIENT)
Dept: PULMONOLOGY | Facility: CLINIC | Age: 63
End: 2023-03-07

## 2023-03-07 ENCOUNTER — NON-APPOINTMENT (OUTPATIENT)
Age: 63
End: 2023-03-07

## 2023-03-10 ENCOUNTER — APPOINTMENT (OUTPATIENT)
Dept: PULMONOLOGY | Facility: CLINIC | Age: 63
End: 2023-03-10
Payer: MEDICAID

## 2023-03-10 PROCEDURE — 99441: CPT

## 2023-03-10 RX ORDER — FAMOTIDINE 20 MG/1
20 TABLET, FILM COATED ORAL
Qty: 180 | Refills: 1 | Status: ACTIVE | COMMUNITY
Start: 2021-12-15 | End: 1900-01-01

## 2023-03-10 RX ORDER — PANTOPRAZOLE 40 MG/1
40 TABLET, DELAYED RELEASE ORAL
Qty: 30 | Refills: 3 | Status: ACTIVE | COMMUNITY
Start: 2020-08-24 | End: 1900-01-01

## 2023-03-13 ENCOUNTER — NON-APPOINTMENT (OUTPATIENT)
Age: 63
End: 2023-03-13

## 2023-03-14 ENCOUNTER — NON-APPOINTMENT (OUTPATIENT)
Age: 63
End: 2023-03-14

## 2023-03-15 ENCOUNTER — APPOINTMENT (OUTPATIENT)
Dept: OTOLARYNGOLOGY | Facility: CLINIC | Age: 63
End: 2023-03-15

## 2023-03-15 ENCOUNTER — APPOINTMENT (OUTPATIENT)
Dept: OTOLARYNGOLOGY | Facility: CLINIC | Age: 63
End: 2023-03-15
Payer: MEDICAID

## 2023-03-15 PROCEDURE — 99214 OFFICE O/P EST MOD 30 MIN: CPT | Mod: 25

## 2023-03-15 PROCEDURE — 17250 CHEM CAUT OF GRANLTJ TISSUE: CPT

## 2023-03-15 PROCEDURE — 31615 TRCHEOBRNCHSC EST TRACHS INC: CPT

## 2023-03-23 RX ORDER — NUT.TX.IMP.RENAL FXN,LAC-REDUC 0.08 G-1.8
LIQUID (ML) ORAL 4 TIMES DAILY
Qty: 120 | Refills: 5 | Status: DISCONTINUED | COMMUNITY
Start: 2022-12-15 | End: 2023-03-23

## 2023-04-08 NOTE — PHYSICAL EXAM
[Midline] : trachea located in midline position [Laryngoscopy Performed] : laryngoscopy was performed, see procedure section for findings [Normal] : no rashes [FreeTextEntry1] : stretcher-bound, on oxygen and ventilator through trach, no retractions or distress [de-identified] : trach tube in place with multiple gauze and loose , 6 LPC trach  [de-identified] : mildly raspy voice, hyperfunctional, easily understood

## 2023-04-08 NOTE — HISTORY OF PRESENT ILLNESS
[de-identified] : 62 year old female here for follow up for chronic respiratory failure and dysphagia\par History of laryngotracheal stenosis.\par Current trach size is 6LPC. Last trach change: Jan 2023 \par Having difficulty swallowing solid food, complains of stasis and pain. \par Currently has PEG tube place, tolerating bolus feeding (Ensure). \par Denies foul smell or secretions\par Pt states mouth has been very dry and unable to consume organic food. \par Continues to reports sore throat treated with Tylenol with relief.

## 2023-04-17 ENCOUNTER — NON-APPOINTMENT (OUTPATIENT)
Age: 63
End: 2023-04-17

## 2023-04-19 ENCOUNTER — APPOINTMENT (OUTPATIENT)
Dept: OTOLARYNGOLOGY | Facility: CLINIC | Age: 63
End: 2023-04-19
Payer: MEDICAID

## 2023-04-19 ENCOUNTER — APPOINTMENT (OUTPATIENT)
Dept: OTOLARYNGOLOGY | Facility: CLINIC | Age: 63
End: 2023-04-19

## 2023-04-19 VITALS
HEART RATE: 86 BPM | HEIGHT: 65 IN | SYSTOLIC BLOOD PRESSURE: 104 MMHG | OXYGEN SATURATION: 95 % | DIASTOLIC BLOOD PRESSURE: 68 MMHG

## 2023-04-19 DIAGNOSIS — R49.0 DYSPHONIA: ICD-10-CM

## 2023-04-19 PROCEDURE — 31615 TRCHEOBRNCHSC EST TRACHS INC: CPT | Mod: 59

## 2023-04-19 PROCEDURE — 99214 OFFICE O/P EST MOD 30 MIN: CPT | Mod: 25

## 2023-04-19 PROCEDURE — 92613 ENDOSCOPY SWALLOW (FEES) I&R: CPT

## 2023-04-19 PROCEDURE — 31579 LARYNGOSCOPY TELESCOPIC: CPT | Mod: 59

## 2023-04-19 PROCEDURE — 92613 ENDOSCOPY SWALLOW (FEES) I&R: CPT | Mod: GN

## 2023-04-20 ENCOUNTER — NON-APPOINTMENT (OUTPATIENT)
Age: 63
End: 2023-04-20

## 2023-04-21 ENCOUNTER — NON-APPOINTMENT (OUTPATIENT)
Age: 63
End: 2023-04-21

## 2023-04-25 ENCOUNTER — NON-APPOINTMENT (OUTPATIENT)
Age: 63
End: 2023-04-25

## 2023-04-26 ENCOUNTER — NON-APPOINTMENT (OUTPATIENT)
Age: 63
End: 2023-04-26

## 2023-04-27 ENCOUNTER — NON-APPOINTMENT (OUTPATIENT)
Age: 63
End: 2023-04-27

## 2023-04-28 DIAGNOSIS — E46 UNSPECIFIED PROTEIN-CALORIE MALNUTRITION: ICD-10-CM

## 2023-04-28 DIAGNOSIS — R13.10 DYSPHAGIA, UNSPECIFIED: ICD-10-CM

## 2023-05-01 ENCOUNTER — NON-APPOINTMENT (OUTPATIENT)
Age: 63
End: 2023-05-01

## 2023-05-02 ENCOUNTER — NON-APPOINTMENT (OUTPATIENT)
Age: 63
End: 2023-05-02

## 2023-05-10 NOTE — H&P PST ADULT - HEIGHT IN FEET
ANTICOAGULATION SERVICE    Calos Scruggs is a 80 y.o. female with PMHx significant for DVT, PE, breast CA who presents to clinic on 5/10/2023 for anticoagulation monitoring and adjustment. 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     Anticoagulation Indication(s):  DVT, PE    Referring Physician:  Dr. Cristal Griffin  Goal INR Range:  2-3  Duration of Anticoagulation Therapy:  Indefinite  Time of day dose taken:  AM  Product patient has at home:  warfarin 5 mg (peach)    INR Summary                            Warfarin regimen (mg)  Date INR   A/P    Sun Mon Tue Wed Thu Fri Sat Mg/wk  5/10 2.4 At goal, continue  2.5 5 2.5 5 2.5 5 5 27.5  3/29 2.6 At goal, continue  2.5 5 2.5 5 2.5 5 5 27.5  3/1 3.4 Above goal, no change 2.5 5 2.5 5 2.5 5 5 27.5  1/18 3.1 Above goal, no change 2.5 5 2.5 5 2.5 5 5 27.5  12/7 2.6 At goal, no change  2.5 5 2.5 5 2.5 5 5 27.5  10/26 3.2 At goal, no change  2.5 5 2.5 5 2.5 5 5 27.5  9/14 2.5 At goal, no change  2.5 5 2.5 5 2.5 5 5 27.5  8/3 2.7 At goal, no change  2.5 5 2.5 5 2.5 5 5 27.5  6/22 2.3 At goal, no change  2.5 5 2.5 5 2.5 5 5 27.5  5/5 3.3 Above goal, continue  2.5 5 2.5 5 2.5 5 5 27.5  3/24  2.7       At goal, continue  2.5 5 2.5 5 2.5 5 5 27.5  2/10  2.2       At goal, continue  2.5 5 2.5 5 2.5 5 5 27.5  12/30  2.7       At goal, continue  2.5 5 2.5 5 2.5 5 5 27.5   11/18 2.3 At goal, continue   2.5 5 2.5 5 2.5 5 5 27.5  10/28 3.9 Above goal, hold x1  2.5 5 2.5 5 2.5 0/5 5 27.5  9/16 2.7 At goal, no change  2.5 5 2.5 5 2.5 5 5 27.5  8/26 3.1 Above goal, no change 2.5 5 2.5 5 2.5 5 5 27.5  6/30 2.1 At goal, no change  2.5 5 2.5 5 2.5 5 5 27.5  5/19 2.3 At goal, no change  2.5 5 2.5 5 2.5 5 5 27.5  4/7 2.4 At goal, no change  2.5 5 2.5 5 2.5 5 5 27.5  2/24 1.9 Below goal, continue  2.5 5 2.5 5 2.5 5 5 27.5  1/13 2.8 At goal, no change  2.5 5 2.5 5 2.5 5 5 27.5  12/2 2.3 At goal, no change  2.5 5 2.5 5 2.5 5 5 27.5  10/21 3.0 At goal, no 5

## 2023-05-12 ENCOUNTER — APPOINTMENT (OUTPATIENT)
Dept: GASTROENTEROLOGY | Facility: CLINIC | Age: 63
End: 2023-05-12
Payer: MEDICAID

## 2023-05-12 VITALS
HEART RATE: 66 BPM | OXYGEN SATURATION: 100 % | SYSTOLIC BLOOD PRESSURE: 106 MMHG | HEIGHT: 65 IN | DIASTOLIC BLOOD PRESSURE: 76 MMHG

## 2023-05-12 DIAGNOSIS — R63.4 ABNORMAL WEIGHT LOSS: ICD-10-CM

## 2023-05-12 DIAGNOSIS — F50.89 OTHER SPECIFIED EATING DISORDER: ICD-10-CM

## 2023-05-12 DIAGNOSIS — Z93.1 GASTROSTOMY STATUS: ICD-10-CM

## 2023-05-12 DIAGNOSIS — K59.00 CONSTIPATION, UNSPECIFIED: ICD-10-CM

## 2023-05-12 DIAGNOSIS — R10.13 EPIGASTRIC PAIN: ICD-10-CM

## 2023-05-12 DIAGNOSIS — D64.9 ANEMIA, UNSPECIFIED: ICD-10-CM

## 2023-05-12 DIAGNOSIS — R10.84 GENERALIZED ABDOMINAL PAIN: ICD-10-CM

## 2023-05-12 PROCEDURE — 99213 OFFICE O/P EST LOW 20 MIN: CPT

## 2023-05-12 RX ORDER — SIMETHICONE 40MG/0.6ML
40 SUSPENSION, DROPS(FINAL DOSAGE FORM)(ML) ORAL
Qty: 10 | Refills: 3 | Status: ACTIVE | COMMUNITY
Start: 2023-05-12 | End: 1900-01-01

## 2023-05-12 NOTE — ASSESSMENT
[FreeTextEntry1] : Abdominal Pain: The patient complains of abdominal pain. The patient is to avoid nonsteroidal anti-inflammatory drugs and aspirin.  I recommend a trial of Pepcid 40 mg twice a day for 3 months for the symptoms.\par Dyspepsia: The patient complains of dyspeptic symptoms.  The patient was advised to continue to abide by an anti-gas (low FOD-MAP) diet.  The patient was previously given a pamphlet for anti-gas (low FOD-MAP).  The patient and I reviewed the anti-gas (low FOD-MAP)diet at length again. The patient is to continue on a trial of Simethicone one tablet 4 times a day p.r.n. abdominal pain and gas.\par GERD: The patient was advised to avoid late-night meals and dietary indiscretions.  The patient was advised to avoid fried and fatty foods.  The patient was advised to abide by an anti-GERD diet. The patient was given a pamphlet for anti-GERD.  The patient and I reviewed the anti-GERD diet at length. I recommend a trial of famotidine 40 mg twice a day x 3 months for the symptoms.\par Nausea: The patient complains of nausea. If the symptoms of nausea persists, the patient may require a trial of Zofran 4 mg twice a day. \par Constipation: The patient complains of constipation.   The patient agreed and will followup to reassess the symptoms.  \par Weight Loss: The patient was previously hospitalized at the [facility] at on March 3, 2020 for failure to thrive with a ?100 pound weight loss over 3 months. The patient still complains of weight loss and anorexia. The patient had tried multiple tube feeds with difficulty tolerating them.  I recommend a trial of [employer]1.5 not vanilla  via the PEG tube.  The case was discussed with the nutritionist regarding the best tube feeds for the weight loss.\par Gastritis: The patient has a history of gastritis. The patient is to avoid nonsteroidal anti-inflammatory drugs and aspirin. I recommend continue on a trial of Sucralfate 4 times a day for 3 months for the symptoms. \par Internal Hemorrhoids: The patient is to consider a trial of Anusol H. C. suppositories one per rectum nightly and Anusol HC2.5% cream apply to affected area twice a day p.r.n. hemorrhoidal bleeding or pain. \par I recommend a repeat colonoscopy in 8 years to reassess for colonic polyps pending patient’s health unless symptomatic. The patient agreed and will follow up for the procedure. \par Anemia: The patient was previously found to have anemia on prior blood work. The upper endoscopy and colonoscopy were inconclusive of the etiology of the anemia. The patient denies any bright red blood per rectum, melena or hematemesis. I recommend following to hemoglobin/hematocrit level. The patient was also advised to follow up with her PMD regarding the anemia for possible hematologic evaluation. If the anemia persists, the patient may require a capsule endoscopy.\par History of Pancreatic Cyst: The patient was previously diagnosed with a pancreatic cystic lesion on prior imaging study. The patient denies any jaundice, pruritus or back pain. The patient complains of abdominal pain. The patient denies any prior history of EtOH abuse. The patient had a CAT scan of the abdomen and pelvis with IV contrast performed on July 3, 2019. The CAT scan of the abdomen and pelvis with IV contrast revealed a markedly limited evaluation of the pancreas. The pancreatic tissue is atrophic. There was beam hardening artifact, motion artifact and lack of small bowel opacification that contributed to limited evaluation. Also noted was a single borderline enlarged periaortic lymph node, a constipated colon and a distended urinary bladder. I recommend a repeat imaging study of the pancreas with IV contrast to reassess the pancreatic cystic lesions after the endoscopic evaluation. The patient is aware of the potential risks of pancreatic cysts progressing to pancreatic cancer. The patient may require an endoscopic ultrasound of pancreas with possible fine-needle aspiration to better assess the pancreatic cystic lesions pending repeat imaging study. The patient is aware of the importance for followup. The patient agrees and will followup. \par Follow-up: The patient is to follow-up in the office in 6 months to reassess the symptoms. The patient was told to call the office if any further problems. \par \par \par

## 2023-05-12 NOTE — HISTORY OF PRESENT ILLNESS
[FreeTextEntry1] : The colonoscopy to the cecum performed at the [facility] GI endoscopy suite on March 12, 2020 revealed a normal but long and tortuous colon and median sized internal hemorrhoids. There were no polyps, masses, diverticulosis, AVMs or colitis noted.  [de-identified] : The CAT scan of the chest without IV contrast performed on August 16, 2022 revealed no significant interval change when compared to March 10, 2020.  Extensive large airway inflammation characterized by cylindrical as well as traction bronchiectasis with areas of fibrocavitary changes involving predominantly the right upper lung zone unchanged.  These findings are consistent with the sequela of chronic inflammation of infectious and/or inflammatory etiologies.  Also noted were Moderate multilevel degenerative disc disease of the upper thoracic spine, dilatation of the IVC and hepatic veins which may suggest a component of venous congestion and/or fluid overload unchanged from prior imaging, tracheotomy tube again noted with mild diffuse infiltration of the subcutaneous fat planes, gastric tube redemonstrated the tip in good position, tracheotomy tube redemonstrated at the tip in good position.  \par \par The CAT scan of the chest, abdomen and pelvis with IV contrast performed on March 11, 2020 revealed no evidence of malignancy. \par \par The CAT scan of the abdomen and pelvis with IV contrast performed on July 3, 2019 revealed a markedly limited evaluation of the pancreas. The pancreatic tissue is atrophic. There was beam hardening artifact, motion artifact and lack of small bowel opacification that contributed to limited evaluation. Also noted was a single borderline enlarged periaortic lymph node, a constipated colon and a distended urinary bladder.\par

## 2023-05-18 ENCOUNTER — NON-APPOINTMENT (OUTPATIENT)
Age: 63
End: 2023-05-18

## 2023-05-24 NOTE — PATIENT PROFILE ADULT. - CAREGIVER ADDRESS
[FreeTextEntry1] : Last seizure was May 18, The semiology looks different now, has usual seizures q 3-4 days. With her last seizure, she was not fully unresponsive. She was drinking from a bottle, started staring, smiling. She was touching mother's face, moving her body, she was made to lay on her side. She was biting her tongue, mouth was shut tight. Rescue medication used three times. No pauses in breathing. 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 She is making progress, understands more. Walks more.\par \par She is on Keppra 2 ml TID, lacosamide 50 mg BID and clobazam 5 mg qhs. They keep running out of the liquid through spillage and spit ups, so I will write for extra volume. 
unable to remember

## 2023-05-29 NOTE — CONSULT LETTER
[Courtesy Letter:] : I had the pleasure of seeing your patient, [unfilled], in my office today. [Please see my note below.] : Please see my note below. [Sincerely,] : Sincerely, [FreeTextEntry3] : Gaurang Rodriguez MD, PhD\par Chief, Division of Laryngology\par Department of Otolaryngology\par [facility]\par Pediatric Otolaryngology, [facility]\par  of Otolaryngology\par [employer]\par

## 2023-05-29 NOTE — HISTORY OF PRESENT ILLNESS
[de-identified] : 63 year old female here for follow up for chronic respiratory failure and dysphagia\par History of laryngotracheal stenosis.\par Current trach size is 6LPC. Last trach change: 3/15/2023 \par Currently has PEG tube place, tolerating bolus feeding (Ensure). \par Denies foul smell or secretions\par

## 2023-06-05 ENCOUNTER — APPOINTMENT (OUTPATIENT)
Dept: PULMONOLOGY | Facility: CLINIC | Age: 63
End: 2023-06-05
Payer: MEDICAID

## 2023-06-05 DIAGNOSIS — Z93.0 TRACHEOSTOMY STATUS: ICD-10-CM

## 2023-06-05 PROCEDURE — 99214 OFFICE O/P EST MOD 30 MIN: CPT

## 2023-06-06 RX ORDER — NUTRITIONAL SUPPLEMENT 0.06 G-1.5
LIQUID (ML) ORAL 3 TIMES DAILY
Qty: 711 | Refills: 3 | Status: ACTIVE | COMMUNITY
Start: 2021-04-28 | End: 1900-01-01

## 2023-06-11 PROBLEM — Z93.0 DEPENDENCE ON TRACHEOSTOMY: Status: ACTIVE | Noted: 2017-06-03

## 2023-06-11 NOTE — HISTORY OF PRESENT ILLNESS
[TextBox_4] : Ms. Bo is a 63 year old female with a history of atypical chest pain, COPD, chronic hypoxemia, dysphagia, GERD, laryngeal stenosis, OW, dependence on tracheostomy, and tracheomalacia who presents into the office for a follow up. \par \par Her chief complain is tube feeding. \par \par Patient reports she is in normal state of health. She reports she is not able to get Pulmocare tube feeding due to non availability. Patient states she is not able to gain any weight due to not getting Pulmocare. She reports she hasn't spoken to her nutritionist regarding tube feeding. \par Patient reports she is complaint with nebulizers and feels benefits. \par \par Patient denies fever, chills, SOB @ rest, wheezing, nasal congestion, runny nose, PND, or heartburn/reflux.\par \par

## 2023-06-11 NOTE — REASON FOR VISIT
[Follow-Up] : a follow-up visit [COPD] : COPD [Formal Caregiver] : formal caregiver [TextBox_44] : atypical chest pain, COPD, chronic hypoxemia, dysphagia, GERD, laryngeal stenosis, dependence on tracheostomy, and tracheomalacia

## 2023-06-11 NOTE — ASSESSMENT
[FreeTextEntry1] : Ms. Bo is a 63 year old female with a history of atypical chest pain, COPD, chronic hypoxemia, dysphagia, GERD, laryngeal stenosis, OW, dependence on tracheostomy, and tracheomalacia who presents into the office for a follow up. \par \par 1.tracheostomy / chronic respiratory failure \par -Continue to wean off the ventilator as tolerable in short trials throughout the day \par -recommended to use hypertonic saline in clearing of tracheostomy \par -Rx given for elastic pads, dressings, disposable inner cannulas, and tracheostomy collar to improve collar. Rx given for Metaplex Lite. \par -recommended f/u with Dr. Brett Laughlin and Dr. Edward Rodriguez\par \par 2.mucopurulent chronic bronchitis \par - continue Pulmozyme.5 BID PRN -NC\par - chest vest in place\par \par 3. Chronic Respiratory Failure \par -Trach collar- goal up to 12hrs per day (not doing)\par - 3 L SIMV: VT- 450; PC 20; PS-18; PEEP- 5\par - Night CPAP 5/PS 18 (back up 8 B/mm)\par \par 4.COPD/asthma\par -continue Combivent 1 inhalation up to QID to replace Symbicort \par -continue Performist nebulizer 1 unit dose BID, followed by Pulmicort 0.5 nebulizer BID \par -continue Albuterol via nebulizer for rescue up to QiD \par -continue on NAC Q8H\par -continue Yuperli 1 unit dose QD via nebulizer\par - continue Mucomyst 2cc (107.) q8H \par \par 5.Abnormal CT: confirm Bronchiectasis / pulmonary nodule\par -s/p CT of the chest -(no present) -s/p 8/2022- next 8/2023\par -Complete high resolution chest CT, prone and supine, to r/o pulmonary nodules - next 8/2023\par \par 6.GERD -(s/p EgD +/- colonoscopy- negative ) #1 issue\par - Recommend GI f/u\par -continue Pepcid 40 mg QHS\par -continue Reglan 5 mg pre-meal, QHS\par \par Patient to follow up with Dr. Avtar in 4-6 months.\par Patient to call with further questions and concerns.\par Patient verbalizes understanding of care plan and is agreeable.\par

## 2023-06-11 NOTE — PHYSICAL EXAM
[No Acute Distress] : no acute distress [Normal Rate/Rhythm] : normal rate/rhythm [Normal S1, S2] : normal s1, s2 [No Resp Distress] : no resp distress [Clear to Auscultation Bilaterally] : clear to auscultation bilaterally [No Abnormalities] : no abnormalities [Benign] : benign [Normal Color/ Pigmentation] : normal color/ pigmentation [No Focal Deficits] : no focal deficits [Oriented x3] : oriented x3 [Normal Affect] : normal affect [TextBox_2] : in stretcher  [TextBox_11] : tracheostomy

## 2023-06-23 ENCOUNTER — APPOINTMENT (OUTPATIENT)
Dept: PULMONOLOGY | Facility: CLINIC | Age: 63
End: 2023-06-23

## 2023-06-23 ENCOUNTER — APPOINTMENT (OUTPATIENT)
Dept: PULMONOLOGY | Facility: CLINIC | Age: 63
End: 2023-06-23
Payer: MEDICAID

## 2023-06-23 DIAGNOSIS — J41.1 MUCOPURULENT CHRONIC BRONCHITIS: ICD-10-CM

## 2023-06-23 DIAGNOSIS — E66.3 OVERWEIGHT: ICD-10-CM

## 2023-06-23 DIAGNOSIS — R06.02 SHORTNESS OF BREATH: ICD-10-CM

## 2023-06-23 PROCEDURE — 71046 X-RAY EXAM CHEST 2 VIEWS: CPT

## 2023-06-23 PROCEDURE — 99214 OFFICE O/P EST MOD 30 MIN: CPT | Mod: 25

## 2023-06-23 RX ORDER — PREDNISONE 10 MG/1
10 TABLET ORAL
Qty: 50 | Refills: 0 | Status: ACTIVE | COMMUNITY
Start: 2023-06-23 | End: 1900-01-01

## 2023-06-23 RX ORDER — AMOXICILLIN AND CLAVULANATE POTASSIUM 600; 42.9 MG/5ML; MG/5ML
600-42.9 FOR SUSPENSION ORAL
Qty: 1 | Refills: 0 | Status: DISCONTINUED | COMMUNITY
Start: 2023-02-03 | End: 2023-06-23

## 2023-06-23 RX ORDER — AZITHROMYCIN 250 MG/1
250 TABLET, FILM COATED ORAL
Qty: 36 | Refills: 1 | Status: DISCONTINUED | COMMUNITY
Start: 2019-12-23 | End: 2023-06-23

## 2023-06-23 NOTE — ASSESSMENT
[FreeTextEntry1] : Ms. Bo is a 63 year old female, presenting into the office with a history of former smoker, COPD, respiratory failure for 10 years, tracheostomy,  tracheostenosis and tracheomalacia who is s/p bronchoscopy with revision of the tracheostomy with dilation of the tracheostomy.  She presents with tracheomalacia and end stage COPD with issues with ventilator. PNA ([facility])- s/p colonoscopy (preop). Chronic colonization of aeruginosa with pseudomonas, Difficulty with mucus clearance despite chest vest, s/p recent PTX on the right -  chronic respiratory complaint s/p colonoscopy - ?Lung Bx ([facility])  s/p PNA and hospitalization 11/2021 - now plagued by reflux; s/p intervention by Edmar Stone (trachea)/ Michael (throat) (Successful)- now with SOB; ?COPD exacerbation; CHF; PE\par \par Her chronic respiratory failure is multifactorial due to:\par -underlying COPD/severe persistent asthma\par -respiratory muscle weakness\par -tracheomalacia\par \par Problem 1A: s/p PNA (resolved 2022); ?PE\par \par problem 1: tracheostomy / chronic respiratory failure \par -Continue to wean off the ventilator as tolerable in short trials throughout the day  \par -recommended to use hypertonic saline in clearing of tracheostomy \par -Rx given for elastic pads, dressings, disposable inner cannulas, and tracheostomy collar to improve collar. Rx given for Metaplex Lite. \par -recommended f/u with Dr. Brett Laughlin and Dr. Edward Rodriguez (pending 7/2023)\par \par \par  Problem 1a: mucopurulent chronic bronchitis \par - continue Pulmozyme .5 BID PRN  -NC\par -add  mg q8H \par - chest vest in place \par You have a clinical scenario most c/q acute bronchitis the etiology of which is unknown but empiric antibiotics are indicated. Hydration, mucolytics including mucinex, robitussin and the like are indicated. Cough controlling agents will be needed. \par \par Problem 1B: PTX (right side)- resolved (2020) \par The patient has a pneumothorax from trauma, iatrogenic, post surgical. You are at risk for one in the future and may need hospitalization. Thoracic surgical evaluation is needed for chest tube and other intervention. \par \par Problem 1C: ?Lung Bx ([facility]) \par - obtain records (not available)\par \par Problem 2: Chronic Respiratory Failure \par -Trach collar- goal up to 12hrs per day (not doing)\par - 3 L SIMV: VT- 450; PC 20; PS-18; PEEP- 5\par - Night CPAP 5/PS 18 (back up 8 B/mm)\par \par problem 3:  COPD/asthma\par -continue Combivent 1 inhalation up to QID to replace Symbicort \par -continue Performist nebulizer 1 unit dose BID, followed by Pulmicort 0.5 nebulizer BID \par -continue Albuterol via nebulizer for rescue up to QiD \par -continue on NAC Q8H\par -continue Yuperli 1 unit dose QD via nebulizer\par - continue Mucomyst 2cc (107.) q8H \par -add Prednisone 30 mg x 5 days, 20 mg x 5 days, 10 mg x 5 days \par -Information sheet given to the patient to be reviewed, this medication is never to be used without consulting the prescribing physician. Proper dietary restraint is necessary specifically salt containing foods, if any reaction may occur should be reported. \par -COPD is a progressive disease and although it can’t be cured , appropriate management can slow its progression, reduce frequency and severity of exacerbations, and improve symptoms and the patient quality of life. Hospitalizations are the greatest contributor to the total COPD costs and account for up to 87% of total COPD related costs. Exacerbations are the main cause of admissions and subsequently account for the 40-75% of COPD costs. Inhaled maintenance therapy reduces the incidence of exacerbations in patients with stable COPD. Incorrect inhaler use and nonadherence are major obstacles to achieving COPD treatment goals. Many COPD patients have challenges (impaired inhalation, limited dexterity, reduced cognition: that limit their ability to correctly use their COPD treatment devices resulting in reduced symptom control. Of most importance is smoking cessation and early intervention with respiratory illnesses and contemplation for pulmonary rehab to enhance quality of life.\par \par \par Problem 3C: ?PE ?CHF 6/2023\par -complete blood work to include: BNP, D-dimer\par -if elevated, complete CTPA evaluation\par \par Problem 3A: Abnormal CT: confirm Bronchiectasis / pulmonary nodule\par -s/p CT of the chest -(no present) -s/p 8/2022- next 8/2023\par -Complete high resolution chest CT, prone and supine, to r/o pulmonary nodules - next  8/2023\par -Recommended to take Slippery Elm Tea and pill for cough and mucus clearing \par -based upon results of CT scan, will apply for chest vest therapy\par \par -CAT scans are the only radiological modality to identify abnormalities w/in the lungs with regards to nodules/masses/lymph nodes. Risks, benefits were reviewed in detail. The guidelines for abnormalities include follow up CT scans at various intervals which could range from 6 weeks to 1 year intervals. If there is a change for the worse then consideration for a biopsy will be considered if you are a candidate. Second opinion evaluation with a thoracic surgeon or an interventional radiologist could be offered. \par - Seen on the CT of the chest or chest x-ray signifies damaged bronchial tubes focal or diffuse which can be sites of recurrent infections. These areas can be colonized by various organisms including bacteria (hemophilous influenzae/Pseudomonas species etc.) as well as acid fast bacilli (mycobacterial disease- inclusive of TB/MT etc.).  The patient has previously used acapella, nebulizer and breathing techniques for airway clearance.  These methods have not provided appropriate secretion manage in this patient.Cough length has been greater than 6 months, hence, initiating vest therapy is necessary.\par \par \par problem 3b: s/p pseudomonas aeruginosa.- chronic colonization (kubulek) \par -Usingvest pack (failed Conventional therapy.) - cough assist/vest Rx \par -get infectious disease f/u PRN \par -chronic colonization\par \par problem 4: GERD -(s/p EgD +/- colonoscopy- negative ) #1 issue\par - Recommend GI f/u\par -add Pepcid 40 mg QHS\par -add Reglan 5 mg pre-meal, QHS\par -continue Protonix 40 mg qAM ;\par -Things to avoid including overeating, spicy foods, tight clothing, eating within three hours of bed, this list is not all inclusive. \par -For treatment of reflux, possible options discussed including diet control, H2 blockers, PPIs, as well as coating motility agents discussed as treatment options. Timing of meals and proximity of last meal to sleep were discussed. If symptoms persist, a formal gastrointestinal evaluation is needed.\par -Rule of 2's: Avoid eating too late, too fast, too much, too spicy or within two hours of bedtime\par \par Problem 5: Immunodeficiency\par - Continue Zithromax 250 mg Monday, Wednesday, and Friday\par -Due to the fact that this pt has had more infections than would be expected and immunological blood work is indicated this would include: IgG subclasses, quantitative immunoglobulins, Strep pneumoniae titers as well as Vitamin D levels. Based on this blood work we will be able to decide where the pt needs additional pneumococcal vaccine either Prevnar 13 or Pneumovax. Immunology evaluation will also be potentially indicated. \par \par Problem 6: Sensory neuropathic Cough\par - Continue Amitriptyline 10 mg up to TID\par -Sensory neuropathic cough is an etiology of cough that is often realized once someone has been ruled out for common disease such as: asthma, COPD, eosinophilic bronchitis, bronchiectasis, post nasal drip, and GERD. It sometimes develops following a URI, herpes zoster outbreak in pharynx or thyroid or cervical spine injury. However, many patients have no identifiable antecedent explanation. \par \par problem 7: tracheomalacia/tracheal tumor \par -follow up with Dr. Edmar Stone for thoracic evaluation for potential tracheoplasty - s/p new tracheostomy\par \par Tracheomalacia is usually acquired in adults and common causes include damage by tracheostomy or endotracheal intubation damaging the tracheal cartilage with increase risk with multiple intubations, prolonged intubation, and concurrent high dose steroid therapy; external chest wall trauma and surgery; chronic compression of the trachea by benign etiologies (eg, benign mediastinal goiter) or malignancy; relapsing polychondritis; or recurrent infection. Tracheomalacia can be asymptomatic, however signs or symptoms can develop as the severity of the airway narrowing progresses with major symptoms include dyspnea, cough, and sputum retention. Other symptoms include severe paroxysms of coughing, wheezing or stridor, barking cough and may be exacerbated by forced expiration, cough, and Valsalva maneuver. Tracheomalacia is diagnosed by a bronchoscopic visualization of dynamic airway collapse on dynamic chest CT. Therapy is warranted in symptomatic patients with severe tracheomalacia and includes surgical repair as tracheobronchoplasty. The patient was referred to Dr. Edmar Stone/Jaylene, at [facility] for a surgical consult. \par \par problem 8: dysphonia/sore throat\par -recommended Fisherman Friend's lozenges \par -recommended to use Slippery Elm Lozenge / Tea \par -continue Evoxac 30 q 8\par \par Problem 8A: Dysphagia \par -recommended swallow therap\par -  \par \par Problem 9: Health Maintenance/COVID19 Precautions \par - S/p Covid 19 vaccine (Pfizer) x1 \par - Clean your hands often. Wash your hands often with soap and water for at least 20 seconds, especially after blowing your nose, coughing, or sneezing, or having been in a public place.\par - If soap and water are not available, use a hand  that contains at least 60% alcohol.\par - To the extent possible, avoid touching high-touch surfaces in public places - elevator buttons, door handles, handrails, handshaking with people, etc. Use a tissue or your sleeve to cover your hand or finger if you must touch something.\par - Wash your hands after touching surfaces in public places.\par - Avoid touching your face, nose, eyes, etc.\par - Clean and disinfect your home to remove germs: practice routine cleaning of frequently touched surfaces (for example: tables, doorknobs, light switches, handles, desks, toilets, faucets, sinks & cell phones)\par - Avoid crowds, especially in poorly ventilated spaces. Your risk of exposure to respiratory viruses like COVID-19 may increase in crowded, closed-in settings with little air circulation if there are people in the crowd who are sick. All patients are recommended to practice social distancing and stay at least 6 feet away from others. \par - Avoid all non-essential travel including plane trips, and especially avoid embarking on cruise ships.\par -If COVID-19 is spreading in your community, take extra measures to put distance between yourself and other people to further reduce your risk of being exposed to this new virus.\par -Stay home as much as possible.\par - Consider ways of getting food brought to your house through family, social, or commercial networks\par -Be aware that the virus has been known to live in the air up to 3 hours post exposure, cardboard up to 24 hours post exposure, copper up to 4 hours post exposure, steel and plastic up to 2-3 days post exposure. Risk of transmission from these surfaces are affected by many variables.\par COVID-19 precautionary Immune Support Recommendations:\par -OTC Vitamin C 500mg BID \par -OTC Quercetin 250-500mg BID \par -OTC Zinc 75-100mg per day \par -OTC Melatonin 1 or 2mg a night \par -OTC Vitamin D 1-4000mg per day \par -OTC Tonic Water 8oz per day\par -Water 0.5-1 gallon per day\par Asthma and COVID19:\par You need to make sure your asthma is under control. This often requires the use of inhaled corticosteroids (and sometimes oral corticosteroids). Inhaled corticosteroids do not likely reduce your immune system’s ability to fight infections, but oral corticosteroids may. It is important to use the steps above to protect yourself to limit your exposure to any respiratory virus. \par \par problem 10: health maintenance \par -Recommended "Throat Coat Tea"\par - recommended to f/u with Dr. Palomares(GI) \par -instructed to increase physical activity as much as possible\par - She was administered an influenza vaccination 11/19\par -recommended strep pneumonia vaccines: Prevnar-13 vaccine, followed by Pneumo vaccine 23 one year following (completed)\par -recommended early intervention for URIs\par -recommended regular osteoporosis evaluations\par -recommended early dermatological evaluations\par -recommended after the age of 50 to the age of 70, colonoscopy every 5 years \par \par \par Follow up in 2 months.\par Patient is encouraged to call with any changes, concerns or questions.

## 2023-06-23 NOTE — HISTORY OF PRESENT ILLNESS
[TextBox_4] : Ms. NAVARRETE is a 63 year old female presenting to the office today with tracheostomy in place for a f/p pulmonary evaluation. Her chief complaint is\par \par -she notes increased SOB worse than normal\par -she notes sharp pain with tracheostomy pressing against her throat\par -she notes sharp chest pain onset 1 month ago\par -she notes dysphagia with solids onset several months\par -she notes losing weight \par -she notes getting food through PEG tube\par -she notes intermittently bringing up yellow sputum with coughing\par -she notes wheezing\par -she notes feeling fatigued\par -she notes sleeping for most of day\par -she notes balance is poor\par -she notes overall body weakness\par -she notes off prednisone now s/p ED visit 1 week ago\par \par -she denies any headaches, nausea, emesis, fever, chills, sweats, chest pressure, palpitations, constipation, diarrhea, vertigo, heartburn, reflux, itchy eyes, itchy ears, leg swelling, leg pain, arthralgias, or sour taste in the mouth.

## 2023-06-23 NOTE — ADDENDUM
[FreeTextEntry1] : Documented by Anne Heath acting as a scribe for Dr. Maco Nova on 06/23/2023. All medical record entries made by the Scribe were at my, Dr. Maco Nova's, direction and personally dictated by me on 06/23/2023. I have reviewed the chart and agree that the record accurately reflects my personal performance of the history, physical exam, assessment and plan. I have also personally directed, reviewed, and agree with the discharge instructions.\par \par

## 2023-06-23 NOTE — PROCEDURE
[FreeTextEntry1] : Chest X-ray revealed RUL scarring; hyperinflation. Chest X-ray was performed for SOB

## 2023-06-23 NOTE — PHYSICAL EXAM
[No Acute Distress] : no acute distress [Normal Oropharynx] : normal oropharynx [III] : Mallampati Class: III [Normal Appearance] : normal appearance [No Neck Mass] : no neck mass [Normal Rate/Rhythm] : normal rate/rhythm [Normal S1, S2] : normal s1, s2 [No Murmurs] : no murmurs [No Resp Distress] : no resp distress [Clear to Auscultation Bilaterally] : clear to auscultation bilaterally [No Abnormalities] : no abnormalities [Benign] : benign [Normal Gait] : normal gait [No Clubbing] : no clubbing [No Cyanosis] : no cyanosis [No Edema] : no edema [FROM] : FROM [Normal Color/ Pigmentation] : normal color/ pigmentation [No Focal Deficits] : no focal deficits [Oriented x3] : oriented x3 [Normal Affect] : normal affect [TextBox_68] : I:E ratio 1:3; clear  [TextBox_11] : tracheostomy in place

## 2023-06-27 ENCOUNTER — NON-APPOINTMENT (OUTPATIENT)
Age: 63
End: 2023-06-27

## 2023-07-11 ENCOUNTER — APPOINTMENT (OUTPATIENT)
Dept: OTOLARYNGOLOGY | Facility: CLINIC | Age: 63
End: 2023-07-11

## 2023-07-11 ENCOUNTER — APPOINTMENT (OUTPATIENT)
Dept: OTOLARYNGOLOGY | Facility: CLINIC | Age: 63
End: 2023-07-11
Payer: MEDICAID

## 2023-07-11 PROCEDURE — 31615 TRCHEOBRNCHSC EST TRACHS INC: CPT

## 2023-07-11 PROCEDURE — 17250 CHEM CAUT OF GRANLTJ TISSUE: CPT

## 2023-07-11 PROCEDURE — 99214 OFFICE O/P EST MOD 30 MIN: CPT | Mod: 25

## 2023-07-11 NOTE — CONSULT LETTER
[Courtesy Letter:] : I had the pleasure of seeing your patient, [unfilled], in my office today. [Please see my note below.] : Please see my note below. [Sincerely,] : Sincerely, [FreeTextEntry3] : Gaurang Rodriguez MD, PhD\par Chief, Division of Laryngology\par Department of Otolaryngology\par [facility]\par Pediatric Otolaryngology, [facility]\par  of Otolaryngology\par [employer] [facility] [employer]\par

## 2023-07-11 NOTE — HISTORY OF PRESENT ILLNESS
[de-identified] : 63 year old female here for follow up for chronic respiratory failure and dysphagia\par History of laryngotracheal stenosis.\par Current trach size is 6LPC. Last trach change: 4/19/2023 \par States went to ER 2x since last visit for sore throat and shortness of breath.\par Reports green secretions, unsure if foul smelling. \par Denies bleeding, bruising, swelling around the trach.\par Currently has PEG tube place, tolerating bolus feeding ([place] [facility]).\par Was on pulmacare but then discontinued so now on neutran 1.5 but can't find it\par

## 2023-07-11 NOTE — REASON FOR VISIT
[Subsequent Evaluation] : a subsequent evaluation for [Formal Caregiver] : formal caregiver [FreeTextEntry2] : dysphagia

## 2023-07-14 DIAGNOSIS — J02.9 ACUTE PHARYNGITIS, UNSPECIFIED: ICD-10-CM

## 2023-07-14 RX ORDER — IBUPROFEN 600 MG/1
600 TABLET, FILM COATED ORAL
Qty: 10 | Refills: 0 | Status: ACTIVE | COMMUNITY
Start: 2023-07-14 | End: 1900-01-01

## 2023-07-17 ENCOUNTER — NON-APPOINTMENT (OUTPATIENT)
Age: 63
End: 2023-07-17

## 2023-07-24 ENCOUNTER — NON-APPOINTMENT (OUTPATIENT)
Age: 63
End: 2023-07-24

## 2023-07-26 ENCOUNTER — OUTPATIENT (OUTPATIENT)
Dept: OUTPATIENT SERVICES | Facility: HOSPITAL | Age: 63
LOS: 1 days | Discharge: HOME | End: 2023-07-26

## 2023-07-26 ENCOUNTER — OUTPATIENT (OUTPATIENT)
Dept: OUTPATIENT SERVICES | Facility: HOSPITAL | Age: 63
LOS: 1 days | End: 2023-07-26

## 2023-07-26 ENCOUNTER — APPOINTMENT (OUTPATIENT)
Dept: RADIOLOGY | Facility: HOSPITAL | Age: 63
End: 2023-07-26
Payer: MEDICAID

## 2023-07-26 ENCOUNTER — NON-APPOINTMENT (OUTPATIENT)
Age: 63
End: 2023-07-26

## 2023-07-26 ENCOUNTER — APPOINTMENT (OUTPATIENT)
Dept: RADIOLOGY | Facility: HOSPITAL | Age: 63
End: 2023-07-26

## 2023-07-26 DIAGNOSIS — Z93.0 TRACHEOSTOMY STATUS: Chronic | ICD-10-CM

## 2023-07-26 DIAGNOSIS — Z43.0 ENCOUNTER FOR ATTENTION TO TRACHEOSTOMY: Chronic | ICD-10-CM

## 2023-07-26 DIAGNOSIS — Z98.891 HISTORY OF UTERINE SCAR FROM PREVIOUS SURGERY: Chronic | ICD-10-CM

## 2023-07-26 DIAGNOSIS — J39.8 OTHER SPECIFIED DISEASES OF UPPER RESPIRATORY TRACT: Chronic | ICD-10-CM

## 2023-07-26 DIAGNOSIS — R13.12 DYSPHAGIA, OROPHARYNGEAL PHASE: ICD-10-CM

## 2023-07-26 PROCEDURE — 74230 X-RAY XM SWLNG FUNCJ C+: CPT | Mod: 26

## 2023-07-26 NOTE — PLAN
[FreeTextEntry2] : \par 1.) Soft Bite Size Solids with Thin Liquids\par 2.) Continue with PEG Tube Feedings for caloric/hydration/medication needs \par 3.) Aspiration Precautions\par 4.) Reflux Precautions\par 5.) Maintain Good Oral Hygiene Care\par 6.) Follow up with Physician\par \par SLP provided Patient and Home Nurse (Debbie) education regarding preliminary results. Patient verbalized understanding and will follow up with Physician. \par

## 2023-07-26 NOTE — HISTORY OF PRESENT ILLNESS
[FreeTextEntry1] : Patient arrived to Radiology for an OutPatient Modified Barium Swallow Study. Patient came by EMT [place] via Stretcher. Patient accompanied by Home Nurse (Debbie).  Patient is Tracheostomy/Vent Dependent. Patient PMH as per EMR:\par \par Active Problems\par Abdominal pain, bilateral upper quadrant (789.01,789.02) (R10.11,R10.12)\par Abdominal pain, diffuse (789.00) (R10.84)\par Abdominal pain, diffuse (789.00) (R10.84)\par Abnormal CT of the chest (793.2) (R93.89)\par Abnormal sputum (786.4) (R09.3)\par Abnormal weight loss (783.21) (R63.4)\par Acute bronchitis (466.0) (J20.9)\par Acute tracheitis without airway obstruction (464.10) (J04.10)\par Anemia, unspecified (285.9) (D64.9)\par Aspiration pneumonia of right middle lobe due to gastric secretions (507.0) (J69.0)\par Atypical chest pain (786.59) (R07.89)\par Bronchiectasis (494.0) (J47.9)\par Chronic hypoxemic respiratory failure (518.83,799.02) (J96.11)\par Chronic obstructive pulmonary disease, unspecified COPD type (496) (J44.9)\par Chronic respiratory failure with hypoxia and hypercapnia (518.83,799.02,786.09)\par (J96.11,J96.12)\par Chronic respiratory insufficiency (786.09) (R06.89)\par Complication of tracheostomy (519.00) (J95.00)\par Constipation, unspecified constipation type (564.00) (K59.00)\par Dependence on tracheostomy (V44.0) (Z93.0)\par Dyspepsia (536.8) (R10.13)\par Dysphagia, oropharyngeal (787.22) (R13.12)\par Dysphagia, unspecified type (787.20) (R13.10)\par Dysphonia (784.42) (R49.0)\par Educated about COVID-19 virus infection (V65.49) (Z71.89)\par Encounter for immunization (V03.89) (Z23)\par Functional dyspepsia (536.8) (K30)\par GERD without esophagitis (530.81) (K21.9)\par Granulation tissue (701.5) (L92.9)\par Infection of tracheostomy stoma (519.01) (J95.02)\par Laryngeal stenosis (478.74) (J38.6)\par Loss of appetite feeding disturbances of nonorganic origin (307.59) (F50.89)\par Lupus anticoagulant syndrome (289.81) (D68.62)\par Malnutrition (263.9) (E46)\par Mucopurulent chronic bronchitis (491.1) (J41.1)\par Nausea (787.02) (R11.0)\par Neck pain (723.1) (M54.2)\par Over weight (278.02) (E66.3)\par Pancreatic cyst (577.2) (K86.2)\par PEG (percutaneous endoscopic gastrostomy) status (V44.1) (Z93.1)\par Pneumothorax, right (512.89) (J93.9)\par Positive sputum culture for Pseudomonas (041.7) (R84.5)\par Preop pulmonary/respiratory exam (V72.82) (Z01.811)\par Preop testing (V72.84) (Z01.818)\par Prolonged PTT (790.92) (R79.1)\par Pseudomonas aeruginosa infection (041.7) (A49.8)\par Respiratory abnormality (786.00) (R06.9)\par Short of breath on exertion (786.05) (R06.02)\par History of Simple Tracheostoma Revision\par SOB (shortness of breath) (786.05) (R06.02)\par Tracheal stenosis (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.19) (J39.8)\par Tracheomalacia (519.19) (J39.8)\par Vocal cord polyp (478.4) (J38.1)\par Weight loss, unintentional (783.21) (R63.4)\par \par Past Medical History\par History of Anxiety (300.00) (F41.9)\par History of COPD exacerbation (491.21) (J44.1)\par History of acute bronchitis (V12.69) (Z87.09)\par History of asthma (V12.69) (Z87.09)\par History of chronic obstructive lung disease (V12.69) (Z87.09)\par History of gastroesophageal reflux (GERD) (V12.79) (Z87.19)\par History of hypertension (V12.59) (Z86.79)\par History of pneumonia (V12.61) (Z87.01)\par History of urinary tract infection (V13.02) (Z87.440)\par History of Poor sleep (V69.4) (Z72.820)\par History of Preop pulmonary/respiratory exam (V72.82) (Z01.811)\par \par Of Note: Patient had a Flexible Endoscopic Evaluation of the Swallow (FEES) Test completed on April 19, 2023 \par Impressions: (See FEES Report for details). \par Mild oral dysphagia\par Recommendations:\par 1) Soft solids and thin liquids, small bites/sips\par 2) Aspiration precautions\par 3) MBS /BS due to globus sensation and formal assessment of the esphagus \par 4) Follow up with MD as scheduled\par \par ENT Note 7/11/2023 - 63yF with chronic pulmonary disease, and trach dependence, vent dependence, dysphagia, doing better with hydration status and calorie intake since getting gtube placed. Still complains of chronic issues that appear stable. Today, trach change was done easily. I highly recommend to continue taking po diet, gtube prn. Previous FEES showed no aspiration or penetration. Esophagram ordered last visit, we will again schedule for possible lower stenosis. Start neb saline. Elevated head of the bed and reflux precaution. RTC 2 months unless issues arise sooner. Trach change today was without difficulty.  \par \par Today, Patient reports having a "stuck" sensation and points to the mid chest/sternum. Patient reports drinking "water and tea" and "eat cookie but I dip it into my tea to soften it"  Patient reports primarily edentulous state and that she is able to gum/mash "cookies"  and does not report eating any other food items.  Patient reports utilizing PEG Tube Feedings.   This Modified Barium Swallow Study is to objectively assess the Physiology of the Oral and Pharyngeal Stage swallowing mechanism for treatment plan for least restrictive diet. \par \par Of Note: Patient is also on scheduled for a Barium Swallow/Esophagram (See Radiologist report). \par \par Of Note: Patient is Tracheostomy/Vent Dependent. Tracheostomy #6LPC (Cuffed not fully inflated) \par Vent Settings: PS20 /  / FiO2@30% / PEEP 5 / RR1  \par Patient's Cuff is NOT fully inflated and tolerating Vent Settings. Patient is with adequate voicing ability to communicate effectively at the conversational level. \par \par Referring MD: Dr. Gaurang Rodriguez \par \par \par \par

## 2023-07-26 NOTE — ASSESSMENT
[FreeTextEntry1] : Patient presents with Mild Oral Stage and Functional Pharyngeal Stage swallowing mechanism. The Oral Stage is characterized by adequate oral containment, slow/prolonged chewing for soft bite size solid due to mostly edentulous state with ability to break down effectively, adequate bolus manipulation, adequate tongue motion with adequate anterior to posterior transfer of the bolus with adequate oral clearance.  The Pharyngeal Stage is characterized by adequate initiation of the pharyngeal swallow, adequate laryngeal elevation, adequate tongue base retraction and adequate pharyngeal constriction. There is adequate pharyngeal clearance post swallow. There was No Aspiration observed before, during or after the swallow for puree, soft bite size solid, and thin liquids. \par \par RESULTS: \par No Aspiration observed before, during or after the swallow for puree, soft bite size and thin liquids.\par \par Of Note: Patient is also on scheduled for a Barium Swallow/Esophagram (See Radiologist Report).  \par \par \par

## 2023-07-31 DIAGNOSIS — R13.10 DYSPHAGIA, UNSPECIFIED: ICD-10-CM

## 2023-08-04 ENCOUNTER — OUTPATIENT (OUTPATIENT)
Dept: OUTPATIENT SERVICES | Facility: HOSPITAL | Age: 63
LOS: 1 days | End: 2023-08-04
Payer: MEDICAID

## 2023-08-04 ENCOUNTER — APPOINTMENT (OUTPATIENT)
Dept: CT IMAGING | Facility: IMAGING CENTER | Age: 63
End: 2023-08-04
Payer: MEDICAID

## 2023-08-04 DIAGNOSIS — Z00.8 ENCOUNTER FOR OTHER GENERAL EXAMINATION: ICD-10-CM

## 2023-08-04 DIAGNOSIS — J39.8 OTHER SPECIFIED DISEASES OF UPPER RESPIRATORY TRACT: Chronic | ICD-10-CM

## 2023-08-04 DIAGNOSIS — Z93.0 TRACHEOSTOMY STATUS: Chronic | ICD-10-CM

## 2023-08-04 DIAGNOSIS — R93.89 ABNORMAL FINDINGS ON DIAGNOSTIC IMAGING OF OTHER SPECIFIED BODY STRUCTURES: ICD-10-CM

## 2023-08-04 DIAGNOSIS — Z98.891 HISTORY OF UTERINE SCAR FROM PREVIOUS SURGERY: Chronic | ICD-10-CM

## 2023-08-04 DIAGNOSIS — Z43.0 ENCOUNTER FOR ATTENTION TO TRACHEOSTOMY: Chronic | ICD-10-CM

## 2023-08-04 PROCEDURE — 71250 CT THORAX DX C-: CPT | Mod: 26

## 2023-08-04 PROCEDURE — 71250 CT THORAX DX C-: CPT

## 2023-08-07 RX ORDER — CEFDINIR 250 MG/5ML
250 POWDER, FOR SUSPENSION ORAL
Qty: 2 | Refills: 0 | Status: ACTIVE | COMMUNITY
Start: 2023-08-07 | End: 1900-01-01

## 2023-08-10 ENCOUNTER — RX RENEWAL (OUTPATIENT)
Age: 63
End: 2023-08-10

## 2023-08-10 RX ORDER — IPRATROPIUM BROMIDE AND ALBUTEROL SULFATE 2.5; .5 MG/3ML; MG/3ML
0.5-2.5 (3) SOLUTION RESPIRATORY (INHALATION) 4 TIMES DAILY
Qty: 360 | Refills: 4 | Status: ACTIVE | COMMUNITY
Start: 2019-08-27 | End: 1900-01-01

## 2023-08-13 ENCOUNTER — NON-APPOINTMENT (OUTPATIENT)
Age: 63
End: 2023-08-13

## 2023-08-18 ENCOUNTER — APPOINTMENT (OUTPATIENT)
Dept: OTOLARYNGOLOGY | Facility: CLINIC | Age: 63
End: 2023-08-18
Payer: MEDICAID

## 2023-08-18 VITALS
HEART RATE: 51 BPM | SYSTOLIC BLOOD PRESSURE: 109 MMHG | WEIGHT: 127 LBS | HEIGHT: 65 IN | BODY MASS INDEX: 21.16 KG/M2 | DIASTOLIC BLOOD PRESSURE: 61 MMHG

## 2023-08-18 PROCEDURE — 31575 DIAGNOSTIC LARYNGOSCOPY: CPT | Mod: 59

## 2023-08-18 PROCEDURE — 17250 CHEM CAUT OF GRANLTJ TISSUE: CPT

## 2023-08-18 PROCEDURE — 99214 OFFICE O/P EST MOD 30 MIN: CPT | Mod: 25

## 2023-08-18 PROCEDURE — 31615 TRCHEOBRNCHSC EST TRACHS INC: CPT | Mod: 59

## 2023-08-18 RX ORDER — BACITRACIN 500 [IU]/G
500 OINTMENT TOPICAL DAILY
Qty: 1 | Refills: 3 | Status: ACTIVE | COMMUNITY
Start: 2023-07-25 | End: 1900-01-01

## 2023-08-18 RX ORDER — ULTRASOUND COUPLING MEDIUM
GEL (GRAM) TOPICAL
Qty: 1 | Refills: 0 | Status: ACTIVE | COMMUNITY
Start: 2023-07-25 | End: 1900-01-01

## 2023-08-19 NOTE — HISTORY OF PRESENT ILLNESS
[de-identified] : 63 year old female following up for chronic respiratory and dysphagia.  History of laryngotracheal stenosis. Current trach size is 6LPC. Last trach change: 7/11/23 Reports constant throat pain. Caregiver states breathing is compromised, settings increased on ventilator but stable over the last few weeks.  States breathing has become more shallow.  Reports green secretions when coughing.  Currently has PEG tube place, tolerating bolus feeding ([facility]). Caregiver reports difficulty getting tube feedings due to insurance.  PEG tube changed 8/17/23 Caregiver reports current episode of edema R>L legs- PCP recommended follow up at ED.  Patient reports foot pain- does not want to go to hospital.  No recent hospitalizations.  Reports excessive drainage around trach, similar to all visits.  No new symptoms

## 2023-08-19 NOTE — CONSULT LETTER
[Courtesy Letter:] : I had the pleasure of seeing your patient, [unfilled], in my office today. [Please see my note below.] : Please see my note below. [Sincerely,] : Sincerely, [FreeTextEntry3] : Gaurang Rodriguez MD, PhD\par  Chief, Division of Laryngology\par  Department of Otolaryngology\par  [facility]\par  Pediatric Otolaryngology, [facility]\par   of Otolaryngology\par  [employer]\par

## 2023-09-01 RX ORDER — NUTRITIONAL SUPPLEMENT/FIBER 0.05 G-1.5
LIQUID (ML) ORAL 3 TIMES DAILY
Qty: 5 | Refills: 5 | Status: ACTIVE | COMMUNITY
Start: 2023-05-01 | End: 1900-01-01

## 2023-09-05 RX ORDER — NUTRITIONAL SUPPLEMENT/FIBER 0.06 G-1.4
LIQUID (ML) ORAL
Qty: 250 | Refills: 3 | Status: ACTIVE | COMMUNITY
Start: 2023-04-28 | End: 1900-01-01

## 2023-09-06 RX ORDER — NUTRITIONAL SUPPLEMENT 0.04G-1/ML
LIQUID (ML) ORAL 3 TIMES DAILY
Qty: 120 | Refills: 5 | Status: DISCONTINUED | COMMUNITY
Start: 2023-03-23 | End: 2023-09-06

## 2023-09-18 ENCOUNTER — NON-APPOINTMENT (OUTPATIENT)
Age: 63
End: 2023-09-18

## 2023-09-19 ENCOUNTER — NON-APPOINTMENT (OUTPATIENT)
Age: 63
End: 2023-09-19

## 2023-09-20 ENCOUNTER — APPOINTMENT (OUTPATIENT)
Dept: OTOLARYNGOLOGY | Facility: CLINIC | Age: 63
End: 2023-09-20
Payer: MEDICAID

## 2023-09-20 VITALS
HEIGHT: 65 IN | SYSTOLIC BLOOD PRESSURE: 103 MMHG | DIASTOLIC BLOOD PRESSURE: 63 MMHG | OXYGEN SATURATION: 100 % | HEART RATE: 63 BPM

## 2023-09-20 DIAGNOSIS — J39.8 OTHER SPECIFIED DISEASES OF UPPER RESPIRATORY TRACT: ICD-10-CM

## 2023-09-20 DIAGNOSIS — R06.89 OTHER ABNORMALITIES OF BREATHING: ICD-10-CM

## 2023-09-20 DIAGNOSIS — J04.10 ACUTE TRACHEITIS W/OUT OBSTRUCTION: ICD-10-CM

## 2023-09-20 DIAGNOSIS — J38.6 STENOSIS OF LARYNX: ICD-10-CM

## 2023-09-20 DIAGNOSIS — L92.9 GRANULOMATOUS DISORDER OF THE SKIN AND SUBCUTANEOUS TISSUE, UNSPECIFIED: ICD-10-CM

## 2023-09-20 DIAGNOSIS — R13.12 DYSPHAGIA, OROPHARYNGEAL PHASE: ICD-10-CM

## 2023-09-20 PROCEDURE — 31615 TRCHEOBRNCHSC EST TRACHS INC: CPT

## 2023-09-20 PROCEDURE — 99214 OFFICE O/P EST MOD 30 MIN: CPT | Mod: 25

## 2023-09-20 RX ORDER — ALBUTEROL SULFATE 90 UG/1
108 (90 BASE) INHALANT RESPIRATORY (INHALATION)
Qty: 3 | Refills: 1 | Status: ACTIVE | COMMUNITY
Start: 2019-07-23 | End: 1900-01-01

## 2023-09-20 RX ORDER — BACITRACIN 500 [IU]/G
500 OINTMENT TOPICAL 3 TIMES DAILY
Qty: 1 | Refills: 3 | Status: ACTIVE | COMMUNITY
Start: 2023-09-20 | End: 1900-01-01

## 2023-09-22 ENCOUNTER — NON-APPOINTMENT (OUTPATIENT)
Age: 63
End: 2023-09-22

## 2023-10-06 RX ORDER — AMOXICILLIN AND CLAVULANATE POTASSIUM 400; 57 MG/5ML; MG/5ML
400-57 POWDER, FOR SUSPENSION ORAL
Qty: 2 | Refills: 0 | Status: ACTIVE | COMMUNITY
Start: 2022-10-13 | End: 1900-01-01

## 2023-10-30 ENCOUNTER — NON-APPOINTMENT (OUTPATIENT)
Age: 63
End: 2023-10-30

## 2023-10-31 ENCOUNTER — NON-APPOINTMENT (OUTPATIENT)
Age: 63
End: 2023-10-31

## 2023-11-02 ENCOUNTER — APPOINTMENT (OUTPATIENT)
Dept: OTOLARYNGOLOGY | Facility: CLINIC | Age: 63
End: 2023-11-02
Payer: MEDICAID

## 2023-11-02 PROCEDURE — 92526 ORAL FUNCTION THERAPY: CPT | Mod: GN

## 2023-11-10 ENCOUNTER — APPOINTMENT (OUTPATIENT)
Dept: GASTROENTEROLOGY | Facility: CLINIC | Age: 63
End: 2023-11-10

## 2023-11-17 ENCOUNTER — NON-APPOINTMENT (OUTPATIENT)
Age: 63
End: 2023-11-17

## 2023-11-30 ENCOUNTER — NON-APPOINTMENT (OUTPATIENT)
Age: 63
End: 2023-11-30

## 2023-11-30 ENCOUNTER — APPOINTMENT (OUTPATIENT)
Dept: OTOLARYNGOLOGY | Facility: CLINIC | Age: 63
End: 2023-11-30

## 2023-12-03 ENCOUNTER — NON-APPOINTMENT (OUTPATIENT)
Age: 63
End: 2023-12-03

## 2023-12-06 ENCOUNTER — APPOINTMENT (OUTPATIENT)
Dept: OTOLARYNGOLOGY | Facility: CLINIC | Age: 63
End: 2023-12-06

## 2023-12-06 ENCOUNTER — APPOINTMENT (OUTPATIENT)
Dept: OTOLARYNGOLOGY | Facility: CLINIC | Age: 63
End: 2023-12-06
Payer: MEDICAID

## 2023-12-06 VITALS — HEART RATE: 60 BPM | OXYGEN SATURATION: 97 %

## 2023-12-06 DIAGNOSIS — B37.0 CANDIDAL STOMATITIS: ICD-10-CM

## 2023-12-06 PROCEDURE — 31615 TRCHEOBRNCHSC EST TRACHS INC: CPT | Mod: 59

## 2023-12-06 PROCEDURE — 31579 LARYNGOSCOPY TELESCOPIC: CPT | Mod: 59

## 2023-12-06 PROCEDURE — 99214 OFFICE O/P EST MOD 30 MIN: CPT | Mod: 25

## 2023-12-06 RX ORDER — NYSTATIN 100000 [USP'U]/ML
100000 SUSPENSION ORAL 3 TIMES DAILY
Qty: 1 | Refills: 1 | Status: ACTIVE | COMMUNITY
Start: 2023-12-06 | End: 1900-01-01

## 2023-12-22 NOTE — CONSULT LETTER
[FreeTextEntry3] : Gaurang Rodriguez MD, PhD\par  Chief, Division of Laryngology\par  Department of Otolaryngology\par  [facility]\par  Pediatric Otolaryngology, [facility]\par   of Otolaryngology\par  [facility] [employer] [employer] [facility]\par

## 2023-12-22 NOTE — HISTORY OF PRESENT ILLNESS
[de-identified] : 63 year old female following up for chronic respiratory and dysphagia.  History of laryngotracheal stenosis. Current trach size is 6CN75R. Last trach change: 9/20/23 Denies bleeding, bruising, swelling around the trach. Relates some foul smelling secretions. Caregiver states breathing is not compromised, settings the same since las visit as per caregiver.  Reports difficulty swallowing with solids- able to tolerate thin liquids.  Currently has PEG tube place, tolerating bolus feeding ([facility]). Caregiver reports intermittent episode of edema in bilateral legs- follows up with cardiologist and PCP comes to house. Patient reports foot pain- does not want to go to hospital.  States had ultrasound at [facility] 9/2023- no blood clots are seen.  No recent hospitalizations.

## 2023-12-22 NOTE — PHYSICAL EXAM
[FreeTextEntry1] : stretcher-bound, on oxygen and ventilator through trach, no retractions or distress [de-identified] : trach tube in place with multiple gauze and loose , 6 LPC trach  [Midline] : trachea located in midline position [Laryngoscopy Performed] : laryngoscopy was performed, see procedure section for findings [Normal] : no rashes [de-identified] : mildly raspy voice, hyperfunctional, easily understood

## 2024-01-25 ENCOUNTER — NON-APPOINTMENT (OUTPATIENT)
Age: 64
End: 2024-01-25

## 2024-01-29 ENCOUNTER — NON-APPOINTMENT (OUTPATIENT)
Age: 64
End: 2024-01-29

## 2024-01-31 ENCOUNTER — NON-APPOINTMENT (OUTPATIENT)
Age: 64
End: 2024-01-31

## 2024-02-11 NOTE — ED ADULT NURSE NOTE - CHIEF COMPLAINT
Patient A&O x4, room air, no c/o pain at this time.  Currently NPO with diagnosis of Pancreatitis.  Patient has also been having a lot of diarrhea, sample sent to rule out C-dif.  Patient has little to no medical HX.     The patient is a 62y Female complaining of

## 2024-03-01 ENCOUNTER — APPOINTMENT (OUTPATIENT)
Dept: OTOLARYNGOLOGY | Facility: CLINIC | Age: 64
End: 2024-03-01
Payer: MEDICAID

## 2024-03-01 PROCEDURE — 31575 DIAGNOSTIC LARYNGOSCOPY: CPT | Mod: 59

## 2024-03-01 PROCEDURE — 92526 ORAL FUNCTION THERAPY: CPT | Mod: GN

## 2024-03-01 PROCEDURE — 99214 OFFICE O/P EST MOD 30 MIN: CPT | Mod: 25

## 2024-03-01 PROCEDURE — 31615 TRCHEOBRNCHSC EST TRACHS INC: CPT | Mod: 59

## 2024-03-11 ENCOUNTER — APPOINTMENT (OUTPATIENT)
Dept: PULMONOLOGY | Facility: CLINIC | Age: 64
End: 2024-03-11
Payer: MEDICAID

## 2024-03-11 VITALS
TEMPERATURE: 96.3 F | OXYGEN SATURATION: 97 % | WEIGHT: 132 LBS | HEART RATE: 76 BPM | BODY MASS INDEX: 21.99 KG/M2 | RESPIRATION RATE: 14 BRPM | SYSTOLIC BLOOD PRESSURE: 120 MMHG | HEIGHT: 65 IN | DIASTOLIC BLOOD PRESSURE: 76 MMHG

## 2024-03-11 DIAGNOSIS — R93.89 ABNORMAL FINDINGS ON DIAGNOSTIC IMAGING OF OTHER SPECIFIED BODY STRUCTURES: ICD-10-CM

## 2024-03-11 DIAGNOSIS — J96.11 CHRONIC RESPIRATORY FAILURE WITH HYPOXIA: ICD-10-CM

## 2024-03-11 DIAGNOSIS — J44.9 CHRONIC OBSTRUCTIVE PULMONARY DISEASE, UNSPECIFIED: ICD-10-CM

## 2024-03-11 DIAGNOSIS — J96.12 CHRONIC RESPIRATORY FAILURE WITH HYPOXIA: ICD-10-CM

## 2024-03-11 DIAGNOSIS — K21.9 GASTRO-ESOPHAGEAL REFLUX DISEASE W/OUT ESOPHAGITIS: ICD-10-CM

## 2024-03-11 DIAGNOSIS — J39.8 OTHER SPECIFIED DISEASES OF UPPER RESPIRATORY TRACT: ICD-10-CM

## 2024-03-11 DIAGNOSIS — J47.9 BRONCHIECTASIS, UNCOMPLICATED: ICD-10-CM

## 2024-03-11 PROCEDURE — 99215 OFFICE O/P EST HI 40 MIN: CPT

## 2024-03-11 RX ORDER — FAMOTIDINE 40 MG/5ML
40 POWDER, FOR SUSPENSION ORAL
Qty: 150 | Refills: 3 | Status: ACTIVE | COMMUNITY
Start: 2023-05-12 | End: 1900-01-01

## 2024-03-11 RX ORDER — BUDESONIDE 0.5 MG/2ML
0.5 INHALANT ORAL
Qty: 60 | Refills: 3 | Status: ACTIVE | COMMUNITY
Start: 2019-08-27 | End: 1900-01-01

## 2024-03-11 RX ORDER — FORMOTEROL FUMARATE DIHYDRATE 0.02 MG/2ML
20 SOLUTION RESPIRATORY (INHALATION)
Qty: 60 | Refills: 5 | Status: ACTIVE | COMMUNITY
Start: 2018-11-26 | End: 1900-01-01

## 2024-03-11 RX ORDER — AMITRIPTYLINE HYDROCHLORIDE 10 MG/1
10 TABLET, FILM COATED ORAL 3 TIMES DAILY
Qty: 90 | Refills: 3 | Status: ACTIVE | COMMUNITY
Start: 2018-11-26 | End: 1900-01-01

## 2024-03-11 RX ORDER — METOCLOPRAMIDE 5 MG/1
5 TABLET ORAL
Qty: 360 | Refills: 1 | Status: ACTIVE | COMMUNITY
Start: 2020-05-08 | End: 1900-01-01

## 2024-03-11 RX ORDER — IPRATROPIUM BROMIDE AND ALBUTEROL 20; 100 UG/1; UG/1
20-100 SPRAY, METERED RESPIRATORY (INHALATION)
Qty: 3 | Refills: 1 | Status: ACTIVE | COMMUNITY
Start: 2019-08-27 | End: 1900-01-01

## 2024-03-11 RX ORDER — ACETYLCYSTEINE 100 MG/ML
10 SOLUTION ORAL; RESPIRATORY (INHALATION)
Qty: 90 | Refills: 5 | Status: ACTIVE | COMMUNITY
Start: 2021-07-09 | End: 1900-01-01

## 2024-03-11 RX ORDER — SODIUM CHLORIDE FOR INHALATION 0.9 %
0.9 VIAL, NEBULIZER (ML) INHALATION
Qty: 120 | Refills: 3 | Status: ACTIVE | COMMUNITY
Start: 2022-04-07 | End: 1900-01-01

## 2024-03-11 RX ORDER — CEVIMELINE HYDROCHLORIDE 30 MG/1
30 CAPSULE ORAL
Qty: 90 | Refills: 3 | Status: ACTIVE | COMMUNITY
Start: 2019-10-29 | End: 1900-01-01

## 2024-03-11 RX ORDER — ALBUTEROL SULFATE 2.5 MG/3ML
(2.5 MG/3ML) SOLUTION RESPIRATORY (INHALATION)
Qty: 360 | Refills: 3 | Status: ACTIVE | COMMUNITY
Start: 1900-01-01 | End: 1900-01-01

## 2024-03-11 RX ORDER — SODIUM CHLORIDE 9 MG/ML
0.9 INJECTION, SOLUTION INTRAMUSCULAR; INTRAVENOUS; SUBCUTANEOUS
Qty: 1 | Refills: 3 | Status: ACTIVE | COMMUNITY
Start: 2023-03-31 | End: 1900-01-01

## 2024-03-11 RX ORDER — AZITHROMYCIN 250 MG/1
250 TABLET, FILM COATED ORAL
Qty: 36 | Refills: 1 | Status: ACTIVE | COMMUNITY
Start: 2020-04-23 | End: 1900-01-01

## 2024-03-11 RX ORDER — DORNASE ALFA 1 MG/ML
2.5 SOLUTION RESPIRATORY (INHALATION) TWICE DAILY
Qty: 1 | Refills: 0 | Status: ACTIVE | COMMUNITY
Start: 2021-01-15 | End: 1900-01-01

## 2024-03-11 NOTE — HISTORY OF PRESENT ILLNESS
[TextBox_4] : LAST VISIT 6/23/2023  Ms. NAVARRETE is a 63 year old female presenting to the office today with tracheostomy in place for a f/p pulmonary evaluation. Her chief complaint is  -she notes increased SOB worse than normal -she notes sharp pain with tracheostomy pressing against her throat -she notes sharp chest pain onset 1 month ago -she notes dysphagia with solids onset several months -she notes losing weight  -she notes getting food through PEG tube -she notes intermittently bringing up yellow sputum with coughing -she notes wheezing -she notes feeling fatigued -she notes sleeping for most of day -she notes balance is poor -she notes overall body weakness -she notes off prednisone now s/p ED visit 1 week ago  -she denies any headaches, nausea, emesis, fever, chills, sweats, chest pressure, palpitations, constipation, diarrhea, vertigo, heartburn, reflux, itchy eyes, itchy ears, leg swelling, leg pain, arthralgias, or sour taste in the mouth.   TODAY'S VISIT 3/11/2024 PATRIZIA NAVARRETE is being seen for a follow-up visit for. atypical chest pain, COPD, chronic hypoxemia, dysphagia, GERD, laryngeal stenosis, OW, dependence on tracheostomy, and tracheomalacia, s/p PEG. Patient accompanied by formal caregiver, nurse Rose Mary.  -reports recently went to ENT to have trach replaced 3/1/2024 Dr. Rodriguez -reports she has stomach upset, nausea and one episode of vomiting; taking pantoprazole, pepcid and reglan -reports she takes klonopin for anxiety; taken today prior to traveling to office -

## 2024-03-11 NOTE — ASSESSMENT
[FreeTextEntry1] : Ms. Bo is a 63 year old female, presenting into the office with a history of former smoker, COPD, respiratory failure for 10 years, tracheostomy,  tracheostenosis and tracheomalacia who is s/p bronchoscopy with revision of the tracheostomy with dilation of the tracheostomy.  She presents with tracheomalacia and end stage COPD with issues with ventilator. PNA ([facility])- s/p colonoscopy (preop). Chronic colonization of aeruginosa with pseudomonas, Difficulty with mucus clearance despite chest vest, s/p recent PTX on the right -  chronic respiratory complaint s/p colonoscopy - ?Lung Bx ([facility])  s/p PNA and hospitalization 11/2021 - now plagued by reflux; s/p intervention by Edmar Stone (trachea)/ Michael (throat) (Successful)- now with SOB; ?COPD exacerbation; CHF; PE  Her chronic respiratory failure is multifactorial due to: -underlying COPD/severe persistent asthma -respiratory muscle weakness -tracheomalacia  Problem 1A: s/p PNA (resolved 2022); ?PE  problem 1: tracheostomy / chronic respiratory failure  -Continue to wean off the ventilator as tolerable in short trials throughout the day   -recommended to use hypertonic saline in clearing of tracheostomy  -trach replaced 3/1/2024 by Dr. Rodriguez   Problem 1a: mucopurulent chronic bronchitis  - continue Pulmozyme .5 BID PRN  -NC -add  mg q8H (NC) - chest vest in place  You have a clinical scenario most c/q acute bronchitis the etiology of which is unknown but empiric antibiotics are indicated. Hydration, mucolytics including mucinex, robitussin and the like are indicated. Cough controlling agents will be needed.   Problem 1B: PTX (right side)- resolved (2020)  The patient has a pneumothorax from trauma, iatrogenic, post surgical. You are at risk for one in the future and may need hospitalization. Thoracic surgical evaluation is needed for chest tube and other intervention.   Problem 1C: ?Lung Bx ([facility])  - obtain records (not available)  Problem 2: Chronic Respiratory Failure  -Trach collar- goal up to 12hrs per day (not doing) - 3 L SIMV: VT- 450; PC 20; PS-18; PEEP- 5 (mostly on vent) - Night CPAP 5/PS 18 (back up 8 B/mm) (NC)  problem 3:  COPD/asthma -continue Combivent 1 inhalation up to QID to replace Symbicort  -continue Performist nebulizer 1 unit dose BID, followed by Pulmicort 0.5 nebulizer BID  -continue Albuterol via nebulizer for rescue up to QiD  -continue on NAC Q8H (NC) -continue Yuperli 1 unit dose QD via nebulizer - continue Mucomyst 2cc (107.) q8H  -s/p Prednisone 30 mg x 5 days, 20 mg x 5 days, 10 mg x 5 days (6/23/2023)  -Information sheet given to the patient to be reviewed, this medication is never to be used without consulting the prescribing physician. Proper dietary restraint is necessary specifically salt containing foods, if any reaction may occur should be reported.  -COPD is a progressive disease and although it can't be cured , appropriate management can slow its progression, reduce frequency and severity of exacerbations, and improve symptoms and the patient quality of life. Hospitalizations are the greatest contributor to the total COPD costs and account for up to 87% of total COPD related costs. Exacerbations are the main cause of admissions and subsequently account for the 40-75% of COPD costs. Inhaled maintenance therapy reduces the incidence of exacerbations in patients with stable COPD. Incorrect inhaler use and nonadherence are major obstacles to achieving COPD treatment goals. Many COPD patients have challenges (impaired inhalation, limited dexterity, reduced cognition: that limit their ability to correctly use their COPD treatment devices resulting in reduced symptom control. Of most importance is smoking cessation and early intervention with respiratory illnesses and contemplation for pulmonary rehab to enhance quality of life.   Problem 3C: ?PE ?CHF 6/2023 -complete blood work to include: BNP, D-dimer -if elevated, complete CTPA evaluation  Problem 3A: Abnormal CT: confirm Bronchiectasis / pulmonary nodule -s/p CT of the chest -(no present) -s/p 8/2022, 8/2023, next 8/2024 -Complete high resolution chest CT, prone and supine, to r/o pulmonary nodules - next  8/2023 -Recommended to take Slippery Elm Tea and pill for cough and mucus clearing  -based upon results of CT scan, will apply for chest vest therapy  -CAT scans are the only radiological modality to identify abnormalities w/in the lungs with regards to nodules/masses/lymph nodes. Risks, benefits were reviewed in detail. The guidelines for abnormalities include follow up CT scans at various intervals which could range from 6 weeks to 1 year intervals. If there is a change for the worse then consideration for a biopsy will be considered if you are a candidate. Second opinion evaluation with a thoracic surgeon or an interventional radiologist could be offered.  - Seen on the CT of the chest or chest x-ray signifies damaged bronchial tubes focal or diffuse which can be sites of recurrent infections. These areas can be colonized by various organisms including bacteria (hemophilous influenzae/Pseudomonas species etc.) as well as acid fast bacilli (mycobacterial disease- inclusive of TB/MT etc.).  The patient has previously used acapella, nebulizer and breathing techniques for airway clearance.  These methods have not provided appropriate secretion manage in this patient.Cough length has been greater than 6 months, hence, initiating vest therapy is necessary.   problem 3b: s/p pseudomonas aeruginosa.- chronic colonization (kubulek)  -Using vest pack (failed Conventional therapy.) - cough assist/vest Rx  -get infectious disease f/u PRN  -chronic colonization  problem 4: GERD -(s/p EgD +/- colonoscopy- negative ) #1 issue - Recommend GI f/u -continue Pepcid 40 mg QHS -continue Reglan 5 mg pre-meal, QHS -continue Protonix 40 mg qAM ; -Things to avoid including overeating, spicy foods, tight clothing, eating within three hours of bed, this list is not all inclusive.  -For treatment of reflux, possible options discussed including diet control, H2 blockers, PPIs, as well as coating motility agents discussed as treatment options. Timing of meals and proximity of last meal to sleep were discussed. If symptoms persist, a formal gastrointestinal evaluation is needed. -Rule of 2's: Avoid eating too late, too fast, too much, too spicy or within two hours of bedtime  Problem 5: Immunodeficiency - Continue Zithromax 250 mg Monday, Wednesday, and Friday -Due to the fact that this pt has had more infections than would be expected and immunological blood work is indicated this would include: IgG subclasses, quantitative immunoglobulins, Strep pneumoniae titers as well as Vitamin D levels. Based on this blood work we will be able to decide where the pt needs additional pneumococcal vaccine either Prevnar 13 or Pneumovax. Immunology evaluation will also be potentially indicated.   Problem 6: Sensory neuropathic Cough - Continue Amitriptyline 10 mg up to TID -Sensory neuropathic cough is an etiology of cough that is often realized once someone has been ruled out for common disease such as: asthma, COPD, eosinophilic bronchitis, bronchiectasis, post nasal drip, and GERD. It sometimes develops following a URI, herpes zoster outbreak in pharynx or thyroid or cervical spine injury. However, many patients have no identifiable antecedent explanation.   problem 7: tracheomalacia/tracheal tumor  -follow up with Dr. Edmar Stone for thoracic evaluation for potential tracheoplasty - s/p new tracheostomy  Tracheomalacia is usually acquired in adults and common causes include damage by tracheostomy or endotracheal intubation damaging the tracheal cartilage with increase risk with multiple intubations, prolonged intubation, and concurrent high dose steroid therapy; external chest wall trauma and surgery; chronic compression of the trachea by benign etiologies (eg, benign mediastinal goiter) or malignancy; relapsing polychondritis; or recurrent infection. Tracheomalacia can be asymptomatic, however signs or symptoms can develop as the severity of the airway narrowing progresses with major symptoms include dyspnea, cough, and sputum retention. Other symptoms include severe paroxysms of coughing, wheezing or stridor, barking cough and may be exacerbated by forced expiration, cough, and Valsalva maneuver. Tracheomalacia is diagnosed by a bronchoscopic visualization of dynamic airway collapse on dynamic chest CT. Therapy is warranted in symptomatic patients with severe tracheomalacia and includes surgical repair as tracheobronchoplasty. The patient was referred to Dr. Edmar Stone/Jaylene, at [facility] for a surgical consult.   problem 8: dysphonia/sore throat -recommended Fisherman Friend's lozenges  -recommended to use Slippery Elm Lozenge / Tea  -continue Evoxac 30 q 8  Problem 8A: Dysphagia  -recommended swallow therapy -    Problem 9: Health Maintenance/COVID19 Precautions  - S/p Covid 19 vaccine (Pfizer) x1  - Clean your hands often. Wash your hands often with soap and water for at least 20 seconds, especially after blowing your nose, coughing, or sneezing, or having been in a public place. - If soap and water are not available, use a hand  that contains at least 60% alcohol. - To the extent possible, avoid touching high-touch surfaces in public places - elevator buttons, door handles, handrails, handshaking with people, etc. Use a tissue or your sleeve to cover your hand or finger if you must touch something. - Wash your hands after touching surfaces in public places. - Avoid touching your face, nose, eyes, etc. - Clean and disinfect your home to remove germs: practice routine cleaning of frequently touched surfaces (for example: tables, doorknobs, light switches, handles, desks, toilets, faucets, sinks & cell phones) - Avoid crowds, especially in poorly ventilated spaces. Your risk of exposure to respiratory viruses like COVID-19 may increase in crowded, closed-in settings with little air circulation if there are people in the crowd who are sick. All patients are recommended to practice social distancing and stay at least 6 feet away from others.  - Avoid all non-essential travel including plane trips, and especially avoid embarking on cruise ships. -If COVID-19 is spreading in your community, take extra measures to put distance between yourself and other people to further reduce your risk of being exposed to this new virus. -Stay home as much as possible. - Consider ways of getting food brought to your house through family, social, or commercial networks -Be aware that the virus has been known to live in the air up to 3 hours post exposure, cardboard up to 24 hours post exposure, copper up to 4 hours post exposure, steel and plastic up to 2-3 days post exposure. Risk of transmission from these surfaces are affected by many variables. COVID-19 precautionary Immune Support Recommendations: -OTC Vitamin C 500mg BID  -OTC Quercetin 250-500mg BID  -OTC Zinc 75-100mg per day  -OTC Melatonin 1 or 2mg a night  -OTC Vitamin D 1-4000mg per day  -OTC Tonic Water 8oz per day -Water 0.5-1 gallon per day Asthma and COVID19: You need to make sure your asthma is under control. This often requires the use of inhaled corticosteroids (and sometimes oral corticosteroids). Inhaled corticosteroids do not likely reduce your immune system's ability to fight infections, but oral corticosteroids may. It is important to use the steps above to protect yourself to limit your exposure to any respiratory virus.   problem 10: health maintenance  -Recommended "Throat Coat Tea" - recommended to f/u with Dr. Palomares(GI)  -instructed to increase physical activity as much as possible - She was administered an influenza vaccination 11/19 -recommended strep pneumonia vaccines: Prevnar-13 vaccine, followed by Pneumo vaccine 23 one year following (completed) -recommended early intervention for URIs -recommended regular osteoporosis evaluations -recommended early dermatological evaluations -recommended after the age of 50 to the age of 70, colonoscopy every 5 years    Follow up for scheduled appt with Dr. Nova April 10, 2024 Patient is encouraged to call with any changes, concerns or questions.

## 2024-03-11 NOTE — PHYSICAL EXAM
[No Acute Distress] : no acute distress [Normal Oropharynx] : normal oropharynx [III] : Mallampati Class: III [Normal Appearance] : normal appearance [No Neck Mass] : no neck mass [Normal Rate/Rhythm] : normal rate/rhythm [Normal S1, S2] : normal s1, s2 [No Murmurs] : no murmurs [No Resp Distress] : no resp distress [Clear to Auscultation Bilaterally] : clear to auscultation bilaterally [No Abnormalities] : no abnormalities [Benign] : benign [Normal Gait] : normal gait [No Clubbing] : no clubbing [No Cyanosis] : no cyanosis [No Edema] : no edema [FROM] : FROM [Normal Color/ Pigmentation] : normal color/ pigmentation [No Focal Deficits] : no focal deficits [Oriented x3] : oriented x3 [Normal Affect] : normal affect [TextBox_11] : tracheostomy in place [TextBox_89] : PEG in place

## 2024-03-12 RX ORDER — REVEFENACIN 175 UG/3ML
175 SOLUTION RESPIRATORY (INHALATION)
Qty: 90 | Refills: 1 | Status: ACTIVE | COMMUNITY
Start: 2019-06-19 | End: 1900-01-01

## 2024-03-14 NOTE — PATIENT PROFILE ADULT - FALL HARM RISK
Well Child Visits Gap Report.  Chart reviewed, immunization record updated.  Care Everywhere unavailable.  Patient care coordination note  LOV with PCP 2/22/2022.  Left detailed message for patient's mother to return call to discuss scheduling well child visit.   surgery

## 2024-03-16 NOTE — PHYSICAL EXAM
[Midline] : trachea located in midline position [Laryngoscopy Performed] : laryngoscopy was performed, see procedure section for findings [Normal] : orientation to person, place, and time: normal [de-identified] : trach tube in place with multiple gauze and loose , 6 LPC trach  [FreeTextEntry1] : stretcher-bound, on oxygen and ventilator through trach, no retractions or distress [de-identified] : mildly raspy voice, hyperfunctional, easily understood

## 2024-03-16 NOTE — CONSULT LETTER
[Courtesy Letter:] : I had the pleasure of seeing your patient, [unfilled], in my office today. [Sincerely,] : Sincerely, [Please see my note below.] : Please see my note below. [FreeTextEntry3] : Gaurang Rodriguez MD, PhD\par  Chief, Division of Laryngology\par  Department of Otolaryngology\par  [facility]\par  Pediatric Otolaryngology, [facility]\par   of Otolaryngology\par  [employer] [facility] [employer]\par

## 2024-03-16 NOTE — HISTORY OF PRESENT ILLNESS
[de-identified] : 63 year old female following up for chronic respiratory and dysphagia.  History of laryngotracheal stenosis. Current trach size is [id number]. Last trach change: 9/20/23 Denies bleeding, bruising, swelling around the trach. Relates some foul smelling secretions. Caregiver states breathing is not compromised, settings the same since las visit as per caregiver.  Reports difficulty swallowing with solids- able to tolerate thin liquids.  Currently has PEG tube place, tolerating bolus feeding ([facility]). Caregiver reports intermittent episode of edema in bilateral legs- follows up with cardiologist and PCP comes to house. Patient reports foot pain- does not want to go to hospital.  States had ultrasound at [facility] 9/2023- no blood clots are seen.  No recent hospitalizations.

## 2024-03-26 ENCOUNTER — NON-APPOINTMENT (OUTPATIENT)
Age: 64
End: 2024-03-26

## 2024-03-27 ENCOUNTER — OUTPATIENT (OUTPATIENT)
Dept: OUTPATIENT SERVICES | Facility: HOSPITAL | Age: 64
LOS: 1 days | End: 2024-03-27

## 2024-03-27 VITALS
HEIGHT: 62 IN | HEART RATE: 79 BPM | WEIGHT: 136.03 LBS | RESPIRATION RATE: 18 BRPM | OXYGEN SATURATION: 98 % | TEMPERATURE: 98 F | DIASTOLIC BLOOD PRESSURE: 71 MMHG | SYSTOLIC BLOOD PRESSURE: 114 MMHG

## 2024-03-27 DIAGNOSIS — Z98.890 OTHER SPECIFIED POSTPROCEDURAL STATES: Chronic | ICD-10-CM

## 2024-03-27 DIAGNOSIS — J95.03 MALFUNCTION OF TRACHEOSTOMY STOMA: ICD-10-CM

## 2024-03-27 DIAGNOSIS — Z43.0 ENCOUNTER FOR ATTENTION TO TRACHEOSTOMY: Chronic | ICD-10-CM

## 2024-03-27 DIAGNOSIS — Z93.1 GASTROSTOMY STATUS: Chronic | ICD-10-CM

## 2024-03-27 DIAGNOSIS — J39.8 OTHER SPECIFIED DISEASES OF UPPER RESPIRATORY TRACT: Chronic | ICD-10-CM

## 2024-03-27 DIAGNOSIS — Z98.891 HISTORY OF UTERINE SCAR FROM PREVIOUS SURGERY: Chronic | ICD-10-CM

## 2024-03-27 DIAGNOSIS — Z93.0 TRACHEOSTOMY STATUS: Chronic | ICD-10-CM

## 2024-03-27 RX ORDER — WATER FOR INHALATION
0 VIAL, NEBULIZER (ML) INHALATION
Qty: 0 | Refills: 0 | DISCHARGE

## 2024-03-27 RX ORDER — POLYETHYLENE GLYCOL 3350 17 G/17G
0 POWDER, FOR SOLUTION ORAL
Qty: 0 | Refills: 0 | DISCHARGE

## 2024-03-27 RX ORDER — ACETYLCYSTEINE 200 MG/ML
4 VIAL (ML) MISCELLANEOUS
Qty: 0 | Refills: 0 | DISCHARGE

## 2024-03-27 RX ORDER — LOSARTAN POTASSIUM 100 MG/1
1 TABLET, FILM COATED ORAL
Qty: 0 | Refills: 0 | DISCHARGE

## 2024-03-27 RX ORDER — LACTULOSE 10 G/15ML
15 SOLUTION ORAL
Qty: 0 | Refills: 0 | DISCHARGE

## 2024-03-27 NOTE — H&P PST ADULT - GASTROINTESTINAL
details… normal/soft/nontender/nondistended/normal active bowel sounds +Peg tube/normal/soft/nontender/nondistended/normal active bowel sounds

## 2024-03-27 NOTE — H&P PST ADULT - HISTORY OF PRESENT ILLNESS
64 year old female with pmhx of HTN, COPD, Chronic respiratory failure, s/p tracheostomy, on Ventilator (2004), tracheomalacia, tracheal stenosis, s/p bronchoscopy for stomaplasty (2022), Pneumothorax (s/p Chest tube, 2020), Dysphagia s/p Peg (3 years ago), Atypical chest pain on Nitro prn, recent visit to [place] [facility] 2 weeks ago for dislodged peg s/p replacement, Bronchiectasis, recent Pneumonia s/p abx 3 weeks ago, presents for pre-op evaluation for diagnosis of  Malfunction of tracheostomy stoma, stenosis of larynx, dysphagia. Patient is scheduled for [facility] laryngoscopy with excision, bronchoscopy with excision, stomaplasty injection laryngoplasty, esophagoscopy, dilation esophagus. Patient c/o increasing episodes of mucous plug and states she is suctioning more often. Patient has chronic thomas and chronic cough.  Denies cp, palpitations, fever, n/v/d.  64 year old female with pmhx of HTN, COPD, Chronic respiratory failure, s/p tracheostomy, on Ventilator (2004), tracheomalacia, tracheal stenosis,s/p trache replaced 3/1/2024 by ENT, s/p bronchoscopy for stomaplasty (2022), Pneumothorax (s/p Chest tube, 2020), Dysphagia s/p Peg (3 years ago), Atypical chest pain on Nitro prn, recent visit to [place] [facility] 2 weeks ago for dislodged peg s/p replacement, Bronchiectasis, recent Pneumonia s/p abx 3 weeks ago, presents for pre-op evaluation for diagnosis of  Malfunction of tracheostomy stoma, stenosis of larynx, dysphagia. Patient is scheduled for [facility] laryngoscopy with excision, bronchoscopy with excision, stomaplasty injection laryngoplasty, esophagoscopy, dilation esophagus. Patient c/o increasing episodes of mucous plug and states she is suctioning more often. Patient has chronic thomas and chronic cough.  Denies cp, palpitations, fever, n/v/d.

## 2024-03-27 NOTE — H&P PST ADULT - NEGATIVE NEUROLOGICAL SYMPTOMS
no transient paralysis/no weakness/no paresthesias/no generalized seizures/no focal seizures/no syncope/no tremors/no vertigo/no headache/no confusion

## 2024-03-27 NOTE — H&P PST ADULT - NSICDXPASTSURGICALHX_GEN_ALL_CORE_FT
PAST SURGICAL HISTORY:  Acute tracheostomy management     Dependence on tracheostomy     H/O colonoscopy     H/O endoscopy     PEG (percutaneous endoscopic gastrostomy) status     S/P      Tracheal stenosis excision of tracheal stenosis 10/2017  revision of tracheal stoma 3/2018

## 2024-03-27 NOTE — H&P PST ADULT - PROBLEM SELECTOR PLAN 1
Patient tentatively scheduled for [facility] laryngoscopy with excision, bronchoscopy with excision, stomaplasty injection laryngoplasty, esophagoscopy, dilation esophagus on 4/1/24.  Pre-op instructions provided. Pt given verbal and written instructions with teach back on chlorhexidine shampoo and pepcid. Pt verbalized understanding with return demonstration.   Copy of CBC, CMP in chart.   Copy of EKG, Echocardiogram, Stress test requested.  Per patient she went to her Cardiologist and had abnormal EKG. She was sent for testing.  Copy of Cardiology evaluation requested. Patient tentatively scheduled for [facility] laryngoscopy with excision, bronchoscopy with excision, stomaplasty injection laryngoplasty, esophagoscopy, dilation esophagus on 4/1/24.  Pre-op instructions provided. Pt given verbal and written instructions with teach back on chlorhexidine shampoo and pepcid. Pt verbalized understanding with return demonstration.   Copy of CBC, CMP in chart.   Copy of EKG, Echocardiogram, Stress test requested.  Per patient she went to her Cardiologist and had abnormal EKG. She was sent for further testing. Denies chest pain, palpitations, during PST visit.   Copy of Cardiology evaluation requested.

## 2024-03-27 NOTE — H&P PST ADULT - OTHER CARE PROVIDERS
Cardiologist-Dr. Ange Goldman-[phone]- ext [phone]    Dr. Nova- Cardiologist-Dr. Ange Goldman-[phone]- ext [phone]    Pulmonologist-Dr. [phone] Cardiologist-Dr. Ange Goldman-[phone]- ext [phone]    Pulmonologist-Dr. [phone]       ENT- Dr. Gaurang Rodriguez

## 2024-03-27 NOTE — H&P PST ADULT - RESPIRATORY
Tracheostomy- with Ventilatory, white sputum on suction tube/clear to auscultation bilaterally/no wheezes/no rales/no rhonchi/breath sounds equal

## 2024-03-27 NOTE — H&P PST ADULT - NSICDXPASTMEDICALHX_GEN_ALL_CORE_FT
PAST MEDICAL HISTORY:  Anemia     Anxiety and depression     Anxiety disorder     Atypical chest pain     COPD (chronic obstructive pulmonary disease)     Dysphagia     H/O bronchiectasis     H/O chronic respiratory failure     H/O pneumothorax     Hypertension     Lupus anticoagulant syndrome     Malfunction of tracheostomy stoma     Pancreatic cyst     Pneumonia     Sickle cell trait     Tracheomalacia     Tracheostomy dependent     Ventilator dependent

## 2024-03-27 NOTE — H&P PST ADULT - MUSCULOSKELETAL
Generalized weakness/ROM intact/no joint swelling/no joint erythema/no joint warmth/normal gait/strength 5/5 bilateral upper extremities/strength 5/5 bilateral lower extremities details…
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
## 2024-03-29 ENCOUNTER — NON-APPOINTMENT (OUTPATIENT)
Age: 64
End: 2024-03-29

## 2024-03-29 NOTE — ASU PATIENT PROFILE, ADULT - FALL HARM RISK - HARM RISK INTERVENTIONS

## 2024-03-29 NOTE — ASU PATIENT PROFILE, ADULT - NS PRO INFO GIVEN TO2
pt comes with transport that is picking her up at 12. She was advised to call back and ask for 11  time/patient

## 2024-03-29 NOTE — ASU PATIENT PROFILE, ADULT - REASON FOR ADMISSION, PROFILE
How Severe Is Your Skin Lesion?: mild Has Your Skin Lesion Been Treated?: not been treated Is This A New Presentation, Or A Follow-Up?: Skin Lesion esophageal dilation

## 2024-03-31 ENCOUNTER — TRANSCRIPTION ENCOUNTER (OUTPATIENT)
Age: 64
End: 2024-03-31

## 2024-04-01 ENCOUNTER — APPOINTMENT (OUTPATIENT)
Dept: OTOLARYNGOLOGY | Facility: HOSPITAL | Age: 64
End: 2024-04-01

## 2024-04-01 ENCOUNTER — INPATIENT (INPATIENT)
Facility: HOSPITAL | Age: 64
LOS: 3 days | Discharge: HOME | End: 2024-04-05
Attending: STUDENT IN AN ORGANIZED HEALTH CARE EDUCATION/TRAINING PROGRAM | Admitting: STUDENT IN AN ORGANIZED HEALTH CARE EDUCATION/TRAINING PROGRAM
Payer: MEDICAID

## 2024-04-01 VITALS
OXYGEN SATURATION: 100 % | TEMPERATURE: 98 F | SYSTOLIC BLOOD PRESSURE: 125 MMHG | HEART RATE: 79 BPM | WEIGHT: 136.03 LBS | DIASTOLIC BLOOD PRESSURE: 90 MMHG | HEIGHT: 64 IN | RESPIRATION RATE: 16 BRPM

## 2024-04-01 DIAGNOSIS — J95.03 MALFUNCTION OF TRACHEOSTOMY STOMA: ICD-10-CM

## 2024-04-01 DIAGNOSIS — Z98.890 OTHER SPECIFIED POSTPROCEDURAL STATES: Chronic | ICD-10-CM

## 2024-04-01 DIAGNOSIS — Z43.0 ENCOUNTER FOR ATTENTION TO TRACHEOSTOMY: Chronic | ICD-10-CM

## 2024-04-01 DIAGNOSIS — Z98.891 HISTORY OF UTERINE SCAR FROM PREVIOUS SURGERY: Chronic | ICD-10-CM

## 2024-04-01 DIAGNOSIS — J39.8 OTHER SPECIFIED DISEASES OF UPPER RESPIRATORY TRACT: Chronic | ICD-10-CM

## 2024-04-01 DIAGNOSIS — Z93.1 GASTROSTOMY STATUS: Chronic | ICD-10-CM

## 2024-04-01 DIAGNOSIS — Z93.0 TRACHEOSTOMY STATUS: Chronic | ICD-10-CM

## 2024-04-01 LAB
ANION GAP SERPL CALC-SCNC: 12 MMOL/L — SIGNIFICANT CHANGE UP (ref 7–14)
BLOOD GAS ARTERIAL - LYTES,HGB,ICA,LACT RESULT: SIGNIFICANT CHANGE UP
BUN SERPL-MCNC: 10 MG/DL — SIGNIFICANT CHANGE UP (ref 7–23)
CALCIUM SERPL-MCNC: 9.4 MG/DL — SIGNIFICANT CHANGE UP (ref 8.4–10.5)
CHLORIDE SERPL-SCNC: 105 MMOL/L — SIGNIFICANT CHANGE UP (ref 98–107)
CO2 SERPL-SCNC: 25 MMOL/L — SIGNIFICANT CHANGE UP (ref 22–31)
CREAT SERPL-MCNC: 0.75 MG/DL — SIGNIFICANT CHANGE UP (ref 0.5–1.3)
EGFR: 89 ML/MIN/1.73M2 — SIGNIFICANT CHANGE UP
GLUCOSE SERPL-MCNC: 78 MG/DL — SIGNIFICANT CHANGE UP (ref 70–99)
HCT VFR BLD CALC: 29.7 % — LOW (ref 34.5–45)
HGB BLD-MCNC: 9.6 G/DL — LOW (ref 11.5–15.5)
MAGNESIUM SERPL-MCNC: 2 MG/DL — SIGNIFICANT CHANGE UP (ref 1.6–2.6)
MCHC RBC-ENTMCNC: 26.3 PG — LOW (ref 27–34)
MCHC RBC-ENTMCNC: 32.3 GM/DL — SIGNIFICANT CHANGE UP (ref 32–36)
MCV RBC AUTO: 81.4 FL — SIGNIFICANT CHANGE UP (ref 80–100)
NRBC # BLD: 0 /100 WBCS — SIGNIFICANT CHANGE UP (ref 0–0)
NRBC # FLD: 0 K/UL — SIGNIFICANT CHANGE UP (ref 0–0)
PHOSPHATE SERPL-MCNC: 3.6 MG/DL — SIGNIFICANT CHANGE UP (ref 2.5–4.5)
PLATELET # BLD AUTO: 385 K/UL — SIGNIFICANT CHANGE UP (ref 150–400)
POTASSIUM SERPL-MCNC: 4.2 MMOL/L — SIGNIFICANT CHANGE UP (ref 3.5–5.3)
POTASSIUM SERPL-SCNC: 4.2 MMOL/L — SIGNIFICANT CHANGE UP (ref 3.5–5.3)
RBC # BLD: 3.65 M/UL — LOW (ref 3.8–5.2)
RBC # FLD: 14.8 % — HIGH (ref 10.3–14.5)
SODIUM SERPL-SCNC: 142 MMOL/L — SIGNIFICANT CHANGE UP (ref 135–145)
WBC # BLD: 5.44 K/UL — SIGNIFICANT CHANGE UP (ref 3.8–10.5)
WBC # FLD AUTO: 5.44 K/UL — SIGNIFICANT CHANGE UP (ref 3.8–10.5)

## 2024-04-01 PROCEDURE — 71045 X-RAY EXAM CHEST 1 VIEW: CPT | Mod: 26,59

## 2024-04-01 PROCEDURE — 99291 CRITICAL CARE FIRST HOUR: CPT | Mod: GC

## 2024-04-01 DEVICE — TUBE TRACH SZ 6 CUFF FLEX REUSE: Type: IMPLANTABLE DEVICE | Status: FUNCTIONAL

## 2024-04-01 RX ORDER — INSULIN LISPRO 100/ML
VIAL (ML) SUBCUTANEOUS AT BEDTIME
Refills: 0 | Status: DISCONTINUED | OUTPATIENT
Start: 2024-04-01 | End: 2024-04-01

## 2024-04-01 RX ORDER — OXYCODONE HYDROCHLORIDE 5 MG/1
2.5 TABLET ORAL EVERY 6 HOURS
Refills: 0 | Status: DISCONTINUED | OUTPATIENT
Start: 2024-04-01 | End: 2024-04-04

## 2024-04-01 RX ORDER — DEXTROSE 50 % IN WATER 50 %
12.5 SYRINGE (ML) INTRAVENOUS ONCE
Refills: 0 | Status: DISCONTINUED | OUTPATIENT
Start: 2024-04-01 | End: 2024-04-01

## 2024-04-01 RX ORDER — ONDANSETRON 8 MG/1
4 TABLET, FILM COATED ORAL ONCE
Refills: 0 | Status: DISCONTINUED | OUTPATIENT
Start: 2024-04-01 | End: 2024-04-01

## 2024-04-01 RX ORDER — NITROGLYCERIN 6.5 MG
0.4 CAPSULE, EXTENDED RELEASE ORAL ONCE
Refills: 0 | Status: DISCONTINUED | OUTPATIENT
Start: 2024-04-01 | End: 2024-04-02

## 2024-04-01 RX ORDER — CHLORHEXIDINE GLUCONATE 213 G/1000ML
1 SOLUTION TOPICAL DAILY
Refills: 0 | Status: DISCONTINUED | OUTPATIENT
Start: 2024-04-01 | End: 2024-04-02

## 2024-04-01 RX ORDER — ONDANSETRON 8 MG/1
4 TABLET, FILM COATED ORAL EVERY 4 HOURS
Refills: 0 | Status: DISCONTINUED | OUTPATIENT
Start: 2024-04-01 | End: 2024-04-01

## 2024-04-01 RX ORDER — HEPARIN SODIUM 5000 [USP'U]/ML
5000 INJECTION INTRAVENOUS; SUBCUTANEOUS EVERY 8 HOURS
Refills: 0 | Status: DISCONTINUED | OUTPATIENT
Start: 2024-04-01 | End: 2024-04-05

## 2024-04-01 RX ORDER — INSULIN LISPRO 100/ML
VIAL (ML) SUBCUTANEOUS
Refills: 0 | Status: DISCONTINUED | OUTPATIENT
Start: 2024-04-01 | End: 2024-04-01

## 2024-04-01 RX ORDER — ACETAMINOPHEN 500 MG
650 TABLET ORAL EVERY 6 HOURS
Refills: 0 | Status: DISCONTINUED | OUTPATIENT
Start: 2024-04-01 | End: 2024-04-05

## 2024-04-01 RX ORDER — SODIUM CHLORIDE 9 MG/ML
1000 INJECTION, SOLUTION INTRAVENOUS
Refills: 0 | Status: DISCONTINUED | OUTPATIENT
Start: 2024-04-01 | End: 2024-04-01

## 2024-04-01 RX ORDER — FENTANYL CITRATE 50 UG/ML
25 INJECTION INTRAVENOUS
Refills: 0 | Status: DISCONTINUED | OUTPATIENT
Start: 2024-04-01 | End: 2024-04-01

## 2024-04-01 RX ORDER — ALBUTEROL 90 UG/1
2.5 AEROSOL, METERED ORAL EVERY 4 HOURS
Refills: 0 | Status: DISCONTINUED | OUTPATIENT
Start: 2024-04-01 | End: 2024-04-05

## 2024-04-01 RX ORDER — DAPAGLIFLOZIN 10 MG/1
10 TABLET, FILM COATED ORAL DAILY
Refills: 0 | Status: DISCONTINUED | OUTPATIENT
Start: 2024-04-01 | End: 2024-04-01

## 2024-04-01 RX ORDER — OXYCODONE HYDROCHLORIDE 5 MG/1
5 TABLET ORAL EVERY 6 HOURS
Refills: 0 | Status: DISCONTINUED | OUTPATIENT
Start: 2024-04-01 | End: 2024-04-04

## 2024-04-01 RX ORDER — DEXTROSE 50 % IN WATER 50 %
25 SYRINGE (ML) INTRAVENOUS ONCE
Refills: 0 | Status: DISCONTINUED | OUTPATIENT
Start: 2024-04-01 | End: 2024-04-01

## 2024-04-01 RX ORDER — FENTANYL CITRATE 50 UG/ML
50 INJECTION INTRAVENOUS
Refills: 0 | Status: DISCONTINUED | OUTPATIENT
Start: 2024-04-01 | End: 2024-04-01

## 2024-04-01 RX ORDER — GLUCAGON INJECTION, SOLUTION 0.5 MG/.1ML
1 INJECTION, SOLUTION SUBCUTANEOUS ONCE
Refills: 0 | Status: DISCONTINUED | OUTPATIENT
Start: 2024-04-01 | End: 2024-04-01

## 2024-04-01 RX ORDER — DEXTROSE 50 % IN WATER 50 %
15 SYRINGE (ML) INTRAVENOUS ONCE
Refills: 0 | Status: DISCONTINUED | OUTPATIENT
Start: 2024-04-01 | End: 2024-04-01

## 2024-04-01 RX ORDER — CLONAZEPAM 1 MG
0.5 TABLET ORAL EVERY 8 HOURS
Refills: 0 | Status: DISCONTINUED | OUTPATIENT
Start: 2024-04-01 | End: 2024-04-01

## 2024-04-01 RX ORDER — ASPIRIN/CALCIUM CARB/MAGNESIUM 324 MG
81 TABLET ORAL DAILY
Refills: 0 | Status: DISCONTINUED | OUTPATIENT
Start: 2024-04-01 | End: 2024-04-05

## 2024-04-01 RX ORDER — IPRATROPIUM/ALBUTEROL SULFATE 18-103MCG
3 AEROSOL WITH ADAPTER (GRAM) INHALATION ONCE
Refills: 0 | Status: COMPLETED | OUTPATIENT
Start: 2024-04-01 | End: 2024-04-01

## 2024-04-01 RX ADMIN — Medication 3 MILLILITER(S): at 18:45

## 2024-04-01 RX ADMIN — HEPARIN SODIUM 5000 UNIT(S): 5000 INJECTION INTRAVENOUS; SUBCUTANEOUS at 20:54
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
## 2024-04-01 NOTE — CONSULT NOTE ADULT - ASSESSMENT
ASSESSMENT      PLAN    NEURO  - Pain --   - Klonopin   - PAIN: Tylenol, oxy    RESP  - VENT -- 12/450/5/30, CPAP    CV  - MAP goal >65  - Pressors --     GI/NUTRITION  - Diet --  CLD  - Protonix IV daily  - PEG    RENAL/  - I/Os  - Reich    HEME  - DVT ppx -- SQH  - ASA81    ID  - ABX -- None    ENDO  - Monitor blood glucose   - Home Rx: Dapagliflozin   - ISS    DISPO:  ASSESSMENT      PLAN    NEURO  - Pain --   - Klonopin   - PAIN: Tylenol, oxy    RESP  - VENT -- 12/450/5/30, CPAP    CV  - MAP goal >65  - Pressors --     GI/NUTRITION  - Diet --  CLD  - Protonix IV daily  - PEG    RENAL/  - I/Os  - Reich    HEME  - DVT ppx -- SQH  - ASA81    ID  - ABX -- None    ENDO  - Monitor blood glucose   - Home Rx: Dapagliflozin   - ISS    DISPO: PACU, Home in AM ASSESSMENT      PLAN    NEURO  - AAO x3  - Pain control: Tylenol, oxy PRN    RESP  - VENT -- 12/450/5/30, CPAP as tolerated  - f/u ABG    CV  - MAP goal >65  - Hemodynamically stable    GI/NUTRITION  - Diet --  CLD  - Protonix IV daily  - PEG    RENAL/  - I/Os  - Reich    HEME  - DVT ppx -- SQH  - ASA81    ID  - ABX -- None    ENDO  - Monitor blood glucose   - Home Rx: Dapagliflozin   - ISS    DISPO: PACU, Home in AM ASSESSMENT  64F PMH HTN, COPD, Chronic respiratory failure, s/p tracheostomy, on Ventilator (2004), tracheomalacia, tracheal stenosis, s/p trache replaced 3/1/2024 by ENT, s/p bronchoscopy for stomaplasty (2022), Pneumothorax (s/p Chest tube, 2020), Dysphagia s/p Peg (3 years ago), Atypical chest pain on Nitro prn, recent visit to [place] [facility] 2 weeks ago for dislodged peg s/p replacement, Bronchiectasis, recent Pneumonia s/p abx 3 weeks ago, now s/p trach change w/ esophageal dilation.        PLAN    NEURO  - AAO x3  - Pain control: Tylenol, oxy PRN    RESP  - VENT -- 16/5/30, CPAP as tolerated  - f/u ABG    CV  - MAP goal >65  - Hemodynamically stable    GI/NUTRITION  - Diet --  CLD w/ TF  - Protonix IV daily  - PEG    RENAL/  - I/Os  - Reich    HEME  - DVT ppx -- SQH  - ASA81    ID  - ABX -- None    ENDO  - Monitor blood glucose   - Home Rx: Dapagliflozin   - ISS    DISPO: PACU, Home in AM

## 2024-04-01 NOTE — PATIENT PROFILE ADULT - FALL HARM RISK - HARM RISK INTERVENTIONS

## 2024-04-01 NOTE — CONSULT NOTE ADULT - SUBJECTIVE AND OBJECTIVE BOX
SICU Consultation Note  =====================================================  HPI:      Surgery Information  OR time:      EBL:          IV Fluids:       Blood Products:   UOP:          PAST MEDICAL & SURGICAL HISTORY:  Tracheostomy dependent      COPD (chronic obstructive pulmonary disease)      Hypertension      Sickle cell trait      Anxiety disorder      Pancreatic cyst      Tracheomalacia      Lupus anticoagulant syndrome      Anemia      H/O pneumothorax      Anxiety and depression      Ventilator dependent      Pneumonia      H/O chronic respiratory failure      Dysphagia      H/O bronchiectasis      Atypical chest pain      Malfunction of tracheostomy stoma      Dependence on tracheostomy      Acute tracheostomy management      Tracheal stenosis  excision of tracheal stenosis 10/2017  revision of tracheal stoma 3/2018      S/P   1986      PEG (percutaneous endoscopic gastrostomy) status      H/O colonoscopy      H/O endoscopy        Home Meds: Home Medications:  albuterol 2.5 mg/0.5 mL (0.5%) inhalation solution: 0.5 milliliter(s) by nebulizer (2024 15:32)  albuterol 2.5 mg/3 mL (0.083%) inhalation solution: 3 milliliter(s) by nebulizer every 3 to 5 hours as needed for  bronchospasm (2024 15:32)  albuterol 90 mcg/inh inhalation aerosol: 2 puff(s) inhaled 4 times a day, As Needed (2024 15:08)  aspirin 81 mg oral tablet, chewable: 1 tab(s) orally once a day Last dose 3/26/24 (2024 15:32)  dapagliflozin 10 mg oral tablet: 1 tab(s) orally once a day (2024 15:32)  nitroglycerin 0.4 mg sublingual tablet: 1 tab(s) sublingually as needed for  chest pain (2024 15:32)    Allergies: Allergies    -latex- hives (Other)  latex (Unknown)  Cipro (Hives)  theophyilline (Hives)    Intolerances      Soc:   Advanced Directives: Presumed Full Code     ROS:    REVIEW OF SYSTEMS    [ ] A ten-point review of systems was otherwise negative except as noted.  [ ] Due to altered mental status/intubation, subjective information were not able to be obtained from the patient. History was obtained, to the extent possible, from review of the chart and collateral sources of information.      CURRENT MEDICATIONS:   --------------------------------------------------------------------------------------  Neurologic Medications  acetaminophen   Oral Liquid .. 650 milliGRAM(s) Oral every 6 hours PRN Mild Pain (1 - 3)  clonazePAM  Tablet 0.5 milliGRAM(s) Oral every 8 hours PRN anxiety  fentaNYL    Injectable 25 MICROGram(s) IV Push every 5 minutes PRN Moderate Pain (4 - 6)  fentaNYL    Injectable 50 MICROGram(s) IV Push every 10 minutes PRN Severe Pain (7 - 10)  ondansetron Injectable 4 milliGRAM(s) IV Push once PRN Nausea and/or Vomiting  ondansetron Injectable 4 milliGRAM(s) IV Push every 4 hours PRN Nausea and/or Vomiting  oxyCODONE    Solution 2.5 milliGRAM(s) Oral every 6 hours PRN Moderate Pain (4 - 6)    Respiratory Medications  albuterol   0.5% 2.5 milliGRAM(s) Nebulizer every 4 hours PRN Bronchospasm    Cardiovascular Medications  nitroglycerin     SubLingual 0.4 milliGRAM(s) SubLingual once PRN Chest Pain    Gastrointestinal Medications    Genitourinary Medications    Hematologic/Oncologic Medications  aspirin  chewable 81 milliGRAM(s) Oral daily    Antimicrobial/Immunologic Medications    Endocrine/Metabolic Medications  dapagliflozin 10 milliGRAM(s) Oral daily    Topical/Other Medications    --------------------------------------------------------------------------------------    VITAL SIGNS, INS/OUTS (last 24 hours):  --------------------------------------------------------------------------------------  ICU Vital Signs Last 24 Hrs  T(C): 36.7 (2024 14:50), Max: 36.7 (2024 14:50)  T(F): 98.1 (2024 14:50), Max: 98.1 (2024 14:50)  HR: 79 (2024 14:50) (79 - 79)  BP: 125/90 (2024 14:50) (125/90 - 125/90)  BP(mean): --  ABP: --  ABP(mean): --  RR: 16 (2024 14:50) (16 - 16)  SpO2: 100% (2024 14:50) (100% - 100%)    O2 Parameters below as of 2024 14:50  Patient On (Oxygen Delivery Method): room air          I&O's Summary    --------------------------------------------------------------------------------------    EXAM:  GENERAL: NAD, lying in bed   NEURO: AOx3, awake alert appropriate  HEENT: NCAT, trachea midline  PULM: Respirations non-labored  ABD: Soft, non-tender, non-distended, no peritonitis/rebound tenderness,   EXT: Warm, well perfused   LINES:    LABS  --------------------------------------------------------------------------------------        --------------------------------------------------------------------------------------     SICU Consultation Note  =====================================================  HPI:  64F PMH HTN, COPD, Chronic respiratory failure, s/p tracheostomy, on Ventilator (), tracheomalacia, tracheal stenosis, s/p trache replaced 3/1/2024 by ENT, s/p bronchoscopy for stomaplasty (), Pneumothorax (s/p Chest tube, ), Dysphagia s/p Peg (3 years ago), Atypical chest pain on Nitro prn, recent visit to [facility] 2 weeks ago for dislodged peg s/p replacement, Bronchiectasis, recent Pneumonia s/p abx 3 weeks ago, now s/p trach change w/ esophageal dilation.      SICU consulted for vent management in PACU    PAST MEDICAL & SURGICAL HISTORY:  Tracheostomy dependent      COPD (chronic obstructive pulmonary disease)      Hypertension      Sickle cell trait      Anxiety disorder      Pancreatic cyst      Tracheomalacia      Lupus anticoagulant syndrome      Anemia      H/O pneumothorax      Anxiety and depression      Ventilator dependent      Pneumonia      H/O chronic respiratory failure      Dysphagia      H/O bronchiectasis      Atypical chest pain      Malfunction of tracheostomy stoma      Dependence on tracheostomy      Acute tracheostomy management      Tracheal stenosis  excision of tracheal stenosis 10/2017  revision of tracheal stoma 3/2018      S/P   1986      PEG (percutaneous endoscopic gastrostomy) status      H/O colonoscopy      H/O endoscopy        Home Meds: Home Medications:  albuterol 2.5 mg/0.5 mL (0.5%) inhalation solution: 0.5 milliliter(s) by nebulizer (2024 15:32)  albuterol 2.5 mg/3 mL (0.083%) inhalation solution: 3 milliliter(s) by nebulizer every 3 to 5 hours as needed for  bronchospasm (2024 15:32)  albuterol 90 mcg/inh inhalation aerosol: 2 puff(s) inhaled 4 times a day, As Needed (2024 15:08)  aspirin 81 mg oral tablet, chewable: 1 tab(s) orally once a day Last dose 3/26/24 (2024 15:32)  dapagliflozin 10 mg oral tablet: 1 tab(s) orally once a day (2024 15:32)  nitroglycerin 0.4 mg sublingual tablet: 1 tab(s) sublingually as needed for  chest pain (2024 15:32)    Allergies: Allergies    -latex- hives (Other)  latex (Unknown)  Cipro (Hives)  theophyilline (Hives)    Intolerances      Soc:   Advanced Directives: Presumed Full Code     ROS:    REVIEW OF SYSTEMS    [ ] A ten-point review of systems was otherwise negative except as noted.  [ ] Due to altered mental status/intubation, subjective information were not able to be obtained from the patient. History was obtained, to the extent possible, from review of the chart and collateral sources of information.      CURRENT MEDICATIONS:   --------------------------------------------------------------------------------------  Neurologic Medications  acetaminophen   Oral Liquid .. 650 milliGRAM(s) Oral every 6 hours PRN Mild Pain (1 - 3)  clonazePAM  Tablet 0.5 milliGRAM(s) Oral every 8 hours PRN anxiety  fentaNYL    Injectable 25 MICROGram(s) IV Push every 5 minutes PRN Moderate Pain (4 - 6)  fentaNYL    Injectable 50 MICROGram(s) IV Push every 10 minutes PRN Severe Pain (7 - 10)  ondansetron Injectable 4 milliGRAM(s) IV Push once PRN Nausea and/or Vomiting  ondansetron Injectable 4 milliGRAM(s) IV Push every 4 hours PRN Nausea and/or Vomiting  oxyCODONE    Solution 2.5 milliGRAM(s) Oral every 6 hours PRN Moderate Pain (4 - 6)    Respiratory Medications  albuterol   0.5% 2.5 milliGRAM(s) Nebulizer every 4 hours PRN Bronchospasm    Cardiovascular Medications  nitroglycerin     SubLingual 0.4 milliGRAM(s) SubLingual once PRN Chest Pain    Gastrointestinal Medications    Genitourinary Medications    Hematologic/Oncologic Medications  aspirin  chewable 81 milliGRAM(s) Oral daily    Antimicrobial/Immunologic Medications    Endocrine/Metabolic Medications  dapagliflozin 10 milliGRAM(s) Oral daily    Topical/Other Medications    --------------------------------------------------------------------------------------    VITAL SIGNS, INS/OUTS (last 24 hours):  --------------------------------------------------------------------------------------  ICU Vital Signs Last 24 Hrs  T(C): 36.7 (2024 14:50), Max: 36.7 (2024 14:50)  T(F): 98.1 (2024 14:50), Max: 98.1 (2024 14:50)  HR: 79 (2024 14:50) (79 - 79)  BP: 125/90 (2024 14:50) (125/90 - 125/90)  BP(mean): --  ABP: --  ABP(mean): --  RR: 16 (2024 14:50) (16 - 16)  SpO2: 100% (2024 14:50) (100% - 100%)    O2 Parameters below as of 2024 14:50  Patient On (Oxygen Delivery Method): room air          I&O's Summary    --------------------------------------------------------------------------------------    EXAM:  GENERAL: NAD, lying in bed   NEURO: AOx3, awake alert appropriate  HEENT: NCAT, trachea midline  PULM: Respirations non-labored  ABD: Soft, non-tender, non-distended, no peritonitis/rebound tenderness,   EXT: Warm, well perfused   LINES:    LABS  --------------------------------------------------------------------------------------        --------------------------------------------------------------------------------------     SICU Consultation Note  =====================================================  HPI:  64F PMH HTN, COPD, Chronic respiratory failure, s/p tracheostomy, on Ventilator (), tracheomalacia, tracheal stenosis, s/p trache replaced 3/1/2024 by ENT, s/p bronchoscopy for stomaplasty (), Pneumothorax (s/p Chest tube, ), Dysphagia s/p Peg (3 years ago), Atypical chest pain on Nitro prn, recent visit to [place] [facility] 2 weeks ago for dislodged peg s/p replacement, Bronchiectasis, recent Pneumonia s/p abx 3 weeks ago, now s/p trach change w/ esophageal dilation.      SICU consulted for vent management in PACU    PAST MEDICAL & SURGICAL HISTORY:  Tracheostomy dependent      COPD (chronic obstructive pulmonary disease)      Hypertension      Sickle cell trait      Anxiety disorder      Pancreatic cyst      Tracheomalacia      Lupus anticoagulant syndrome      Anemia      H/O pneumothorax      Anxiety and depression      Ventilator dependent      Pneumonia      H/O chronic respiratory failure      Dysphagia      H/O bronchiectasis      Atypical chest pain      Malfunction of tracheostomy stoma      Dependence on tracheostomy      Acute tracheostomy management      Tracheal stenosis  excision of tracheal stenosis 10/2017  revision of tracheal stoma 3/2018      S/P   1986      PEG (percutaneous endoscopic gastrostomy) status      H/O colonoscopy      H/O endoscopy        Home Meds: Home Medications:  albuterol 2.5 mg/0.5 mL (0.5%) inhalation solution: 0.5 milliliter(s) by nebulizer (2024 15:32)  albuterol 2.5 mg/3 mL (0.083%) inhalation solution: 3 milliliter(s) by nebulizer every 3 to 5 hours as needed for  bronchospasm (2024 15:32)  albuterol 90 mcg/inh inhalation aerosol: 2 puff(s) inhaled 4 times a day, As Needed (2024 15:08)  aspirin 81 mg oral tablet, chewable: 1 tab(s) orally once a day Last dose 3/26/24 (2024 15:32)  dapagliflozin 10 mg oral tablet: 1 tab(s) orally once a day (2024 15:32)  nitroglycerin 0.4 mg sublingual tablet: 1 tab(s) sublingually as needed for  chest pain (2024 15:32)    Allergies: Allergies    -latex- hives (Other)  latex (Unknown)  Cipro (Hives)  theophyilline (Hives)    Intolerances      Soc:   Advanced Directives: Presumed Full Code     ROS:    REVIEW OF SYSTEMS    [ ] A ten-point review of systems was otherwise negative except as noted.  [ ] Due to altered mental status/intubation, subjective information were not able to be obtained from the patient. History was obtained, to the extent possible, from review of the chart and collateral sources of information.      CURRENT MEDICATIONS:   --------------------------------------------------------------------------------------  Neurologic Medications  acetaminophen   Oral Liquid .. 650 milliGRAM(s) Oral every 6 hours PRN Mild Pain (1 - 3)  clonazePAM  Tablet 0.5 milliGRAM(s) Oral every 8 hours PRN anxiety  fentaNYL    Injectable 25 MICROGram(s) IV Push every 5 minutes PRN Moderate Pain (4 - 6)  fentaNYL    Injectable 50 MICROGram(s) IV Push every 10 minutes PRN Severe Pain (7 - 10)  ondansetron Injectable 4 milliGRAM(s) IV Push once PRN Nausea and/or Vomiting  ondansetron Injectable 4 milliGRAM(s) IV Push every 4 hours PRN Nausea and/or Vomiting  oxyCODONE    Solution 2.5 milliGRAM(s) Oral every 6 hours PRN Moderate Pain (4 - 6)    Respiratory Medications  albuterol   0.5% 2.5 milliGRAM(s) Nebulizer every 4 hours PRN Bronchospasm    Cardiovascular Medications  nitroglycerin     SubLingual 0.4 milliGRAM(s) SubLingual once PRN Chest Pain    Gastrointestinal Medications    Genitourinary Medications    Hematologic/Oncologic Medications  aspirin  chewable 81 milliGRAM(s) Oral daily    Antimicrobial/Immunologic Medications    Endocrine/Metabolic Medications  dapagliflozin 10 milliGRAM(s) Oral daily    Topical/Other Medications    --------------------------------------------------------------------------------------    VITAL SIGNS, INS/OUTS (last 24 hours):  --------------------------------------------------------------------------------------  ICU Vital Signs Last 24 Hrs  T(C): 36.7 (2024 14:50), Max: 36.7 (2024 14:50)  T(F): 98.1 (2024 14:50), Max: 98.1 (2024 14:50)  HR: 79 (2024 14:50) (79 - 79)  BP: 125/90 (2024 14:50) (125/90 - 125/90)  BP(mean): --  ABP: --  ABP(mean): --  RR: 16 (2024 14:50) (16 - 16)  SpO2: 100% (2024 14:50) (100% - 100%)    O2 Parameters below as of 2024 14:50  Patient On (Oxygen Delivery Method): room air          I&O's Summary    --------------------------------------------------------------------------------------    EXAM:  GENERAL: NAD, lying in bed   NEURO: AOx3, awake alert appropriate  HEENT: NCAT, trachea midline. CPAP 16/5/30%  PULM: Respirations non-labored  ABD: Soft, non-tender, non-distended, no peritonitis/rebound tenderness,   EXT: Warm, well perfused   LINES:    LABS  --------------------------------------------------------------------------------------        --------------------------------------------------------------------------------------

## 2024-04-02 ENCOUNTER — TRANSCRIPTION ENCOUNTER (OUTPATIENT)
Age: 64
End: 2024-04-02

## 2024-04-02 LAB
ANION GAP SERPL CALC-SCNC: 12 MMOL/L — SIGNIFICANT CHANGE UP (ref 7–14)
BUN SERPL-MCNC: 11 MG/DL — SIGNIFICANT CHANGE UP (ref 7–23)
CALCIUM SERPL-MCNC: 9.1 MG/DL — SIGNIFICANT CHANGE UP (ref 8.4–10.5)
CHLORIDE SERPL-SCNC: 103 MMOL/L — SIGNIFICANT CHANGE UP (ref 98–107)
CK MB CFR SERPL CALC: 3.7 NG/ML — SIGNIFICANT CHANGE UP
CO2 SERPL-SCNC: 24 MMOL/L — SIGNIFICANT CHANGE UP (ref 22–31)
CREAT SERPL-MCNC: 0.75 MG/DL — SIGNIFICANT CHANGE UP (ref 0.5–1.3)
EGFR: 89 ML/MIN/1.73M2 — SIGNIFICANT CHANGE UP
GLUCOSE BLDC GLUCOMTR-MCNC: 117 MG/DL — HIGH (ref 70–99)
GLUCOSE BLDC GLUCOMTR-MCNC: 88 MG/DL — SIGNIFICANT CHANGE UP (ref 70–99)
GLUCOSE BLDC GLUCOMTR-MCNC: 90 MG/DL — SIGNIFICANT CHANGE UP (ref 70–99)
GLUCOSE SERPL-MCNC: 307 MG/DL — HIGH (ref 70–99)
HCT VFR BLD CALC: 29.5 % — LOW (ref 34.5–45)
HGB BLD-MCNC: 9.6 G/DL — LOW (ref 11.5–15.5)
MAGNESIUM SERPL-MCNC: 1.9 MG/DL — SIGNIFICANT CHANGE UP (ref 1.6–2.6)
MCHC RBC-ENTMCNC: 27 PG — SIGNIFICANT CHANGE UP (ref 27–34)
MCHC RBC-ENTMCNC: 32.5 GM/DL — SIGNIFICANT CHANGE UP (ref 32–36)
MCV RBC AUTO: 82.9 FL — SIGNIFICANT CHANGE UP (ref 80–100)
NRBC # BLD: 0 /100 WBCS — SIGNIFICANT CHANGE UP (ref 0–0)
NRBC # FLD: 0 K/UL — SIGNIFICANT CHANGE UP (ref 0–0)
PHOSPHATE SERPL-MCNC: 4.5 MG/DL — SIGNIFICANT CHANGE UP (ref 2.5–4.5)
PLATELET # BLD AUTO: 408 K/UL — HIGH (ref 150–400)
POTASSIUM SERPL-MCNC: 4.3 MMOL/L — SIGNIFICANT CHANGE UP (ref 3.5–5.3)
POTASSIUM SERPL-SCNC: 4.3 MMOL/L — SIGNIFICANT CHANGE UP (ref 3.5–5.3)
RBC # BLD: 3.56 M/UL — LOW (ref 3.8–5.2)
RBC # FLD: 15 % — HIGH (ref 10.3–14.5)
SODIUM SERPL-SCNC: 139 MMOL/L — SIGNIFICANT CHANGE UP (ref 135–145)
TROPONIN T, HIGH SENSITIVITY RESULT: 15 NG/L — SIGNIFICANT CHANGE UP
WBC # BLD: 3.76 K/UL — LOW (ref 3.8–10.5)
WBC # FLD AUTO: 3.76 K/UL — LOW (ref 3.8–10.5)

## 2024-04-02 PROCEDURE — 99223 1ST HOSP IP/OBS HIGH 75: CPT

## 2024-04-02 PROCEDURE — 93010 ELECTROCARDIOGRAM REPORT: CPT

## 2024-04-02 RX ORDER — CHLORHEXIDINE GLUCONATE 213 G/1000ML
1 SOLUTION TOPICAL DAILY
Refills: 0 | Status: DISCONTINUED | OUTPATIENT
Start: 2024-04-02 | End: 2024-04-05

## 2024-04-02 RX ORDER — SODIUM CHLORIDE 9 MG/ML
1000 INJECTION, SOLUTION INTRAVENOUS
Refills: 0 | Status: DISCONTINUED | OUTPATIENT
Start: 2024-04-02 | End: 2024-04-02

## 2024-04-02 RX ORDER — NITROGLYCERIN 6.5 MG
0.4 CAPSULE, EXTENDED RELEASE ORAL
Refills: 0 | Status: DISCONTINUED | OUTPATIENT
Start: 2024-04-02 | End: 2024-04-05

## 2024-04-02 RX ORDER — MAGNESIUM SULFATE 500 MG/ML
1 VIAL (ML) INJECTION ONCE
Refills: 0 | Status: COMPLETED | OUTPATIENT
Start: 2024-04-02 | End: 2024-04-02

## 2024-04-02 RX ORDER — SIMETHICONE 80 MG/1
80 TABLET, CHEWABLE ORAL AT BEDTIME
Refills: 0 | Status: DISCONTINUED | OUTPATIENT
Start: 2024-04-02 | End: 2024-04-05

## 2024-04-02 RX ADMIN — Medication 81 MILLIGRAM(S): at 12:06

## 2024-04-02 RX ADMIN — HEPARIN SODIUM 5000 UNIT(S): 5000 INJECTION INTRAVENOUS; SUBCUTANEOUS at 12:06

## 2024-04-02 RX ADMIN — ALBUTEROL 2.5 MILLIGRAM(S): 90 AEROSOL, METERED ORAL at 15:44

## 2024-04-02 RX ADMIN — HEPARIN SODIUM 5000 UNIT(S): 5000 INJECTION INTRAVENOUS; SUBCUTANEOUS at 04:49

## 2024-04-02 RX ADMIN — ALBUTEROL 2.5 MILLIGRAM(S): 90 AEROSOL, METERED ORAL at 05:03

## 2024-04-02 RX ADMIN — ALBUTEROL 2.5 MILLIGRAM(S): 90 AEROSOL, METERED ORAL at 22:28

## 2024-04-02 RX ADMIN — Medication 100 GRAM(S): at 09:04

## 2024-04-02 RX ADMIN — Medication 0.4 MILLIGRAM(S): at 05:30

## 2024-04-02 RX ADMIN — HEPARIN SODIUM 5000 UNIT(S): 5000 INJECTION INTRAVENOUS; SUBCUTANEOUS at 22:46

## 2024-04-02 RX ADMIN — SODIUM CHLORIDE 100 MILLILITER(S): 9 INJECTION, SOLUTION INTRAVENOUS at 01:00

## 2024-04-02 NOTE — H&P ADULT - HISTORY OF PRESENT ILLNESS
64F w/ PMHx of HTN, COPD, bronchiectasis, chronic respiratory failure, s/p tracheostomy on ventilator (2004), tracheomalacia, tracheal stenosis, s/p trach replacement, s/p bronchoscopy for stomaplasty (2022), pneumothorax, dysphagia s/p PEG, atypical chest pain on Nitro prn (last few weeks only per patient), recent visit to [place] [facility] 2 weeks ago for dislodged PEG s/p replacement, recent treatment for PNA 3 weeks ago, who presented to [facility] for routine tracheostomy tube exchange and underwent stomaplasty with trach tube exchange and esophageal dilation (4/1) with ENT. Postop course uneventful. ENT reqwuested SLP eval postprocedure and the SICU was consulted for ventilator management pending SLP. Patient evaluated by SLP on 4/2 however was noted to be lethargic reportedly due lack of sleep the night before and thus SLP eval was deferred. She was deemed not a candidate for SICU given non critically ill. RCU then consulted for management given ventilator dependency pending SLP evaluation.      Patient seen in the PACU. Sleeping but easily rousable. In addition to her providing information regarding the reason for her current hospital visit, she endorsed intermittent chest pain for the past few weeks. She reportedly had a cardiac w/u at an OSH (?CT coronaries, TTE) which was normal, and has been using SL NTG PRN. Additionally patient reports since her last PEG tube replacement 2 weeks ago she has experienced back up / leakage of feeds through the tube and pain at the tube site.

## 2024-04-02 NOTE — DISCHARGE NOTE PROVIDER - NSDCFUADDINST_GEN_ALL_CORE_FT
1. Clean your tracheostomy tube daily as taught at [facility] and as needed  2. Suction tracheostomy tube three times a day and as needed  3. Humidified air to trach collar at night  4. Suction as needed as taught at [facility]  5. If you have any mucous plugging that you are unable to remove from your tracheostomy tube and it is causing difficulty breathing call the office and go to the emergency room

## 2024-04-02 NOTE — SWALLOW BEDSIDE ASSESSMENT ADULT - SWALLOW EVAL: DIAGNOSIS
Attempted to assess Pt. Increased fatigue noted at this time with reduced ability to maintain adequate level of arousal and alertness to support po trials. Per nursing, Pt was up all night. Attempted tactile stimulation via sternal rub and presentation of a cold compress, verbal stimulation, and education re: rationale for assessment in order to facilitate Pt arousal; however, Pt declined participation via a head nod and went back to sleep. Will reattempt to assess Pt upon improved alertness.

## 2024-04-02 NOTE — PROGRESS NOTE ADULT - SUBJECTIVE AND OBJECTIVE BOX
CHIEF COMPLAINT:Patient is a 64y old  Female who presents with a chief complaint of MALFUNCTION OF TRACHEOSTOMY STOMA (02 Apr 2024 04:21)      INTERVAL EVENTS: s/p tracheostomy tube exchange, esophageal dilation. Now pending SLP Eval.     ROS: Seen by bedside during AM rounds     OBJECTIVE:  ICU Vital Signs Last 24 Hrs  T(C): 36.4 (02 Apr 2024 16:00), Max: 36.8 (01 Apr 2024 23:00)  T(F): 97.6 (02 Apr 2024 16:00), Max: 98.3 (01 Apr 2024 23:00)  HR: 68 (02 Apr 2024 16:00) (53 - 90)  BP: 107/76 (02 Apr 2024 16:00) (79/45 - 153/87)  BP(mean): 84 (02 Apr 2024 16:00) (51 - 101)  ABP: --  ABP(mean): --  RR: 17 (02 Apr 2024 16:00) (10 - 22)  SpO2: 100% (02 Apr 2024 16:00) (96% - 100%)    O2 Parameters below as of 02 Apr 2024 03:00  Patient On (Oxygen Delivery Method): ventilator, On CPAP          Mode: CPAP with PS, FiO2: 30, PEEP: 5, PS: 16, MAP: 9, PIP: 21    04-01 @ 07:01  -  04-02 @ 07:00  --------------------------------------------------------  IN: 400 mL / OUT: 1300 mL / NET: -900 mL      CAPILLARY BLOOD GLUCOSE  117 (02 Apr 2024 13:00)      POCT Blood Glucose.: 117 mg/dL (02 Apr 2024 13:28)      PHYSICAL EXAM:  General: NAD   Neck: (+) Trach tube noted, site c/d/i.  Cards: S1/S2, no murmurs   Pulm: CTA bilaterally. No wheezes.   Abdomen: Soft, NTND. (+) PEG noted, site c/d/i. Mild TTP surrounding PEG site.   Extremities: Trace pedal edema. Extremities warm to touch. Intact distal pulses.  Neurology: AOx3. 5/5 motor strength x4E. Follows basic commands. Communicates verbally (poor phonation) and written word.   Skin: warm to touch, color appropriate for ethnicity. Refer to RN assessment for further details.    Mode: CPAP with PS  FiO2: 30  PEEP: 5  PS: 16  MAP: 9  PIP: 21      HOSPITAL MEDICATIONS:  MEDICATIONS  (STANDING):  aspirin  chewable 81 milliGRAM(s) Oral daily  chlorhexidine 2% Cloths 1 Application(s) Topical daily  heparin   Injectable 5000 Unit(s) SubCutaneous every 8 hours  simethicone drops 80 milliGRAM(s) Oral at bedtime    MEDICATIONS  (PRN):  acetaminophen   Oral Liquid .. 650 milliGRAM(s) Oral every 6 hours PRN Mild Pain (1 - 3)  albuterol   0.5% 2.5 milliGRAM(s) Nebulizer every 4 hours PRN Bronchospasm  nitroglycerin     SubLingual 0.4 milliGRAM(s) SubLingual every 5 minutes PRN Chest Pain  oxyCODONE    Solution 5 milliGRAM(s) Oral every 6 hours PRN Severe Pain (7 - 10)  oxyCODONE    Solution 2.5 milliGRAM(s) Oral every 6 hours PRN Moderate Pain (4 - 6)      LABS:                        9.6    3.76  )-----------( 408      ( 02 Apr 2024 06:00 )             29.5     04-02    139  |  103  |  11  ----------------------------<  307<H>  4.3   |  24  |  0.75    Ca    9.1      02 Apr 2024 06:00  Phos  4.5     04-02  Mg     1.90     04-02        Urinalysis Basic - ( 02 Apr 2024 06:00 )    Color: x / Appearance: x / SG: x / pH: x  Gluc: 307 mg/dL / Ketone: x  / Bili: x / Urobili: x   Blood: x / Protein: x / Nitrite: x   Leuk Esterase: x / RBC: x / WBC x   Sq Epi: x / Non Sq Epi: x / Bacteria: x      Arterial Blood Gas:  04-01 @ 20:00  7.35/48/102/26/98.8/0.5  ABG lactate: --

## 2024-04-02 NOTE — PROGRESS NOTE ADULT - SUBJECTIVE AND OBJECTIVE BOX
ENT Progress Note/ Postop Check    HPI: 64F with chronic vent dependence s/p DL, stomaplasty, esophageal dilation      Interval:  Doing well overnight, on vent, did not take PO overnight. No order for PO.      PAST MEDICAL & SURGICAL HISTORY:  Tracheostomy dependent      COPD (chronic obstructive pulmonary disease)      Hypertension      Sickle cell trait      Anxiety disorder      Pancreatic cyst      Tracheomalacia      Lupus anticoagulant syndrome      Anemia      H/O pneumothorax      Anxiety and depression      Ventilator dependent      Pneumonia      H/O chronic respiratory failure      Dysphagia      H/O bronchiectasis      Atypical chest pain      Malfunction of tracheostomy stoma      Dependence on tracheostomy      Acute tracheostomy management      Tracheal stenosis  excision of tracheal stenosis 10/2017  revision of tracheal stoma 3/2018      S/P         PEG (percutaneous endoscopic gastrostomy) status      H/O colonoscopy      H/O endoscopy        Allergies    -latex- hives (Other)  latex (Unknown)  Cipro (Hives)  theophyilline (Hives)    Intolerances      MEDICATIONS  (STANDING):  aspirin  chewable 81 milliGRAM(s) Oral daily  chlorhexidine 2% Cloths 1 Application(s) Topical daily  heparin   Injectable 5000 Unit(s) SubCutaneous every 8 hours  simethicone drops 80 milliGRAM(s) Oral at bedtime    MEDICATIONS  (PRN):  acetaminophen   Oral Liquid .. 650 milliGRAM(s) Oral every 6 hours PRN Mild Pain (1 - 3)  albuterol   0.5% 2.5 milliGRAM(s) Nebulizer every 4 hours PRN Bronchospasm  nitroglycerin     SubLingual 0.4 milliGRAM(s) SubLingual every 5 minutes PRN Chest Pain  oxyCODONE    Solution 2.5 milliGRAM(s) Oral every 6 hours PRN Moderate Pain (4 - 6)  oxyCODONE    Solution 5 milliGRAM(s) Oral every 6 hours PRN Severe Pain (7 - 10)        Vital Signs Last 24 Hrs  T(C): 36.4 (2024 03:00), Max: 36.8 (2024 17:05)  T(F): 97.5 (2024 03:00), Max: 98.3 (2024 23:00)  HR: 53 (2024 04:00) (53 - 90)  BP: 110/64 (2024 04:00) (100/54 - 153/87)  BP(mean): 75 (2024 04:00) (66 - 101)  RR: 12 (2024 04:00) (10 - 22)  SpO2: 100% (2024 04:00) (98% - 100%)    Parameters below as of 2024 03:00  Patient On (Oxygen Delivery Method): ventilator, On CPAP        Physical Exam:  Alert, NAD    Nonlabored Respirations ANUPAMA SYKES      24 @ 07:01  -  - @ 06:45  --------------------------------------------------------  IN: 400 mL / OUT: 1300 mL / NET: -900 mL                              9.6    5.44  )-----------( 385      ( 2024 20:00 )             29.7    04-01    142  |  105  |  10  ----------------------------<  78  4.2   |  25  |  0.75    Ca    9.4      2024 20:00  Phos  3.6     04-01  Mg     2.00     04-01           A/P: 64yFemale s/p DL, stomaplasty, esophageal dilation   - SLP this AM

## 2024-04-02 NOTE — PROGRESS NOTE ADULT - SUBJECTIVE AND OBJECTIVE BOX
24 HOUR EVENTS:  - NAEO    SUBJECTIVE/ROS:  Patient seen at bedside this AM.     OBJECTIVE:    VITALS:  Vital Signs Last 24 Hrs  T(C): 36.8 (01 Apr 2024 23:00), Max: 36.8 (01 Apr 2024 17:05)  T(F): 98.3 (01 Apr 2024 23:00), Max: 98.3 (01 Apr 2024 23:00)  HR: 82 (01 Apr 2024 23:00) (59 - 90)  BP: 121/77 (01 Apr 2024 23:00) (106/56 - 153/87)  BP(mean): 86 (01 Apr 2024 23:00) (69 - 101)  RR: 22 (01 Apr 2024 23:00) (12 - 22)  SpO2: 100% (01 Apr 2024 23:00) (98% - 100%)    Parameters below as of 01 Apr 2024 23:00  Patient On (Oxygen Delivery Method): ventilator, CPAP: ps 16,peep 5 FI02 305      Mode: CPAP with PS  FiO2: 30  PEEP: 5  PS: 12  MAP: 9  PIP: 18    I&O's Summary    01 Apr 2024 07:01  -  02 Apr 2024 00:26  --------------------------------------------------------  IN: 0 mL / OUT: 600 mL / NET: -600 mL        PHYSICAL EXAM:  GENERAL: NAD, lying in bed   NEURO: AOx3, awake alert appropriate  HEENT: NCAT, trachea midline. CPAP 16/5/30%  PULM: Respirations non-labored  ABD: Soft, non-tender, non-distended, no peritonitis/rebound tenderness,   EXT: Warm, well perfused       LABS:                        9.6    5.44  )-----------( 385      ( 01 Apr 2024 20:00 )             29.7   04-01    142  |  105  |  10  ----------------------------<  78  4.2   |  25  |  0.75    Ca    9.4      01 Apr 2024 20:00  Phos  3.6     04-01  Mg     2.00     04-01      CAPILLARY BLOOD GLUCOSE          MEDICATIONS:   MEDICATIONS  (STANDING):  aspirin  chewable 81 milliGRAM(s) Oral daily  chlorhexidine 2% Cloths 1 Application(s) Topical daily  heparin   Injectable 5000 Unit(s) SubCutaneous every 8 hours    MEDICATIONS  (PRN):  acetaminophen   Oral Liquid .. 650 milliGRAM(s) Oral every 6 hours PRN Mild Pain (1 - 3)  albuterol   0.5% 2.5 milliGRAM(s) Nebulizer every 4 hours PRN Bronchospasm  nitroglycerin     SubLingual 0.4 milliGRAM(s) SubLingual once PRN Chest Pain  oxyCODONE    Solution 5 milliGRAM(s) Oral every 6 hours PRN Severe Pain (7 - 10)  oxyCODONE    Solution 2.5 milliGRAM(s) Oral every 6 hours PRN Moderate Pain (4 - 6)   INTERVAL EVENTS:  - OVN had chest pain, pt described as possibly gas pain. EKG obtained, unchanged from prior, cardiac enzymes neg.  - xfer to RCU      OBJECTIVE:    VITALS:  Vital Signs Last 24 Hrs  T(C): 36.8 (01 Apr 2024 23:00), Max: 36.8 (01 Apr 2024 17:05)  T(F): 98.3 (01 Apr 2024 23:00), Max: 98.3 (01 Apr 2024 23:00)  HR: 82 (01 Apr 2024 23:00) (59 - 90)  BP: 121/77 (01 Apr 2024 23:00) (106/56 - 153/87)  BP(mean): 86 (01 Apr 2024 23:00) (69 - 101)  RR: 22 (01 Apr 2024 23:00) (12 - 22)  SpO2: 100% (01 Apr 2024 23:00) (98% - 100%)    Parameters below as of 01 Apr 2024 23:00  Patient On (Oxygen Delivery Method): ventilator, CPAP: ps 16,peep 5 FI02 305      Mode: CPAP with PS  FiO2: 30  PEEP: 5  PS: 12  MAP: 9  PIP: 18    I&O's Summary    01 Apr 2024 07:01  -  02 Apr 2024 00:26  --------------------------------------------------------  IN: 0 mL / OUT: 600 mL / NET: -600 mL        PHYSICAL EXAM:  GENERAL: NAD, lying in bed   NEURO: AOx3, awake alert appropriate  HEENT: NCAT, trachea midline. CPAP 16/5/30%  PULM: Respirations non-labored  ABD: Soft, non-tender, non-distended, no peritonitis/rebound tenderness,   EXT: Warm, well perfused       LABS:                        9.6    5.44  )-----------( 385      ( 01 Apr 2024 20:00 )             29.7   04-01    142  |  105  |  10  ----------------------------<  78  4.2   |  25  |  0.75    Ca    9.4      01 Apr 2024 20:00  Phos  3.6     04-01  Mg     2.00     04-01      CAPILLARY BLOOD GLUCOSE          MEDICATIONS:   MEDICATIONS  (STANDING):  aspirin  chewable 81 milliGRAM(s) Oral daily  chlorhexidine 2% Cloths 1 Application(s) Topical daily  heparin   Injectable 5000 Unit(s) SubCutaneous every 8 hours    MEDICATIONS  (PRN):  acetaminophen   Oral Liquid .. 650 milliGRAM(s) Oral every 6 hours PRN Mild Pain (1 - 3)  albuterol   0.5% 2.5 milliGRAM(s) Nebulizer every 4 hours PRN Bronchospasm  nitroglycerin     SubLingual 0.4 milliGRAM(s) SubLingual once PRN Chest Pain  oxyCODONE    Solution 5 milliGRAM(s) Oral every 6 hours PRN Severe Pain (7 - 10)  oxyCODONE    Solution 2.5 milliGRAM(s) Oral every 6 hours PRN Moderate Pain (4 - 6)

## 2024-04-02 NOTE — PROGRESS NOTE ADULT - ASSESSMENT
65 yo F w/ PMHx of HTN, COPD, chronic respiratory failure, s/p tracheostomy on ventilator (2004), tracheomalacia, tracheal stenosis, s/p trach replacement, s/p bronchoscopy for stomaplasty (2022), pneumothorax, dysphagia s/p PEG, atypical chest pain on Nitro prn (last few weeks only per patient), recent visit to [place] ER 2 weeks ago for dislodged PEG s/p replacement, bronchiectasis, recent pneumonia s/p abx 3 weeks ago. Presented for scheduled trach exchange and esophageal dilation. Pending SLP eval per ENT request. Admitted to RCU pending SLP.     PLAN:  NEURO  - AO x3, mouths words and can communicate via writing  - Pain control: Tylenol, Oxy PRN    ENT  - Hx of COPD, tracheomalacia/tracheal stenosis with chronic resp failure requiring MV  - S/P DL, stomaplasty with tracheostomy tube exchange, and esophageal dilation with ENT (4/1)  - C/w trach care daily    RESP  - Hx of COPD, tracheomalacia/tracheal stenosis with chronic resp failure requiring MV  - Per pt she remains on CPAP ATC at home and at times has been able to come off the vent  - C/w CPAP 16/5/30  - Will place back on AC Mode ventilation overnight for now  - Blood gas on admission with no e/o hypercapnea nor acidosis  - Will RPT blood gas 4/2  - Airway clearance with Albuterol and Chest PT    CV  - Reporting chest pain for the past few weeks and reportedly underwent cardiac workup 2 weeks ago including CT coronaries which pt states was normal however was told her heart was "thick and stiff"  - Intermittent complaints of chest pain here and Trop NEG in house   - EKG (4/2/24) showing NSR with RBBB  - EKG (dating 1/21/2021) also with RBBB seen   - Episodes of hypotension on 4/2 while in PACU  - Can trial IVF if needed to assess response  - Follows with Cardiologist Dr. Ange Bryan ([facility])  - Ordered for SL NTG PRN chest pain   - Attempt to obtain/review prior cardiac w/u tomorrow     GI/NUTRITION  - Hx dysphagia in s/o MV requiring PEG tube  - Reportedly had dislodged PEG and had it replaced 2 weeks ago at [facility]- pt reports tube was switched from 22F to 18F  - Per patient reports continued pain at PEG site & reports food backs up and leaks out of tube at home with feeds so will consult IR to evaluate  - PEG tube in place but reported has eaten pureed food despite MV at home  - Per ENT requesting SLP eval to determine eligibility for PO Diet despite MV   - C/w PEG TF for now pending SLP tomorrow    RENAL/  - No acute issues  - Voiding spontaneously   - Monitor BUN/Cr and Uo    HEME  - DVT ppx - SQH  - Continue home ASA81    ID  - Afebrile, no leukocytosis. Monitor off abx     ENDO  - Monitor blood glucose   - Holding home Dapagliflozin  - FSBG and ISS Q6H for now while on TF       DISPO: Pt reports she lives at home with 24H nursing services and her  in [place]. Plan for DC back home when able   CODE: FULL

## 2024-04-02 NOTE — H&P ADULT - NSHPPHYSICALEXAM_GEN_ALL_CORE
General: NAD   HEENT: NC/AT. EOMs grossly intact.   Neck: (+) Trach tube noted, site c/d/i.  Cards: S1/S2, no murmurs   Pulm: CTA bilaterally. No wheezes.   Abdomen: Soft, NTND. (+) PEG noted, site c/d/i. Mild TTP surrounding PEG site.   Extremities: Trace pedal edema. Extremities warm to touch.   PV: Intact distal pulses.  Neurology: AOx3. 5/5 motor strength x4E. Follows basic commands. Communicates verbally (poor phonation) and written word.   Skin: warm to touch, color appropriate for ethnicity. Refer to RN assessment for further details.

## 2024-04-02 NOTE — DISCHARGE NOTE PROVIDER - CARE PROVIDER_API CALL
Gaurang Rodriguez  Otolaryngology  [address] [place] [id number]  Phone: [phone]  Fax: [phone]  Follow Up Time: 1 week

## 2024-04-02 NOTE — PROGRESS NOTE ADULT - ASSESSMENT
65 yo F w/ PMHx of HTN, COPD, chronic respiratory failure, s/p tracheostomy on ventilator (2004), tracheomalacia, tracheal stenosis, s/p trach replacement, s/p bronchoscopy for stomaplasty (2022), pneumothorax, fysphagia s/p PEG, atypical chest pain on Nitro prn, recent visit to [place] ER 2 weeks ago for dislodged PEG s/p replacement, bronchiectasis, recent pneumonia s/p abx 3 weeks ago. Patient now s/p scheduled trach change and esophageal dilation. SICU consulted for trach management post-op.      PLAN  NEURO  - AO x3  - Pain control: Tylenol, Oxy PRN    RESP  - VENT - 16/5/30, CPAP as tolerated  - Albuterol PRN     CV  - Hemodynamically stable off pressors     GI/NUTRITION  - Diet - NPO pending final post-op CXR read      - Passed bedside S&S  - PEG in place     RENAL/  - Voiding spontaneously     HEME  - DVT ppx - SQH  - Continue home ASA81    ID  - Afebrile, no leukocytosis. Monitor off abx     ENDO  - Monitor blood glucose   - Holding home Dapagliflozin       DISPO: PACU, d/c in AM   CODE: FULL  65 yo F w/ PMHx of HTN, COPD, chronic respiratory failure, s/p tracheostomy on ventilator (2004), tracheomalacia, tracheal stenosis, s/p trach replacement, s/p bronchoscopy for stomaplasty (2022), pneumothorax, fysphagia s/p PEG, atypical chest pain on Nitro prn, recent visit to [place] ER 2 weeks ago for dislodged PEG s/p replacement, bronchiectasis, recent pneumonia s/p abx 3 weeks ago. Patient now s/p scheduled trach change and esophageal dilation. SICU consulted for trach management post-op.      PLAN  NEURO  - AO x3  - Pain control: Tylenol, Oxy PRN    RESP  - VENT - 16/5/30, CPAP as tolerated  - Albuterol PRN     CV  - Hemodynamically stable off pressors   - at home on nitro SL prn    GI/NUTRITION  - Diet - CLD + TFs  - simethicone  - PEG in place     RENAL/  - Voiding spontaneously     HEME  - DVT ppx - SQH  - Continue home ASA81    ID  - Afebrile, no leukocytosis. Monitor off abx     ENDO  - Monitor blood glucose   - Holding home Dapagliflozin       DISPO: PACU, d/c in AM   CODE: FULL

## 2024-04-02 NOTE — H&P ADULT - NS ATTEND AMEND GEN_ALL_CORE FT
Pt is a 64F with MHx chronic respiratory failure with ventilator dependence via tracheostomy (2004) and oropharyngeal dysphagia s/p PEG placement presenting from PACU following a scheduled tracheostomy exchange and esophageal dilation 2/2 SLP evaluation admitted to RCU on 4/1/24.     Pt with chronic hypoxemic and hypercapnic respiratory failure with ventilator dependence via tracheostomy (since 2004) i/s/o hx tracheomalacia and tracheal stenosis on home ventilator settings.      Dispo pending medical optimization. Pt full code. DVT ppx in place. Discussed with pt at bedside daily. Pt is a 64F with MHx chronic respiratory failure with ventilator dependence via tracheostomy (2004) and oropharyngeal dysphagia s/p PEG placement presenting from PACU following a scheduled tracheostomy exchange and esophageal dilation 2/2 SLP evaluation admitted to RCU on 4/1/24.     Pt with chronic hypoxemic and hypercapnic respiratory failure with ventilator dependence via tracheostomy (since 2004) i/s/o hx tracheomalacia and tracheal stenosis on home ventilator settings. ABG on current settings appropriate. Pt presenting for routine tracheostomy exchange and esophageal dilation in ambulatory sx with ENT. Pt tolerated surgery well without post-operative complications but 2/2 prolonged sedation following anesthesia pt was unable to successfully undergo SLP evaluation. Pt to undergo SLP evaluation following admission once pt more awake and at mental status baseline.     Dispo pending medical optimization. Pt full code. DVT ppx in place. Discussed with pt at bedside daily.

## 2024-04-02 NOTE — DISCHARGE NOTE PROVIDER - NSDCFUSCHEDAPPT_GEN_ALL_CORE_FT
Maco Nova  [facility] Physician [facility]  [address] [facility]  Scheduled Appointment: 04/10/2024    Gaurang Rodriguez  [facility] Physician [facility]  [address]  Scheduled Appointment: 05/01/2024     Vanesa Day  [facility]  PULMMED [address]  Scheduled Appointment: 04/08/2024    Maco Nova  [facility]  [address]ULED [address] [facility]  Scheduled Appointment: 04/10/2024    Gaurang Rodriguez  [facility]  [address] [facility]  Scheduled Appointment: 05/01/2024     Maco Nova  [facility] Physician [facility]  [address] [facility]  Scheduled Appointment: 04/10/2024    Gaurang Rodriguez  [facility] Physician [facility]  [address] [facility]  Scheduled Appointment: 05/01/2024

## 2024-04-02 NOTE — H&P ADULT - TIME BILLING
Review of patient records, including history, laboratory data, and imaging. Patient evaluation and assessment. Coordination of care. Time excludes teaching or procedures.

## 2024-04-02 NOTE — DISCHARGE NOTE PROVIDER - HOSPITAL COURSE
64F chronic vent dependent and PEG s/p DL, stomaplasty, esophageal dilation 4/1. CXR without perforation. Patient VS  monitored and trended. Patient ordered for SLP evaluation       64 years old Female with chronic vent dependent and PEG s/p DL, stomaplasty, esophageal dilation 4/1. CXR without perforation, patient started on CLD. Patient VS  monitored and trended. Patient (passed/failed) bedside swallow evaluation on 4/2/2024. Patient is advanced to ...... Patient was cleared for discharge home by Dr. Rodriguez on 4/2/2024. All prescriptions were sent to a pharmacy that was agreed on with the patient.       63 YO F with PMHx of HTN, COPD and chronic respiratory failure, s/p tracheostomy with ventilator dependence (2004) complicated by tracheomalacia and tracheal stenosis, s/p tracheostomy replacement and bronchoscopy for stomaplasty (2022), pneumothorax, dysphagia s/p PEG, atypical chest pain on nitro PRN (last few weeks only per patient), and recent visit to [place] ER 2 weeks prior to admission for dislodged PEG s/p replacement, bronchiectasis, and recent pneumonia s/p abx 3 weeks ago. Patient now presented for scheduled trach exchange (6 CUFFED SHILEY) and esophageal dilation. Admitted for SLP eval per ENT request and SS recommended PEG as primary source of nutrition with allowance of puree and thin liquids by mouth. Course complicated by chest pain, however ECG with NSR with RBBB as prior and Irma negative. Per discussion with patient she had a cardiac work up performed prior to surgery with no acute findings. Course further complicated by PEG leak from medicine port when tube feeds are given with piston syringe. Per patient, she initially had a 22F PEG, however when she went to [place] ER an 18F PEG was used to replace clogged PEG. Discussed with IR for possible upsize back to baseline, however IR recommended reglan, senna, miralax and simethicone to aid in possible gastroparesis and follow up with outpatient IR team. 63 YO F with PMHx of HTN, COPD and chronic respiratory failure, s/p tracheostomy with ventilator dependence (2004) complicated by tracheomalacia and tracheal stenosis, s/p tracheostomy replacement and bronchoscopy for stomaplasty (2022), pneumothorax, dysphagia s/p PEG, atypical chest pain on nitro PRN (last few weeks only per patient), and recent visit to [place] ER 2 weeks prior to admission for dislodged PEG s/p replacement, bronchiectasis, and recent pneumonia s/p abx 3 weeks ago. Patient now presented for scheduled trach exchange (6 CUFFED SHILEY) and esophageal dilation. Admitted for SLP eval per ENT request and SS recommended PEG as primary source of nutrition with allowance of puree and thin liquids by mouth. Course complicated by chest pain, however ECG with NSR with RBBB as prior and Irma negative. Per discussion with patient she had a cardiac work up performed prior to surgery with no acute findings. Course further complicated by PEG leak from medicine port when tube feeds are given with piston syringe. Per patient, she initially had a 22F PEG, however when she went to [place] ER an 18F PEG was used to replace clogged PEG. Discussed with IR for possible upsize back to baseline, however IR recommended reglan, senna, miralax and simethicone to aid in possible gastroparesis and follow up with outpatient IR team.     Case discussed with Dr Rahman and patient is medically cleared and stable for discharge

## 2024-04-02 NOTE — DISCHARGE NOTE PROVIDER - NSDCMRMEDTOKEN_GEN_ALL_CORE_FT
albuterol 2.5 mg/0.5 mL (0.5%) inhalation solution: 0.5 milliliter(s) by nebulizer  albuterol 2.5 mg/3 mL (0.083%) inhalation solution: 3 milliliter(s) by nebulizer every 3 to 5 hours as needed for  bronchospasm  albuterol 90 mcg/inh inhalation aerosol: 2 puff(s) inhaled 4 times a day, As Needed  aspirin 81 mg oral tablet, chewable: 1 tab(s) orally once a day Last dose 3/26/24  clonazePAM 0.5 mg oral tablet, disintegratin tab(s) orally every 8 hours, As Needed -anxiety - for agitation MDD:3 tablets  dapagliflozin 10 mg oral tablet: 1 tab(s) orally once a day  nitroglycerin 0.4 mg sublingual tablet: 1 tab(s) sublingually as needed for  chest pain   albuterol 2.5 mg/0.5 mL (0.5%) inhalation solution: 0.5 milliliter(s) by nebulizer every 4 hours As needed  albuterol 2.5 mg/3 mL (0.083%) inhalation solution: 3 milliliter(s) by nebulizer every 3 to 5 hours as needed for  bronchospasm  albuterol 90 mcg/inh inhalation aerosol: 2 puff(s) inhaled 4 times a day, As Needed  aspirin 81 mg oral tablet, chewable: 1 tab(s) orally once a day Last dose 3/26/24  clonazePAM 0.5 mg oral tablet, disintegratin tab(s) orally every 8 hours, As Needed -anxiety - for agitation MDD:3 tablets  dapagliflozin 10 mg oral tablet: 1 tab(s) orally once a day  nitroglycerin 0.4 mg sublingual tablet: 1 tab(s) sublingually once a day as needed for  chest pain   albuterol 2.5 mg/0.5 mL (0.5%) inhalation solution: 0.5 milliliter(s) by nebulizer every 4 hours As needed  albuterol 2.5 mg/3 mL (0.083%) inhalation solution: 3 milliliter(s) by nebulizer every 3 to 5 hours as needed for  bronchospasm  aspirin 81 mg oral tablet, chewable: 1 tab(s) orally once a day Last dose 3/26/24  clonazePAM 0.5 mg oral tablet, disintegratin tab(s) orally every 8 hours, As Needed -anxiety - for agitation MDD:3 tablets  dapagliflozin 10 mg oral tablet: 1 tab(s) orally once a day  nitroglycerin 0.4 mg sublingual tablet: 1 tab(s) sublingually once a day as needed for  chest pain   albuterol 2.5 mg/0.5 mL (0.5%) inhalation solution: 0.5 milliliter(s) by nebulizer every 4 hours As needed  aspirin 81 mg oral tablet, chewable: 1 tab(s) by gastrostomy tube once a day  clonazePAM 0.5 mg oral tablet, disintegratin tab(s) orally every 8 hours, As Needed -anxiety - for agitation MDD:3 tablets  dapagliflozin 10 mg oral tablet: 1 tab(s) by gastrostomy tube once a day  nitroglycerin 0.4 mg sublingual tablet: 1 tab(s) sublingually once a day as needed for  chest pain  simethicone 40 mg/0.6 mL oral liquid: 1.2 milliliter(s) by gastrostomy tube once a day (at bedtime)

## 2024-04-02 NOTE — CHART NOTE - NSCHARTNOTEFT_GEN_A_CORE
RCU ACCEPTANCE NOTE    ** INCOMPLETE NOTE ** RCU ACCEPTANCE NOTE    Called by SICU to evaluate patient for RCU admission. In short this is           65 yo F w/ PMHx of HTN, COPD, chronic respiratory failure, s/p tracheostomy on ventilator (2004), tracheomalacia, tracheal stenosis, s/p trach replacement, s/p bronchoscopy for stomaplasty (2022), pneumothorax, dysphagia s/p PEG, atypical chest pain on Nitro prn (last few weeks only per patient), recent visit to [place] ER 2 weeks ago for dislodged PEG s/p replacement, bronchiectasis, recent pneumonia s/p abx 3 weeks ago. Presented for scheduled trach exchange and esophageal dilation. Pending SLP eval per ENT request. Admitted to RCU pending SLP.       PLAN  NEURO  - AO x3, mouths words and can communicate via writing  - Pain control: Tylenol, Oxy PRN    RESP  - Hx of COPD, tracheomalacia/tracheal stenosis with chronic resp failure requiring MV  - Per pt she remains on CPAP ATC at home and at times has been able to come off the vent  - C/w CPAP 16/5/30  - Will place back on AC Mode ventilation overnight for now  - Blood gas on admission with no e/o hypercapnea nor acidosis  - Will RPT blood gas 4/2  - Albuterol PRN     CV  - Reporting chest pain for the past few weeks and reportedly underwent cardiac workup 2 weeks ago including CT coronaries which pt states was normal however was told her heart was "thick and stiff"  - EKG (4/2/24) showing NSR with RBBB  - EKG (dating 1/21/2021) also with RBBB seen   - Episodes of hypotension on 4/2 while in PACU  - Can trial IVF if needed to assess response  - Follows with Cardiologist Dr. Ange Bryan ([facility])    GI/NUTRITION  - Hx dysphagia in s/o MV requiring PEG tube  - Reportedly had dislodged PEG and had it replaced 2 weeks ago at [facility]- pt reports tube was switched from 22F to 18F  - Per patient reports continued pain at PEG site & reports food backs up and leaks out of tube at home with feeds so will consult IR to evaluate  - PEG tube in place but reported has eaten pureed food despite MV at home  - Per ENT requesting SLP eval to determine eligibility for PO Diet despite MV   - C/w PEG TF for now pending SLP tomorrow    RENAL/  - No acute issues  - Voiding spontaneously   - Monitor BUN/Cr and Uo    HEME  - DVT ppx - SQH  - Continue home ASA81    ID  - Afebrile, no leukocytosis. Monitor off abx     ENDO  - Monitor blood glucose   - Holding home Dapagliflozin  - FSBG and ISS Q6H for now while on TF       DISPO: Pt reports she lives at home with 24H nursing services and her  in [place]. Plan for DC back home when able   CODE: FULL RCU CONSULT NOTE    Called by SICU to evaluate patient for RCU admission.     In short this is 64F w/ PMHx of HTN, COPD, bronchiectasis, chronic respiratory failure, s/p tracheostomy on ventilator (2004), tracheomalacia, tracheal stenosis, s/p trach replacement, s/p bronchoscopy for stomaplasty (2022), pneumothorax, dysphagia s/p PEG, atypical chest pain on Nitro prn (last few weeks only per patient), recent visit to [place] ER 2 weeks ago for dislodged PEG s/p replacement, recent treatment for PNA 3 weeks ago, who presented for routine tracheostomy tube exchange and underwent stomaplasty with trach tube exchange and esophageal dilation (4/1) with ENT. Postop course uneventful. ENT requesting SLP eval . Patient evaluated by SLP on 4/2 however was noted to be lethargic reportedly due lack of sleep the night before and thus SLP eval was deferred.     I evaluated patient at bedside this afternoon. Sleeping comfortably but roused to verbal and tactile stimuli. Awake and oriented within a few minutes and able to have a conversation with me (via writing on paper). Discussed with SLP Team, however unable to assess patient again today and will expedite for tomorrow. SICU requesting RCU admission while awaiting SLP formal eval.     Patient assessed at bedside today. Accepted to RCU pending bed availability.

## 2024-04-02 NOTE — SWALLOW BEDSIDE ASSESSMENT ADULT - COMMENTS
"64F PMH HTN, COPD, Chronic respiratory failure, s/p tracheostomy, on Ventilator (), tracheomalacia, tracheal stenosis, s/p trache replaced 3/1/2024 by ENT, s/p bronchoscopy for stomaplasty (), Pneumothorax (s/p Chest tube, ), Dysphagia s/p Peg (3 years ago), Atypical chest pain on Nitro prn, recent visit to [place] ER 2 weeks ago for dislodged peg s/p replacement, Bronchiectasis, recent Pneumonia s/p abx 3 weeks ago, now s/p trach change w/ esophageal dilation."    Imagin/1 CXR: Tracheostomy tube. No pneumomediastinum or pneumothorax post esophageal dilation. The heart size is normal. No focal consolidations. Chronic right upper lung scarring loss of volume in this lobe.

## 2024-04-02 NOTE — H&P ADULT - ASSESSMENT
63 yo F w/ PMHx of HTN, COPD, chronic respiratory failure, s/p tracheostomy on ventilator (2004), tracheomalacia, tracheal stenosis, s/p trach replacement, s/p bronchoscopy for stomaplasty (2022), pneumothorax, dysphagia s/p PEG, atypical chest pain on Nitro prn (last few weeks only per patient), recent visit to [place] ER 2 weeks ago for dislodged PEG s/p replacement, bronchiectasis, recent pneumonia s/p abx 3 weeks ago. Presented for scheduled trach exchange and esophageal dilation. Pending SLP eval per ENT request. Admitted to RCU pending SLP.     PLAN:  NEURO  - AO x3, mouths words and can communicate via writing  - Pain control: Tylenol, Oxy PRN    ENT  - Hx of COPD, tracheomalacia/tracheal stenosis with chronic resp failure requiring MV  - S/P DL, stomaplasty with tracheostomy tube exchange, and esophageal dilation with ENT (4/1)  - C/w trach care daily    RESP  - Hx of COPD, tracheomalacia/tracheal stenosis with chronic resp failure requiring MV  - Per pt she remains on CPAP ATC at home and at times has been able to come off the vent  - C/w CPAP 16/5/30  - Will place back on AC Mode ventilation overnight for now  - Blood gas on admission with no e/o hypercapnea nor acidosis  - Will RPT blood gas 4/2  - Airway clearance with Albuterol and Chest PT    CV  - Reporting chest pain for the past few weeks and reportedly underwent cardiac workup 2 weeks ago including CT coronaries which pt states was normal however was told her heart was "thick and stiff"  - Intermittent complaints of chest pain here and Trop NEG in house   - EKG (4/2/24) showing NSR with RBBB  - EKG (dating 1/21/2021) also with RBBB seen   - Episodes of hypotension on 4/2 while in PACU  - Can trial IVF if needed to assess response  - Follows with Cardiologist Dr. Ange Bryan ([facility])  - Ordered for SL NTG PRN chest pain   - Attempt to obtain/review prior cardiac w/u tomorrow     GI/NUTRITION  - Hx dysphagia in s/o MV requiring PEG tube  - Reportedly had dislodged PEG and had it replaced 2 weeks ago at [facility]- pt reports tube was switched from 22F to 18F  - Per patient reports continued pain at PEG site & reports food backs up and leaks out of tube at home with feeds so will consult IR to evaluate  - PEG tube in place but reported has eaten pureed food despite MV at home  - Per ENT requesting SLP eval to determine eligibility for PO Diet despite MV   - C/w PEG TF for now pending SLP tomorrow    RENAL/  - No acute issues  - Voiding spontaneously   - Monitor BUN/Cr and Uo    HEME  - DVT ppx - SQH  - Continue home [id number]    ID  - Afebrile, no leukocytosis. Monitor off abx     ENDO  - Monitor blood glucose   - Holding home Dapagliflozin  - FSBG and ISS Q6H for now while on TF       DISPO: Pt reports she lives at home with 24H nursing services and her  in [place]. Plan for DC back home when able   CODE: FULL

## 2024-04-02 NOTE — CHART NOTE - NSCHARTNOTEFT_GEN_A_CORE
Spoke with RCA PA this AM to request that Ms. Bo be transferred to the RCU. The patient was accepted to SICU POD0 s/p scheduled trach change and esophogeal dilation. SICU was consulted for trach and vent management post-op, with plan for d/c in AM. Patient remained in PACU overnight. Per ENT, patient needs a speech and swallow evaluation prior to discharge, and was too fatigued to fully participate this AM. I requested that the patient be transferred to RCU as she has no critical care needs at this time, and cannot be listed for a floor bed due to need to vent management. Patient is reliant on a ventilator at baseline. I was told that the RCU PA would discuss the case with the team and call us back.

## 2024-04-02 NOTE — DISCHARGE NOTE PROVIDER - NSDCCPCAREPLAN_GEN_ALL_CORE_FT
PRINCIPAL DISCHARGE DIAGNOSIS  Diagnosis: Malfunction of tracheostomy stoma  Assessment and Plan of Treatment: Follow up outpatient     PRINCIPAL DISCHARGE DIAGNOSIS  Diagnosis: Malfunction of tracheostomy stoma  Assessment and Plan of Treatment: - You presented for a routine tracheostomy exchange and procedure performed by ENT on 4/1 with tracheostomy replaced with 6UN75R and s/p esophageal dilation.   - Please follow up with ENT outpatient      SECONDARY DISCHARGE DIAGNOSES  Diagnosis: Dysphagia  Assessment and Plan of Treatment: - You were noted with recent PEG issue at [facility] and required a replacement of PEG in ER. You noted complaints of leakage of tube feed content from medicine port, however per discussion with ENT plan for replacement/ upsize of PEG back to 22F with your outpatient IR team.     PRINCIPAL DISCHARGE DIAGNOSIS  Diagnosis: Malfunction of tracheostomy stoma  Assessment and Plan of Treatment: - You presented for a routine tracheostomy exchange and procedure performed by ENT on 4/1 with tracheostomy replaced with 6UN75R and s/p esophageal dilation.   - Please follow up with ENT outpatient  - Please follow up with the pulmonary home team within 24-48 hours post discharge via telehealth video call. The office was emailed for a follow up visit and will reach out to you via text or email.   - Post telehealth visit you will follow up routinely with your pulmonologist, Dr Nova.      SECONDARY DISCHARGE DIAGNOSES  Diagnosis: Dysphagia  Assessment and Plan of Treatment: - You were noted with recent PEG issue at [facility] and required a replacement of PEG in ER. You noted complaints of leakage of tube feed content from medicine port, however per discussion with ENT plan for replacement/ upsize of PEG back to 22F with your outpatient IR team.  - For nutrition you are recommended to continue on Puree and thin liquids by mouth with PEG tube feeds, [employer] 1.5 bolus feeds 229cc every 6 hours.  - Please follow up with your medical provider post discharge.    Diagnosis: Tracheostomy dependence  Assessment and Plan of Treatment: - Continue with vent dependence with RR 12, , PEEP 5 and FIO2 40. Follow up with your pulmonary or primary care physician.     PRINCIPAL DISCHARGE DIAGNOSIS  Diagnosis: Malfunction of tracheostomy stoma  Assessment and Plan of Treatment: - You presented for a routine tracheostomy exchange and procedure performed by ENT on 4/1 with tracheostomy replaced with 6UN75R and s/p esophageal dilation.   - Please follow up with ENT outpatient  - Please follow up with the pulmonary home team within 24-48 hours post discharge via telehealth video call. The office was emailed for a follow up visit and will reach out to you via text or email.   - Post telehealth visit you will follow up routinely with your pulmonologist, Dr Nova.      SECONDARY DISCHARGE DIAGNOSES  Diagnosis: Dysphagia  Assessment and Plan of Treatment: - You were noted with recent PEG issue at [facility] and required a replacement of PEG in ER. You noted complaints of leakage of tube feed content from medicine port, however per discussion with ENT plan for replacement/ upsize of PEG back to 22F with your outpatient IR team.  - For nutrition you are recommended to continue on Puree and thin liquids by mouth with PEG tube feeds, [employer] 1.5 bolus feeds 229cc every 6 hours.  - Please follow up with your medical provider post discharge.    Diagnosis: Tracheostomy dependence  Assessment and Plan of Treatment: - Continue with vent dependence with previous home vent settings. Follow up with your pulmonary or primary care physician.     PRINCIPAL DISCHARGE DIAGNOSIS  Diagnosis: Malfunction of tracheostomy stoma  Assessment and Plan of Treatment: - You presented for a routine tracheostomy exchange and procedure performed by ENT on 4/1 with tracheostomy replaced with 6UN75R and s/p esophageal dilation.   - Please follow up with ENT outpatient  - Please follow up with the pulmonary home team within 24-48 hours post discharge via telehealth video call. The office was emailed for a follow up visit and will reach out to you via text or email.   - Post telehealth visit you will follow up routinely with your pulmonologist, Dr Nova.      SECONDARY DISCHARGE DIAGNOSES  Diagnosis: Dysphagia  Assessment and Plan of Treatment: - You were noted with recent PEG issue at [facility] and required a replacement of PEG in ER. You noted complaints of leakage of tube feed content from medicine port, however per discussion with ENT plan for replacement/ upsize of PEG back to 22F with your outpatient IR team.  - For nutrition you are recommended to continue on Puree and thin liquids by mouth with PEG tube feeds, [employer] 1.5 bolus feeds 229cc every 6 hours.  - Please follow up with your medical provider post discharge.    Diagnosis: Tracheostomy dependence  Assessment and Plan of Treatment: - Continue with vent dependence with previous home vent settings (SIMV PC RR 12, TOTAL PIP 20, PS 18, PEEP 5, and O2 3L bleed). Follow up with your pulmonary or primary care physician.

## 2024-04-03 LAB
A1C WITH ESTIMATED AVERAGE GLUCOSE RESULT: 5 % — SIGNIFICANT CHANGE UP (ref 4–5.6)
ADD ON TEST-SPECIMEN IN LAB: SIGNIFICANT CHANGE UP
ANION GAP SERPL CALC-SCNC: 10 MMOL/L — SIGNIFICANT CHANGE UP (ref 7–14)
BASOPHILS # BLD AUTO: 0.1 K/UL — SIGNIFICANT CHANGE UP (ref 0–0.2)
BASOPHILS NFR BLD AUTO: 1.2 % — SIGNIFICANT CHANGE UP (ref 0–2)
BUN SERPL-MCNC: 17 MG/DL — SIGNIFICANT CHANGE UP (ref 7–23)
CALCIUM SERPL-MCNC: 9.3 MG/DL — SIGNIFICANT CHANGE UP (ref 8.4–10.5)
CHLORIDE SERPL-SCNC: 106 MMOL/L — SIGNIFICANT CHANGE UP (ref 98–107)
CO2 SERPL-SCNC: 28 MMOL/L — SIGNIFICANT CHANGE UP (ref 22–31)
CREAT SERPL-MCNC: 0.79 MG/DL — SIGNIFICANT CHANGE UP (ref 0.5–1.3)
EGFR: 83 ML/MIN/1.73M2 — SIGNIFICANT CHANGE UP
EOSINOPHIL # BLD AUTO: 0.17 K/UL — SIGNIFICANT CHANGE UP (ref 0–0.5)
EOSINOPHIL NFR BLD AUTO: 2 % — SIGNIFICANT CHANGE UP (ref 0–6)
ESTIMATED AVERAGE GLUCOSE: 97 — SIGNIFICANT CHANGE UP
GLUCOSE SERPL-MCNC: 83 MG/DL — SIGNIFICANT CHANGE UP (ref 70–99)
HCT VFR BLD CALC: 32.6 % — LOW (ref 34.5–45)
HGB BLD-MCNC: 10.2 G/DL — LOW (ref 11.5–15.5)
IANC: 5.14 K/UL — SIGNIFICANT CHANGE UP (ref 1.8–7.4)
IMM GRANULOCYTES NFR BLD AUTO: 0.2 % — SIGNIFICANT CHANGE UP (ref 0–0.9)
LYMPHOCYTES # BLD AUTO: 2.43 K/UL — SIGNIFICANT CHANGE UP (ref 1–3.3)
LYMPHOCYTES # BLD AUTO: 28.5 % — SIGNIFICANT CHANGE UP (ref 13–44)
MAGNESIUM SERPL-MCNC: 2.1 MG/DL — SIGNIFICANT CHANGE UP (ref 1.6–2.6)
MCHC RBC-ENTMCNC: 26.2 PG — LOW (ref 27–34)
MCHC RBC-ENTMCNC: 31.3 GM/DL — LOW (ref 32–36)
MCV RBC AUTO: 83.8 FL — SIGNIFICANT CHANGE UP (ref 80–100)
MONOCYTES # BLD AUTO: 0.67 K/UL — SIGNIFICANT CHANGE UP (ref 0–0.9)
MONOCYTES NFR BLD AUTO: 7.9 % — SIGNIFICANT CHANGE UP (ref 2–14)
NEUTROPHILS # BLD AUTO: 5.14 K/UL — SIGNIFICANT CHANGE UP (ref 1.8–7.4)
NEUTROPHILS NFR BLD AUTO: 60.2 % — SIGNIFICANT CHANGE UP (ref 43–77)
NRBC # BLD: 0 /100 WBCS — SIGNIFICANT CHANGE UP (ref 0–0)
NRBC # FLD: 0 K/UL — SIGNIFICANT CHANGE UP (ref 0–0)
PHOSPHATE SERPL-MCNC: 2.8 MG/DL — SIGNIFICANT CHANGE UP (ref 2.5–4.5)
PLATELET # BLD AUTO: 407 K/UL — HIGH (ref 150–400)
POTASSIUM SERPL-MCNC: 4.3 MMOL/L — SIGNIFICANT CHANGE UP (ref 3.5–5.3)
POTASSIUM SERPL-SCNC: 4.3 MMOL/L — SIGNIFICANT CHANGE UP (ref 3.5–5.3)
RBC # BLD: 3.89 M/UL — SIGNIFICANT CHANGE UP (ref 3.8–5.2)
RBC # FLD: 15.4 % — HIGH (ref 10.3–14.5)
SODIUM SERPL-SCNC: 144 MMOL/L — SIGNIFICANT CHANGE UP (ref 135–145)
WBC # BLD: 8.53 K/UL — SIGNIFICANT CHANGE UP (ref 3.8–10.5)
WBC # FLD AUTO: 8.53 K/UL — SIGNIFICANT CHANGE UP (ref 3.8–10.5)

## 2024-04-03 PROCEDURE — 93010 ELECTROCARDIOGRAM REPORT: CPT

## 2024-04-03 PROCEDURE — 99233 SBSQ HOSP IP/OBS HIGH 50: CPT

## 2024-04-03 RX ORDER — POLYETHYLENE GLYCOL 3350 17 G/17G
17 POWDER, FOR SOLUTION ORAL DAILY
Refills: 0 | Status: DISCONTINUED | OUTPATIENT
Start: 2024-04-03 | End: 2024-04-05

## 2024-04-03 RX ORDER — SENNA PLUS 8.6 MG/1
10 TABLET ORAL AT BEDTIME
Refills: 0 | Status: DISCONTINUED | OUTPATIENT
Start: 2024-04-03 | End: 2024-04-05

## 2024-04-03 RX ORDER — METOCLOPRAMIDE HCL 10 MG
10 TABLET ORAL EVERY 8 HOURS
Refills: 0 | Status: DISCONTINUED | OUTPATIENT
Start: 2024-04-03 | End: 2024-04-05

## 2024-04-03 RX ORDER — CLONAZEPAM 1 MG
0.5 TABLET ORAL EVERY 8 HOURS
Refills: 0 | Status: DISCONTINUED | OUTPATIENT
Start: 2024-04-03 | End: 2024-04-04

## 2024-04-03 RX ADMIN — HEPARIN SODIUM 5000 UNIT(S): 5000 INJECTION INTRAVENOUS; SUBCUTANEOUS at 11:39

## 2024-04-03 RX ADMIN — CHLORHEXIDINE GLUCONATE 1 APPLICATION(S): 213 SOLUTION TOPICAL at 11:39

## 2024-04-03 RX ADMIN — SIMETHICONE 80 MILLIGRAM(S): 80 TABLET, CHEWABLE ORAL at 21:14

## 2024-04-03 RX ADMIN — HEPARIN SODIUM 5000 UNIT(S): 5000 INJECTION INTRAVENOUS; SUBCUTANEOUS at 21:13

## 2024-04-03 RX ADMIN — Medication 10 MILLIGRAM(S): at 13:09

## 2024-04-03 RX ADMIN — ALBUTEROL 2.5 MILLIGRAM(S): 90 AEROSOL, METERED ORAL at 18:22

## 2024-04-03 RX ADMIN — POLYETHYLENE GLYCOL 3350 17 GRAM(S): 17 POWDER, FOR SOLUTION ORAL at 11:39

## 2024-04-03 RX ADMIN — Medication 10 MILLIGRAM(S): at 21:13

## 2024-04-03 RX ADMIN — SENNA PLUS 10 MILLILITER(S): 8.6 TABLET ORAL at 21:13

## 2024-04-03 RX ADMIN — Medication 81 MILLIGRAM(S): at 11:39

## 2024-04-03 RX ADMIN — SIMETHICONE 80 MILLIGRAM(S): 80 TABLET, CHEWABLE ORAL at 00:06

## 2024-04-03 RX ADMIN — HEPARIN SODIUM 5000 UNIT(S): 5000 INJECTION INTRAVENOUS; SUBCUTANEOUS at 04:59

## 2024-04-03 NOTE — SWALLOW BEDSIDE ASSESSMENT ADULT - COMMENTS
Per charting, "65 yo F w/ PMHx of HTN, COPD, chronic respiratory failure, s/p tracheostomy on ventilator (2004), tracheomalacia, tracheal stenosis, s/p trach replacement, s/p bronchoscopy for stomaplasty (2022), pneumothorax, dysphagia s/p PEG, atypical chest pain on Nitro prn (last few weeks only per patient), recent visit to [place] [facility] 2 weeks ago for dislodged PEG s/p replacement, bronchiectasis, recent pneumonia s/p abx 3 weeks ago. Presented for scheduled trach exchange and esophageal dilation" Now s/p DL, stomaplasty, esophageal dilation 4/1    4/3/24: "IR consulted regarding leaking gastrostomy tube. Patient seen and evaluated at bedside. Patient reports g tube being dislodged around 2 weeks and was replaced at [facility] with a 18Fr G tube (previously had a 22Fr). G tube flushed with NS without resistance, however there is a constant leakage of feeds out of the tube. No leaking noted at access site. - Recommend obtaining GI evaluation for gastroparesis/gastric dysmotility - If it is deemed that a gastrojejunostomy tube is necessary, reconsult IR for G to GJ conversion"    Pt is familiar to this service on an outpatient basis, as well a from initial attempt to see pt at bedside yesterday (4/2/24). Despite leaking g-tube and trach/vent dependency, this service requested by ENT to perform bedside swallow evaluation inclusive of puree and liquid trials this AM. Patient was awake and alert. 6CN75R trach in place, cuff partially inflated. Vent settings: PEEP 5, PIP 21, FiO2 30%. Cuff fully deflated by PA prior to presentation of PO trials. Vocal quality strong but strained/strangled, likely compensatory in nature. Vital signs stable.

## 2024-04-03 NOTE — CHART NOTE - NSCHARTNOTEFT_GEN_A_CORE
63 yo F w/ PMHx of HTN, COPD, chronic respiratory failure, s/p tracheostomy on ventilator (2004), tracheomalacia, tracheal stenosis, s/p trach replacement, s/p bronchoscopy for stomaplasty (2022), pneumothorax, dysphagia s/p PEG, atypical chest pain on Nitro prn (last few weeks only per patient), recent visit to [place] ER 2 weeks ago for dislodged PEG s/p replacement, bronchiectasis, recent pneumonia s/p abx 3 weeks ago. Presented for scheduled trach exchange and esophageal dilation.    IR consulted regarding leaking gastrostomy tube. Patient seen and evaluated at bedside. Patient reports g tube being dislodged around 2 weeks and was replaced at [facility] with a 18Fr G tube (previously had a 22Fr). G tube flushed with NS without resistance, however there is a constant leakage of feeds out of the tube. No leaking noted at access site.     - Recommend obtaining GI evaluation for gastroparesis/gastric dysmotility  - If it is deemed that a gastrojejunostomy tube is necessary, reconsult IR for G to GJ conversion     [id number] IR PA

## 2024-04-03 NOTE — PROGRESS NOTE ADULT - SUBJECTIVE AND OBJECTIVE BOX
ENT Progress Note    Interval:        PAST MEDICAL & SURGICAL HISTORY:  Tracheostomy dependent      COPD (chronic obstructive pulmonary disease)      Hypertension      Sickle cell trait      Anxiety disorder      Pancreatic cyst      Tracheomalacia      Lupus anticoagulant syndrome      Anemia      H/O pneumothorax      Anxiety and depression      Ventilator dependent      Pneumonia      H/O chronic respiratory failure      Dysphagia      H/O bronchiectasis      Atypical chest pain      Malfunction of tracheostomy stoma      Dependence on tracheostomy      Acute tracheostomy management      Tracheal stenosis  excision of tracheal stenosis 10/2017  revision of tracheal stoma 3/2018      S/P         PEG (percutaneous endoscopic gastrostomy) status      H/O colonoscopy      H/O endoscopy        Allergies    -latex- hives (Other)  latex (Unknown)  Cipro (Hives)  theophyilline (Hives)    Intolerances      MEDICATIONS  (STANDING):  aspirin  chewable 81 milliGRAM(s) Oral daily  chlorhexidine 2% Cloths 1 Application(s) Topical daily  heparin   Injectable 5000 Unit(s) SubCutaneous every 8 hours  simethicone drops 80 milliGRAM(s) Oral at bedtime    MEDICATIONS  (PRN):  acetaminophen   Oral Liquid .. 650 milliGRAM(s) Oral every 6 hours PRN Mild Pain (1 - 3)  albuterol   0.5% 2.5 milliGRAM(s) Nebulizer every 4 hours PRN Bronchospasm  nitroglycerin     SubLingual 0.4 milliGRAM(s) SubLingual every 5 minutes PRN Chest Pain  oxyCODONE    Solution 5 milliGRAM(s) Oral every 6 hours PRN Severe Pain (7 - 10)  oxyCODONE    Solution 2.5 milliGRAM(s) Oral every 6 hours PRN Moderate Pain (4 - 6)        Vital Signs Last 24 Hrs  T(C): 36.7 (2024 04:00), Max: 36.8 (2024 00:00)  T(F): 98.1 (2024 04:00), Max: 98.2 (2024 00:00)  HR: 62 (2024 04:00) (55 - 72)  BP: 100/64 (2024 04:00) (79/45 - 109/68)  BP(mean): 77 (2024 04:00) (51 - 84)  RR: 15 (2024 04:00) (10 - 17)  SpO2: 100% (2024 04:00) (95% - 100%)    Parameters below as of 2024 04:00  Patient On (Oxygen Delivery Method): BiPAP/CPAP        Physical Exam:  Alert, NAD  Neck: 6CN75R in place  Nonlabored Respirations RA  MONY                              10.2   8.53  )-----------( 407      ( 2024 05:52 )             32.6    04-02    139  |  103  |  11  ----------------------------<  307<H>  4.3   |  24  |  0.75    Ca    9.1      2024 06:00  Phos  4.5     04-02  Mg     1.90     04-02

## 2024-04-03 NOTE — SWALLOW BEDSIDE ASSESSMENT ADULT - SWALLOW EVAL: DIAGNOSIS
Today's clinical evaluation reveals functional oral and pharyngeal stages of swallow to safely initiate oral intake of puree and thin liquids. Oral stage is characterized by functional oral prep and transit with adequate oral clearance. Pharyngeal trigger appears timely with palpable hyolaryngeal elevation/excursion. No overt s/s aspiration, remarkable oral residue or complaint of pharyngeal hang-up across both consistencies. Vital signs stable. Today's clinical evaluation reveals functional oral stage and suspected pharyngeal dysphagia in setting of complicated pulmonary history and trach/vent dependency. Oral stage is characterized by functional oral prep and transit with adequate oral clearance across puree and thin liquid trials. Pharyngeal trigger appears timely with reduced hyolaryngeal elevation/excursion upon subjective palpation. No overt s/s aspiration, remarkable oral residue or complaint of pharyngeal hang-up noted across both consistencies. Vital signs stable.

## 2024-04-03 NOTE — PROGRESS NOTE ADULT - ASSESSMENT
A/P: 64yFemale s/p DL, stomaplasty, esophageal dilation 4/1  - SLP this AM  - Plan for discharge home

## 2024-04-03 NOTE — SWALLOW BEDSIDE ASSESSMENT ADULT - SWALLOW EVAL: RECOMMENDED DIET
Continue PEG for primary means of nutrition/hydration. Allow oral supplementation of puree textures and thin liquids, as tolerated.

## 2024-04-03 NOTE — PROGRESS NOTE ADULT - SUBJECTIVE AND OBJECTIVE BOX
CHIEF COMPLAINT:Patient is a 64y old  Female who presents with a chief complaint of MALFUNCTION OF TRACHEOSTOMY STOMA (02 Apr 2024 04:21)      INTERVAL EVENTS:     ROS: Seen by bedside during AM rounds     OBJECTIVE:  ICU Vital Signs Last 24 Hrs  T(C): 36.7 (03 Apr 2024 04:00), Max: 36.8 (03 Apr 2024 00:00)  T(F): 98.1 (03 Apr 2024 04:00), Max: 98.2 (03 Apr 2024 00:00)  HR: 62 (03 Apr 2024 04:00) (55 - 72)  BP: 100/64 (03 Apr 2024 04:00) (79/45 - 109/68)  BP(mean): 77 (03 Apr 2024 04:00) (51 - 84)  ABP: --  ABP(mean): --  RR: 15 (03 Apr 2024 04:00) (10 - 17)  SpO2: 100% (03 Apr 2024 04:00) (95% - 100%)    O2 Parameters below as of 03 Apr 2024 04:00  Patient On (Oxygen Delivery Method): BiPAP/CPAP          Mode: CPAP with PS, FiO2: 30, PEEP: 5, PS: 16, MAP: 10, PIP: 20    CAPILLARY BLOOD GLUCOSE  117 (02 Apr 2024 13:00)      POCT Blood Glucose.: 117 mg/dL (02 Apr 2024 13:28)      PHYSICAL EXAM:  General:   HEENT:   Lymph Nodes:  Neck:   Respiratory:   Cardiovascular:   Abdomen:   Extremities:   Skin:   Neurological:  Psychiatry:    Mode: CPAP with PS  FiO2: 30  PEEP: 5  PS: 16  MAP: 10  PIP: 20      HOSPITAL MEDICATIONS:  MEDICATIONS  (STANDING):  aspirin  chewable 81 milliGRAM(s) Oral daily  chlorhexidine 2% Cloths 1 Application(s) Topical daily  heparin   Injectable 5000 Unit(s) SubCutaneous every 8 hours  simethicone drops 80 milliGRAM(s) Oral at bedtime    MEDICATIONS  (PRN):  acetaminophen   Oral Liquid .. 650 milliGRAM(s) Oral every 6 hours PRN Mild Pain (1 - 3)  albuterol   0.5% 2.5 milliGRAM(s) Nebulizer every 4 hours PRN Bronchospasm  nitroglycerin     SubLingual 0.4 milliGRAM(s) SubLingual every 5 minutes PRN Chest Pain  oxyCODONE    Solution 2.5 milliGRAM(s) Oral every 6 hours PRN Moderate Pain (4 - 6)  oxyCODONE    Solution 5 milliGRAM(s) Oral every 6 hours PRN Severe Pain (7 - 10)      LABS:                        10.2   8.53  )-----------( 407      ( 03 Apr 2024 05:52 )             32.6     04-02    139  |  103  |  11  ----------------------------<  307<H>  4.3   |  24  |  0.75    Ca    9.1      02 Apr 2024 06:00  Phos  4.5     04-02  Mg     1.90     04-02        Urinalysis Basic - ( 02 Apr 2024 06:00 )    Color: x / Appearance: x / SG: x / pH: x  Gluc: 307 mg/dL / Ketone: x  / Bili: x / Urobili: x   Blood: x / Protein: x / Nitrite: x   Leuk Esterase: x / RBC: x / WBC x   Sq Epi: x / Non Sq Epi: x / Bacteria: x      Arterial Blood Gas:  04-01 @ 20:00  7.35/48/102/26/98.8/0.5  ABG lactate: --     CHIEF COMPLAINT:Patient is a 64y old  Female who presents with a chief complaint of MALFUNCTION OF TRACHEOSTOMY STOMA (02 Apr 2024 04:21)      INTERVAL EVENTS:     ROS: Seen by bedside during AM rounds     OBJECTIVE:  ICU Vital Signs Last 24 Hrs  T(C): 36.7 (03 Apr 2024 04:00), Max: 36.8 (03 Apr 2024 00:00)  T(F): 98.1 (03 Apr 2024 04:00), Max: 98.2 (03 Apr 2024 00:00)  HR: 62 (03 Apr 2024 04:00) (55 - 72)  BP: 100/64 (03 Apr 2024 04:00) (79/45 - 109/68)  BP(mean): 77 (03 Apr 2024 04:00) (51 - 84)  ABP: --  ABP(mean): --  RR: 15 (03 Apr 2024 04:00) (10 - 17)  SpO2: 100% (03 Apr 2024 04:00) (95% - 100%)    O2 Parameters below as of 03 Apr 2024 04:00  Patient On (Oxygen Delivery Method): BiPAP/CPAP          Mode: CPAP with PS, FiO2: 30, PEEP: 5, PS: 16, MAP: 10, PIP: 20    CAPILLARY BLOOD GLUCOSE  117 (02 Apr 2024 13:00)      POCT Blood Glucose.: 117 mg/dL (02 Apr 2024 13:28)      PHYSICAL EXAM:  General: NAD   Neck: (+) Trach tube noted, site c/d/i.  Cards: S1/S2, no murmurs   Pulm: CTA bilaterally. No wheezes.   Abdomen: Soft, Nondistended. (+) PEG noted. Mild generalized TTP worse around PEG site.   Extremities: No pedal edema. Extremities warm to touch. Intact distal pulses.  Neurology: Sleeping but rouses to verbal stimuli. Oriented x3. 5/5 motor strength x4E. Follows basic commands. Communicates verbally (poor phonation) and written word.   Skin: warm to touch, color appropriate for ethnicity. Refer to RN assessment for further details.      Mode: CPAP with PS  FiO2: 30  PEEP: 5  PS: 16  MAP: 10  PIP: 20      HOSPITAL MEDICATIONS:  MEDICATIONS  (STANDING):  aspirin  chewable 81 milliGRAM(s) Oral daily  chlorhexidine 2% Cloths 1 Application(s) Topical daily  heparin   Injectable 5000 Unit(s) SubCutaneous every 8 hours  simethicone drops 80 milliGRAM(s) Oral at bedtime    MEDICATIONS  (PRN):  acetaminophen   Oral Liquid .. 650 milliGRAM(s) Oral every 6 hours PRN Mild Pain (1 - 3)  albuterol   0.5% 2.5 milliGRAM(s) Nebulizer every 4 hours PRN Bronchospasm  nitroglycerin     SubLingual 0.4 milliGRAM(s) SubLingual every 5 minutes PRN Chest Pain  oxyCODONE    Solution 2.5 milliGRAM(s) Oral every 6 hours PRN Moderate Pain (4 - 6)  oxyCODONE    Solution 5 milliGRAM(s) Oral every 6 hours PRN Severe Pain (7 - 10)      LABS:                        10.2   8.53  )-----------( 407      ( 03 Apr 2024 05:52 )             32.6     04-02    139  |  103  |  11  ----------------------------<  307<H>  4.3   |  24  |  0.75    Ca    9.1      02 Apr 2024 06:00  Phos  4.5     04-02  Mg     1.90     04-02        Urinalysis Basic - ( 02 Apr 2024 06:00 )    Color: x / Appearance: x / SG: x / pH: x  Gluc: 307 mg/dL / Ketone: x  / Bili: x / Urobili: x   Blood: x / Protein: x / Nitrite: x   Leuk Esterase: x / RBC: x / WBC x   Sq Epi: x / Non Sq Epi: x / Bacteria: x      Arterial Blood Gas:  04-01 @ 20:00  7.35/48/102/26/98.8/0.5  ABG lactate: --     CHIEF COMPLAINT:Patient is a 64y old  Female who presents with a chief complaint of MALFUNCTION OF TRACHEOSTOMY STOMA (02 Apr 2024 04:21)      INTERVAL EVENTS: no overnight events noted.     ROS: Seen by bedside during AM rounds     OBJECTIVE:  ICU Vital Signs Last 24 Hrs  T(C): 36.7 (03 Apr 2024 04:00), Max: 36.8 (03 Apr 2024 00:00)  T(F): 98.1 (03 Apr 2024 04:00), Max: 98.2 (03 Apr 2024 00:00)  HR: 62 (03 Apr 2024 04:00) (55 - 72)  BP: 100/64 (03 Apr 2024 04:00) (79/45 - 109/68)  BP(mean): 77 (03 Apr 2024 04:00) (51 - 84)  ABP: --  ABP(mean): --  RR: 15 (03 Apr 2024 04:00) (10 - 17)  SpO2: 100% (03 Apr 2024 04:00) (95% - 100%)    O2 Parameters below as of 03 Apr 2024 04:00  Patient On (Oxygen Delivery Method): BiPAP/CPAP          Mode: CPAP with PS, FiO2: 30, PEEP: 5, PS: 16, MAP: 10, PIP: 20    CAPILLARY BLOOD GLUCOSE  117 (02 Apr 2024 13:00)      POCT Blood Glucose.: 117 mg/dL (02 Apr 2024 13:28)      PHYSICAL EXAM:  General: NAD   Neck: (+) Trach tube noted, site c/d/i.  Cards: S1/S2, no murmurs   Pulm: CTA bilaterally. No wheezes.   Abdomen: Soft, Nondistended. (+) PEG noted. Mild generalized TTP worse around PEG site.   Extremities: No pedal edema. Extremities warm to touch. Intact distal pulses.  Neurology: Sleeping but rouses to verbal stimuli. Oriented x3. 5/5 motor strength x4E. Follows basic commands. Communicates verbally (poor phonation) and written word.   Skin: warm to touch, color appropriate for ethnicity. Refer to RN assessment for further details.      Mode: CPAP with PS  FiO2: 30  PEEP: 5  PS: 16  MAP: 10  PIP: 20      HOSPITAL MEDICATIONS:  MEDICATIONS  (STANDING):  aspirin  chewable 81 milliGRAM(s) Oral daily  chlorhexidine 2% Cloths 1 Application(s) Topical daily  heparin   Injectable 5000 Unit(s) SubCutaneous every 8 hours  simethicone drops 80 milliGRAM(s) Oral at bedtime    MEDICATIONS  (PRN):  acetaminophen   Oral Liquid .. 650 milliGRAM(s) Oral every 6 hours PRN Mild Pain (1 - 3)  albuterol   0.5% 2.5 milliGRAM(s) Nebulizer every 4 hours PRN Bronchospasm  nitroglycerin     SubLingual 0.4 milliGRAM(s) SubLingual every 5 minutes PRN Chest Pain  oxyCODONE    Solution 2.5 milliGRAM(s) Oral every 6 hours PRN Moderate Pain (4 - 6)  oxyCODONE    Solution 5 milliGRAM(s) Oral every 6 hours PRN Severe Pain (7 - 10)      LABS:                        10.2   8.53  )-----------( 407      ( 03 Apr 2024 05:52 )             32.6     04-02    139  |  103  |  11  ----------------------------<  307<H>  4.3   |  24  |  0.75    Ca    9.1      02 Apr 2024 06:00  Phos  4.5     04-02  Mg     1.90     04-02        Urinalysis Basic - ( 02 Apr 2024 06:00 )    Color: x / Appearance: x / SG: x / pH: x  Gluc: 307 mg/dL / Ketone: x  / Bili: x / Urobili: x   Blood: x / Protein: x / Nitrite: x   Leuk Esterase: x / RBC: x / WBC x   Sq Epi: x / Non Sq Epi: x / Bacteria: x      Arterial Blood Gas:  04-01 @ 20:00  7.35/48/102/26/98.8/0.5  ABG lactate: --

## 2024-04-03 NOTE — SWALLOW BEDSIDE ASSESSMENT ADULT - SWALLOW EVAL: CRITERIA FOR SKILLED INTERVENTION MET
This service to follow on inpatient basis as schedule permits to ensure ongoing diet tolerance. Would suggest follow up with this service on an outpatient basis; consider solid trials with SLP only./demonstrates skilled criteria for swallowing intervention

## 2024-04-04 ENCOUNTER — TRANSCRIPTION ENCOUNTER (OUTPATIENT)
Age: 64
End: 2024-04-04

## 2024-04-04 LAB
ANION GAP SERPL CALC-SCNC: 6 MMOL/L — LOW (ref 7–14)
BASOPHILS # BLD AUTO: 0.07 K/UL — SIGNIFICANT CHANGE UP (ref 0–0.2)
BASOPHILS NFR BLD AUTO: 1.3 % — SIGNIFICANT CHANGE UP (ref 0–2)
BUN SERPL-MCNC: 14 MG/DL — SIGNIFICANT CHANGE UP (ref 7–23)
CALCIUM SERPL-MCNC: 8.9 MG/DL — SIGNIFICANT CHANGE UP (ref 8.4–10.5)
CHLORIDE SERPL-SCNC: 108 MMOL/L — HIGH (ref 98–107)
CO2 SERPL-SCNC: 30 MMOL/L — SIGNIFICANT CHANGE UP (ref 22–31)
CREAT SERPL-MCNC: 0.67 MG/DL — SIGNIFICANT CHANGE UP (ref 0.5–1.3)
EGFR: 98 ML/MIN/1.73M2 — SIGNIFICANT CHANGE UP
EOSINOPHIL # BLD AUTO: 0.25 K/UL — SIGNIFICANT CHANGE UP (ref 0–0.5)
EOSINOPHIL NFR BLD AUTO: 4.5 % — SIGNIFICANT CHANGE UP (ref 0–6)
GLUCOSE SERPL-MCNC: 74 MG/DL — SIGNIFICANT CHANGE UP (ref 70–99)
HCT VFR BLD CALC: 28.7 % — LOW (ref 34.5–45)
HGB BLD-MCNC: 9.2 G/DL — LOW (ref 11.5–15.5)
IANC: 2.79 K/UL — SIGNIFICANT CHANGE UP (ref 1.8–7.4)
IMM GRANULOCYTES NFR BLD AUTO: 0.2 % — SIGNIFICANT CHANGE UP (ref 0–0.9)
LYMPHOCYTES # BLD AUTO: 1.9 K/UL — SIGNIFICANT CHANGE UP (ref 1–3.3)
LYMPHOCYTES # BLD AUTO: 34.5 % — SIGNIFICANT CHANGE UP (ref 13–44)
MAGNESIUM SERPL-MCNC: 1.7 MG/DL — SIGNIFICANT CHANGE UP (ref 1.6–2.6)
MCHC RBC-ENTMCNC: 26.5 PG — LOW (ref 27–34)
MCHC RBC-ENTMCNC: 32.1 GM/DL — SIGNIFICANT CHANGE UP (ref 32–36)
MCV RBC AUTO: 82.7 FL — SIGNIFICANT CHANGE UP (ref 80–100)
MONOCYTES # BLD AUTO: 0.49 K/UL — SIGNIFICANT CHANGE UP (ref 0–0.9)
MONOCYTES NFR BLD AUTO: 8.9 % — SIGNIFICANT CHANGE UP (ref 2–14)
NEUTROPHILS # BLD AUTO: 2.79 K/UL — SIGNIFICANT CHANGE UP (ref 1.8–7.4)
NEUTROPHILS NFR BLD AUTO: 50.6 % — SIGNIFICANT CHANGE UP (ref 43–77)
NRBC # BLD: 0 /100 WBCS — SIGNIFICANT CHANGE UP (ref 0–0)
NRBC # FLD: 0 K/UL — SIGNIFICANT CHANGE UP (ref 0–0)
PHOSPHATE SERPL-MCNC: 2.4 MG/DL — LOW (ref 2.5–4.5)
PLATELET # BLD AUTO: 348 K/UL — SIGNIFICANT CHANGE UP (ref 150–400)
POTASSIUM SERPL-MCNC: 4.1 MMOL/L — SIGNIFICANT CHANGE UP (ref 3.5–5.3)
POTASSIUM SERPL-SCNC: 4.1 MMOL/L — SIGNIFICANT CHANGE UP (ref 3.5–5.3)
RBC # BLD: 3.47 M/UL — LOW (ref 3.8–5.2)
RBC # FLD: 15.4 % — HIGH (ref 10.3–14.5)
SODIUM SERPL-SCNC: 144 MMOL/L — SIGNIFICANT CHANGE UP (ref 135–145)
WBC # BLD: 5.51 K/UL — SIGNIFICANT CHANGE UP (ref 3.8–10.5)
WBC # FLD AUTO: 5.51 K/UL — SIGNIFICANT CHANGE UP (ref 3.8–10.5)

## 2024-04-04 PROCEDURE — 99233 SBSQ HOSP IP/OBS HIGH 50: CPT

## 2024-04-04 RX ORDER — OXYCODONE HYDROCHLORIDE 5 MG/1
5 TABLET ORAL EVERY 6 HOURS
Refills: 0 | Status: DISCONTINUED | OUTPATIENT
Start: 2024-04-04 | End: 2024-04-05

## 2024-04-04 RX ORDER — OXYCODONE HYDROCHLORIDE 5 MG/1
2.5 TABLET ORAL EVERY 6 HOURS
Refills: 0 | Status: DISCONTINUED | OUTPATIENT
Start: 2024-04-04 | End: 2024-04-05

## 2024-04-04 RX ORDER — ALBUTEROL 90 UG/1
2 AEROSOL, METERED ORAL
Qty: 0 | Refills: 0 | DISCHARGE

## 2024-04-04 RX ORDER — SODIUM,POTASSIUM PHOSPHATES 278-250MG
1 POWDER IN PACKET (EA) ORAL
Refills: 0 | Status: COMPLETED | OUTPATIENT
Start: 2024-04-04 | End: 2024-04-04

## 2024-04-04 RX ORDER — MAGNESIUM SULFATE 500 MG/ML
2 VIAL (ML) INJECTION ONCE
Refills: 0 | Status: COMPLETED | OUTPATIENT
Start: 2024-04-04 | End: 2024-04-04

## 2024-04-04 RX ORDER — CLONAZEPAM 1 MG
0.5 TABLET ORAL EVERY 8 HOURS
Refills: 0 | Status: DISCONTINUED | OUTPATIENT
Start: 2024-04-04 | End: 2024-04-05

## 2024-04-04 RX ADMIN — HEPARIN SODIUM 5000 UNIT(S): 5000 INJECTION INTRAVENOUS; SUBCUTANEOUS at 05:38

## 2024-04-04 RX ADMIN — Medication 81 MILLIGRAM(S): at 11:17

## 2024-04-04 RX ADMIN — Medication 10 MILLIGRAM(S): at 21:11

## 2024-04-04 RX ADMIN — Medication 0.5 MILLIGRAM(S): at 22:17

## 2024-04-04 RX ADMIN — Medication 25 GRAM(S): at 11:16

## 2024-04-04 RX ADMIN — OXYCODONE HYDROCHLORIDE 5 MILLIGRAM(S): 5 TABLET ORAL at 22:50

## 2024-04-04 RX ADMIN — ALBUTEROL 2.5 MILLIGRAM(S): 90 AEROSOL, METERED ORAL at 22:19

## 2024-04-04 RX ADMIN — HEPARIN SODIUM 5000 UNIT(S): 5000 INJECTION INTRAVENOUS; SUBCUTANEOUS at 21:11

## 2024-04-04 RX ADMIN — Medication 1 PACKET(S): at 14:09

## 2024-04-04 RX ADMIN — POLYETHYLENE GLYCOL 3350 17 GRAM(S): 17 POWDER, FOR SOLUTION ORAL at 11:17

## 2024-04-04 RX ADMIN — SIMETHICONE 80 MILLIGRAM(S): 80 TABLET, CHEWABLE ORAL at 21:11

## 2024-04-04 RX ADMIN — Medication 10 MILLIGRAM(S): at 05:38

## 2024-04-04 RX ADMIN — ALBUTEROL 2.5 MILLIGRAM(S): 90 AEROSOL, METERED ORAL at 17:15

## 2024-04-04 RX ADMIN — Medication 10 MILLIGRAM(S): at 14:10

## 2024-04-04 RX ADMIN — HEPARIN SODIUM 5000 UNIT(S): 5000 INJECTION INTRAVENOUS; SUBCUTANEOUS at 11:18

## 2024-04-04 RX ADMIN — OXYCODONE HYDROCHLORIDE 5 MILLIGRAM(S): 5 TABLET ORAL at 22:17

## 2024-04-04 RX ADMIN — SENNA PLUS 10 MILLILITER(S): 8.6 TABLET ORAL at 21:11

## 2024-04-04 RX ADMIN — Medication 1 PACKET(S): at 11:17

## 2024-04-04 RX ADMIN — CHLORHEXIDINE GLUCONATE 1 APPLICATION(S): 213 SOLUTION TOPICAL at 11:18

## 2024-04-04 NOTE — DIETITIAN INITIAL EVALUATION ADULT - PERTINENT LABORATORY DATA
04-04    144  |  108<H>  |  14  ----------------------------<  74  4.1   |  30  |  0.67    Ca    8.9      04 Apr 2024 05:47  Phos  2.4     04-04  Mg     1.70     04-04    A1C with Estimated Average Glucose Result: 5.0 % (04-03-24 @ 05:52)

## 2024-04-04 NOTE — DIETITIAN INITIAL EVALUATION ADULT - OTHER INFO
Per chart review, 63 YO F with PMHx of HTN, COPD and chronic respiratory failure, s/p tracheostomy with ventilator dependence (2004) complicated by tracheomalacia and tracheal stenosis, s/p tracheostomy replacement and bronchoscopy for stomaplasty (2022), pneumothorax, dysphagia s/p PEG, atypical chest pain on nitro PRN (last few weeks only per patient), and recent visit to [place] ER 2 weeks prior to admission for dislodged PEG s/p replacement, bronchiectasis, and recent pneumonia s/p abx 3 weeks ago. Patient now presented for scheduled trach exchange (6 CUFFED SHILEY) and esophageal dilation. Admitted for SLP eval per ENT request and course complicated by PEG leak.       Patient seen at bedside, appears lethargic unable to provide any nutrition information. RN provided collateral, who reports pt currently on both PO diet and TF via PEG, however at the time of visit pt's lunch visibly seen with <25% consumption. Pt's noted s/o Swallow Eval 4/3 with "Continue PEG for primary means of nutrition/hydration. Allow oral supplementation of puree textures and thin liquids, as tolerated" per SLP. Current TF regimen bolus feeding 226ml q6hrs x 4 times daily provided total volume of 916 ml. TF regimen provides 1375kcal, 68gm pro, 641ml of free water. As per chart review, pt noted with PEG dysfunction, IR consulted and recommend tx for possible gastroparesis. GI consult in placed. Recommend consider change TF to continuous feed for better tolerance. Recommend when medically feasible, change to continuous feeding of TF [employer] Peptide 1.5Cal @ 40ml/hr x 24 hrs to provide total 960ml/day, 1440kcal, 71gm pro, 672ml free water. Current TF regimen is meeting pt 77-93% of her estimated calorie needs, and 100% protein needs. Pt may continue with pureed via PO to provide complementary nutrients. Cont. to follow up with GI for possibly GJ tube. RD to remain available for further nutritional interventions as indicated.

## 2024-04-04 NOTE — DISCHARGE NOTE NURSING/CASE MANAGEMENT/SOCIAL WORK - PATIENT PORTAL LINK FT
You can access the FollowMyHealth Patient Portal offered by [facility] by registering at the following website: [email or web address]. By joining [facility]’s FollowMyHealth portal, you will also be able to view your health information using other applications (apps) compatible with our system.

## 2024-04-04 NOTE — DIETITIAN INITIAL EVALUATION ADULT - PERTINENT MEDS FT
MEDICATIONS  (STANDING):  aspirin  chewable 81 milliGRAM(s) Oral daily  chlorhexidine 2% Cloths 1 Application(s) Topical daily  heparin   Injectable 5000 Unit(s) SubCutaneous every 8 hours  metoclopramide Injectable 10 milliGRAM(s) IV Push every 8 hours  polyethylene glycol 3350 17 Gram(s) Oral daily  senna Syrup 10 milliLiter(s) Oral at bedtime  simethicone drops 80 milliGRAM(s) Oral at bedtime    MEDICATIONS  (PRN):  acetaminophen   Oral Liquid .. 650 milliGRAM(s) Oral every 6 hours PRN Mild Pain (1 - 3)  albuterol   0.5% 2.5 milliGRAM(s) Nebulizer every 4 hours PRN Bronchospasm  clonazePAM  Tablet 0.5 milliGRAM(s) Oral every 8 hours PRN anxiety  nitroglycerin     SubLingual 0.4 milliGRAM(s) SubLingual every 5 minutes PRN Chest Pain  oxyCODONE    Solution 2.5 milliGRAM(s) Oral every 6 hours PRN Moderate Pain (4 - 6)  oxyCODONE    Solution 5 milliGRAM(s) Oral every 6 hours PRN Severe Pain (7 - 10)

## 2024-04-04 NOTE — PROGRESS NOTE ADULT - ASSESSMENT
65 YO F with PMHx of HTN, COPD and chronic respiratory failure, s/p tracheostomy with ventilator dependence (2004) complicated by tracheomalacia and tracheal stenosis, s/p tracheostomy replacement and bronchoscopy for stomaplasty (2022), pneumothorax, dysphagia s/p PEG, atypical chest pain on nitro PRN (last few weeks only per patient), and recent visit to [place] ER 2 weeks prior to admission for dislodged PEG s/p replacement, bronchiectasis, and recent pneumonia s/p abx 3 weeks ago. Patient now presented for scheduled trach exchange (6 CUFFED SHILEY) and esophageal dilation. Admitted for SLP eval per ENT request and course complicated by PEG leak.  65 YO F with PMHx of HTN, COPD and chronic respiratory failure, s/p tracheostomy with ventilator dependence (2004) complicated by tracheomalacia and tracheal stenosis, s/p tracheostomy replacement and bronchoscopy for stomaplasty (2022), pneumothorax, dysphagia s/p PEG, atypical chest pain on nitro PRN (last few weeks only per patient), and recent visit to [place] ER 2 weeks prior to admission for dislodged PEG s/p replacement, bronchiectasis, and recent pneumonia s/p ABX x 3 weeks ago. Patient now presented for scheduled trach exchange (6 CUFFED SHILEY) and esophageal dilation. Admitted for SLP eval per ENT request and course complicated by PEG leak.     NEUROLOGY  - No acute issues   - Continue on klonopin PRN for anxiety   - Continue on oxycodone PRN for pain     CARDIOVASCULAR  # Atypical chest pain   - Chest pain chronic and currently on nitro PRN   - Recent cardiac work up for sx clearance with no acute findings   - ECG on admission with NSR with RBBB similar to prior   - Troponin negative in house   - Follow up with outpatient Cardiology, Odette Goldman, Cardiology NP     RESPIRATORY  # Chronic respiratory failure   - Hx COPD and chronic respiratory failure, s/p tracheostomy with ventilator dependence (2004) complicated by tracheomalacia and tracheal stenosis, s/p tracheostomy replacement and bronchoscopy for stomaplasty (2022), and pneumothorax who presented for routine trach exchange   - Trach exchanged for [id number] on 4/1  - Continue on vent   - Continue on albuterol PRN     GI  # Esophageal stricture/ tracheal stenosis   - Last dilation on 12/5/2022 and now s/p repeat dilation 4/1   - Seen by SS 4/3 and plan for PEG as primary means of nutrition with allowance of puree and thin liquids.   - Continue with PO and PEG TF as tolerated     # Gastroparesis   - Patient with recent PEG issue and exchanged in [place] ER with 18F PEG. Baseline noted with 22F PEG per patient.   - Course complicated PEG leakage as piston syringe tube feeds given.   - Discussed with IR who does not recommending upsizing to 22F.   - Recommend patient follow up with [place] IR for upsizing back to 22F.   - Continue on reglan, miralax, senna and simethicone     RENAL  - No acute issues   - Monitor renal function and UOP     INFECTIOUS DISEASE  - No acute issues     HEME  - No acute issues   - Continue on home ASA   - DVT PPX with HSQ    ENDOCRINE  - No hx of DM2 A1C 5.0 and holding home Dapagliflozin    SKIN  - Basic trach and PEG care ordered     ETHICS/ GOC    - FULL CODE     DISPO - Back home with 24 hour nursing services and her .  63 YO F with PMHx of HTN, COPD and chronic respiratory failure, s/p tracheostomy with ventilator dependence (2004) complicated by tracheomalacia and tracheal stenosis, s/p tracheostomy replacement and bronchoscopy for stomaplasty (2022), pneumothorax, dysphagia s/p PEG, atypical chest pain on nitro PRN (last few weeks only per patient), and recent visit to [place] ER 2 weeks prior to admission for dislodged PEG s/p replacement, bronchiectasis, and recent pneumonia s/p ABX x 3 weeks ago. Patient now presented for scheduled trach exchange (6 CUFFED SHILEY) and esophageal dilation. Admitted for SLP eval per ENT request and course complicated by PEG leak.     NEUROLOGY  - No acute issues   - Continue on klonopin PRN for anxiety   - Continue on oxycodone PRN for pain     CARDIOVASCULAR  # Atypical chest pain   - Chest pain chronic and currently on nitro PRN   - Recent cardiac work up for sx clearance with no acute findings   - ECG on admission with NSR with RBBB similar to prior   - Troponin negative in house   - Continue on nitro SL PRN   - Follow up with outpatient Cardiology, Odette Goldman, Cardiology NP     RESPIRATORY  # Chronic respiratory failure   - Hx COPD and chronic respiratory failure, s/p tracheostomy with ventilator dependence (2004) complicated by tracheomalacia and tracheal stenosis, s/p tracheostomy replacement and bronchoscopy for stomaplasty (2022), and pneumothorax who presented for routine trach exchange   - Trach exchanged for [id number] on 4/1  - Continue on vent and SBT as tolerated   - Continue on albuterol PRN     GI  # Esophageal stricture/ tracheal stenosis   - Last dilation on 12/5/2022 and now s/p repeat dilation 4/1   - Seen by SS 4/3 and plan for PEG as primary means of nutrition with allowance of puree and thin liquids.   - Continue with PO and PEG TF as tolerated     # Gastroparesis   - Patient with recent PEG issue and exchanged in [place] ER with 18F PEG. Baseline noted with 22F PEG per patient.   - Course complicated PEG leakage as piston syringe tube feeds given.   - Discussed with IR who does not recommending upsizing to 22F.   - Recommend patient follow up with [place] IR for upsizing back to 22F.   - Continue on reglan, miralax, senna and simethicone     RENAL  - No acute issues   - Monitor renal function and UOP     INFECTIOUS DISEASE  - No acute issues     HEME  - No acute issues   - Continue on home ASA   - DVT PPX with HSQ    ENDOCRINE  - No hx of DM2 A1C 5.0 and holding home Dapagliflozin    SKIN  - Basic trach and PEG care ordered     ETHICS/ GOC    - FULL CODE     DISPO - Back home tomorrow with 24 hour nursing services and her .

## 2024-04-04 NOTE — DIETITIAN INITIAL EVALUATION ADULT - NS FNS DIET ORDER
Diet, Pureed:   Tube Feeding Modality: Gastrostomy  Echo [facility] Peptide 1.5 (KFPEPT1.5RTH)  Total Volume for 24 Hours (mL): 916  Bolus  Total Volume of Bolus (mL):  229  Total # of Feeds: 4  Tube Feed Frequency: Every 6 hours   Tube Feed Start Time: 11:34  Bolus Feed Rate (mL per Hour): 229   Bolus Feed Duration (in Hours): 1 (04-03-24 @ 11:34) [Active]

## 2024-04-04 NOTE — DISCHARGE NOTE NURSING/CASE MANAGEMENT/SOCIAL WORK - NSDCPEFALRISK_GEN_ALL_CORE
For information on Fall & Injury Prevention, visit: [email or web address] OR  [email or web address] OR  https://www.cdc.gov/steadi/patient.html

## 2024-04-04 NOTE — PROGRESS NOTE ADULT - SUBJECTIVE AND OBJECTIVE BOX
CHIEF COMPLAINT: Patient is a 64y old  Female who presents with a chief complaint of SLP evaluation s/p tracheostomy tube exchange (2024 17:28)      INTERVAL EVENTS:    REVIEW OF SYSTEMS: Seen by bedside during AM rounds and     Mode: AC/ CMV (Assist Control/ Continuous Mandatory Ventilation), RR (machine): 12, TV (machine): 450, FiO2: 30, PEEP: 5, ITime: 0.71, MAP: 11, PIP: 38      OBJECTIVE:  ICU Vital Signs Last 24 Hrs  T(C): 36.6 (2024 09:23), Max: 36.8 (2024 17:36)  T(F): 97.8 (2024 09:23), Max: 98.2 (2024 17:36)  HR: 58 (:23) (58 - 99)  BP: 103/66 (2024 09:23) (101/72 - 121/86)  BP(mean): --  ABP: --  ABP(mean): --  RR: 20 (2024 09:23) (14 - 22)  SpO2: 99% (2024 09:23) (97% - 100%)    O2 Parameters below as of 2024 09:23  Patient On (Oxygen Delivery Method): ventilator          Mode: AC/ CMV (Assist Control/ Continuous Mandatory Ventilation), RR (machine): 12, TV (machine): 450, FiO2: 30, PEEP: 5, ITime: 0.71, MAP: 11, PIP: 38     @ 07:01  -  - @ 07:00  --------------------------------------------------------  IN: 0 mL / OUT: 1800 mL / NET: -1800 mL      CAPILLARY BLOOD GLUCOSE  117 (2024 13:00)      POCT Blood Glucose.: 117 mg/dL (2024 13:28)      HOSPITAL MEDICATIONS:  MEDICATIONS  (STANDING):  aspirin  chewable 81 milliGRAM(s) Oral daily  chlorhexidine 2% Cloths 1 Application(s) Topical daily  heparin   Injectable 5000 Unit(s) SubCutaneous every 8 hours  metoclopramide Injectable 10 milliGRAM(s) IV Push every 8 hours  polyethylene glycol 3350 17 Gram(s) Oral daily  senna Syrup 10 milliLiter(s) Oral at bedtime  simethicone drops 80 milliGRAM(s) Oral at bedtime    MEDICATIONS  (PRN):  acetaminophen   Oral Liquid .. 650 milliGRAM(s) Oral every 6 hours PRN Mild Pain (1 - 3)  albuterol   0.5% 2.5 milliGRAM(s) Nebulizer every 4 hours PRN Bronchospasm  clonazePAM  Tablet 0.5 milliGRAM(s) Oral every 8 hours PRN anxiety  nitroglycerin     SubLingual 0.4 milliGRAM(s) SubLingual every 5 minutes PRN Chest Pain  oxyCODONE    Solution 5 milliGRAM(s) Oral every 6 hours PRN Severe Pain (7 - 10)  oxyCODONE    Solution 2.5 milliGRAM(s) Oral every 6 hours PRN Moderate Pain (4 - 6)      PHYSICAL EXAMINATION    LABS:                        9.2    5.51  )-----------( 348      ( 2024 05:47 )             28.7     04-    144  |  108<H>  |  14  ----------------------------<  74  4.1   |  30  |  0.67    Ca    8.9      2024 05:47  Phos  2.4     04-04  Mg     1.70     04-04        Urinalysis Basic - ( 2024 05:47 )    Color: x / Appearance: x / SG: x / pH: x  Gluc: 74 mg/dL / Ketone: x  / Bili: x / Urobili: x   Blood: x / Protein: x / Nitrite: x   Leuk Esterase: x / RBC: x / WBC x   Sq Epi: x / Non Sq Epi: x / Bacteria: x            PAST MEDICAL & SURGICAL HISTORY:  Tracheostomy dependent      COPD (chronic obstructive pulmonary disease)      Hypertension      Sickle cell trait      Anxiety disorder      Pancreatic cyst      Tracheomalacia      Lupus anticoagulant syndrome      Anemia      H/O pneumothorax      Anxiety and depression      Ventilator dependent      Pneumonia      H/O chronic respiratory failure      Dysphagia      H/O bronchiectasis      Atypical chest pain      Malfunction of tracheostomy stoma      Dependence on tracheostomy      Acute tracheostomy management      Tracheal stenosis  excision of tracheal stenosis 10/2017  revision of tracheal stoma 3/2018      S/P         PEG (percutaneous endoscopic gastrostomy) status      H/O colonoscopy      H/O endoscopy          FAMILY HISTORY:  Family history of lung disease (Sibling)    Family history of leukemia    Family history of heart disease (Grandparent)        Social History:      RADIOLOGY:  [ ] Reviewed and interpreted by me    PULMONARY FUNCTION TESTS:    EKG: CHIEF COMPLAINT: Patient is a 64y old  Female who presents with a chief complaint of SLP evaluation s/p tracheostomy tube exchange (2024 17:28)      INTERVAL EVENTS:  - No interval events overnight   - Questionable gastroparesis and reglan continued.   - Follow up with [facility] for possible upsize to baseline 22F PEG    REVIEW OF SYSTEMS: Seen by bedside during AM rounds and denies SOB or pain, but reports annoyance over PEG issues     Mode: AC/ CMV (Assist Control/ Continuous Mandatory Ventilation), RR (machine): 12, TV (machine): 450, FiO2: 30, PEEP: 5, ITime: 0.71, MAP: 11, PIP: 38      OBJECTIVE:  ICU Vital Signs Last 24 Hrs  T(C): 36.6 (2024 09:23), Max: 36.8 (2024 17:36)  T(F): 97.8 (2024 09:23), Max: 98.2 (2024 17:36)  HR: 58 (2024 09:23) (58 - 99)  BP: 103/66 (2024 09:23) (101/72 - 121/86)  BP(mean): --  ABP: --  ABP(mean): --  RR: 20 (2024 09:23) (14 - 22)  SpO2: 99% (2024 09:23) (97% - 100%)    O2 Parameters below as of 2024 09:23  Patient On (Oxygen Delivery Method): ventilator          Mode: AC/ CMV (Assist Control/ Continuous Mandatory Ventilation), RR (machine): 12, TV (machine): 450, FiO2: 30, PEEP: 5, ITime: 0.71, MAP: 11, PIP: 38    - @ 07:01  -  -04 @ 07:00  --------------------------------------------------------  IN: 0 mL / OUT: 1800 mL / NET: -1800 mL      CAPILLARY BLOOD GLUCOSE  POCT Blood Glucose.: 117 mg/dL (2024 13:28)      HOSPITAL MEDICATIONS:  MEDICATIONS  (STANDING):  aspirin  chewable 81 milliGRAM(s) Oral daily  chlorhexidine 2% Cloths 1 Application(s) Topical daily  heparin   Injectable 5000 Unit(s) SubCutaneous every 8 hours  metoclopramide Injectable 10 milliGRAM(s) IV Push every 8 hours  polyethylene glycol 3350 17 Gram(s) Oral daily  senna Syrup 10 milliLiter(s) Oral at bedtime  simethicone drops 80 milliGRAM(s) Oral at bedtime    MEDICATIONS  (PRN):  acetaminophen   Oral Liquid .. 650 milliGRAM(s) Oral every 6 hours PRN Mild Pain (1 - 3)  albuterol   0.5% 2.5 milliGRAM(s) Nebulizer every 4 hours PRN Bronchospasm  clonazePAM  Tablet 0.5 milliGRAM(s) Oral every 8 hours PRN anxiety  nitroglycerin     SubLingual 0.4 milliGRAM(s) SubLingual every 5 minutes PRN Chest Pain  oxyCODONE    Solution 5 milliGRAM(s) Oral every 6 hours PRN Severe Pain (7 - 10)  oxyCODONE    Solution 2.5 milliGRAM(s) Oral every 6 hours PRN Moderate Pain (4 - 6)      PHYSICAL EXAMINATION  General: NAD   HEENT: Trach present   Cards: S1/S2, no murmurs   Pulm: Course vent sounds bilaterally. No wheezes.   Abdomen: Soft, nondistended and nontender. BS (+) PEG present   Extremities: No pedal edema. GARRETT of BL upper and lower extremities.  Neurology: AOx3 with no acute focal neurological deficits     LABS:                        9.2    5.51  )-----------( 348      ( 2024 05:47 )             28.7     04-04    144  |  108<H>  |  14  ----------------------------<  74  4.1   |  30  |  0.67    Ca    8.9      2024 05:47  Phos  2.4     04-04  Mg     1.70     04-04        Urinalysis Basic - ( 2024 05:47 )  Color: x / Appearance: x / SG: x / pH: x  Gluc: 74 mg/dL / Ketone: x  / Bili: x / Urobili: x   Blood: x / Protein: x / Nitrite: x   Leuk Esterase: x / RBC: x / WBC x   Sq Epi: x / Non Sq Epi: x / Bacteria: x            PAST MEDICAL & SURGICAL HISTORY:  Tracheostomy dependent      COPD (chronic obstructive pulmonary disease)      Hypertension      Sickle cell trait      Anxiety disorder      Pancreatic cyst      Tracheomalacia      Lupus anticoagulant syndrome      Anemia      H/O pneumothorax      Anxiety and depression      Ventilator dependent      Pneumonia      H/O chronic respiratory failure      Dysphagia      H/O bronchiectasis      Atypical chest pain      Malfunction of tracheostomy stoma      Dependence on tracheostomy      Acute tracheostomy management      Tracheal stenosis  excision of tracheal stenosis 10/2017  revision of tracheal stoma 3/2018      S/P         PEG (percutaneous endoscopic gastrostomy) status      H/O colonoscopy      H/O endoscopy          FAMILY HISTORY:  Family history of lung disease (Sibling)    Family history of leukemia    Family history of heart disease (Grandparent)        Social History:      RADIOLOGY:  [ ] Reviewed and interpreted by me    PULMONARY FUNCTION TESTS:    EKG:

## 2024-04-04 NOTE — DIETITIAN INITIAL EVALUATION ADULT - ENTERAL
Recommend when medically feasible, change to continuous feeding of TF [employer] Peptide 1.5Cal @ 40ml/hr x 24 hrs to provide total 960ml/day, 1440kcal, 71gm pro, 672ml free water.

## 2024-04-04 NOTE — DIETITIAN INITIAL EVALUATION ADULT - ADD RECOMMEND
1) Recommend when medically feasible, change to continuos feeding to provide better TF tolerance given pt with possible gastroparesis.   2) Monitor PO intake/TF tolerance, Labs, weights, BMs, and skin integrity.   3) Follow up with GI for possible GJ tube if PEG still leaking.   4) RD to remain available for further nutritional interventions as indicated.

## 2024-04-05 VITALS
HEART RATE: 67 BPM | OXYGEN SATURATION: 100 % | DIASTOLIC BLOOD PRESSURE: 70 MMHG | RESPIRATION RATE: 20 BRPM | SYSTOLIC BLOOD PRESSURE: 99 MMHG | TEMPERATURE: 98 F

## 2024-04-05 DIAGNOSIS — Z93.0 TRACHEOSTOMY STATUS: ICD-10-CM

## 2024-04-05 PROBLEM — F41.9 ANXIETY DISORDER, UNSPECIFIED: Chronic | Status: ACTIVE | Noted: 2024-03-27

## 2024-04-05 PROBLEM — Z99.11 DEPENDENCE ON RESPIRATOR [VENTILATOR] STATUS: Chronic | Status: ACTIVE | Noted: 2024-03-27

## 2024-04-05 PROBLEM — J18.9 PNEUMONIA, UNSPECIFIED ORGANISM: Chronic | Status: ACTIVE | Noted: 2024-03-27

## 2024-04-05 PROBLEM — R07.89 OTHER CHEST PAIN: Chronic | Status: ACTIVE | Noted: 2024-03-27

## 2024-04-05 PROBLEM — Z87.09 PERSONAL HISTORY OF OTHER DISEASES OF THE RESPIRATORY SYSTEM: Chronic | Status: ACTIVE | Noted: 2024-03-27

## 2024-04-05 PROBLEM — D64.9 ANEMIA, UNSPECIFIED: Chronic | Status: ACTIVE | Noted: 2024-03-27

## 2024-04-05 PROCEDURE — 99233 SBSQ HOSP IP/OBS HIGH 50: CPT

## 2024-04-05 RX ORDER — SIMETHICONE 80 MG/1
1.2 TABLET, CHEWABLE ORAL
Qty: 0 | Refills: 0 | DISCHARGE
Start: 2024-04-05

## 2024-04-05 RX ORDER — ALBUTEROL 90 UG/1
3 AEROSOL, METERED ORAL
Refills: 0 | DISCHARGE

## 2024-04-05 RX ORDER — NITROGLYCERIN 6.5 MG
1 CAPSULE, EXTENDED RELEASE ORAL
Qty: 0 | Refills: 0 | DISCHARGE

## 2024-04-05 RX ORDER — DAPAGLIFLOZIN 10 MG/1
1 TABLET, FILM COATED ORAL
Qty: 0 | Refills: 0 | DISCHARGE

## 2024-04-05 RX ORDER — CHLORHEXIDINE GLUCONATE 213 G/1000ML
15 SOLUTION TOPICAL EVERY 12 HOURS
Refills: 0 | Status: DISCONTINUED | OUTPATIENT
Start: 2024-04-05 | End: 2024-04-05

## 2024-04-05 RX ADMIN — Medication 10 MILLIGRAM(S): at 13:01

## 2024-04-05 RX ADMIN — Medication 10 MILLIGRAM(S): at 05:33

## 2024-04-05 RX ADMIN — CHLORHEXIDINE GLUCONATE 1 APPLICATION(S): 213 SOLUTION TOPICAL at 12:44

## 2024-04-05 RX ADMIN — POLYETHYLENE GLYCOL 3350 17 GRAM(S): 17 POWDER, FOR SOLUTION ORAL at 12:45

## 2024-04-05 RX ADMIN — HEPARIN SODIUM 5000 UNIT(S): 5000 INJECTION INTRAVENOUS; SUBCUTANEOUS at 12:45

## 2024-04-05 RX ADMIN — Medication 81 MILLIGRAM(S): at 12:45

## 2024-04-05 RX ADMIN — HEPARIN SODIUM 5000 UNIT(S): 5000 INJECTION INTRAVENOUS; SUBCUTANEOUS at 05:33

## 2024-04-05 NOTE — PROGRESS NOTE ADULT - ASSESSMENT
63 YO F with PMHx of HTN, COPD and chronic respiratory failure, s/p tracheostomy with ventilator dependence (2004) complicated by tracheomalacia and tracheal stenosis, s/p tracheostomy replacement and bronchoscopy for stomaplasty (2022), pneumothorax, dysphagia s/p PEG, atypical chest pain on nitro PRN (last few weeks only per patient), and recent visit to [place] [facility] 2 weeks prior to admission for dislodged PEG s/p replacement, bronchiectasis, and recent pneumonia s/p ABX x 3 weeks ago. Patient now presented for scheduled trach exchange (6 CUFFED SHILEY) and esophageal dilation. Admitted for SLP eval per ENT request and course complicated by PEG leak.     NEUROLOGY  - No acute issues   - Continue on klonopin PRN for anxiety   - Continue on oxycodone PRN for pain     CARDIOVASCULAR  # Atypical chest pain   - Chest pain chronic and currently on nitro PRN   - Recent cardiac work up for sx clearance with no acute findings   - ECG on admission with NSR with RBBB similar to prior   - Troponin negative in house   - Continue on nitro SL PRN   - Follow up with outpatient Cardiology, Odette Goldman, Cardiology NP     RESPIRATORY  # Chronic respiratory failure   - Hx COPD and chronic respiratory failure, s/p tracheostomy with ventilator dependence (2004) complicated by tracheomalacia and tracheal stenosis, s/p tracheostomy replacement and bronchoscopy for stomaplasty (2022), and pneumothorax who presented for routine trach exchange   - Trach exchanged for 6CN75R on 4/1  - Continue on vent and SBT as tolerated   - Continue on albuterol PRN     GI  # Esophageal stricture/ tracheal stenosis   - Last dilation on 12/5/2022 and now s/p repeat dilation 4/1   - Seen by SS 4/3 and plan for PEG as primary means of nutrition with allowance of puree and thin liquids.   - Continue with PO and PEG TF as tolerated     # Gastroparesis   - Patient with recent PEG issue and exchanged in [place] [facility] with 18F PEG. Baseline noted with 22F PEG per patient.   - Course complicated PEG leakage as piston syringe tube feeds given.   - Discussed with IR who does not recommending upsizing to 22F.   - Recommend patient follow up with [facility] for upsizing back to 22F.   - Continue on reglan, miralax, senna and simethicone     RENAL  - No acute issues   - Monitor renal function and UOP     INFECTIOUS DISEASE  - No acute issues     HEME  - No acute issues   - Continue on home ASA   - DVT PPX with HSQ    ENDOCRINE  - No hx of DM2 A1C 5.0 and holding home Dapagliflozin    SKIN  - Basic trach and PEG care ordered     ETHICS/ GOC    - FULL CODE     DISPO - Back home tomorrow with 24 hour nursing services and her .  63 YO F with PMHx of HTN, COPD and chronic respiratory failure, s/p tracheostomy with ventilator dependence (2004) complicated by tracheomalacia and tracheal stenosis, s/p tracheostomy replacement and bronchoscopy for stomaplasty (2022), pneumothorax, dysphagia s/p PEG, atypical chest pain on nitro PRN (last few weeks only per patient), and recent visit to [facility] 2 weeks prior to admission for dislodged PEG s/p replacement, bronchiectasis, and recent pneumonia s/p ABX x 3 weeks ago. Patient now presented for scheduled trach exchange (6 CUFFED SHILEY) and esophageal dilation. Admitted for SLP eval per ENT request and course complicated by PEG leak.     NEUROLOGY  - No acute issues   - Continue on klonopin PRN for anxiety   - Continue on oxycodone PRN for pain     CARDIOVASCULAR  # Atypical chest pain   - Chest pain chronic and currently on nitro PRN   - Recent cardiac work up for sx clearance with no acute findings   - ECG on admission with NSR with RBBB similar to prior   - Troponin negative in house   - Continue on nitro SL PRN   - Follow up with outpatient Cardiology, Odette Goldman, Cardiology NP     RESPIRATORY  # Chronic respiratory failure   - Hx COPD and chronic respiratory failure, s/p tracheostomy with ventilator dependence (2004) complicated by tracheomalacia and tracheal stenosis, s/p tracheostomy replacement and bronchoscopy for stomaplasty (2022), and pneumothorax who presented for routine trach exchange   - Trach exchanged for 6CN75R on 4/1  - Continue on vent and back to [facility] 12/20/18/5/30 when back home  - Continue on albuterol PRN     GI  # Esophageal stricture/ tracheal stenosis   - Last dilation on 12/5/2022 and now s/p repeat dilation 4/1   - Seen by SS 4/3 and plan for PEG as primary means of nutrition with allowance of puree and thin liquids.   - Continue with PO and PEG TF as tolerated     # Gastroparesis   - Patient with recent PEG issue and exchanged in [facility] with 18F PEG. Baseline noted with 22F PEG per patient.   - Course complicated PEG leakage as piston syringe tube feeds given.   - Discussed with IR who does not recommending upsizing to 22F.   - Recommend patient follow up with [facility] for upsizing back to 22F.   - Continue on reglan, miralax, senna and simethicone     RENAL  - No acute issues   - Monitor renal function and UOP     INFECTIOUS DISEASE  - No acute issues     HEME  - No acute issues   - Continue on home ASA   - DVT PPX with HSQ    ENDOCRINE  - No hx of DM2 A1C 5.0 and holding home Dapagliflozin    SKIN  - Basic trach and PEG care ordered     ETHICS/ GOC    - FULL CODE     DISPO - Back home today with 24 hour private nursing services and her .

## 2024-04-05 NOTE — PROGRESS NOTE ADULT - SUBJECTIVE AND OBJECTIVE BOX
CHIEF COMPLAINT: Patient is a 64y old  Female who presents with a chief complaint of Malfunction of tracheostomy stoma (2024 12:35)      INTERVAL EVENTS:    REVIEW OF SYSTEMS: Seen by bedside during AM rounds and     Mode: AC/ CMV (Assist Control/ Continuous Mandatory Ventilation), RR (machine): 12, TV (machine): 450, FiO2: 50, PEEP: 5, ITime: 0.98, MAP: 9, PIP: 22      OBJECTIVE:  ICU Vital Signs Last 24 Hrs  T(C): 36.6 (2024 05:00), Max: 36.7 (2024 20:00)  T(F): 97.9 (2024 05:00), Max: 98.1 (2024 20:00)  HR: 65 (2024 06:51) (52 - 80)  BP: 109/79 (2024 05:00) (101/68 - 148/85)  BP(mean): --  ABP: --  ABP(mean): --  RR: 16 (2024 05:00) (13 - 20)  SpO2: 100% (2024 06:51) (97% - 100%)    O2 Parameters below as of 2024 05:00  Patient On (Oxygen Delivery Method): ventilator    O2 Concentration (%): 30      Mode: AC/ CMV (Assist Control/ Continuous Mandatory Ventilation), RR (machine): 12, TV (machine): 450, FiO2: 50, PEEP: 5, ITime: 0.98, MAP: 9, PIP: 22    04-04 @ 07:01  -  -05 @ 07:00  --------------------------------------------------------  IN: 458 mL / OUT: 2150 mL / NET: -1692 mL      CAPILLARY BLOOD GLUCOSE          HOSPITAL MEDICATIONS:  MEDICATIONS  (STANDING):  aspirin  chewable 81 milliGRAM(s) Oral daily  chlorhexidine 2% Cloths 1 Application(s) Topical daily  heparin   Injectable 5000 Unit(s) SubCutaneous every 8 hours  metoclopramide Injectable 10 milliGRAM(s) IV Push every 8 hours  polyethylene glycol 3350 17 Gram(s) Oral daily  senna Syrup 10 milliLiter(s) Oral at bedtime  simethicone drops 80 milliGRAM(s) Oral at bedtime    MEDICATIONS  (PRN):  acetaminophen   Oral Liquid .. 650 milliGRAM(s) Oral every 6 hours PRN Mild Pain (1 - 3)  albuterol   0.5% 2.5 milliGRAM(s) Nebulizer every 4 hours PRN Bronchospasm  clonazePAM  Tablet 0.5 milliGRAM(s) Oral every 8 hours PRN anxiety  nitroglycerin     SubLingual 0.4 milliGRAM(s) SubLingual every 5 minutes PRN Chest Pain  oxyCODONE    Solution 2.5 milliGRAM(s) Oral every 6 hours PRN Moderate Pain (4 - 6)  oxyCODONE    Solution 5 milliGRAM(s) Oral every 6 hours PRN Severe Pain (7 - 10)      PHYSICAL EXAMINATION    LABS:                        9.2    5.51  )-----------( 348      ( 2024 05:47 )             28.7     04-04    144  |  108<H>  |  14  ----------------------------<  74  4.1   |  30  |  0.67    Ca    8.9      2024 05:47  Phos  2.4     04-04  Mg     1.70     04-04        Urinalysis Basic - ( 2024 05:47 )  Color: x / Appearance: x / SG: x / pH: x  Gluc: 74 mg/dL / Ketone: x  / Bili: x / Urobili: x   Blood: x / Protein: x / Nitrite: x   Leuk Esterase: x / RBC: x / WBC x   Sq Epi: x / Non Sq Epi: x / Bacteria: x            PAST MEDICAL & SURGICAL HISTORY:  Tracheostomy dependent      COPD (chronic obstructive pulmonary disease)      Hypertension      Sickle cell trait      Anxiety disorder      Pancreatic cyst      Tracheomalacia      Lupus anticoagulant syndrome      Anemia      H/O pneumothorax      Anxiety and depression      Ventilator dependent      Pneumonia      H/O chronic respiratory failure      Dysphagia      H/O bronchiectasis      Atypical chest pain      Malfunction of tracheostomy stoma      Dependence on tracheostomy      Acute tracheostomy management      Tracheal stenosis  excision of tracheal stenosis 10/2017  revision of tracheal stoma 3/2018      S/P         PEG (percutaneous endoscopic gastrostomy) status      H/O colonoscopy      H/O endoscopy          FAMILY HISTORY:  Family history of lung disease (Sibling)    Family history of leukemia    Family history of heart disease (Grandparent)        Social History:      RADIOLOGY:  [ ] Reviewed and interpreted by me    PULMONARY FUNCTION TESTS:    EKG: CHIEF COMPLAINT: Patient is a 64y old  Female who presents with a chief complaint of Malfunction of tracheostomy stoma (2024 12:35)      INTERVAL EVENTS:  - No interval events overnight   - DISPO home today     REVIEW OF SYSTEMS: Seen by bedside during AM rounds and denies SOB    Mode: AC/ CMV (Assist Control/ Continuous Mandatory Ventilation), RR (machine): 12, TV (machine): 450, FiO2: 50, PEEP: 5, ITime: 0.98, MAP: 9, PIP: 22      OBJECTIVE:  ICU Vital Signs Last 24 Hrs  T(C): 36.6 (2024 05:00), Max: 36.7 (2024 20:00)  T(F): 97.9 (2024 05:00), Max: 98.1 (2024 20:00)  HR: 65 (2024 06:51) (52 - 80)  BP: 109/79 (2024 05:00) (101/68 - 148/85)  BP(mean): --  ABP: --  ABP(mean): --  RR: 16 (2024 05:00) (13 - 20)  SpO2: 100% (2024 06:51) (97% - 100%)    O2 Parameters below as of 2024 05:00  Patient On (Oxygen Delivery Method): ventilator    O2 Concentration (%): 30      Mode: AC/ CMV (Assist Control/ Continuous Mandatory Ventilation), RR (machine): 12, TV (machine): 450, FiO2: 50, PEEP: 5, ITime: 0.98, MAP: 9, PIP: 22     @ 07:01  -  - @ 07:00  --------------------------------------------------------  IN: 458 mL / OUT: 2150 mL / NET: -1692 mL      CAPILLARY BLOOD GLUCOSE          HOSPITAL MEDICATIONS:  MEDICATIONS  (STANDING):  aspirin  chewable 81 milliGRAM(s) Oral daily  chlorhexidine 2% Cloths 1 Application(s) Topical daily  heparin   Injectable 5000 Unit(s) SubCutaneous every 8 hours  metoclopramide Injectable 10 milliGRAM(s) IV Push every 8 hours  polyethylene glycol 3350 17 Gram(s) Oral daily  senna Syrup 10 milliLiter(s) Oral at bedtime  simethicone drops 80 milliGRAM(s) Oral at bedtime    MEDICATIONS  (PRN):  acetaminophen   Oral Liquid .. 650 milliGRAM(s) Oral every 6 hours PRN Mild Pain (1 - 3)  albuterol   0.5% 2.5 milliGRAM(s) Nebulizer every 4 hours PRN Bronchospasm  clonazePAM  Tablet 0.5 milliGRAM(s) Oral every 8 hours PRN anxiety  nitroglycerin     SubLingual 0.4 milliGRAM(s) SubLingual every 5 minutes PRN Chest Pain  oxyCODONE    Solution 2.5 milliGRAM(s) Oral every 6 hours PRN Moderate Pain (4 - 6)  oxyCODONE    Solution 5 milliGRAM(s) Oral every 6 hours PRN Severe Pain (7 - 10)      PHYSICAL EXAMINATION  General: NAD   HEENT: Trach present   Cards: S1/S2, no murmurs   Pulm: Course vent sounds bilaterally. No wheezes.   Abdomen: Soft, nondistended and nontender. BS (+) PEG present   Extremities: No pedal edema. GARRETT of BL upper and lower extremities.  Neurology: AOx3 with no acute focal neurological deficits     LABS:                        9.2    5.51  )-----------( 348      ( 2024 05:47 )             28.7     04-04    144  |  108<H>  |  14  ----------------------------<  74  4.1   |  30  |  0.67    Ca    8.9      2024 05:47  Phos  2.4     04-04  Mg     1.70     04-04        Urinalysis Basic - ( 2024 05:47 )  Color: x / Appearance: x / SG: x / pH: x  Gluc: 74 mg/dL / Ketone: x  / Bili: x / Urobili: x   Blood: x / Protein: x / Nitrite: x   Leuk Esterase: x / RBC: x / WBC x   Sq Epi: x / Non Sq Epi: x / Bacteria: x            PAST MEDICAL & SURGICAL HISTORY:  Tracheostomy dependent      COPD (chronic obstructive pulmonary disease)      Hypertension      Sickle cell trait      Anxiety disorder      Pancreatic cyst      Tracheomalacia      Lupus anticoagulant syndrome      Anemia      H/O pneumothorax      Anxiety and depression      Ventilator dependent      Pneumonia      H/O chronic respiratory failure      Dysphagia      H/O bronchiectasis      Atypical chest pain      Malfunction of tracheostomy stoma      Dependence on tracheostomy      Acute tracheostomy management      Tracheal stenosis  excision of tracheal stenosis 10/2017  revision of tracheal stoma 3/2018      S/P         PEG (percutaneous endoscopic gastrostomy) status      H/O colonoscopy      H/O endoscopy          FAMILY HISTORY:  Family history of lung disease (Sibling)    Family history of leukemia    Family history of heart disease (Grandparent)        Social History:      RADIOLOGY:  [ ] Reviewed and interpreted by me    PULMONARY FUNCTION TESTS:    EKG:

## 2024-04-05 NOTE — PROGRESS NOTE ADULT - NS ATTEND AMEND GEN_ALL_CORE FT
Pt is a 64F with MHx chronic respiratory failure with ventilator dependence via tracheostomy (2004) and oropharyngeal dysphagia s/p PEG placement presenting from PACU following a scheduled tracheostomy exchange and esophageal dilation 2/2 SLP evaluation admitted to RCU on 4/1/24.     Pt with chronic hypoxemic and hypercapnic respiratory failure with ventilator dependence via tracheostomy (since 2004) i/s/o hx tracheomalacia and tracheal stenosis on home ventilator settings. ABG on current settings appropriate. Pt presenting for routine tracheostomy exchange and esophageal dilation in ambulatory sx with ENT. Pt tolerated surgery well without post-operative complications but 2/2 prolonged sedation following anesthesia pt was unable to successfully undergo SLP evaluation. Pt now s/p SLP evaluation with recommendation to continue current diet with PEG feeds. Pt c/o leakage around three way stop-cock following ER exchange 2/2 blockage 2 weeks ago at OSH (22F to 18F.) Discussed with IR if possible to perform another routine exchange prior to d/c but recommended to continue with current care without further exchanges. Pt changed to bolus feeds, no evidence of leakage around stoma or at distal end of tube. Pt educated on slower administration of bolus feeds with less pressure on piston 2/2 smaller diameter tube. Will f/u with her GI and IR physician as outpatient.     Pt medically optimized for discharge today. Pt full code. DVT ppx in place. Discussed with pt at bedside daily. Plan for d/c home with vent with re-instatement of private-duty RN and home care services.
Pt is a 64F with MHx chronic respiratory failure with ventilator dependence via tracheostomy (2004) and oropharyngeal dysphagia s/p PEG placement presenting from PACU following a scheduled tracheostomy exchange and esophageal dilation 2/2 SLP evaluation admitted to RCU on 4/1/24.     Pt with chronic hypoxemic and hypercapnic respiratory failure with ventilator dependence via tracheostomy (since 2004) i/s/o hx tracheomalacia and tracheal stenosis on home ventilator settings. ABG on current settings appropriate. Pt presenting for routine tracheostomy exchange and esophageal dilation in ambulatory sx with ENT. Pt tolerated surgery well without post-operative complications but 2/2 prolonged sedation following anesthesia pt was unable to successfully undergo SLP evaluation. Pt now s/p SLP evaluation with recommendation to continue current diet with PEG feeds. Pt c/o leakage around three way stop-cock following ER exchange 2/2 blockage 2 weeks ago at OSH (22F to 18F.) Will discuss with IR if possible to perform another routine exchange prior to d/c.     Dispo pending medical optimization. Pt full code. DVT ppx in place. Discussed with pt at bedside daily.
Pt is a 64F with MHx chronic respiratory failure with ventilator dependence via tracheostomy (2004) and oropharyngeal dysphagia s/p PEG placement presenting from PACU following a scheduled tracheostomy exchange and esophageal dilation 2/2 SLP evaluation admitted to RCU on 4/1/24.     Pt with chronic hypoxemic and hypercapnic respiratory failure with ventilator dependence via tracheostomy (since 2004) i/s/o hx tracheomalacia and tracheal stenosis on home ventilator settings. ABG on current settings appropriate. Pt presenting for routine tracheostomy exchange and esophageal dilation in ambulatory sx with ENT. Pt tolerated surgery well without post-operative complications but 2/2 prolonged sedation following anesthesia pt was unable to successfully undergo SLP evaluation. Pt now s/p SLP evaluation with recommendation to continue current diet with PEG feeds. Pt c/o leakage around three way stop-cock following ER exchange 2/2 blockage 2 weeks ago at OSH (22F to 18F.) Discussed with IR if possible to perform another routine exchange prior to d/c but recommended to continue with current care without further exchanges. Pt changed to bolus feeds, no evidence of leakage around stoma or at distal end of tube. Pt educated on slower administration of bolus feeds with less pressure on piston 2/2 smaller diameter tube. Will f/u with her GI and IR physician as outpatient.     Pt medically optimized for discharge today. Pt full code. DVT ppx in place. Discussed with pt at bedside daily. Plan for d/c home with vent with re-instatement of private-duty RN and home care services.

## 2024-04-05 NOTE — PROGRESS NOTE ADULT - SUBJECTIVE AND OBJECTIVE BOX
ENT ISSUE/POD: malfunction of tracheostomy stoma    HPI: Patient was seen and examined at bedside this AM. Patient resting comfortably, with no complaints and/or concerns at this time.      PAST MEDICAL & SURGICAL HISTORY:  Tracheostomy dependent      COPD (chronic obstructive pulmonary disease)      Hypertension      Sickle cell trait      Anxiety disorder      Pancreatic cyst      Tracheomalacia      Lupus anticoagulant syndrome      Anemia      H/O pneumothorax      Anxiety and depression      Ventilator dependent      Pneumonia      H/O chronic respiratory failure      Dysphagia      H/O bronchiectasis      Atypical chest pain      Malfunction of tracheostomy stoma      Dependence on tracheostomy      Acute tracheostomy management      Tracheal stenosis  excision of tracheal stenosis 10/2017  revision of tracheal stoma 3/2018      S/P         PEG (percutaneous endoscopic gastrostomy) status      H/O colonoscopy      H/O endoscopy        Allergies    -latex- hives (Other)  latex (Unknown)  Cipro (Hives)  theophyilline (Hives)    Intolerances      MEDICATIONS  (STANDING):  aspirin  chewable 81 milliGRAM(s) Oral daily  chlorhexidine 2% Cloths 1 Application(s) Topical daily  heparin   Injectable 5000 Unit(s) SubCutaneous every 8 hours  metoclopramide Injectable 10 milliGRAM(s) IV Push every 8 hours  polyethylene glycol 3350 17 Gram(s) Oral daily  senna Syrup 10 milliLiter(s) Oral at bedtime  simethicone drops 80 milliGRAM(s) Oral at bedtime    MEDICATIONS  (PRN):  acetaminophen   Oral Liquid .. 650 milliGRAM(s) Oral every 6 hours PRN Mild Pain (1 - 3)  albuterol   0.5% 2.5 milliGRAM(s) Nebulizer every 4 hours PRN Bronchospasm  clonazePAM  Tablet 0.5 milliGRAM(s) Oral every 8 hours PRN anxiety  nitroglycerin     SubLingual 0.4 milliGRAM(s) SubLingual every 5 minutes PRN Chest Pain  oxyCODONE    Solution 5 milliGRAM(s) Oral every 6 hours PRN Severe Pain (7 - 10)  oxyCODONE    Solution 2.5 milliGRAM(s) Oral every 6 hours PRN Moderate Pain (4 - 6)      Social History: see consult note    Family history: see consult note    ROS:   ENT: all negative except as noted in HPI   Pulm: denies SOB, cough, hemoptysis  Neuro: denies numbness/tingling, loss of sensation  Endo: denies heat/cold intolerance, excessive sweating      Vital Signs Last 24 Hrs  T(C): 36.6 (2024 05:00), Max: 36.7 (2024 20:00)  T(F): 97.9 (2024 05:00), Max: 98.1 (2024 20:00)  HR: 65 (2024 06:51) (52 - 80)  BP: 109/79 (2024 05:00) (101/68 - 148/85)  BP(mean): --  RR: 16 (2024 05:00) (13 - 20)  SpO2: 100% (2024 06:51) (97% - 100%)    Parameters below as of 2024 05:00  Patient On (Oxygen Delivery Method): ventilator    O2 Concentration (%): 30                          9.2    5.51  )-----------( 348      ( 2024 05:47 )             28.7    04-04    144  |  108<H>  |  14  ----------------------------<  74  4.1   |  30  |  0.67    Ca    8.9      2024 05:47  Phos  2.4     04-04  Mg     1.70     04-04         PHYSICAL EXAM:  Gen: NAD  Neck: 6CN75R in place  Resp: breathing easily, no stridor

## 2024-04-05 NOTE — PROGRESS NOTE ADULT - REASON FOR ADMISSION
SLP evaluation s/p tracheostomy tube exchange

## 2024-04-08 ENCOUNTER — APPOINTMENT (OUTPATIENT)
Dept: PULMONOLOGY | Facility: CLINIC | Age: 64
End: 2024-04-08

## 2024-04-08 PROBLEM — R13.10 DYSPHAGIA, UNSPECIFIED: Chronic | Status: ACTIVE | Noted: 2024-03-27

## 2024-04-08 PROBLEM — J95.03 MALFUNCTION OF TRACHEOSTOMY STOMA: Chronic | Status: ACTIVE | Noted: 2024-03-27

## 2024-04-08 PROBLEM — Z87.09 PERSONAL HISTORY OF OTHER DISEASES OF THE RESPIRATORY SYSTEM: Chronic | Status: ACTIVE | Noted: 2024-03-27

## 2024-04-08 NOTE — HISTORY OF PRESENT ILLNESS
[Home] : at home, [unfilled] , at the time of the visit. [Medical Office: ([facility])___] : at the medical office located in  [Verbal consent obtained from patient] : the patient, [unfilled] [[facility]] : Post-hospitalization from [facility] [Admitted on: ___] : The patient was admitted on [unfilled] [Discharged on ___] : discharged on [unfilled] [Discharge Summary] : discharge summary [Pertinent Labs] : pertinent labs [Discharge Med List] : discharge medication list [Med Reconciliation] : medication reconciliation has been completed [Patient Contacted By: ____] : and contacted by [unfilled] [FreeTextEntry2] :  65 YO F with PMHx of HTN, COPD and chronic hypoxemic hypercapnic respiratory failure, s/p tracheostomy with ventilator dependence (2004) complicated by tracheomalacia and tracheal stenosis, admitted to RCU for scheduled trach exchange (6 CUFFED SHILEY) and esophageal dilation. Pt tolerated surgery well without post-operative complications but 2/2 prolonged sedation following anesthesia pt was unable to successfully undergo SLP evaluation. Pt now s/p SLP evaluation with recommendation to continue current diet with PEG feeds.

## 2024-05-01 ENCOUNTER — APPOINTMENT (OUTPATIENT)
Dept: OTOLARYNGOLOGY | Facility: CLINIC | Age: 64
End: 2024-05-01
Payer: MEDICAID

## 2024-05-01 PROCEDURE — 31575 DIAGNOSTIC LARYNGOSCOPY: CPT | Mod: 78,59

## 2024-05-01 PROCEDURE — 99213 OFFICE O/P EST LOW 20 MIN: CPT | Mod: 24,25

## 2024-05-01 PROCEDURE — 31615 TRCHEOBRNCHSC EST TRACHS INC: CPT | Mod: 78,59

## 2024-05-01 RX ORDER — AMOXICILLIN 500 MG/1
500 CAPSULE ORAL TWICE DAILY
Qty: 20 | Refills: 0 | Status: ACTIVE | COMMUNITY
Start: 2024-05-01 | End: 1900-01-01

## 2024-05-01 RX ORDER — AMOXICILLIN AND CLAVULANATE POTASSIUM 400; 57 MG/5ML; MG/5ML
400-57 POWDER, FOR SUSPENSION ORAL
Qty: 3 | Refills: 0 | Status: COMPLETED | COMMUNITY
Start: 2023-11-03 | End: 2024-05-01

## 2024-05-13 NOTE — PACU DISCHARGE NOTE - NAUSEA/VOMITING:
Goal Outcome Evaluation:  Plan of Care Reviewed With: patient        Progress: no change  Outcome Evaluation: Pt. soa with exertion. Notified md of c/o pain in head and back. Medicated as ordered. Cont. plan of care.                                None

## 2024-05-16 ENCOUNTER — APPOINTMENT (OUTPATIENT)
Dept: PULMONOLOGY | Facility: CLINIC | Age: 64
End: 2024-05-16

## 2024-05-18 NOTE — PHYSICAL EXAM
[Midline] : trachea located in midline position [Laryngoscopy Performed] : laryngoscopy was performed, see procedure section for findings [Normal] : no rashes [FreeTextEntry1] : stretcher-bound, on oxygen and ventilator through trach, no retractions or distress [de-identified] : trach tube in place with multiple gauze and loose , 6 LPC trach  [de-identified] : mildly raspy voice, hyperfunctional, easily understood

## 2024-05-18 NOTE — HISTORY OF PRESENT ILLNESS
[de-identified] : 64 year old female following up for chronic respiratory and dysphagia.  s/p Micro direct laryngoscopy, bronchoscopy with excision of tracheal stenosis, stomaplasty with tracheostomy tube exchange, esophagoscopy, esophageal dilation with serial dilators 04/01/24 Reports some bleeding - coughing up occasional bloody mucus with green/yellow mucus Reports recent fever.  Current trach size is 6CN75R - reports having some foul smelling secretions with dried blood Denies bleeding, bruising, swelling around the trach Reports difficulty swallowing with solids- able to tolerate thin liquids. PEG recently replaced about a month ago after coming out - tolerating bolus feeding ([employer])

## 2024-05-18 NOTE — CONSULT LETTER
[Courtesy Letter:] : I had the pleasure of seeing your patient, [unfilled], in my office today. [Please see my note below.] : Please see my note below. [Sincerely,] : Sincerely, [FreeTextEntry3] : Gaurang Rodriguez MD, PhD\par  Chief, Division of Laryngology\par  Department of Otolaryngology\par  [facility]\par  Pediatric Otolaryngology, [facility]\par   of Otolaryngology\par  [facility] [employer]\par

## 2024-05-21 ENCOUNTER — APPOINTMENT (OUTPATIENT)
Dept: PULMONOLOGY | Facility: CLINIC | Age: 64
End: 2024-05-21

## 2024-05-31 NOTE — ED PROVIDER NOTE - NS ED MD TWO NIGHTS YN
External INR result from PST, result of 2.3. Goal range should be 2.0-3.0.   
Referring MD: Dr. Aron Maldonado  AAC orders exp: 3/7/25    Diagnosis: Recurrent PE   Goal range: 2.0-3.0  Spoke with patient via phone regarding anticoagulation monitoring.   Last INR on 5/17/24 was 2.6.  Dose maintained.   Today's INR is 2.3 per home meter and is within goal range.    Current warfarin total weekly dose of 57.5 mg verified.  Informed the INR result is within therapeutic range and instructed to maintain current dose per protocol. Discussed dose and return date of 2 wks per home meter for next INR. See Anticoagulation flowsheet.     is in the office today supervising the treatment.    Instructed to contact the clinic with any unusual bleeding or bruising, any changes in medications, diet, health status, lifestyle, or any other changes, questions or concerns. Verbalized understanding of all discussed.     
Yes

## 2024-06-12 NOTE — PATIENT PROFILE ADULT - NSFALLSECTIONLABEL_GEN_A_CORE
Continued Stay Note   Humberto     Patient Name: Jeannie Ruiz  MRN: [id number]  Today's Date: 6/12/2024    Admit Date: 6/5/2024    Plan: DC Plan: Home with [facility] pending acceptance. Nocturnal Home Oxygen per [facility]. Orders placed. CABG 6/6/2024.   Discharge Plan       Row Name 06/12/24 1445       Plan    Plan DC Plan: Home with [facility] pending acceptance. Nocturnal Home Oxygen per [facility]. Orders placed. CABG 6/6/2024.    Patient/Family in Agreement with Plan yes    Provided Post Acute Provider List? Yes    Post Acute Provider List Home Health    Provided Post Acute Provider Quality & Resource List? Yes    Post Acute Provider Quality and Resource List Home Health    Delivered To Patient    Plan Comments CM was contacted by CV surgery NP Madeline Grover and informed that patient is asking to go to rehab. CM spoke with patient and daughter at bedside. CM explained patient is walking over 70 feet with therapy and is not appropriate for rehab from an insurance approval perspective, unless she would like to private pay. Patient is unable to private pay. CM offered home health for services if she is interested. Patient would like to have home health. CM reviewed list with patient and daughter. Patient agreeable to 1) [facility] and 2) [facility]. CM placed referral in basket for [facility] and liaison notified. Gabriela states they will have to decline due to inability to see patient timely. CM placed referral in basket for [facility] and liaison notified. Awaiting confirmation of acceptance at this time. CM reached out to [facility] liaison who verified they have received order and overnight oximetry and will accept at their [place], [place] office. CM placed phone number to call for oxygen delivery on AVS. CM anticipated DC home today. Patient to transport home via private vehicle with daughter. No barriers.               Expected Discharge Date and Time       Expected Discharge Date Expected Discharge Time    Jun 12,  Speech Pathology:    Aware per medical chart review that pt's family is pursuing hospice care. Will continue to follow peripherally to assist with d/c planning and family education as appropriate.    2024           Met with patient in room   Maintain distance greater than six feet and spent less than fifteen minutes in the room.      Amanda Eduardo, AUGUST    Office Phone: [phone]  Office Cell:     [phone]       .

## 2024-07-14 ENCOUNTER — NON-APPOINTMENT (OUTPATIENT)
Age: 64
End: 2024-07-14

## 2024-07-17 ENCOUNTER — NON-APPOINTMENT (OUTPATIENT)
Age: 64
End: 2024-07-17

## 2024-07-17 ENCOUNTER — APPOINTMENT (OUTPATIENT)
Dept: OTOLARYNGOLOGY | Facility: CLINIC | Age: 64
End: 2024-07-17

## 2024-07-19 ENCOUNTER — NON-APPOINTMENT (OUTPATIENT)
Age: 64
End: 2024-07-19

## 2024-07-24 ENCOUNTER — APPOINTMENT (OUTPATIENT)
Dept: PULMONOLOGY | Facility: CLINIC | Age: 64
End: 2024-07-24

## 2024-07-30 NOTE — DIETITIAN INITIAL EVALUATION ADULT. - NS FNS CHANGE IN WEIGHT
Patient presents to the ER with complaints of back pain after a fall 11 days ago. Patient went to her doctor Thursday, had an xray done, unknown results at this time. Patient ambulatory with pain, denies numbness/tingling but states she has sciatica and chronic back pain on morphine daily at home. Loss

## 2024-08-09 NOTE — CHART NOTE - NSCHARTNOTEFT_GEN_A_CORE
From: Leonel Rocha  To: Colin Weinstein MD  Sent: 8/9/2024 9:05 AM CDT  Subject: Afib is back as of Monday    my loop recorder care team from [facility] gave me a call today saying that my AFF has been going since Monday and so I checked on my Fitbit demetria after I got the demetria working on my new iphoneit and verified also that that was happening. I don’t feel any heart feeling. I do feel some weakness whine walking longer distances with my rollator walker. so I wanted to let you know, I’m sure they probably contact you so That’s an update. Thanks bye    Spoke to pts home nurse Rose Mary Osman at [phone] who states that the patient was sent in by Dr. Rodriguez because he wants her Trach evaluated/potentially changed.   She states pt has been eating pureed food for the past 4 months since the nurse started caring for pt but she's been eating less and says she feels food stuck in chest and has been afraid to eat, nurse has been trying to give pt pedialyte. Last week pt didn't sleep for 2 days in a row, nurse states pt is anxious she's dying and she only sleeps during the day. States pt feels weak lately.     Reached out to Dr. Rodriguez's office [phone], spoke to staff, they state he is in surgery today. Will try to contact ENT team here.

## 2024-08-16 NOTE — ASU PREOP CHECKLIST - AS BP NONINV SITE
left upper arm
What Type Of Note Output Would You Prefer (Optional)?: Bullet Format
Hpi Title: Evaluation of Skin Lesions
How Severe Are Your Spot(S)?: mild
Have Your Spot(S) Been Treated In The Past?: has not been treated

## 2024-08-28 ENCOUNTER — APPOINTMENT (OUTPATIENT)
Dept: OTOLARYNGOLOGY | Facility: CLINIC | Age: 64
End: 2024-08-28

## 2024-08-28 VITALS — SYSTOLIC BLOOD PRESSURE: 114 MMHG | DIASTOLIC BLOOD PRESSURE: 69 MMHG | HEART RATE: 71 BPM | OXYGEN SATURATION: 100 %

## 2024-08-28 PROCEDURE — 99214 OFFICE O/P EST MOD 30 MIN: CPT | Mod: 25

## 2024-08-28 PROCEDURE — 31615 TRCHEOBRNCHSC EST TRACHS INC: CPT

## 2024-08-28 NOTE — REASON FOR VISIT
[Subsequent Evaluation] : a subsequent evaluation for [FreeTextEntry2] : dysphagia  [Formal Caregiver] : formal caregiver

## 2024-08-28 NOTE — HISTORY OF PRESENT ILLNESS
[de-identified] : 64 year old female following up for chronic respiratory and dysphagia.  s/p Micro direct laryngoscopy, bronchoscopy with excision of tracheal stenosis, stomaplasty with tracheostomy tube exchange, esophagoscopy, esophageal dilation with serial dilators 04/01/24 Occasionally coughs up bloody mucus with green/yellow mucus. Last trach change was in the office in May Breating is stable Reports recent fever.  Current trach size is [id number]  Denies bleeding, bruising, swelling around the trach Reports difficulty swallowing with solids- able to tolerate thin liquids. Has a PEG.

## 2024-08-28 NOTE — CONSULT LETTER
[Courtesy Letter:] : I had the pleasure of seeing your patient, [unfilled], in my office today. [Please see my note below.] : Please see my note below. [Sincerely,] : Sincerely, [FreeTextEntry3] : Gaurang Rodriguez MD, PhD\par  Chief, Division of Laryngology\par  Department of Otolaryngology\par  [facility]\par  Pediatric Otolaryngology, [facility]\par   of Otolaryngology\par  [employer]\par

## 2024-08-28 NOTE — PHYSICAL EXAM
[FreeTextEntry1] : stretcher-bound, on oxygen and ventilator through trach, no retractions or distress [de-identified] : trach tube in place with multiple gauze and loose , 6 LPC trach  [Midline] : trachea located in midline position [Laryngoscopy Performed] : laryngoscopy was performed, see procedure section for findings [Normal] : no rashes [de-identified] : mildly raspy voice, hyperfunctional, easily understood

## 2024-09-05 ENCOUNTER — APPOINTMENT (OUTPATIENT)
Dept: PULMONOLOGY | Facility: CLINIC | Age: 64
End: 2024-09-05

## 2024-10-01 ENCOUNTER — APPOINTMENT (OUTPATIENT)
Dept: PULMONOLOGY | Facility: CLINIC | Age: 64
End: 2024-10-01
Payer: MEDICAID

## 2024-10-01 VITALS
HEART RATE: 76 BPM | SYSTOLIC BLOOD PRESSURE: 135 MMHG | HEIGHT: 65 IN | RESPIRATION RATE: 14 BRPM | WEIGHT: 132 LBS | OXYGEN SATURATION: 100 % | TEMPERATURE: 97.6 F | DIASTOLIC BLOOD PRESSURE: 85 MMHG | BODY MASS INDEX: 21.99 KG/M2

## 2024-10-01 DIAGNOSIS — J47.9 BRONCHIECTASIS, UNCOMPLICATED: ICD-10-CM

## 2024-10-01 DIAGNOSIS — J44.9 CHRONIC OBSTRUCTIVE PULMONARY DISEASE, UNSPECIFIED: ICD-10-CM

## 2024-10-01 DIAGNOSIS — R93.89 ABNORMAL FINDINGS ON DIAGNOSTIC IMAGING OF OTHER SPECIFIED BODY STRUCTURES: ICD-10-CM

## 2024-10-01 DIAGNOSIS — J96.11 CHRONIC RESPIRATORY FAILURE WITH HYPOXIA: ICD-10-CM

## 2024-10-01 PROCEDURE — 99214 OFFICE O/P EST MOD 30 MIN: CPT

## 2024-10-01 NOTE — REASON FOR VISIT
[Follow-Up] : a follow-up visit [Abnormal CXR/ Chest CT] : an abnormal CXR/ chest CT [Bronchiectasis] : bronchiectasis [Shortness of Breath] : shortness of breath [Formal Caregiver] : formal caregiver [Other: _____] : [unfilled]

## 2024-10-03 NOTE — DISCUSSION/SUMMARY
[FreeTextEntry1] : 8/4/2023 CHEST CT (COMPARISON: CT chest 8/15/2022, CT chest abdomen pelvis 3/10/2020): Tracheostomy cannula tip in the proximal trachea. Trace secretions in the trachea. Emphysematous changes. No significant change in mild lower lobe predominant bronchiectasis. Unchanged linear opacities associated with underlying bronchiectasis and mild architectural distortion in the right upper lobe and right lower lobe. Unchanged thin linear scarring in the lower lobes and left upper lobe. No pleural effusion.

## 2024-10-03 NOTE — REVIEW OF SYSTEMS
[Cough] : cough [Sputum] : sputum [Negative] : Endocrine [Poor Appetite] : poor appetite [Fever] : no fever [Fatigue] : no fatigue [Chills] : no chills [Recent Wt Loss (___ Lbs)] : ~T recent [unfilled] lb weight loss [Chest Tightness] : no chest tightness [Dyspnea] : no dyspnea [Wheezing] : no wheezing [SOB on Exertion] : no sob on exertion

## 2024-10-03 NOTE — HISTORY OF PRESENT ILLNESS
[TextBox_4] : PATRIZIA NAVARRETE is a 64-year-old, former smoking, female. She has past medical history of atypical chest pain, COPD, chronic hypoxemia , respiratory failure for 10 years, tracheostomy, tracheostenosis and tracheomalacia who is s/p bronchoscopy with revision of the tracheostomy with dilation of the tracheostomy, Chronic colonization of aeruginosa with pseudomonas, Difficulty with mucus clearance GERD, laryngeal stenosis, OW, s/p PEG. Patient accompanied by formal caregiver and 2 EMS personal as patient arrives via ambulette. She presents for a follow up visit.  Her chief concern is increased mucus plugging x 3 months.   Patient states she has been unable to obtain nebulized Acetylcysteine from any pharmacy and feels increased mucus plugging because of it. She states difficulty tolerating food which she also attributes to mucus plugs blocking trach and causing her not to be able to breathe or eat. She states her chest vest has been broken for months and she has not been using it.  She notes last hospitalization was at [facility] 2 months ago.   Patient denies fever/chills, increased fatigue, cough, wheezing, shortness of breath at rest or exertion, or chest tightness at this time.

## 2024-10-03 NOTE — ASSESSMENT
[FreeTextEntry1] : PATRIZIA NAVARRETE is a 64-year-old, former smoking, female. She has past medical history of atypical chest pain, COPD, chronic hypoxemia , respiratory failure for 10 years, tracheostomy, tracheostenosis and tracheomalacia who is s/p bronchoscopy with revision of the tracheostomy with dilation of the tracheostomy, Chronic colonization of aeruginosa with pseudomonas, Difficulty with mucus clearance GERD, laryngeal stenosis, OW, s/p PEG. Patient accompanied by formal caregiver and 2 EMS personal as patient arrives via ambulette. She presents for a follow up visit. Her chief concern is increased mucus plugging x 3 months.   1.  COPD/mucopurulent chronic bronchitis: - Continue DuoNeb Q6H PRN - continue Performist nebulizer 1 unit dose BID - continue Yuperli 1 unit dose QD via nebulizer - continue Pulmicort 0.5 nebulizer BID - Restart Acetylcysteine/Mucomyst via nebulizer (will see if office can help locate a pharmacy that carries it).  - Will RX new chest vest as she states current one has battery issues.   2. Chronic Respiratory Failure -Trach collar- goal up to 12hrs per day (not doing) - 3 L SIMV: VT- 450; PC 20; PS-18; PEEP- 5 (mostly on vent) - Night CPAP 5/PS 18 (back up 8 B/mm)  - continue follow up with Dr. Rodriguez, ENT.   3. Abnormal CT: confirm Bronchiectasis / pulmonary nodule -s/p CT of the chest -(no present) -s/p 8/2022, 8/2023, next 8/2024 (overdue)  4. GERD -(s/p EgD +/- colonoscopy- negative ) #1 issue - Recommend GI f/u -continue Pepcid 40 mg QHS -continue Reglan 5 mg pre-meal, QHS -continue Protonix 40 mg qAM ;  5. Sensory neuropathic Cough: - Continue Amitriptyline 10 mg up to TID (patient states she does not use).   Patient to follow up with Dr. Nova as scheduled for 3/5/2025.  Patient to call with further questions and concerns. Patient verbalizes understanding of care plan and is agreeable.

## 2024-10-03 NOTE — HISTORY OF PRESENT ILLNESS
[TextBox_4] : PATRIZIA NAVARRETE is a 64-year-old, former smoking, female. She has past medical history of atypical chest pain, COPD, chronic hypoxemia , respiratory failure for 10 years, tracheostomy, tracheostenosis and tracheomalacia who is s/p bronchoscopy with revision of the tracheostomy with dilation of the tracheostomy, Chronic colonization of aeruginosa with pseudomonas, Difficulty with mucus clearance GERD, laryngeal stenosis, OW, s/p PEG. Patient accompanied by formal caregiver and 2 EMS personal as patient arrives via ambulette. She presents for a follow up visit.  Her chief concern is increased mucus plugging x 3 months.   Patient states she has been unable to obtain nebulized Acetylcysteine from any pharmacy and feels increased mucus plugging because of it. She states difficulty tolerating food which she also attributes to mucus plugs blocking trach and causing her not to be able to breathe or eat. She states her chest vest has been broken for months and she has not been using it.  She notes last hospitalization was at [facility] 2 months ago.   Patient denies fever/chills, increased fatigue, cough, wheezing, shortness of breath at rest or exertion, or chest tightness at this time.

## 2024-10-03 NOTE — PHYSICAL EXAM
[No Acute Distress] : no acute distress [No Deformities] : no deformities [Normal Rate/Rhythm] : normal rate/rhythm [Normal S1, S2] : normal s1, s2 [No Murmurs] : no murmurs [No Resp Distress] : no resp distress [Clear to Auscultation Bilaterally] : clear to auscultation bilaterally [No Abnormalities] : no abnormalities [No Clubbing] : no clubbing [No Cyanosis] : no cyanosis [No Edema] : no edema [FROM] : FROM [Normal Color/ Pigmentation] : normal color/ pigmentation [No Focal Deficits] : no focal deficits [Oriented x3] : oriented x3 [Normal Affect] : normal affect [TextBox_11] : trach present  [TextBox_68] : Mild expiratory wheezes auscultated.

## 2024-10-03 NOTE — HISTORY OF PRESENT ILLNESS
[TextBox_4] : PATRIZIA NAVARRETE is a 64-year-old, former smoking, female. She has past medical history of atypical chest pain, COPD, chronic hypoxemia , respiratory failure for 10 years, tracheostomy, tracheostenosis and tracheomalacia who is s/p bronchoscopy with revision of the tracheostomy with dilation of the tracheostomy, Chronic colonization of aeruginosa with pseudomonas, Difficulty with mucus clearance GERD, laryngeal stenosis, OW, s/p PEG. Patient accompanied by formal caregiver and 2 EMS personal as patient arrives via ambulette. She presents for a follow up visit.  Her chief concern is increased mucus plugging x 3 months.   Patient states she has been unable to obtain nebulized Acetylcysteine from any pharmacy and feels increased mucus plugging because of it. She states difficulty tolerating food which she also attributes to mucus plugs blocking trach and causing her not to be able to breathe or eat. She states her chest vest has been broken for months and she has not been using it.  She notes last hospitalization was at [facility] 2 months ago.   Patient denies fever/chills, increased fatigue, cough, wheezing, shortness of breath at rest or exertion, or chest tightness at this time.

## 2024-10-10 NOTE — ASU PATIENT PROFILE, ADULT - AS SC BRADEN MOISTURE
[Time Spent: ___ minutes] : I have spent [unfilled] minutes of time on the encounter which excludes teaching and separately reported services.
(4) rarely moist

## 2024-10-16 ENCOUNTER — APPOINTMENT (OUTPATIENT)
Dept: OTOLARYNGOLOGY | Facility: CLINIC | Age: 64
End: 2024-10-16

## 2024-10-16 VITALS
SYSTOLIC BLOOD PRESSURE: 115 MMHG | DIASTOLIC BLOOD PRESSURE: 76 MMHG | OXYGEN SATURATION: 100 % | HEART RATE: 68 BPM | BODY MASS INDEX: 22.82 KG/M2 | HEIGHT: 65 IN | WEIGHT: 137 LBS

## 2024-10-16 PROCEDURE — 31615 TRCHEOBRNCHSC EST TRACHS INC: CPT

## 2024-10-16 PROCEDURE — 99214 OFFICE O/P EST MOD 30 MIN: CPT | Mod: 25

## 2024-12-04 ENCOUNTER — APPOINTMENT (OUTPATIENT)
Dept: OTOLARYNGOLOGY | Facility: CLINIC | Age: 64
End: 2024-12-04

## 2024-12-21 NOTE — PROGRESS NOTE ADULT - ASSESSMENT
63 yo F w/ PMHx of HTN, COPD, chronic respiratory failure, s/p tracheostomy on ventilator (2004), tracheomalacia, tracheal stenosis, s/p trach replacement, s/p bronchoscopy for stomaplasty (2022), pneumothorax, dysphagia s/p PEG, atypical chest pain on Nitro prn (last few weeks only per patient), recent visit to [facility] 2 weeks ago for dislodged PEG s/p replacement, bronchiectasis, recent pneumonia s/p abx 3 weeks ago. Presented for scheduled trach exchange and esophageal dilation. Pending SLP eval per ENT request. Admitted to RCU pending SLP.     PLAN:  NEURO  - AO x3, mouths words and can communicate via writing  - Pain control: Tylenol, Oxy PRN    ENT  - Hx of COPD, tracheomalacia/tracheal stenosis with chronic resp failure requiring MV  - S/P DL, stomaplasty with tracheostomy tube exchange, and esophageal dilation with ENT (4/1)  - C/w trach care daily    RESP  - Hx of COPD, tracheomalacia/tracheal stenosis with chronic resp failure requiring MV  - Per pt she remains on CPAP ATC at home and at times has been able to come off the vent  - C/w CPAP 16/5/30  - Will place back on AC Mode ventilation overnight for now  - Blood gas on admission with no e/o hypercapnea nor acidosis  - Will RPT blood gas 4/2  - Airway clearance with Albuterol and Chest PT    CV  - Reporting chest pain for the past few weeks and reportedly underwent cardiac workup 2 weeks ago including CT coronaries which pt states was normal however was told her heart was "thick and stiff"  - Intermittent complaints of chest pain here and Trop NEG in house   - EKG (4/2/24) showing NSR with RBBB  - EKG (dating 1/21/2021) also with RBBB seen   - Episodes of hypotension on 4/2 while in PACU  - Can trial IVF if needed to assess response  - Follows with Cardiologist Dr. Ange Bryan ([facility])  - Ordered for SL NTG PRN chest pain   - Attempt to obtain/review prior cardiac w/u tomorrow     GI/NUTRITION  - Hx dysphagia in s/o MV requiring PEG tube  - Reportedly had dislodged PEG and had it replaced 2 weeks ago at [facility]- pt reports tube was switched from 22F to 18F  - Per patient reports continued pain at PEG site & reports food backs up and leaks out of tube at home with feeds so will consult IR to evaluate  - PEG tube in place but reported has eaten pureed food despite MV at home  - Per ENT requesting SLP eval to determine eligibility for PO Diet despite MV   - C/w PEG TF for now pending SLP tomorrow    RENAL/  - No acute issues  - Voiding spontaneously   - Monitor BUN/Cr and Uo    HEME  - DVT ppx - SQH  - Continue home [id number]    ID  - Afebrile, no leukocytosis. Monitor off abx     ENDO  - Monitor blood glucose   - Holding home Dapagliflozin  - FSBG and ISS Q6H for now while on TF       DISPO: Pt reports she lives at home with 24H nursing services and her  in [place]. Plan for DC back home when able   CODE: FULL 65 yo F w/ PMHx of HTN, COPD, chronic respiratory failure, s/p tracheostomy on ventilator (2004), tracheomalacia, tracheal stenosis, s/p trach replacement, s/p bronchoscopy for stomaplasty (2022), pneumothorax, dysphagia s/p PEG, atypical chest pain on Nitro prn (last few weeks only per patient), recent visit to [facility] 2 weeks ago for dislodged PEG s/p replacement, bronchiectasis, recent pneumonia s/p abx 3 weeks ago. Presented for scheduled trach exchange and esophageal dilation. Admitted for SLP eval per ENT request.    PLAN:  NEURO  - AO x3, mouths words and can communicate via writing  - Pain control: Tylenol, Oxy PRN    ENT  - Hx of COPD, tracheomalacia/tracheal stenosis with chronic resp failure requiring MV  - S/P DL, stomaplasty (silver nitrate) with tracheostomy tube exchange, and esophageal dilation with ENT (4/1)  - C/w trach care daily    RESP  - Hx of COPD, tracheomalacia/tracheal stenosis with chronic resp failure requiring MV  - Per pt she remains on CPAP ATC at home and at times has been able to come off the vent  - C/w CPAP 16/5/30  - Will place back on AC Mode ventilation overnight for now  - Blood gas on admission with no e/o hypercapnea nor acidosis  - Will RPT blood gas 4/2  - Airway clearance with Albuterol and Chest PT    CV  - Reporting chest pain for the past few weeks and reportedly underwent cardiac workup 2 weeks ago including CT coronaries which pt states was normal however was told her heart was "thick and stiff"  - Intermittent complaints of chest pain here and Trop NEG in house   - EKG (4/2/24) showing NSR with RBBB  - EKG (dating 1/21/2021) also with RBBB seen   - Episodes of hypotension on 4/2 while in PACU  - Can trial IVF if needed to assess response  - Follows with Cardiologist NP Ange Goldman ([facility])  - Ordered for SL NTG PRN chest pain   - Attempt to obtain/review prior cardiac w/u tomorrow     GI/NUTRITION  - Hx dysphagia in s/o MV requiring PEG tube  - Reportedly had dislodged PEG and had it replaced 2 weeks ago at [facility]- pt reports tube was switched from 22F to 18F  - Per patient reports continued pain at PEG site & reports food backs up and leaks out of tube at home with feeds so will consult IR to evaluate  - IR recommending treatment of possible gastroparesis and will consider GJ conversion if continued leakage  - Start Reglan TID (4/3) for gastric motility  - PEG tube in place but reported has eaten pureed food despite MV at home  - S/P SLP eval (4/3) recommending c/w PEG TF WITH ALLOWANCE of PUREED diet   - Per patient, has been told by Nutritionist in the past that she will require TF to supplement oral diet in order to maintain nutritional needs  - Nutrition consult pending    RENAL/  - No acute issues  - Voiding spontaneously   - Monitor BUN/Cr and Uo    HEME  - DVT ppx - SQH  - Continue home [id number]    ID  - Afebrile, no leukocytosis. Monitor off abx     ENDO  - Monitor blood glucose   - Holding home Dapagliflozin  - FSBG and ISS Q6H for now while on TF       DISPO: Pt reports she lives at home with 24H nursing services and her  in [place]. Plan for DC back home when able   CODE: FULL no weight-bearing restrictions

## 2025-01-07 ENCOUNTER — NON-APPOINTMENT (OUTPATIENT)
Age: 65
End: 2025-01-07

## 2025-01-17 ENCOUNTER — APPOINTMENT (OUTPATIENT)
Dept: OTOLARYNGOLOGY | Facility: CLINIC | Age: 65
End: 2025-01-17

## 2025-01-31 ENCOUNTER — NON-APPOINTMENT (OUTPATIENT)
Age: 65
End: 2025-01-31

## 2025-02-26 ENCOUNTER — APPOINTMENT (OUTPATIENT)
Dept: OTOLARYNGOLOGY | Facility: CLINIC | Age: 65
End: 2025-02-26
Payer: MEDICAID

## 2025-02-26 ENCOUNTER — NON-APPOINTMENT (OUTPATIENT)
Age: 65
End: 2025-02-26

## 2025-02-26 PROCEDURE — 99214 OFFICE O/P EST MOD 30 MIN: CPT | Mod: 25

## 2025-02-26 PROCEDURE — 31615 TRCHEOBRNCHSC EST TRACHS INC: CPT

## 2025-03-03 ENCOUNTER — APPOINTMENT (OUTPATIENT)
Dept: PULMONOLOGY | Facility: CLINIC | Age: 65
End: 2025-03-03

## 2025-03-05 NOTE — ED ADULT NURSE NOTE - PLAN OF CARE
Bedside visitors/Position of comfort/Fall precautions/Explanation of exam/test 4 = No assist / stand by assistance

## 2025-03-07 RX ORDER — SODIUM CHLORIDE SOLN NEBU 7% 7 %
7 NEBU SOLN INHALATION
Qty: 1 | Refills: 2 | Status: ACTIVE | COMMUNITY
Start: 2025-03-07 | End: 1900-01-01

## 2025-04-03 ENCOUNTER — APPOINTMENT (OUTPATIENT)
Dept: PULMONOLOGY | Facility: CLINIC | Age: 65
End: 2025-04-03

## 2025-04-09 NOTE — DISCHARGE NOTE NURSING/CASE MANAGEMENT/SOCIAL WORK - NSTOBACCONEVERSMOKERY/N_GEN_A
Incoming call from patient. Patient is ready to schedule to surgery. Please call back when able.    No

## 2025-04-10 NOTE — PATIENT PROFILE ADULT. - MENTAL HEALTH CONDITIONS/SYMPTOMS, PROFILE
Please call Vicente Coombs in 2 weeks to evaluate pain response to Glenohumeral Joint Steroid Injection with Ultrasound Right  performed by CODY Durán on 3/27/2025.    Pre-pain score: 8    Thank you.  
Post-Procedure Pain Evaluation Only (Two Week Call):    Telephone call to patient to evaluate pain response to Glenohumeral Joint Steroid Injection with Ultrasound Right  performed by CODY Durán on 3/27/2025.        Pre-procedure pain score: 8    Post-procedure pain score: 2    % Pain Relief - 75% relief    Improvement in functionality with activities of daily livin%    Next office visit: \"I get up and I do not feel it anymore. I can move things around better.\"      Next office visit 2025 with Nicol ROSS   
anxiety disorder

## 2025-04-11 ENCOUNTER — EMERGENCY (EMERGENCY)
Facility: HOSPITAL | Age: 65
LOS: 1 days | End: 2025-04-11
Attending: STUDENT IN AN ORGANIZED HEALTH CARE EDUCATION/TRAINING PROGRAM | Admitting: STUDENT IN AN ORGANIZED HEALTH CARE EDUCATION/TRAINING PROGRAM
Payer: MEDICAID

## 2025-04-11 ENCOUNTER — APPOINTMENT (OUTPATIENT)
Dept: OTOLARYNGOLOGY | Facility: CLINIC | Age: 65
End: 2025-04-11

## 2025-04-11 VITALS
OXYGEN SATURATION: 97 % | WEIGHT: 136.91 LBS | TEMPERATURE: 97 F | HEART RATE: 50 BPM | HEIGHT: 60 IN | RESPIRATION RATE: 16 BRPM | SYSTOLIC BLOOD PRESSURE: 152 MMHG | DIASTOLIC BLOOD PRESSURE: 82 MMHG

## 2025-04-11 VITALS
DIASTOLIC BLOOD PRESSURE: 91 MMHG | HEART RATE: 55 BPM | OXYGEN SATURATION: 98 % | SYSTOLIC BLOOD PRESSURE: 141 MMHG | RESPIRATION RATE: 16 BRPM | TEMPERATURE: 98 F

## 2025-04-11 DIAGNOSIS — Z98.890 OTHER SPECIFIED POSTPROCEDURAL STATES: Chronic | ICD-10-CM

## 2025-04-11 DIAGNOSIS — Z43.0 ENCOUNTER FOR ATTENTION TO TRACHEOSTOMY: Chronic | ICD-10-CM

## 2025-04-11 DIAGNOSIS — J39.8 OTHER SPECIFIED DISEASES OF UPPER RESPIRATORY TRACT: Chronic | ICD-10-CM

## 2025-04-11 DIAGNOSIS — Z93.0 TRACHEOSTOMY STATUS: Chronic | ICD-10-CM

## 2025-04-11 DIAGNOSIS — Z93.1 GASTROSTOMY STATUS: Chronic | ICD-10-CM

## 2025-04-11 DIAGNOSIS — Z98.891 HISTORY OF UTERINE SCAR FROM PREVIOUS SURGERY: Chronic | ICD-10-CM

## 2025-04-11 PROCEDURE — 99284 EMERGENCY DEPT VISIT MOD MDM: CPT

## 2025-04-11 RX ORDER — IPRATROPIUM BROMIDE AND ALBUTEROL SULFATE .5; 2.5 MG/3ML; MG/3ML
3 SOLUTION RESPIRATORY (INHALATION) ONCE
Refills: 0 | Status: DISCONTINUED | OUTPATIENT
Start: 2025-04-11 | End: 2025-04-15

## 2025-04-11 NOTE — ED ADULT NURSE NOTE - NSFALLHARMRISKINTERV_ED_ALL_ED

## 2025-04-11 NOTE — ED ADULT NURSE REASSESSMENT NOTE - NS ED NURSE REASSESS COMMENT FT1
Received report from rosa elena Juarez. Pt laying in stretcher comfortably with caregiver at the bedside. NAD. Pt denies any chest pain, SOB, palpitations, dizziness, weakness, or any pain at this time. AxOx4. RR even and unlabored on tracheostomy to vent. Pt on cardiac monitor, NSR. Pending transport and discharge.

## 2025-04-11 NOTE — ED PROVIDER NOTE - NSFOLLOWUPINSTRUCTIONS_ED_ALL_ED_FT
You recently had your tracheostomy tube changed due to a crack. These instructions will help you care for your tracheostomy at home. If you have any questions or concerns, please contact your doctor or home health nurse immediately.    Important Reminders:    Keep your hands clean: Wash your hands thoroughly with soap and water or use an alcohol-based hand  before and after touching your tracheostomy.  Emergency Contact: Keep emergency contact information readily available. Know how to call for help quickly if needed.  Supplies: Ensure you have adequate tracheostomy supplies at home, including spare tubes (one the same size and one a size smaller), inner cannulas, cleaning supplies, and humidification equipment.  Tracheostomy Care:    Humidification: Use a humidifier or nebulizer to keep the air you breathe moist, especially during dry weather or when using oxygen. This helps prevent mucus plugging and keeps your airway comfortable.  Suctioning: Suction your tracheostomy as needed to remove secretions. Your doctor or nurse will have shown you how to do this safely. Don't suction too frequently or too forcefully.  Cleaning around the stoma: Clean the skin around your stoma (the opening in your neck) at least twice daily using sterile gauze or cotton swabs and sterile saline or prescribed cleaning solution. Gently pat the skin dry. Avoid using lotions or creams around the stoma unless specifically instructed by your doctor.  Inner Cannula Care: If your tracheostomy tube has an inner cannula, clean it as directed by your doctor or nurse, usually at least once a day. Soak it in hydrogen peroxide or a prescribed cleaning solution, rinse thoroughly with sterile saline, and replace it securely.  Tube Changes: DO NOT attempt to change the entire tracheostomy tube yourself unless you have been specifically trained and are comfortable doing so. Contact your doctor or home health nurse if you have any concerns about your tube.  Observe for signs of infection: Watch for signs of infection, such as increased redness, swelling, pain, foul-smelling discharge, or fever. Contact your doctor immediately if you notice any of these signs.  When to Seek Medical Attention:    Difficulty breathing  Bleeding from the tracheostomy site  Accidental dislodgement of the tracheostomy tube  Signs of infection (redness, swelling, pain, pus, fever)  Excessive coughing or mucus production  Any changes in the appearance of your stoma  If the tracheostomy tube feels loose or uncomfortable  Follow-up Care:    Schedule and keep all follow-up appointments with your doctor or home health nurse. They will monitor your tracheostomy and ensure proper healing.

## 2025-04-11 NOTE — ED PROVIDER NOTE - CLINICAL SUMMARY MEDICAL DECISION MAKING FREE TEXT BOX
64F w/ PMHx of HTN, COPD, bronchiectasis, chronic respiratory failure, s/p tracheostomy on ventilator (2004), tracheomalacia, tracheal stenosis, s/p trach replacement, s/p bronchoscopy for stomaplasty (2022), pneumothorax, dysphagia s/p PEG presenting for trach exchange. Patient sent by outpatient ENT for cracked tracheostomy collar.     VS stable, patient sating well on MV. Tracheostomy tube intact. Low concern for airway compromise. Will perform trach exchange. 64F w/ PMHx of HTN, COPD, bronchiectasis, chronic respiratory failure, s/p tracheostomy on ventilator (2004), tracheomalacia, tracheal stenosis, s/p trach replacement, s/p bronchoscopy for stomaplasty (2022), pneumothorax, dysphagia s/p PEG presenting for trach exchange. Patient sent by outpatient ENT for cracked tracheostomy collar.     VS stable, patient sating well on MV. Tracheostomy tube appears intact, crack is on top part of faceplate. Low concern for airway compromise. Will perform trach exchange.

## 2025-04-11 NOTE — ED PROVIDER NOTE - PROGRESS NOTE DETAILS
Tracheostomy tube exchanged by myself with new 6LPC provided by patient bedside. Stoma patent, mildly stenotic. Able to place new 6LPC trach tube without difficulty. Pt tolerated trach change well. Venting appropriately after trach exchange, good end tidal c02 (30s-40s), 100% 02 sat on pulse ox, able to suction w/o issue. Will contact SW with assistance for transport home, recommend routine trach care and ENT follow up for any complications. - Alexandre Smith MD. EM Attending Loop closed w/outpatient ENT Dr. Rodriguez who states unclear why pt was re-directed to ED; Dr. Rodriguez updated regarding ED course and agreeable with DC. - Alexandre Smith MD. EM Attending

## 2025-04-11 NOTE — ED ADULT NURSE NOTE - OBJECTIVE STATEMENT
65 year old  female c/o tracheostomy repair. EMS providing additional information. EMS states pt was in transport for the ENT for monthly trach repair, they arrived to ENT office and the office said they could not take the pt at this time. EMS then rerouted to [facility] ED. Pt endorses tracheostomy in cracked at insertion site. Pt denies any SOB, chest pain, palpitations, dizziness, weakness, lightheadedness, or any pain at this time.    AxOx4. Pt alert to voice. Airway is patent. Pt speaking in full sentences. RR even and unlabored with trach to vent. Lung sounds clear to auscultation in all lung fields. Pt on cardiac monitor, sinus bradycardia noted. Abdomen soft, nondistended, and nontender. PEG tube noted to left abdomen. Safety maintained.

## 2025-04-11 NOTE — ED PROVIDER NOTE - PATIENT PORTAL LINK FT
You can access the FollowMyHealth Patient Portal offered by [facility] by registering at the following website: [email or web address]. By joining [facility]’s FollowMyHealth portal, you will also be able to view your health information using other applications (apps) compatible with our system.

## 2025-04-11 NOTE — ED PROVIDER NOTE - OBJECTIVE STATEMENT
64F w/ PMHx of HTN, COPD, bronchiectasis, chronic respiratory failure, s/p tracheostomy on ventilator (2004), tracheomalacia, tracheal stenosis, s/p trach replacement, s/p bronchoscopy for stomaplasty (2022), pneumothorax, dysphagia s/p PEG presenting for trach exchange. Patient sent by outpatient ENT for cracked tracheostomy collar. 64F w/ PMHx of HTN, COPD, bronchiectasis, chronic respiratory failure, s/p tracheostomy on ventilator (2004), tracheomalacia, tracheal stenosis, s/p trach replacement, s/p bronchoscopy for stomaplasty (2022), pneumothorax, dysphagia s/p PEG presenting for trach exchange. Patient sent by outpatient ENT for cracked tracheostomy.

## 2025-04-11 NOTE — ED PROVIDER NOTE - ATTENDING CONTRIBUTION TO CARE
Dr. Smith, Attending Physician-  I performed a face to face bedside interview with patient regarding history of present illness, review of symptoms and past medical history. I completed an independent physical exam.  I have discussed patient's plan of care with the resident.    64F w/ PMHx of HTN, COPD, bronchiectasis, chronic respiratory failure, s/p tracheostomy on ventilator (2004), tracheomalacia, tracheal stenosis, s/p trach replacement, s/p bronchoscopy for stomaplasty (2022), pneumothorax, dysphagia s/p PEG presenting for trach exchange. Patient sent by outpatient ENT for cracked tracheostomy collar.     VS stable, patient satting well on MV. Tracheostomy tube appears intact, crack is on top part of faceplate. Lungs grossly CTAB anteriorly. Satting 100% off vent with nasal cannula O2 4L (per aide at bedside, pt sometimes walks around at home w/o connection to vent). Low concern for airway compromise. Will perform trach exchange in ED, patient agreeable to trach exchange and DC. - Alexandre Smith MD. EM Attending Dr. Smith, Attending Physician-  I performed a face to face bedside interview with patient regarding history of present illness, review of symptoms and past medical history. I completed an independent physical exam.  I have discussed patient's plan of care with the resident.    64F w/ PMHx of HTN, COPD, bronchiectasis, chronic respiratory failure, s/p tracheostomy on ventilator (2004), tracheomalacia, tracheal stenosis, s/p trach replacement, s/p bronchoscopy for stomaplasty (2022), pneumothorax, dysphagia s/p PEG presenting for trach exchange. Pt went to ENT office for monthly trach exchange, they arrived to ENT office and the office said they could not take the pt at this time so EMS was redirected to take patient to [facility] ED. Pt endorses tracheostomy tube crack at insertion site.     VS stable, patient satting well on MV. Tracheostomy tube appears intact, crack is on top part of faceplate. Lungs grossly CTAB anteriorly. Satting 100% off vent with nasal cannula O2 4L (per aide at bedside, pt sometimes walks around at home w/o connection to vent). Low concern for airway compromise. Will perform trach exchange in ED, patient agreeable to trach exchange and DC. - Alexandre Smith MD. EM Attending

## 2025-04-11 NOTE — ED ADULT TRIAGE NOTE - CHIEF COMPLAINT QUOTE
Pt presents to ED via EMS from home with c/o trach complication. Pt was en route to ENT office and MD advised for pt to come to ED for further eval. pt in no apparent distress, arrives on vent.

## 2025-04-11 NOTE — PROVIDER CONTACT NOTE (OTHER) - ASSESSMENT
Pt asked for Medicaid taxi. [employer] arranged [place] [employer] [phone]. [facility] Inv# [id number]. P/Y 4:45 PM. Pt is with a . Address and phone number verified with pt. Taxi will call pt.

## 2025-04-11 NOTE — ED ADULT NURSE REASSESSMENT NOTE - NS ED NURSE REASSESS COMMENT FT1
Break RN: Patient is resting comfortably at this time, NAD, RR even and unlabored, patient remains on ventilator at this time, no c/o pain or SOB, daughter at bedside, patient pending  at this time via ambulance, safety maintained, family updated on plan of care

## 2025-04-11 NOTE — ED PROVIDER NOTE - PHYSICAL EXAMINATION
PHYSICAL EXAM:  GENERAL: NAD  ENMT: No tonsillar erythema, exudates, or enlargement; Moist mucous membranes  NECK: Top part of tracheostomy faceplate cracked. Tube appears intact.  HEART: Regular rate and rhythm; No murmurs, rubs, or gallops  RESPIRATORY: CTA B/L, No W/R/R

## 2025-05-28 ENCOUNTER — NON-APPOINTMENT (OUTPATIENT)
Age: 65
End: 2025-05-28

## 2025-06-03 ENCOUNTER — NON-APPOINTMENT (OUTPATIENT)
Age: 65
End: 2025-06-03

## 2025-06-04 ENCOUNTER — NON-APPOINTMENT (OUTPATIENT)
Age: 65
End: 2025-06-04

## 2025-06-05 ENCOUNTER — NON-APPOINTMENT (OUTPATIENT)
Age: 65
End: 2025-06-05

## 2025-06-06 ENCOUNTER — APPOINTMENT (OUTPATIENT)
Dept: OTOLARYNGOLOGY | Facility: CLINIC | Age: 65
End: 2025-06-06

## 2025-06-06 PROCEDURE — 31615 TRCHEOBRNCHSC EST TRACHS INC: CPT | Mod: 59

## 2025-06-06 PROCEDURE — 31575 DIAGNOSTIC LARYNGOSCOPY: CPT | Mod: 59

## 2025-06-06 PROCEDURE — 99214 OFFICE O/P EST MOD 30 MIN: CPT | Mod: 25

## 2025-06-09 NOTE — PROGRESS NOTE ADULT - ASSESSMENT
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    ##neuro  -no acute issues  -ISTOP Reference #: [id number]  -patient takes zolpidem 5mg prn and clonezepam 0.5mg TID prn  -will continue    ##CV  -no acute issues  -home losartan and norvasc was held last admission  -to restart prn    ##pulmonary  Tracheomalacia s/p trach, pt states she came for trach change, ENT team saw pt yesterday, following along for possible trach change if pt to go to OR for G tube placement  -vent dependence, on collar appx 2 hours/day  -goal up to 12 hours/day  -as outpatient attempting to wean off vent as tolerated   -on the following settings at home: 3 L SIMV: VT- 450; PC 20; PS-18; PEEP- 5, no increase in settings  - Night CPAP 5/PS 18 (back up 8 B/mm)    COPD  -on azithromycin TIW for severe COPD  -outpatient pulmonology recently changed medications  -Albuterol/ipratropium inhaler PRN  -has multiple other inhaler prescriptions, unclear what patient takes at home (per home nurse, pt takes albuterol nebs as inhalers dry her mouth)    ##renal  -K and Mg stable today    ##GI  - GI team to see patient today, with scheduled G tube placement for morning of 5/22    FTT  -has had prior EGD and colonoscopy which were unremarkable  -prior infectious and malignancy workup in March also unrevealing   -no associated lab derangements  -evaluated by nutrition on last admission  -dysphagia eval, placed on dysphagia diet  - to get G tube      ##ID  -doxy dc'jaswinder, pt with chronic tracheobronchitis on Azithro TIW  -has history of polymicrobial sputum culture (MRSAl klebsiella, serratia, achromobacter) and chronic psuedomonas colonization, sputum cx showing kleb pneumo here, no WBC count/ no fevers  -currently does not meet any SIRS criteria  -ID saw patient, no IV abx needed as pt was requesting  -outpatient pulmonology suspicious of potential immunodeficiency given higher rate of infection than anticipated  -to consider sending IgG subclasses, quantitative immunoglobulins, Strep pneumoniae titers, Vitamin D levels  - Covid swab today prior to procedure tomorrow    ##heme  -history of anemia  -consistent with baseline  -no acute issues  - holding Lovenox for 5/22 for procedure    ##endocrine  no acute issues 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    ##neuro  -no acute issues  -ISTOP Reference #: [id number]  -patient takes zolpidem 5mg prn and clonezepam 0.5mg TID prn  -pt s/p G tube placement with sedation fent/versed, will monitor    ##CV  -no acute issues  -home losartan and norvasc was held last admission  -to restart prn    ##pulmonary  Tracheomalacia s/p trach, pt states she came for trach change, ENT following, attempted trach change done today unsuccessful though doesn't need new trach   -vent dependence, on collar appx 2 hours/day  -goal up to 12 hours/day  -as outpatient attempting to wean off vent as tolerated   -on the following settings at home: 3 L SIMV: VT- 450; PC 20; PS-18; PEEP- 5, no increase in settings  - Night CPAP 5/PS 18 (back up 8 B/mm)    COPD  -on azithromycin TIW for severe COPD  -Albuterol/ipratropium inhaler PRN  -has multiple other inhaler prescriptions, unclear what patient takes at home (per home nurse, pt takes albuterol nebs as inhalers dry her mouth)    ##renal  -no active issues    ##GI  - GI team placed G tube at bedside today, post procedure xray confirming position, f/u with GI when to restart feeds    FTT  -Pt weighed 190lbs Dec 19, currently 140lbs  -has had prior EGD and colonoscopy which were unremarkable  -prior infectious and malignancy workup in March also unrevealing   -no associated lab derangements  -evaluated by nutrition on last admission  -dysphagia eval, placed on dysphagia diet  - Calorie count for three days (to end Monday after mid day meal)  - Nutrition to eval patient for tube feeds after calorie count      ##ID  -doxy dc'd, pt with chronic tracheobronchitis on Azithro TIW  -has history of polymicrobial sputum culture (MRSAl klebsiella, serratia, achromobacter) and chronic psuedomonas colonization, sputum cx showing kleb pneumo here, no WBC count/ no fevers  -currently does not meet any SIRS criteria  -ID saw patient, no IV abx needed as pt was requesting  -outpatient pulmonology suspicious of potential immunodeficiency given higher rate of infection than anticipated  -to consider sending IgG subclasses, quantitative immunoglobulins, Strep pneumoniae titers, Vitamin D levels  - Covid neg x2    ##heme  -history of anemia  -consistent with baseline  -no acute issues  - holding Lovenox 5/22 for procedure, continue 5/23    ##endocrine  no acute issues 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    ##neuro  -no acute issues  -ISTOP Reference #: [id number]  -patient takes zolpidem 5mg prn and clonezepam 0.5mg TID prn  -pt s/p G tube placement with sedation fent/versed, will monitor    ##CV  -no acute issues  -home losartan and norvasc was held last admission  -to restart prn    ##pulmonary  Tracheomalacia s/p trach, pt states she came for trach change, ENT following, attempted trach change done today unsuccessful though doesn't need new trach   -vent dependence, on collar appx 2 hours/day  -goal up to 12 hours/day  -as outpatient attempting to wean off vent as tolerated   -on the following settings at home: 3 L SIMV: VT- 450; PC 20; PS-18; PEEP- 5, no increase in settings  - Night CPAP 5/PS 18 (back up 8 B/mm)    COPD  -on azithromycin TIW for severe COPD  -Albuterol/ipratropium inhaler PRN  -has multiple other inhaler prescriptions, unclear what patient takes at home (per home nurse, pt takes albuterol nebs as inhalers dry her mouth)    ##renal  -no active issues    ##GI  - GI team placed G tube at bedside today, post procedure xray confirming position  - Per Dr. Lazo: patient can eat after procedure, don't use G tube until 24 hrs after procedure    FTT  -Pt weighed 190lbs Dec 19, currently 140lbs, outpatient doctor's notes noting weight loss  -has had prior EGD and colonoscopy which were unremarkable  -prior infectious and malignancy workup in March also unrevealing   -no associated lab derangements  -evaluated by nutrition on last admission  -dysphagia eval, placed on dysphagia diet  - Calorie count for three days (to end Monday after mid day meal)  - Nutrition to eval patient for tube feeds after calorie count      ##ID  -doxy dc'jaswinder, pt with chronic tracheobronchitis on Azithro TIW  -has history of polymicrobial sputum culture (MRSAl klebsiella, serratia, achromobacter) and chronic psuedomonas colonization, sputum cx showing kleb pneumo here, no WBC count/ no fevers  -currently does not meet any SIRS criteria  -ID saw patient, no IV abx needed as pt was requesting  -outpatient pulmonology suspicious of potential immunodeficiency given higher rate of infection than anticipated  -to consider sending IgG subclasses, quantitative immunoglobulins, Strep pneumoniae titers, Vitamin D levels  - Covid neg x2    ##heme  -history of anemia  -consistent with baseline  -no acute issues  - holding Lovenox 5/22 for procedure, continue 5/23    ##endocrine  no acute issues Yes

## 2025-06-11 RX ORDER — CIPROFLOXACIN AND DEXAMETHASONE 3; 1 MG/ML; MG/ML
0.3-0.1 SUSPENSION/ DROPS AURICULAR (OTIC) TWICE DAILY
Qty: 1 | Refills: 1 | Status: ACTIVE | COMMUNITY
Start: 2025-06-11 | End: 1900-01-01

## 2025-06-11 RX ORDER — CEFDINIR 300 MG/1
300 CAPSULE ORAL
Qty: 20 | Refills: 0 | Status: ACTIVE | COMMUNITY
Start: 2025-06-11 | End: 1900-01-01

## 2025-06-17 ENCOUNTER — NON-APPOINTMENT (OUTPATIENT)
Age: 65
End: 2025-06-17

## 2025-07-16 ENCOUNTER — APPOINTMENT (OUTPATIENT)
Dept: OTOLARYNGOLOGY | Facility: CLINIC | Age: 65
End: 2025-07-16

## 2025-07-16 PROCEDURE — 31615 TRCHEOBRNCHSC EST TRACHS INC: CPT

## 2025-07-16 PROCEDURE — 99214 OFFICE O/P EST MOD 30 MIN: CPT | Mod: 25

## 2025-07-16 RX ORDER — CIPROFLOXACIN AND DEXAMETHASONE 3; 1 MG/ML; MG/ML
0.3-0.1 SUSPENSION/ DROPS AURICULAR (OTIC)
Qty: 5 | Refills: 3 | Status: ACTIVE | COMMUNITY
Start: 2025-07-16 | End: 1900-01-01

## 2025-07-30 ENCOUNTER — APPOINTMENT (OUTPATIENT)
Dept: PULMONOLOGY | Facility: CLINIC | Age: 65
End: 2025-07-30
Payer: MEDICARE

## 2025-07-30 VITALS
BODY MASS INDEX: 22.82 KG/M2 | SYSTOLIC BLOOD PRESSURE: 118 MMHG | HEIGHT: 65 IN | WEIGHT: 137 LBS | TEMPERATURE: 97.2 F | DIASTOLIC BLOOD PRESSURE: 70 MMHG | HEART RATE: 67 BPM | OXYGEN SATURATION: 99 % | RESPIRATION RATE: 14 BRPM

## 2025-07-30 DIAGNOSIS — K21.9 GASTRO-ESOPHAGEAL REFLUX DISEASE W/OUT ESOPHAGITIS: ICD-10-CM

## 2025-07-30 DIAGNOSIS — J39.8 OTHER SPECIFIED DISEASES OF UPPER RESPIRATORY TRACT: ICD-10-CM

## 2025-07-30 DIAGNOSIS — R06.02 SHORTNESS OF BREATH: ICD-10-CM

## 2025-07-30 DIAGNOSIS — R93.89 ABNORMAL FINDINGS ON DIAGNOSTIC IMAGING OF OTHER SPECIFIED BODY STRUCTURES: ICD-10-CM

## 2025-07-30 DIAGNOSIS — R63.0 ANOREXIA: ICD-10-CM

## 2025-07-30 DIAGNOSIS — J47.9 BRONCHIECTASIS, UNCOMPLICATED: ICD-10-CM

## 2025-07-30 DIAGNOSIS — J96.11 CHRONIC RESPIRATORY FAILURE WITH HYPOXIA: ICD-10-CM

## 2025-07-30 DIAGNOSIS — J96.12 CHRONIC RESPIRATORY FAILURE WITH HYPOXIA: ICD-10-CM

## 2025-07-30 PROCEDURE — 99204 OFFICE O/P NEW MOD 45 MIN: CPT

## 2025-07-30 PROCEDURE — G2211 COMPLEX E/M VISIT ADD ON: CPT

## 2025-07-30 RX ORDER — SODIUM CHLORIDE SOLN NEBU 7% 7 %
7 NEBU SOLN INHALATION
Qty: 240 | Refills: 3 | Status: ACTIVE | COMMUNITY
Start: 2025-07-30 | End: 1900-01-01

## 2025-07-30 RX ORDER — MIRTAZAPINE 15 MG/1
15 TABLET, FILM COATED ORAL
Qty: 30 | Refills: 4 | Status: ACTIVE | COMMUNITY
Start: 2025-07-30 | End: 1900-01-01

## 2025-08-04 RX ORDER — DEXAMETHASONE SODIUM PHOSPHATE 1 MG/ML
0.1 SOLUTION/ DROPS OPHTHALMIC
Qty: 1 | Refills: 0 | Status: ACTIVE | COMMUNITY
Start: 2025-08-04 | End: 1900-01-01

## 2025-08-04 RX ORDER — CIPROFLOXACIN HYDROCHLORIDE 3 MG/ML
0.3 SOLUTION OPHTHALMIC
Qty: 1 | Refills: 0 | Status: ACTIVE | COMMUNITY
Start: 2025-08-04 | End: 1900-01-01

## 2025-09-09 ENCOUNTER — APPOINTMENT (OUTPATIENT)
Dept: OTOLARYNGOLOGY | Facility: CLINIC | Age: 65
End: 2025-09-09

## 2025-09-09 VITALS
WEIGHT: 137 LBS | HEIGHT: 65 IN | BODY MASS INDEX: 22.82 KG/M2 | DIASTOLIC BLOOD PRESSURE: 94 MMHG | RESPIRATION RATE: 16 BRPM | SYSTOLIC BLOOD PRESSURE: 147 MMHG | HEART RATE: 71 BPM | OXYGEN SATURATION: 100 %

## 2025-09-18 DIAGNOSIS — K22.4 DYSKINESIA OF ESOPHAGUS: ICD-10-CM

## 2025-09-18 DIAGNOSIS — J95.03 MALFUNCTION OF TRACHEOSTOMY STOMA: ICD-10-CM

## (undated) DEVICE — SYR LUER LOK 3CC

## (undated) DEVICE — WARMING BLANKET UPPER ADULT

## (undated) DEVICE — POSITIONER PATIENT SAFETY STRAP 3X60"

## (undated) DEVICE — SUT SILK 2-0 30" SH

## (undated) DEVICE — CANISTER DISPOSABLE THIN WALL 3000CC

## (undated) DEVICE — DRAPE 3/4 SHEET 52X76"

## (undated) DEVICE — GLV 7.5 PROTEXIS (CREAM) MICRO

## (undated) DEVICE — KNIFE ELECTROSURGICAL 4X0.7MM 165CM

## (undated) DEVICE — ELCTR BOVIE TIP BLADE INSULATED 2.75" EDGE

## (undated) DEVICE — STAPLER SKIN VISI-STAT 35 WIDE

## (undated) DEVICE — SOL ANTI FOG

## (undated) DEVICE — TUBING SUCTION NONCONDUCTIVE 6MM X 12FT

## (undated) DEVICE — APPLICATOR COTTON TIP 6" STERLE

## (undated) DEVICE — CATH FLEX SUCTION 5FR 60CM

## (undated) DEVICE — BIPOLAR FORCEP KIRWAN JEWELERS STR 4" X 0.4MM W 12FT CORD (GREEN)

## (undated) DEVICE — LIJ-SATALOFF ANT COMMISSURE LARYNGOSCOPE: Type: DURABLE MEDICAL EQUIPMENT

## (undated) DEVICE — DRSG TELFA 3 X 8

## (undated) DEVICE — LABELS BLANK W PEN

## (undated) DEVICE — CATH SUCTION 6FRX60CM

## (undated) DEVICE — DRSG DUODERM EXTRA THIN 4X4"

## (undated) DEVICE — SUT CHROMIC 4-0 18" S-4

## (undated) DEVICE — SOL IRR POUR H2O 500ML

## (undated) DEVICE — CATH SUCTION 4FRX60CM

## (undated) DEVICE — MADGIC LARYNGO-TRACHEAL MUCOSAL ATOMIZATION DEVICE WITH 3 ML SYRINGE

## (undated) DEVICE — Device

## (undated) DEVICE — VENODYNE/SCD SLEEVE CALF MEDIUM

## (undated) DEVICE — SUT PROLENE 4-0 36" RB-1

## (undated) DEVICE — PACK HEAD & NECK

## (undated) DEVICE — ELCTR GROUNDING PAD ADULT COVIDIEN

## (undated) DEVICE — DRSG STERISTRIPS 0.25 X 3"

## (undated) DEVICE — DRAPE SPLIT SHEET 77" X 108"

## (undated) DEVICE — GLV 7.5 PROTEXIS (WHITE)

## (undated) DEVICE — LUBRICATING JELLY ONESHOT 1.25OZ

## (undated) DEVICE — SYR LUER LOK 10CC

## (undated) DEVICE — ELCTR BOVIE TIP NEEDLE INSULATED 2.8" EDGE